# Patient Record
Sex: FEMALE | Race: WHITE
[De-identification: names, ages, dates, MRNs, and addresses within clinical notes are randomized per-mention and may not be internally consistent; named-entity substitution may affect disease eponyms.]

---

## 2018-10-27 ENCOUNTER — HOSPITAL ENCOUNTER (INPATIENT)
Dept: HOSPITAL 8 - ED | Age: 58
LOS: 4 days | Discharge: PSYCHIATRIC OR SUBSTANCE USE FACILITY | DRG: 917 | End: 2018-10-31
Attending: INTERNAL MEDICINE | Admitting: INTERNAL MEDICINE
Payer: MEDICAID

## 2018-10-27 VITALS — BODY MASS INDEX: 18.78 KG/M2 | WEIGHT: 123.9 LBS | HEIGHT: 68 IN

## 2018-10-27 VITALS — DIASTOLIC BLOOD PRESSURE: 94 MMHG | SYSTOLIC BLOOD PRESSURE: 141 MMHG

## 2018-10-27 DIAGNOSIS — T43.202A: Primary | ICD-10-CM

## 2018-10-27 DIAGNOSIS — G92: ICD-10-CM
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
## 2018-10-27 DIAGNOSIS — F32.9: ICD-10-CM

## 2018-10-27 DIAGNOSIS — R09.02: ICD-10-CM

## 2018-10-27 DIAGNOSIS — E87.0: ICD-10-CM

## 2018-10-27 DIAGNOSIS — F10.239: ICD-10-CM

## 2018-10-27 DIAGNOSIS — F10.229: ICD-10-CM

## 2018-10-27 DIAGNOSIS — E43: ICD-10-CM

## 2018-10-27 DIAGNOSIS — E87.6: ICD-10-CM

## 2018-10-27 LAB
ALBUMIN SERPL-MCNC: 3 G/DL (ref 3.4–5)
ALP SERPL-CCNC: 113 U/L (ref 45–117)
ALT SERPL-CCNC: 33 U/L (ref 12–78)
ANION GAP SERPL CALC-SCNC: 14 MMOL/L (ref 5–15)
APAP SERPL-MCNC: 3 MCG/ML (ref 10–30)
BASOPHILS # BLD AUTO: 0.01 X10^3/UL (ref 0–0.1)
BASOPHILS NFR BLD AUTO: 0 % (ref 0–1)
BILIRUB SERPL-MCNC: 0.2 MG/DL (ref 0.2–1)
CALCIUM SERPL-MCNC: 7.7 MG/DL (ref 8.5–10.1)
CHLORIDE SERPL-SCNC: 112 MMOL/L (ref 98–107)
CREAT SERPL-MCNC: 0.54 MG/DL (ref 0.55–1.02)
EOSINOPHIL # BLD AUTO: 0.16 X10^3/UL (ref 0–0.4)
EOSINOPHIL NFR BLD AUTO: 5 % (ref 1–7)
ERYTHROCYTE [DISTWIDTH] IN BLOOD BY AUTOMATED COUNT: 17.6 % (ref 9.6–15.2)
LYMPHOCYTES # BLD AUTO: 1.66 X10^3/UL (ref 1–3.4)
LYMPHOCYTES NFR BLD AUTO: 50 % (ref 22–44)
MCH RBC QN AUTO: 34 PG (ref 27–34.8)
MCHC RBC AUTO-ENTMCNC: 33.8 G/DL (ref 32.4–35.8)
MCV RBC AUTO: 100.3 FL (ref 80–100)
MD: NO
MONOCYTES # BLD AUTO: 0.26 X10^3/UL (ref 0.2–0.8)
MONOCYTES NFR BLD AUTO: 8 % (ref 2–9)
NEUTROPHILS # BLD AUTO: 1.26 X10^3/UL (ref 1.8–6.8)
NEUTROPHILS NFR BLD AUTO: 38 % (ref 42–75)
O2 FLOW: 2 L/MIN
PLATELET # BLD AUTO: 179 X10^3/UL (ref 130–400)
PMV BLD AUTO: 7.1 FL (ref 7.4–10.4)
PROT SERPL-MCNC: 5.9 G/DL (ref 6.4–8.2)
RBC # BLD AUTO: 4.2 X10^6/UL (ref 3.82–5.3)
VANCOMYCIN TROUGH SERPL-MCNC: 2.5 MG/DL (ref 2.8–20)

## 2018-10-27 PROCEDURE — 80307 DRUG TEST PRSMV CHEM ANLYZR: CPT

## 2018-10-27 PROCEDURE — 36415 COLL VENOUS BLD VENIPUNCTURE: CPT

## 2018-10-27 PROCEDURE — 83735 ASSAY OF MAGNESIUM: CPT

## 2018-10-27 PROCEDURE — 80329 ANALGESICS NON-OPIOID 1 OR 2: CPT

## 2018-10-27 PROCEDURE — 85025 COMPLETE CBC W/AUTO DIFF WBC: CPT

## 2018-10-27 PROCEDURE — 99291 CRITICAL CARE FIRST HOUR: CPT

## 2018-10-27 PROCEDURE — 96361 HYDRATE IV INFUSION ADD-ON: CPT

## 2018-10-27 PROCEDURE — C9113 INJ PANTOPRAZOLE SODIUM, VIA: HCPCS

## 2018-10-27 PROCEDURE — 36600 WITHDRAWAL OF ARTERIAL BLOOD: CPT

## 2018-10-27 PROCEDURE — 82803 BLOOD GASES ANY COMBINATION: CPT

## 2018-10-27 PROCEDURE — 90656 IIV3 VACC NO PRSV 0.5 ML IM: CPT

## 2018-10-27 PROCEDURE — 80053 COMPREHEN METABOLIC PANEL: CPT

## 2018-10-27 PROCEDURE — G0480 DRUG TEST DEF 1-7 CLASSES: HCPCS

## 2018-10-27 PROCEDURE — 87081 CULTURE SCREEN ONLY: CPT

## 2018-10-27 PROCEDURE — 84100 ASSAY OF PHOSPHORUS: CPT

## 2018-10-27 PROCEDURE — 93005 ELECTROCARDIOGRAM TRACING: CPT

## 2018-10-27 PROCEDURE — 96374 THER/PROPH/DIAG INJ IV PUSH: CPT

## 2018-10-27 RX ADMIN — DEXTROSE, SODIUM CHLORIDE, AND POTASSIUM CHLORIDE SCH MLS/HR: 5; .45; .15 INJECTION INTRAVENOUS at 23:28

## 2018-10-27 RX ADMIN — ENOXAPARIN SODIUM SCH MG: 40 INJECTION SUBCUTANEOUS at 21:54

## 2018-10-28 VITALS — SYSTOLIC BLOOD PRESSURE: 158 MMHG | DIASTOLIC BLOOD PRESSURE: 108 MMHG

## 2018-10-28 LAB
<PLATELET ESTIMATE>: ADEQUATE
<PLT MORPHOLOGY>: (no result)
ALBUMIN SERPL-MCNC: 3.2 G/DL (ref 3.4–5)
ALP SERPL-CCNC: 106 U/L (ref 45–117)
ALT SERPL-CCNC: 37 U/L (ref 12–78)
ANION GAP SERPL CALC-SCNC: 5 MMOL/L (ref 5–15)
ANISOCYTOSIS BLD QL SMEAR: (no result)
BAND#(MANUAL): 0.13 X10^3/UL
BILIRUB SERPL-MCNC: 0.6 MG/DL (ref 0.2–1)
CALCIUM SERPL-MCNC: 6.8 MG/DL (ref 8.5–10.1)
CHLORIDE SERPL-SCNC: 110 MMOL/L (ref 98–107)
CREAT SERPL-MCNC: 0.53 MG/DL (ref 0.55–1.02)
ERYTHROCYTE [DISTWIDTH] IN BLOOD BY AUTOMATED COUNT: 17 % (ref 9.6–15.2)
LYMPH#(MANUAL): 0.46 X10^3/UL (ref 1–3.4)
LYMPHS% (MANUAL): 7 % (ref 22–44)
MACROCYTES BLD QL SMEAR: (no result)
MCH RBC QN AUTO: 33.7 PG (ref 27–34.8)
MCHC RBC AUTO-ENTMCNC: 33.6 G/DL (ref 32.4–35.8)
MCV RBC AUTO: 100.3 FL (ref 80–100)
MD: YES
MONOS#(MANUAL): 0.2 X10^3/UL (ref 0.3–2.7)
MONOS% (MANUAL): 3 % (ref 2–9)
NEUTS BAND NFR BLD: 2 % (ref 0–7)
PLATELET # BLD AUTO: 178 X10^3/UL (ref 130–400)
PMV BLD AUTO: 7.6 FL (ref 7.4–10.4)
PROT SERPL-MCNC: 5.9 G/DL (ref 6.4–8.2)
RBC # BLD AUTO: 4.21 X10^6/UL (ref 3.82–5.3)
SEG#(MANUAL): 5.72 X10^3/UL (ref 1.8–6.8)
SEGS% (MANUAL): 88 % (ref 42–75)

## 2018-10-28 RX ADMIN — DEXTROSE, SODIUM CHLORIDE, AND POTASSIUM CHLORIDE SCH MLS/HR: 5; .45; .15 INJECTION INTRAVENOUS at 04:04

## 2018-10-28 RX ADMIN — ENOXAPARIN SODIUM SCH MG: 40 INJECTION SUBCUTANEOUS at 21:36

## 2018-10-28 RX ADMIN — PHENOBARBITAL SODIUM SCH MG: 65 INJECTION INTRAMUSCULAR; INTRAVENOUS at 19:33

## 2018-10-28 RX ADMIN — Medication SCH TAB: at 09:30

## 2018-10-29 VITALS — DIASTOLIC BLOOD PRESSURE: 77 MMHG | SYSTOLIC BLOOD PRESSURE: 115 MMHG

## 2018-10-29 VITALS — SYSTOLIC BLOOD PRESSURE: 129 MMHG | DIASTOLIC BLOOD PRESSURE: 85 MMHG

## 2018-10-29 VITALS — SYSTOLIC BLOOD PRESSURE: 106 MMHG | DIASTOLIC BLOOD PRESSURE: 70 MMHG

## 2018-10-29 LAB
ALBUMIN SERPL-MCNC: 3.4 G/DL (ref 3.4–5)
ALP SERPL-CCNC: 134 U/L (ref 45–117)
ALT SERPL-CCNC: 39 U/L (ref 12–78)
ANION GAP SERPL CALC-SCNC: 10 MMOL/L (ref 5–15)
BASOPHILS # BLD AUTO: 0.04 X10^3/UL (ref 0–0.1)
BASOPHILS NFR BLD AUTO: 1 % (ref 0–1)
BILIRUB SERPL-MCNC: 1 MG/DL (ref 0.2–1)
CALCIUM SERPL-MCNC: 7.8 MG/DL (ref 8.5–10.1)
CHLORIDE SERPL-SCNC: 105 MMOL/L (ref 98–107)
CREAT SERPL-MCNC: 0.45 MG/DL (ref 0.55–1.02)
EOSINOPHIL # BLD AUTO: 0.08 X10^3/UL (ref 0–0.4)
EOSINOPHIL NFR BLD AUTO: 1 % (ref 1–7)
ERYTHROCYTE [DISTWIDTH] IN BLOOD BY AUTOMATED COUNT: 16.9 % (ref 9.6–15.2)
LYMPHOCYTES # BLD AUTO: 0.99 X10^3/UL (ref 1–3.4)
LYMPHOCYTES NFR BLD AUTO: 17 % (ref 22–44)
MCH RBC QN AUTO: 33.6 PG (ref 27–34.8)
MCHC RBC AUTO-ENTMCNC: 33.6 G/DL (ref 32.4–35.8)
MCV RBC AUTO: 99.9 FL (ref 80–100)
MD: NO
MONOCYTES # BLD AUTO: 0.49 X10^3/UL (ref 0.2–0.8)
MONOCYTES NFR BLD AUTO: 8 % (ref 2–9)
NEUTROPHILS # BLD AUTO: 4.23 X10^3/UL (ref 1.8–6.8)
NEUTROPHILS NFR BLD AUTO: 73 % (ref 42–75)
PLATELET # BLD AUTO: 216 X10^3/UL (ref 130–400)
PMV BLD AUTO: 7.4 FL (ref 7.4–10.4)
PROT SERPL-MCNC: 6.8 G/DL (ref 6.4–8.2)
RBC # BLD AUTO: 5.12 X10^6/UL (ref 3.82–5.3)

## 2018-10-29 RX ADMIN — ACETAMINOPHEN PRN MG: 325 TABLET, FILM COATED ORAL at 18:24

## 2018-10-29 RX ADMIN — ENOXAPARIN SODIUM SCH MG: 40 INJECTION SUBCUTANEOUS at 20:21

## 2018-10-29 RX ADMIN — FOLIC ACID SCH MG: 1 TABLET ORAL at 08:29

## 2018-10-29 RX ADMIN — ACETAMINOPHEN PRN MG: 325 TABLET, FILM COATED ORAL at 01:31

## 2018-10-29 RX ADMIN — THIAMINE HYDROCHLORIDE SCH MLS/HR: 100 INJECTION, SOLUTION INTRAMUSCULAR; INTRAVENOUS at 09:03

## 2018-10-29 RX ADMIN — PHENOBARBITAL SODIUM SCH MG: 65 INJECTION INTRAMUSCULAR; INTRAVENOUS at 20:20

## 2018-10-29 RX ADMIN — PHENOBARBITAL SODIUM SCH MG: 65 INJECTION INTRAMUSCULAR; INTRAVENOUS at 08:26

## 2018-10-29 RX ADMIN — Medication SCH TAB: at 08:29

## 2018-10-30 VITALS — SYSTOLIC BLOOD PRESSURE: 157 MMHG | DIASTOLIC BLOOD PRESSURE: 102 MMHG

## 2018-10-30 VITALS — DIASTOLIC BLOOD PRESSURE: 80 MMHG | SYSTOLIC BLOOD PRESSURE: 122 MMHG

## 2018-10-30 VITALS — SYSTOLIC BLOOD PRESSURE: 122 MMHG | DIASTOLIC BLOOD PRESSURE: 76 MMHG

## 2018-10-30 VITALS — SYSTOLIC BLOOD PRESSURE: 120 MMHG | DIASTOLIC BLOOD PRESSURE: 84 MMHG

## 2018-10-30 VITALS — SYSTOLIC BLOOD PRESSURE: 128 MMHG | DIASTOLIC BLOOD PRESSURE: 88 MMHG

## 2018-10-30 VITALS — SYSTOLIC BLOOD PRESSURE: 124 MMHG | DIASTOLIC BLOOD PRESSURE: 83 MMHG

## 2018-10-30 VITALS — SYSTOLIC BLOOD PRESSURE: 150 MMHG | DIASTOLIC BLOOD PRESSURE: 84 MMHG

## 2018-10-30 VITALS — SYSTOLIC BLOOD PRESSURE: 120 MMHG | DIASTOLIC BLOOD PRESSURE: 87 MMHG

## 2018-10-30 RX ADMIN — ENOXAPARIN SODIUM SCH MG: 40 INJECTION SUBCUTANEOUS at 22:00

## 2018-10-30 RX ADMIN — ACETAMINOPHEN PRN MG: 325 TABLET, FILM COATED ORAL at 11:11

## 2018-10-30 RX ADMIN — PHENOBARBITAL SODIUM SCH MG: 65 INJECTION INTRAMUSCULAR; INTRAVENOUS at 08:43

## 2018-10-30 RX ADMIN — ACETAMINOPHEN PRN MG: 325 TABLET, FILM COATED ORAL at 15:24

## 2018-10-30 RX ADMIN — FOLIC ACID SCH MG: 1 TABLET ORAL at 08:43

## 2018-10-30 RX ADMIN — Medication SCH TAB: at 08:43

## 2018-10-30 RX ADMIN — ACETAMINOPHEN PRN MG: 325 TABLET, FILM COATED ORAL at 04:26

## 2018-10-30 RX ADMIN — ACETAMINOPHEN PRN MG: 325 TABLET, FILM COATED ORAL at 00:20

## 2018-10-30 RX ADMIN — THIAMINE HYDROCHLORIDE SCH MLS/HR: 100 INJECTION, SOLUTION INTRAMUSCULAR; INTRAVENOUS at 08:43

## 2018-10-31 VITALS — SYSTOLIC BLOOD PRESSURE: 129 MMHG | DIASTOLIC BLOOD PRESSURE: 86 MMHG

## 2018-12-15 ENCOUNTER — HOSPITAL ENCOUNTER (EMERGENCY)
Facility: MEDICAL CENTER | Age: 58
End: 2018-12-15

## 2018-12-15 VITALS
TEMPERATURE: 98 F | OXYGEN SATURATION: 94 % | RESPIRATION RATE: 20 BRPM | HEART RATE: 85 BPM | BODY MASS INDEX: 19.68 KG/M2 | SYSTOLIC BLOOD PRESSURE: 112 MMHG | DIASTOLIC BLOOD PRESSURE: 67 MMHG | WEIGHT: 125.66 LBS

## 2018-12-15 PROCEDURE — 302449 STATCHG TRIAGE ONLY (STATISTIC)

## 2018-12-15 ASSESSMENT — PAIN SCALES - GENERAL: PAINLEVEL_OUTOF10: 8

## 2018-12-15 NOTE — ED NOTES
Pt refusing to stay to see ERP. Pt signed form for leaving before physician contact. Pt ambulated to triage stable/steady gait A+O x4.

## 2019-01-25 ENCOUNTER — APPOINTMENT (OUTPATIENT)
Dept: RADIOLOGY | Facility: MEDICAL CENTER | Age: 59
End: 2019-01-25
Attending: EMERGENCY MEDICINE
Payer: MEDICAID

## 2019-01-25 ENCOUNTER — HOSPITAL ENCOUNTER (EMERGENCY)
Facility: MEDICAL CENTER | Age: 59
End: 2019-01-25
Attending: EMERGENCY MEDICINE
Payer: MEDICAID

## 2019-01-25 VITALS
DIASTOLIC BLOOD PRESSURE: 79 MMHG | BODY MASS INDEX: 22.77 KG/M2 | TEMPERATURE: 98.4 F | RESPIRATION RATE: 20 BRPM | HEART RATE: 82 BPM | HEIGHT: 67 IN | OXYGEN SATURATION: 93 % | SYSTOLIC BLOOD PRESSURE: 108 MMHG | WEIGHT: 145.06 LBS

## 2019-01-25 DIAGNOSIS — T23.321S: ICD-10-CM

## 2019-01-25 DIAGNOSIS — L08.9 FINGER INFECTION: ICD-10-CM

## 2019-01-25 LAB
ALBUMIN SERPL BCP-MCNC: 3.9 G/DL (ref 3.2–4.9)
ALBUMIN/GLOB SERPL: 1.2 G/DL
ALP SERPL-CCNC: 120 U/L (ref 30–99)
ALT SERPL-CCNC: 13 U/L (ref 2–50)
ANION GAP SERPL CALC-SCNC: 11 MMOL/L (ref 0–11.9)
APTT PPP: 33 SEC (ref 24.7–36)
AST SERPL-CCNC: 18 U/L (ref 12–45)
BASOPHILS # BLD AUTO: 0.8 % (ref 0–1.8)
BASOPHILS # BLD: 0.07 K/UL (ref 0–0.12)
BILIRUB SERPL-MCNC: 0.3 MG/DL (ref 0.1–1.5)
BLOOD CULTURE HOLD CXBCH: NORMAL
BUN SERPL-MCNC: 13 MG/DL (ref 8–22)
CALCIUM SERPL-MCNC: 9.6 MG/DL (ref 8.5–10.5)
CHLORIDE SERPL-SCNC: 103 MMOL/L (ref 96–112)
CO2 SERPL-SCNC: 24 MMOL/L (ref 20–33)
CREAT SERPL-MCNC: 0.52 MG/DL (ref 0.5–1.4)
EOSINOPHIL # BLD AUTO: 0.23 K/UL (ref 0–0.51)
EOSINOPHIL NFR BLD: 2.6 % (ref 0–6.9)
ERYTHROCYTE [DISTWIDTH] IN BLOOD BY AUTOMATED COUNT: 44 FL (ref 35.9–50)
GLOBULIN SER CALC-MCNC: 3.2 G/DL (ref 1.9–3.5)
GLUCOSE SERPL-MCNC: 102 MG/DL (ref 65–99)
GRAM STN SPEC: NORMAL
HCT VFR BLD AUTO: 43.4 % (ref 37–47)
HGB BLD-MCNC: 15 G/DL (ref 12–16)
IMM GRANULOCYTES # BLD AUTO: 0.06 K/UL (ref 0–0.11)
IMM GRANULOCYTES NFR BLD AUTO: 0.7 % (ref 0–0.9)
INR PPP: 0.92 (ref 0.87–1.13)
LYMPHOCYTES # BLD AUTO: 1.93 K/UL (ref 1–4.8)
LYMPHOCYTES NFR BLD: 22 % (ref 22–41)
MCH RBC QN AUTO: 32.8 PG (ref 27–33)
MCHC RBC AUTO-ENTMCNC: 34.6 G/DL (ref 33.6–35)
MCV RBC AUTO: 95 FL (ref 81.4–97.8)
MONOCYTES # BLD AUTO: 0.62 K/UL (ref 0–0.85)
MONOCYTES NFR BLD AUTO: 7.1 % (ref 0–13.4)
NEUTROPHILS # BLD AUTO: 5.87 K/UL (ref 2–7.15)
NEUTROPHILS NFR BLD: 66.8 % (ref 44–72)
NRBC # BLD AUTO: 0 K/UL
NRBC BLD-RTO: 0 /100 WBC
PLATELET # BLD AUTO: 375 K/UL (ref 164–446)
PMV BLD AUTO: 8.9 FL (ref 9–12.9)
POTASSIUM SERPL-SCNC: 3.7 MMOL/L (ref 3.6–5.5)
PROT SERPL-MCNC: 7.1 G/DL (ref 6–8.2)
PROTHROMBIN TIME: 12.5 SEC (ref 12–14.6)
RBC # BLD AUTO: 4.57 M/UL (ref 4.2–5.4)
SIGNIFICANT IND 70042: NORMAL
SITE SITE: NORMAL
SODIUM SERPL-SCNC: 138 MMOL/L (ref 135–145)
SOURCE SOURCE: NORMAL
WBC # BLD AUTO: 8.8 K/UL (ref 4.8–10.8)

## 2019-01-25 PROCEDURE — 85025 COMPLETE CBC W/AUTO DIFF WBC: CPT

## 2019-01-25 PROCEDURE — 36415 COLL VENOUS BLD VENIPUNCTURE: CPT

## 2019-01-25 PROCEDURE — 700111 HCHG RX REV CODE 636 W/ 250 OVERRIDE (IP)

## 2019-01-25 PROCEDURE — 160048 HCHG OR STATISTICAL LEVEL 1-5: Performed by: ORTHOPAEDIC SURGERY

## 2019-01-25 PROCEDURE — 99291 CRITICAL CARE FIRST HOUR: CPT

## 2019-01-25 PROCEDURE — 87205 SMEAR GRAM STAIN: CPT

## 2019-01-25 PROCEDURE — 700105 HCHG RX REV CODE 258: Performed by: EMERGENCY MEDICINE

## 2019-01-25 PROCEDURE — 71045 X-RAY EXAM CHEST 1 VIEW: CPT

## 2019-01-25 PROCEDURE — 501838 HCHG SUTURE GENERAL: Performed by: ORTHOPAEDIC SURGERY

## 2019-01-25 PROCEDURE — 90715 TDAP VACCINE 7 YRS/> IM: CPT | Performed by: EMERGENCY MEDICINE

## 2019-01-25 PROCEDURE — 87070 CULTURE OTHR SPECIMN AEROBIC: CPT

## 2019-01-25 PROCEDURE — 96374 THER/PROPH/DIAG INJ IV PUSH: CPT | Mod: XU

## 2019-01-25 PROCEDURE — 90471 IMMUNIZATION ADMIN: CPT

## 2019-01-25 PROCEDURE — 96375 TX/PRO/DX INJ NEW DRUG ADDON: CPT | Mod: XU

## 2019-01-25 PROCEDURE — 500891 HCHG PACK, ORTHO MAJOR: Performed by: ORTHOPAEDIC SURGERY

## 2019-01-25 PROCEDURE — 85610 PROTHROMBIN TIME: CPT

## 2019-01-25 PROCEDURE — 160009 HCHG ANES TIME/MIN: Performed by: ORTHOPAEDIC SURGERY

## 2019-01-25 PROCEDURE — A9270 NON-COVERED ITEM OR SERVICE: HCPCS | Performed by: ANESTHESIOLOGY

## 2019-01-25 PROCEDURE — 85730 THROMBOPLASTIN TIME PARTIAL: CPT

## 2019-01-25 PROCEDURE — 73130 X-RAY EXAM OF HAND: CPT | Mod: RT

## 2019-01-25 PROCEDURE — 160036 HCHG PACU - EA ADDL 30 MINS PHASE I: Performed by: ORTHOPAEDIC SURGERY

## 2019-01-25 PROCEDURE — 160002 HCHG RECOVERY MINUTES (STAT): Performed by: ORTHOPAEDIC SURGERY

## 2019-01-25 PROCEDURE — 160027 HCHG SURGERY MINUTES - 1ST 30 MINS LEVEL 2: Performed by: ORTHOPAEDIC SURGERY

## 2019-01-25 PROCEDURE — 700111 HCHG RX REV CODE 636 W/ 250 OVERRIDE (IP): Performed by: EMERGENCY MEDICINE

## 2019-01-25 PROCEDURE — 160035 HCHG PACU - 1ST 60 MINS PHASE I: Performed by: ORTHOPAEDIC SURGERY

## 2019-01-25 PROCEDURE — 700102 HCHG RX REV CODE 250 W/ 637 OVERRIDE(OP): Performed by: ANESTHESIOLOGY

## 2019-01-25 PROCEDURE — 700111 HCHG RX REV CODE 636 W/ 250 OVERRIDE (IP): Performed by: ANESTHESIOLOGY

## 2019-01-25 PROCEDURE — 80053 COMPREHEN METABOLIC PANEL: CPT

## 2019-01-25 RX ORDER — DIPHENHYDRAMINE HYDROCHLORIDE 50 MG/ML
12.5 INJECTION INTRAMUSCULAR; INTRAVENOUS
Status: DISCONTINUED | OUTPATIENT
Start: 2019-01-25 | End: 2019-01-26 | Stop reason: HOSPADM

## 2019-01-25 RX ORDER — IPRATROPIUM BROMIDE AND ALBUTEROL SULFATE 2.5; .5 MG/3ML; MG/3ML
3 SOLUTION RESPIRATORY (INHALATION)
Status: DISCONTINUED | OUTPATIENT
Start: 2019-01-25 | End: 2019-01-26 | Stop reason: HOSPADM

## 2019-01-25 RX ORDER — HYDROMORPHONE HYDROCHLORIDE 1 MG/ML
0.2 INJECTION, SOLUTION INTRAMUSCULAR; INTRAVENOUS; SUBCUTANEOUS
Status: DISCONTINUED | OUTPATIENT
Start: 2019-01-25 | End: 2019-01-26 | Stop reason: HOSPADM

## 2019-01-25 RX ORDER — MIDAZOLAM HYDROCHLORIDE 1 MG/ML
1 INJECTION INTRAMUSCULAR; INTRAVENOUS
Status: DISCONTINUED | OUTPATIENT
Start: 2019-01-25 | End: 2019-01-26 | Stop reason: HOSPADM

## 2019-01-25 RX ORDER — OXYCODONE HYDROCHLORIDE 5 MG/1
5 TABLET ORAL EVERY 4 HOURS PRN
Qty: 20 TAB | Refills: 0 | Status: SHIPPED | OUTPATIENT
Start: 2019-01-25 | End: 2019-01-30

## 2019-01-25 RX ORDER — SULFAMETHOXAZOLE AND TRIMETHOPRIM 800; 160 MG/1; MG/1
1 TABLET ORAL 2 TIMES DAILY
Qty: 20 TAB | Refills: 0 | Status: SHIPPED | OUTPATIENT
Start: 2019-01-25 | End: 2019-09-09

## 2019-01-25 RX ORDER — TRAZODONE HYDROCHLORIDE 100 MG/1
100 TABLET ORAL NIGHTLY PRN
COMMUNITY
End: 2021-04-13

## 2019-01-25 RX ORDER — HALOPERIDOL 5 MG/ML
1 INJECTION INTRAMUSCULAR
Status: DISCONTINUED | OUTPATIENT
Start: 2019-01-25 | End: 2019-01-26 | Stop reason: HOSPADM

## 2019-01-25 RX ORDER — HYDRALAZINE HYDROCHLORIDE 20 MG/ML
5 INJECTION INTRAMUSCULAR; INTRAVENOUS
Status: DISCONTINUED | OUTPATIENT
Start: 2019-01-25 | End: 2019-01-26 | Stop reason: HOSPADM

## 2019-01-25 RX ORDER — ONDANSETRON 2 MG/ML
4 INJECTION INTRAMUSCULAR; INTRAVENOUS
Status: DISCONTINUED | OUTPATIENT
Start: 2019-01-25 | End: 2019-01-26 | Stop reason: HOSPADM

## 2019-01-25 RX ORDER — QUETIAPINE FUMARATE 400 MG/1
400 TABLET, FILM COATED ORAL NIGHTLY PRN
COMMUNITY
End: 2021-04-13

## 2019-01-25 RX ORDER — MEPERIDINE HYDROCHLORIDE 25 MG/ML
12.5 INJECTION INTRAMUSCULAR; INTRAVENOUS; SUBCUTANEOUS
Status: DISCONTINUED | OUTPATIENT
Start: 2019-01-25 | End: 2019-01-26 | Stop reason: HOSPADM

## 2019-01-25 RX ORDER — OXYCODONE HCL 5 MG/5 ML
5 SOLUTION, ORAL ORAL
Status: COMPLETED | OUTPATIENT
Start: 2019-01-25 | End: 2019-01-25

## 2019-01-25 RX ORDER — OXYCODONE HCL 5 MG/5 ML
10 SOLUTION, ORAL ORAL
Status: COMPLETED | OUTPATIENT
Start: 2019-01-25 | End: 2019-01-25

## 2019-01-25 RX ORDER — HYDROMORPHONE HYDROCHLORIDE 1 MG/ML
0.5 INJECTION, SOLUTION INTRAMUSCULAR; INTRAVENOUS; SUBCUTANEOUS ONCE
Status: COMPLETED | OUTPATIENT
Start: 2019-01-25 | End: 2019-01-25

## 2019-01-25 RX ORDER — SODIUM CHLORIDE, SODIUM LACTATE, POTASSIUM CHLORIDE, CALCIUM CHLORIDE 600; 310; 30; 20 MG/100ML; MG/100ML; MG/100ML; MG/100ML
INJECTION, SOLUTION INTRAVENOUS CONTINUOUS
Status: DISCONTINUED | OUTPATIENT
Start: 2019-01-25 | End: 2019-01-26 | Stop reason: HOSPADM

## 2019-01-25 RX ORDER — ONDANSETRON 2 MG/ML
4 INJECTION INTRAMUSCULAR; INTRAVENOUS ONCE
Status: COMPLETED | OUTPATIENT
Start: 2019-01-25 | End: 2019-01-25

## 2019-01-25 RX ORDER — MIRTAZAPINE 45 MG/1
45 TABLET, FILM COATED ORAL NIGHTLY PRN
COMMUNITY
End: 2021-04-13

## 2019-01-25 RX ORDER — MIDAZOLAM HYDROCHLORIDE 1 MG/ML
INJECTION INTRAMUSCULAR; INTRAVENOUS
Status: DISCONTINUED
Start: 2019-01-25 | End: 2019-01-26 | Stop reason: HOSPADM

## 2019-01-25 RX ORDER — HYDROMORPHONE HYDROCHLORIDE 1 MG/ML
0.1 INJECTION, SOLUTION INTRAMUSCULAR; INTRAVENOUS; SUBCUTANEOUS
Status: DISCONTINUED | OUTPATIENT
Start: 2019-01-25 | End: 2019-01-26 | Stop reason: HOSPADM

## 2019-01-25 RX ORDER — HYDROMORPHONE HYDROCHLORIDE 1 MG/ML
0.4 INJECTION, SOLUTION INTRAMUSCULAR; INTRAVENOUS; SUBCUTANEOUS
Status: DISCONTINUED | OUTPATIENT
Start: 2019-01-25 | End: 2019-01-26 | Stop reason: HOSPADM

## 2019-01-25 RX ORDER — MAGNESIUM HYDROXIDE 1200 MG/15ML
LIQUID ORAL
Status: COMPLETED | OUTPATIENT
Start: 2019-01-25 | End: 2019-01-25

## 2019-01-25 RX ORDER — SODIUM CHLORIDE 9 MG/ML
INJECTION, SOLUTION INTRAVENOUS CONTINUOUS
Status: DISCONTINUED | OUTPATIENT
Start: 2019-01-25 | End: 2019-01-26 | Stop reason: HOSPADM

## 2019-01-25 RX ADMIN — VANCOMYCIN HYDROCHLORIDE 1600 MG: 100 INJECTION, POWDER, LYOPHILIZED, FOR SOLUTION INTRAVENOUS at 19:51

## 2019-01-25 RX ADMIN — FENTANYL CITRATE 50 MCG: 50 INJECTION, SOLUTION INTRAMUSCULAR; INTRAVENOUS at 19:51

## 2019-01-25 RX ADMIN — ONDANSETRON 4 MG: 2 INJECTION INTRAMUSCULAR; INTRAVENOUS at 17:50

## 2019-01-25 RX ADMIN — HYDROMORPHONE HYDROCHLORIDE 0.5 MG: 1 INJECTION, SOLUTION INTRAMUSCULAR; INTRAVENOUS; SUBCUTANEOUS at 17:50

## 2019-01-25 RX ADMIN — AMPICILLIN SODIUM AND SULBACTAM SODIUM 3 G: 2; 1 INJECTION, POWDER, FOR SOLUTION INTRAMUSCULAR; INTRAVENOUS at 18:10

## 2019-01-25 RX ADMIN — OXYCODONE HYDROCHLORIDE 10 MG: 5 SOLUTION ORAL at 19:15

## 2019-01-25 RX ADMIN — SODIUM CHLORIDE: 9 INJECTION, SOLUTION INTRAVENOUS at 17:49

## 2019-01-25 RX ADMIN — CLOSTRIDIUM TETANI TOXOID ANTIGEN (FORMALDEHYDE INACTIVATED), CORYNEBACTERIUM DIPHTHERIAE TOXOID ANTIGEN (FORMALDEHYDE INACTIVATED), BORDETELLA PERTUSSIS TOXOID ANTIGEN (GLUTARALDEHYDE INACTIVATED), BORDETELLA PERTUSSIS FILAMENTOUS HEMAGGLUTININ ANTIGEN (FORMALDEHYDE INACTIVATED), BORDETELLA PERTUSSIS PERTACTIN ANTIGEN, AND BORDETELLA PERTUSSIS FIMBRIAE 2/3 ANTIGEN 0.5 ML: 5; 2; 2.5; 5; 3; 5 INJECTION, SUSPENSION INTRAMUSCULAR at 18:09
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
## 2019-01-26 NOTE — ED NOTES
.Patient ambulated from waiting room to ED room with steady gait. Pus noted to be draining off patient's wound. Wound swab collected and sent to lab by RN. Patient gowned and warm blankets provided.

## 2019-01-26 NOTE — DISCHARGE INSTRUCTIONS
ACTIVITY: Rest and take it easy for the first 24 hours.  A responsible adult is recommended to remain with you during that time.  It is normal to feel sleepy.  We encourage you to not do anything that requires balance, judgment or coordination.    MILD FLU-LIKE SYMPTOMS ARE NORMAL. YOU MAY EXPERIENCE GENERALIZED MUSCLE ACHES, THROAT IRRITATION, HEADACHE AND/OR SOME NAUSEA.    FOR 24 HOURS DO NOT:  Drive, operate machinery or run household appliances.  Drink beer or alcoholic beverages.   Make important decisions or sign legal documents.    SPECIAL INSTRUCTIONS:    ACTIVITY:  The patient should remain full weightbearing     PAIN CONTROL:  The patient has been prescribed a narcotic pain medication.  Side effects of narcotics pain medications include sedation and constipation.  To prevent constipation, it is recommended that the patient take an over the counter stool softener (such as Colace or Senna) and use laxatives as needed to prevent constipation.  Take these over the counter medications as directed by the packaging.  The patient may not drive while taking narcotic pain medication.  The patient should wean off their prescription pain medication by taking over the counter analgesics (such as Tylenol, Advil, Ibuprofen, etc).  Use caution when taking acetaminophen (Tylenol), as some narcotic medications contain acetaminophen.  The total dose of acetaminophen should not exceed 3000 mg daily.    WOUND CARE:  The patient has a surgical wound which was closed with staples or sutures.  These need to be removed 10-14 days after surgery.  If the patient is not in a splint or cast, the operative dressing should be removed 2 days after surgery, and dry gauze dressing changes should be performed daily after that.       DIET: To avoid nausea, slowly advance diet as tolerated, avoiding spicy or greasy foods for the first day.  Add more substantial food to your diet according to your physician's instructions.  INCREASE FLUIDS  AND FIBER TO AVOID CONSTIPATION.    SURGICAL DRESSING/BATHING:  You may shower when the operative dressing is removed.      FOLLOW-UP APPOINTMENT:  A follow-up appointment should be arranged with your doctor in 1-2 WEEKS; call to schedule.    You should CALL YOUR PHYSICIAN if you develop:  Fever greater than 101 degrees F.  Pain not relieved by medication, or persistent nausea or vomiting.  Excessive bleeding (blood soaking through dressing) or unexpected drainage from the wound.  Extreme redness or swelling around the incision site, drainage of pus or foul smelling drainage.  Inability to urinate or empty your bladder within 8 hours.  Problems with breathing or chest pain.    You should call 911 if you develop problems with breathing or chest pain.  If you are unable to contact your doctor or surgical center, you should go to the nearest emergency room or urgent care center.  Physician's telephone #: [phone]    If any questions arise, call your doctor.  If your doctor is not available, please feel free to call the Surgical Center at [phone].  The Center is open Monday through Friday from 7AM to 7PM.  You can also call the [facility] HOTLINE open 24 hours/day, 7 days/week and speak to a nurse at [phone], or toll free at [phone].    A registered nurse may call you a few days after your surgery to see how you are doing after your procedure.    MEDICATIONS: Resume taking daily medication.  Take prescribed pain medication with food.  If no medication is prescribed, you may take non-aspirin pain medication if needed.  PAIN MEDICATION CAN BE VERY CONSTIPATING.  Take a stool softener or laxative such as senokot, pericolace, or milk of magnesia if needed.    Prescription given for Oxycodone (pain) Bactrim(antibiotic).  Last pain medication given at 7:15.    If your physician has prescribed pain medication that includes Acetaminophen (Tylenol), do not take additional Acetaminophen (Tylenol) while taking  the prescribed medication.    Depression / Suicide Risk    As you are discharged from this [facility] facility, it is important to learn how to keep safe from harming yourself.    Recognize the warning signs:  · Abrupt changes in personality, positive or negative- including increase in energy   · Giving away possessions  · Change in eating patterns- significant weight changes-  positive or negative  · Change in sleeping patterns- unable to sleep or sleeping all the time   · Unwillingness or inability to communicate  · Depression  · Unusual sadness, discouragement and loneliness  · Talk of wanting to die  · Neglect of personal appearance   · Rebelliousness- reckless behavior  · Withdrawal from people/activities they love  · Confusion- inability to concentrate     If you or a loved one observes any of these behaviors or has concerns about self-harm, here's what you can do:  · Talk about it- your feelings and reasons for harming yourself  · Remove any means that you might use to hurt yourself (examples: pills, rope, extension cords, firearm)  · Get professional help from the community (Mental Health, Substance Abuse, psychological counseling)  · Do not be alone:Call your Safe Contact- someone whom you trust who will be there for you.  · Call your local CRISIS HOTLINE 855-6138 or 282-044-0586  · Call your local Children's Mobile Crisis Response Team [place] (212) 869-7421 or www.[employer]  · Call the toll free National Suicide Prevention Hotlines   · National Suicide Prevention Lifeline 706-636-FVRX (0027)  · National Hope Line Network 800-SUICIDE (002-1083)        Sulfamethoxazole; Trimethoprim, SMX-TMP tablets  What is this medicine?  SULFAMETHOXAZOLE; TRIMETHOPRIM or SMX-TMP (suhl fuh meth OK charlotte zohl; trye METH oh prim) is a combination of a sulfonamide antibiotic and a second antibiotic, trimethoprim. It is used to treat or prevent certain kinds of bacterial infections. It will not work for colds,  flu, or other viral infections.  This medicine may be used for other purposes; ask your health care provider or pharmacist if you have questions.  COMMON BRAND NAME(S): Bacter-Aid DS, Bactrim, Bactrim DS, Septra, Septra DS  What should I tell my health care provider before I take this medicine?  They need to know if you have any of these conditions:  -anemia  -asthma  -being treated with anticonvulsants  -if you frequently drink alcohol containing drinks  -kidney disease  -liver disease  -low level of folic acid or glucose-6-phosphate dehydrogenase  -poor nutrition or malabsorption  -porphyria  -severe allergies  -thyroid disorder  -an unusual or allergic reaction to sulfamethoxazole, trimethoprim, sulfa drugs, other medicines, foods, dyes, or preservatives  -pregnant or trying to get pregnant  -breast-feeding  How should I use this medicine?  Take this medicine by mouth with a full glass of water. Follow the directions on the prescription label. Take your medicine at regular intervals. Do not take it more often than directed. Do not skip doses or stop your medicine early.  Talk to your pediatrician regarding the use of this medicine in children. Special care may be needed. This medicine has been used in children as young as 2 months of age.  Overdosage: If you think you have taken too much of this medicine contact a poison control center or emergency room at once.  NOTE: This medicine is only for you. Do not share this medicine with others.  What if I miss a dose?  If you miss a dose, take it as soon as you can. If it is almost time for your next dose, take only that dose. Do not take double or extra doses.  What may interact with this medicine?  Do not take this medicine with any of the following medications:  -aminobenzoate potassium  -dofetilide  -metronidazole  This medicine may also interact with the following medications:  -ACE inhibitors like benazepril, enalapril, lisinopril, and ramipril  -birth control  pills  -cyclosporine  -digoxin  -diuretics  -indomethacin  -medicines for diabetes  -methenamine  -methotrexate  -phenytoin  -potassium supplements  -pyrimethamine  -sulfinpyrazone  -tricyclic antidepressants  -warfarin  This list may not describe all possible interactions. Give your health care provider a list of all the medicines, herbs, non-prescription drugs, or dietary supplements you use. Also tell them if you smoke, drink alcohol, or use illegal drugs. Some items may interact with your medicine.  What should I watch for while using this medicine?  Tell your doctor or health care professional if your symptoms do not improve. Drink several glasses of water a day to reduce the risk of kidney problems.  Do not treat diarrhea with over the counter products. Contact your doctor if you have diarrhea that lasts more than 2 days or if it is severe and watery.  This medicine can make you more sensitive to the sun. Keep out of the sun. If you cannot avoid being in the sun, wear protective clothing and use a sunscreen. Do not use sun lamps or tanning beds/booths.  What side effects may I notice from receiving this medicine?  Side effects that you should report to your doctor or health care professional as soon as possible:  -allergic reactions like skin rash or hives, swelling of the face, lips, or tongue  -breathing problems  -fever or chills, sore throat  -irregular heartbeat, chest pain  -joint or muscle pain  -pain or difficulty passing urine  -red pinpoint spots on skin  -redness, blistering, peeling or loosening of the skin, including inside the mouth  -unusual bleeding or bruising  -unusually weak or tired  -yellowing of the eyes or skin  Side effects that usually do not require medical attention (report to your doctor or health care professional if they continue or are bothersome):  -diarrhea  -dizziness  -headache  -loss of appetite  -nausea, vomiting  -nervousness  This list may not describe all possible side  effects. Call your doctor for medical advice about side effects. You may report side effects to FDA at 5-456-XAK-0567.  Where should I keep my medicine?  Keep out of the reach of children.  Store at room temperature between 20 to 25 degrees C (68 to 77 degrees F). Protect from light. Throw away any unused medicine after the expiration date.  NOTE: This sheet is a summary. It may not cover all possible information. If you have questions about this medicine, talk to your doctor, pharmacist, or health care provider.  © 2018 [employer] (2014-07-25 14:38:26)

## 2019-01-26 NOTE — OR NURSING
Pt on room air.  No c/o nausea, tolerating PO fluids and medication. Pt tolerated yogurt and fruit.  Before discharging pt administered Vancomycin IV over 2 hours.  Right ring finger dressing CDI, elevated on pillow.  Pain tolerable per pt.  Pt up to bathroom, tolerated well.   Additional dressing supplies provided to pt.  Discharge instructions discussed with pt and her daughter.  VSS, afebrile, MUKHERJEE, A/O x4.  Prescriptions filled while waiting.  Pt discharged via wheelchair.
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
## 2019-01-26 NOTE — OR NURSING
Pt's daughter at bedside.  Prescriptions provided to fill while waiting for Vancomycin IV gtt to complete before discharge.

## 2019-01-27 LAB
BACTERIA WND AEROBE CULT: ABNORMAL
BACTERIA WND AEROBE CULT: ABNORMAL
GRAM STN SPEC: ABNORMAL
SIGNIFICANT IND 70042: ABNORMAL
SITE SITE: ABNORMAL
SOURCE SOURCE: ABNORMAL

## 2019-06-21 ENCOUNTER — OFFICE VISIT (OUTPATIENT)
Dept: URGENT CARE | Facility: CLINIC | Age: 59
End: 2019-06-21
Payer: MEDICAID

## 2019-06-21 ENCOUNTER — APPOINTMENT (OUTPATIENT)
Dept: RADIOLOGY | Facility: IMAGING CENTER | Age: 59
End: 2019-06-21
Attending: PHYSICIAN ASSISTANT
Payer: MEDICAID

## 2019-06-21 VITALS
OXYGEN SATURATION: 94 % | BODY MASS INDEX: 23.23 KG/M2 | RESPIRATION RATE: 18 BRPM | DIASTOLIC BLOOD PRESSURE: 88 MMHG | HEIGHT: 67 IN | WEIGHT: 148 LBS | HEART RATE: 82 BPM | TEMPERATURE: 97.2 F | SYSTOLIC BLOOD PRESSURE: 122 MMHG
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
## 2019-06-21 DIAGNOSIS — M79.644 PAIN OF RIGHT THUMB: ICD-10-CM

## 2019-06-21 DIAGNOSIS — S62.524A CLOSED NONDISPLACED FRACTURE OF DISTAL PHALANX OF RIGHT THUMB, INITIAL ENCOUNTER: ICD-10-CM

## 2019-06-21 PROCEDURE — 73140 X-RAY EXAM OF FINGER(S): CPT | Mod: TC,RT | Performed by: PHYSICIAN ASSISTANT

## 2019-06-21 PROCEDURE — 99204 OFFICE O/P NEW MOD 45 MIN: CPT | Performed by: PHYSICIAN ASSISTANT

## 2019-06-21 ASSESSMENT — ENCOUNTER SYMPTOMS
VISUAL CHANGE: 0
SORE THROAT: 0
FEVER: 0
EYE DISCHARGE: 0
WEAKNESS: 0
NAUSEA: 0
NUMBNESS: 0
VOMITING: 0
SHORTNESS OF BREATH: 0
HEADACHES: 1
EYE REDNESS: 0
COUGH: 0

## 2019-06-21 NOTE — PROGRESS NOTES
Subjective:      Nathalie Aguilar is a 58 y.o. female who presents with Finger Injury ( fractured thumb (R) -x1.5 weeks- has had x-ray,exp pain)          Hand Injury   This is a new problem. Episode onset: x 1 week ago. The problem occurs constantly. The problem has been unchanged. Associated symptoms include headaches (intermittent headaches). Pertinent negatives include no chest pain, congestion, coughing, fever, nausea, numbness, rash, sore throat, visual change, vomiting or weakness. Exacerbated by: movement of rihgt thumb. She has tried nothing for the symptoms.      The patient presents to clinic c/o a fracture to her right thumb. The patient states she fell approx. 1 week ago, injuring her right thumb. The patient states her fall was due to alcohol intoxication. The patient was seen by her PCP after the fall who ordered an XR of her right thumb. The patient states prior to receiving the results of her XR she checked herself into [facility] for alcohol detox. The patient reports continued pain to her right thumb. The patient states she has not been wearing a splint for her fracture. She reports associated swelling and bruising to her left thumb. She also reports increased pain with movement. The patient has been taking NSAIDs for her pain.     The patient also states she hit her head and bruised her ribs as a result of the fall. Unknown LOC. She reports continued bump to her head with associated tenderness. The patient also reports intermittent headaches. She denies vision changes, neck pain, numbness, tingling and weakness to her extremities, dizziness, and nausea/vomiting. She also denies shortness of breath, cough, and chest pain.    PMH:  has a past medical history of ASTHMA; Cancer (HCC); Heroin addiction (HCC); Pneumonia; and Psychiatric disorder.  MEDS:   Current Outpatient Prescriptions:   •  Venlafaxine HCl (EFFEXOR PO), Take  by mouth., Disp: , Rfl:   •  ClonazePAM (KLONOPIN PO), Take  by mouth.,  "Disp: , Rfl:   •  Mirtazapine (REMERON PO), Take  by mouth., Disp: , Rfl:   •  TRAZODONE HCL PO, Take  by mouth., Disp: , Rfl:   •  QUEtiapine Fumarate (SEROQUEL PO), Take  by mouth., Disp: , Rfl:   •  sulfamethoxazole-trimethoprim (BACTRIM DS) 800-160 MG tablet, Take 1 Tab by mouth 2 times a day., Disp: 20 Tab, Rfl: 0  •  ibuprofen (MOTRIN) 800 MG TABS, Take 1 Tab by mouth every 8 hours as needed for Mild Pain (pain)., Disp: 30 Each, Rfl: 0  ALLERGIES: No Known Allergies  SURGHX:   Past Surgical History:   Procedure Laterality Date   • INCISION AND DRAINAGE ORTHOPEDIC Right 1/25/2019    Procedure: INCISION AND DRAINAGE ORTHOPEDIC - HAND;  Surgeon: Kendrick Campbell M.D.;  Location: SURGERY [facility];  Service: Orthopedics   • GYN SURGERY      partial hysterectomy     SOCHX:  reports that she has been smoking.  She has never used smokeless tobacco. She reports that she drinks alcohol. She reports that she does not use drugs.  FH: Family history was reviewed, no pertinent findings to report      Review of Systems   Constitutional: Negative for fever.   HENT: Negative for congestion, ear pain and sore throat.    Eyes: Negative for discharge and redness.   Respiratory: Negative for cough and shortness of breath.    Cardiovascular: Negative for chest pain and leg swelling.   Gastrointestinal: Negative for nausea and vomiting.   Genitourinary: Negative for dysuria.   Musculoskeletal: Positive for joint pain.   Skin: Negative for rash.   Neurological: Positive for headaches (intermittent headaches). Negative for weakness and numbness.   All other systems reviewed and are negative.         Objective:     /88 (BP Location: Right arm)   Pulse 82   Temp 36.2 °C (97.2 °F) (Temporal)   Resp 18   Ht 1.702 m (5' 7\")   Wt 67.1 kg (148 lb)   SpO2 94%   BMI 23.18 kg/m²      Physical Exam   Constitutional: She is oriented to person, place, and time. She appears well-developed and well-nourished. No distress. "   HENT:   Head: Normocephalic. Head is with contusion. Head is without raccoon's eyes and without Chandler's sign.       Right Ear: External ear normal.   Left Ear: External ear normal.   Nose: Nose normal.   Mouth/Throat: Oropharynx is clear and moist.   Head:  Localized swelling/bump to the right parietal region with mild tenderness to palpation.    Eyes: Pupils are equal, round, and reactive to light. Conjunctivae and EOM are normal.   Neck: Normal range of motion. Neck supple.   Cardiovascular: Normal rate, regular rhythm and normal heart sounds.    Pulmonary/Chest: Effort normal and breath sounds normal.       Ribs:  Ecchymosis to left lateral ribs in various stages of healing.   No tenderness to palpation.   Musculoskeletal:        Hands:  Right Thumb:   Tenderness to the right thumb with associated swelling and ecchymosis  Decreased ROM secondary to pain and swelling.  Neurovascular intact   strength 5/5  Intrinsic muscles intact   Neurological: She is alert and oriented to person, place, and time. She displays tremor. No cranial nerve deficit or sensory deficit. Gait normal.   Skin: Skin is warm and dry.        Progress:  Right Thumb XR:  FINDINGS:  There is an acute nondisplaced comminuted fracture involving the base of the distal phalanx of the right thumb. The fracture involves the articular surface.  No dislocation is present.   Impression       Acute comminuted relatively nondisplaced fracture of the base of the distal phalanx of the right thumb. The fracture involves the articular surface.     Thumb Spica Splint applied by MA and checked by me.   Assessment/Plan:     1. Fall, initial encounter    2. Closed nondisplaced fracture of distal phalanx of right thumb, initial encounter    - DX-FINGER(S) 2+ RIGHT; Future  - REFERRAL TO ORTHOPEDICS    The patient's presenting symptoms and physical exam are consistent with an acute injury to her right thumb. The patient's right thumb XR today in clinic showed  an acute comminuted relatively non-displaced fracture of the base of the distal phalanx of the right thumb, which involves the articular surface. Will place the patient in a thumb spica splint. Will refer the patient to Ortho for further evaluation and treatment of her fracture.  On physical exam, the patient had swelling and tenderness to the right parietal region of her scalp. Given the patient's head injury with unknown LOC, I recommended the patient be transferred to the [facility] ED for further evaluation. The patient declined transfer to the [facility] ED for further evaluation, stating she would like to return to [facility]. I explained the associated risks of not seeking further care with the patient, including bleeding on the brain, and she verbalized understanding. The patient is voluntarily checked into [facility] for alcohol detox. The patient is alert and oriented, and demonstrates appropriate decision making capacity, and despite knowing the associated risks of not seeking further care, is choosing to go back to [facility] at this time. The [facility] representative accompanying the patient states the patient will be re-evaluated by the treatment team at [facility] and transported to the ED at that time if needed. Again, the patient states she would like to return to [facility].  Discussed strict ED return precautions with the patient.     OTC NSAIDS for pain/discomfort  RICE Therapy - rest, ice, compression, and elevation for symptomatic relief   Wear splint until seen by Ortho  Follow-up with Ortho  Follow-up with PCP as needed  Return to clinic or go to the ED if symptoms worsen or fail to improve, or if the patient should develop worsening/increasing right thumb pain, increased swelling, headache, vision changes, numbness, tingling, or weakness to her extremities, dizziness, nausea/vomiting, fever/chills, and/or any concerning symptoms.     Discussed plan with the patient, and she agrees to the above.

## 2019-09-09 ENCOUNTER — APPOINTMENT (OUTPATIENT)
Dept: RADIOLOGY | Facility: MEDICAL CENTER | Age: 59
End: 2019-09-09
Attending: EMERGENCY MEDICINE
Payer: MEDICAID

## 2019-09-09 ENCOUNTER — HOSPITAL ENCOUNTER (EMERGENCY)
Facility: MEDICAL CENTER | Age: 59
End: 2019-09-09
Attending: EMERGENCY MEDICINE
Payer: MEDICAID

## 2019-09-09 VITALS
HEART RATE: 90 BPM | TEMPERATURE: 97.7 F | RESPIRATION RATE: 14 BRPM | DIASTOLIC BLOOD PRESSURE: 68 MMHG | WEIGHT: 130 LBS | BODY MASS INDEX: 20.4 KG/M2 | HEIGHT: 67 IN | SYSTOLIC BLOOD PRESSURE: 100 MMHG | OXYGEN SATURATION: 94 %
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
## 2019-09-09 DIAGNOSIS — S09.90XA CLOSED HEAD INJURY, INITIAL ENCOUNTER: ICD-10-CM

## 2019-09-09 DIAGNOSIS — T07.XXXA ABRASIONS OF MULTIPLE SITES: ICD-10-CM

## 2019-09-09 LAB
ABO GROUP BLD: NORMAL
ALBUMIN SERPL BCP-MCNC: 3.9 G/DL (ref 3.2–4.9)
ALBUMIN/GLOB SERPL: 1.4 G/DL
ALP SERPL-CCNC: 119 U/L (ref 30–99)
ALT SERPL-CCNC: 13 U/L (ref 2–50)
ANION GAP SERPL CALC-SCNC: 17 MMOL/L (ref 0–11.9)
APTT PPP: 23.3 SEC (ref 24.7–36)
AST SERPL-CCNC: 25 U/L (ref 12–45)
BASOPHILS # BLD AUTO: 0.7 % (ref 0–1.8)
BASOPHILS # BLD: 0.05 K/UL (ref 0–0.12)
BILIRUB SERPL-MCNC: 1.3 MG/DL (ref 0.1–1.5)
BLD GP AB SCN SERPL QL: NORMAL
BUN SERPL-MCNC: 11 MG/DL (ref 8–22)
CALCIUM SERPL-MCNC: 8.9 MG/DL (ref 8.5–10.5)
CHLORIDE SERPL-SCNC: 102 MMOL/L (ref 96–112)
CO2 SERPL-SCNC: 24 MMOL/L (ref 20–33)
CREAT SERPL-MCNC: 0.71 MG/DL (ref 0.5–1.4)
EOSINOPHIL # BLD AUTO: 0.19 K/UL (ref 0–0.51)
EOSINOPHIL NFR BLD: 2.7 % (ref 0–6.9)
ERYTHROCYTE [DISTWIDTH] IN BLOOD BY AUTOMATED COUNT: 44.9 FL (ref 35.9–50)
ETHANOL BLD-MCNC: 0.01 G/DL
GLOBULIN SER CALC-MCNC: 2.8 G/DL (ref 1.9–3.5)
GLUCOSE SERPL-MCNC: 126 MG/DL (ref 65–99)
HCT VFR BLD AUTO: 50.4 % (ref 37–47)
HGB BLD-MCNC: 17.5 G/DL (ref 12–16)
IMM GRANULOCYTES # BLD AUTO: 0.02 K/UL (ref 0–0.11)
IMM GRANULOCYTES NFR BLD AUTO: 0.3 % (ref 0–0.9)
INR PPP: 0.93 (ref 0.87–1.13)
LIPASE SERPL-CCNC: 9 U/L (ref 11–82)
LYMPHOCYTES # BLD AUTO: 1.04 K/UL (ref 1–4.8)
LYMPHOCYTES NFR BLD: 14.6 % (ref 22–41)
MAGNESIUM SERPL-MCNC: 1.4 MG/DL (ref 1.5–2.5)
MCH RBC QN AUTO: 32.8 PG (ref 27–33)
MCHC RBC AUTO-ENTMCNC: 34.7 G/DL (ref 33.6–35)
MCV RBC AUTO: 94.6 FL (ref 81.4–97.8)
MONOCYTES # BLD AUTO: 0.57 K/UL (ref 0–0.85)
MONOCYTES NFR BLD AUTO: 8 % (ref 0–13.4)
NEUTROPHILS # BLD AUTO: 5.26 K/UL (ref 2–7.15)
NEUTROPHILS NFR BLD: 73.7 % (ref 44–72)
NRBC # BLD AUTO: 0 K/UL
NRBC BLD-RTO: 0 /100 WBC
PHOSPHATE SERPL-MCNC: 2.6 MG/DL (ref 2.5–4.5)
PLATELET # BLD AUTO: 216 K/UL (ref 164–446)
PMV BLD AUTO: 10.4 FL (ref 9–12.9)
POTASSIUM SERPL-SCNC: 3.4 MMOL/L (ref 3.6–5.5)
PROT SERPL-MCNC: 6.7 G/DL (ref 6–8.2)
PROTHROMBIN TIME: 12.7 SEC (ref 12–14.6)
RBC # BLD AUTO: 5.33 M/UL (ref 4.2–5.4)
RH BLD: NORMAL
SODIUM SERPL-SCNC: 143 MMOL/L (ref 135–145)
TRIGL SERPL-MCNC: 139 MG/DL (ref 0–149)
WBC # BLD AUTO: 7.1 K/UL (ref 4.8–10.8)

## 2019-09-09 PROCEDURE — 70486 CT MAXILLOFACIAL W/O DYE: CPT

## 2019-09-09 PROCEDURE — 85730 THROMBOPLASTIN TIME PARTIAL: CPT

## 2019-09-09 PROCEDURE — 86901 BLOOD TYPING SEROLOGIC RH(D): CPT

## 2019-09-09 PROCEDURE — 99285 EMERGENCY DEPT VISIT HI MDM: CPT

## 2019-09-09 PROCEDURE — 86850 RBC ANTIBODY SCREEN: CPT

## 2019-09-09 PROCEDURE — 80307 DRUG TEST PRSMV CHEM ANLYZR: CPT

## 2019-09-09 PROCEDURE — 83735 ASSAY OF MAGNESIUM: CPT

## 2019-09-09 PROCEDURE — 86900 BLOOD TYPING SEROLOGIC ABO: CPT

## 2019-09-09 PROCEDURE — 72125 CT NECK SPINE W/O DYE: CPT

## 2019-09-09 PROCEDURE — 96374 THER/PROPH/DIAG INJ IV PUSH: CPT

## 2019-09-09 PROCEDURE — 84100 ASSAY OF PHOSPHORUS: CPT

## 2019-09-09 PROCEDURE — 70450 CT HEAD/BRAIN W/O DYE: CPT

## 2019-09-09 PROCEDURE — 85610 PROTHROMBIN TIME: CPT

## 2019-09-09 PROCEDURE — 85025 COMPLETE CBC W/AUTO DIFF WBC: CPT

## 2019-09-09 PROCEDURE — 700111 HCHG RX REV CODE 636 W/ 250 OVERRIDE (IP): Performed by: SURGERY

## 2019-09-09 PROCEDURE — 304561 HCHG STAT O2

## 2019-09-09 PROCEDURE — 83690 ASSAY OF LIPASE: CPT

## 2019-09-09 PROCEDURE — 307742 HCHG YELLOW TRAUMA TEAM SERVICES

## 2019-09-09 PROCEDURE — 80053 COMPREHEN METABOLIC PANEL: CPT

## 2019-09-09 PROCEDURE — 84478 ASSAY OF TRIGLYCERIDES: CPT

## 2019-09-09 RX ORDER — ATORVASTATIN CALCIUM 20 MG/1
20 TABLET, FILM COATED ORAL NIGHTLY
COMMUNITY
End: 2021-04-13

## 2019-09-09 RX ORDER — CHLORPROMAZINE HYDROCHLORIDE 10 MG/1
10 TABLET, FILM COATED ORAL 2 TIMES DAILY PRN
COMMUNITY
End: 2021-04-13

## 2019-09-09 RX ORDER — ALPRAZOLAM 2 MG/1
2 TABLET ORAL 2 TIMES DAILY PRN
COMMUNITY
End: 2021-04-13

## 2019-09-09 RX ORDER — VENLAFAXINE HYDROCHLORIDE 150 MG/1
300 CAPSULE, EXTENDED RELEASE ORAL DAILY
COMMUNITY
End: 2021-04-13

## 2019-09-09 RX ORDER — NALTREXONE HYDROCHLORIDE 50 MG/1
50 TABLET, FILM COATED ORAL DAILY
COMMUNITY
End: 2021-04-13

## 2019-09-09 RX ORDER — PROPRANOLOL HYDROCHLORIDE 20 MG/1
20 TABLET ORAL 2 TIMES DAILY
COMMUNITY
End: 2021-04-13

## 2019-09-09 RX ADMIN — FENTANYL CITRATE 50 MCG: 50 INJECTION, SOLUTION INTRAMUSCULAR; INTRAVENOUS at 10:02

## 2019-09-09 RX ADMIN — FENTANYL CITRATE 25 MCG: 50 INJECTION, SOLUTION INTRAMUSCULAR; INTRAVENOUS at 10:02

## 2019-09-09 NOTE — ED NOTES
"Patient friend at bedside, patient called RN to room, states \"I just took a nap and i'm ready to go home now\".  RN assisted patient up to EOB, walked in hallway with SBA, steady gait.  ERP made aware.  "

## 2019-09-09 NOTE — ED PROVIDER NOTES
ED Provider Note    CHIEF COMPLAINT  Chief Complaint   Patient presents with   • Trauma Yellow       HPI  Nathalie Aguilar is a 58 y.o. female who presents after a fall.  She is not exactly sure when it was yesterday at some point she fell and struck her head on concrete.  She was told she had a 5-minute loss of consciousness.  Patient states that she lost her balance and fell because she was drinking too much.  She denies any blacking out or syncope.  She has pain across the front of her face now.  She also has pain across her neck.  She is also skinned up her knees.  She denies any weakness or numbness.  No nausea or vomiting.  No chest or belly symptoms at all.  She had a tetanus shot earlier this year.  She states that she falls quite a bit, and attributes this to drinking.  She does not think that she has broken anything, she reports that she is very hard headed.  There is no other complaint.    PAST MEDICAL HISTORY  Past Medical History:   Diagnosis Date   • ASTHMA    • Cancer (HCC)     breast   • Heroin addiction (HCC)    • Pneumonia    • Psychiatric disorder        FAMILY HISTORY  No family history on file.    SOCIAL HISTORY  Social History     Tobacco Use   • Smoking status: Current Every Day Smoker   • Smokeless tobacco: Never Used   Substance Use Topics   • Alcohol use: Yes     Comment: occ   • Drug use: No     Comment: history of         SURGICAL HISTORY  Past Surgical History:   Procedure Laterality Date   • INCISION AND DRAINAGE ORTHOPEDIC Right 1/25/2019    Procedure: INCISION AND DRAINAGE ORTHOPEDIC - HAND;  Surgeon: Kendrick Campbell M.D.;  Location: SURGERY [facility];  Service: Orthopedics   • GYN SURGERY      partial hysterectomy       CURRENT MEDICATIONS  Home Medications     Reviewed by Delio Cabrera (Pharmacy Tech) on 09/09/19 at 1103  Med List Status: Complete   Medication Last Dose Status   alprazolam (XANAX) 2 MG tablet 9/8/2019 Active   atorvastatin (LIPITOR) 20 MG Tab  "9/8/2019 Active   chlorproMAZINE (THORAZINE) 10 MG Tab 9/8/2019 Active   mirtazapine (REMERON) 45 MG tablet 9/8/2019 Active   naltrexone (DEPADE) 50 MG Tab 9/8/2019 Active   propranolol (INDERAL) 20 MG Tab 9/8/2019 Active   quetiapine (SEROQUEL) 400 MG tablet 9/8/2019 Active   traZODone (DESYREL) 100 MG Tab 9/8/2019 Active   venlafaxine (EFFEXOR XR) 150 MG extended-release capsule 9/8/2019 Active                I have reviewed the nurses notes and/or the list brought with the patient.    ALLERGIES  No Known Allergies    REVIEW OF SYSTEMS  See HPI for further details. Review of systems as above, otherwise all other systems are negative.     PHYSICAL EXAM  VITAL SIGNS: /79   Pulse 88   Temp 36.5 °C (97.7 °F)   Resp 14   Ht 1.702 m (5' 7\")   Wt 59 kg (130 lb)   SpO2 97%   BMI 20.36 kg/m²     Constitutional: Chronically ill-appearing.  Not toxic nor acutely ill.  HENT: Mucus membranes moist.  Oropharynx is clear.  No evidence of skull fracture, however she has abrasions over the front of her face and nose.  Mild surrounding hematoma over this one on the forehead.  Some these abrasions appear older in appearance.  There is nothing suturable.  Eyes: Pupils equally round.  No scleral icterus.   Neck: Initially kept immobilized; no midline step-off tenderness or deformity, later full nontender range of motion.  Cardiovascular: Regular heart rate and rhythm.  No murmurs, rubs, nor gallop appreciated.   Thorax & Lungs: Chest is nontender.  Lungs are clear to auscultation with good air movement bilaterally.  No wheeze, rhonchi, nor rales.   Abdomen: Soft, with no tenderness, rebound nor guarding.  No mass, pulsatile mass, nor hepatosplenomegaly appreciated.  Skin: As above.  She also has some older appearing abrasions of various ages on her right foot, both of her knees and shins.  Extremities/Musculoskeletal: No sign of trauma.  Calves are nontender with no cords nor edema.  No Rachel's sign.  Pulses are intact " all around.   Neurologic: Alert & oriented.  Strength and sensation is intact all around.  Gait is slow but steady.  Psychiatric: Normal affect appropriate for the clinical situation.    LABS  Labs Reviewed   DIAGNOSTIC ALCOHOL - Abnormal; Notable for the following components:       Result Value    Diagnostic Alcohol 0.01 (*)     All other components within normal limits   COMP METABOLIC PANEL - Abnormal; Notable for the following components:    Potassium 3.4 (*)     Anion Gap 17.0 (*)     Glucose 126 (*)     Alkaline Phosphatase 119 (*)     All other components within normal limits   MAGNESIUM - Abnormal; Notable for the following components:    Magnesium 1.4 (*)     All other components within normal limits   LIPASE - Abnormal; Notable for the following components:    Lipase 9 (*)     All other components within normal limits   CBC WITH DIFFERENTIAL - Abnormal; Notable for the following components:    Hemoglobin 17.5 (*)     Hematocrit 50.4 (*)     Neutrophils-Polys 73.70 (*)     Lymphocytes 14.60 (*)     All other components within normal limits   APTT - Abnormal; Notable for the following components:    APTT 23.3 (*)     All other components within normal limits   PHOSPHORUS   TRIGLYCERIDE   PROTHROMBIN TIME   COD (ADULT)   COMPONENT CELLULAR   ESTIMATED GFR   ABO RH CONFIRM         RADIOLOGY/PROCEDURES  I have reviewed the patient's film interpretations myself, and they are read out by the radiologist as:   CT-MAXILLOFACIAL W/O PLUS RECONS   Final Result      No facial bone fracture identified.   Frontal scalp trauma and soft tissue swelling about the nose.   Paranasal sinus mucosal thickening and mucous retention cyst.      CT-HEAD W/O   Final Result      1.  No acute intracranial abnormality.   2.  LEFT forehead scalp swelling without underlying skull fracture.   3.  Chronic paranasal sinus disease.      CT-CSPINE WITHOUT PLUS RECONS   Final Result      1.  Straightening and moderate multilevel degenerative  change of cervical spine.   2.  Minimal anterior wedge compression of C7, chronic.   3.  No acute cervical spine fracture or subluxation.   4.  Moderate emphysema.   5.  3 mm nodule at the RIGHT lung apex, nonspecific.      Low Risk: No routine follow-up      High Risk: Optional CT at 12 months      Comments: Nodules less than 6 mm do not require routine follow-up, but certain patients at high risk with suspicious nodule morphology, upper lobe location, or both may warrant 12-month follow-up.      Low Risk - Minimal or absent history of smoking and of other known risk factors.      High Risk - History of smoking or of other known risk factors.      Note: These recommendations do not apply to lung cancer screening, patients with immunosuppression, or patients with known primary cancer.      Fleischner Society 2017 Guidelines for Management of Incidentally Detected Pulmonary Nodules in Adults      US-ABORTED US PROCEDURE    (Results Pending)     .    MEDICAL RECORD  I have reviewed patient's medical record and pertinent results are listed above.    COURSE & MEDICAL DECISION MAKING  I have reviewed any medical record information, laboratory studies and radiographic results as noted above.  Patient presents after a fall last night.  She was triaged as a trauma yellow because of a loss of consciousness.  Here she has multiple abrasions but otherwise looks intact.  CT of her head neck and face are obtained, as noted above do not show any evidence of acute injury.  Trauma surgery has seen the patient and signed off.  Nursing is cleaned up her face and applied antibiotic ointment.  Tetanus is up-to-date.  Initially nursing tried to ambulate her, she was very unsteady to the point that there was felt that she was not safe to walk.  I spoke with the hospitalist about admitting her to the hospital for gait instability after a fall, potentially exacerbated by underlying alcoholism.  However an hour later I was called back to  the room the patient is up, ambulate with no difficulty and no instability, and is ready to leave.  I pointed out to her the hospitalist has already initially started her evaluation, however she does not want to stay.  She was discharged home with instructions on head injury, abrasions, and seeking help for alcohol problems.    FINAL IMPRESSION  1. Closed head injury, initial encounter    2. Fall, initial encounter    3. Abrasions of multiple sites           This dictation was created using voice recognition software.    Electronically signed by: Braden Lauern, 9/9/2019 11:15 AM

## 2019-09-09 NOTE — ED NOTES
Pt up assisted at the edge of the bed and tried ambulating pt, able to take few steps while inside the room then pt started feeling dizzy describes as she is going to fall forward, gait unsteady. Denies n/v. Bedside report to Shabana TAN.

## 2019-09-09 NOTE — ED NOTES
PIV removed and bandaged.  Nathalie Aguilar discharged via ambulation with friend driving home.  Discharge instructions given and reviewed, patient educated on wound care and dressing change, educated to follow up with PCP, verbalized understanding.  All personal belongings in possession.  No questions at this time.

## 2019-09-09 NOTE — DISCHARGE PLANNING
Trauma Response    Referral: Trauma yellow Response    Intervention: SW responded to trauma yellow.  Pt was arrived at the ER Lobby with a friend. Pt reports she fell after a night a drinking with a friend. Pt was alert upon arrival.  Pts name is Nathalie Aguilar (: 1960). MSW met with pt. Pt stated her friend brought her to the ER. Pt declined wanting any family contact at this time. She reports she will call her daughter when she is able to.     Plan: MSW will remain available for support

## 2019-09-09 NOTE — FLOWSHEET NOTE
Respiratory Trauma Yellow Note    Intubation NO                 Events/Summary/Plan: Trauma Yellow No interventions needed.

## 2019-09-09 NOTE — ED NOTES
Patient resting on gurney, respirations even and unlabored.  Patient reports she would not feel safe discharging home at this time.

## 2019-09-09 NOTE — ED NOTES
Late Entry: Patient to triage c/o facial pain and neck pain, s/p MGLF last night, (+) LOC, patient reports friend witnessed event and 5 minute loss of consciousness, abrasions noted to face, c-collar placed in triage, charge RN aware of patient, to [facility], report to TNTL and TN2.

## 2019-09-09 NOTE — ED NOTES
Bedside report from Tamera TAN.  Pt remains supine, c-collar in placed. Able to move all extremities. Perrla.  Denies numbness/tingling in all extremities. Aaox4. C/o nasal pain. Noted facial abrasions/bilateral knees abrasions.

## 2020-03-06 ENCOUNTER — APPOINTMENT (OUTPATIENT)
Dept: RADIOLOGY | Facility: MEDICAL CENTER | Age: 60
End: 2020-03-06
Attending: EMERGENCY MEDICINE
Payer: MEDICAID

## 2020-03-06 ENCOUNTER — HOSPITAL ENCOUNTER (EMERGENCY)
Facility: MEDICAL CENTER | Age: 60
End: 2020-03-06
Attending: EMERGENCY MEDICINE
Payer: MEDICAID

## 2020-03-06 VITALS
HEART RATE: 70 BPM | RESPIRATION RATE: 16 BRPM | WEIGHT: 130 LBS | SYSTOLIC BLOOD PRESSURE: 99 MMHG | OXYGEN SATURATION: 93 % | DIASTOLIC BLOOD PRESSURE: 71 MMHG | HEIGHT: 67 IN | BODY MASS INDEX: 20.4 KG/M2 | TEMPERATURE: 97.8 F

## 2020-03-06 DIAGNOSIS — R55 NEAR SYNCOPE: ICD-10-CM

## 2020-03-06 LAB
ALBUMIN SERPL BCP-MCNC: 3.8 G/DL (ref 3.2–4.9)
ALBUMIN/GLOB SERPL: 1.4 G/DL
ALP SERPL-CCNC: 86 U/L (ref 30–99)
ALT SERPL-CCNC: 10 U/L (ref 2–50)
ANION GAP SERPL CALC-SCNC: 8 MMOL/L (ref 0–11.9)
AST SERPL-CCNC: 16 U/L (ref 12–45)
BASOPHILS # BLD AUTO: 0.7 % (ref 0–1.8)
BASOPHILS # BLD: 0.03 K/UL (ref 0–0.12)
BILIRUB SERPL-MCNC: 0.6 MG/DL (ref 0.1–1.5)
BUN SERPL-MCNC: 11 MG/DL (ref 8–22)
CALCIUM SERPL-MCNC: 8.9 MG/DL (ref 8.5–10.5)
CHLORIDE SERPL-SCNC: 103 MMOL/L (ref 96–112)
CO2 SERPL-SCNC: 25 MMOL/L (ref 20–33)
CREAT SERPL-MCNC: 0.71 MG/DL (ref 0.5–1.4)
EKG IMPRESSION: NORMAL
EOSINOPHIL # BLD AUTO: 0.08 K/UL (ref 0–0.51)
EOSINOPHIL NFR BLD: 1.8 % (ref 0–6.9)
ERYTHROCYTE [DISTWIDTH] IN BLOOD BY AUTOMATED COUNT: 46.1 FL (ref 35.9–50)
GLOBULIN SER CALC-MCNC: 2.8 G/DL (ref 1.9–3.5)
GLUCOSE SERPL-MCNC: 70 MG/DL (ref 65–99)
HCT VFR BLD AUTO: 44.6 % (ref 37–47)
HGB BLD-MCNC: 15.1 G/DL (ref 12–16)
IMM GRANULOCYTES # BLD AUTO: 0.01 K/UL (ref 0–0.11)
IMM GRANULOCYTES NFR BLD AUTO: 0.2 % (ref 0–0.9)
LYMPHOCYTES # BLD AUTO: 1.9 K/UL (ref 1–4.8)
LYMPHOCYTES NFR BLD: 43.5 % (ref 22–41)
MCH RBC QN AUTO: 33 PG (ref 27–33)
MCHC RBC AUTO-ENTMCNC: 33.9 G/DL (ref 33.6–35)
MCV RBC AUTO: 97.6 FL (ref 81.4–97.8)
MONOCYTES # BLD AUTO: 0.36 K/UL (ref 0–0.85)
MONOCYTES NFR BLD AUTO: 8.2 % (ref 0–13.4)
NEUTROPHILS # BLD AUTO: 1.99 K/UL (ref 2–7.15)
NEUTROPHILS NFR BLD: 45.6 % (ref 44–72)
NRBC # BLD AUTO: 0 K/UL
NRBC BLD-RTO: 0 /100 WBC
PLATELET # BLD AUTO: 345 K/UL (ref 164–446)
PMV BLD AUTO: 8.6 FL (ref 9–12.9)
POTASSIUM SERPL-SCNC: 3.6 MMOL/L (ref 3.6–5.5)
PROT SERPL-MCNC: 6.6 G/DL (ref 6–8.2)
RBC # BLD AUTO: 4.57 M/UL (ref 4.2–5.4)
SODIUM SERPL-SCNC: 136 MMOL/L (ref 135–145)
WBC # BLD AUTO: 4.4 K/UL (ref 4.8–10.8)

## 2020-03-06 PROCEDURE — 80053 COMPREHEN METABOLIC PANEL: CPT

## 2020-03-06 PROCEDURE — 85025 COMPLETE CBC W/AUTO DIFF WBC: CPT

## 2020-03-06 PROCEDURE — 99285 EMERGENCY DEPT VISIT HI MDM: CPT

## 2020-03-06 PROCEDURE — 93005 ELECTROCARDIOGRAM TRACING: CPT | Performed by: EMERGENCY MEDICINE

## 2020-03-06 PROCEDURE — 700105 HCHG RX REV CODE 258: Performed by: EMERGENCY MEDICINE

## 2020-03-06 PROCEDURE — 71045 X-RAY EXAM CHEST 1 VIEW: CPT

## 2020-03-06 RX ORDER — SODIUM CHLORIDE 9 MG/ML
1000 INJECTION, SOLUTION INTRAVENOUS ONCE
Status: COMPLETED | OUTPATIENT
Start: 2020-03-06 | End: 2020-03-06

## 2020-03-06 RX ADMIN — SODIUM CHLORIDE 1000 ML: 9 INJECTION, SOLUTION INTRAVENOUS at 17:33

## 2020-03-06 ASSESSMENT — FIBROSIS 4 INDEX: FIB4 SCORE: 1.86

## 2020-03-07 NOTE — ED PROVIDER NOTES
ED Provider Note    Scribed for Tomer Jennings M.D. by Vivi Tam. 3/6/2020  5:00 PM    Primary care provider: Mohsen Tamasaby, M.D.  Means of arrival: Ambulance  History obtained from: Patient  History limited by: None    CHIEF COMPLAINT  Chief Complaint   Patient presents with   • Near Syncopal   • GLF   • Medical Clearance       HPI  Nathalie Aguilar is a 59 y.o. female who presents to the Emergency Department by ambulance complaining of near syncope and ground-level fall onset a couple hours prior to my exam. Patient states she was bending over multiple times to  trash and felt suddenly light-headed. She reports that she felt like she was going to pass out or have a seizure before experiencing a ground level fall. No loss of consciousness or head trauma. The patient notes she has not really been eating lately due to her living home not having vegan options and is dehydrated. No modifying factors noted. The patient endorses associated headache, dyspnea, and subjective warmth but denies any abdominal pain, nausea, vomiting, dysuria, or hematuria. Her last alcohol consumption was 2 months ago.     REVIEW OF SYSTEMS  Pertinent positives include near syncope, ground-level fall, headache, dyspnea, and subjective warmth. Pertinent negatives include no loss of consciousness, Head trauma, abdominal pain, nausea, vomiting, dysuria, or hematuria.  All other systems reviewed and negative.    PAST MEDICAL HISTORY   has a past medical history of ASTHMA, Cancer (HCC), Heroin addiction (HCC), Pneumonia, and Psychiatric disorder.    SURGICAL HISTORY   has a past surgical history that includes gyn surgery and incision and drainage orthopedic (Right, 1/25/2019).    SOCIAL HISTORY  Social History     Tobacco Use   • Smoking status: Current Every Day Smoker   • Smokeless tobacco: Never Used   Substance Use Topics   • Alcohol use: Yes     Comment: occ   • Drug use: No     Comment: history of      Social History  "    Substance and Sexual Activity   Drug Use No    Comment: history of       FAMILY HISTORY  No family history on file.    CURRENT MEDICATIONS  Home Medications     Reviewed by Alexa Ferguson R.N. (Registered Nurse) on 03/06/20 at 1631  Med List Status: <None>   Medication Last Dose Status   alprazolam (XANAX) 2 MG tablet  Active   atorvastatin (LIPITOR) 20 MG Tab  Active   chlorproMAZINE (THORAZINE) 10 MG Tab  Active   mirtazapine (REMERON) 45 MG tablet  Active   naltrexone (DEPADE) 50 MG Tab  Active   propranolol (INDERAL) 20 MG Tab  Active   quetiapine (SEROQUEL) 400 MG tablet  Active   traZODone (DESYREL) 100 MG Tab  Active   venlafaxine (EFFEXOR XR) 150 MG extended-release capsule  Active                ALLERGIES  No Known Allergies    PHYSICAL EXAM  VITAL SIGNS: /67   Pulse 84   Temp 36.6 °C (97.8 °F) (Temporal)   Resp 16   Ht 1.702 m (5' 7\")   Wt 59 kg (130 lb)   SpO2 98%   BMI 20.36 kg/m²     Constitutional: Well developed, Well nourished, mild distress, Non-toxic appearance.   HENT: Normocephalic, Atraumatic, Bilateral external ears normal, Dry mucous membranes, No oral exudates.   Eyes: PERRLA, EOMI, Conjunctiva normal, No discharge.   Neck: No tenderness, Supple, No stridor.   Lymphatic: No lymphadenopathy noted.   Cardiovascular: Normal heart rate, Normal rhythm.   Thorax & Lungs: Clear to auscultation bilaterally, No respiratory distress, No wheezing, No crackles.   Abdomen: Soft, No tenderness, No masses, No pulsatile masses.   Skin: Track marks to bilateral arms. Warm, Dry, No erythema, No rash.   Extremities:, No edema No cyanosis.   Musculoskeletal: No tenderness to palpation or major deformities noted.  Intact distal pulses  Neurologic: Awake, alert. Moves all extremities spontaneously.  Psychiatric: Affect normal, Judgment normal, Mood normal.     LABS  Results for orders placed or performed during the hospital encounter of 03/06/20   CBC WITH DIFFERENTIAL   Result Value Ref " Range    WBC 4.4 (L) 4.8 - 10.8 K/uL    RBC 4.57 4.20 - 5.40 M/uL    Hemoglobin 15.1 12.0 - 16.0 g/dL    Hematocrit 44.6 37.0 - 47.0 %    MCV 97.6 81.4 - 97.8 fL    MCH 33.0 27.0 - 33.0 pg    MCHC 33.9 33.6 - 35.0 g/dL    RDW 46.1 35.9 - 50.0 fL    Platelet Count 345 164 - 446 K/uL    MPV 8.6 (L) 9.0 - 12.9 fL    Neutrophils-Polys 45.60 44.00 - 72.00 %    Lymphocytes 43.50 (H) 22.00 - 41.00 %    Monocytes 8.20 0.00 - 13.40 %    Eosinophils 1.80 0.00 - 6.90 %    Basophils 0.70 0.00 - 1.80 %    Immature Granulocytes 0.20 0.00 - 0.90 %    Nucleated RBC 0.00 /100 WBC    Neutrophils (Absolute) 1.99 (L) 2.00 - 7.15 K/uL    Lymphs (Absolute) 1.90 1.00 - 4.80 K/uL    Monos (Absolute) 0.36 0.00 - 0.85 K/uL    Eos (Absolute) 0.08 0.00 - 0.51 K/uL    Baso (Absolute) 0.03 0.00 - 0.12 K/uL    Immature Granulocytes (abs) 0.01 0.00 - 0.11 K/uL    NRBC (Absolute) 0.00 K/uL   COMP METABOLIC PANEL   Result Value Ref Range    Sodium 136 135 - 145 mmol/L    Potassium 3.6 3.6 - 5.5 mmol/L    Chloride 103 96 - 112 mmol/L    Co2 25 20 - 33 mmol/L    Anion Gap 8.0 0.0 - 11.9    Glucose 70 65 - 99 mg/dL    Bun 11 8 - 22 mg/dL    Creatinine 0.71 0.50 - 1.40 mg/dL    Calcium 8.9 8.5 - 10.5 mg/dL    AST(SGOT) 16 12 - 45 U/L    ALT(SGPT) 10 2 - 50 U/L    Alkaline Phosphatase 86 30 - 99 U/L    Total Bilirubin 0.6 0.1 - 1.5 mg/dL    Albumin 3.8 3.2 - 4.9 g/dL    Total Protein 6.6 6.0 - 8.2 g/dL    Globulin 2.8 1.9 - 3.5 g/dL    A-G Ratio 1.4 g/dL   ESTIMATED GFR   Result Value Ref Range    GFR If African American >60 >60 mL/min/1.73 m 2    GFR If Non African American >60 >60 mL/min/1.73 m 2   EKG (NOW)   Result Value Ref Range    Report       [facility] Emergency Dept.    Test Date:  2020  Pt Name:    JOSS KOBY               Department: ER  MRN:        [id number]                      Room:       [facility]  Gender:     Female                       Technician: [id number]  :        1960                   Requested  By:SKY BRAR  Order #:    [id number]                    Reading MD: SKY BRAR MD    Measurements  Intervals                                Axis  Rate:       68                           P:          68  WI:         140                          QRS:        52  QRSD:       94                           T:          41  QT:         428  QTc:        456    Interpretive Statements  SINUS RHYTHM  No previous ECG available for comparison  Electronically Signed On 3-6-2020 18:19:19 PST by SKY BRAR MD       All labs reviewed by me.       RADIOLOGY  DX-CHEST-PORTABLE (1 VIEW)   Final Result      No radiographic evidence of acute cardiopulmonary process.        The radiologist's interpretation of all radiological studies have been reviewed by me.      COURSE & MEDICAL DECISION MAKING  Pertinent Labs & Imaging studies reviewed. (See chart for details)    I reviewed the patient's medical records which showed history of asthma and heroine addiction.     5:00 PM - Patient seen and examined at bedside. Discussed with patient I will order labs and imaging to further evaluate. Patient consents to plan of care. Patient will be treated with NS infusion 1,000 mL. Ordered DX-Chest, CBC with differentials, CMP, and EKG to evaluate her symptoms. The differential diagnoses include but are not limited to: Dehydration, Orthostatic vital signs.     Decision Making:  Patient with near syncopal episode, she felt like it was possibly due to a seizure but I believe it is likely secondary to dehydration.  She has not been eating and drinking well, secondary to her being vegan.  EKG was unremarkable, laboratory tests are unremarkable.  The patient will be discharged home.  Patient is medically cleared.  The patient has borderline hypotension but the patient has a baseline hypotension.    HYDRATION: Based on the patient's presentation of Dehydration and Other near syncope the patient was given IV fluids. IV Hydration was  used because oral hydration was not adequate alone. Upon recheck following hydration, the patient was Improved.      The patient will return for new or worsening symptoms and is stable at the time of discharge.    The patient is referred to a primary physician for blood pressure management, diabetic screening, and for all other preventative health concerns.        DISPOSITION:  Patient will be discharged home in stable condition.    FOLLOW UP:  [facility], Emergency Dept  [address] [facility] [id number] [phone]    If symptoms worsen    Mohsen Tamasaby, M.D.  [address] [address]  [phone]            OUTPATIENT MEDICATIONS:  Discharge Medication List as of 3/6/2020  6:54 PM          FINAL IMPRESSION  1. Near syncope          Vivi CASH (Scribe), am scribing for, and in the presence of, Tomer Jennings M.D..    Electronically signed by: Vivi Tam (Scribe), 3/6/2020    ITomer M.D. personally performed the services described in this documentation, as scribed by Vivi Tam in my presence, and it is both accurate and complete.    C    The note accurately reflects work and decisions made by me.  Tomer Jennings M.D.  3/6/2020  8:16 PM

## 2020-03-07 NOTE — ED TRIAGE NOTES
Chief Complaint   Patient presents with   • Near Syncopal   • GLF   • Medical Clearance   Pt bib [facility].  Pt was volunteering picking up garbage when she felt like she was going to pass out/have a seizure and that she fell to the ground.  Pt denies LOC or hitting her head.  Pt states she really hasn't been eating because the sober living home she is staying at doesn't have vegan options.  Pt needs medical clearance to return to facility.

## 2020-03-16 ENCOUNTER — HOSPITAL ENCOUNTER (EMERGENCY)
Dept: HOSPITAL 8 - ED | Age: 60
Discharge: HOME | End: 2020-03-16
Payer: MEDICAID

## 2020-03-16 VITALS — WEIGHT: 134.48 LBS | BODY MASS INDEX: 21.11 KG/M2 | HEIGHT: 67 IN

## 2020-03-16 VITALS — DIASTOLIC BLOOD PRESSURE: 74 MMHG | SYSTOLIC BLOOD PRESSURE: 101 MMHG

## 2020-03-16 DIAGNOSIS — R06.00: ICD-10-CM

## 2020-03-16 DIAGNOSIS — E78.5: ICD-10-CM

## 2020-03-16 DIAGNOSIS — R94.31: ICD-10-CM

## 2020-03-16 DIAGNOSIS — J41.1: Primary | ICD-10-CM

## 2020-03-16 LAB
ALBUMIN SERPL-MCNC: 3.5 G/DL (ref 3.4–5)
ANION GAP SERPL CALC-SCNC: 6 MMOL/L (ref 5–15)
BASOPHILS # BLD AUTO: 0.02 X10^3/UL (ref 0–0.1)
BASOPHILS NFR BLD AUTO: 0 % (ref 0–1)
CALCIUM SERPL-MCNC: 9.1 MG/DL (ref 8.5–10.1)
CHLORIDE SERPL-SCNC: 112 MMOL/L (ref 98–107)
CREAT SERPL-MCNC: 0.71 MG/DL (ref 0.55–1.02)
EOSINOPHIL # BLD AUTO: 0.04 X10^3/UL (ref 0–0.4)
EOSINOPHIL NFR BLD AUTO: 1 % (ref 1–7)
ERYTHROCYTE [DISTWIDTH] IN BLOOD BY AUTOMATED COUNT: 14 % (ref 9.6–15.2)
LYMPHOCYTES # BLD AUTO: 0.81 X10^3/UL (ref 1–3.4)
LYMPHOCYTES NFR BLD AUTO: 14 % (ref 22–44)
MCH RBC QN AUTO: 32.9 PG (ref 27–34.8)
MCHC RBC AUTO-ENTMCNC: 33.7 G/DL (ref 32.4–35.8)
MCV RBC AUTO: 97.6 FL (ref 80–100)
MD: NO
MONOCYTES # BLD AUTO: 0.45 X10^3/UL (ref 0.2–0.8)
MONOCYTES NFR BLD AUTO: 8 % (ref 2–9)
NEUTROPHILS # BLD AUTO: 4.51 X10^3/UL (ref 1.8–6.8)
NEUTROPHILS NFR BLD AUTO: 77 % (ref 42–75)
PLATELET # BLD AUTO: 292 X10^3/UL (ref 130–400)
PMV BLD AUTO: 7.5 FL (ref 7.4–10.4)
RBC # BLD AUTO: 4.77 X10^6/UL (ref 3.82–5.3)

## 2020-03-16 PROCEDURE — 99285 EMERGENCY DEPT VISIT HI MDM: CPT

## 2020-03-16 PROCEDURE — 36415 COLL VENOUS BLD VENIPUNCTURE: CPT

## 2020-03-16 PROCEDURE — 71045 X-RAY EXAM CHEST 1 VIEW: CPT

## 2020-03-16 PROCEDURE — 82040 ASSAY OF SERUM ALBUMIN: CPT

## 2020-03-16 PROCEDURE — 85025 COMPLETE CBC W/AUTO DIFF WBC: CPT

## 2020-03-16 PROCEDURE — 80048 BASIC METABOLIC PNL TOTAL CA: CPT

## 2020-03-16 PROCEDURE — 93005 ELECTROCARDIOGRAM TRACING: CPT

## 2020-03-16 NOTE — NUR
REVIEWED DISCHARGE INSTRUCTIONS AND PRESCRIPTIONS W/ PT, VERBALIZED 
UNDERSTANDING TO INFORMATION PROVIDED INCLUDING FOLLOW UP CARE, RETURN 
PRECAUTIONS, MEDICATIONS AND HAND HYGIENE, DENIED QUESTIONS/CONCERNS.  PT 
AMBULATED FROM ED.

## 2020-03-16 NOTE — NUR
PT C/O COUGH, SHORTNESS OF BREATH X 3 DAYS, REPORTS CARE FACILITY IS ON "LOCK 
DOWN RIGHT NOW," UNK IF ANY COVID EXPOSURE.  PT REPORTS DX W/ PNA 1.5 MONTHS 
AGO.  PT PLACED ON CARDIAC MONITOR.

## 2020-05-11 ENCOUNTER — HOSPITAL ENCOUNTER (EMERGENCY)
Dept: HOSPITAL 8 - ED | Age: 60
Discharge: LEFT BEFORE DISCHARGE | End: 2020-05-11
Payer: MEDICAID

## 2020-05-11 VITALS — DIASTOLIC BLOOD PRESSURE: 71 MMHG | SYSTOLIC BLOOD PRESSURE: 98 MMHG

## 2020-05-11 VITALS — HEIGHT: 67 IN | WEIGHT: 140.43 LBS | BODY MASS INDEX: 22.04 KG/M2

## 2020-05-11 DIAGNOSIS — R06.02: Primary | ICD-10-CM

## 2020-05-11 DIAGNOSIS — Z53.21: ICD-10-CM

## 2020-05-11 PROCEDURE — 93005 ELECTROCARDIOGRAM TRACING: CPT

## 2021-03-15 DIAGNOSIS — Z23 NEED FOR VACCINATION: ICD-10-CM

## 2021-04-13 ENCOUNTER — HOSPITAL ENCOUNTER (INPATIENT)
Facility: MEDICAL CENTER | Age: 61
LOS: 14 days | DRG: 082 | End: 2021-04-27
Attending: EMERGENCY MEDICINE | Admitting: INTERNAL MEDICINE
Payer: MEDICAID

## 2021-04-13 ENCOUNTER — APPOINTMENT (OUTPATIENT)
Dept: RADIOLOGY | Facility: MEDICAL CENTER | Age: 61
DRG: 082 | End: 2021-04-13
Attending: EMERGENCY MEDICINE
Payer: MEDICAID

## 2021-04-13 DIAGNOSIS — S06.5XAA SDH (SUBDURAL HEMATOMA) (HCC): ICD-10-CM

## 2021-04-13 DIAGNOSIS — I46.9 CARDIAC ARREST (HCC): ICD-10-CM

## 2021-04-13 DIAGNOSIS — R94.31 QT PROLONGATION: ICD-10-CM

## 2021-04-13 DIAGNOSIS — S06.5XAA SUBDURAL HEMATOMA (HCC): ICD-10-CM

## 2021-04-13 DIAGNOSIS — R41.82 ALTERED MENTAL STATUS, UNSPECIFIED ALTERED MENTAL STATUS TYPE: ICD-10-CM

## 2021-04-13 DIAGNOSIS — F10.939 ALCOHOL WITHDRAWAL SYNDROME WITH COMPLICATION (HCC): ICD-10-CM

## 2021-04-13 DIAGNOSIS — E87.20 LACTIC ACIDOSIS: ICD-10-CM

## 2021-04-13 DIAGNOSIS — E43 PROTEIN-CALORIE MALNUTRITION, SEVERE (HCC): ICD-10-CM

## 2021-04-13 DIAGNOSIS — E87.8 ELECTROLYTE DISTURBANCE: ICD-10-CM

## 2021-04-13 LAB
ALBUMIN SERPL BCP-MCNC: 3 G/DL (ref 3.2–4.9)
ALBUMIN/GLOB SERPL: 1.2 G/DL
ALP SERPL-CCNC: 167 U/L (ref 30–99)
ALT SERPL-CCNC: 87 U/L (ref 2–50)
AMMONIA PLAS-SCNC: 28 UMOL/L (ref 11–45)
AMPHET UR QL SCN: NEGATIVE
ANION GAP SERPL CALC-SCNC: 38 MMOL/L (ref 7–16)
APPEARANCE UR: ABNORMAL
APTT PPP: 35.3 SEC (ref 24.7–36)
AST SERPL-CCNC: 220 U/L (ref 12–45)
BACTERIA #/AREA URNS HPF: ABNORMAL /HPF
BARBITURATES UR QL SCN: NEGATIVE
BASOPHILS # BLD AUTO: 0.2 % (ref 0–1.8)
BASOPHILS # BLD: 0.01 K/UL (ref 0–0.12)
BENZODIAZ UR QL SCN: POSITIVE
BILIRUB SERPL-MCNC: 5 MG/DL (ref 0.1–1.5)
BILIRUB UR QL STRIP.AUTO: ABNORMAL
BUN SERPL-MCNC: 19 MG/DL (ref 8–22)
BZE UR QL SCN: NEGATIVE
CALCIUM SERPL-MCNC: 7.5 MG/DL (ref 8.5–10.5)
CANNABINOIDS UR QL SCN: POSITIVE
CFT BLD TEG: 5.1 MIN (ref 5–10)
CHLORIDE SERPL-SCNC: 85 MMOL/L (ref 96–112)
CK SERPL-CCNC: 96 U/L (ref 0–154)
CLOT ANGLE BLD TEG: 58.8 DEGREES (ref 53–72)
CLOT LYSIS 30M P MA LENFR BLD TEG: 0 % (ref 0–8)
CO2 SERPL-SCNC: 14 MMOL/L (ref 20–33)
COLOR UR: ABNORMAL
CREAT SERPL-MCNC: 0.85 MG/DL (ref 0.5–1.4)
CT.EXTRINSIC BLD ROTEM: 3.6 MIN (ref 1–3)
EKG IMPRESSION: NORMAL
EOSINOPHIL # BLD AUTO: 0 K/UL (ref 0–0.51)
EOSINOPHIL NFR BLD: 0 % (ref 0–6.9)
EPI CELLS #/AREA URNS HPF: NEGATIVE /HPF
ERYTHROCYTE [DISTWIDTH] IN BLOOD BY AUTOMATED COUNT: 67.2 FL (ref 35.9–50)
ETHANOL BLD-MCNC: 205.4 MG/DL (ref 0–10)
GLOBULIN SER CALC-MCNC: 2.5 G/DL (ref 1.9–3.5)
GLUCOSE SERPL-MCNC: 99 MG/DL (ref 65–99)
GLUCOSE UR STRIP.AUTO-MCNC: NEGATIVE MG/DL
HCT VFR BLD AUTO: 32.6 % (ref 37–47)
HGB BLD-MCNC: 11 G/DL (ref 12–16)
IMM GRANULOCYTES # BLD AUTO: 0.04 K/UL (ref 0–0.11)
IMM GRANULOCYTES NFR BLD AUTO: 0.8 % (ref 0–0.9)
INR PPP: 1.66 (ref 0.87–1.13)
KETONES UR STRIP.AUTO-MCNC: 80 MG/DL
LACTATE BLD-SCNC: 13 MMOL/L (ref 0.5–2)
LEUKOCYTE ESTERASE UR QL STRIP.AUTO: ABNORMAL
LYMPHOCYTES # BLD AUTO: 0.43 K/UL (ref 1–4.8)
LYMPHOCYTES NFR BLD: 8.8 % (ref 22–41)
MAGNESIUM SERPL-MCNC: 1 MG/DL (ref 1.5–2.5)
MCF BLD TEG: 53.6 MM (ref 50–70)
MCH RBC QN AUTO: 36.7 PG (ref 27–33)
MCHC RBC AUTO-ENTMCNC: 33.7 G/DL (ref 33.6–35)
MCV RBC AUTO: 108.7 FL (ref 81.4–97.8)
METHADONE UR QL SCN: NEGATIVE
MICRO URNS: ABNORMAL
MONOCYTES # BLD AUTO: 0.2 K/UL (ref 0–0.85)
MONOCYTES NFR BLD AUTO: 4.1 % (ref 0–13.4)
NEUTROPHILS # BLD AUTO: 4.21 K/UL (ref 2–7.15)
NEUTROPHILS NFR BLD: 86.1 % (ref 44–72)
NITRITE UR QL STRIP.AUTO: POSITIVE
NRBC # BLD AUTO: 0.04 K/UL
NRBC BLD-RTO: 0.8 /100 WBC
OPIATES UR QL SCN: NEGATIVE
OXYCODONE UR QL SCN: NEGATIVE
PCP UR QL SCN: NEGATIVE
PH UR STRIP.AUTO: 5.5 [PH] (ref 5–8)
PLATELET # BLD AUTO: 145 K/UL (ref 164–446)
PMV BLD AUTO: 10.8 FL (ref 9–12.9)
POTASSIUM SERPL-SCNC: 3 MMOL/L (ref 3.6–5.5)
PROCALCITONIN SERPL-MCNC: 0.8 NG/ML
PROPOXYPH UR QL SCN: NEGATIVE
PROT SERPL-MCNC: 5.5 G/DL (ref 6–8.2)
PROT UR QL STRIP: 30 MG/DL
PROTHROMBIN TIME: 20.1 SEC (ref 12–14.6)
RBC # BLD AUTO: 3 M/UL (ref 4.2–5.4)
RBC # URNS HPF: ABNORMAL /HPF
RBC UR QL AUTO: ABNORMAL
SODIUM SERPL-SCNC: 137 MMOL/L (ref 135–145)
SP GR UR STRIP.AUTO: 1.02
TEG ALGORITHM TGALG: ABNORMAL
TROPONIN T SERPL-MCNC: 65 NG/L (ref 6–19)
UROBILINOGEN UR STRIP.AUTO-MCNC: 1 MG/DL
WBC # BLD AUTO: 4.9 K/UL (ref 4.8–10.8)
WBC #/AREA URNS HPF: ABNORMAL /HPF

## 2021-04-13 PROCEDURE — 96375 TX/PRO/DX INJ NEW DRUG ADDON: CPT

## 2021-04-13 PROCEDURE — 85384 FIBRINOGEN ACTIVITY: CPT

## 2021-04-13 PROCEDURE — U0005 INFEC AGEN DETEC AMPLI PROBE: HCPCS

## 2021-04-13 PROCEDURE — 83735 ASSAY OF MAGNESIUM: CPT

## 2021-04-13 PROCEDURE — 82077 ASSAY SPEC XCP UR&BREATH IA: CPT

## 2021-04-13 PROCEDURE — 99223 1ST HOSP IP/OBS HIGH 75: CPT | Performed by: STUDENT IN AN ORGANIZED HEALTH CARE EDUCATION/TRAINING PROGRAM

## 2021-04-13 PROCEDURE — 700101 HCHG RX REV CODE 250: Performed by: EMERGENCY MEDICINE

## 2021-04-13 PROCEDURE — 94760 N-INVAS EAR/PLS OXIMETRY 1: CPT

## 2021-04-13 PROCEDURE — 87186 SC STD MICRODIL/AGAR DIL: CPT

## 2021-04-13 PROCEDURE — 770022 HCHG ROOM/CARE - ICU (200)

## 2021-04-13 PROCEDURE — 84484 ASSAY OF TROPONIN QUANT: CPT

## 2021-04-13 PROCEDURE — 87077 CULTURE AEROBIC IDENTIFY: CPT | Mod: 91

## 2021-04-13 PROCEDURE — 85730 THROMBOPLASTIN TIME PARTIAL: CPT

## 2021-04-13 PROCEDURE — U0003 INFECTIOUS AGENT DETECTION BY NUCLEIC ACID (DNA OR RNA); SEVERE ACUTE RESPIRATORY SYNDROME CORONAVIRUS 2 (SARS-COV-2) (CORONAVIRUS DISEASE [COVID-19]), AMPLIFIED PROBE TECHNIQUE, MAKING USE OF HIGH THROUGHPUT TECHNOLOGIES AS DESCRIBED BY CMS-2020-01-R: HCPCS

## 2021-04-13 PROCEDURE — 700111 HCHG RX REV CODE 636 W/ 250 OVERRIDE (IP): Performed by: INTERNAL MEDICINE

## 2021-04-13 PROCEDURE — 93005 ELECTROCARDIOGRAM TRACING: CPT | Performed by: EMERGENCY MEDICINE

## 2021-04-13 PROCEDURE — 83605 ASSAY OF LACTIC ACID: CPT

## 2021-04-13 PROCEDURE — 99291 CRITICAL CARE FIRST HOUR: CPT | Performed by: INTERNAL MEDICINE

## 2021-04-13 PROCEDURE — 99291 CRITICAL CARE FIRST HOUR: CPT

## 2021-04-13 PROCEDURE — 80053 COMPREHEN METABOLIC PANEL: CPT

## 2021-04-13 PROCEDURE — 81001 URINALYSIS AUTO W/SCOPE: CPT

## 2021-04-13 PROCEDURE — 87040 BLOOD CULTURE FOR BACTERIA: CPT

## 2021-04-13 PROCEDURE — 70450 CT HEAD/BRAIN W/O DYE: CPT

## 2021-04-13 PROCEDURE — 82140 ASSAY OF AMMONIA: CPT

## 2021-04-13 PROCEDURE — 84145 PROCALCITONIN (PCT): CPT

## 2021-04-13 PROCEDURE — 71045 X-RAY EXAM CHEST 1 VIEW: CPT

## 2021-04-13 PROCEDURE — 700111 HCHG RX REV CODE 636 W/ 250 OVERRIDE (IP): Performed by: EMERGENCY MEDICINE

## 2021-04-13 PROCEDURE — 96365 THER/PROPH/DIAG IV INF INIT: CPT

## 2021-04-13 PROCEDURE — 85025 COMPLETE CBC W/AUTO DIFF WBC: CPT

## 2021-04-13 PROCEDURE — 85610 PROTHROMBIN TIME: CPT

## 2021-04-13 PROCEDURE — 87086 URINE CULTURE/COLONY COUNT: CPT

## 2021-04-13 PROCEDURE — 51702 INSERT TEMP BLADDER CATH: CPT

## 2021-04-13 PROCEDURE — 80307 DRUG TEST PRSMV CHEM ANLYZR: CPT

## 2021-04-13 PROCEDURE — 700105 HCHG RX REV CODE 258: Performed by: EMERGENCY MEDICINE

## 2021-04-13 PROCEDURE — 85347 COAGULATION TIME ACTIVATED: CPT

## 2021-04-13 PROCEDURE — 303105 HCHG CATHETER EXTRA

## 2021-04-13 PROCEDURE — 85576 BLOOD PLATELET AGGREGATION: CPT

## 2021-04-13 PROCEDURE — 82550 ASSAY OF CK (CPK): CPT

## 2021-04-13 RX ORDER — SODIUM CHLORIDE 9 MG/ML
1000 INJECTION, SOLUTION INTRAVENOUS ONCE
Status: COMPLETED | OUTPATIENT
Start: 2021-04-13 | End: 2021-04-13

## 2021-04-13 RX ORDER — CLONIDINE HYDROCHLORIDE 0.1 MG/1
0.1 TABLET ORAL
Status: DISCONTINUED | OUTPATIENT
Start: 2021-04-13 | End: 2021-04-14

## 2021-04-13 RX ORDER — SODIUM CHLORIDE, SODIUM LACTATE, POTASSIUM CHLORIDE, AND CALCIUM CHLORIDE .6; .31; .03; .02 G/100ML; G/100ML; G/100ML; G/100ML
2000 INJECTION, SOLUTION INTRAVENOUS ONCE
Status: COMPLETED | OUTPATIENT
Start: 2021-04-13 | End: 2021-04-14

## 2021-04-13 RX ORDER — LORAZEPAM 2 MG/ML
1-2 INJECTION INTRAMUSCULAR
Status: DISCONTINUED | OUTPATIENT
Start: 2021-04-13 | End: 2021-04-15

## 2021-04-13 RX ORDER — POTASSIUM CHLORIDE 7.45 MG/ML
10 INJECTION INTRAVENOUS ONCE
Status: COMPLETED | OUTPATIENT
Start: 2021-04-13 | End: 2021-04-14

## 2021-04-13 RX ORDER — MAGNESIUM SULFATE HEPTAHYDRATE 40 MG/ML
2 INJECTION, SOLUTION INTRAVENOUS ONCE
Status: COMPLETED | OUTPATIENT
Start: 2021-04-13 | End: 2021-04-14

## 2021-04-13 RX ORDER — POLYETHYLENE GLYCOL 3350 17 G/17G
1 POWDER, FOR SOLUTION ORAL
Status: DISCONTINUED | OUTPATIENT
Start: 2021-04-13 | End: 2021-04-14

## 2021-04-13 RX ORDER — FOLIC ACID 1 MG/1
1 TABLET ORAL DAILY
Status: DISCONTINUED | OUTPATIENT
Start: 2021-04-14 | End: 2021-04-14

## 2021-04-13 RX ORDER — ACETAMINOPHEN 325 MG/1
650 TABLET ORAL EVERY 6 HOURS PRN
Status: DISCONTINUED | OUTPATIENT
Start: 2021-04-13 | End: 2021-04-14

## 2021-04-13 RX ORDER — NICOTINE 21 MG/24HR
14 PATCH, TRANSDERMAL 24 HOURS TRANSDERMAL
Status: DISCONTINUED | OUTPATIENT
Start: 2021-04-14 | End: 2021-04-27 | Stop reason: HOSPADM

## 2021-04-13 RX ORDER — POTASSIUM CHLORIDE 20 MEQ/1
20 TABLET, EXTENDED RELEASE ORAL 2 TIMES DAILY
Status: DISCONTINUED | OUTPATIENT
Start: 2021-04-13 | End: 2021-04-14

## 2021-04-13 RX ORDER — AMOXICILLIN 250 MG
2 CAPSULE ORAL 2 TIMES DAILY
Status: DISCONTINUED | OUTPATIENT
Start: 2021-04-14 | End: 2021-04-14

## 2021-04-13 RX ORDER — SODIUM CHLORIDE, SODIUM LACTATE, POTASSIUM CHLORIDE, CALCIUM CHLORIDE 600; 310; 30; 20 MG/100ML; MG/100ML; MG/100ML; MG/100ML
INJECTION, SOLUTION INTRAVENOUS CONTINUOUS
Status: DISCONTINUED | OUTPATIENT
Start: 2021-04-13 | End: 2021-04-14

## 2021-04-13 RX ORDER — LORAZEPAM 2 MG/ML
2 INJECTION INTRAMUSCULAR EVERY 4 HOURS
Status: DISCONTINUED | OUTPATIENT
Start: 2021-04-13 | End: 2021-04-14

## 2021-04-13 RX ORDER — DEXMEDETOMIDINE HYDROCHLORIDE 4 UG/ML
.1-1 INJECTION, SOLUTION INTRAVENOUS CONTINUOUS
Status: DISCONTINUED | OUTPATIENT
Start: 2021-04-13 | End: 2021-04-14

## 2021-04-13 RX ORDER — SODIUM CHLORIDE AND POTASSIUM CHLORIDE 150; 900 MG/100ML; MG/100ML
INJECTION, SOLUTION INTRAVENOUS ONCE
Status: COMPLETED | OUTPATIENT
Start: 2021-04-13 | End: 2021-04-14

## 2021-04-13 RX ORDER — BISACODYL 10 MG
10 SUPPOSITORY, RECTAL RECTAL
Status: DISCONTINUED | OUTPATIENT
Start: 2021-04-13 | End: 2021-04-14

## 2021-04-13 RX ORDER — GAUZE BANDAGE 2" X 2"
100 BANDAGE TOPICAL DAILY
Status: DISCONTINUED | OUTPATIENT
Start: 2021-04-14 | End: 2021-04-14

## 2021-04-13 RX ADMIN — SODIUM CHLORIDE 1000 ML: 9 INJECTION, SOLUTION INTRAVENOUS at 20:27

## 2021-04-13 RX ADMIN — POTASSIUM CHLORIDE AND SODIUM CHLORIDE: 900; 150 INJECTION, SOLUTION INTRAVENOUS at 22:23

## 2021-04-13 RX ADMIN — PIPERACILLIN AND TAZOBACTAM 4.5 G: 4; .5 INJECTION, POWDER, LYOPHILIZED, FOR SOLUTION INTRAVENOUS; PARENTERAL at 21:14

## 2021-04-13 RX ADMIN — LORAZEPAM 2 MG: 2 INJECTION INTRAMUSCULAR; INTRAVENOUS at 23:44

## 2021-04-13 RX ADMIN — LORAZEPAM 2 MG: 2 INJECTION INTRAMUSCULAR; INTRAVENOUS at 22:22

## 2021-04-13 RX ADMIN — SODIUM CHLORIDE 1000 ML: 9 INJECTION, SOLUTION INTRAVENOUS at 19:00

## 2021-04-13 ASSESSMENT — LIFESTYLE VARIABLES
TREMOR: MODERATE TREMOR WITH ARMS EXTENDED
AGITATION: MODERATELY FIDGETY AND RESTLESS
VISUAL DISTURBANCES: NOT PRESENT
DO YOU DRINK ALCOHOL: YES
NAUSEA AND VOMITING: *
TACTILE DISTURBANCES: MODERATE ITCHING, PINS AND NEEDLES SENSATION, BURNING OR NUMBNESS
HEADACHE, FULLNESS IN HEAD: NOT PRESENT
ORIENTATION AND CLOUDING OF SENSORIUM: DATE DISORIENTATION BY MORE THAN TWO CALENDAR DAYS
ANXIETY: *
PAROXYSMAL SWEATS: *
AUDITORY DISTURBANCES: NOT PRESENT
TOTAL SCORE: 20

## 2021-04-13 ASSESSMENT — FIBROSIS 4 INDEX
FIB4 SCORE: 9.6
FIB4 SCORE: 0.87

## 2021-04-14 ENCOUNTER — APPOINTMENT (OUTPATIENT)
Dept: RADIOLOGY | Facility: MEDICAL CENTER | Age: 61
DRG: 082 | End: 2021-04-14
Attending: STUDENT IN AN ORGANIZED HEALTH CARE EDUCATION/TRAINING PROGRAM
Payer: MEDICAID

## 2021-04-14 ENCOUNTER — APPOINTMENT (OUTPATIENT)
Dept: RADIOLOGY | Facility: MEDICAL CENTER | Age: 61
DRG: 082 | End: 2021-04-14
Attending: NURSE PRACTITIONER
Payer: MEDICAID

## 2021-04-14 ENCOUNTER — APPOINTMENT (OUTPATIENT)
Dept: CARDIOLOGY | Facility: MEDICAL CENTER | Age: 61
DRG: 082 | End: 2021-04-14
Attending: EMERGENCY MEDICINE
Payer: MEDICAID

## 2021-04-14 ENCOUNTER — APPOINTMENT (OUTPATIENT)
Dept: RADIOLOGY | Facility: MEDICAL CENTER | Age: 61
DRG: 082 | End: 2021-04-14
Attending: INTERNAL MEDICINE
Payer: MEDICAID

## 2021-04-14 PROBLEM — E87.20 LACTIC ACIDOSIS: Status: ACTIVE | Noted: 2021-04-14

## 2021-04-14 PROBLEM — E43 PROTEIN-CALORIE MALNUTRITION, SEVERE (HCC): Chronic | Status: ACTIVE | Noted: 2021-04-14

## 2021-04-14 PROBLEM — E83.51 HYPOCALCEMIA: Status: ACTIVE | Noted: 2021-04-14

## 2021-04-14 PROBLEM — R57.9 SHOCK (HCC): Status: ACTIVE | Noted: 2021-04-14

## 2021-04-14 PROBLEM — I51.89 RIGHT ATRIAL MASS: Status: ACTIVE | Noted: 2021-04-14

## 2021-04-14 PROBLEM — E87.8 ELECTROLYTE DISTURBANCE: Status: ACTIVE | Noted: 2021-04-14

## 2021-04-14 PROBLEM — F10.939 ALCOHOL WITHDRAWAL SYNDROME WITH COMPLICATION (HCC): Status: ACTIVE | Noted: 2021-04-14

## 2021-04-14 PROBLEM — E80.6 HYPERBILIRUBINEMIA: Status: ACTIVE | Noted: 2021-04-14

## 2021-04-14 PROBLEM — R74.01 TRANSAMINITIS: Status: ACTIVE | Noted: 2021-04-14

## 2021-04-14 PROBLEM — E83.42 HYPOMAGNESEMIA: Status: ACTIVE | Noted: 2021-04-14

## 2021-04-14 PROBLEM — E87.6 HYPOKALEMIA: Status: ACTIVE | Noted: 2021-04-14

## 2021-04-14 PROBLEM — F12.90 MARIJUANA USER: Status: ACTIVE | Noted: 2021-04-14

## 2021-04-14 PROBLEM — R79.1 ELEVATED INR: Status: ACTIVE | Noted: 2021-04-14

## 2021-04-14 PROBLEM — J96.00 ACUTE RESPIRATORY FAILURE (HCC): Status: ACTIVE | Noted: 2021-04-14

## 2021-04-14 PROBLEM — I46.9 CARDIAC ARREST (HCC): Status: ACTIVE | Noted: 2021-04-14

## 2021-04-14 PROBLEM — R65.10 SIRS (SYSTEMIC INFLAMMATORY RESPONSE SYNDROME) (HCC): Status: ACTIVE | Noted: 2021-04-14

## 2021-04-14 PROBLEM — R94.31 QT PROLONGATION: Status: ACTIVE | Noted: 2021-04-14

## 2021-04-14 PROBLEM — Z71.89 ADVANCED CARE PLANNING/COUNSELING DISCUSSION: Status: ACTIVE | Noted: 2021-04-14

## 2021-04-14 LAB
ALBUMIN SERPL BCP-MCNC: 2.1 G/DL (ref 3.2–4.9)
ALBUMIN SERPL BCP-MCNC: 2.3 G/DL (ref 3.2–4.9)
ALBUMIN/GLOB SERPL: 1.2 G/DL
ALBUMIN/GLOB SERPL: 1.2 G/DL
ALP SERPL-CCNC: 117 U/L (ref 30–99)
ALP SERPL-CCNC: 144 U/L (ref 30–99)
ALT SERPL-CCNC: 62 U/L (ref 2–50)
ALT SERPL-CCNC: 83 U/L (ref 2–50)
ANION GAP SERPL CALC-SCNC: 13 MMOL/L (ref 7–16)
ANION GAP SERPL CALC-SCNC: 14 MMOL/L (ref 7–16)
ANION GAP SERPL CALC-SCNC: 21 MMOL/L (ref 7–16)
ANION GAP SERPL CALC-SCNC: 28 MMOL/L (ref 7–16)
ANION GAP SERPL CALC-SCNC: 28 MMOL/L (ref 7–16)
AST SERPL-CCNC: 153 U/L (ref 12–45)
AST SERPL-CCNC: 238 U/L (ref 12–45)
BASE EXCESS BLDA CALC-SCNC: -1 MMOL/L (ref -4–3)
BASE EXCESS BLDA CALC-SCNC: -8 MMOL/L (ref -4–3)
BASOPHILS # BLD AUTO: 0 % (ref 0–1.8)
BASOPHILS # BLD AUTO: 0 % (ref 0–1.8)
BASOPHILS # BLD: 0 K/UL (ref 0–0.12)
BASOPHILS # BLD: 0 K/UL (ref 0–0.12)
BILIRUB SERPL-MCNC: 3.6 MG/DL (ref 0.1–1.5)
BILIRUB SERPL-MCNC: 3.7 MG/DL (ref 0.1–1.5)
BODY TEMPERATURE: ABNORMAL DEGREES
BODY TEMPERATURE: ABNORMAL DEGREES
BREATHS SETTING VENT: 20
BUN SERPL-MCNC: 13 MG/DL (ref 8–22)
BUN SERPL-MCNC: 13 MG/DL (ref 8–22)
BUN SERPL-MCNC: 15 MG/DL (ref 8–22)
BUN SERPL-MCNC: 16 MG/DL (ref 8–22)
BUN SERPL-MCNC: 16 MG/DL (ref 8–22)
CA-I SERPL-SCNC: 0.9 MMOL/L (ref 1.1–1.3)
CA-I SERPL-SCNC: 0.9 MMOL/L (ref 1.1–1.3)
CALCIUM SERPL-MCNC: 6.2 MG/DL (ref 8.5–10.5)
CALCIUM SERPL-MCNC: 6.5 MG/DL (ref 8.5–10.5)
CALCIUM SERPL-MCNC: 7 MG/DL (ref 8.5–10.5)
CALCIUM SERPL-MCNC: 7 MG/DL (ref 8.5–10.5)
CALCIUM SERPL-MCNC: 7.4 MG/DL (ref 8.5–10.5)
CHLORIDE SERPL-SCNC: 101 MMOL/L (ref 96–112)
CHLORIDE SERPL-SCNC: 106 MMOL/L (ref 96–112)
CHLORIDE SERPL-SCNC: 96 MMOL/L (ref 96–112)
CHLORIDE SERPL-SCNC: 98 MMOL/L (ref 96–112)
CHLORIDE SERPL-SCNC: 99 MMOL/L (ref 96–112)
CK SERPL-CCNC: 88 U/L (ref 0–154)
CO2 BLDA-SCNC: 17 MMOL/L (ref 20–33)
CO2 BLDA-SCNC: 24 MMOL/L (ref 20–33)
CO2 SERPL-SCNC: 11 MMOL/L (ref 20–33)
CO2 SERPL-SCNC: 14 MMOL/L (ref 20–33)
CO2 SERPL-SCNC: 22 MMOL/L (ref 20–33)
CO2 SERPL-SCNC: 25 MMOL/L (ref 20–33)
CO2 SERPL-SCNC: 26 MMOL/L (ref 20–33)
CREAT SERPL-MCNC: 0.2 MG/DL (ref 0.5–1.4)
CREAT SERPL-MCNC: 0.3 MG/DL (ref 0.5–1.4)
CREAT SERPL-MCNC: 0.4 MG/DL (ref 0.5–1.4)
CREAT SERPL-MCNC: 0.45 MG/DL (ref 0.5–1.4)
CREAT SERPL-MCNC: 0.47 MG/DL (ref 0.5–1.4)
DELSYS IDSYS: ABNORMAL
DELSYS IDSYS: ABNORMAL
EKG IMPRESSION: NORMAL
EKG IMPRESSION: NORMAL
END TIDAL CARBON DIOXIDE IECO2: 16 MMHG
END TIDAL CARBON DIOXIDE IECO2: 32 MMHG
EOSINOPHIL # BLD AUTO: 0 K/UL (ref 0–0.51)
EOSINOPHIL # BLD AUTO: 0 K/UL (ref 0–0.51)
EOSINOPHIL NFR BLD: 0 % (ref 0–6.9)
EOSINOPHIL NFR BLD: 0 % (ref 0–6.9)
ERYTHROCYTE [DISTWIDTH] IN BLOOD BY AUTOMATED COUNT: 66.4 FL (ref 35.9–50)
ERYTHROCYTE [DISTWIDTH] IN BLOOD BY AUTOMATED COUNT: 68.4 FL (ref 35.9–50)
GLOBULIN SER CALC-MCNC: 1.8 G/DL (ref 1.9–3.5)
GLOBULIN SER CALC-MCNC: 2 G/DL (ref 1.9–3.5)
GLUCOSE BLD-MCNC: 205 MG/DL (ref 65–99)
GLUCOSE SERPL-MCNC: 178 MG/DL (ref 65–99)
GLUCOSE SERPL-MCNC: 261 MG/DL (ref 65–99)
GLUCOSE SERPL-MCNC: 71 MG/DL (ref 65–99)
GLUCOSE SERPL-MCNC: 88 MG/DL (ref 65–99)
GLUCOSE SERPL-MCNC: 98 MG/DL (ref 65–99)
GRAM STN SPEC: NORMAL
HAV IGM SERPL QL IA: NORMAL
HBV CORE IGM SER QL: NORMAL
HBV SURFACE AG SER QL: NORMAL
HCO3 BLDA-SCNC: 15.8 MMOL/L (ref 17–25)
HCO3 BLDA-SCNC: 23 MMOL/L (ref 17–25)
HCT VFR BLD AUTO: 23.6 % (ref 37–47)
HCT VFR BLD AUTO: 23.9 % (ref 37–47)
HCT VFR BLD AUTO: 26.5 % (ref 37–47)
HCV AB SER QL: NORMAL
HGB BLD-MCNC: 7.9 G/DL (ref 12–16)
HGB BLD-MCNC: 8.1 G/DL (ref 12–16)
HGB BLD-MCNC: 8.9 G/DL (ref 12–16)
HOROWITZ INDEX BLDA+IHG-RTO: 283 MM[HG]
HOROWITZ INDEX BLDA+IHG-RTO: 406 MM[HG]
IMM GRANULOCYTES # BLD AUTO: 0.04 K/UL (ref 0–0.11)
IMM GRANULOCYTES # BLD AUTO: 0.07 K/UL (ref 0–0.11)
IMM GRANULOCYTES NFR BLD AUTO: 1.1 % (ref 0–0.9)
IMM GRANULOCYTES NFR BLD AUTO: 1.9 % (ref 0–0.9)
LACTATE BLD-SCNC: 10 MMOL/L (ref 0.5–2)
LACTATE BLD-SCNC: 3.6 MMOL/L (ref 0.5–2)
LACTATE BLD-SCNC: 5.8 MMOL/L (ref 0.5–2)
LACTATE BLD-SCNC: 8.5 MMOL/L (ref 0.5–2)
LV EJECT FRACT  99904: 65
LYMPHOCYTES # BLD AUTO: 0.38 K/UL (ref 1–4.8)
LYMPHOCYTES # BLD AUTO: 0.56 K/UL (ref 1–4.8)
LYMPHOCYTES NFR BLD: 10 % (ref 22–41)
LYMPHOCYTES NFR BLD: 15.2 % (ref 22–41)
MAGNESIUM SERPL-MCNC: 1.2 MG/DL (ref 1.5–2.5)
MAGNESIUM SERPL-MCNC: 1.9 MG/DL (ref 1.5–2.5)
MAGNESIUM SERPL-MCNC: 2 MG/DL (ref 1.5–2.5)
MAGNESIUM SERPL-MCNC: 2.3 MG/DL (ref 1.5–2.5)
MCH RBC QN AUTO: 36.8 PG (ref 27–33)
MCH RBC QN AUTO: 36.8 PG (ref 27–33)
MCHC RBC AUTO-ENTMCNC: 33.6 G/DL (ref 33.6–35)
MCHC RBC AUTO-ENTMCNC: 33.9 G/DL (ref 33.6–35)
MCV RBC AUTO: 108.6 FL (ref 81.4–97.8)
MCV RBC AUTO: 109.5 FL (ref 81.4–97.8)
MODE IMODE: ABNORMAL
MODE IMODE: ABNORMAL
MONOCYTES # BLD AUTO: 0.09 K/UL (ref 0–0.85)
MONOCYTES # BLD AUTO: 0.18 K/UL (ref 0–0.85)
MONOCYTES NFR BLD AUTO: 2.4 % (ref 0–13.4)
MONOCYTES NFR BLD AUTO: 4.7 % (ref 0–13.4)
NEUTROPHILS # BLD AUTO: 2.96 K/UL (ref 2–7.15)
NEUTROPHILS # BLD AUTO: 3.19 K/UL (ref 2–7.15)
NEUTROPHILS NFR BLD: 80.5 % (ref 44–72)
NEUTROPHILS NFR BLD: 84.2 % (ref 44–72)
NRBC # BLD AUTO: 0.02 K/UL
NRBC # BLD AUTO: 0.06 K/UL
NRBC BLD-RTO: 0.5 /100 WBC
NRBC BLD-RTO: 1.6 /100 WBC
O2/TOTAL GAS SETTING VFR VENT: 100 %
O2/TOTAL GAS SETTING VFR VENT: 35 %
PCO2 BLDA: 26.7 MMHG (ref 26–37)
PCO2 BLDA: 33.5 MMHG (ref 26–37)
PCO2 TEMP ADJ BLDA: 25.7 MMHG (ref 26–37)
PCO2 TEMP ADJ BLDA: 33.7 MMHG (ref 26–37)
PEEP END EXPIRATORY PRESSURE IPEEP: 8 CMH20
PEEP END EXPIRATORY PRESSURE IPEEP: 8 CMH20
PERCENT MINUTE VOLUME IPMV: 165
PH BLDA: 7.38 [PH] (ref 7.4–7.5)
PH BLDA: 7.44 [PH] (ref 7.4–7.5)
PH TEMP ADJ BLDA: 7.39 [PH] (ref 7.4–7.5)
PH TEMP ADJ BLDA: 7.44 [PH] (ref 7.4–7.5)
PHOSPHATE SERPL-MCNC: 1.9 MG/DL (ref 2.5–4.5)
PHOSPHATE SERPL-MCNC: 2.1 MG/DL (ref 2.5–4.5)
PLATELET # BLD AUTO: 109 K/UL (ref 164–446)
PLATELET # BLD AUTO: 94 K/UL (ref 164–446)
PMV BLD AUTO: 11.1 FL (ref 9–12.9)
PMV BLD AUTO: 11.2 FL (ref 9–12.9)
PO2 BLDA: 406 MMHG (ref 64–87)
PO2 BLDA: 99 MMHG (ref 64–87)
PO2 TEMP ADJ BLDA: 100 MMHG (ref 64–87)
PO2 TEMP ADJ BLDA: 400 MMHG (ref 64–87)
POTASSIUM SERPL-SCNC: 2.6 MMOL/L (ref 3.6–5.5)
POTASSIUM SERPL-SCNC: 2.8 MMOL/L (ref 3.6–5.5)
POTASSIUM SERPL-SCNC: 3.3 MMOL/L (ref 3.6–5.5)
POTASSIUM SERPL-SCNC: 3.6 MMOL/L (ref 3.6–5.5)
POTASSIUM SERPL-SCNC: 3.8 MMOL/L (ref 3.6–5.5)
PREALB SERPL-MCNC: 10 MG/DL (ref 18–38)
PREALB SERPL-MCNC: 10.6 MG/DL (ref 18–38)
PROT SERPL-MCNC: 3.9 G/DL (ref 6–8.2)
PROT SERPL-MCNC: 4.3 G/DL (ref 6–8.2)
PTH-INTACT SERPL-MCNC: 39.8 PG/ML (ref 14–72)
PTH-INTACT SERPL-MCNC: 66.2 PG/ML (ref 14–72)
RBC # BLD AUTO: 2.2 M/UL (ref 4.2–5.4)
RBC # BLD AUTO: 2.42 M/UL (ref 4.2–5.4)
SAO2 % BLDA: 100 % (ref 93–99)
SAO2 % BLDA: 98 % (ref 93–99)
SARS-COV-2 RNA RESP QL NAA+PROBE: NOTDETECTED
SIGNIFICANT IND 70042: NORMAL
SITE SITE: NORMAL
SODIUM SERPL-SCNC: 135 MMOL/L (ref 135–145)
SODIUM SERPL-SCNC: 140 MMOL/L (ref 135–145)
SODIUM SERPL-SCNC: 141 MMOL/L (ref 135–145)
SODIUM SERPL-SCNC: 141 MMOL/L (ref 135–145)
SODIUM SERPL-SCNC: 145 MMOL/L (ref 135–145)
SOURCE SOURCE: NORMAL
SPECIMEN DRAWN FROM PATIENT: ABNORMAL
SPECIMEN DRAWN FROM PATIENT: ABNORMAL
SPECIMEN SOURCE: NORMAL
TIDAL VOLUME IVT: 390 ML
TROPONIN T SERPL-MCNC: 69 NG/L (ref 6–19)
TROPONIN T SERPL-MCNC: 72 NG/L (ref 6–19)
VIT B12 SERPL-MCNC: 1659 PG/ML (ref 211–911)
WBC # BLD AUTO: 3.7 K/UL (ref 4.8–10.8)
WBC # BLD AUTO: 3.8 K/UL (ref 4.8–10.8)

## 2021-04-14 PROCEDURE — 93306 TTE W/DOPPLER COMPLETE: CPT | Mod: 26 | Performed by: INTERNAL MEDICINE

## 2021-04-14 PROCEDURE — 82306 VITAMIN D 25 HYDROXY: CPT

## 2021-04-14 PROCEDURE — 36620 INSERTION CATHETER ARTERY: CPT | Performed by: EMERGENCY MEDICINE

## 2021-04-14 PROCEDURE — 70450 CT HEAD/BRAIN W/O DYE: CPT

## 2021-04-14 PROCEDURE — 80053 COMPREHEN METABOLIC PANEL: CPT

## 2021-04-14 PROCEDURE — 700111 HCHG RX REV CODE 636 W/ 250 OVERRIDE (IP): Performed by: INTERNAL MEDICINE

## 2021-04-14 PROCEDURE — 700111 HCHG RX REV CODE 636 W/ 250 OVERRIDE (IP)

## 2021-04-14 PROCEDURE — 770022 HCHG ROOM/CARE - ICU (200)

## 2021-04-14 PROCEDURE — 700111 HCHG RX REV CODE 636 W/ 250 OVERRIDE (IP): Performed by: EMERGENCY MEDICINE

## 2021-04-14 PROCEDURE — 93010 ELECTROCARDIOGRAM REPORT: CPT | Performed by: INTERNAL MEDICINE

## 2021-04-14 PROCEDURE — 31500 INSERT EMERGENCY AIRWAY: CPT

## 2021-04-14 PROCEDURE — 37799 UNLISTED PX VASCULAR SURGERY: CPT

## 2021-04-14 PROCEDURE — 700105 HCHG RX REV CODE 258: Performed by: NURSE PRACTITIONER

## 2021-04-14 PROCEDURE — 04HY32Z INSERTION OF MONITORING DEVICE INTO LOWER ARTERY, PERCUTANEOUS APPROACH: ICD-10-PCS | Performed by: EMERGENCY MEDICINE

## 2021-04-14 PROCEDURE — 5A12012 PERFORMANCE OF CARDIAC OUTPUT, SINGLE, MANUAL: ICD-10-PCS | Performed by: EMERGENCY MEDICINE

## 2021-04-14 PROCEDURE — 302214 INTUBATION BOX: Performed by: EMERGENCY MEDICINE

## 2021-04-14 PROCEDURE — B548ZZA ULTRASONOGRAPHY OF SUPERIOR VENA CAVA, GUIDANCE: ICD-10-PCS | Performed by: EMERGENCY MEDICINE

## 2021-04-14 PROCEDURE — 83970 ASSAY OF PARATHORMONE: CPT

## 2021-04-14 PROCEDURE — 700101 HCHG RX REV CODE 250: Performed by: EMERGENCY MEDICINE

## 2021-04-14 PROCEDURE — 82330 ASSAY OF CALCIUM: CPT

## 2021-04-14 PROCEDURE — 85014 HEMATOCRIT: CPT

## 2021-04-14 PROCEDURE — 93970 EXTREMITY STUDY: CPT

## 2021-04-14 PROCEDURE — 93010 ELECTROCARDIOGRAM REPORT: CPT | Mod: 77 | Performed by: INTERNAL MEDICINE

## 2021-04-14 PROCEDURE — 02HV33Z INSERTION OF INFUSION DEVICE INTO SUPERIOR VENA CAVA, PERCUTANEOUS APPROACH: ICD-10-PCS | Performed by: EMERGENCY MEDICINE

## 2021-04-14 PROCEDURE — 700105 HCHG RX REV CODE 258: Performed by: INTERNAL MEDICINE

## 2021-04-14 PROCEDURE — 99292 CRITICAL CARE ADDL 30 MIN: CPT | Mod: 25 | Performed by: INTERNAL MEDICINE

## 2021-04-14 PROCEDURE — 84134 ASSAY OF PREALBUMIN: CPT

## 2021-04-14 PROCEDURE — 36556 INSERT NON-TUNNEL CV CATH: CPT

## 2021-04-14 PROCEDURE — 36556 INSERT NON-TUNNEL CV CATH: CPT | Mod: RT | Performed by: INTERNAL MEDICINE

## 2021-04-14 PROCEDURE — C1751 CATH, INF, PER/CENT/MIDLINE: HCPCS

## 2021-04-14 PROCEDURE — 71045 X-RAY EXAM CHEST 1 VIEW: CPT

## 2021-04-14 PROCEDURE — 85018 HEMOGLOBIN: CPT

## 2021-04-14 PROCEDURE — A9270 NON-COVERED ITEM OR SERVICE: HCPCS | Performed by: STUDENT IN AN ORGANIZED HEALTH CARE EDUCATION/TRAINING PROGRAM

## 2021-04-14 PROCEDURE — 94003 VENT MGMT INPAT SUBQ DAY: CPT

## 2021-04-14 PROCEDURE — 84100 ASSAY OF PHOSPHORUS: CPT | Mod: 91

## 2021-04-14 PROCEDURE — 87070 CULTURE OTHR SPECIMN AEROBIC: CPT

## 2021-04-14 PROCEDURE — 93005 ELECTROCARDIOGRAM TRACING: CPT | Performed by: EMERGENCY MEDICINE

## 2021-04-14 PROCEDURE — A9270 NON-COVERED ITEM OR SERVICE: HCPCS | Performed by: NURSE PRACTITIONER

## 2021-04-14 PROCEDURE — 99253 IP/OBS CNSLTJ NEW/EST LOW 45: CPT | Mod: 25 | Performed by: PSYCHIATRY & NEUROLOGY

## 2021-04-14 PROCEDURE — 5A1945Z RESPIRATORY VENTILATION, 24-96 CONSECUTIVE HOURS: ICD-10-PCS | Performed by: EMERGENCY MEDICINE

## 2021-04-14 PROCEDURE — 0BH17EZ INSERTION OF ENDOTRACHEAL AIRWAY INTO TRACHEA, VIA NATURAL OR ARTIFICIAL OPENING: ICD-10-PCS | Performed by: EMERGENCY MEDICINE

## 2021-04-14 PROCEDURE — 700102 HCHG RX REV CODE 250 W/ 637 OVERRIDE(OP): Performed by: INTERNAL MEDICINE

## 2021-04-14 PROCEDURE — 93005 ELECTROCARDIOGRAM TRACING: CPT | Performed by: NURSE PRACTITIONER

## 2021-04-14 PROCEDURE — 700101 HCHG RX REV CODE 250: Performed by: INTERNAL MEDICINE

## 2021-04-14 PROCEDURE — 82962 GLUCOSE BLOOD TEST: CPT

## 2021-04-14 PROCEDURE — 94799 UNLISTED PULMONARY SVC/PX: CPT

## 2021-04-14 PROCEDURE — 83735 ASSAY OF MAGNESIUM: CPT | Mod: 91

## 2021-04-14 PROCEDURE — 93306 TTE W/DOPPLER COMPLETE: CPT

## 2021-04-14 PROCEDURE — 87205 SMEAR GRAM STAIN: CPT

## 2021-04-14 PROCEDURE — 99292 CRITICAL CARE ADDL 30 MIN: CPT | Mod: 25 | Performed by: NURSE PRACTITIONER

## 2021-04-14 PROCEDURE — 4A00X4Z MEASUREMENT OF CENTRAL NERVOUS ELECTRICAL ACTIVITY, EXTERNAL APPROACH: ICD-10-PCS | Performed by: STUDENT IN AN ORGANIZED HEALTH CARE EDUCATION/TRAINING PROGRAM

## 2021-04-14 PROCEDURE — 700111 HCHG RX REV CODE 636 W/ 250 OVERRIDE (IP): Performed by: STUDENT IN AN ORGANIZED HEALTH CARE EDUCATION/TRAINING PROGRAM

## 2021-04-14 PROCEDURE — 82607 VITAMIN B-12: CPT

## 2021-04-14 PROCEDURE — 36556 INSERT NON-TUNNEL CV CATH: CPT | Mod: LT,59 | Performed by: EMERGENCY MEDICINE

## 2021-04-14 PROCEDURE — 99291 CRITICAL CARE FIRST HOUR: CPT | Mod: 25 | Performed by: EMERGENCY MEDICINE

## 2021-04-14 PROCEDURE — A9270 NON-COVERED ITEM OR SERVICE: HCPCS | Performed by: INTERNAL MEDICINE

## 2021-04-14 PROCEDURE — 700101 HCHG RX REV CODE 250: Performed by: NURSE PRACTITIONER

## 2021-04-14 PROCEDURE — 700102 HCHG RX REV CODE 250 W/ 637 OVERRIDE(OP): Performed by: NURSE PRACTITIONER

## 2021-04-14 PROCEDURE — 700102 HCHG RX REV CODE 250 W/ 637 OVERRIDE(OP): Performed by: STUDENT IN AN ORGANIZED HEALTH CARE EDUCATION/TRAINING PROGRAM

## 2021-04-14 PROCEDURE — 84484 ASSAY OF TROPONIN QUANT: CPT

## 2021-04-14 PROCEDURE — 80048 BASIC METABOLIC PNL TOTAL CA: CPT | Mod: 91

## 2021-04-14 PROCEDURE — 80074 ACUTE HEPATITIS PANEL: CPT

## 2021-04-14 PROCEDURE — 94002 VENT MGMT INPAT INIT DAY: CPT

## 2021-04-14 PROCEDURE — 82550 ASSAY OF CK (CPK): CPT

## 2021-04-14 PROCEDURE — 36620 INSERTION CATHETER ARTERY: CPT

## 2021-04-14 PROCEDURE — 95714 VEEG EA 12-26 HR UNMNTR: CPT | Performed by: STUDENT IN AN ORGANIZED HEALTH CARE EDUCATION/TRAINING PROGRAM

## 2021-04-14 PROCEDURE — 700105 HCHG RX REV CODE 258: Performed by: EMERGENCY MEDICINE

## 2021-04-14 PROCEDURE — 82803 BLOOD GASES ANY COMBINATION: CPT | Mod: 91

## 2021-04-14 PROCEDURE — 31500 INSERT EMERGENCY AIRWAY: CPT | Performed by: EMERGENCY MEDICINE

## 2021-04-14 PROCEDURE — 95700 EEG CONT REC W/VID EEG TECH: CPT | Performed by: STUDENT IN AN ORGANIZED HEALTH CARE EDUCATION/TRAINING PROGRAM

## 2021-04-14 PROCEDURE — 700111 HCHG RX REV CODE 636 W/ 250 OVERRIDE (IP): Performed by: NURSE PRACTITIONER

## 2021-04-14 PROCEDURE — 95720 EEG PHY/QHP EA INCR W/VEEG: CPT | Performed by: STUDENT IN AN ORGANIZED HEALTH CARE EDUCATION/TRAINING PROGRAM

## 2021-04-14 PROCEDURE — C9113 INJ PANTOPRAZOLE SODIUM, VIA: HCPCS | Performed by: INTERNAL MEDICINE

## 2021-04-14 PROCEDURE — 76705 ECHO EXAM OF ABDOMEN: CPT

## 2021-04-14 PROCEDURE — 85025 COMPLETE CBC W/AUTO DIFF WBC: CPT

## 2021-04-14 PROCEDURE — 83605 ASSAY OF LACTIC ACID: CPT | Mod: 91

## 2021-04-14 PROCEDURE — 82272 OCCULT BLD FECES 1-3 TESTS: CPT

## 2021-04-14 PROCEDURE — 700105 HCHG RX REV CODE 258: Performed by: STUDENT IN AN ORGANIZED HEALTH CARE EDUCATION/TRAINING PROGRAM

## 2021-04-14 RX ORDER — DEXTROSE MONOHYDRATE 25 G/50ML
50 INJECTION, SOLUTION INTRAVENOUS
Status: DISCONTINUED | OUTPATIENT
Start: 2021-04-14 | End: 2021-04-14

## 2021-04-14 RX ORDER — PHENOBARBITAL SODIUM 130 MG/ML
130 INJECTION INTRAMUSCULAR; INTRAVENOUS
Status: DISCONTINUED | OUTPATIENT
Start: 2021-04-14 | End: 2021-04-14

## 2021-04-14 RX ORDER — NOREPINEPHRINE BITARTRATE 0.03 MG/ML
0-30 INJECTION, SOLUTION INTRAVENOUS CONTINUOUS
Status: DISCONTINUED | OUTPATIENT
Start: 2021-04-14 | End: 2021-04-19

## 2021-04-14 RX ORDER — LORAZEPAM 2 MG/ML
2 INJECTION INTRAMUSCULAR ONCE
Status: COMPLETED | OUTPATIENT
Start: 2021-04-14 | End: 2021-04-14

## 2021-04-14 RX ORDER — POLYETHYLENE GLYCOL 3350 17 G/17G
1 POWDER, FOR SOLUTION ORAL
Status: DISCONTINUED | OUTPATIENT
Start: 2021-04-14 | End: 2021-04-14

## 2021-04-14 RX ORDER — CALCIUM CARBONATE 500 MG/1
500 TABLET, CHEWABLE ORAL 2 TIMES DAILY
Status: DISCONTINUED | OUTPATIENT
Start: 2021-04-14 | End: 2021-04-14

## 2021-04-14 RX ORDER — FAMOTIDINE 20 MG/1
20 TABLET, FILM COATED ORAL EVERY 12 HOURS
Status: DISCONTINUED | OUTPATIENT
Start: 2021-04-14 | End: 2021-04-15

## 2021-04-14 RX ORDER — BISACODYL 10 MG
10 SUPPOSITORY, RECTAL RECTAL
Status: DISCONTINUED | OUTPATIENT
Start: 2021-04-14 | End: 2021-04-20

## 2021-04-14 RX ORDER — MAGNESIUM SULFATE HEPTAHYDRATE 40 MG/ML
2 INJECTION, SOLUTION INTRAVENOUS ONCE
Status: COMPLETED | OUTPATIENT
Start: 2021-04-14 | End: 2021-04-14

## 2021-04-14 RX ORDER — POTASSIUM CHLORIDE 14.9 MG/ML
20 INJECTION INTRAVENOUS ONCE
Status: COMPLETED | OUTPATIENT
Start: 2021-04-14 | End: 2021-04-14

## 2021-04-14 RX ORDER — CLONIDINE HYDROCHLORIDE 0.1 MG/1
0.1 TABLET ORAL
Status: DISCONTINUED | OUTPATIENT
Start: 2021-04-14 | End: 2021-04-14

## 2021-04-14 RX ORDER — BISACODYL 10 MG
10 SUPPOSITORY, RECTAL RECTAL
Status: DISCONTINUED | OUTPATIENT
Start: 2021-04-14 | End: 2021-04-14

## 2021-04-14 RX ORDER — POLYETHYLENE GLYCOL 3350 17 G/17G
1 POWDER, FOR SOLUTION ORAL
Status: DISCONTINUED | OUTPATIENT
Start: 2021-04-14 | End: 2021-04-20

## 2021-04-14 RX ORDER — ACETAMINOPHEN 325 MG/1
650 TABLET ORAL EVERY 6 HOURS PRN
Status: DISCONTINUED | OUTPATIENT
Start: 2021-04-14 | End: 2021-04-20

## 2021-04-14 RX ORDER — CALCIUM CHLORIDE 100 MG/ML
INJECTION INTRAVENOUS; INTRAVENTRICULAR
Status: COMPLETED
Start: 2021-04-14 | End: 2021-04-14

## 2021-04-14 RX ORDER — LORAZEPAM 2 MG/ML
2 INJECTION INTRAMUSCULAR EVERY 4 HOURS
Status: DISCONTINUED | OUTPATIENT
Start: 2021-04-14 | End: 2021-04-15

## 2021-04-14 RX ORDER — DEXTROSE MONOHYDRATE 25 G/50ML
50 INJECTION, SOLUTION INTRAVENOUS
Status: DISCONTINUED | OUTPATIENT
Start: 2021-04-14 | End: 2021-04-20

## 2021-04-14 RX ORDER — EPINEPHRINE IN SOD CHLOR,ISO 1 MG/10 ML
SYRINGE (ML) INTRAVENOUS
Status: COMPLETED | OUTPATIENT
Start: 2021-04-14 | End: 2021-04-14

## 2021-04-14 RX ORDER — AMOXICILLIN 250 MG
2 CAPSULE ORAL 2 TIMES DAILY
Status: DISCONTINUED | OUTPATIENT
Start: 2021-04-14 | End: 2021-04-20

## 2021-04-14 RX ORDER — CALCIUM CHLORIDE 100 MG/ML
1 INJECTION INTRAVENOUS; INTRAVENTRICULAR ONCE
Status: COMPLETED | OUTPATIENT
Start: 2021-04-14 | End: 2021-04-14

## 2021-04-14 RX ORDER — POTASSIUM CHLORIDE 20 MEQ/1
20 TABLET, EXTENDED RELEASE ORAL 2 TIMES DAILY
Status: DISCONTINUED | OUTPATIENT
Start: 2021-04-14 | End: 2021-04-14

## 2021-04-14 RX ORDER — PROPOFOL 10 MG/ML
20 INJECTION, EMULSION INTRAVENOUS ONCE
Status: COMPLETED | OUTPATIENT
Start: 2021-04-14 | End: 2021-04-14

## 2021-04-14 RX ORDER — AMOXICILLIN 250 MG
2 CAPSULE ORAL 2 TIMES DAILY
Status: DISCONTINUED | OUTPATIENT
Start: 2021-04-14 | End: 2021-04-14

## 2021-04-14 RX ORDER — GAUZE BANDAGE 2" X 2"
100 BANDAGE TOPICAL DAILY
Status: DISCONTINUED | OUTPATIENT
Start: 2021-04-14 | End: 2021-04-14

## 2021-04-14 RX ORDER — PHENOBARBITAL SODIUM 130 MG/ML
260 INJECTION INTRAMUSCULAR; INTRAVENOUS
Status: DISCONTINUED | OUTPATIENT
Start: 2021-04-14 | End: 2021-04-14

## 2021-04-14 RX ORDER — POTASSIUM CHLORIDE 7.45 MG/ML
10 INJECTION INTRAVENOUS ONCE
Status: COMPLETED | OUTPATIENT
Start: 2021-04-14 | End: 2021-04-15

## 2021-04-14 RX ORDER — FOLIC ACID 1 MG/1
1 TABLET ORAL DAILY
Status: DISPENSED | OUTPATIENT
Start: 2021-04-14 | End: 2021-04-18

## 2021-04-14 RX ORDER — POTASSIUM CHLORIDE 7.45 MG/ML
10 INJECTION INTRAVENOUS
Status: COMPLETED | OUTPATIENT
Start: 2021-04-14 | End: 2021-04-14

## 2021-04-14 RX ORDER — POTASSIUM CHLORIDE 20 MEQ/1
40 TABLET, EXTENDED RELEASE ORAL ONCE
Status: DISCONTINUED | OUTPATIENT
Start: 2021-04-14 | End: 2021-04-14

## 2021-04-14 RX ORDER — PHYTONADIONE 5 MG/1
10 TABLET ORAL DAILY
Status: COMPLETED | OUTPATIENT
Start: 2021-04-14 | End: 2021-04-16

## 2021-04-14 RX ORDER — INSULIN GLARGINE 100 [IU]/ML
0.2 INJECTION, SOLUTION SUBCUTANEOUS EVERY EVENING
Status: DISCONTINUED | OUTPATIENT
Start: 2021-04-14 | End: 2021-04-14

## 2021-04-14 RX ADMIN — LORAZEPAM 2 MG: 2 INJECTION INTRAMUSCULAR; INTRAVENOUS at 22:15

## 2021-04-14 RX ADMIN — DEXMEDETOMIDINE 0.2 MCG/KG/HR: 200 INJECTION, SOLUTION INTRAVENOUS at 00:22

## 2021-04-14 RX ADMIN — POTASSIUM CHLORIDE 10 MEQ: 7.46 INJECTION, SOLUTION INTRAVENOUS at 03:32

## 2021-04-14 RX ADMIN — DOCUSATE SODIUM 50 MG AND SENNOSIDES 8.6 MG 2 TABLET: 8.6; 5 TABLET, FILM COATED ORAL at 11:55

## 2021-04-14 RX ADMIN — SODIUM CHLORIDE 8 MG/HR: 9 INJECTION, SOLUTION INTRAVENOUS at 22:17

## 2021-04-14 RX ADMIN — FAMOTIDINE 20 MG: 20 TABLET ORAL at 17:32

## 2021-04-14 RX ADMIN — FAMOTIDINE 20 MG: 20 TABLET ORAL at 11:55

## 2021-04-14 RX ADMIN — LORAZEPAM 2 MG: 2 INJECTION INTRAMUSCULAR; INTRAVENOUS at 05:45

## 2021-04-14 RX ADMIN — THIAMINE HYDROCHLORIDE: 100 INJECTION, SOLUTION INTRAMUSCULAR; INTRAVENOUS at 11:30

## 2021-04-14 RX ADMIN — LORAZEPAM 2 MG: 2 INJECTION INTRAMUSCULAR; INTRAVENOUS at 21:09

## 2021-04-14 RX ADMIN — MAGNESIUM SULFATE 2 G: 2 INJECTION INTRAVENOUS at 00:06

## 2021-04-14 RX ADMIN — MAGNESIUM GLUCONATE 500 MG ORAL TABLET 400 MG: 500 TABLET ORAL at 17:30

## 2021-04-14 RX ADMIN — CEFTRIAXONE SODIUM 2 G: 2 INJECTION, POWDER, FOR SOLUTION INTRAMUSCULAR; INTRAVENOUS at 11:30

## 2021-04-14 RX ADMIN — PHYTONADIONE 10 MG: 5 TABLET ORAL at 11:50

## 2021-04-14 RX ADMIN — POTASSIUM PHOSPHATE, MONOBASIC AND POTASSIUM PHOSPHATE, DIBASIC 30 MMOL: 224; 236 INJECTION, SOLUTION, CONCENTRATE INTRAVENOUS at 14:23

## 2021-04-14 RX ADMIN — INSULIN HUMAN 2 UNITS: 100 INJECTION, SOLUTION PARENTERAL at 17:31

## 2021-04-14 RX ADMIN — POTASSIUM BICARBONATE 50 MEQ: 978 TABLET, EFFERVESCENT ORAL at 07:55

## 2021-04-14 RX ADMIN — CALCIUM CHLORIDE 1 G: 100 INJECTION, SOLUTION INTRAVENOUS at 07:25

## 2021-04-14 RX ADMIN — POTASSIUM CHLORIDE 10 MEQ: 7.46 INJECTION, SOLUTION INTRAVENOUS at 02:28

## 2021-04-14 RX ADMIN — DOCUSATE SODIUM 50 MG AND SENNOSIDES 8.6 MG 2 TABLET: 8.6; 5 TABLET, FILM COATED ORAL at 17:30

## 2021-04-14 RX ADMIN — MAGNESIUM SULFATE 2 G: 2 INJECTION INTRAVENOUS at 03:32

## 2021-04-14 RX ADMIN — LORAZEPAM 2 MG: 2 INJECTION INTRAMUSCULAR; INTRAVENOUS at 07:13

## 2021-04-14 RX ADMIN — SODIUM CHLORIDE 3000 MG: 9 INJECTION, SOLUTION INTRAVENOUS at 06:26

## 2021-04-14 RX ADMIN — NOREPINEPHRINE BITARTRATE 5 MCG/MIN: 1 INJECTION, SOLUTION, CONCENTRATE INTRAVENOUS at 06:00

## 2021-04-14 RX ADMIN — POTASSIUM CHLORIDE 20 MEQ: 14.9 INJECTION, SOLUTION INTRAVENOUS at 13:34

## 2021-04-14 RX ADMIN — PROPOFOL 30 MCG/KG/MIN: 10 INJECTION, EMULSION INTRAVENOUS at 18:49

## 2021-04-14 RX ADMIN — SODIUM CHLORIDE, POTASSIUM CHLORIDE, SODIUM LACTATE AND CALCIUM CHLORIDE: 600; 310; 30; 20 INJECTION, SOLUTION INTRAVENOUS at 02:30

## 2021-04-14 RX ADMIN — POTASSIUM CHLORIDE 20 MEQ: 14.9 INJECTION, SOLUTION INTRAVENOUS at 18:51

## 2021-04-14 RX ADMIN — CALCIUM CHLORIDE 1 G: 100 INJECTION INTRAVENOUS; INTRAVENTRICULAR at 07:25

## 2021-04-14 RX ADMIN — POTASSIUM CHLORIDE 10 MEQ: 7.46 INJECTION, SOLUTION INTRAVENOUS at 00:12

## 2021-04-14 RX ADMIN — NOREPINEPHRINE BITARTRATE 9 MCG/MIN: 1 INJECTION, SOLUTION, CONCENTRATE INTRAVENOUS at 21:48

## 2021-04-14 RX ADMIN — DOXYCYCLINE 100 MG: 100 INJECTION, POWDER, LYOPHILIZED, FOR SOLUTION INTRAVENOUS at 17:31

## 2021-04-14 RX ADMIN — CALCIUM CARBONATE 500 MG: 500 TABLET, CHEWABLE ORAL at 07:57

## 2021-04-14 RX ADMIN — NICOTINE 14 MG: 14 PATCH TRANSDERMAL at 00:29

## 2021-04-14 RX ADMIN — LORAZEPAM 2 MG: 2 INJECTION INTRAMUSCULAR; INTRAVENOUS at 07:24

## 2021-04-14 RX ADMIN — DOXYCYCLINE 100 MG: 100 INJECTION, POWDER, LYOPHILIZED, FOR SOLUTION INTRAVENOUS at 12:05

## 2021-04-14 RX ADMIN — EPINEPHRINE 1 MG: 0.1 INJECTION, SOLUTION ENDOTRACHEAL; INTRACARDIAC; INTRAVENOUS at 06:21

## 2021-04-14 RX ADMIN — MAGNESIUM GLUCONATE 500 MG ORAL TABLET 400 MG: 500 TABLET ORAL at 11:55

## 2021-04-14 RX ADMIN — PROPOFOL 30 MCG/KG/MIN: 10 INJECTION, EMULSION INTRAVENOUS at 08:11

## 2021-04-14 RX ADMIN — PHENOBARBITAL SODIUM 280 MG: 65 INJECTION INTRAMUSCULAR; INTRAVENOUS at 23:02

## 2021-04-14 RX ADMIN — SODIUM CHLORIDE, POTASSIUM CHLORIDE, SODIUM LACTATE AND CALCIUM CHLORIDE 1250 ML: 600; 310; 30; 20 INJECTION, SOLUTION INTRAVENOUS at 02:16

## 2021-04-14 RX ADMIN — PROPOFOL 20 MG: 10 INJECTION, EMULSION INTRAVENOUS at 11:10

## 2021-04-14 RX ADMIN — POTASSIUM CHLORIDE 10 MEQ: 7.46 INJECTION, SOLUTION INTRAVENOUS at 04:58

## 2021-04-14 RX ADMIN — DIBASIC SODIUM PHOSPHATE, MONOBASIC POTASSIUM PHOSPHATE AND MONOBASIC SODIUM PHOSPHATE 250 MG: 852; 155; 130 TABLET ORAL at 14:23

## 2021-04-14 RX ADMIN — LORAZEPAM 2 MG: 2 INJECTION INTRAMUSCULAR; INTRAVENOUS at 07:55

## 2021-04-14 RX ADMIN — MAGNESIUM SULFATE 2 G: 2 INJECTION INTRAVENOUS at 05:45

## 2021-04-14 RX ADMIN — THIAMINE HYDROCHLORIDE 250 MG: 100 INJECTION, SOLUTION INTRAMUSCULAR; INTRAVENOUS at 11:30

## 2021-04-14 RX ADMIN — DIBASIC SODIUM PHOSPHATE, MONOBASIC POTASSIUM PHOSPHATE AND MONOBASIC SODIUM PHOSPHATE 250 MG: 852; 155; 130 TABLET ORAL at 17:31

## 2021-04-14 ASSESSMENT — PAIN DESCRIPTION - PAIN TYPE
TYPE: ACUTE PAIN

## 2021-04-14 ASSESSMENT — ENCOUNTER SYMPTOMS
VOMITING: 0
DIZZINESS: 0
PALPITATIONS: 0
ABDOMINAL PAIN: 0
DEPRESSION: 1
SORE THROAT: 0
MYALGIAS: 0
MEMORY LOSS: 1
FEVER: 0
CHILLS: 0
NAUSEA: 0
DIARRHEA: 0
NERVOUS/ANXIOUS: 1
COUGH: 0
DOUBLE VISION: 0
FALLS: 1
HEADACHES: 1
BLURRED VISION: 0
WEAKNESS: 1
SHORTNESS OF BREATH: 0
CONSTIPATION: 0

## 2021-04-14 ASSESSMENT — COGNITIVE AND FUNCTIONAL STATUS - GENERAL
MOVING FROM LYING ON BACK TO SITTING ON SIDE OF FLAT BED: A LOT
CLIMB 3 TO 5 STEPS WITH RAILING: A LOT
TOILETING: A LOT
DRESSING REGULAR UPPER BODY CLOTHING: A LOT
DAILY ACTIVITIY SCORE: 12
STANDING UP FROM CHAIR USING ARMS: A LOT
MOVING TO AND FROM BED TO CHAIR: A LOT
DRESSING REGULAR LOWER BODY CLOTHING: A LOT
SUGGESTED CMS G CODE MODIFIER MOBILITY: CL
MOBILITY SCORE: 12
TURNING FROM BACK TO SIDE WHILE IN FLAT BAD: A LOT
EATING MEALS: A LOT
PERSONAL GROOMING: A LOT
HELP NEEDED FOR BATHING: A LOT
WALKING IN HOSPITAL ROOM: A LOT
SUGGESTED CMS G CODE MODIFIER DAILY ACTIVITY: CL

## 2021-04-14 NOTE — PROGRESS NOTES
Two RN skin check performed with Aleah TAN. Patient was found down, presumed to be down for extended period of time, based upon daughter's report (the daughter found the patient down).     Pt had extensive blanchable redness on back, buttocks, and abdomin. Sacrum had a non-blanchable area of redness (photo taken). Bilateral ankles were swollen and had blanchable redness. Skin tears and bruising on arms and hands bilaterally. Rash on abdominal region. Right cheek swollen and bruised.

## 2021-04-14 NOTE — PROGRESS NOTES
0510: During sheet change, patient had seizure like activity. This RN at bedside, prompted angle Belinda to get Ativan from [employer]. Patient became apneic, bradycardic, and non-responsive. This progressed to asystole. Compressions started at 0511.     0512: Patient bagged via LMA and bag valve mask.     0516: 1mg epi     0518: Pulse check with ROSC obtained. Patient in SVT.    0520: Patient intubated by Dr. Sims.

## 2021-04-14 NOTE — ASSESSMENT & PLAN NOTE
"-family meeting today with patient's daughter, Kerline, mother, Pat, Palliative Care RN VIELKA Gage and MD Camarillo.  -22 minutes spent updating on recent imaging, labs, hemodynamics, and mentation. Patient has been off sedation for about 24 hours now. She will open her eyes to her name, but does not follow commands. MUKHERJEE's independently.   -both mother and daughter express that patient is a \"difficult person to put it nicely\". They suggest patient would not want prolonged life-support. They report patient has struggled with eating disorders her entire life and likely wouldn't want long-term tube feedings either. As far as ADL's, they suggest patient wouldn't want to be disabled and would want to be able to live independently.   -patient was able to SBT yesterday and today, but still does not follow commands to get parameters required for extubation. Patient received dose of Phenobarbital at 2300 on 4/13 for suspected ETOH withdrawal. Average 1/2 life about 80 hours. Therefore, we will continue to watch her over the next 24-48 hours, get MRI brain, and decide further plan/goals at that time.   -Palliative Care following  "

## 2021-04-14 NOTE — CARE PLAN
Pt coded, end tidal reading 12-17 during compressions, intubated and color change present, placed on ventilator.

## 2021-04-14 NOTE — CONSULTS
Neurology Consultation        Referring Physician:  Dr. Tania Camarillo    Referral Reason: Seizures    HPI: Nathalie is a 60-year-old woman who was found down yesterday evening.  She had likely been down for 24 hours at least.  CT of the brain revealed bilateral subdural hematomas without significant mass-effect.  This morning around 5 AM she developed a stiffening episode and then went into asystole.  She underwent CPR and return of circulation was obtained within 5 minutes.  She was intubated.  Since intubation she has had recurrent episodes of posturing and severe rigidity of the limbs with opening of the eyes and dilation of the pupils.  They are lasting up to 10 to 15 minutes at a time.  They seem to be stimulus provoked.  She was loaded with Keppra 3000 mg as well as given 4 mg of IV Ativan.  Unclear if the Ativan resulted in any decrease in the intensity and frequency of these events.  There is no known previous history of seizures.    Past Medical History:  Active Ambulatory Problems     Diagnosis Date Noted   • No Active Ambulatory Problems     Resolved Ambulatory Problems     Diagnosis Date Noted   • No Resolved Ambulatory Problems     Past Medical History:   Diagnosis Date   • ASTHMA    • Cancer (HCC)    • Heroin addiction (HCC)    • Pneumonia    • Psychiatric disorder        Medications:  See MAR    Allergies:  No Known Allergies    Social History:  Social History     Socioeconomic History   • Marital status:      Spouse name: Not on file   • Number of children: Not on file   • Years of education: Not on file   • Highest education level: Not on file   Occupational History   • Not on file   Tobacco Use   • Smoking status: Current Every Day Smoker     Packs/day: 0.50   • Smokeless tobacco: Never Used   Substance and Sexual Activity   • Alcohol use: Yes     Comment: 1 pint hard alcohol daily   • Drug use: No     Comment: history of heroin use   • Sexual activity: Not on file   Other Topics Concern   •  Not on file   Social History Narrative   • Not on file     Social Determinants of Health     Financial Resource Strain:    • Difficulty of Paying Living Expenses:    Food Insecurity:    • Worried About Running Out of Food in the Last Year:    • Ran Out of Food in the Last Year:    Transportation Needs:    • Lack of Transportation (Medical):    • Lack of Transportation (Non-Medical):    Physical Activity:    • Days of Exercise per Week:    • Minutes of Exercise per Session:    Stress:    • Feeling of Stress :    Social Connections:    • Frequency of Communication with Friends and Family:    • Frequency of Social Gatherings with Friends and Family:    • Attends Taoism Services:    • Active Member of Clubs or Organizations:    • Attends Club or Organization Meetings:    • Marital Status:    Intimate Partner Violence:    • Fear of Current or Ex-Partner:    • Emotionally Abused:    • Physically Abused:    • Sexually Abused:        Family History:  Family History   Problem Relation Age of Onset   • No Known Problems Mother    • Alcohol abuse Father        ROS:  Unobtainable due to intubation and sedation.    Physical Exam:  Vitals:   Vitals:    04/14/21 1600 04/14/21 1615 04/14/21 1630 04/14/21 1645   BP: (!) 93/65      Pulse: 95 (!) 114 94 99   Resp: 20 (!) 30 20 20   Temp:       TempSrc: Temporal      SpO2: 92% 92% 100% 100%   Weight:       Height:         CONSTITUTIONAL:  Lying in the hospital bed, appears older than stated age, intubated  EYES:  conjunctiva are clear without icterus, pupils are equal and reactive to light  NECK:  Supple and non tender, no masses  CV:  Carotid pulses are present bilaterally and normal amplitude, extremities are well perfused without edema  MUSCULOSKELETAL:  see neurological exam for details of gait and station, muscle strength and range of motion in the extremities      NEUROLOGICAL EXAM  MENTAL STATUS: Intubated and sedated, does grimace to noxious stimuli but does not open eyes to  voice or noxious stimuli  CRANIAL NERVES:  PERRL, EOMI with no nystagmus, eyes are in the midline without gaze deviation, face is symmetric, corneal reflexes intact bilaterally  MOTOR: Withdraws all 4 limbs to noxious stimuli minimally, when I initially evaluated her she appeared to be having decerebrate posturing with severe rigidity of the limbs, I was unable to flex them passively.  REFLEXES: Trace at the biceps and patellas, toes are downgoing bilaterally  SENSATION:  Intact to noxious stimuli in all 4 limbs  COORDINATION: Deferred due to intubation.  GAIT:  Deferred, due to weakness and fall risk        Impression: Nathalie is a 60-year-old woman with bilateral subdural hematomas without significant mass-effect.  Neurosurgery has been consulted and does not feel there is an indication for surgical intervention at this time.  She had possible seizure activity preceding a brief cardiac arrest.  Since that time she is having episodes of posturing and rigidity associated with pupillary dilatation.  Unclear if these represent epileptic seizures or some type of posturing or sympathetic storm type of event.      Recommendations:  -Patient has been started on propofol and the episodes seem to have ceased for now.  We will hold propofol and start continuous EEG monitoring to try to capture an event to see if they are epileptic in nature or not.  -Would continue Keppra 1000 mg twice daily for now  -MRI of the brain may be useful eventually to guide in prognosis and look for seizure focus but would defer to least tomorrow to allow for continuous EEG monitoring.  -Neurology will continue to follow.

## 2021-04-14 NOTE — ED TRIAGE NOTES
"61 y/o f BIB EMS after being found down in her home by her daughter. Patient was found \"on the floor in the fetal position\" naked and covered in feces and believed to be down at least 24 hours. Patient has history of heroine use and is a current every day etoh drinker. Daughter reports that pt is noncompliant with her meds. Patient placed on 2L O2 via NC.  Patient is currently AOx2-3.  "

## 2021-04-14 NOTE — PROGRESS NOTES
Spoke to patient's daughter Kerline. Updates given by Argenis VORA. Went over MRI screening form. All questions answered by daughter.

## 2021-04-14 NOTE — PROGRESS NOTES
Critical Care Progress Note    Date of admission  4/13/2021    Chief Complaint  Altered level of consciousness    Hospital Course  Ms. Aguilar is a 60-year-old female with PMH significant for tobacco dependence, EtOH dependence (1 pint/day) and IVDU (heroin) who presented via EMS 4/13/2021 with acute loss of consciousness and failure to thrive. A couple of days prior to presentation, she reportedly fell in the bathroom.  Day of presentation she was found by her daughter, naked, altered and covered in feces.  Suspected she was down for at least 24 hours.  CT head showed acute/subacute bilateral SDH left greater than right with significant bifrontal cerebral atrophy.    Pertinent admission labs: Lactic acid 13, UDS positive benzos and cannabinoids, .      Interval Problem Update  Reviewed last 24 hour events:              - acute events overnight: Code blue around 0511. ROSC after approx 10 min. Intubated. Suspected seizure activity. Loaded with Keppra.               - Tm: 36.8              - HR:  's              - SBP: 100-120's              - Neuro: Intermittent seizure activity with decorticate type posturing this morning.    Moves all extremities.  Does not follow commands.  Pupillary response changes -5 mm and sluggish during seizure-like activity.  2 to 3 mm and brisk when calm.  Propofol infusion (not titrating for RASS).     - GI: NPO.  Core track in place.  Holding off on tube feedings until hemodynamically stable              - I/O: 4700/385 (+4400 since admit)               - Castro: yes              - Lines: LIJ TLC, right femoral art line, CastroBETHEL single-lumen PICC              - PPx: Pepcid              - CXR (personally reviewed): no significant change from previous. No focal consolidations or pulmonary edema.              - Antibiotic Day:    - SAT/SBT: contraindicated. Intubated this morning. Hemodynamically unstable, active seizures.    - Mobility: 1   - Goals of Care: discussed on  phone with daughter, Kerline.    Review of Systems  Review of Systems   Unable to perform ROS: Intubated        Vital Signs for last 24 hours   Temp:  [35.8 °C (96.5 °F)-37.1 °C (98.8 °F)] 36.4 °C (97.5 °F)  Pulse:  [] 101  Resp:  [16-84] 21  BP: ()/(53-97) 129/84  SpO2:  [90 %-100 %] 100 %    Hemodynamic parameters for last 24 hours       Respiratory Information for the last 24 hours  Vent Mode: APVCMV  Rate (breaths/min): 20  Vt Target (mL): 390  PEEP/CPAP: 8  MAP: 11  Control VTE (exp VT): 396    Physical Exam   Physical Exam  Vitals and nursing note reviewed.   Constitutional:       General: She is in acute distress.      Appearance: She is underweight. She is ill-appearing. She is not toxic-appearing.      Interventions: She is intubated.   HENT:      Mouth/Throat:      Lips: Pink.      Mouth: Mucous membranes are moist.   Eyes:      Comments: 5 mm and sluggish at times  2 mm and brisk at times  Equal     Cardiovascular:      Pulses: Decreased pulses.           Dorsalis pedis pulses are 1+ on the right side and 1+ on the left side.      Heart sounds: Heart sounds are distant.   Pulmonary:      Effort: Pulmonary effort is normal. She is intubated.      Breath sounds: No wheezing or rhonchi.   Abdominal:      General: Bowel sounds are decreased.      Palpations: Abdomen is soft.   Genitourinary:     Comments: Castro with concentrated yellow urine  Musculoskeletal:      Right lower leg: No edema.      Left lower leg: No edema.      Comments: LONNY  Does not follow commands   Skin:     General: Skin is cool.      Capillary Refill: Capillary refill takes 2 to 3 seconds.      Comments: Bruises in various stages of healing throughout   Neurological:      GCS: GCS eye subscore is 2. GCS verbal subscore is 1. GCS motor subscore is 4.      Cranial Nerves: Cranial nerve deficit present.      Motor: Atrophy, abnormal muscle tone and seizure activity present.      Comments: 7T   Psychiatric:      Comments:  Intubated    Agitated at times         Medications  Current Facility-Administered Medications   Medication Dose Route Frequency Provider Last Rate Last Admin   • Pharmacy Consult: Enteral tube insertion - review meds/change route/product selection  1 Each Other PHARMACY TO DOSE Fatmata Funez M.D.       • multivitamin (THERAGRAN) tablet 1 tablet  1 tablet Enteral Tube DAILY Ventura Still, D.O.   Stopped at 04/14/21 0900    And   • folic acid (FOLVITE) tablet 1 mg  1 mg Enteral Tube DAILY Ventura Still, D.O.   Stopped at 04/14/21 0900   • magnesium oxide (MAG-OX) tablet 400 mg  400 mg Enteral Tube BID Ventura Still, D.O.   400 mg at 04/14/21 1730   • acetaminophen (Tylenol) tablet 650 mg  650 mg Enteral Tube Q6HRS PRN Ventura Still, D.O.       • norepinephrine (Levophed) infusion 8 mg/250 mL (premix)  0-30 mcg/min Intravenous Continuous Argenis Stapleton, A.P.R.N. 4.7 mL/hr at 04/14/21 1730 2.5 mcg/min at 04/14/21 1730   • MD Alert...ICU Electrolyte Replacement per Pharmacy   Other PHARMACY TO DOSE Argenis Stapleton, A.P.R.N.       • thiamine (B-1) 250 mg in dextrose 5% 100 mL IVPB  250 mg Intravenous DAILY Argenis Stapleton, A.P.R.N.   Stopped at 04/14/21 1200   • cefTRIAXone (ROCEPHIN) 2 g in  mL IVPB  2 g Intravenous Q24HRS Argenis Stapleton, A.P.R.N.   Stopped at 04/14/21 1200   • doxycycline (VIBRAMYCIN) 100 mg in  mL IVPB  100 mg Intravenous Q12HRS Argenis Stapleton, A.P.R.N.   Stopped at 04/14/21 1831   • propofol (DIPRIVAN) injection  30 mcg/kg/min Intravenous Continuous Argenis Staplteon, A.P.R.N. 10 mL/hr at 04/14/21 1101 30 mcg/kg/min at 04/14/21 1101   • K+ Scale: Goal of 4.5  1 Each Intravenous Q6HRS Tania Camarillo M.D.   1 Each at 04/14/21 1800   • Respiratory Therapy Consult   Nebulization Continuous RT ANA Harvey.P.R.N.       • famotidine (PEPCID) tablet 20 mg  20 mg Enteral Tube Q12HRS ESTEFANIA HarveyP.R.N.   20 mg at 04/14/21 1732    Or   • famotidine (PEPCID) injection 20 mg  20 mg  Intravenous Q12HRS Argenis Stapleton, A.P.R.N.       • senna-docusate (PERICOLACE or SENOKOT S) 8.6-50 MG per tablet 2 tablet  2 tablet Enteral Tube BID Argenis Stapleton, A.P.R.N.   2 tablet at 04/14/21 1730    And   • polyethylene glycol/lytes (MIRALAX) PACKET 1 Packet  1 Packet Enteral Tube QDAY PRN Argenis Stapleton, A.P.R.N.        And   • magnesium hydroxide (MILK OF MAGNESIA) suspension 30 mL  30 mL Enteral Tube QDAY PRN Argenis Stapleton, A.P.R.N.        And   • bisacodyl (DULCOLAX) suppository 10 mg  10 mg Rectal QDAY PRN Argenis Stapleton, A.P.R.N.       • lidocaine (XYLOCAINE) 1 % injection 1-2 mL  1-2 mL Tracheal Tube Q30 MIN PRN Argenis Stapleton, A.P.R.N.       • phytonadione (MEPHYTON) tablet 10 mg  10 mg Enteral Tube DAILY Argenis Stapleton, A.P.R.N.   10 mg at 04/14/21 1150   • insulin regular (HumuLIN R,NovoLIN R) injection  1-6 Units Subcutaneous 4X/DAY ACHS Argenis Stapleton, A.P.R.N.   2 Units at 04/14/21 1731    And   • dextrose 50% (D50W) injection 50 mL  50 mL Intravenous Q15 MIN PRN Argenis Stapleton, A.P.R.N.       • phosphorus (K-Phos-Neutral) per tablet 250 mg  250 mg Enteral Tube Q6HRS Argeins Stapleton, A.P.R.N.   250 mg at 04/14/21 1731   • potassium phosphate 30 mmol in  mL ivpb  30 mmol Intravenous Once Argenis Stapleton, A.P.R.N. 83.3 mL/hr at 04/14/21 1423 30 mmol at 04/14/21 1423   • potassium chloride in water (KCL) ivpb **Administer in ICU only** 20 mEq  20 mEq Intravenous Once Tania Camarillo M.D.       • fentaNYL (SUBLIMAZE) injection 25 mcg  25 mcg Intravenous Q HOUR PRN Tania Camarillo M.D.        Or   • fentaNYL (SUBLIMAZE) injection 50 mcg  50 mcg Intravenous Q HOUR PRN Tania Camarillo M.D.        Or   • fentaNYL (SUBLIMAZE) injection 100 mcg  100 mcg Intravenous Q HOUR PRN Tania Camarillo M.D.       • fentaNYL (SUBLIMAZE) 50 mcg/mL in 50mL   Intravenous Continuous Tania Camarillo M.D.       • LORazepam (ATIVAN) injection 1-2 mg  1-2 mg Intravenous Q2HRS PRN Ventura Still D.O.   2 mg at 04/14/21  0724   • nicotine (NICODERM) 14 MG/24HR 14 mg  14 mg Transdermal Daily-0600 Francesco Bernstein M.D.   14 mg at 04/14/21 0029       Fluids    Intake/Output Summary (Last 24 hours) at 4/14/2021 1801  Last data filed at 4/14/2021 1800  Gross per 24 hour   Intake 7749.18 ml   Output 705 ml   Net 7044.18 ml       Laboratory  Recent Labs     04/14/21  0546 04/14/21  1303   ISTATAPH 7.381* 7.444   ISTATAPCO2 26.7 33.5   ISTATAPO2 406* 99*   ISTATATCO2 17* 24   FMQNZRN2VGI 100* 98   ISTATARTHCO3 15.8* 23.0   ISTATARTBE -8* -1   ISTATTEMP 97.0 F 98.8 F   ISTATFIO2 100 35   ISTATSPEC Arterial Arterial   ISTATAPHTC 7.394* 7.442   UEJYHQHF9YF 400* 100*     Recent Labs     04/13/21  1845 04/14/21  0249   CPKTOTAL 96 88     Recent Labs     04/14/21  0545 04/14/21  1157 04/14/21  1555   SODIUM 135 141 140   POTASSIUM 2.6* 3.3* 3.6   CHLORIDE 96 98 101   CO2 11* 22 25   BUN 16 15 13   CREATININE 0.45* 0.40* 0.30*   MAGNESIUM 1.9 2.3 2.0   PHOSPHORUS 2.1* 1.9*  --    CALCIUM 6.5* 7.4* 7.0*     Recent Labs     04/13/21  1845 04/13/21  1845 04/14/21  0249 04/14/21  0249 04/14/21  0545 04/14/21  1157 04/14/21  1555   ALTSGPT 87*  --  62*  --  83*  --   --    ASTSGOT 220*  --  153*  --  238*  --   --    ALKPHOSPHAT 167*  --  117*  --  144*  --   --    TBILIRUBIN 5.0*  --  3.6*  --  3.7*  --   --    PREALBUMIN  --   --   --   --  10.6* 10.0*  --    GLUCOSE 99   < > 71   < > 98 178* 261*    < > = values in this interval not displayed.     Recent Labs     04/13/21  1845 04/14/21  0249 04/14/21  0545   WBC 4.9 3.8* 3.7*   NEUTSPOLYS 86.10* 84.20* 80.50*   LYMPHOCYTES 8.80* 10.00* 15.20*   MONOCYTES 4.10 4.70 2.40   EOSINOPHILS 0.00 0.00 0.00   BASOPHILS 0.20 0.00 0.00   ASTSGOT 220* 153* 238*   ALTSGPT 87* 62* 83*   ALKPHOSPHAT 167* 117* 144*   TBILIRUBIN 5.0* 3.6* 3.7*     Recent Labs     04/13/21  1845 04/14/21 0249 04/14/21  0545   RBC 3.00* 2.20* 2.42*   HEMOGLOBIN 11.0* 8.1* 8.9*   HEMATOCRIT 32.6* 23.9* 26.5*   PLATELETCT 145* 94* 109*    PROTHROMBTM 20.1*  --   --    APTT 35.3  --   --    INR 1.66*  --   --        Imaging  X-Ray:  I have personally reviewed the images and compared with prior images.  CT:    Reviewed  Echo:   Reviewed  Ultrasound:  Reviewed    Assessment/Plan  * SDH (subdural hematoma) (HCC)- (present on admission)  Assessment & Plan  -Patient with significant alcohol history - on presentation  -Sustained a fall, striking head at home 2 days prior to presentation  -Imaging showed bilateral subdural hematomas L>R  -INR elevated.  Not currently on anticoagulation.  Will give vitamin K 10 mg via enteral tube x3 days  -Also with new onset seizures.  Loaded with Keppra.  Ativan as needed.  Neurology following  -Every hour neurochecks  -VTE currently contraindicated  -PT/OT/SLP once hemodynamically stable    Alcohol withdrawal syndrome with complication (HCC)- (present on admission)  Assessment & Plan  -with acute intoxication on admission  -Rally bag  -high-dose Thiamine IV x 3 days, then PO daily  -MVI, folate daily  -Hepatitis panel negative  -cessation education and counseling when able  -seizure precautions - continuous EEG     Protein-calorie malnutrition, severe (HCC)- (present on admission)  Assessment & Plan  -Body mass index is 18.7 kg/m².  -History of significant EtOH abuse  -Keeping NPO for now.  Will start tube feedings per dietary recommendations once hemodynamically stable  -High risk for refeeding syndrome.  Monitor labs closely  -Daily MVI, folate and thiamine level    Transaminitis- (present on admission)  Assessment & Plan  -Likely from chronic alcohol use  -Hepatitis panel negative  -RUQ US pending  -Follow labs    Lactic acidosis- (present on admission)  Assessment & Plan  -Suspect from seizure-like activity and cardiac arrest  -Trending  -Empiric ABX      Electrolyte disturbance- (present on admission)  Assessment & Plan  -Hypokalemia, hypomagnesemia, hypophosphatemia, hypomagnesemia  -Replacing all per ICU  protocol  -Trending BMPs  -Rare PVCs noted on continuous telemetry monitoring    Shock (HCC)  Assessment & Plan  -Multifactorial: Cardiogenic shock post cardiac arrest. Hypovolemic shock  -Status post 4 L fluid resuscitation  -Vasopressor support to maintain MAP greater than 65  -Continuous hemodynamic monitoring    Acute respiratory failure (HCC)  Assessment & Plan  -bilateral SDH with seizure like activity  -intubated post-cardiac arrest  -Lung protective ventilation strategies  -Titrate ventilator prescription to optimize oxygenation, ventilation, and acid base balance.  -Goal PCO2 35-40  -Daily SAT/SBT trials currently contraindicated  -Chlorhexidine  -RT protocols    Cardiac arrest (HCC)  Assessment & Plan  -asystole this morning - suspected due to seizures  -ROSC after 2 rounds CPR  -intubated  -ECHO pending  -target-temperature management not indicated at this time. Data does not support in-hospital TTM. Would consider cooling to maintain normothermia if spikes fevers  -code status discussed with daughter - remain FULL CODE at this time.       QT prolongation- (present on admission)  Assessment & Plan  -537 on EKG, repeat EKG post cardiac arrest  -Repeat   -Continuous telemetry monitoring  -Repeat a.m. EKG  -Avoid QT prolonging agents    Advanced care planning/counseling discussion  Assessment & Plan  -Discussed with patient's daughter, Kerline, today.  Patient has no ED on file.  Kerline and her mother have not had extensive conversations in the past regarding advanced directives or end-of-life care.  Kerline says her mom has commented on not wanting to have aggressive care if her outcome would be long-term disability.  Kerline would like to discuss with patient's and make a CODE STATUS decision tomorrow.   -Palliative care consult    Elevated INR- (present on admission)  Assessment & Plan  -due to chronic liver disease  -vitamin K 10 mg daily enterally x 3 days  -follow INR  -monitor for bleeding      Right  atrial mass  Assessment & Plan  -identified on ECHO done today : measured to be 1.4 cm x 2.3 cm  -AC contraindicated with acute bilateral SDH  -dedicated cardiac MRI for further evaluation when able - currently has continuous EEG with <MRI-uncompatible leads  -BLE US to evaluate for DVT's. If positive, consider IVC filter placement         VTE:  Contraindicated  Ulcer: H2 Antagonist  Lines: Central Line  Ongoing indication addressed, Arterial Line  Ongoing indication addressed and Castro Catheter  Ongoing indication addressed    I have performed a physical exam and reviewed and updated ROS and Plan today (4/14/2021). In review of yesterday's note (4/13/2021), there are no changes except as documented above.     Discussed patient condition and risk of morbidity and/or mortality with Family, RN, RT, Pharmacy, Dietary, , Code status disscussed, Charge nurse / hot rounds and neurology  The patient remains critically ill.  Critical care time = 70 minutes in directly providing and coordinating critical care and extensive data review.  No time overlap and excludes procedures.    Please note that this dictation was created using voice recognition software. I have made every reasonable attempt to correct obvious errors, but there may be errors of grammar and possibly content that I did not discover before finalizing the note.    GELY Harvey.

## 2021-04-14 NOTE — PROGRESS NOTES
60F h/o EtOH, heroin misuse.  Having falls, Daughter found her down.  Bilateral SDHs and alcohol withdrawal in ER.  0500 seizure, then asystole. 8 minutes of CPR.  Intubated. Ongoing seizure-keppra, propofol, cEEG.  On vent, levo, propofol. Central line placed this morning, then accidentally pulled out in CT.     #Cardiac arrest-Probably due to seizure, electrolytes. ?contribution of cardiac mass.  Low suspicion for PE as her RV function is good.  Short arrest, PEA so TTM not indicated.  Would treat hyperthermia with targeted normothermia if she fevers. Non-responsive after arrest.  #seizure-EtOH withdrawal + SDH.  CT stable post-arrest.  MRI when stable/able. cEEG.  Propofol. Keppra Neuro following  #SDH-stable on CT today  #Acute ischemic, toxic and metabolic delirium  #Respiratory failure-intubated kathrin arrest. Lung protective ventilation. Low suspicion for PNA clinically, but infiltrate on CXR so will leave doxy for now and send trach aspirate  #shock-normotensive before propofol.  Levophed for MAP >65  #cardiac mass-noted on TTE.  Could be thrombus vs mass.  LE doppler negative. CMR vs CT.  Minimal TR and hemodynamics are largely OK so I don't think this is causing obstructive physiology  #electrolyte abnormalities-probably EtOh and nutrition related, aggressive repletion  #lactic acidsos-sz, cardiac arrest, trend  #abnormal LFTs-probably alk hep with underlying chronic liver injury from alcohol  #elevated INR-nutrition, liver  #UTI-ceftriaxone  #malnutrition-dietary consult, monitor refeeding, high dose thiamine    The patient remains critically ill on vent, pressors.  Additional critical care time = 60 minutes in directly providing and coordinating critical care and extensive data review.  No time overlap and excludes procedures.

## 2021-04-14 NOTE — PROGRESS NOTES
Lab called with critical result of Lactic at 5.8. Critical lab result read back to .   VIELKA More notified of critical lab result at 1235.

## 2021-04-14 NOTE — PROGRESS NOTES
Pt belongings in safekeeping drawer on [place]UST and entered into log. Pt also has home meds in pharmacy.

## 2021-04-14 NOTE — ASSESSMENT & PLAN NOTE
-Patient with significant alcohol history - on presentation  -Sustained a fall, striking head at home 2 days prior to presentation  -Imaging showed bilateral subdural hematomas L>R  -INR elevated.  Not currently on anticoagulation.  She has received 3 doses vitamin K  -Continuous EEG identified no seizure activity;   -Keppra Dcd 4/19  -Every 4 neurochecks  -VTE currently contraindicated  -PT/OT/SLP   -MRI 4/18 bilateral holohemispheric supratentorial subdural hematomas appear slightly larger than on the recent prior CT.

## 2021-04-14 NOTE — PROGRESS NOTES
Patient seen and examined.    Note dictated.    Patient admitted after a fall several days earlier.    H/o etoh/heroin abuse.    Patient found covered in feces.    Head CT was nonoperative with vince acute/subacute sdh.    Patient coded this am after seizures with asystole.  Patient intubated.    Stat Head CT just reviewed and completed.    Head CT is stable to improved.    Rec AED.    No NS treatment recommended.    Will sign off.    Pls call with questions.

## 2021-04-14 NOTE — ASSESSMENT & PLAN NOTE
"-identified on ECHO done today : measured to be 1.4 cm x 2.3 cm  -BLE US 4/14 Evidence of subacute to chronic superficial venous thrombosis in the lesser saphenous vein. Repeat US 4/17 showed no evidence of acute DVT  -Cardiology consulted: \"Right atrial mass is likely a prominent eustachian valve but cannot exclude malignancy the most appropriate test is a cardiac MRI.  Clearly she is not in clinical state to get a cardiac MRI as you have to hold still for 40 minutes with breath holds so if she gets to this clinical state try to arrange a cardiac MRI for evaluation of this otherwise could be done as an outpatient\".         "

## 2021-04-14 NOTE — ASSESSMENT & PLAN NOTE
-with acute intoxication on admission  -she has received Rally bag  -MVI, folate, thiamine daily  -Hepatitis panel negative  -cessation education and counseling when able  -seizure precautions

## 2021-04-14 NOTE — ASSESSMENT & PLAN NOTE
-Body mass index is 18.7 kg/m².  -History of significant EtOH abuse  -tube feedings per dietary  -High risk for refeeding syndrome.  Monitor closely  -Daily MVI, folate and thiamine

## 2021-04-14 NOTE — ED NOTES
Pt tremulous and tossing and turning in bed. She is intermittently pulling at wires and lines/drains. Attempted to redirect. Pt intermittently redirectable.

## 2021-04-14 NOTE — PROCEDURES
Intubation    Date/Time: 4/14/2021 5:20 AM  Performed by: Diego Sims M.D.  Authorized by: Diego Sims M.D.     Consent:     Consent obtained:  Emergent situation  Pre-procedure details:     Patient status:  Unresponsive    Mallampati score:  I    Pretreatment medications:  None    Paralytics:  None  Procedure details:     Preoxygenation:  ESTRADA/LMA    CPR in progress: no      Intubation method:  Oral    Oral intubation technique:  Video-assisted    Laryngoscope type:  GlideScope    Laryngoscope blade:  Mac 3    Cormack-Lehane Classification:  Grade 2a    Tube size (mm):  7.5    Tube type:  Cuffed    Number of attempts:  1    Tube visualized through cords: yes    Placement assessment:     ETT to teeth:  23    Tube secured with:  ETT duran    Breath sounds:  Equal    Placement verification: chest rise, direct visualization, equal breath sounds, ETCO2 detector and tube exhalation    Post-procedure details:     Patient tolerance of procedure:  Tolerated well, no immediate complications

## 2021-04-14 NOTE — ASSESSMENT & PLAN NOTE
-Hypokalemia, hypomagnesemia, hypophosphatemia, hypomagnesemia, hypocalcemia  -Replacing   -Following labs

## 2021-04-14 NOTE — PROGRESS NOTES
Cortrak Placement    Tube Team verified patient name and medical record number prior to tube placement.  Cortrak tube (55 inches, 10 Peruvian) placed at 75 cm in right nare.  Per Cortrak picture, tube appears to be in the stomach.  Nursing Instructions: Awaiting KUB to confirm placement before use for medications or feeding. Once placement confirmed, flush tube with 30 ml of water, and then remove and save stylet, in patient medication drawer.

## 2021-04-14 NOTE — ASSESSMENT & PLAN NOTE
Acute to subacute with history of recent falls    Patient evaluated by neurosurgery with nonsurgical treatment recommended  Continue Keppra per neurology recommendations

## 2021-04-14 NOTE — ED NOTES
Yady from Lab called with critical result of lactic 13.0 at 1919. Critical lab result read back to Yady.   Dr. Florence notified of critical lab result at 1920.  Critical lab result read back by Dr. Florence.

## 2021-04-14 NOTE — HOSPITAL COURSE
Ms. Aguilar is a 60-year-old female with PMH significant for cholecystectomy, tobacco dependence, EtOH dependence (1 pint/day) and IVDU (heroin) who presented via EMS 4/13/2021 with acute loss of consciousness and failure to thrive. A couple of days prior to presentation, she reportedly fell in the bathroom.  Day of presentation she was found by her daughter, naked, altered and covered in feces.  Suspected she was down for at least 24 hours.  CT head showed acute/subacute bilateral SDH left greater than right with significant bifrontal cerebral atrophy. Neurosurgery consulted and recommend non-operative management.     Pertinent admission labs: Lactic acid 13, UDS positive benzos and cannabinoids, . Elevated liver enzymes, Hepatitis panel (-). RUQ US unremarkable showing fatty liver and trace ascites.    4/14 - seizure --> asystole, Code Blue ROSC after 2nd pulse check. Intubated. Continuous EEG. ECHO found mass right atrium. Unable to do cardiac MRI at this time.   4/15 - bloody stool overnight. Hgb 7.7. INR 1.28, receiving vitamin K. Protonix drip. GI consult, medical management for now. Hgb 8.9 after 1 RBC.   4/16 - OK to start tube feedings per GI  4/18 - extubated. A/O x 4. No focal neuro deficits.  4/19- Weaned off or norepinephrine - Failed SLP; plan for FEES 4/20. Transfer to floor

## 2021-04-14 NOTE — ED NOTES
Med rec updated and complete.  Allergies reviewed. Pt is unable to participate in an interview at this time. Placed call to listed home pharmacy. Unable to locate  Active profile.      Listed home pharmacy [facility]  [phone]

## 2021-04-14 NOTE — PROGRESS NOTES
Tech from Lab called with critical result of lactic acid of 8.5 at 0320. Critical lab result read back to tech.   Dr. Sims notified of critical lab result at 0322.  Critical lab result read back by Dr. Sims.

## 2021-04-14 NOTE — PROGRESS NOTES
Critical Care Event Note    Called to the bedside for cardiac arrest.  Apparently nursing staff was bathing the patient and she was turned on her side when she had seizure like movement and became unresponsive at which point she was found to be pulseless.  ACLS was immediately initiated, she had asystole on the monitor, she was given 1 mg of epinephrine and an iGel was placed.  She obtained ROSC after approximately 10 minutes.  She was intubated and a right femoral arterial line was placed.    Cardiac arrest w/ ROSC:  Unclear etiology  Approximately 10 minutes of downtime  Titrate norepinephrine to maintain MAP > 65  Fluid resuscitation  Trend lactates  TTE    Acute hypoxic respiratory failure:  Ventilator dependent respiratory failure  Modify ventilator to optimize oxygenation and ventilation  HOB > 30  Chlorhexidine  Perform spontaneous breathing trial when able  Early mobility    Seizure:  Reported seizure like activity just prior to cardiac arrest  STAT head CT  STAT EEG  Lorazepam  Load with levetiracetam  Neurology consult    Updated patient's daughter, Kerline Hanson.    Patient is critically ill.  The critical that has been undertaken is medically complex.  There has been no overlap in critical care time.  Critical Care Time not including procedures: 50

## 2021-04-14 NOTE — PROCEDURES
Central Line Insertion    Date/Time: 4/14/2021 2:09 AM  Performed by: Diego Sims M.D.  Authorized by: Diego Sims M.D.     Consent:     Consent obtained:  Written    Consent given by:  Healthcare agent  Pre-procedure details:     Hand hygiene: Hand hygiene performed prior to insertion      Sterile barrier technique: All elements of maximal sterile technique followed      Skin preparation:  2% chlorhexidine    Skin preparation agent: Skin preparation agent completely dried prior to procedure    Anesthesia:     Anesthesia method:  Local infiltration    Local anesthetic:  Lidocaine 1% w/o epi  Procedure details:     Location: left cephallic.    Site selection rationale:  Unable to stay still, no IV access obtainable by nursing    Procedural supplies:  Single lumen    Catheter size: 16 G.    Landmarks identified: yes      Ultrasound guidance: yes      Sterile ultrasound techniques: Sterile gel and sterile probe covers were used      Number of attempts:  1    Successful placement: yes    Post-procedure details:     Post-procedure:  Dressing applied and line sutured    Assessment:  Blood return through all ports and free fluid flow    Patient tolerance of procedure:  Tolerated well, no immediate complications

## 2021-04-14 NOTE — THERAPY
Occupational Therapy Contact Note    OT consult received. Pt coded this AM, placed on ventilator. Will hold until able to participate.    Angela Dhaliwal, OTR/L

## 2021-04-14 NOTE — ASSESSMENT & PLAN NOTE
-due to chronic liver disease  -she has received vitamin K 10 mg daily enterally x 3 days  -follow INR as clinically indicated  -monitor for bleeding - no further evidence of melena

## 2021-04-14 NOTE — ED PROVIDER NOTES
ED Provider Note    Scribed for Navjot Florence M.D. by Prema Vo. 4/13/2021, 6:26 PM.    Primary care provider: Mohsen Tamasaby, M.D.  Means of arrival: EMS  History obtained from: Patient  History limited by: Ahistoric     CHIEF COMPLAINT  Chief Complaint   Patient presents with   • ALOC   • Failure to Thrive       HPI  Nathalie Aguilar is a 60 y.o. female who presents to the Emergency Department for evaluation of altered level of consciousness. Patient reports remembering falling down. Per nursing staff, patient was believed to be down for at least 24 hours and was found by her daughter naked and covered in feces. She has a history of heroine use.    Further history of present illness cannot be obtained due to the patient's ahistoric.     REVIEW OF SYSTEMS  Pertinent positives include altered level of consciousness. Pertinent negatives include no fever.     Further ROS cannot be obtained due to the patient's ahistoric.      PAST MEDICAL HISTORY   has a past medical history of ASTHMA, Cancer (HCC), Heroin addiction (HCC), Pneumonia, and Psychiatric disorder.    SURGICAL HISTORY   has a past surgical history that includes gyn surgery and incision and drainage orthopedic (Right, 1/25/2019).    SOCIAL HISTORY  Social History     Tobacco Use   • Smoking status: Current Every Day Smoker   • Smokeless tobacco: Never Used   Substance Use Topics   • Alcohol use: Yes     Comment: occ   • Drug use: No     Comment: history of      Social History     Substance and Sexual Activity   Drug Use No    Comment: history of       FAMILY HISTORY  No family history on noted.    CURRENT MEDICATIONS  Current Outpatient Medications   Medication Instructions   • alprazolam (XANAX) 2 mg, Oral, 2 TIMES DAILY PRN   • atorvastatin (LIPITOR) 20 mg, Oral, NIGHTLY   • chlorproMAZINE (THORAZINE) 10 mg, Oral, 2 TIMES DAILY PRN   • mirtazapine (REMERON) 45 mg, Oral, NIGHTLY PRN   • naltrexone (DEPADE) 50 mg, Oral, DAILY   • propranolol (INDERAL)  "20 mg, Oral, 2 TIMES DAILY   • quetiapine (SEROQUEL) 400 mg, Oral, NIGHTLY PRN   • traZODone (DESYREL) 100 mg, Oral, NIGHTLY PRN   • venlafaxine (EFFEXOR XR) 300 mg, Oral, DAILY          ALLERGIES  No Known Allergies    PHYSICAL EXAM  VITAL SIGNS: /55   Pulse (!) 102   Temp 36.8 °C (98.2 °F) (Temporal)   Resp 20   Ht 1.702 m (5' 7\")   Wt 59 kg (130 lb)   SpO2 100%   BMI 20.36 kg/m²     Constitutional: Well developed, Well nourished, No acute distress, Non-toxic appearance. Covered in feces  HENT: Normocephalic, Atraumatic, mucous membranes dry, no erythema, exudates, swelling, or masses, nares patent  Eyes: nonicteric  Neck: Supple, no meningismus  Lymphatic: No lymphadenopathy noted.   Cardiovascular: Borderline tachycardic with regular rhythm, no gallops rubs or murmurs  Lungs: Clear bilaterally   Abdomen: Bowel sounds normal, Soft, No tenderness  Skin: Warm, Dry, no rash  Back: No tenderness, No CVA tenderness.   Genitalia: Deferred  Rectal: Deferred  Extremities: No edema. Moves all extremities equally   Neurologic: follows some commands, moving all extremities, speech is somewhat slurred  Psychiatric: Oriented to person and place but not oriented to time. Slow to respond    DIAGNOSTIC STUDIES / PROCEDURES    LABS  Results for orders placed or performed during the hospital encounter of 04/13/21   CBC WITH DIFFERENTIAL   Result Value Ref Range    WBC 4.9 4.8 - 10.8 K/uL    RBC 3.00 (L) 4.20 - 5.40 M/uL    Hemoglobin 11.0 (L) 12.0 - 16.0 g/dL    Hematocrit 32.6 (L) 37.0 - 47.0 %    .7 (H) 81.4 - 97.8 fL    MCH 36.7 (H) 27.0 - 33.0 pg    MCHC 33.7 33.6 - 35.0 g/dL    RDW 67.2 (H) 35.9 - 50.0 fL    Platelet Count 145 (L) 164 - 446 K/uL    MPV 10.8 9.0 - 12.9 fL    Neutrophils-Polys 86.10 (H) 44.00 - 72.00 %    Lymphocytes 8.80 (L) 22.00 - 41.00 %    Monocytes 4.10 0.00 - 13.40 %    Eosinophils 0.00 0.00 - 6.90 %    Basophils 0.20 0.00 - 1.80 %    Immature Granulocytes 0.80 0.00 - 0.90 %    " Nucleated RBC 0.80 /100 WBC    Neutrophils (Absolute) 4.21 2.00 - 7.15 K/uL    Lymphs (Absolute) 0.43 (L) 1.00 - 4.80 K/uL    Monos (Absolute) 0.20 0.00 - 0.85 K/uL    Eos (Absolute) 0.00 0.00 - 0.51 K/uL    Baso (Absolute) 0.01 0.00 - 0.12 K/uL    Immature Granulocytes (abs) 0.04 0.00 - 0.11 K/uL    NRBC (Absolute) 0.04 K/uL   COMP METABOLIC PANEL   Result Value Ref Range    Sodium 137 135 - 145 mmol/L    Potassium 3.0 (L) 3.6 - 5.5 mmol/L    Chloride 85 (L) 96 - 112 mmol/L    Co2 14 (L) 20 - 33 mmol/L    Anion Gap 38.0 (H) 7.0 - 16.0    Glucose 99 65 - 99 mg/dL    Bun 19 8 - 22 mg/dL    Creatinine 0.85 0.50 - 1.40 mg/dL    Calcium 7.5 (L) 8.5 - 10.5 mg/dL    AST(SGOT) 220 (H) 12 - 45 U/L    ALT(SGPT) 87 (H) 2 - 50 U/L    Alkaline Phosphatase 167 (H) 30 - 99 U/L    Total Bilirubin 5.0 (H) 0.1 - 1.5 mg/dL    Albumin 3.0 (L) 3.2 - 4.9 g/dL    Total Protein 5.5 (L) 6.0 - 8.2 g/dL    Globulin 2.5 1.9 - 3.5 g/dL    A-G Ratio 1.2 g/dL   TROPONIN   Result Value Ref Range    Troponin T 65 (H) 6 - 19 ng/L   LACTIC ACID   Result Value Ref Range    Lactic Acid 13.0 (HH) 0.5 - 2.0 mmol/L   CREATINE KINASE   Result Value Ref Range    CPK Total 96 0 - 154 U/L   SARS-CoV-2 PCR (24 hour In-House): Collect [facility] swab in [facility]    Specimen: Respirate   Result Value Ref Range    SARS-CoV-2 Source NP Swab    AMMONIA   Result Value Ref Range    Ammonia 28 11 - 45 umol/L   ESTIMATED GFR   Result Value Ref Range    GFR If African American >60 >60 mL/min/1.73 m 2    GFR If Non African American >60 >60 mL/min/1.73 m 2   DIAGNOSTIC ALCOHOL   Result Value Ref Range    Diagnostic Alcohol 205.4 (H) 0.0 - 10.0 mg/dL   APTT   Result Value Ref Range    APTT 35.3 24.7 - 36.0 sec   PROTHROMBIN TIME (INR)   Result Value Ref Range    PT 20.1 (H) 12.0 - 14.6 sec    INR 1.66 (H) 0.87 - 1.13      All labs reviewed by me.     EKG:   Obtained at 6:11 PM  Tachycardic Sinus rhythm   Rate: 101  Axis normal   Nonspecific intraventricular delay  T waves inverted  inferiorly   QT interval prolonged   A lot of Artifact        RADIOLOGY  CT-HEAD W/O   Final Result      Bilateral subdural hematomas measure up to 8.3 mm on the left and 5 mm on the right.      Left frontal subarachnoid blood is also seen.      No midline shift.      Soft tissue swelling of the right cheek.      Findings discussed with Dr Naman MORGAN-CHEST-PORTABLE (1 VIEW)   Final Result      No acute cardiopulmonary process is seen.        The radiologist's interpretation of all radiological studies have been reviewed by me.    COURSE & MEDICAL DECISION MAKING  Nursing notes, VS, PMSFHx reviewed in chart.     6:26 PM Patient seen and examined at bedside. Ordered for DX-Chest, CT-Head, UA, Lactic Acid, Troponin, CMP, CBC w/ diff, Creatine Kinase, and COVID Testing to evaluate. Patient was treated with NS 1,000 mL for her symptoms.     6:45 PM - Patient was reevaluated at bedside. IV was placed successfully. Ordered Estimated GFR and Ammonia to evaluate her symptoms.    7:43 PM - Ordered Blood culture to evaluate her symptoms     8:00 PM - Ordered Prothrombin, APTT, Diagnostic Alcohol, Urine Drug Screen, and Basic TEG to evaluate her symptoms.    8:20 PM -  Paged Neuro.      8:20 PM - I discussed the patient's case and the above findings with Dr. Leos (Neurology).    8:59 PM - Paged Hospitalist.      9:08 PM - I discussed the patient's case and the above findings with Dr. Bernstein (Hospitalist) who agrees to evaluate the patient for hospitalization.      HYDRATION: Based on the patient's presentation of Bj BLANCHARD the patient was given IV fluids. IV Hydration was used because oral hydration was not adequate alone. Upon recheck following hydration, the patient was improved.    Decision Making:  This is a 60 y.o. year old female who presents with significant acidosis and alcohol intoxication.  She also appears to have bilateral subdural hematomas with some subarachnoid blood-these are small subdurals.  The patient is  altered what may be a combination of her metabolic state including alcohol and electrolyte derangement.  The patient's potassium was somewhat low and this will be corrected.  She was written for IV fluids for her acidosis-lactate was noted to be 13.  Case discussed with the hospitalist and the patient will be admitted.  Case also discussed with Dr. Leos.    9:24 PM-case discussed with the intensivist who will see the patient    CRITICAL CARE  The very real possibilty of a deterioration of this patient's condition required the highest level of my preparedness for sudden, emergent intervention.  I provided critical care services, which included medication orders, frequent reevaluations of the patient's condition and response to treatment, ordering and reviewing test results, and discussing the case with various consultants.  The critical care time associated with the care of the patient was 30 minutes. Review chart for interventions. This time is exclusive of any other billable procedures.      DISPOSITION:  Patient will be hospitalized by Dr. Bernstein in guarded condition.    FINAL IMPRESSION  1. Altered mental status, unspecified altered mental status type    2. Subdural hematoma (HCC)    3. Lactic acidosis          Prema CASH (Bertha), am scribing for, and in the presence of, Navjot Florence M.D..    Electronically signed by: Prema Vo (Bertha), 4/13/2021    Navjot CASH M.D. personally performed the services described in this documentation, as scribed by Prema Vo in my presence, and it is both accurate and complete. C.    The note accurately reflects work and decisions made by me.  Navjot Florence M.D.  4/13/2021  9:13 PM

## 2021-04-14 NOTE — DISCHARGE PLANNING
Medical Social Work    Referral: Code Blue    Intervention: MSW responded to R112 for a Code Blue for pt Nathalie Jeff (: 1960).  MSW contacted pt's daughter, Kerline ([phone]) via phone and updated her on pt's status.  Pt's daughter states that she will not be coming into the hospital at this time and requests to be updated via phone.  Nursing staff aware.    Plan: SW will remain available.

## 2021-04-14 NOTE — ASSESSMENT & PLAN NOTE
Secondary to hypoalbuminemia.  Corrected calcium within normal limits.  Continue to monitor closely.

## 2021-04-14 NOTE — DIETARY
Nutrition Services: Nutrition Support Assessment  Day 1 of admit.  Nathalie Aguilar is a 60 y.o. female with admitting DX of SDH (subdural hematoma)     Current problem list:  1. Subdural hematoma  2. Protein-calorie malnutrition, severe  3. Alcohol withdrawal syndrome with complication  4. Lactic acidosis  5. Marijuana user  6. Transaminitis     Assessment:  Estimated Nutritional Needs: based on: Ht: 172.7 cm, Wt : 55.8 kg via bed scale, IBW: 63.5 kg, BMI: 18.7 - Normal     Calculation/Equation: REE per MSJ x 1.2 = 1419 kcal/day; RMR per [facility] (vent L/min 7.4, T max 24 hours 36.8) = 1297 kcal/day  Total Calories / day: 1250 - 1450 (Calories / k - 26)  Total Grams Protein / day: 67 - 84 (Grams Protein / k.2 - 1.5)  Total Fluids ml / day: 1677 ml    Evaluation:   1. Consult received for TF assessment and failure to thrive. Pt was intubated this morning.    2. Enteral access: Gastric Cortrak   3. Labs: K 2.6, Creatinine 0.45, Phosphorus 2.1  4. Meds: tums, rocephin, vibramycin, pepcid, lantus, humalog, K+ scale, ativan, mag-ox, electrolyte replacement, thiamine, MVI, folvite, pericolace, levophed @ 15 mcg/min, propofol (paused)  5. Fluids: detox IV 1000 mL infusion, lactated ringers infusion @ 125 mL/hr  6. Pt at risk for refeeding syndrome due to alcohol withdrawal.   7. Fiber-free formula Impact Peptide 1.5 appropriate due to pressor support.       Malnutrition Risk: Unable to determine at this time.      Recommendations/Plan:  Start Impact Peptide 1.5 @ 25 ml/hr and advance per protocol to 35 ml/hr (goal rate) to provide 1260 kcal (23 kcal/kg), 79 grams protein (1.4 gm/kg), and 647 ml free water per day.   Fluids per MD.   Monitor for refeeding: Order BMP w/ Mg and Phos x 7 days. Replete K, Phos and Mg prn. Supplement 100 mg Thiamine x 7 days to reduce risk of refeeding       RD following

## 2021-04-14 NOTE — ASSESSMENT & PLAN NOTE
-SIRS criteria identified on my evaluation include:  Tachycardia, with heart rate greater than 90 BPM and Tachypnea, with respirations greater than 20 per minute  -SIRS is non-infectious, the patient does not have sepsis

## 2021-04-14 NOTE — EEG PROGRESS NOTE
Late entry:    PT hooked for CVEEG with CT compatible electrodes and collodion and Tape. Electrodes are not MRI conditional. NO MRI with these electrodes.

## 2021-04-14 NOTE — PROGRESS NOTES
Lost PIV access. IV medications paused. MD notified. Physician came to bedside to place PICC. Timeout called at 0145. IV medications restarted at 0210.

## 2021-04-14 NOTE — CONSULTS
DATE OF SERVICE:  04/14/2021     NEUROSURGICAL CONSULTATION     REASON FOR NEUROSURGICAL CONSULTATION:  Incidental finding of bilateral   subdural hematomas.     CLINICAL HISTORY:  The patient is a 60-year-old female with a history of   alcohol and heroin dependence with unspecified psychiatric disorder and asthma   who presented via EMS with acute loss of consciousness and failure to thrive.    The patient has a history of alcohol abuse and drinks a pint of alcohol   daily.  She fell in the bathroom a couple days ago.  She does not remember the   exact day and the exact events.  She was living with her boyfriend.  There   was a loss of consciousness when she hit her head during the fall.     Per EMS, the patient was found by the daughter to be naked and covered in   feces.  It is felt that she was down for at least 24 hours.  The head CT   showed a significant bifrontal cerebral atrophy with acute to subacute   bilateral subdural hematomas less than 1 cm with no mass effect.  Urine drug   screen was positive for benzodiazepines and cannabinoid metabolites.  Blood   alcohol was 205.     REVIEW OF SYSTEMS:  Positive for malaise and fatigue.  Neurologic:  Positive   for weakness and headaches.     PAST MEDICAL HISTORY:  Significant for asthma, cancer, heroin addiction,   pneumonia and psychiatric disorder.     PAST SURGICAL HISTORY:  Significant for GYN surgery.  Incision and drainage of   an orthopedic procedure.     FAMILY HISTORY:  Significant for alcohol abuse in the father.     SOCIAL HISTORY:  Positive for alcohol and smoking.     ALLERGIES:  None.     MEDICATIONS:  None.     PHYSICAL EXAMINATION:  VITAL SIGNS:  Upon examination include temperature 36.8, pulse of 100,   respirations of 20, blood pressure of 103/61, oxygen sats at 99%.  NEUROLOGIC:  For me, the patient was intubated, having disconjugate pupils,   having pupils 4 mm that are briskly reactive to 2.  The patient has some   nonpurposeful flexion in  the arms and has extensor tone in the lower   extremities.  Of note, this morning, the patient coded and was asystolic.  It   was thought that she had a seizure.  The patient's pupils were fixed and   dilated at one point.  Upon my examination this morning, she is being packed   up to go to CAT scan.     DIAGNOSTIC DATA:  Traumatic subdural hematomas without mass effect or edema.    The patient's both of the subdural hematomas are less than 1 cm.  Given the   atrophy in the brain, the patient can accommodate them.     IMPRESSION AND PLAN:  Traumatic subdural hematomas in a patient with drug and   alcohol use and failure to thrive and was found in feces after hitting her   head several days ago.  We await the repeat head CT.  I suspect that there   would not be anything surgical there.  I will write an updated note after the   repeat CAT scan is performed.  It is being performed currently.        ______________________________  MD DI GANN/ELIEL/RAHUL    DD:  04/14/2021 08:18  DT:  04/14/2021 11:54    Job#:  [id number]

## 2021-04-14 NOTE — PROGRESS NOTES
Arnold from Lab called with critical result of calcium of 6.2 at 0353. Critical lab result read back to Arnold.   Dr. Funez notified of critical lab result at 0354. Critical lab result read back by Dr. Funez.

## 2021-04-14 NOTE — PROCEDURES
CONTINUOUS VIDEO ELECTROENCEPHALOGRAM REPORT      Referring provider: Dr. Glez    DOS: 4/14/2021 (20 hours and 37 minutes of total recording time).     INDICATION:  Nathalie Aguilar 60 y.o. female presenting with altered mental status    CURRENT ANTIEPILEPTIC AND/OR SEDATING REGIMEN:   Ativan (lorazepam) (2mg )  Keppra (levetiracitam) (3000mg )  Diprivan (propofol) (30mcg/kg/min)    TECHNIQUE: CVEEG was set up by a Neurodiagnostic technologist who performed education to the patient and staff. A minimum of 23 electrodes and 23 channel recording was setup and performed by Neurodiagnostic technologist, in accordance with the international 10-20 system. Impedence, electrode integrity, and technical impressions were documented a minimum of every 2-24 hour period by a Neurodiagnostic Technologist and reviewed by Interpreting Physician. The study was reviewed in bipolar and referential montages. The recording examined the patient in the awake, drowsy, and sleep state(s).     DESCRIPTION OF THE RECORD:  During wakefulness, the background was continuous, low ampl, and no discernible posterior dominant rhythm.  Spontaneous variability and state changes were present. Reactivity was minimal but not altogrther absent.   During drowsiness, theta/delta frequencies were seen.    Sleep was captured and was characterized by diffuse background delta/theta activity with a loss of myogenic artifact.  N2 sleep transients in the form of sleep spindles and vertex waves were seen in the leads over the central regions.     ACTIVATION PROCEDURES:   NA      ICTAL AND INTERICTAL FINDINGS:   No focal or generalized epileptiform activity noted.     No regional slowing was seen during this routine study.      No seizures    EKG: sampling of the EKG recording showed tachycardia    EVENTS: Multiple clinical events were reported or recorded, two of these events at 2:58 PM and 9:02 PM were push button events. Clinically, patient was biting ET  tube and/or exhibiting fine body shaking. Although abundant artifact was present, no definitive abnormal EEG correlate could be seen.    NOTE: This is a technically-limited study due to abundant myogenic, movement, and lead artifact obscuring most of the recording.      INTERPRETATION:  Abnormal, technically-limited video EEG recording in the awake, drowsy, and sleep state(s):  - Moderate background slowing suggestive of diffuse/multifocal cerebral dysfunction.   - No persistent focal asymmetries seen.  - No definitive epileptiform discharges seen   - No definitive electro-clinical seizures. Clinical correlation is recommended.  -Multiple clinical events were reported or recorded, two of these events at 2:58 PM and 9:02 PM were push button events. Clinically, patient was biting ET tube and/or exhibiting fine body shaking. Although abundant artifact was present, no definitive abnormal EEG correlate could be seen.      Chris Kelly MD  Epilepsy and General Neurology  Department of Neurology  Instructor of Clinical Neurology [facility] of [facility].   Office: [phone]  Fax: [phone]

## 2021-04-14 NOTE — CONSULTS
Critical Care Consultation    Date of consult: 4/13/2021    Referring Physician  Navjot Florence M.D.;Ventura BARNETT    Reason for Consultation  SDH, EtOH withdraw, ALOC    History of Presenting Illness  60 y.o. female who presented 4/13/2021 with SDH, acute alcohol intoxication, and altered level of consciousness    Code Status  No Order    Review of Systems  Review of Systems   Unable to perform ROS: Mental status change       Past Medical History   has a past medical history of ASTHMA, Cancer (HCC), Heroin addiction (HCC), Pneumonia, and Psychiatric disorder.    Surgical History   has a past surgical history that includes gyn surgery and incision and drainage orthopedic (Right, 1/25/2019).    Family History  family history is not on file.    Social History   reports that she has been smoking. She has never used smokeless tobacco. She reports current alcohol use. She reports that she does not use drugs.    Medications  Home Medications     Reviewed by Delio Hernandez (Pharmacy Tech) on 04/13/21 at 1931  Med List Status: Complete   Medication Last Dose Status        Patient Davie Taking any Medications                     Current Facility-Administered Medications   Medication Dose Route Frequency Provider Last Rate Last Admin   • 0.9 % NaCl with KCl 20 mEq infusion   Intravenous Once Navjot Florence M.D.       • dexmedetomidine (PRECEDEX) 400 mcg/100mL NS premix infusion  0.1-1 mcg/kg/hr Intravenous Continuous Ventura Bellain, D.O.       • LORazepam (ATIVAN) injection 2 mg  2 mg Intravenous Q4HRS Ventura Bellain, D.O.       • LORazepam (ATIVAN) injection 1-2 mg  1-2 mg Intravenous Q2HRS PRN Ventura Bellain, D.O.       • detox IV 1000 mL (D5LR + magnesium 1 g + thiamine 100 mg + folic acid 1 mg) infusion   Intravenous Once Ventura Bellain, D.O.        And   • [START ON 4/14/2021] thiamine (Vitamin B-1) tablet 100 mg  100 mg Oral DAILY Ventura Still, D.O.        And   • [START ON 4/14/2021] multivitamin (THERAGRAN) tablet 1 tablet   1 tablet Oral DAILY Ventura Still D.OBao        And   • [START ON 4/14/2021] folic acid (FOLVITE) tablet 1 mg  1 mg Oral DAILY Ventura Still D.O.       • magnesium sulfate IVPB premix 2 g  2 g Intravenous Once Ventura Still D.O.        And   • [START ON 4/14/2021] magnesium oxide (MAG-OX) tablet 400 mg  400 mg Oral BID Ventura Still D.O.       • potassium chloride (KCL) ivpb 10 mEq  10 mEq Intravenous Once Ventura Still D.O.       • potassium chloride SA (Kdur) tablet 20 mEq  20 mEq Oral BID Ventura Still D.O.       • cloNIDine (CATAPRES) tablet 0.1 mg  0.1 mg Oral Q HOUR PRN Ventura Still D.O.         No current outpatient medications on file.       Allergies  No Known Allergies    Vital Signs last 24 hours  Temp:  [36.8 °C (98.2 °F)] 36.8 °C (98.2 °F)  Pulse:  [] 100  Resp:  [20-23] 20  BP: (101-120)/(55-65) 103/61  SpO2:  [99 %-100 %] 99 %    Physical Exam  Physical Exam  Constitutional:       Appearance: She is ill-appearing.      Comments: Pt appears to be unkept and has visible dirt on her legs and feet.   HENT:      Head: Normocephalic and atraumatic.      Nose: Nose normal.      Mouth/Throat:      Mouth: Mucous membranes are moist.      Pharynx: Oropharynx is clear.   Eyes:      Extraocular Movements: Extraocular movements intact.      Conjunctiva/sclera: Conjunctivae normal.   Cardiovascular:      Rate and Rhythm: Normal rate and regular rhythm.      Pulses: Normal pulses.      Heart sounds: Normal heart sounds.   Pulmonary:      Effort: Pulmonary effort is normal.      Breath sounds: Normal breath sounds.   Abdominal:      General: There is no distension.      Palpations: Abdomen is soft.      Tenderness: There is no abdominal tenderness. There is no guarding.   Musculoskeletal:      Cervical back: Normal range of motion and neck supple.      Right lower leg: No edema.      Left lower leg: Edema present.   Skin:     General: Skin is warm and dry.      Capillary Refill: Capillary refill  takes 2 to 3 seconds.   Neurological:      Mental Status: She is disoriented.         Fluids    Intake/Output Summary (Last 24 hours) at 2021  Last data filed at 2021  Gross per 24 hour   Intake 2100 ml   Output 100 ml   Net 2000 ml       Laboratory  Recent Results (from the past 48 hour(s))   EKG (NOW)    Collection Time: 21  6:11 PM   Result Value Ref Range    Report       [facility] Emergency Dept.    Test Date:  2021  Pt Name:    JOSS WHALEN               Department: ER  MRN:        [id number]                      Room:        03  Gender:     Female                       Technician: [id number]  :        1960                   Requested By:BALJIT AGUIRRE  Order #:    [id number]                    Reading MD:    Measurements  Intervals                                Axis  Rate:       101                          P:          115  OR:         109                          QRS:        85  QRSD:       114                          T:          105  QT:         392  QTc:        509    Interpretive Statements  SINUS TACHYCARDIA  BIATRIAL ABNORMALITIES  NONSPECIFIC INTRAVENTRICULAR CONDUCTION DELAY  PROBABLE INFERIOR INFARCT, OLD  ARTIFACT IN LEAD(S) I,II,III,aVR,aVL,aVF,V1,V2,V4,V5,V6  Compared to ECG 2020 17:32:33  Atrial abnormality now present  Intraventricular conduction delay now present  Myocardial infarct finding now  present  Sinus rhythm no longer present     SARS-CoV-2 PCR (24 hour In-House): Collect [facility] swab in [facility]    Collection Time: 21  6:43 PM    Specimen: Respirate   Result Value Ref Range    SARS-CoV-2 Source NP Swab    CBC WITH DIFFERENTIAL    Collection Time: 21  6:45 PM   Result Value Ref Range    WBC 4.9 4.8 - 10.8 K/uL    RBC 3.00 (L) 4.20 - 5.40 M/uL    Hemoglobin 11.0 (L) 12.0 - 16.0 g/dL    Hematocrit 32.6 (L) 37.0 - 47.0 %    .7 (H) 81.4 - 97.8 fL    MCH 36.7 (H) 27.0 - 33.0 pg    MCHC 33.7 33.6 - 35.0 g/dL    RDW  67.2 (H) 35.9 - 50.0 fL    Platelet Count 145 (L) 164 - 446 K/uL    MPV 10.8 9.0 - 12.9 fL    Neutrophils-Polys 86.10 (H) 44.00 - 72.00 %    Lymphocytes 8.80 (L) 22.00 - 41.00 %    Monocytes 4.10 0.00 - 13.40 %    Eosinophils 0.00 0.00 - 6.90 %    Basophils 0.20 0.00 - 1.80 %    Immature Granulocytes 0.80 0.00 - 0.90 %    Nucleated RBC 0.80 /100 WBC    Neutrophils (Absolute) 4.21 2.00 - 7.15 K/uL    Lymphs (Absolute) 0.43 (L) 1.00 - 4.80 K/uL    Monos (Absolute) 0.20 0.00 - 0.85 K/uL    Eos (Absolute) 0.00 0.00 - 0.51 K/uL    Baso (Absolute) 0.01 0.00 - 0.12 K/uL    Immature Granulocytes (abs) 0.04 0.00 - 0.11 K/uL    NRBC (Absolute) 0.04 K/uL   COMP METABOLIC PANEL    Collection Time: 04/13/21  6:45 PM   Result Value Ref Range    Sodium 137 135 - 145 mmol/L    Potassium 3.0 (L) 3.6 - 5.5 mmol/L    Chloride 85 (L) 96 - 112 mmol/L    Co2 14 (L) 20 - 33 mmol/L    Anion Gap 38.0 (H) 7.0 - 16.0    Glucose 99 65 - 99 mg/dL    Bun 19 8 - 22 mg/dL    Creatinine 0.85 0.50 - 1.40 mg/dL    Calcium 7.5 (L) 8.5 - 10.5 mg/dL    AST(SGOT) 220 (H) 12 - 45 U/L    ALT(SGPT) 87 (H) 2 - 50 U/L    Alkaline Phosphatase 167 (H) 30 - 99 U/L    Total Bilirubin 5.0 (H) 0.1 - 1.5 mg/dL    Albumin 3.0 (L) 3.2 - 4.9 g/dL    Total Protein 5.5 (L) 6.0 - 8.2 g/dL    Globulin 2.5 1.9 - 3.5 g/dL    A-G Ratio 1.2 g/dL   TROPONIN    Collection Time: 04/13/21  6:45 PM   Result Value Ref Range    Troponin T 65 (H) 6 - 19 ng/L   LACTIC ACID    Collection Time: 04/13/21  6:45 PM   Result Value Ref Range    Lactic Acid 13.0 (HH) 0.5 - 2.0 mmol/L   CREATINE KINASE    Collection Time: 04/13/21  6:45 PM   Result Value Ref Range    CPK Total 96 0 - 154 U/L   AMMONIA    Collection Time: 04/13/21  6:45 PM   Result Value Ref Range    Ammonia 28 11 - 45 umol/L   ESTIMATED GFR    Collection Time: 04/13/21  6:45 PM   Result Value Ref Range    GFR If African American >60 >60 mL/min/1.73 m 2    GFR If Non African American >60 >60 mL/min/1.73 m 2   DIAGNOSTIC ALCOHOL     Collection Time: 04/13/21  6:45 PM   Result Value Ref Range    Diagnostic Alcohol 205.4 (H) 0.0 - 10.0 mg/dL   APTT    Collection Time: 04/13/21  6:45 PM   Result Value Ref Range    APTT 35.3 24.7 - 36.0 sec   PROTHROMBIN TIME (INR)    Collection Time: 04/13/21  6:45 PM   Result Value Ref Range    PT 20.1 (H) 12.0 - 14.6 sec    INR 1.66 (H) 0.87 - 1.13   URINALYSIS (UA)    Collection Time: 04/13/21  8:25 PM    Specimen: Urine   Result Value Ref Range    Micro Urine Req Microscopic    URINE DRUG SCREEN    Collection Time: 04/13/21  8:25 PM   Result Value Ref Range    Amphetamines Urine Negative Negative    Barbiturates Negative Negative    Benzodiazepines Positive (A) Negative    Cocaine Metabolite Negative Negative    Methadone Negative Negative    Opiates Negative Negative    Oxycodone Negative Negative    Phencyclidine -Pcp Negative Negative    Propoxyphene Negative Negative    Cannabinoid Metab Positive (A) Negative   BASIC TEG    Collection Time: 04/13/21  8:30 PM   Result Value Ref Range    Reaction Time Initial-R 5.1 5.0 - 10.0 min    Clot Kinetics-K 3.6 (H) 1.0 - 3.0 min    Clot Angle-Angle 58.8 53.0 - 72.0 degrees    Maximum Clot Strength-MA 53.6 50.0 - 70.0 mm    Lysis 30 minutes-LY30 0.0 0.0 - 8.0 %    TEG Algorithm Link Algorithm        Imaging  CT-HEAD W/O   Final Result      Bilateral subdural hematomas measure up to 8.3 mm on the left and 5 mm on the right.      Left frontal subarachnoid blood is also seen.      No midline shift.      Soft tissue swelling of the right cheek.      Findings discussed with Dr Naman MORGAN-CHEST-PORTABLE (1 VIEW)   Final Result      No acute cardiopulmonary process is seen.          Assessment/Plan  SDH (subdural hematoma) (HCC)  Assessment & Plan  Pt is a 59 yo  female who was found in her home obtunded.  Pt was last seen on 12 Apr 2021.  Pt found to be intoxicated with altered level of consciousness.  CT Head shows b/l frontal subdural hematomas.  Pt has EtOH  of 207.  Pt unable to answer questions of what month is it and where are you right now? Do not know if pt's obtundation is from SDH or from EtOH intoxication.      - Admit pt to ICU  - Start ICU EtOH Withdraw protocol  - Frequent neurochecks  - Repeat CT Head in am.  - Hold anticoag  - Resuscitate with IVF to normalize lactate.  Pt appears to have good cardiac output, so it would be ok to be aggressive with IVF.  - Order procalcitonin       Discussed patient condition and risk of morbidity and/or mortality with Hospitalist.    The patient remains critically ill.  Critical care time = 43 minutes in directly providing and coordinating critical care and extensive data review.  No time overlap and excludes procedures.

## 2021-04-14 NOTE — PROCEDURES
Arterial Line Insertion    Date/Time: 4/14/2021 5:39 AM  Performed by: Diego Sims M.D.  Authorized by: Diego Sims M.D.   Consent: The procedure was performed in an emergent situation.  Preparation: Patient was prepped and draped in the usual sterile fashion.  Indications: hemodynamic monitoring  Location: right femoral  Needle gauge: 20  Seldinger technique: Seldinger technique used  Number of attempts: 1  Post-procedure: line sutured and dressing applied  Post-procedure CMS: unchanged  Patient tolerance: patient tolerated the procedure well with no immediate complications

## 2021-04-14 NOTE — THERAPY
Physical Therapy Contact Note    PT consult received, new admit with coding this AM; will follow up when appropriate for PT evaluation;    Kimberley FLORES, PT,  [phone]   NPO

## 2021-04-14 NOTE — H&P
Hospital Medicine History & Physical Note    Date of Service  4/13/2021    Primary Care Physician  Mohsen Tamasaby, M.D.    Consultants  Critical care-Dr. Still  Neurosurgery-Dr. Leos    Code Status  Full Code    Chief Complaint  Chief Complaint   Patient presents with   • ALOC   • Failure to Thrive       History of Presenting Illness  60 y.o. female with a history of alcohol and heroin dependence, unspecified psychiatric disorder, and asthma who presented by ambulance 4/13/2021 with acute loss of consciousness and failure to thrive.  Patient reports that she is an alcoholic and usually drinks about a pint of hard alcohol daily.  She reports she fell in the bathroom a couple days ago.  She does not remember the exact date and the exact events.  She reports living with her boyfriend, likely called EMS.  Patient reports she lost consciousness and hit her head during the fall.  She does not remember when her last trunk was.  She denies any history of alcohol significant withdrawal symptoms or history of seizures.  She reports continued to smoke about 1/2 pack/day.  She denies any other illicit drug use.  She denies any fevers or chills.    Per EMS report, patient was found by her daughter was noted to be naked and covered in feces.  It is believed that she was found for at least 24 hours.  Head CT revealed acute to subacute bilateral subdural hematomas left greater than the right with significant bifrontal cerebral atrophy.  Her lactic acid was noted to be 13.  Urine drug screen was positive for benzodiazepines and cannabinoid metabolites.  Blood alcohol level was 205.      Review of Systems  Review of Systems   Constitutional: Positive for malaise/fatigue. Negative for chills and fever.   HENT: Negative for ear pain and sore throat.    Eyes: Negative for blurred vision and double vision.   Respiratory: Negative for cough and shortness of breath.    Cardiovascular: Negative for chest pain and palpitations.      Gastrointestinal: Negative for abdominal pain, constipation, diarrhea, nausea and vomiting.   Genitourinary: Negative for dysuria and frequency.   Musculoskeletal: Positive for falls. Negative for joint pain and myalgias.   Skin: Negative for itching and rash.   Neurological: Positive for weakness and headaches. Negative for dizziness.   Psychiatric/Behavioral: Positive for depression and memory loss. The patient is nervous/anxious.        Past Medical History   has a past medical history of ASTHMA, Cancer (HCC), Heroin addiction (HCC), Pneumonia, and Psychiatric disorder.    Surgical History   has a past surgical history that includes gyn surgery and incision and drainage orthopedic (Right, 1/25/2019).     Family History  family history includes Alcohol abuse in her father; No Known Problems in her mother.     Social History   reports that she has been smoking. She has been smoking about 0.50 packs per day. She has never used smokeless tobacco. She reports current alcohol use. She reports that she does not use drugs.    Allergies  No Known Allergies    Medications  None       Physical Exam  Temp:  [36.8 °C (98.2 °F)] 36.8 °C (98.2 °F)  Pulse:  [] 100  Resp:  [20-23] 20  BP: (101-120)/(55-65) 103/61  SpO2:  [99 %-100 %] 99 %    Physical Exam  Vitals and nursing note reviewed.   Constitutional:       General: She is not in acute distress.     Appearance: She is well-developed. She is ill-appearing. She is not toxic-appearing or diaphoretic.      Comments: Disheveled, unkept, covered in feces, cachectic   HENT:      Head: Normocephalic and atraumatic.      Right Ear: External ear normal.      Left Ear: External ear normal.      Nose: Nose normal.      Mouth/Throat:      Pharynx: Oropharynx is clear. No oropharyngeal exudate or posterior oropharyngeal erythema.   Eyes:      General: Scleral icterus present.         Right eye: No discharge.         Left eye: No discharge.      Conjunctiva/sclera: Conjunctivae  normal.      Pupils: Pupils are equal, round, and reactive to light.   Neck:      Thyroid: No thyromegaly.      Vascular: No JVD.      Trachea: No tracheal deviation.   Cardiovascular:      Rate and Rhythm: Normal rate and regular rhythm.      Pulses: Normal pulses.      Heart sounds: Normal heart sounds. No murmur. No friction rub. No gallop.    Pulmonary:      Effort: Pulmonary effort is normal. No respiratory distress.      Breath sounds: Normal breath sounds. No stridor. No wheezing or rales.   Abdominal:      General: Abdomen is flat. Bowel sounds are normal. There is no distension.      Palpations: Abdomen is soft. There is no mass.      Tenderness: There is no abdominal tenderness. There is no guarding or rebound.   Musculoskeletal:         General: No tenderness or deformity.      Cervical back: Neck supple.      Right lower leg: No edema.      Left lower leg: No edema.   Lymphadenopathy:      Cervical: No cervical adenopathy.   Skin:     General: Skin is warm and dry.      Coloration: Skin is jaundiced. Skin is not pale.      Findings: No erythema or rash.   Neurological:      Mental Status: She is alert and oriented to person, place, and time.      Sensory: No sensory deficit.      Motor: No weakness or abnormal muscle tone.      Comments: Patient has full strength in both the upper and lower extremities.  Sensation is intact to light touch in both the upper lower extremities.  There is no drift.   Psychiatric:         Behavior: Behavior normal.         Thought Content: Thought content normal.         Judgment: Judgment normal.         Laboratory:  Recent Labs     04/13/21 1845   WBC 4.9   RBC 3.00*   HEMOGLOBIN 11.0*   HEMATOCRIT 32.6*   .7*   MCH 36.7*   MCHC 33.7   RDW 67.2*   PLATELETCT 145*   MPV 10.8     Recent Labs     04/13/21 1845   SODIUM 137   POTASSIUM 3.0*   CHLORIDE 85*   CO2 14*   GLUCOSE 99   BUN 19   CREATININE 0.85   CALCIUM 7.5*     Recent Labs     04/13/21 1845   ALTSGPT 87*      ASTSGOT 220*   ALKPHOSPHAT 167*   TBILIRUBIN 5.0*   GLUCOSE 99     Recent Labs     04/13/21  1845   APTT 35.3   INR 1.66*     No results for input(s): NTPROBNP in the last 72 hours.      Recent Labs     04/13/21  1845   TROPONINT 65*       Imaging:  CT-HEAD W/O   Final Result      Bilateral subdural hematomas measure up to 8.3 mm on the left and 5 mm on the right.      Left frontal subarachnoid blood is also seen.      No midline shift.      Soft tissue swelling of the right cheek.      Findings discussed with Dr Naman MORGAN-CHEST-PORTABLE (1 VIEW)   Final Result      No acute cardiopulmonary process is seen.            I have personally reviewed the patient's head CT.  Per my review, acute to subacute bilateral hemispheric subdural hematomas left greater than the right.  No significant midline shift.  Significant cerebral atrophy in the bilateral frontal lobe.  No significant hydrocephalus.  No intracranial masses.        I have personally reviewed the patient's chest x-ray.  Per my review, enlarged lung volumes bilaterally with no significant interstitial or focal infiltrates.  Sharp costophrenic angles bilaterally.      I have personally reviewed the patient's EKG.  Per my review, significant artifact from underlying tremor with tachycardia with heart rate of 101, prolonged QTC of 9, no significant ST elevation or depression.  There is T wave inversion in V2 and V3.      Assessment/Plan:  I anticipate this patient will require at least two midnights for appropriate medical management, necessitating inpatient admission.    * SDH (subdural hematoma) (HCC)- (present on admission)  Assessment & Plan  Acute to subacute.  Likely secondary to ground-level fall 1 to 2 days ago with loss of consciousness.  I have discussed the case with the ER physician as well as the critical care specialist, who has kindly accepted the patient for ICU care.    Admit to ICU.  Neurochecks every hour.  Appreciate neurosurgical and  critical care consultations.  Avoid chemo anticoagulation for DVT prophylaxis.  Seizure, fall, and aspiration precautions.    Alcohol withdrawal syndrome with complication (HCC)- (present on admission)  Assessment & Plan  Complicated by a bilateral subdural hematomas.    Appreciate critical care consultation.  Precedex and CIWA protocol as per critical care.  Vitamin replacement therapy.  Aspiration, fall, and seizure precautions.  Patient counseled on alcohol cessation to treatment center to help groups.    Protein-calorie malnutrition, severe (HCC)- (present on admission)  Assessment & Plan  In setting of alcoholism.  BMI of 18.7.    Nutrition consult.  Check prealbumin.  Consider NG tube placement.    Hyperbilirubinemia  Assessment & Plan  Etiology unclear.  Possibly secondary to alcohol.    Check right upper quadrant ultrasound.    Transaminitis- (present on admission)  Assessment & Plan  Etiology unclear.  Possibly secondary to alcohol.    Right upper quadrant ultrasound given significant hyperbilirubinemia.    Hypocalcemia- (present on admission)  Assessment & Plan  Secondary to hypoalbuminemia.  Corrected calcium within normal limits.  Continue to monitor closely.    Marijuana user- (present on admission)  Assessment & Plan  As per positive UDS.     polysubstance abstinence.    Lactic acidosis- (present on admission)  Assessment & Plan  Likely secondary to severe dehydration and tissue ischemia.    Continue fluid resuscitation.    Hypomagnesemia- (present on admission)  Assessment & Plan  Secondary to alcoholism.  Replace for goal greater than 2.0.    Hypokalemia- (present on admission)  Assessment & Plan  In setting of alcohol dependence.  Correct for goal greater than 4.0.  Correct magnesium for goal greater than 2.0.

## 2021-04-14 NOTE — ASSESSMENT & PLAN NOTE
Echogenic liver ultrasound like alcoholic hepatitis    Counseled on alcohol cessation  Monitor LFTs

## 2021-04-15 ENCOUNTER — APPOINTMENT (OUTPATIENT)
Dept: RADIOLOGY | Facility: MEDICAL CENTER | Age: 61
DRG: 082 | End: 2021-04-15
Attending: NURSE PRACTITIONER
Payer: MEDICAID

## 2021-04-15 PROBLEM — K92.2 GI BLEED: Status: ACTIVE | Noted: 2021-04-15

## 2021-04-15 PROBLEM — K92.1 MELENA: Status: ACTIVE | Noted: 2021-04-15

## 2021-04-15 LAB
25(OH)D3 SERPL-MCNC: 5 NG/ML (ref 30–80)
25(OH)D3 SERPL-MCNC: 5 NG/ML (ref 30–80)
ABO GROUP BLD: NORMAL
ANION GAP SERPL CALC-SCNC: 11 MMOL/L (ref 7–16)
ANION GAP SERPL CALC-SCNC: 12 MMOL/L (ref 7–16)
ANION GAP SERPL CALC-SCNC: 13 MMOL/L (ref 7–16)
ANION GAP SERPL CALC-SCNC: 13 MMOL/L (ref 7–16)
BARCODED ABORH UBTYP: 5100
BARCODED PRD CODE UBPRD: NORMAL
BARCODED UNIT NUM UBUNT: NORMAL
BASE EXCESS BLDA CALC-SCNC: 4 MMOL/L (ref -4–3)
BASOPHILS # BLD AUTO: 0 % (ref 0–1.8)
BASOPHILS # BLD: 0 K/UL (ref 0–0.12)
BLD GP AB SCN SERPL QL: NORMAL
BODY TEMPERATURE: ABNORMAL DEGREES
BREATHS SETTING VENT: 20
BUN SERPL-MCNC: 12 MG/DL (ref 8–22)
BUN SERPL-MCNC: 12 MG/DL (ref 8–22)
BUN SERPL-MCNC: 8 MG/DL (ref 8–22)
BUN SERPL-MCNC: 9 MG/DL (ref 8–22)
CA-I SERPL-SCNC: 0.9 MMOL/L (ref 1.1–1.3)
CA-I SERPL-SCNC: 0.9 MMOL/L (ref 1.1–1.3)
CALCIUM SERPL-MCNC: 6.6 MG/DL (ref 8.5–10.5)
CALCIUM SERPL-MCNC: 6.7 MG/DL (ref 8.5–10.5)
CALCIUM SERPL-MCNC: 6.8 MG/DL (ref 8.5–10.5)
CALCIUM SERPL-MCNC: 6.9 MG/DL (ref 8.5–10.5)
CHLORIDE SERPL-SCNC: 103 MMOL/L (ref 96–112)
CHLORIDE SERPL-SCNC: 104 MMOL/L (ref 96–112)
CHLORIDE SERPL-SCNC: 106 MMOL/L (ref 96–112)
CHLORIDE SERPL-SCNC: 107 MMOL/L (ref 96–112)
CO2 BLDA-SCNC: 29 MMOL/L (ref 20–33)
CO2 SERPL-SCNC: 22 MMOL/L (ref 20–33)
CO2 SERPL-SCNC: 23 MMOL/L (ref 20–33)
CO2 SERPL-SCNC: 25 MMOL/L (ref 20–33)
CO2 SERPL-SCNC: 25 MMOL/L (ref 20–33)
COMPONENT R 8504R: NORMAL
CREAT SERPL-MCNC: 0.18 MG/DL (ref 0.5–1.4)
CREAT SERPL-MCNC: 0.21 MG/DL (ref 0.5–1.4)
CREAT SERPL-MCNC: 0.22 MG/DL (ref 0.5–1.4)
CREAT SERPL-MCNC: 0.25 MG/DL (ref 0.5–1.4)
CRP SERPL HS-MCNC: 3.86 MG/DL (ref 0–0.75)
DELSYS IDSYS: ABNORMAL
EKG IMPRESSION: NORMAL
END TIDAL CARBON DIOXIDE IECO2: 29 MMHG
EOSINOPHIL # BLD AUTO: 0.02 K/UL (ref 0–0.51)
EOSINOPHIL NFR BLD: 0.6 % (ref 0–6.9)
ERYTHROCYTE [DISTWIDTH] IN BLOOD BY AUTOMATED COUNT: 65.1 FL (ref 35.9–50)
GLUCOSE BLD-MCNC: 102 MG/DL (ref 65–99)
GLUCOSE BLD-MCNC: 111 MG/DL (ref 65–99)
GLUCOSE SERPL-MCNC: 106 MG/DL (ref 65–99)
GLUCOSE SERPL-MCNC: 109 MG/DL (ref 65–99)
GLUCOSE SERPL-MCNC: 93 MG/DL (ref 65–99)
GLUCOSE SERPL-MCNC: 99 MG/DL (ref 65–99)
HCO3 BLDA-SCNC: 27.6 MMOL/L (ref 17–25)
HCT VFR BLD AUTO: 22.8 % (ref 37–47)
HEMOCCULT STL QL: POSITIVE
HGB BLD-MCNC: 7 G/DL (ref 12–16)
HGB BLD-MCNC: 7.7 G/DL (ref 12–16)
HGB BLD-MCNC: 8.1 G/DL (ref 12–16)
HGB BLD-MCNC: 8.9 G/DL (ref 12–16)
HOROWITZ INDEX BLDA+IHG-RTO: 228 MM[HG]
IMM GRANULOCYTES # BLD AUTO: 0.05 K/UL (ref 0–0.11)
IMM GRANULOCYTES NFR BLD AUTO: 1.5 % (ref 0–0.9)
INR PPP: 1.28 (ref 0.87–1.13)
LYMPHOCYTES # BLD AUTO: 0.78 K/UL (ref 1–4.8)
LYMPHOCYTES NFR BLD: 23 % (ref 22–41)
MAGNESIUM SERPL-MCNC: 1.6 MG/DL (ref 1.5–2.5)
MAGNESIUM SERPL-MCNC: 1.6 MG/DL (ref 1.5–2.5)
MAGNESIUM SERPL-MCNC: 1.7 MG/DL (ref 1.5–2.5)
MAGNESIUM SERPL-MCNC: 2 MG/DL (ref 1.5–2.5)
MCH RBC QN AUTO: 36.3 PG (ref 27–33)
MCHC RBC AUTO-ENTMCNC: 33.8 G/DL (ref 33.6–35)
MCV RBC AUTO: 107.5 FL (ref 81.4–97.8)
MODE IMODE: ABNORMAL
MONOCYTES # BLD AUTO: 0.12 K/UL (ref 0–0.85)
MONOCYTES NFR BLD AUTO: 3.5 % (ref 0–13.4)
NEUTROPHILS # BLD AUTO: 2.42 K/UL (ref 2–7.15)
NEUTROPHILS NFR BLD: 71.4 % (ref 44–72)
NRBC # BLD AUTO: 0.06 K/UL
NRBC BLD-RTO: 1.8 /100 WBC
O2/TOTAL GAS SETTING VFR VENT: 40 %
PCO2 BLDA: 36.2 MMHG (ref 26–37)
PCO2 TEMP ADJ BLDA: 34.9 MMHG (ref 26–37)
PEEP END EXPIRATORY PRESSURE IPEEP: 8 CMH20
PH BLDA: 7.49 [PH] (ref 7.4–7.5)
PH TEMP ADJ BLDA: 7.5 [PH] (ref 7.4–7.5)
PHOSPHATE SERPL-MCNC: 1.4 MG/DL (ref 2.5–4.5)
PHOSPHATE SERPL-MCNC: 2 MG/DL (ref 2.5–4.5)
PHOSPHATE SERPL-MCNC: 2.4 MG/DL (ref 2.5–4.5)
PHOSPHATE SERPL-MCNC: 2.6 MG/DL (ref 2.5–4.5)
PLATELET # BLD AUTO: 82 K/UL (ref 164–446)
PMV BLD AUTO: 11.5 FL (ref 9–12.9)
PO2 BLDA: 91 MMHG (ref 64–87)
PO2 TEMP ADJ BLDA: 86 MMHG (ref 64–87)
POTASSIUM SERPL-SCNC: 3.6 MMOL/L (ref 3.6–5.5)
POTASSIUM SERPL-SCNC: 3.6 MMOL/L (ref 3.6–5.5)
POTASSIUM SERPL-SCNC: 3.7 MMOL/L (ref 3.6–5.5)
POTASSIUM SERPL-SCNC: 3.7 MMOL/L (ref 3.6–5.5)
PREALB SERPL-MCNC: 9.7 MG/DL (ref 18–38)
PRODUCT TYPE UPROD: NORMAL
PROTHROMBIN TIME: 16.4 SEC (ref 12–14.6)
RBC # BLD AUTO: 2.12 M/UL (ref 4.2–5.4)
RH BLD: NORMAL
SAO2 % BLDA: 98 % (ref 93–99)
SODIUM SERPL-SCNC: 140 MMOL/L (ref 135–145)
SODIUM SERPL-SCNC: 141 MMOL/L (ref 135–145)
SODIUM SERPL-SCNC: 141 MMOL/L (ref 135–145)
SODIUM SERPL-SCNC: 142 MMOL/L (ref 135–145)
SPECIMEN DRAWN FROM PATIENT: ABNORMAL
TIDAL VOLUME IVT: 390 ML
TRIGL SERPL-MCNC: 213 MG/DL (ref 0–149)
TROPONIN T SERPL-MCNC: 57 NG/L (ref 6–19)
UNIT STATUS USTAT: NORMAL
WBC # BLD AUTO: 3.4 K/UL (ref 4.8–10.8)

## 2021-04-15 PROCEDURE — 700102 HCHG RX REV CODE 250 W/ 637 OVERRIDE(OP): Performed by: STUDENT IN AN ORGANIZED HEALTH CARE EDUCATION/TRAINING PROGRAM

## 2021-04-15 PROCEDURE — 700101 HCHG RX REV CODE 250: Performed by: NURSE PRACTITIONER

## 2021-04-15 PROCEDURE — 94003 VENT MGMT INPAT SUBQ DAY: CPT

## 2021-04-15 PROCEDURE — C9113 INJ PANTOPRAZOLE SODIUM, VIA: HCPCS | Performed by: INTERNAL MEDICINE

## 2021-04-15 PROCEDURE — 84484 ASSAY OF TROPONIN QUANT: CPT

## 2021-04-15 PROCEDURE — 700102 HCHG RX REV CODE 250 W/ 637 OVERRIDE(OP): Performed by: INTERNAL MEDICINE

## 2021-04-15 PROCEDURE — 700105 HCHG RX REV CODE 258: Performed by: NURSE PRACTITIONER

## 2021-04-15 PROCEDURE — 80048 BASIC METABOLIC PNL TOTAL CA: CPT | Mod: 91

## 2021-04-15 PROCEDURE — 85018 HEMOGLOBIN: CPT | Mod: 91

## 2021-04-15 PROCEDURE — 86900 BLOOD TYPING SEROLOGIC ABO: CPT

## 2021-04-15 PROCEDURE — 84100 ASSAY OF PHOSPHORUS: CPT

## 2021-04-15 PROCEDURE — 700111 HCHG RX REV CODE 636 W/ 250 OVERRIDE (IP): Performed by: NURSE PRACTITIONER

## 2021-04-15 PROCEDURE — 95714 VEEG EA 12-26 HR UNMNTR: CPT | Performed by: STUDENT IN AN ORGANIZED HEALTH CARE EDUCATION/TRAINING PROGRAM

## 2021-04-15 PROCEDURE — 82803 BLOOD GASES ANY COMBINATION: CPT

## 2021-04-15 PROCEDURE — 86140 C-REACTIVE PROTEIN: CPT

## 2021-04-15 PROCEDURE — A9270 NON-COVERED ITEM OR SERVICE: HCPCS | Performed by: STUDENT IN AN ORGANIZED HEALTH CARE EDUCATION/TRAINING PROGRAM

## 2021-04-15 PROCEDURE — 30233N1 TRANSFUSION OF NONAUTOLOGOUS RED BLOOD CELLS INTO PERIPHERAL VEIN, PERCUTANEOUS APPROACH: ICD-10-PCS | Performed by: STUDENT IN AN ORGANIZED HEALTH CARE EDUCATION/TRAINING PROGRAM

## 2021-04-15 PROCEDURE — 99233 SBSQ HOSP IP/OBS HIGH 50: CPT | Mod: 25 | Performed by: INTERNAL MEDICINE

## 2021-04-15 PROCEDURE — A9270 NON-COVERED ITEM OR SERVICE: HCPCS | Performed by: INTERNAL MEDICINE

## 2021-04-15 PROCEDURE — 82330 ASSAY OF CALCIUM: CPT

## 2021-04-15 PROCEDURE — 95720 EEG PHY/QHP EA INCR W/VEEG: CPT | Performed by: STUDENT IN AN ORGANIZED HEALTH CARE EDUCATION/TRAINING PROGRAM

## 2021-04-15 PROCEDURE — 700102 HCHG RX REV CODE 250 W/ 637 OVERRIDE(OP): Performed by: NURSE PRACTITIONER

## 2021-04-15 PROCEDURE — 86923 COMPATIBILITY TEST ELECTRIC: CPT

## 2021-04-15 PROCEDURE — 85025 COMPLETE CBC W/AUTO DIFF WBC: CPT

## 2021-04-15 PROCEDURE — 700105 HCHG RX REV CODE 258: Performed by: INTERNAL MEDICINE

## 2021-04-15 PROCEDURE — 86850 RBC ANTIBODY SCREEN: CPT

## 2021-04-15 PROCEDURE — 94799 UNLISTED PULMONARY SVC/PX: CPT

## 2021-04-15 PROCEDURE — 93005 ELECTROCARDIOGRAM TRACING: CPT | Performed by: NURSE PRACTITIONER

## 2021-04-15 PROCEDURE — 99254 IP/OBS CNSLTJ NEW/EST MOD 60: CPT | Performed by: INTERNAL MEDICINE

## 2021-04-15 PROCEDURE — 93010 ELECTROCARDIOGRAM REPORT: CPT | Performed by: INTERNAL MEDICINE

## 2021-04-15 PROCEDURE — 83735 ASSAY OF MAGNESIUM: CPT

## 2021-04-15 PROCEDURE — 700111 HCHG RX REV CODE 636 W/ 250 OVERRIDE (IP): Performed by: EMERGENCY MEDICINE

## 2021-04-15 PROCEDURE — P9016 RBC LEUKOCYTES REDUCED: HCPCS

## 2021-04-15 PROCEDURE — 99291 CRITICAL CARE FIRST HOUR: CPT | Performed by: NURSE PRACTITIONER

## 2021-04-15 PROCEDURE — 700111 HCHG RX REV CODE 636 W/ 250 OVERRIDE (IP): Performed by: INTERNAL MEDICINE

## 2021-04-15 PROCEDURE — 84134 ASSAY OF PREALBUMIN: CPT

## 2021-04-15 PROCEDURE — 700101 HCHG RX REV CODE 250: Performed by: INTERNAL MEDICINE

## 2021-04-15 PROCEDURE — 82962 GLUCOSE BLOOD TEST: CPT | Mod: 91

## 2021-04-15 PROCEDURE — 770022 HCHG ROOM/CARE - ICU (200)

## 2021-04-15 PROCEDURE — 36430 TRANSFUSION BLD/BLD COMPNT: CPT

## 2021-04-15 PROCEDURE — 85610 PROTHROMBIN TIME: CPT

## 2021-04-15 PROCEDURE — 99232 SBSQ HOSP IP/OBS MODERATE 35: CPT | Mod: 25 | Performed by: PSYCHIATRY & NEUROLOGY

## 2021-04-15 PROCEDURE — A9270 NON-COVERED ITEM OR SERVICE: HCPCS | Performed by: NURSE PRACTITIONER

## 2021-04-15 PROCEDURE — 71045 X-RAY EXAM CHEST 1 VIEW: CPT

## 2021-04-15 PROCEDURE — 86901 BLOOD TYPING SEROLOGIC RH(D): CPT

## 2021-04-15 PROCEDURE — 84478 ASSAY OF TRIGLYCERIDES: CPT

## 2021-04-15 PROCEDURE — 4A00X4Z MEASUREMENT OF CENTRAL NERVOUS ELECTRICAL ACTIVITY, EXTERNAL APPROACH: ICD-10-PCS | Performed by: STUDENT IN AN ORGANIZED HEALTH CARE EDUCATION/TRAINING PROGRAM

## 2021-04-15 RX ORDER — POTASSIUM CHLORIDE 14.9 MG/ML
20 INJECTION INTRAVENOUS ONCE
Status: COMPLETED | OUTPATIENT
Start: 2021-04-16 | End: 2021-04-16

## 2021-04-15 RX ORDER — POTASSIUM CHLORIDE 14.9 MG/ML
20 INJECTION INTRAVENOUS ONCE
Status: DISCONTINUED | OUTPATIENT
Start: 2021-04-15 | End: 2021-04-15

## 2021-04-15 RX ORDER — MAGNESIUM SULFATE HEPTAHYDRATE 40 MG/ML
4 INJECTION, SOLUTION INTRAVENOUS ONCE
Status: COMPLETED | OUTPATIENT
Start: 2021-04-15 | End: 2021-04-15

## 2021-04-15 RX ORDER — POTASSIUM CHLORIDE 14.9 MG/ML
20 INJECTION INTRAVENOUS ONCE
Status: COMPLETED | OUTPATIENT
Start: 2021-04-15 | End: 2021-04-15

## 2021-04-15 RX ORDER — LEVETIRACETAM 10 MG/ML
1000 INJECTION INTRAVASCULAR EVERY 12 HOURS
Status: DISCONTINUED | OUTPATIENT
Start: 2021-04-15 | End: 2021-04-19

## 2021-04-15 RX ORDER — PANTOPRAZOLE SODIUM 40 MG/10ML
40 INJECTION, POWDER, LYOPHILIZED, FOR SOLUTION INTRAVENOUS 2 TIMES DAILY
Status: DISCONTINUED | OUTPATIENT
Start: 2021-04-15 | End: 2021-04-16

## 2021-04-15 RX ORDER — LORAZEPAM 2 MG/ML
2 INJECTION INTRAMUSCULAR
Status: DISCONTINUED | OUTPATIENT
Start: 2021-04-15 | End: 2021-04-15

## 2021-04-15 RX ADMIN — LEVETIRACETAM INJECTION 1000 MG: 10 INJECTION INTRAVENOUS at 17:05

## 2021-04-15 RX ADMIN — CALCIUM GLUCONATE 1 G: 98 INJECTION, SOLUTION INTRAVENOUS at 17:09

## 2021-04-15 RX ADMIN — PROPOFOL 30 MCG/KG/MIN: 10 INJECTION, EMULSION INTRAVENOUS at 11:50

## 2021-04-15 RX ADMIN — LORAZEPAM 2 MG: 2 INJECTION INTRAMUSCULAR; INTRAVENOUS at 02:12

## 2021-04-15 RX ADMIN — DOXYCYCLINE 100 MG: 100 INJECTION, POWDER, LYOPHILIZED, FOR SOLUTION INTRAVENOUS at 05:46

## 2021-04-15 RX ADMIN — NOREPINEPHRINE BITARTRATE 2 MCG/MIN: 1 INJECTION, SOLUTION, CONCENTRATE INTRAVENOUS at 17:36

## 2021-04-15 RX ADMIN — LEVETIRACETAM INJECTION 1000 MG: 10 INJECTION INTRAVENOUS at 07:29

## 2021-04-15 RX ADMIN — THIAMINE HYDROCHLORIDE 250 MG: 100 INJECTION, SOLUTION INTRAMUSCULAR; INTRAVENOUS at 07:03

## 2021-04-15 RX ADMIN — LORAZEPAM 2 MG: 2 INJECTION INTRAMUSCULAR; INTRAVENOUS at 09:13

## 2021-04-15 RX ADMIN — PANTOPRAZOLE SODIUM 40 MG: 40 INJECTION, POWDER, LYOPHILIZED, FOR SOLUTION INTRAVENOUS at 17:07

## 2021-04-15 RX ADMIN — MAGNESIUM GLUCONATE 500 MG ORAL TABLET 400 MG: 500 TABLET ORAL at 05:10

## 2021-04-15 RX ADMIN — DIBASIC SODIUM PHOSPHATE, MONOBASIC POTASSIUM PHOSPHATE AND MONOBASIC SODIUM PHOSPHATE 250 MG: 852; 155; 130 TABLET ORAL at 00:42

## 2021-04-15 RX ADMIN — MAGNESIUM SULFATE IN WATER 4 G: 40 INJECTION, SOLUTION INTRAVENOUS at 07:55

## 2021-04-15 RX ADMIN — PROPOFOL 30 MCG/KG/MIN: 10 INJECTION, EMULSION INTRAVENOUS at 07:29

## 2021-04-15 RX ADMIN — NICOTINE 14 MG: 14 PATCH TRANSDERMAL at 05:05

## 2021-04-15 RX ADMIN — PANTOPRAZOLE SODIUM 40 MG: 40 INJECTION, POWDER, LYOPHILIZED, FOR SOLUTION INTRAVENOUS at 07:54

## 2021-04-15 RX ADMIN — PROPOFOL 30 MCG/KG/MIN: 10 INJECTION, EMULSION INTRAVENOUS at 04:11

## 2021-04-15 RX ADMIN — THERA TABS 1 TABLET: TAB at 05:10

## 2021-04-15 RX ADMIN — LORAZEPAM 2 MG: 2 INJECTION INTRAMUSCULAR; INTRAVENOUS at 08:23

## 2021-04-15 RX ADMIN — POTASSIUM CHLORIDE 10 MEQ: 7.46 INJECTION, SOLUTION INTRAVENOUS at 00:38

## 2021-04-15 RX ADMIN — DIBASIC SODIUM PHOSPHATE, MONOBASIC POTASSIUM PHOSPHATE AND MONOBASIC SODIUM PHOSPHATE 250 MG: 852; 155; 130 TABLET ORAL at 05:09

## 2021-04-15 RX ADMIN — FAMOTIDINE 20 MG: 20 TABLET ORAL at 05:10

## 2021-04-15 RX ADMIN — CEFTRIAXONE SODIUM 2 G: 2 INJECTION, POWDER, FOR SOLUTION INTRAMUSCULAR; INTRAVENOUS at 05:07

## 2021-04-15 RX ADMIN — PROPOFOL 25 MCG/KG/MIN: 10 INJECTION, EMULSION INTRAVENOUS at 13:26

## 2021-04-15 RX ADMIN — ACETAMINOPHEN 650 MG: 325 TABLET, FILM COATED ORAL at 20:37

## 2021-04-15 RX ADMIN — PHYTONADIONE 10 MG: 5 TABLET ORAL at 05:17

## 2021-04-15 RX ADMIN — MAGNESIUM GLUCONATE 500 MG ORAL TABLET 400 MG: 500 TABLET ORAL at 17:09

## 2021-04-15 RX ADMIN — POTASSIUM PHOSPHATE, MONOBASIC AND POTASSIUM PHOSPHATE, DIBASIC 30 MMOL: 224; 236 INJECTION, SOLUTION, CONCENTRATE INTRAVENOUS at 07:55

## 2021-04-15 RX ADMIN — LORAZEPAM 2 MG: 2 INJECTION INTRAMUSCULAR; INTRAVENOUS at 06:00

## 2021-04-15 RX ADMIN — FOLIC ACID 1 MG: 1 TABLET ORAL at 05:10

## 2021-04-15 RX ADMIN — POTASSIUM CHLORIDE 20 MEQ: 14.9 INJECTION, SOLUTION INTRAVENOUS at 17:32

## 2021-04-15 RX ADMIN — POTASSIUM CHLORIDE 20 MEQ: 14.9 INJECTION, SOLUTION INTRAVENOUS at 04:51

## 2021-04-15 RX ADMIN — CALCIUM GLUCONATE 1 G: 98 INJECTION, SOLUTION INTRAVENOUS at 07:55

## 2021-04-15 ASSESSMENT — FIBROSIS 4 INDEX: FIB4 SCORE: 14.14

## 2021-04-15 ASSESSMENT — PAIN DESCRIPTION - PAIN TYPE
TYPE: ACUTE PAIN

## 2021-04-15 NOTE — PROGRESS NOTES
Patient noted to have dark loose stool. Dr. Still notified. Orders received for occult blood stool and H&H.

## 2021-04-15 NOTE — CONSULTS
Gastroenterology Consultation    Date of Service  4/15/2021    Referring Physician  Tania Camarillo M.D.    Consulting Physician  Esvin Acuna M.D.    Reason for Consultation  Melena versus hematochezia    History of Presenting Illness  60 y.o. female with EtOH dependence who presented 4/13/2021 with altered mental status and has had multiple issues during hospital stay whom we were asked to see for melena versus hematochezia and anemia.    All history obtained from review of chart and discussion with nurse and ICU physician and daughter.    Her daughter notes that she is an alcoholic.  She was apparently having some abdominal discomfort and diarrhea and actually went 9 days without eating prior to admission.  Ultimately, her daughter found her down with altered mental status covered in feces and brought her to hospital.      Since being in hospital she was found to have small bilateral subdural hematoma, small SAH managed nonoperatively, possible seizure from EtOH withdraw, possible atrial mass.  She also had asystolic arrest yesterday requiring CPR.  She has acute alcoholic hepatitis.    Then, last night, she developed onset of melena per nursing.  Had 5 episodes last night but slowing today with only one small episode.  Hgb dropped to 7 on last check.  BUN normal.  Currently undergoing continuous 24 hour EEG.      Review of Systems  Review of Systems   Unable to perform ROS: Intubated       Past Medical History   has a past medical history of ASTHMA, Cancer (HCC), Heroin addiction (HCC), Pneumonia, and Psychiatric disorder.    Surgical History   has a past surgical history that includes gyn surgery; incision and drainage orthopedic (Right, 1/25/2019); and cholecystectomy.    Family History  family history includes Alcohol abuse in her father; No Known Problems in her mother.    Social History   reports that she has been smoking. She has been smoking about 0.50 packs per day. She has never used smokeless  tobacco. She reports current alcohol use. She reports that she does not use drugs.    Medications    Current Facility-Administered Medications:   •  levETIRAcetam (Keppra) IV  •  pantoprazole  •  propofol **AND** [START ON 4/17/2021] Triglycerides Starting now and then Every 3 Days  •  Pharmacy  •  [COMPLETED] detox 1000 mL infusion **AND** [DISCONTINUED] thiamine **AND** multivitamin **AND** folic acid  •  [COMPLETED] magnesium sulfate **AND** magnesium oxide  •  acetaminophen  •  norepinephrine (Levophed) infusion  •  MD Alert...Adult ICU Electrolyte Replacement per Pharmacy  •  thiamine  •  cefTRIAXone (ROCEPHIN) IV  •  K+ Scale: Goal of 4.5  •  Respiratory Therapy Consult  •  senna-docusate **AND** polyethylene glycol/lytes **AND** magnesium hydroxide **AND** bisacodyl  •  lidocaine  •  phytonadione  •  fentaNYL **OR** fentaNYL **OR** fentaNYL  •  fentaNYL  •  [DISCONTINUED] insulin regular **AND** POC blood glucose manual result **AND** NOTIFY MD and PharmD **AND** [DISCONTINUED] glucose **AND** dextrose 50%  •  nicotine **AND** Nicotine Replacement Patient Education Materials **AND** [DISCONTINUED] nicotine polacrilex    No medications prior to admission.         Allergies  No Known Allergies    Physical Exam  Temp:  [36.1 °C (97 °F)-37.2 °C (99 °F)] 36.4 °C (97.5 °F)  Pulse:  [] 96  Resp:  [9-84] 17  BP: ()/(52-84) 93/61  SpO2:  [90 %-100 %] 99 %    Physical Exam  Vitals and nursing note reviewed.   Constitutional:       Interventions: She is sedated and intubated.   Eyes:      General: Scleral icterus present.   Cardiovascular:      Rate and Rhythm: Normal rate and regular rhythm.      Pulses: Normal pulses.      Heart sounds: Murmur present.   Pulmonary:      Effort: Pulmonary effort is normal. No respiratory distress. She is intubated.      Breath sounds: Normal breath sounds. No wheezing or rales.   Abdominal:      General: Abdomen is flat. Bowel sounds are normal. There is no distension.       Palpations: Abdomen is soft. There is no mass.      Tenderness: There is no abdominal tenderness. There is no guarding or rebound.      Hernia: No hernia is present.   Musculoskeletal:      Right lower leg: Edema present.      Left lower leg: Edema present.   Neurological:      Motor: Weakness: .wetread.      Deep Tendon Reflexes: Abnormal reflex: .current.         Fluids  Date 04/15/21 0700 - 04/16/21 0659   Shift 2278-8547 5733-8791 3157-5399 24 Hour Total   INTAKE   I.V. 161.8   161.8   NG/GT 0   0   IV Piggyback 318.1   318.1   Shift Total 479.9   479.9   OUTPUT   Urine 77   77   Shift Total 77   77   Weight (kg) 57.8 57.8 57.8 57.8       Laboratory  Recent Labs     04/14/21 0249 04/14/21 0249 04/14/21  0545 04/14/21  0545 04/14/21  1952 04/15/21  0245 04/15/21  0927   WBC 3.8*  --  3.7*  --   --  3.4*  --    RBC 2.20*  --  2.42*  --   --  2.12*  --    HEMOGLOBIN 8.1*   < > 8.9*   < > 7.9* 7.7* 7.0*   HEMATOCRIT 23.9*   < > 26.5*  --  23.6* 22.8*  --    .6*  --  109.5*  --   --  107.5*  --    MCH 36.8*  --  36.8*  --   --  36.3*  --    MCHC 33.9  --  33.6  --   --  33.8  --    RDW 66.4*  --  68.4*  --   --  65.1*  --    PLATELETCT 94*  --  109*  --   --  82*  --    MPV 11.1  --  11.2  --   --  11.5  --     < > = values in this interval not displayed.     Recent Labs     04/14/21  2120 04/15/21  0245 04/15/21  0927   SODIUM 145 141 140   POTASSIUM 3.8 3.6 3.6   CHLORIDE 106 103 104   CO2 26 25 25   GLUCOSE 88 106* 109*   BUN 13 12 12   CREATININE 0.20* 0.22* 0.25*   CALCIUM 7.0* 6.9* 6.8*     Recent Labs     04/13/21  1845 04/15/21  0245   APTT 35.3  --    INR 1.66* 1.28*          Recent Labs     04/15/21  0927   TRIGLYCERIDE 213*        Imaging  DX-CHEST-PORTABLE (1 VIEW)   Final Result         1.  Hazy left pulmonary opacities suggests subtle infiltrate, similar to prior study.      US-EXTREMITY VENOUS LOWER BILAT   Final Result      EC-ECHOCARDIOGRAM COMPLETE W/O CONT   Final Result      US-RUQ    Final Result      1.  Enlarged echogenic liver suggesting fatty infiltration.   2.  Prior cholecystectomy.   3.  No biliary dilation.   4.  Trace ascites, nonspecific.         DX-CHEST-PORTABLE (1 VIEW)   Final Result      Left internal jugular catheter placement with the tip projecting over the superior vena cava. No pneumothorax is identified.      CT-HEAD W/O   Final Result      1.  Stable bilateral subdural hematomas. No significant midline shift.      2.  Stable left frontal subarachnoid hemorrhage.         DX-CHEST-LIMITED (1 VIEW)   Final Result      Right internal jugular catheter placement with the tip projecting over the superior vena cava. No pneumothorax is identified.      DX-CHEST-PORTABLE (1 VIEW)   Final Result         1.  Hazy left pulmonary opacities suggests subtle infiltrate      CT-HEAD W/O   Final Result      Bilateral subdural hematomas measure up to 8.3 mm on the left and 5 mm on the right.      Left frontal subarachnoid blood is also seen.      No midline shift.      Soft tissue swelling of the right cheek.      Findings discussed with Dr Florence      DX-CHEST-PORTABLE (1 VIEW)   Final Result      No acute cardiopulmonary process is seen.            Assessment/Plan  61 y/o with EtOH dependence, alcoholic hepatitis, SDH, SAH, possible seizure from EtOH withdraw, recent asystole arrest that has developed melena versus hematochezia and anemia.  Bleeding has likely slowed and occurred post-arrest.  BUN normal arguing against upper GI bleed but still possible.  Question potential ischemic colitis following cardiac arrest.  Given numerous issues, higher risk for endoscopy and feel like bleeding is slowing at this time.  Recommend conservative management and serial monitoring and blood transfusion if needed.  If she develops more brisk bleeding or unstable vitals, then plan for EGD.    PROBLEMS:  1. Melena versus hematochezia  2. Acute blood loss anemia  3. Recent asystole  4. Possible seizures  5.  EtOH dependence  6. Alcoholic hepatitis  7. Subdural hematoma  8. Subarachnoid hemorrhage  9. Atrial mass  10. Intubated  11. Hypotension    PLAN:  1. Conservative management with IV PPI BID and serial H/H q8 hours  2. Transfuse as needed  3. If has increased melena or unstable vitals, then plan for more urgent EGD.  Discussed with ICU physician, Dr. Camarillo

## 2021-04-15 NOTE — CONSULTS
Reason for Consult:  Asked by Dr Tania Camarillo M.D. to see this patient with abnormal echo   patient's PCP: Mohsen Tamasaby, M.D.    CC:   Chief Complaint   Patient presents with   • ALOC   • Failure to Thrive       HPI: This is a 60-year-old with alcohol resume who presents with altered mental status and failure to thrive she found to have subarachnoid hemorrhage and had a PEA arrest, echocardiogram shows normal function but there is a concern for right atrial mass    Medications / Drug list prior to admission:  No current facility-administered medications on file prior to encounter.     No current outpatient medications on file prior to encounter.       Current list of administered Medications:    Current Facility-Administered Medications:   •  levETIRAcetam (Keppra) 1000 mg in 100 mL NaCl IV premix, 1,000 mg, Intravenous, Q12HRS, Argenis Stapleton, A.P.R.N., Stopped at 04/15/21 0744  •  pantoprazole (PROTONIX) injection 40 mg, 40 mg, Intravenous, BID, Tania Camarillo M.D., 40 mg at 04/15/21 0754  •  propofol (DIPRIVAN) injection, 0-50 mcg/kg/min, Intravenous, Continuous, Last Rate: 5.2 mL/hr at 04/15/21 1428, 15 mcg/kg/min at 04/15/21 1428 **AND** [START ON 4/17/2021] Triglycerides Starting now and then Every 3 Days, , , Every 3 Days (0300), Argenis Stapleton, A.P.R.N.  •  Pharmacy Consult: Enteral tube insertion - review meds/change route/product selection, 1 Each, Other, PHARMACY TO DOSE, Fatmata Funez M.D.  •  [COMPLETED] detox IV 1000 mL (D5LR + magnesium 1 g + thiamine 100 mg + folic acid 1 mg) infusion, , Intravenous, Once, Stopped at 04/14/21 1530 **AND** [DISCONTINUED] thiamine (Vitamin B-1) tablet 100 mg, 100 mg, Enteral Tube, DAILY **AND** multivitamin (THERAGRAN) tablet 1 tablet, 1 tablet, Enteral Tube, DAILY, 1 tablet at 04/15/21 0510 **AND** folic acid (FOLVITE) tablet 1 mg, 1 mg, Enteral Tube, DAILY, Ventura Still D.MICHELLE., 1 mg at 04/15/21 0510  •  [COMPLETED] magnesium sulfate IVPB premix 2 g, 2 g,  Intravenous, Once, Stopped at 04/14/21 0700 **AND** magnesium oxide (MAG-OX) tablet 400 mg, 400 mg, Enteral Tube, BID, Ventura Still D.O., 400 mg at 04/15/21 0510  •  acetaminophen (Tylenol) tablet 650 mg, 650 mg, Enteral Tube, Q6HRS PRN, MARISOL Ye.O.  •  norepinephrine (Levophed) infusion 8 mg/250 mL (premix), 0-30 mcg/min, Intravenous, Continuous, Taina Camarillo M.D., Last Rate: 5.6 mL/hr at 04/15/21 1500, 3 mcg/min at 04/15/21 1500  •  MD Alert...ICU Electrolyte Replacement per Pharmacy, , Other, PHARMACY TO DOSE, Argenis Stapleton, A.P.R.N.  •  thiamine (B-1) 250 mg in dextrose 5% 100 mL IVPB, 250 mg, Intravenous, DAILY, Argenis Stapleton A.P.R.N., Last Rate: 200 mL/hr at 04/15/21 0703, 250 mg at 04/15/21 0703  •  cefTRIAXone (ROCEPHIN) 2 g in  mL IVPB, 2 g, Intravenous, Q24HRS, Argenis Stapleton A.P.R.N., Stopped at 04/15/21 0537  •  K+ Scale: Goal of 4.5, 1 Each, Intravenous, Q6HRS, Tania Camarillo M.D., Stopped at 04/15/21 1200  •  Respiratory Therapy Consult, , Nebulization, Continuous RT, Argenis Stapleton, A.P.R.N.  •  senna-docusate (PERICOLACE or SENOKOT S) 8.6-50 MG per tablet 2 tablet, 2 tablet, Enteral Tube, BID, Stopped at 04/15/21 0600 **AND** polyethylene glycol/lytes (MIRALAX) PACKET 1 Packet, 1 Packet, Enteral Tube, QDAY PRN **AND** magnesium hydroxide (MILK OF MAGNESIA) suspension 30 mL, 30 mL, Enteral Tube, QDAY PRN **AND** bisacodyl (DULCOLAX) suppository 10 mg, 10 mg, Rectal, QDAY PRN, Argenis Stapleton A.P.R.N.  •  lidocaine (XYLOCAINE) 1 % injection 1-2 mL, 1-2 mL, Tracheal Tube, Q30 MIN PRN, Argenis Stapleton A.P.R.N.  •  phytonadione (MEPHYTON) tablet 10 mg, 10 mg, Enteral Tube, DAILY, Argenis Stapleton A.P.R.N., 10 mg at 04/15/21 0517  •  fentaNYL (SUBLIMAZE) injection 25 mcg, 25 mcg, Intravenous, Q HOUR PRN **OR** fentaNYL (SUBLIMAZE) injection 50 mcg, 50 mcg, Intravenous, Q HOUR PRN **OR** fentaNYL (SUBLIMAZE) injection 100 mcg, 100 mcg, Intravenous, Q HOUR PRN, Tania Camarillo,  M.D.  •  fentaNYL (SUBLIMAZE) 50 mcg/mL in 50mL, , Intravenous, Continuous, Tania Camarillo M.D., Stopped at 04/14/21 1815  •  [DISCONTINUED] insulin regular (HumuLIN R,NovoLIN R) injection, 1-6 Units, Subcutaneous, Q6HRS, Stopped at 04/15/21 0000 **AND** POC blood glucose manual result, , , Q6H **AND** NOTIFY MD and PharmD, , , Once **AND** [DISCONTINUED] glucose 4 g chewable tablet 16 g, 16 g, Oral, Q15 MIN PRN **AND** dextrose 50% (D50W) injection 50 mL, 50 mL, Intravenous, Q15 MIN PRN, Ventura Still D.O.  •  nicotine (NICODERM) 14 MG/24HR 14 mg, 14 mg, Transdermal, Daily-0600, 14 mg at 04/15/21 0505 **AND** Nicotine Replacement Patient Education Materials, , , Once **AND** [DISCONTINUED] nicotine polacrilex (NICORETTE) 2 MG piece 2 mg, 2 mg, Oral, Q HOUR PRN, Francesco Bernstein M.D.    Past Medical History:   Diagnosis Date   • ASTHMA    • Cancer (HCC)     breast   • Heroin addiction (HCC)    • Pneumonia    • Psychiatric disorder        Past Surgical History:   Procedure Laterality Date   • INCISION AND DRAINAGE ORTHOPEDIC Right 1/25/2019    Procedure: INCISION AND DRAINAGE ORTHOPEDIC - HAND;  Surgeon: Kendrick Campbell M.D.;  Location: SURGERY [facility];  Service: Orthopedics   • CHOLECYSTECTOMY     • GYN SURGERY      partial hysterectomy       Family History   Problem Relation Age of Onset   • No Known Problems Mother    • Alcohol abuse Father      Patient family history was personally reviewed, no pertinent family history to current presentation    Social History     Tobacco Use   • Smoking status: Current Every Day Smoker     Packs/day: 0.50   • Smokeless tobacco: Never Used   Substance Use Topics   • Alcohol use: Yes     Comment: 1 pint hard alcohol daily   • Drug use: No     Comment: history of heroin use       ALLERGIES:  No Known Allergies    Review of systems:  A complete review of symptoms was unable to obtain due to mental status    Physical exam:  Patient Vitals for the past 24 hrs:   BP Temp Temp  src Pulse Resp SpO2 Weight   04/15/21 1530 -- -- -- 98 18 98 % --   04/15/21 1515 -- 36.3 °C (97.3 °F) Temporal 94 (!) 25 98 % --   04/15/21 1500 -- 36.6 °C (97.8 °F) Temporal 90 (!) 28 98 % --   04/15/21 1451 -- -- -- 91 17 98 % --   04/15/21 1445 112/62 36.5 °C (97.7 °F) Temporal 92 (!) 23 99 % --   04/15/21 1430 103/57 36.2 °C (97.2 °F) Temporal 86 (!) 27 99 % --   04/15/21 1425 125/71 36.6 °C (97.8 °F) Temporal 89 (!) 26 98 % --   04/15/21 1420 108/59 36.2 °C (97.1 °F) Temporal 89 (!) 21 99 % --   04/15/21 1417 107/60 36.6 °C (97.9 °F) -- 86 18 99 % --   04/15/21 1415 106/60 36.6 °C (97.9 °F) Temporal 88 18 98 % --   04/15/21 1400 -- -- -- 91 18 97 % --   04/15/21 1345 -- -- -- 94 (!) 21 97 % --   04/15/21 1330 -- -- -- (!) 101 (!) 21 95 % --   04/15/21 1315 -- -- -- 94 19 96 % --   04/15/21 1300 -- -- -- 92 20 96 % --   04/15/21 1245 -- -- -- 91 18 94 % --   04/15/21 1230 -- -- -- 94 (!) 26 100 % --   04/15/21 1215 -- -- -- 86 (!) 10 100 % --   04/15/21 1200 (!) 99/66 -- -- 92 (!) 10 99 % --   04/15/21 1145 -- -- -- 92 (!) 11 100 % --   04/15/21 1130 -- -- -- 91 (!) 10 100 % --   04/15/21 1115 -- -- -- 90 13 100 % --   04/15/21 1100 -- -- -- 94 (!) 21 99 % --   04/15/21 1045 -- -- -- 89 18 98 % --   04/15/21 1030 -- -- -- 98 14 99 % --   04/15/21 1029 -- -- -- 96 17 99 % --   04/15/21 1015 -- -- -- 99 (!) 9 99 % --   04/15/21 1000 -- -- -- (!) 101 15 98 % --   04/15/21 0945 -- -- -- (!) 103 13 98 % --   04/15/21 0930 -- -- -- (!) 102 (!) 23 98 % --   04/15/21 0915 -- -- -- (!) 106 (!) 21 99 % --   04/15/21 0900 -- -- -- (!) 108 (!) 23 99 % --   04/15/21 0845 -- -- -- (!) 107 19 99 % --   04/15/21 0830 -- -- -- (!) 104 (!) 25 100 % --   04/15/21 0815 -- -- -- (!) 121 (!) 39 99 % --   04/15/21 0800 (!) 93/61 36.4 °C (97.5 °F) Temporal (!) 103 (!) 11 97 % --   04/15/21 0745 -- -- -- (!) 105 13 98 % --   04/15/21 0730 -- -- -- (!) 105 (!) 21 97 % --   04/15/21 0715 -- -- -- (!) 108 (!) 23 97 % --   04/15/21 0700  (!) 87/58 -- -- (!) 109 17 97 % --   04/15/21 0645 -- -- -- (!) 111 (!) 25 98 % --   04/15/21 0630 -- -- -- (!) 112 (!) 26 98 % --   04/15/21 0620 -- -- -- (!) 117 (!) 29 100 % --   04/15/21 0615 118/74 -- -- (!) 117 (!) 22 100 % --   04/15/21 0600 -- 37.2 °C (98.9 °F) Temporal (!) 122 (!) 38 99 % --   04/15/21 0445 -- -- -- (!) 113 (!) 24 96 % --   04/15/21 0430 -- -- -- (!) 109 (!) 23 99 % --   04/15/21 0415 -- -- -- (!) 113 (!) 30 100 % --   04/15/21 0400 101/63 37.2 °C (99 °F) Temporal (!) 107 (!) 22 99 % --   04/15/21 0345 -- -- -- (!) 109 (!) 24 99 % --   04/15/21 0300 (!) 98/62 -- -- (!) 114 (!) 24 97 % --   04/15/21 0245 -- -- -- (!) 113 (!) 24 98 % --   04/15/21 0230 -- -- -- (!) 113 (!) 24 97 % --   04/15/21 0215 -- -- -- (!) 110 (!) 22 97 % --   04/15/21 0202 -- -- -- (!) 111 (!) 21 97 % --   04/15/21 0200 (!) 99/65 36.1 °C (97 °F) Temporal (!) 114 (!) 23 97 % --   04/15/21 0145 -- -- -- (!) 113 (!) 25 97 % --   04/15/21 0130 -- -- -- (!) 111 (!) 22 97 % --   04/15/21 0115 -- -- -- (!) 112 (!) 21 97 % --   04/15/21 0100 (!) 98/66 -- -- (!) 109 (!) 22 94 % --   04/15/21 0045 -- -- -- (!) 112 (!) 22 98 % --   04/15/21 0030 -- -- -- (!) 111 (!) 22 98 % --   04/15/21 0015 -- -- -- (!) 111 (!) 23 98 % --   04/15/21 0000 (!) 98/67 36.1 °C (97 °F) Temporal (!) 114 (!) 21 98 % 57.8 kg (127 lb 6.8 oz)   04/14/21 2345 -- -- -- (!) 113 (!) 34 99 % --   04/14/21 2330 -- -- -- (!) 116 (!) 24 98 % --   04/14/21 2315 -- -- -- (!) 115 (!) 23 98 % --   04/14/21 2300 (!) 94/62 -- -- (!) 113 (!) 22 98 % --   04/14/21 2245 -- -- -- (!) 115 (!) 24 98 % --   04/14/21 2230 -- -- -- (!) 116 (!) 23 98 % --   04/14/21 2215 -- -- -- (!) 105 19 97 % --   04/14/21 2211 -- -- -- (!) 112 (!) 22 97 % --   04/14/21 2200 (!) 94/60 36.2 °C (97.2 °F) Temporal (!) 115 (!) 21 97 % --   04/14/21 2145 -- -- -- (!) 114 (!) 23 97 % --   04/14/21 2130 -- -- -- (!) 114 (!) 22 98 % --   04/14/21 2115 -- -- -- (!) 112 (!) 24 99 % --   04/14/21 2045  -- -- -- (!) 111 (!) 22 98 % --   04/14/21 2030 -- -- -- (!) 112 19 99 % --   04/14/21 2015 -- -- -- (!) 110 (!) 21 99 % --   04/14/21 2000 100/68 36.1 °C (97 °F) Temporal (!) 107 20 99 % --   04/14/21 1945 -- -- -- (!) 117 (!) 31 98 % --   04/14/21 1930 -- -- -- (!) 104 (!) 21 99 % --   04/14/21 1915 -- -- -- (!) 105 (!) 22 99 % --   04/14/21 1900 (!) 78/52 -- -- (!) 109 (!) 23 100 % --   04/14/21 1845 -- -- -- (!) 104 19 98 % --   04/14/21 1830 -- -- -- (!) 113 (!) 25 100 % --   04/14/21 1826 -- -- -- 91 (!) 23 100 % --   04/14/21 1815 -- -- -- (!) 107 (!) 22 100 % --   04/14/21 1800 (!) 86/56 -- -- (!) 105 (!) 21 100 % --   04/14/21 1745 -- -- -- (!) 101 (!) 21 100 % --   04/14/21 1730 -- -- -- (!) 104 (!) 22 100 % --   04/14/21 1715 -- -- -- 98 18 100 % --   04/14/21 1700 129/84 -- -- 98 (!) 63 100 % --   04/14/21 1645 -- -- -- 99 20 100 % --   04/14/21 1630 -- -- -- 94 20 100 % --   04/14/21 1615 -- -- -- (!) 114 (!) 30 92 % --     General: Sedated on vent -4  EYES: no jaundice  HEENT: ET tube  Neck:  No JVD.   CVS: Regular tachycardic  Resp: Vent sounds  Abdomen: Soft, ND,  Skin: Grossly nothing acute no obvious rashes  Neurological: She has abnormal posturing of the feet  Extremities: Minimal edema. No cyanosis.       Data:  Laboratory studies personally reviewed by me:  Recent Results (from the past 24 hour(s))   POCT glucose device results    Collection Time: 04/14/21  5:22 PM   Result Value Ref Range    Glucose - Accu-Ck 205 (H) 65 - 99 mg/dL   OCCULT BLOOD STOOL    Collection Time: 04/14/21  7:48 PM   Result Value Ref Range    Occult Blood Feces Positive (A) Negative   HEMOGLOBIN AND HEMATOCRIT    Collection Time: 04/14/21  7:52 PM   Result Value Ref Range    Hemoglobin 7.9 (L) 12.0 - 16.0 g/dL    Hematocrit 23.6 (L) 37.0 - 47.0 %   MAGNESIUM    Collection Time: 04/14/21  9:20 PM   Result Value Ref Range    Magnesium 1.7 1.5 - 2.5 mg/dL   PHOSPHORUS    Collection Time: 04/14/21  9:20 PM   Result Value Ref  Range    Phosphorus 2.0 (L) 2.5 - 4.5 mg/dL   Basic Metabolic Panel    Collection Time: 04/14/21  9:20 PM   Result Value Ref Range    Sodium 145 135 - 145 mmol/L    Potassium 3.8 3.6 - 5.5 mmol/L    Chloride 106 96 - 112 mmol/L    Co2 26 20 - 33 mmol/L    Glucose 88 65 - 99 mg/dL    Bun 13 8 - 22 mg/dL    Creatinine 0.20 (L) 0.50 - 1.40 mg/dL    Calcium 7.0 (L) 8.5 - 10.5 mg/dL    Anion Gap 13.0 7.0 - 16.0   IONIZED CALCIUM    Collection Time: 04/14/21  9:20 PM   Result Value Ref Range    Ionized Calcium 0.9 (L) 1.1 - 1.3 mmol/L   ESTIMATED GFR    Collection Time: 04/14/21  9:20 PM   Result Value Ref Range    GFR If African American >60 >60 mL/min/1.73 m 2    GFR If Non African American >60 >60 mL/min/1.73 m 2   POCT glucose device results    Collection Time: 04/15/21 12:41 AM   Result Value Ref Range    Glucose - Accu-Ck 102 (H) 65 - 99 mg/dL   PHOSPHORUS    Collection Time: 04/15/21  2:45 AM   Result Value Ref Range    Phosphorus 1.4 (L) 2.5 - 4.5 mg/dL   CRP Quantitive (Non-Cardiac)    Collection Time: 04/15/21  2:45 AM   Result Value Ref Range    Stat C-Reactive Protein 3.86 (H) 0.00 - 0.75 mg/dL   Prealbumin    Collection Time: 04/15/21  2:45 AM   Result Value Ref Range    Pre-Albumin 9.7 (L) 18.0 - 38.0 mg/dL   CBC with Differential    Collection Time: 04/15/21  2:45 AM   Result Value Ref Range    WBC 3.4 (L) 4.8 - 10.8 K/uL    RBC 2.12 (L) 4.20 - 5.40 M/uL    Hemoglobin 7.7 (L) 12.0 - 16.0 g/dL    Hematocrit 22.8 (L) 37.0 - 47.0 %    .5 (H) 81.4 - 97.8 fL    MCH 36.3 (H) 27.0 - 33.0 pg    MCHC 33.8 33.6 - 35.0 g/dL    RDW 65.1 (H) 35.9 - 50.0 fL    Platelet Count 82 (L) 164 - 446 K/uL    MPV 11.5 9.0 - 12.9 fL    Neutrophils-Polys 71.40 44.00 - 72.00 %    Lymphocytes 23.00 22.00 - 41.00 %    Monocytes 3.50 0.00 - 13.40 %    Eosinophils 0.60 0.00 - 6.90 %    Basophils 0.00 0.00 - 1.80 %    Immature Granulocytes 1.50 (H) 0.00 - 0.90 %    Nucleated RBC 1.80 /100 WBC    Neutrophils (Absolute) 2.42 2.00 -  7.15 K/uL    Lymphs (Absolute) 0.78 (L) 1.00 - 4.80 K/uL    Monos (Absolute) 0.12 0.00 - 0.85 K/uL    Eos (Absolute) 0.02 0.00 - 0.51 K/uL    Baso (Absolute) 0.00 0.00 - 0.12 K/uL    Immature Granulocytes (abs) 0.05 0.00 - 0.11 K/uL    NRBC (Absolute) 0.06 K/uL   Prothrombin Time    Collection Time: 04/15/21  2:45 AM   Result Value Ref Range    PT 16.4 (H) 12.0 - 14.6 sec    INR 1.28 (H) 0.87 - 1.13   Basic Metabolic Panel    Collection Time: 04/15/21  2:45 AM   Result Value Ref Range    Sodium 141 135 - 145 mmol/L    Potassium 3.6 3.6 - 5.5 mmol/L    Chloride 103 96 - 112 mmol/L    Co2 25 20 - 33 mmol/L    Glucose 106 (H) 65 - 99 mg/dL    Bun 12 8 - 22 mg/dL    Creatinine 0.22 (L) 0.50 - 1.40 mg/dL    Calcium 6.9 (LL) 8.5 - 10.5 mg/dL    Anion Gap 13.0 7.0 - 16.0   MAGNESIUM    Collection Time: 04/15/21  2:45 AM   Result Value Ref Range    Magnesium 1.6 1.5 - 2.5 mg/dL   ESTIMATED GFR    Collection Time: 04/15/21  2:45 AM   Result Value Ref Range    GFR If African American >60 >60 mL/min/1.73 m 2    GFR If Non African American >60 >60 mL/min/1.73 m 2   TROPONIN    Collection Time: 04/15/21  2:45 AM   Result Value Ref Range    Troponin T 57 (H) 6 - 19 ng/L   COD - Adult (Type and Screen)    Collection Time: 04/15/21  2:45 AM   Result Value Ref Range    ABO Grouping Only O     Rh Grouping Only POS     Antibody Screen-Cod NEG    COMPONENT CELLULAR    Collection Time: 04/15/21  2:45 AM   Result Value Ref Range    Component R       R99                 Red Cells, LR       [id number]   issued       04/15/21   14:05      Product Type R99     Dispense Status issued     Unit Number (Barcoded) [id number]     Product Code (Barcoded) D3292J70     Blood Type (Barcoded) 5100    POCT arterial blood gas device results    Collection Time: 04/15/21  2:47 AM   Result Value Ref Range    Ph 7.490 7.400 - 7.500    Pco2 36.2 26.0 - 37.0 mmHg    Po2 91 (H) 64 - 87 mmHg    Tco2 29 20 - 33 mmol/L    S02 98 93 - 99 %    Hco3 27.6 (H)  17.0 - 25.0 mmol/L    BE 4 (H) -4 - 3 mmol/L    Body Temp 97.0 F degrees    O2 Therapy 40 %    iPF Ratio 228     Ph Temp Ana 7.504 (H) 7.400 - 7.500    Pco2 Temp Co 34.9 26.0 - 37.0 mmHg    Po2 Temp Cor 86 64 - 87 mmHg    Specimen Arterial     DelSys Vent     End Tidal Carbon Dioxide 29 mmhg    Tidal Volume 390 mL    Peep End Expiratory Pressure 8 cmh20    Set Rate 20     Mode APV-CMV    EKG    Collection Time: 04/15/21  3:48 AM   Result Value Ref Range    Report       [facility]    Test Date:  2021-04-15  Pt Name:    JOSS WHALEN               Department: [facility]  MRN:        [id number]                      Room:       12  Gender:     Female                       Technician: PATSY  :        [id number]                   Requested By:PRIYANK PIRES  Order #:    [id number]                    Reading MD: Leti Malave MD    Measurements  Intervals                                Axis  Rate:       110                          P:          71  WI:         115                          QRS:        67  QRSD:       92                           T:          65  QT:         378  QTc:        512    Interpretive Statements  SINUS TACHYCARDIA  ABNORMAL R-WAVE PROGRESSION, EARLY TRANSITION  ABNRM T, CONSIDER ISCHEMIA, ANTEROLATERAL LDS  PROLONGED QT INTERVAL  BASELINE WANDER IN LEAD(S) V6  Compared to ECG 2021 10:31:51  Possible ischemia now present  T-wave abnormality no longer present  Electronically Signed On 4- 6:16:05 PDT by Leti lucas MD     IONIZED CALCIUM    Collection Time: 04/15/21  4:30 AM   Result Value Ref Range    Ionized Calcium 0.9 (L) 1.1 - 1.3 mmol/L   POCT glucose device results    Collection Time: 04/15/21  5:19 AM   Result Value Ref Range    Glucose - Accu-Ck 111 (H) 65 - 99 mg/dL   Basic Metabolic Panel    Collection Time: 04/15/21  9:27 AM   Result Value Ref Range    Sodium 140 135 - 145 mmol/L    Potassium 3.6 3.6 - 5.5 mmol/L    Chloride 104 96 - 112 mmol/L    Co2 25 20 - 33  mmol/L    Glucose 109 (H) 65 - 99 mg/dL    Bun 12 8 - 22 mg/dL    Creatinine 0.25 (L) 0.50 - 1.40 mg/dL    Calcium 6.8 (LL) 8.5 - 10.5 mg/dL    Anion Gap 11.0 7.0 - 16.0   HGB    Collection Time: 04/15/21  9:27 AM   Result Value Ref Range    Hemoglobin 7.0 (L) 12.0 - 16.0 g/dL   MAGNESIUM    Collection Time: 04/15/21  9:27 AM   Result Value Ref Range    Magnesium 1.6 1.5 - 2.5 mg/dL   PHOSPHORUS    Collection Time: 04/15/21  9:27 AM   Result Value Ref Range    Phosphorus 2.4 (L) 2.5 - 4.5 mg/dL   Triglyceride    Collection Time: 04/15/21  9:27 AM   Result Value Ref Range    Triglycerides 213 (H) 0 - 149 mg/dL   ESTIMATED GFR    Collection Time: 04/15/21  9:27 AM   Result Value Ref Range    GFR If African American >60 >60 mL/min/1.73 m 2    GFR If Non African American >60 >60 mL/min/1.73 m 2       Imaging:  DX-CHEST-PORTABLE (1 VIEW)   Final Result         1.  Hazy left pulmonary opacities suggests subtle infiltrate, similar to prior study.      US-EXTREMITY VENOUS LOWER BILAT   Final Result      EC-ECHOCARDIOGRAM COMPLETE W/O CONT   Final Result      US-RUQ   Final Result      1.  Enlarged echogenic liver suggesting fatty infiltration.   2.  Prior cholecystectomy.   3.  No biliary dilation.   4.  Trace ascites, nonspecific.         DX-CHEST-PORTABLE (1 VIEW)   Final Result      Left internal jugular catheter placement with the tip projecting over the superior vena cava. No pneumothorax is identified.      CT-HEAD W/O   Final Result      1.  Stable bilateral subdural hematomas. No significant midline shift.      2.  Stable left frontal subarachnoid hemorrhage.         DX-CHEST-LIMITED (1 VIEW)   Final Result      Right internal jugular catheter placement with the tip projecting over the superior vena cava. No pneumothorax is identified.      DX-CHEST-PORTABLE (1 VIEW)   Final Result         1.  Hazy left pulmonary opacities suggests subtle infiltrate      CT-HEAD W/O   Final Result      Bilateral subdural hematomas  measure up to 8.3 mm on the left and 5 mm on the right.      Left frontal subarachnoid blood is also seen.      No midline shift.      Soft tissue swelling of the right cheek.      Findings discussed with Dr Naman MORGAN-CHEST-PORTABLE (1 VIEW)   Final Result      No acute cardiopulmonary process is seen.              EKG tracings personally reviewed by me sinus tachycardia with prolonged QT interval abnormal T waves these are new compared to the EKG from March 2020    Echocardiogram images personally reviewed by me show preserved ejection fraction in the right atrium there is a mass it seems to move with the surrounding heart structures and not independently most likely is a prominent eustachian valve but cannot exclude a malignancy it does not have characteristics of a thrombus    All pertinent features of laboratory and imaging reviewed including primary images where applicable      Principal Problem:    SDH (subdural hematoma) (HCC) POA: Yes  Active Problems:    Protein-calorie malnutrition, severe (HCC) (Chronic) POA: Yes    Alcohol withdrawal syndrome with complication (HCC) POA: Yes    Electrolyte disturbance POA: Yes    Lactic acidosis POA: Yes    Marijuana user POA: Yes    Transaminitis POA: Yes    Hyperbilirubinemia POA: Yes    Right atrial mass POA: Yes    Elevated INR POA: Yes    Advanced care planning/counseling discussion POA: No    QT prolongation POA: Yes    Cardiac arrest (HCC) POA: No    Acute respiratory failure (HCC) POA: No    Shock (HCC) POA: Unknown    GI bleed POA: No    Melena POA: No  Resolved Problems:    * No resolved hospital problems. *      Assessment / Plan:  Right atrial mass is likely a prominent eustachian valve but cannot exclude malignancy the most appropriate test is a cardiac MRI.  Clearly she is not in clinical state to get a cardiac MRI as you have to hold still for 40 minutes with breath holds so if she gets to this clinical state try to arrange a cardiac MRI for evaluation  of this otherwise could be done as an outpatient.    Avoid QT prolonging medications    Cardiology will sign off but certainly reconsult when she is able to do a cardiac MRI    I personally discussed her case with  Dr Tania Camarillo M.D.    It is my pleasure to participate in the care of Ms. Aguilar.  Please do not hesitate to contact me with questions or concerns.    Asa Sanchez MD PhD [facility]  Cardiologist [facility]    4/15/2021    Please note that this dictation was created using voice recognition software. There may be errors I did not discover before finalizing the note.

## 2021-04-15 NOTE — CARE PLAN
Problem: Safety  Goal: Will remain free from injury  4/15/2021 0233 by Yanni Molina R.N.  Outcome: PROGRESSING AS EXPECTED  4/15/2021 0137 by Yanni Molina R.N.  Outcome: PROGRESSING AS EXPECTED  Goal: Will remain free from falls  Outcome: PROGRESSING AS EXPECTED     Problem: Knowledge Deficit  Goal: Knowledge of disease process/condition, treatment plan, diagnostic tests, and medications will improve  Outcome: PROGRESSING AS EXPECTED     Problem: Skin Integrity  Goal: Risk for impaired skin integrity will decrease  Outcome: PROGRESSING AS EXPECTED     Problem: Safety - Medical Restraint  Goal: Remains free of injury from restraints (Restraint for Interference with Medical Device)  Description: INTERVENTIONS:  1. Determine that other, less restrictive measures have been tried or would not be effective before applying the restraint  2. Evaluate the patient's condition at the time of restraint application  3. Inform patient/family regarding the reason for restraint  4. Q2H: Monitor safety, psychosocial status, comfort, nutrition and hydration  Outcome: PROGRESSING AS EXPECTED     Problem: Communication  Goal: The ability to communicate needs accurately and effectively will improve  4/15/2021 0233 by Yanni Molina R.N.  Outcome: PROGRESSING SLOWER THAN EXPECTED  4/15/2021 0137 by Yanni Molina R.N.  Outcome: PROGRESSING SLOWER THAN EXPECTED     Problem: Bowel/Gastric:  Goal: Normal bowel function is maintained or improved  Outcome: PROGRESSING SLOWER THAN EXPECTED     Problem: Discharge Barriers/Planning  Goal: Patient's continuum of care needs will be met  Outcome: PROGRESSING SLOWER THAN EXPECTED     Problem: Respiratory:  Goal: Respiratory status will improve  Outcome: PROGRESSING SLOWER THAN EXPECTED

## 2021-04-15 NOTE — PROCEDURES
Central Line Insertion    Date/Time: 4/14/2021 5:41 PM  Performed by: Tania Camarillo M.D.  Authorized by: Tania Camarillo M.D.     Consent:     Consent obtained:  Verbal    Consent given by:  Healthcare agent    Risks discussed:  Arterial puncture, infection, incorrect placement, pneumothorax, nerve damage and bleeding  Pre-procedure details:     Hand hygiene: Hand hygiene performed prior to insertion      Sterile barrier technique: All elements of maximal sterile technique followed      Skin preparation:  ChloraPrep    Skin preparation agent: Skin preparation agent completely dried prior to procedure    Sedation:     Sedation type:  None  Anesthesia:     Anesthesia method:  Local infiltration    Local anesthetic:  Lidocaine 1% w/o epi  Procedure details:     Location:  R internal jugular    Patient position:  Trendelenburg    Procedural supplies:  Triple lumen    Catheter size:  7 Fr    Landmarks identified: yes      Ultrasound guidance: yes      Sterile ultrasound techniques: Sterile gel and sterile probe covers were used      Number of attempts:  2 (initial attempt got flash but wire wouldn't pass.  Second attempt successful with more trendelenburg)    Successful placement: yes    Post-procedure details:     Post-procedure:  Dressing applied and line sutured    Assessment:  Blood return through all ports, no pneumothorax on x-ray, placement verified by x-ray and free fluid flow    Patient tolerance of procedure:  Tolerated well, no immediate complications  Comments:      Small hematoma  Central Line Insertion    Date/Time: 4/14/2021 5:42 PM  Performed by: Tania Camarillo M.D.  Authorized by: Tania Camarillo M.D.     Consent:     Consent obtained:  Verbal    Consent given by:  Healthcare agent    Risks discussed:  Arterial puncture, infection, incorrect placement, pneumothorax, nerve damage and bleeding  Pre-procedure details:     Hand hygiene: Hand hygiene performed prior to insertion      Sterile barrier  technique: All elements of maximal sterile technique followed      Skin preparation:  ChloraPrep    Skin preparation agent: Skin preparation agent completely dried prior to procedure    Sedation:     Sedation type:  None  Anesthesia:     Anesthesia method:  Local infiltration    Local anesthetic:  Lidocaine 1% w/o epi  Procedure details:     Location:  R internal jugular and L internal jugular    Patient position:  Trendelenburg    Procedural supplies:  Triple lumen    Catheter size:  7 Fr    Landmarks identified: yes      Ultrasound guidance: yes      Sterile ultrasound techniques: Sterile gel and sterile probe covers were used      Number of attempts:  1    Successful placement: yes    Post-procedure details:     Post-procedure:  Dressing applied and line sutured    Assessment:  Blood return through all ports, no pneumothorax on x-ray, placement verified by x-ray and free fluid flow    Patient tolerance of procedure:  Tolerated well, no immediate complications  Comments:      First line got pulled out during CT scan so I put another line on the left

## 2021-04-15 NOTE — PROGRESS NOTES
Critical Care Progress Note    Date of admission  4/13/2021    Chief Complaint  Altered level of consciousness    Hospital Course  Ms. Aguilar is a 60-year-old female with PMH significant for cholecystectomy, tobacco dependence, EtOH dependence (1 pint/day) and IVDU (heroin) who presented via EMS 4/13/2021 with acute loss of consciousness and failure to thrive. A couple of days prior to presentation, she reportedly fell in the bathroom.  Day of presentation she was found by her daughter, naked, altered and covered in feces.  Suspected she was down for at least 24 hours.  CT head showed acute/subacute bilateral SDH left greater than right with significant bifrontal cerebral atrophy. Neurosurgery consulted and recommend non-operative management.     Pertinent admission labs: Lactic acid 13, UDS positive benzos and cannabinoids, . Elevated liver enzymes, Hepatitis panel (-). RUQ US unremarkable showing fatty liver and trace ascites.    4/14 - seizure --> asystole, Code Blue ROSC after 2nd pulse check. Intubated. Continuous EEG. ECHO found mass right atrium. Unable to do cardiac MRI at this time.     4/15 - bloody stool overnight. Hgb 7.7. INR 1.28, receiving vitamin K. Protonix drip.    Interval Problem Update  Reviewed last 24 hour events:              - acute events overnight: dark bloody stools x 5, Hgb 7.7. Protonix drip --> BID IV. Patient had seizure-like activity this morning around 0823/0913. EEG reviewed by Neurology and reportedly not seizure activity. DC'd scheduled Ativan, using Propofol for RAAS goal -1 to +1 and component of ETOH withdrawal.               - Tm: 99              - HR: 's              - SBP: 's              - Neuro: rhythmic twitching right foot. Posturing at times. Continuous EEG. Does not follow commands. PEERL              - GI: NPO - continue to hold tube feedings today              - I/O: 3800/750 (+7800 since admit)              - Castro: yes              - Lines:  LIJ TLC, right fem art line, BETHEL single lumen PICC              - PPx: Protonix              - CXR (personally reviewed): no significant changes from yesterday. No infiltrates.               - Antibiotic Day: 2/5 - Ceftriaxone. DC Doxy today, do not suspect PNA.   - SAT/SBT: contraindicated due to continuous EEG monitoring and hemodynamic instability.   - Mobility: 1   - Goals of Care: discussed with daughter, Kerline, today. Continue full treatment, but change to DNR status. Discussed with Cardiology - do not think GERARD would be beneficial to evaluate right atrial mass. Consider Cardiac CT when more stable. Also will need brain MRI when EEG completed. Continue trending labs and replacing electrolytes.     Review of Systems  Review of Systems   Unable to perform ROS: Intubated        Vital Signs for last 24 hours   Temp:  [36.1 °C (97 °F)-37.2 °C (99 °F)] 36.6 °C (97.8 °F)  Pulse:  [] 94  Resp:  [9-63] 25  BP: ()/(52-84) 112/62  SpO2:  [90 %-100 %] 98 %    Hemodynamic parameters for last 24 hours       Respiratory Information for the last 24 hours  Vent Mode: APVCMV  Rate (breaths/min): 20  Vt Target (mL): 390  PEEP/CPAP: 8  MAP: 11  Control VTE (exp VT): 471    Physical Exam   Physical Exam  Vitals and nursing note reviewed.   Constitutional:       General: She is in acute distress.      Appearance: She is underweight. She is ill-appearing. She is not toxic-appearing.      Interventions: She is intubated.   HENT:      Mouth/Throat:      Lips: Pink.      Mouth: Mucous membranes are moist.   Eyes:      Comments: PEERL wax and wane between 5mm and 2mm     Cardiovascular:      Pulses: Decreased pulses.           Dorsalis pedis pulses are 1+ on the right side and 1+ on the left side.      Heart sounds: Heart sounds are distant.   Pulmonary:      Effort: Pulmonary effort is normal. She is intubated.      Breath sounds: No wheezing or rhonchi.   Abdominal:      General: Bowel sounds are decreased.      Palpations:  Abdomen is soft.      Comments: Incontinent maroon stools   Genitourinary:     Comments: Castro with concentrated yellow urine  Musculoskeletal:      Right lower leg: No edema.      Left lower leg: No edema.      Comments: LONNY  Does not follow commands   Skin:     General: Skin is cool.      Capillary Refill: Capillary refill takes 2 to 3 seconds.      Comments: Bruises in various stages of healing throughout   Neurological:      GCS: GCS eye subscore is 2. GCS verbal subscore is 1. GCS motor subscore is 4.      Cranial Nerves: Cranial nerve deficit present.      Motor: Atrophy, abnormal muscle tone and seizure activity present.      Comments: 7T   Psychiatric:      Comments: Intubated    Agitated at times         Medications  Current Facility-Administered Medications   Medication Dose Route Frequency Provider Last Rate Last Admin   • levETIRAcetam (Keppra) 1000 mg in 100 mL NaCl IV premix  1,000 mg Intravenous Q12HRS Argenis Stapleton A.P.R.N.   Stopped at 04/15/21 0744   • pantoprazole (PROTONIX) injection 40 mg  40 mg Intravenous BID Tania Camarillo M.D.   40 mg at 04/15/21 0754   • propofol (DIPRIVAN) injection  0-50 mcg/kg/min Intravenous Continuous Argenis Stapleton A.P.R.N. 5.2 mL/hr at 04/15/21 1428 15 mcg/kg/min at 04/15/21 1428   • Pharmacy Consult: Enteral tube insertion - review meds/change route/product selection  1 Each Other PHARMACY TO DOSE Fatmata Funez M.D.       • multivitamin (THERAGRAN) tablet 1 tablet  1 tablet Enteral Tube DAILY Ventura Still, D.O.   1 tablet at 04/15/21 0510    And   • folic acid (FOLVITE) tablet 1 mg  1 mg Enteral Tube DAILY Ventura Still, D.O.   1 mg at 04/15/21 0510   • magnesium oxide (MAG-OX) tablet 400 mg  400 mg Enteral Tube BID Ventura Still, D.O.   400 mg at 04/15/21 0510   • acetaminophen (Tylenol) tablet 650 mg  650 mg Enteral Tube Q6HRS PRN Ventura Still, D.O.       • norepinephrine (Levophed) infusion 8 mg/250 mL (premix)  0-30 mcg/min Intravenous Continuous  Tania Camarillo M.D. 9.4 mL/hr at 04/15/21 1427 5 mcg/min at 04/15/21 1427   • MD Alert...ICU Electrolyte Replacement per Pharmacy   Other PHARMACY TO DOSE Argenis Stapleton A.P.R.N.       • thiamine (B-1) 250 mg in dextrose 5% 100 mL IVPB  250 mg Intravenous DAILY Argenis Stapleton A.P.R.N. 200 mL/hr at 04/15/21 0703 250 mg at 04/15/21 0703   • cefTRIAXone (ROCEPHIN) 2 g in  mL IVPB  2 g Intravenous Q24HRS Argenis Stapleton, A.P.R.N.   Stopped at 04/15/21 0537   • K+ Scale: Goal of 4.5  1 Each Intravenous Q6HRS Tania Camarillo M.D.   Stopped at 04/15/21 1200   • Respiratory Therapy Consult   Nebulization Continuous RT Argenis Stapleton, A.P.R.N.       • senna-docusate (PERICOLACE or SENOKOT S) 8.6-50 MG per tablet 2 tablet  2 tablet Enteral Tube BID Argenis Stapleton, A.P.R.N.   Stopped at 04/15/21 0600    And   • polyethylene glycol/lytes (MIRALAX) PACKET 1 Packet  1 Packet Enteral Tube QDAY PRN Argenis Stapleton, A.P.R.N.        And   • magnesium hydroxide (MILK OF MAGNESIA) suspension 30 mL  30 mL Enteral Tube QDAY PRN Argenis Stapleton, A.P.R.N.        And   • bisacodyl (DULCOLAX) suppository 10 mg  10 mg Rectal QDAY PRN Argenis Stapleton, A.P.R.N.       • lidocaine (XYLOCAINE) 1 % injection 1-2 mL  1-2 mL Tracheal Tube Q30 MIN PRN Argenis Stapleton, A.P.R.N.       • phytonadione (MEPHYTON) tablet 10 mg  10 mg Enteral Tube DAILY Argenis Stapleton, A.P.R.N.   10 mg at 04/15/21 0517   • fentaNYL (SUBLIMAZE) injection 25 mcg  25 mcg Intravenous Q HOUR PRN Tania Camarillo M.D.        Or   • fentaNYL (SUBLIMAZE) injection 50 mcg  50 mcg Intravenous Q HOUR PRN Tania Camarillo M.D.        Or   • fentaNYL (SUBLIMAZE) injection 100 mcg  100 mcg Intravenous Q HOUR PRN Tania Camarillo M.D.       • fentaNYL (SUBLIMAZE) 50 mcg/mL in 50mL   Intravenous Continuous Tania Camarillo M.D.   Stopped at 04/14/21 1815   • dextrose 50% (D50W) injection 50 mL  50 mL Intravenous Q15 MIN PRN Ventura Still D.O.       • nicotine (NICODERM) 14 MG/24HR 14 mg   14 mg Transdermal Daily-0600 Francesco Bernstein M.D.   14 mg at 04/15/21 0505       Fluids    Intake/Output Summary (Last 24 hours) at 4/15/2021 1521  Last data filed at 4/15/2021 1400  Gross per 24 hour   Intake 2609.97 ml   Output 732 ml   Net 1877.97 ml       Laboratory  Recent Labs     04/14/21  0546 04/14/21  1303 04/15/21  0247   ISTATAPH 7.381* 7.444 7.490   ISTATAPCO2 26.7 33.5 36.2   ISTATAPO2 406* 99* 91*   ISTATATCO2 17* 24 29   [id number] 100* 98 98   ISTATARTHCO3 15.8* 23.0 27.6*   ISTATARTBE -8* -1 4*   ISTATTEMP 97.0 F 98.8 F 97.0 F   ISTATFIO2 100 35 40   ISTATSPEC Arterial Arterial Arterial   ISTATAPHTC 7.394* 7.442 7.504*   [id number] 400* 100* 86     Recent Labs     04/13/21  1845 04/14/21  0249   CPKTOTAL 96 88     Recent Labs     04/14/21  2120 04/15/21  0245 04/15/21  0927   SODIUM 145 141 140   POTASSIUM 3.8 3.6 3.6   CHLORIDE 106 103 104   CO2 26 25 25   BUN 13 12 12   CREATININE 0.20* 0.22* 0.25*   MAGNESIUM 1.7 1.6 1.6   PHOSPHORUS 2.0* 1.4* 2.4*   CALCIUM 7.0* 6.9* 6.8*     Recent Labs     04/13/21  1845 04/13/21  1845 04/14/21  0249 04/14/21  0249 04/14/21  0545 04/14/21  0545 04/14/21  1157 04/14/21  1555 04/14/21  2120 04/15/21  0245 04/15/21  0927   ALTSGPT 87*  --  62*  --  83*  --   --   --   --   --   --    ASTSGOT 220*  --  153*  --  238*  --   --   --   --   --   --    ALKPHOSPHAT 167*  --  117*  --  144*  --   --   --   --   --   --    TBILIRUBIN 5.0*  --  3.6*  --  3.7*  --   --   --   --   --   --    PREALBUMIN  --   --   --   --  10.6*  --  10.0*  --   --  9.7*  --    GLUCOSE 99   < > 71   < > 98   < > 178*   < > 88 106* 109*    < > = values in this interval not displayed.     Recent Labs     04/13/21  1845 04/13/21  1845 04/14/21  0249 04/14/21  0545 04/15/21  0245   WBC 4.9   < > 3.8* 3.7* 3.4*   NEUTSPOLYS 86.10*   < > 84.20* 80.50* 71.40   LYMPHOCYTES 8.80*   < > 10.00* 15.20* 23.00   MONOCYTES 4.10   < > 4.70 2.40 3.50   EOSINOPHILS 0.00   < > 0.00 0.00 0.60   BASOPHILS 0.20    < > 0.00 0.00 0.00   ASTSGOT 220*  --  153* 238*  --    ALTSGPT 87*  --  62* 83*  --    ALKPHOSPHAT 167*  --  117* 144*  --    TBILIRUBIN 5.0*  --  3.6* 3.7*  --     < > = values in this interval not displayed.     Recent Labs     04/13/21  1845 04/13/21  1845 04/14/21  0249 04/14/21  0249 04/14/21  0545 04/14/21  0545 04/14/21  1952 04/15/21  0245 04/15/21  0927   RBC 3.00*   < > 2.20*  --  2.42*  --   --  2.12*  --    HEMOGLOBIN 11.0*   < > 8.1*   < > 8.9*   < > 7.9* 7.7* 7.0*   HEMATOCRIT 32.6*   < > 23.9*   < > 26.5*  --  23.6* 22.8*  --    PLATELETCT 145*   < > 94*  --  109*  --   --  82*  --    PROTHROMBTM 20.1*  --   --   --   --   --   --  16.4*  --    APTT 35.3  --   --   --   --   --   --   --   --    INR 1.66*  --   --   --   --   --   --  1.28*  --     < > = values in this interval not displayed.       Imaging  X-Ray:  I have personally reviewed the images and compared with prior images.  EKG:  I have personally reviewed the images and compared with prior images.  Ultrasound:  Reviewed    Assessment/Plan  * SDH (subdural hematoma) (HCC)- (present on admission)  Assessment & Plan  -Patient with significant alcohol history - on presentation  -Sustained a fall, striking head at home 2 days prior to presentation  -Imaging showed bilateral subdural hematomas L>R  -INR elevated.  Not currently on anticoagulation.  Will give vitamin K 10 mg via enteral tube x3 days  -Also with new onset seizures.  Loaded with Keppra.  Ativan as needed.  Neurology following - continue BID Keppra  -Every 2 neurochecks  -VTE currently contraindicated  -PT/OT/SLP once hemodynamically stable    Alcohol withdrawal syndrome with complication (HCC)- (present on admission)  Assessment & Plan  -with acute intoxication on admission  -she has received Rally bag  -high-dose Thiamine IV x 3 days, then PO daily  -MVI, folate daily  -Hepatitis panel negative  -cessation education and counseling when able  -seizure precautions -  "continuous EEG     Protein-calorie malnutrition, severe (HCC)- (present on admission)  Assessment & Plan  -Body mass index is 18.7 kg/m².  -History of significant EtOH abuse  -Keeping NPO for now.  Will start tube feedings per dietary recommendations once hemodynamically stable  -High risk for refeeding syndrome.  Monitor labs closely  -Daily MVI, folate and thiamine     Transaminitis- (present on admission)  Assessment & Plan  -Likely from chronic alcohol use  -Hepatitis panel negative  -RUQ US pending  -Follow labs    Lactic acidosis- (present on admission)  Assessment & Plan  -Suspect from seizure-like activity and cardiac arrest  -Trending down, stop trending        Electrolyte disturbance- (present on admission)  Assessment & Plan  -Hypokalemia, hypomagnesemia, hypophosphatemia, hypomagnesemia  -Replacing all per ICU protocol  -Trending BMPs    Melena  Assessment & Plan  -4 stools overnight, one this morning  -see \"GI Bleed\"    GI bleed  Assessment & Plan  -suspected  -GI consult - no intervention at this time. Should Hgb continue to drop or patient begin having more bloody stools, would consider EGD/colonoscopy  -continue PPI  -continue to trend Hgb    Shock (HCC)  Assessment & Plan  -Multifactorial: Cardiogenic shock post cardiac arrest. Hypovolemic shock  -Status post 4 L fluid resuscitation  -Vasopressor support to maintain MAP greater than 65  -Continuous hemodynamic monitoring    Acute respiratory failure (HCC)  Assessment & Plan  -bilateral SDH with seizure like activity  -intubated post-cardiac arrest  -Lung protective ventilation strategies  -Low suspicion for PNA clinically, but infiltrate on CXR so will leave doxy for now and send trach aspirate  -Titrate ventilator prescription to optimize oxygenation, ventilation, and acid base balance.  -Goal PCO2 35-40  -Daily SAT/SBT trials currently contraindicated  -Chlorhexidine  -RT protocols    Cardiac arrest (HCC)  Assessment & Plan  -4/14 asystole - " suspected due to seizures  -ROSC after 2 rounds CPR  -intubated  -ECHO nml EF, no valvular abnormalities reported  -target-temperature management not indicated at this time. Data does not support in-hospital TTM. Would consider cooling to maintain normothermia if spikes fevers  -code status discussed with daughter - remain FULL CODE at this time.           QT prolongation- (present on admission)  Assessment & Plan  -537 on EKG, repeat EKG post cardiac arrest  -Repeat , 512  -Continuous telemetry monitoring  -Repeat a.m. EKG  -Avoid QT prolonging agents  -trending and replacing Mag    Advanced care planning/counseling discussion  Assessment & Plan  -per daughter, Kerline, patient has been complaining of abdominal pain for a few weeks now.  Kerline has been trying to encourage mother to seek medical advice, however had been unable to do so.  Kerline found her mother down at home covered in feces and altered mentation.  -Discussed patient's status and plan of care with daughter, Mabel, both on the phone and in person at bedside today.  Kerline has been in contact with the patient's mother who is on her way here from [place].  -Kerline does wish to transition to DNR status at this time.  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 Repeat US ordered for 4/17.           VTE:  Contraindicated  Ulcer: H2  Antagonist  Lines: Central Line  Ongoing indication addressed, Arterial Line  Ongoing indication addressed and Castro Catheter  Ongoing indication addressed    I have performed a physical exam and reviewed and updated ROS and Plan today (4/15/2021). In review of yesterday's note (4/14/2021), there are no changes except as documented above.     Discussed patient condition and risk of morbidity and/or mortality with Family, RN, RT, Pharmacy, , Charge nurse / hot rounds, Patient and GI and neurology  The patient remains critically ill.  Critical care time = 55 minutes in directly providing and coordinating critical care and extensive data review.  No time overlap and excludes procedures.    Please note that this dictation was created using voice recognition software. I have made every reasonable attempt to correct obvious errors, but there may be errors of grammar and possibly content that I did not discover before finalizing the note.    GELY Harvey.

## 2021-04-15 NOTE — PROCEDURES
CONTINUOUS VIDEO ELECTROENCEPHALOGRAM REPORT        Referring provider: Dr. Glez     DOS: 4/15/2021 (23 hours and 24 minutes of total recording time).      INDICATION:  Nathalie Aguilar 60 y.o. female presenting with altered mental status     CURRENT ANTIEPILEPTIC AND/OR SEDATING REGIMEN:   keppra  Propofol gtt (paused at 6:20 AM on 4/16/21     TECHNIQUE: CVEEG was set up by a Neurodiagnostic technologist who performed education to the patient and staff. A minimum of 23 electrodes and 23 channel recording was setup and performed by Neurodiagnostic technologist, in accordance with the international 10-20 system. Impedence, electrode integrity, and technical impressions were documented a minimum of every 2-24 hour period by a Neurodiagnostic Technologist and reviewed by Interpreting Physician. The study was reviewed in bipolar and referential montages. The recording examined the patient in the awake, drowsy, and sleep state(s).      DESCRIPTION OF THE RECORD:  During wakefulness, the background was continuous, low amplitude, and without a discernible posterior dominant rhythm.  Spontaneous variability and state changes were present. Reactivity was minimal but not altogrther absent.   During drowsiness, theta/delta frequencies were seen.     Sleep was captured and was characterized by diffuse background delta/theta activity with a loss of myogenic artifact.  N2 sleep transients in the form of sleep spindles and vertex waves were seen in the leads over the central regions.      ACTIVATION PROCEDURES:   NA        ICTAL AND INTERICTAL FINDINGS:   No focal or generalized epileptiform activity noted.      No regional slowing was seen during this routine study.       No seizures     EKG: sampling of the EKG recording showed tachycardia     EVENTS: No push button events recorded or reported     NOTE: This is an occasionally technically-limited study due to abundant myogenic, movement, and lead artifact obscuring most of  the recording.        INTERPRETATION:  Abnormal and occasionally technically-limited video EEG recording in the awake, drowsy, and sleep state(s):  - Moderate background slowing suggestive of diffuse/multifocal cerebral dysfunction.   - No persistent focal asymmetries seen.  - No definitive epileptiform discharges seen   - No definitive electro-clinical seizures. Clinical correlation is recommended.  -No push button events recorded or reported        Chris Kelly MD  Epilepsy and General Neurology  Department of Neurology  Instructor of Clinical Neurology [facility].   Office: [phone]  Fax: [phone]

## 2021-04-15 NOTE — PROGRESS NOTES
Tech from lab called with critical result of calcium of 6.9 at 0400. Dr. Sims notified at 0403. MD order for ionized calcium.

## 2021-04-15 NOTE — PROGRESS NOTES
Interval Critical Care Progress Note:    Pt had stool with blood in it.  Occult blood positive.  Pt started on Protonix gtt.  Pt's hgb slowly trending down.  Last Hgb 7.9.  Check morning Hgb, if continuing to decline pt will need transfusion.  Pt should undergo upper and lower GI scopes when pt deemed stable.    Pt had seizure activity during the evening, around 2200 hrs.  Pt's saturations declined during seizure.  Seizure was abated with 2 mg Ativan.  A review of pt's chart showed that pt's ativan was d/c'ed earlier in the day.  The pt is a severe alcoholic who was going through alcohol withdrawal with EtOH level of greater than 200.  Pt needs scheduled ativan for EtOH withdraw, which is in addition to pt's propofol sedation.    Pt was loaded with 1/2 loading dose of phenobarb and placed back on scheduled ativan for EtOH withdrawal.  Cont scheduled ativan.

## 2021-04-15 NOTE — CARE PLAN
Ventilator Daily Summary    Vent Day # 1 ETT 7.5 @ 25     Ventilator settings changed this shift:  Yes APVCMV 20/390/8/50%    Weaning trials:No     Respiratory Procedures: None     Plan: Continue current ventilator settings and wean mechanical ventilation as tolerated per physician orders.

## 2021-04-15 NOTE — PROGRESS NOTES
2110: Patient noted to have seizure-like activity: whole body rigidity and tremors. Oxygen saturation dropped with good waveform to 38%. 2mg Ativan administered. RT called to bedside to bag patient, but seizure abated and saturations recovered to >90%.     2111: Dr. Still notified of episode.     2127: Dr. Still at bedside. Discussed episode by phone with on-call neurologist Dr. Mathis. Orders placed by MD for scheduled ativan and phenobarbitol loading dose (see MAR).

## 2021-04-15 NOTE — ASSESSMENT & PLAN NOTE
-4/14 asystole - suspected due to seizures  -ROSC after 2 rounds CPR  -intubated/ extubated 4/18  -ECHO nml EF, no valvular abnormalities reported  -target-temperature management not indicated.  -code status discussed with daughter - patient is DNR

## 2021-04-15 NOTE — ASSESSMENT & PLAN NOTE
-bilateral SDH with seizure like activity  -intubated post-cardiac arrest. Extubated 4/18  -Wean O2 to keep sats >92%  -RT protocols

## 2021-04-15 NOTE — CARE PLAN
Problem: Safety  Goal: Will remain free from injury  Outcome: PROGRESSING AS EXPECTED  Note: Seizure precautions in place  Goal: Will remain free from falls  Outcome: PROGRESSING AS EXPECTED  Note: Bed alarm on     Problem: Safety - Medical Restraint  Goal: Remains free of injury from restraints (Restraint for Interference with Medical Device)  Description: INTERVENTIONS:  1. Determine that other, less restrictive measures have been tried or would not be effective before applying the restraint  2. Evaluate the patient's condition at the time of restraint application  3. Inform patient/family regarding the reason for restraint  4. Q2H: Monitor safety, psychosocial status, comfort, nutrition and hydration  Outcome: PROGRESSING AS EXPECTED  Flowsheets (Taken 4/14/2021 1814)  Addressed this shift: Remains free of injury from restraints (restraint for interference with medical device):   Determine that other, less restrictive measures have been tried or would not be effective before applying the restraint   Evaluate the patient's condition at the time of restraint application   Inform patient/family regarding the reason for restraint   Every 2 hours: Monitor safety, psychosocial status, comfort, nutrition and hydration  Note: Need for continued restraints assessed. In place for safety.  Goal: Free from restraint(s) (Restraint for Interference with Medical Device)  Description: INTERVENTIONS:  1. ONCE/SHIFT or MINIMUM Q12H: Assess and document the continuing need for restraints  2. Q24H: Continued use of restraint requires LIP to perform face to face examination and written order  3. Identify and implement measures to help patient regain control  Outcome: PROGRESSING AS EXPECTED  Flowsheets (Taken 4/14/2021 1814)  Addressed this shift: Free from restraint(s) (restraint for interference with medical device):   ONCE/SHIFT or MINIMUM Every 12 hours: Assess and document the continuing need for restraints   Every 24 hours:  Continued use of restraint requires Licensed Independent Practitioner to perform face to face examination and written order   Identify and implement measures to help patient regain control  Note: Need for continued restraints assessed. In place for safety.

## 2021-04-15 NOTE — DIETARY
Nutrition Services: Update   Day 2 of admit.  Nathalie Aguilar is a 60 y.o. female with admitting DX of SDH (subdural hematoma)     Pt is currently NPO x1. TF assessment was completed yesterday, however TF order was d/c yesterday as well. Spoke with RN. RN reports plan to hold off on restarting TF because pt now has GI bleed. RD will continue to monitor for nutrition plan of care.     RD following

## 2021-04-15 NOTE — ASSESSMENT & PLAN NOTE
-resolved - BM today showed no evidence of melena  -GI consult - no intervention at this time. Should Hgb continue to drop or patient begin having more bloody stools, would consider EGD/colonoscopy  -continue PPI

## 2021-04-15 NOTE — ASSESSMENT & PLAN NOTE
-Multifactorial: Cardiogenic shock post cardiac arrest. Hypovolemic shock  -Status post fluid resuscitation  -resolved

## 2021-04-15 NOTE — CARE PLAN
Ventilator Daily Summary     Vent Day # 2 ETT 7.5 @ 25      Ventilator settings changed this shift:  Decreased FiO2 to 40%     Weaning trials: No      Respiratory Procedures: None      Plan: Continue current ventilator settings and wean mechanical ventilation as tolerated per physician orders.

## 2021-04-15 NOTE — PROGRESS NOTES
Neurology Progress Note        Subjective:    Patient had a clinical episode concerning for seizure overnight and was given phenobarbital and lorazepam for concerns about alcohol withdrawal.  Otherwise no significant change overnight.    Objective:  Vitals:  Vitals:    04/15/21 0945 04/15/21 1000 04/15/21 1015 04/15/21 1029   BP:       Pulse: (!) 103 (!) 101 99 96   Resp: 13 15 (!) 9 17   Temp:       TempSrc:       SpO2: 98% 98% 99% 99%   Weight:       Height:         MENTAL STATUS: Intubated and sedated, does grimace to noxious stimuli but does not open eyes to voice or noxious stimuli  CRANIAL NERVES:  pupils are pinpoint today, EOMI with no nystagmus, eyes are in the midline without gaze deviation, face is symmetric, corneal reflexes intact bilaterally  MOTOR: Withdraws all 4 limbs to noxious stimuli minimally  REFLEXES: Trace at the biceps and patellas, toes are upgoing bilaterally  SENSATION:  Intact to noxious stimuli in all 4 limbs  COORDINATION: Deferred due to intubation.  GAIT:  Deferred, due to weakness and fall risk    Recent Labs     04/14/21  0249 04/14/21  0249 04/14/21  0545 04/14/21  0545 04/14/21  1952 04/15/21  0245 04/15/21  0927   WBC 3.8*  --  3.7*  --   --  3.4*  --    RBC 2.20*  --  2.42*  --   --  2.12*  --    HEMOGLOBIN 8.1*   < > 8.9*   < > 7.9* 7.7* 7.0*   HEMATOCRIT 23.9*   < > 26.5*  --  23.6* 22.8*  --    .6*  --  109.5*  --   --  107.5*  --    MCH 36.8*  --  36.8*  --   --  36.3*  --    MCHC 33.9  --  33.6  --   --  33.8  --    RDW 66.4*  --  68.4*  --   --  65.1*  --    PLATELETCT 94*  --  109*  --   --  82*  --    MPV 11.1  --  11.2  --   --  11.5  --     < > = values in this interval not displayed.     Recent Labs     04/14/21 2120 04/15/21  0245 04/15/21  0927   SODIUM 145 141 140   POTASSIUM 3.8 3.6 3.6   CHLORIDE 106 103 104   CO2 26 25 25   GLUCOSE 88 106* 109*   BUN 13 12 12   CREATININE 0.20* 0.22* 0.25*   CALCIUM 7.0* 6.9* 6.8*     Recent Labs     04/13/21  1845  04/15/21  0245   APTT 35.3  --    INR 1.66* 1.28*         Recent Labs     04/13/21 1845 04/14/21  0249   CPKTOTAL 96 88     Recent Labs     04/15/21  0927   TRIGLYCERIDE 213*     Recent Labs     04/13/21 1845 04/13/21 1845 04/14/21 0249 04/14/21  0545 04/14/21  2120 04/15/21  0245 04/15/21  0927   SODIUM 137   < > 141   < > 145 141 140   POTASSIUM 3.0*   < > 2.8*   < > 3.8 3.6 3.6   CHLORIDE 85*   < > 99   < > 106 103 104   CO2 14*   < > 14*   < > 26 25 25   GLUCOSE 99   < > 71   < > 88 106* 109*   BUN 19   < > 16   < > 13 12 12   CPKTOTAL 96  --  88  --   --   --   --     < > = values in this interval not displayed.     Recent Labs     04/14/21  2120 04/15/21  0245 04/15/21  0927   SODIUM 145 141 140   POTASSIUM 3.8 3.6 3.6   CHLORIDE 106 103 104   CO2 26 25 25   BUN 13 12 12   CREATININE 0.20* 0.22* 0.25*   MAGNESIUM 1.7 1.6 1.6   PHOSPHORUS 2.0* 1.4* 2.4*   CALCIUM 7.0* 6.9* 6.8*     Recent Labs     04/13/21  1845 04/15/21  0245   APTT 35.3  --    INR 1.66* 1.28*     No results found for this or any previous visit.           Imaging: neuroimaging reviewed and directly visualized by me  DX-CHEST-PORTABLE (1 VIEW)   Final Result         1.  Hazy left pulmonary opacities suggests subtle infiltrate, similar to prior study.      US-EXTREMITY VENOUS LOWER BILAT   Final Result      EC-ECHOCARDIOGRAM COMPLETE W/O CONT   Final Result      US-RUQ   Final Result      1.  Enlarged echogenic liver suggesting fatty infiltration.   2.  Prior cholecystectomy.   3.  No biliary dilation.   4.  Trace ascites, nonspecific.         DX-CHEST-PORTABLE (1 VIEW)   Final Result      Left internal jugular catheter placement with the tip projecting over the superior vena cava. No pneumothorax is identified.      CT-HEAD W/O   Final Result      1.  Stable bilateral subdural hematomas. No significant midline shift.      2.  Stable left frontal subarachnoid hemorrhage.         DX-CHEST-LIMITED (1 VIEW)   Final Result      Right internal  jugular catheter placement with the tip projecting over the superior vena cava. No pneumothorax is identified.      DX-CHEST-PORTABLE (1 VIEW)   Final Result         1.  Hazy left pulmonary opacities suggests subtle infiltrate      CT-HEAD W/O   Final Result      Bilateral subdural hematomas measure up to 8.3 mm on the left and 5 mm on the right.      Left frontal subarachnoid blood is also seen.      No midline shift.      Soft tissue swelling of the right cheek.      Findings discussed with Dr Florence      DX-CHEST-PORTABLE (1 VIEW)   Final Result      No acute cardiopulmonary process is seen.        EEG without epileptiform activity, subclinical seizure or electrographic correlate to clinical events.    Impression: Nathalie is a 60-year-old woman with bilateral subdural hematomas without significant mass-effect.  Neurosurgery has been consulted and does not feel there is an indication for surgical intervention at this time.  She had possible seizure activity preceding a brief cardiac arrest.  Since that time she is having episodes of posturing and rigidity associated with pupillary dilatation.  EEG has not shown any evidence of seizure so far despite clinical events that have been concerning.  I do not think she had had a full blown episode of posturing with pupillary dilation since being started on propofol.        Recommendations:  -Would continue Keppra 1000 mg twice daily for now  -Discussed with ICU team, they will attempt to wean sedation today.  -Will continue EEG monitoring till tomorrow morning to monitor for seizures as sedation is withdrawn.  -MRI of the brain may be useful eventually to guide in prognosis and look for seizure focus but would defer to least tomorrow to allow for continuous EEG monitoring.  -Neurology will continue to follow.     40 minutes of floor time were spent in reviewing the case and coordinating care with the ICU team and Epileptologist.

## 2021-04-15 NOTE — PROGRESS NOTES
0820- Rhythmic motion of R foot noted after cleaning up patient. VIELKA More notified and came to bedsided.    0823- 2mg Ativan given per MAR

## 2021-04-15 NOTE — CARE PLAN
Ventilator Daily Summary    Vent Day # 2 ETT 7.5@25    Ventilator settings changed this shift: Yes APVCMV 20/390/8/30%    Weaning trials:No     Respiratory Procedures: None    Plan: Continue current ventilator settings and wean mechanical ventilation as tolerated per physician orders.

## 2021-04-16 ENCOUNTER — APPOINTMENT (OUTPATIENT)
Dept: RADIOLOGY | Facility: MEDICAL CENTER | Age: 61
DRG: 082 | End: 2021-04-16
Attending: NURSE PRACTITIONER
Payer: MEDICAID

## 2021-04-16 PROBLEM — B96.20 E-COLI UTI: Status: ACTIVE | Noted: 2021-04-16

## 2021-04-16 PROBLEM — B96.29 UTI DUE TO EXTENDED-SPECTRUM BETA LACTAMASE (ESBL) PRODUCING ESCHERICHIA COLI: Status: ACTIVE | Noted: 2021-04-16

## 2021-04-16 PROBLEM — Z16.12 UTI DUE TO EXTENDED-SPECTRUM BETA LACTAMASE (ESBL) PRODUCING ESCHERICHIA COLI: Status: ACTIVE | Noted: 2021-04-16

## 2021-04-16 PROBLEM — N39.0 E-COLI UTI: Status: ACTIVE | Noted: 2021-04-16

## 2021-04-16 PROBLEM — D69.6 THROMBOCYTOPENIA (HCC): Status: ACTIVE | Noted: 2021-04-16

## 2021-04-16 LAB
ANION GAP SERPL CALC-SCNC: 11 MMOL/L (ref 7–16)
ANION GAP SERPL CALC-SCNC: 13 MMOL/L (ref 7–16)
ANION GAP SERPL CALC-SCNC: 9 MMOL/L (ref 7–16)
ANISOCYTOSIS BLD QL SMEAR: ABNORMAL
BACTERIA SPEC RESP CULT: NORMAL
BACTERIA UR CULT: ABNORMAL
BACTERIA UR CULT: ABNORMAL
BASE EXCESS BLDA CALC-SCNC: 1 MMOL/L (ref -4–3)
BASOPHILS # BLD AUTO: 0.2 % (ref 0–1.8)
BASOPHILS # BLD: 0.01 K/UL (ref 0–0.12)
BODY TEMPERATURE: ABNORMAL DEGREES
BREATHS SETTING VENT: 18
BUN SERPL-MCNC: 5 MG/DL (ref 8–22)
BUN SERPL-MCNC: 7 MG/DL (ref 8–22)
BUN SERPL-MCNC: 7 MG/DL (ref 8–22)
CA-I SERPL-SCNC: 1 MMOL/L (ref 1.1–1.3)
CA-I SERPL-SCNC: 1 MMOL/L (ref 1.1–1.3)
CALCIUM SERPL-MCNC: 7.4 MG/DL (ref 8.5–10.5)
CALCIUM SERPL-MCNC: 7.8 MG/DL (ref 8.5–10.5)
CALCIUM SERPL-MCNC: 8.6 MG/DL (ref 8.5–10.5)
CHLORIDE SERPL-SCNC: 101 MMOL/L (ref 96–112)
CHLORIDE SERPL-SCNC: 104 MMOL/L (ref 96–112)
CHLORIDE SERPL-SCNC: 106 MMOL/L (ref 96–112)
CO2 BLDA-SCNC: 25 MMOL/L (ref 20–33)
CO2 SERPL-SCNC: 22 MMOL/L (ref 20–33)
CO2 SERPL-SCNC: 24 MMOL/L (ref 20–33)
CO2 SERPL-SCNC: 25 MMOL/L (ref 20–33)
COMMENT 1642: NORMAL
CREAT SERPL-MCNC: 0.17 MG/DL (ref 0.5–1.4)
CREAT SERPL-MCNC: 0.19 MG/DL (ref 0.5–1.4)
CREAT SERPL-MCNC: 0.21 MG/DL (ref 0.5–1.4)
DELSYS IDSYS: ABNORMAL
END TIDAL CARBON DIOXIDE IECO2: 26 MMHG
EOSINOPHIL # BLD AUTO: 0.05 K/UL (ref 0–0.51)
EOSINOPHIL NFR BLD: 1.2 % (ref 0–6.9)
GLUCOSE BLD-MCNC: 101 MG/DL (ref 65–99)
GLUCOSE BLD-MCNC: 94 MG/DL (ref 65–99)
GLUCOSE SERPL-MCNC: 106 MG/DL (ref 65–99)
GLUCOSE SERPL-MCNC: 90 MG/DL (ref 65–99)
GLUCOSE SERPL-MCNC: 96 MG/DL (ref 65–99)
GRAM STN SPEC: NORMAL
HCO3 BLDA-SCNC: 24.2 MMOL/L (ref 17–25)
HCT VFR BLD AUTO: 24.6 % (ref 37–47)
HGB BLD-MCNC: 8.1 G/DL (ref 12–16)
HGB BLD-MCNC: 8.2 G/DL (ref 12–16)
HGB BLD-MCNC: 8.6 G/DL (ref 12–16)
HOROWITZ INDEX BLDA+IHG-RTO: 183 MM[HG]
IMM GRANULOCYTES # BLD AUTO: 0.2 K/UL (ref 0–0.11)
IMM GRANULOCYTES NFR BLD AUTO: 5 % (ref 0–0.9)
INR PPP: 1.1 (ref 0.87–1.13)
LYMPHOCYTES # BLD AUTO: 1.11 K/UL (ref 1–4.8)
LYMPHOCYTES NFR BLD: 27.5 % (ref 22–41)
MACROCYTES BLD QL SMEAR: ABNORMAL
MAGNESIUM SERPL-MCNC: 1.6 MG/DL (ref 1.5–2.5)
MAGNESIUM SERPL-MCNC: 1.7 MG/DL (ref 1.5–2.5)
MAGNESIUM SERPL-MCNC: 2.3 MG/DL (ref 1.5–2.5)
MCH RBC QN AUTO: 34.7 PG (ref 27–33)
MCHC RBC AUTO-ENTMCNC: 33.3 G/DL (ref 33.6–35)
MCV RBC AUTO: 104.2 FL (ref 81.4–97.8)
MODE IMODE: ABNORMAL
MONOCYTES # BLD AUTO: 0.26 K/UL (ref 0–0.85)
MONOCYTES NFR BLD AUTO: 6.4 % (ref 0–13.4)
MORPHOLOGY BLD-IMP: NORMAL
NEUTROPHILS # BLD AUTO: 2.41 K/UL (ref 2–7.15)
NEUTROPHILS NFR BLD: 59.7 % (ref 44–72)
NRBC # BLD AUTO: 0.25 K/UL
NRBC BLD-RTO: 6.2 /100 WBC
O2/TOTAL GAS SETTING VFR VENT: 40 %
PCO2 BLDA: 31.4 MMHG (ref 26–37)
PCO2 TEMP ADJ BLDA: 30.7 MMHG (ref 26–37)
PEEP END EXPIRATORY PRESSURE IPEEP: 8 CMH20
PH BLDA: 7.5 [PH] (ref 7.4–7.5)
PH TEMP ADJ BLDA: 7.5 [PH] (ref 7.4–7.5)
PHOSPHATE SERPL-MCNC: 1.6 MG/DL (ref 2.5–4.5)
PHOSPHATE SERPL-MCNC: 2.4 MG/DL (ref 2.5–4.5)
PHOSPHATE SERPL-MCNC: 3.5 MG/DL (ref 2.5–4.5)
PLATELET # BLD AUTO: 60 K/UL (ref 164–446)
PLATELET BLD QL SMEAR: NORMAL
PMV BLD AUTO: 11.1 FL (ref 9–12.9)
PO2 BLDA: 73 MMHG (ref 64–87)
PO2 TEMP ADJ BLDA: 71 MMHG (ref 64–87)
POTASSIUM SERPL-SCNC: 4.1 MMOL/L (ref 3.6–5.5)
POTASSIUM SERPL-SCNC: 4.2 MMOL/L (ref 3.6–5.5)
POTASSIUM SERPL-SCNC: 4.3 MMOL/L (ref 3.6–5.5)
PROTHROMBIN TIME: 14.5 SEC (ref 12–14.6)
RBC # BLD AUTO: 2.36 M/UL (ref 4.2–5.4)
RBC BLD AUTO: PRESENT
SAO2 % BLDA: 96 % (ref 93–99)
SIGNIFICANT IND 70042: ABNORMAL
SIGNIFICANT IND 70042: NORMAL
SITE SITE: ABNORMAL
SITE SITE: NORMAL
SODIUM SERPL-SCNC: 135 MMOL/L (ref 135–145)
SODIUM SERPL-SCNC: 139 MMOL/L (ref 135–145)
SODIUM SERPL-SCNC: 141 MMOL/L (ref 135–145)
SOURCE SOURCE: ABNORMAL
SOURCE SOURCE: NORMAL
SPECIMEN DRAWN FROM PATIENT: ABNORMAL
TIDAL VOLUME IVT: 390 ML
WBC # BLD AUTO: 4 K/UL (ref 4.8–10.8)

## 2021-04-16 PROCEDURE — 302136 NUTRITION PUMP: Performed by: INTERNAL MEDICINE

## 2021-04-16 PROCEDURE — A9270 NON-COVERED ITEM OR SERVICE: HCPCS | Performed by: NURSE PRACTITIONER

## 2021-04-16 PROCEDURE — 99292 CRITICAL CARE ADDL 30 MIN: CPT | Performed by: INTERNAL MEDICINE

## 2021-04-16 PROCEDURE — 700111 HCHG RX REV CODE 636 W/ 250 OVERRIDE (IP): Performed by: NURSE PRACTITIONER

## 2021-04-16 PROCEDURE — 71045 X-RAY EXAM CHEST 1 VIEW: CPT

## 2021-04-16 PROCEDURE — 85018 HEMOGLOBIN: CPT | Mod: 91

## 2021-04-16 PROCEDURE — 85610 PROTHROMBIN TIME: CPT

## 2021-04-16 PROCEDURE — 84100 ASSAY OF PHOSPHORUS: CPT

## 2021-04-16 PROCEDURE — 700102 HCHG RX REV CODE 250 W/ 637 OVERRIDE(OP): Performed by: STUDENT IN AN ORGANIZED HEALTH CARE EDUCATION/TRAINING PROGRAM

## 2021-04-16 PROCEDURE — 700111 HCHG RX REV CODE 636 W/ 250 OVERRIDE (IP): Performed by: INTERNAL MEDICINE

## 2021-04-16 PROCEDURE — 82803 BLOOD GASES ANY COMBINATION: CPT

## 2021-04-16 PROCEDURE — C9113 INJ PANTOPRAZOLE SODIUM, VIA: HCPCS | Performed by: INTERNAL MEDICINE

## 2021-04-16 PROCEDURE — 700105 HCHG RX REV CODE 258: Performed by: INTERNAL MEDICINE

## 2021-04-16 PROCEDURE — 84478 ASSAY OF TRIGLYCERIDES: CPT

## 2021-04-16 PROCEDURE — 85027 COMPLETE CBC AUTOMATED: CPT

## 2021-04-16 PROCEDURE — A9270 NON-COVERED ITEM OR SERVICE: HCPCS | Performed by: INTERNAL MEDICINE

## 2021-04-16 PROCEDURE — 700111 HCHG RX REV CODE 636 W/ 250 OVERRIDE (IP): Performed by: EMERGENCY MEDICINE

## 2021-04-16 PROCEDURE — 94003 VENT MGMT INPAT SUBQ DAY: CPT

## 2021-04-16 PROCEDURE — 95718 EEG PHYS/QHP 2-12 HR W/VEEG: CPT | Performed by: STUDENT IN AN ORGANIZED HEALTH CARE EDUCATION/TRAINING PROGRAM

## 2021-04-16 PROCEDURE — 94799 UNLISTED PULMONARY SVC/PX: CPT

## 2021-04-16 PROCEDURE — 700102 HCHG RX REV CODE 250 W/ 637 OVERRIDE(OP): Performed by: INTERNAL MEDICINE

## 2021-04-16 PROCEDURE — 770022 HCHG ROOM/CARE - ICU (200)

## 2021-04-16 PROCEDURE — 80048 BASIC METABOLIC PNL TOTAL CA: CPT | Mod: 91

## 2021-04-16 PROCEDURE — 4A00X4Z MEASUREMENT OF CENTRAL NERVOUS ELECTRICAL ACTIVITY, EXTERNAL APPROACH: ICD-10-PCS | Performed by: STUDENT IN AN ORGANIZED HEALTH CARE EDUCATION/TRAINING PROGRAM

## 2021-04-16 PROCEDURE — 85025 COMPLETE CBC W/AUTO DIFF WBC: CPT

## 2021-04-16 PROCEDURE — 95711 VEEG 2-12 HR UNMONITORED: CPT | Performed by: STUDENT IN AN ORGANIZED HEALTH CARE EDUCATION/TRAINING PROGRAM

## 2021-04-16 PROCEDURE — 37799 UNLISTED PX VASCULAR SURGERY: CPT

## 2021-04-16 PROCEDURE — 82330 ASSAY OF CALCIUM: CPT | Mod: 91

## 2021-04-16 PROCEDURE — 99232 SBSQ HOSP IP/OBS MODERATE 35: CPT | Mod: 25 | Performed by: PSYCHIATRY & NEUROLOGY

## 2021-04-16 PROCEDURE — 99291 CRITICAL CARE FIRST HOUR: CPT | Performed by: NURSE PRACTITIONER

## 2021-04-16 PROCEDURE — 87040 BLOOD CULTURE FOR BACTERIA: CPT | Mod: 91

## 2021-04-16 PROCEDURE — 700102 HCHG RX REV CODE 250 W/ 637 OVERRIDE(OP): Performed by: NURSE PRACTITIONER

## 2021-04-16 PROCEDURE — 85007 BL SMEAR W/DIFF WBC COUNT: CPT

## 2021-04-16 PROCEDURE — 700101 HCHG RX REV CODE 250: Performed by: EMERGENCY MEDICINE

## 2021-04-16 PROCEDURE — 86140 C-REACTIVE PROTEIN: CPT

## 2021-04-16 PROCEDURE — 83735 ASSAY OF MAGNESIUM: CPT | Mod: 91

## 2021-04-16 PROCEDURE — 700105 HCHG RX REV CODE 258: Performed by: NURSE PRACTITIONER

## 2021-04-16 PROCEDURE — 82962 GLUCOSE BLOOD TEST: CPT

## 2021-04-16 PROCEDURE — A9270 NON-COVERED ITEM OR SERVICE: HCPCS | Performed by: STUDENT IN AN ORGANIZED HEALTH CARE EDUCATION/TRAINING PROGRAM

## 2021-04-16 RX ORDER — OMEPRAZOLE 20 MG/1
20 CAPSULE, DELAYED RELEASE ORAL 2 TIMES DAILY
Status: DISCONTINUED | OUTPATIENT
Start: 2021-04-16 | End: 2021-04-16

## 2021-04-16 RX ORDER — POTASSIUM CHLORIDE 7.45 MG/ML
10 INJECTION INTRAVENOUS ONCE
Status: COMPLETED | OUTPATIENT
Start: 2021-04-16 | End: 2021-04-16

## 2021-04-16 RX ORDER — MAGNESIUM SULFATE HEPTAHYDRATE 40 MG/ML
2 INJECTION, SOLUTION INTRAVENOUS ONCE
Status: COMPLETED | OUTPATIENT
Start: 2021-04-16 | End: 2021-04-16

## 2021-04-16 RX ORDER — SULFAMETHOXAZOLE AND TRIMETHOPRIM 400; 80 MG/1; MG/1
1 TABLET ORAL EVERY 12 HOURS
Status: COMPLETED | OUTPATIENT
Start: 2021-04-16 | End: 2021-04-20

## 2021-04-16 RX ORDER — METOPROLOL TARTRATE 1 MG/ML
2.5 INJECTION, SOLUTION INTRAVENOUS ONCE
Status: DISCONTINUED | OUTPATIENT
Start: 2021-04-16 | End: 2021-04-16

## 2021-04-16 RX ORDER — SODIUM CHLORIDE, SODIUM LACTATE, POTASSIUM CHLORIDE, AND CALCIUM CHLORIDE .6; .31; .03; .02 G/100ML; G/100ML; G/100ML; G/100ML
1000 INJECTION, SOLUTION INTRAVENOUS ONCE
Status: COMPLETED | OUTPATIENT
Start: 2021-04-16 | End: 2021-04-16

## 2021-04-16 RX ORDER — MAGNESIUM SULFATE HEPTAHYDRATE 40 MG/ML
4 INJECTION, SOLUTION INTRAVENOUS ONCE
Status: COMPLETED | OUTPATIENT
Start: 2021-04-16 | End: 2021-04-16

## 2021-04-16 RX ORDER — CALCIUM CHLORIDE 100 MG/ML
2 INJECTION INTRAVENOUS; INTRAVENTRICULAR ONCE
Status: COMPLETED | OUTPATIENT
Start: 2021-04-16 | End: 2021-04-16

## 2021-04-16 RX ORDER — LIDOCAINE HYDROCHLORIDE 10 MG/ML
INJECTION, SOLUTION INFILTRATION; PERINEURAL
Status: ACTIVE
Start: 2021-04-16 | End: 2021-04-17

## 2021-04-16 RX ADMIN — CEFTRIAXONE SODIUM 2 G: 2 INJECTION, POWDER, FOR SOLUTION INTRAMUSCULAR; INTRAVENOUS at 05:35

## 2021-04-16 RX ADMIN — MAGNESIUM SULFATE IN WATER 4 G: 40 INJECTION, SOLUTION INTRAVENOUS at 11:19

## 2021-04-16 RX ADMIN — LEVETIRACETAM INJECTION 1000 MG: 10 INJECTION INTRAVENOUS at 05:05

## 2021-04-16 RX ADMIN — CALCIUM CHLORIDE 2 G: 100 INJECTION, SOLUTION INTRAVENOUS at 01:45

## 2021-04-16 RX ADMIN — POTASSIUM CHLORIDE 10 MEQ: 7.46 INJECTION, SOLUTION INTRAVENOUS at 12:47

## 2021-04-16 RX ADMIN — SODIUM CHLORIDE, POTASSIUM CHLORIDE, SODIUM LACTATE AND CALCIUM CHLORIDE 1000 ML: 600; 310; 30; 20 INJECTION, SOLUTION INTRAVENOUS at 16:52

## 2021-04-16 RX ADMIN — MAGNESIUM GLUCONATE 500 MG ORAL TABLET 400 MG: 500 TABLET ORAL at 05:08

## 2021-04-16 RX ADMIN — MAGNESIUM GLUCONATE 500 MG ORAL TABLET 400 MG: 500 TABLET ORAL at 17:43

## 2021-04-16 RX ADMIN — MAGNESIUM SULFATE 2 G: 2 INJECTION INTRAVENOUS at 02:48

## 2021-04-16 RX ADMIN — PANTOPRAZOLE SODIUM 40 MG: 40 INJECTION, POWDER, LYOPHILIZED, FOR SOLUTION INTRAVENOUS at 05:18

## 2021-04-16 RX ADMIN — PHYTONADIONE 10 MG: 5 TABLET ORAL at 05:09

## 2021-04-16 RX ADMIN — LEVETIRACETAM INJECTION 1000 MG: 10 INJECTION INTRAVENOUS at 17:33

## 2021-04-16 RX ADMIN — POTASSIUM CHLORIDE 20 MEQ: 14.9 INJECTION, SOLUTION INTRAVENOUS at 00:47

## 2021-04-16 RX ADMIN — FENTANYL CITRATE 25 MCG: 50 INJECTION, SOLUTION INTRAMUSCULAR; INTRAVENOUS at 20:42

## 2021-04-16 RX ADMIN — OMEPRAZOLE 40 MG: KIT at 17:43

## 2021-04-16 RX ADMIN — THERA TABS 1 TABLET: TAB at 05:08

## 2021-04-16 RX ADMIN — SULFAMETHOXAZOLE AND TRIMETHOPRIM 1 TABLET: 400; 80 TABLET ORAL at 17:42

## 2021-04-16 RX ADMIN — POTASSIUM CHLORIDE 10 MEQ: 7.46 INJECTION, SOLUTION INTRAVENOUS at 17:41

## 2021-04-16 RX ADMIN — POTASSIUM PHOSPHATE, MONOBASIC AND POTASSIUM PHOSPHATE, DIBASIC 30 MMOL: 224; 236 INJECTION, SOLUTION, CONCENTRATE INTRAVENOUS at 03:15

## 2021-04-16 RX ADMIN — FOLIC ACID 1 MG: 1 TABLET ORAL at 05:08

## 2021-04-16 RX ADMIN — DOCUSATE SODIUM 50 MG AND SENNOSIDES 8.6 MG 2 TABLET: 8.6; 5 TABLET, FILM COATED ORAL at 17:43

## 2021-04-16 RX ADMIN — THIAMINE HYDROCHLORIDE 250 MG: 100 INJECTION, SOLUTION INTRAMUSCULAR; INTRAVENOUS at 06:19

## 2021-04-16 RX ADMIN — MEROPENEM 500 MG: 500 INJECTION, POWDER, FOR SOLUTION INTRAVENOUS at 11:21

## 2021-04-16 RX ADMIN — NICOTINE 14 MG: 14 PATCH TRANSDERMAL at 05:09

## 2021-04-16 RX ADMIN — PROPOFOL 25 MCG/KG/MIN: 10 INJECTION, EMULSION INTRAVENOUS at 02:11

## 2021-04-16 ASSESSMENT — PAIN DESCRIPTION - PAIN TYPE
TYPE: ACUTE PAIN

## 2021-04-16 ASSESSMENT — FIBROSIS 4 INDEX: FIB4 SCORE: 18.8

## 2021-04-16 NOTE — PROGRESS NOTES
Dr. Sims notified of critical calcium of 6.7, and ionized calcium of 1.0. MD to place order for calcium chloride.

## 2021-04-16 NOTE — CARE PLAN
Ventilator Daily Summary    Vent Day # 3    ETT 7.5 @ 25      Ventilator settings changed this shift:yes based on ABGs    16 390 10 40%    Weaning trials:no    Respiratory Procedures:no    Plan: Continue current ventilator settings and wean mechanical ventilation as tolerated per physician orders.

## 2021-04-16 NOTE — ASSESSMENT & PLAN NOTE
-present on admission-found down, altered at home - had been incontinent feces  -ID following. Recommend 5 day course of  PO Bactrim. End date 4/20

## 2021-04-16 NOTE — CARE PLAN
Problem: Safety  Goal: Will remain free from injury  Outcome: PROGRESSING AS EXPECTED  Goal: Will remain free from falls  Outcome: PROGRESSING AS EXPECTED     Problem: Infection  Goal: Will remain free from infection  Outcome: PROGRESSING AS EXPECTED     Problem: Skin Integrity  Goal: Risk for impaired skin integrity will decrease  Outcome: PROGRESSING AS EXPECTED     Problem: Pain Management  Goal: Pain level will decrease to patient's comfort goal  Outcome: PROGRESSING AS EXPECTED     Problem: Safety - Medical Restraint  Goal: Remains free of injury from restraints (Restraint for Interference with Medical Device)  Description: INTERVENTIONS:  1. Determine that other, less restrictive measures have been tried or would not be effective before applying the restraint  2. Evaluate the patient's condition at the time of restraint application  3. Inform patient/family regarding the reason for restraint  4. Q2H: Monitor safety, psychosocial status, comfort, nutrition and hydration  Outcome: PROGRESSING AS EXPECTED     Problem: Communication  Goal: The ability to communicate needs accurately and effectively will improve  Outcome: PROGRESSING SLOWER THAN EXPECTED     Problem: Bowel/Gastric:  Goal: Normal bowel function is maintained or improved  Outcome: PROGRESSING SLOWER THAN EXPECTED     Problem: Respiratory:  Goal: Respiratory status will improve  Outcome: PROGRESSING SLOWER THAN EXPECTED     Problem: Psychosocial Needs:  Goal: Level of anxiety will decrease  Outcome: PROGRESSING SLOWER THAN EXPECTED

## 2021-04-16 NOTE — PROGRESS NOTES
Neurology Progress Note        Subjective:    Nathalie has had no further concern for gneralized seizure activity.  She did have an episode where her pupils dilated and her blood pressure went up bu there was no motor manifestations.  Review of the EEG showed no evidence of seizure activity in the last 24 hours.  Propofol was turned off around 6am this morning.  There was concern for GI bleeding overnight and she is requiring some low doses of pressors.    Objective:  Vitals:  Vitals:    04/16/21 0628 04/16/21 0800 04/16/21 1000 04/16/21 1050   BP:       Pulse: 98 90  (!) 112   Resp: (!) 24 16  17   Temp:  36.9 °C (98.4 °F) 36.5 °C (97.7 °F)    TempSrc:  Temporal Temporal    SpO2: 97%   95%   Weight:       Height:            MENTAL STATUS: Intubated  does grimace to noxious stimuli and opens eyes to voice and mild stimulation but does not follow commands.  CRANIAL NERVES:  pupils are equal and reactive, eyes are in the midline without gaze deviation, face is symmetric, corneal reflexes intact bilaterally  MOTOR: Withdraws all 4 limbs to noxious stimuli minimally  REFLEXES: Trace at the biceps and patellas, toes are upgoing bilaterally  SENSATION:  Intact to noxious stimuli in all 4 limbs  COORDINATION: Deferred due to intubation.  GAIT:  Deferred, due to weakness and fall risk       Recent Labs     04/14/21  0249 04/14/21  0249 04/14/21  0545 04/14/21  0545 04/14/21  1952 04/14/21  1952 04/15/21  0245 04/15/21  0927 04/15/21  2200 04/16/21  0408 04/16/21  0925   WBC 3.8*   < > 3.7*  --   --   --  3.4*  --   --   --  4.0*   RBC 2.20*   < > 2.42*  --   --   --  2.12*  --   --   --  2.36*   HEMOGLOBIN 8.1*   < > 8.9*   < > 7.9*   < > 7.7*   < > 8.1* 8.6* 8.2*  8.1*   HEMATOCRIT 23.9*   < > 26.5*   < > 23.6*  --  22.8*  --   --   --  24.6*   .6*   < > 109.5*  --   --   --  107.5*  --   --   --  104.2*   MCH 36.8*   < > 36.8*  --   --   --  36.3*  --   --   --  34.7*   MCHC 33.9   < > 33.6  --   --   --  33.8  --    --   --  33.3*   RDW 66.4*  --  68.4*  --   --   --  65.1*  --   --   --   --    PLATELETCT 94*   < > 109*  --   --   --  82*  --   --   --  60*   MPV 11.1   < > 11.2  --   --   --  11.5  --   --   --  11.1    < > = values in this interval not displayed.     Recent Labs     04/15/21  2200 04/16/21  0408 04/16/21  0925   SODIUM 142 141 135   POTASSIUM 3.7 4.3 4.1   CHLORIDE 107 106 101   CO2 23 22 25   GLUCOSE 93 96 106*   BUN 8 7* 7*   CREATININE 0.18* 0.17* 0.19*   CALCIUM 6.7* 8.6 7.8*     Recent Labs     04/13/21  1845 04/15/21  0245 04/16/21  0615   APTT 35.3  --   --    INR 1.66* 1.28* 1.10         Recent Labs     04/13/21 1845 04/14/21  0249   CPKTOTAL 96 88     Recent Labs     04/15/21  0927   TRIGLYCERIDE 213*     Recent Labs     04/13/21 1845 04/13/21  1845 04/14/21  0249 04/14/21  0545 04/15/21  2200 04/16/21  0408 04/16/21  0925   SODIUM 137   < > 141   < > 142 141 135   POTASSIUM 3.0*   < > 2.8*   < > 3.7 4.3 4.1   CHLORIDE 85*   < > 99   < > 107 106 101   CO2 14*   < > 14*   < > 23 22 25   GLUCOSE 99   < > 71   < > 93 96 106*   BUN 19   < > 16   < > 8 7* 7*   CPKTOTAL 96  --  88  --   --   --   --     < > = values in this interval not displayed.     Recent Labs     04/15/21  1607 04/15/21  1607 04/15/21  2200 04/16/21  0000 04/16/21 0408 04/16/21  0925   SODIUM 141   < > 142  --  141 135   POTASSIUM 3.7   < > 3.7  --  4.3 4.1   CHLORIDE 106   < > 107  --  106 101   CO2 22   < > 23  --  22 25   BUN 9   < > 8  --  7* 7*   CREATININE 0.21*   < > 0.18*  --  0.17* 0.19*   MAGNESIUM 2.0  --   --  1.7  --  1.6   PHOSPHORUS 2.6  --   --  1.6*  --  3.5   CALCIUM 6.6*   < > 6.7*  --  8.6 7.8*    < > = values in this interval not displayed.     Recent Labs     04/13/21  1845 04/15/21  0245 04/16/21  0615   APTT 35.3  --   --    INR 1.66* 1.28* 1.10     No results found for this or any previous visit.           Imaging: neuroimaging reviewed and directly visualized by me  DX-CHEST-PORTABLE (1 VIEW)   Final  Result         1.  Hazy left pulmonary opacities suggests subtle infiltrate, similar to prior study.      DX-CHEST-PORTABLE (1 VIEW)   Final Result         1.  Hazy left pulmonary opacities suggests subtle infiltrate, similar to prior study.      US-EXTREMITY VENOUS LOWER BILAT   Final Result      EC-ECHOCARDIOGRAM COMPLETE W/O CONT   Final Result      US-RUQ   Final Result      1.  Enlarged echogenic liver suggesting fatty infiltration.   2.  Prior cholecystectomy.   3.  No biliary dilation.   4.  Trace ascites, nonspecific.         DX-CHEST-PORTABLE (1 VIEW)   Final Result      Left internal jugular catheter placement with the tip projecting over the superior vena cava. No pneumothorax is identified.      CT-HEAD W/O   Final Result      1.  Stable bilateral subdural hematomas. No significant midline shift.      2.  Stable left frontal subarachnoid hemorrhage.         DX-CHEST-LIMITED (1 VIEW)   Final Result      Right internal jugular catheter placement with the tip projecting over the superior vena cava. No pneumothorax is identified.      DX-CHEST-PORTABLE (1 VIEW)   Final Result         1.  Hazy left pulmonary opacities suggests subtle infiltrate      CT-HEAD W/O   Final Result      Bilateral subdural hematomas measure up to 8.3 mm on the left and 5 mm on the right.      Left frontal subarachnoid blood is also seen.      No midline shift.      Soft tissue swelling of the right cheek.      Findings discussed with Dr Florence      DX-CHEST-PORTABLE (1 VIEW)   Final Result      No acute cardiopulmonary process is seen.          Impression: Nathalie is a 60-year-old woman with bilateral subdural hematomas without significant mass-effect.  Neurosurgery has been consulted and does not feel there is an indication for surgical intervention at this time.  She had possible seizure activity preceding a brief cardiac arrest.  Since that time she is having episodes of posturing and rigidity associated with pupillary dilatation.   EEG has not shown any evidence of seizure so far despite clinical events that have been concerning.         Recommendations:  -Would continue Keppra 1000 mg twice daily for now  -Watch on EEG till around noon time off sedation.  If no development of epileptiform activity will be ok to stop EEG monitoring.  -MRI of the brain may be useful eventually to guide in prognosis and look for seizure focus but not critical to management if patient is clinically unstable.    -In regards to prognosis from a neurological perspective the patient had a relatively short period of cardiac arrest and is showing motor withdrawal in all 4 limbs as well as opening eyes to noxious stimuli.  So far I have no evidence of any irreversible brain injury.  Obviously the patient is still critically ill from multiple medical issues.  I cannot rule out at this time that she has not suffered some permanent neurological disability from her traumatic brain injury, however I doubt she suffered a significant anoxic brain injury.  Overall neurological prognosis remains guarded.    -Neurology will continue to follow.

## 2021-04-16 NOTE — CARE PLAN
Ventilator Daily Summary    Vent Day # 3 ETT 7.5@ 25    Ventilator settings changed this shift:  No     Weaning trials:Yes     Respiratory Procedures: No    Plan: Continue current ventilator settings and wean mechanical ventilation as tolerated per physician orders.

## 2021-04-16 NOTE — PROGRESS NOTES
60F h/o EtOH, heroin misuse.  Having falls, Daughter found her down.  Bilateral SDHs and alcohol withdrawal in ER.  PEA/asystole arrest on 4/14, 8 min CPR.  Remains on vent, encephalopathic, no seizures on cEEG despite clinical rhythmic movements.       #Cardiac arrest-Probably due to seizure, electrolytes. Short arrest, PEA so TTM not indicated.  Remains encephalopathic, but opens eyes to voice so hopefully will have neuro recovering.  MRI brain tomorrow.    #seizure-Clinical sz before cardiac arrest.  No seizures on cEEG. Cont AEDs in conjunction with neuro.     #SDH    #GIB-black stool, Hgb drifted down. 1U prbc yesterday, hgb now stable.  GI consulted, no scope for now.  PO omeprazole BID, trend hgb q12.     #thrombocytopenia-likely d/t liver disease, bleed    #Acute ischemic, toxic and metabolic delirium. Improving, opens eyes to voice.  Received phenobarb night of 14/15 so may have prolonged sedation.  Continue off propofol.     #Respiratory failure-intubated kathrin arrest. Lung protective ventilation. Low suspicion for PNA clinically, but infiltrate on CXR so will leave doxy for now and send trach aspirate    #shock-normotensive before propofol.  Levophed for MAP >65.  IVC collapsing today.  Fluid bolus    #cardiac mass-noted on TTE.  Could be thrombus vs mass, more likely mass.  LE doppler superficial clot. CMR vs CT when able/stable.  Minimal TR and hemodynamics are largely OK so I don't think this is causing obstructive physiology    #electrolyte abnormalities-probably EtOh and nutrition related, aggressive repletion. Watching for refeeding with tube feeds      #abnormal LFTs-probably alk hep with underlying chronic liver injury from alcohol    #elevated INR-resolved with vitamin K, probably just nutritional.     #UTI-ESBL.  ID consult.  Recommend bactrim.  Fever last night, get blood cultures.     #malnutrition-dietary consult, monitor refeeding, high dose thiamine    The patient remains critically ill on  vent, pressors.  Additional critical care time = 30 minutes in directly providing and coordinating critical care and extensive data review.  No time overlap and excludes procedures.

## 2021-04-16 NOTE — PROCEDURES
CONTINUOUS VIDEO ELECTROENCEPHALOGRAM REPORT        Referring provider: Dr. Glez     DOS: 4/16/2021 (5 hours and 55 minutes of total recording time).      INDICATION:  Nathalie Aguilar 60 y.o. female presenting with altered mental status     CURRENT ANTIEPILEPTIC AND/OR SEDATING REGIMEN:   keppra  Propofol gtt (paused at 6:20 AM on 4/16/21)     TECHNIQUE: CVEEG was set up by a Neurodiagnostic technologist who performed education to the patient and staff. A minimum of 23 electrodes and 23 channel recording was setup and performed by Neurodiagnostic technologist, in accordance with the international 10-20 system. Impedence, electrode integrity, and technical impressions were documented a minimum of every 2-24 hour period by a Neurodiagnostic Technologist and reviewed by Interpreting Physician. The study was reviewed in bipolar and referential montages. The recording examined the patient in the awake, drowsy, and sleep state(s).      DESCRIPTION OF THE RECORD:  During wakefulness, the background was continuous, low amplitude, and without a discernible posterior dominant rhythm.  Spontaneous variability and state changes were present. Reactivity was minimal but not altogrther absent.   During drowsiness, theta/delta frequencies were seen.     Sleep was captured and was characterized by diffuse background delta/theta activity with a loss of myogenic artifact.  N2 sleep transients in the form of sleep spindles and vertex waves were seen in the leads over the central regions.      ACTIVATION PROCEDURES:   NA        ICTAL AND INTERICTAL FINDINGS:   There was the emergence of quasi-periodic sharply contoured delta activity at 0.5-1 hz.     No regional slowing was seen during this routine study.       No seizures     EKG: sampling of the EKG recording showed tachycardia     EVENTS: No push button events recorded or reported     NOTE: This is an occasionally technically-limited study due to abundant myogenic, movement,  and lead artifact obscuring most of the recording.        INTERPRETATION:  Abnormal video EEG recording in the awake, drowsy, and sleep state(s):  - Moderate background slowing with occasional triphasic waves suggestive of diffuse/multifocal cerebral dysfunction.   - No persistent focal asymmetries seen.  - No definitive electro-clinical seizures. Clinical correlation is recommended.  -No push button events recorded or reported  -Compared to the prior study, the emergence of triphasic waves is now seen.        Chris Kelly MD  Epilepsy and General Neurology  Department of Neurology  Instructor of Clinical Neurology [facility].   Office: [phone]  Fax: [phone]

## 2021-04-16 NOTE — PROGRESS NOTES
Critical Care Event Note    Called by bedside for electrolyte repletion.    Hypomagnesemia:  - 2 grams magnesium sulfate    Hypophosphatemia:  - 30 mmol KPhos

## 2021-04-16 NOTE — ASSESSMENT & PLAN NOTE
-multifactorial due to history of ETOH abuse, vitamin deficiencies, liver disease  -stable - improving  -monitor for bleeding  -follow CBC

## 2021-04-16 NOTE — PROGRESS NOTES
Gastroenterology Progress Note     Author: Esvin Acuna M.D.   Date & Time Created: 4/16/2021 7:33 AM    Chief Complaint:  Melena versus hematochezia    History of Presenting Illness  60 y.o. female with EtOH dependence who presented 4/13/2021 with altered mental status and has had multiple issues during hospital stay whom we were asked to see for melena versus hematochezia and anemia.     All history obtained from review of chart and discussion with nurse and ICU physician and daughter.     Her daughter notes that she is an alcoholic.  She was apparently having some abdominal discomfort and diarrhea and actually went 9 days without eating prior to admission.  Ultimately, her daughter found her down with altered mental status covered in feces and brought her to hospital.       Since being in hospital she was found to have small bilateral subdural hematoma, small SAH managed nonoperatively, possible seizure from EtOH withdraw, possible atrial mass.  She also had asystolic arrest yesterday requiring CPR.  She has acute alcoholic hepatitis.     Then, last night, she developed onset of melena per nursing.  Had 5 episodes last night but slowing today with only one small episode.  Hgb dropped to 7 on last check.  BUN normal.  Currently undergoing continuous 24 hour EEG.     Interval History:  4/16/2021: One small melena overnight but much improved per RN.  Transfused 1U PRBC with appropriate increase.  Hgb now stable.  Remains altered, on low dose pressors.    Review of Systems:  Review of Systems   Unable to perform ROS: Intubated       Physical Exam:  Physical Exam  Constitutional:       Appearance: She is ill-appearing.   Cardiovascular:      Rate and Rhythm: Normal rate and regular rhythm.   Pulmonary:      Breath sounds: Decreased breath sounds present.   Abdominal:      General: Abdomen is flat. Bowel sounds are normal. There is no distension.      Palpations: Abdomen is soft. There is no mass.       Tenderness: There is no abdominal tenderness. There is no guarding or rebound.      Hernia: No hernia is present.   Musculoskeletal:      Right lower leg: Edema present.      Left lower leg: Edema present.         Labs:  Recent Labs     04/14/21  0546 04/14/21  1303 04/15/21  0247   ISTATAPH 7.381* 7.444 7.490   ISTATAPCO2 26.7 33.5 36.2   ISTATAPO2 406* 99* 91*   ISTATATCO2 17* 24 29   LHBUGFC9XDP 100* 98 98   ISTATARTHCO3 15.8* 23.0 27.6*   ISTATARTBE -8* -1 4*   ISTATTEMP 97.0 F 98.8 F 97.0 F   ISTATFIO2 100 35 40   ISTATSPEC Arterial Arterial Arterial   ISTATAPHTC 7.394* 7.442 7.504*   LHYPMNQM0XC 400* 100* 86     Recent Labs     04/13/21  1845 04/14/21  0249   CPKTOTAL 96 88     Recent Labs     04/15/21  0927 04/15/21  0927 04/15/21  1607 04/15/21  2200 04/16/21  0000 04/16/21  0408   SODIUM 140   < > 141 142  --  141   POTASSIUM 3.6   < > 3.7 3.7  --  4.3   CHLORIDE 104   < > 106 107  --  106   CO2 25   < > 22 23  --  22   BUN 12   < > 9 8  --  7*   CREATININE 0.25*   < > 0.21* 0.18*  --  0.17*   MAGNESIUM 1.6  --  2.0  --  1.7  --    PHOSPHORUS 2.4*  --  2.6  --  1.6*  --    CALCIUM 6.8*   < > 6.6* 6.7*  --  8.6    < > = values in this interval not displayed.     Recent Labs     04/13/21  1845 04/13/21  1845 04/14/21  0249 04/14/21  0249 04/14/21  0545 04/14/21  0545 04/14/21  1157 04/14/21  1555 04/15/21  0245 04/15/21  0927 04/15/21  1607 04/15/21  2200 04/16/21  0408   ALTSGPT 87*  --  62*  --  83*  --   --   --   --   --   --   --   --    ASTSGOT 220*  --  153*  --  238*  --   --   --   --   --   --   --   --    ALKPHOSPHAT 167*  --  117*  --  144*  --   --   --   --   --   --   --   --    TBILIRUBIN 5.0*  --  3.6*  --  3.7*  --   --   --   --   --   --   --   --    PREALBUMIN  --   --   --   --  10.6*  --  10.0*  --  9.7*  --   --   --   --    GLUCOSE 99   < > 71   < > 98   < > 178*   < > 106*   < > 99 93 96    < > = values in this interval not displayed.     Recent Labs     04/13/21 1845 04/13/21  1845  21  0545 21  0545 04/14/21  1952 04/14/21  1952 04/15/21  0245 04/15/21  0927 04/15/21  1607 04/15/21  2200 04/16/21  0408 04/16/21  0615   RBC 3.00*   < > 2.20*  --  2.42*  --   --   --  2.12*  --   --   --   --   --    HEMOGLOBIN 11.0*   < > 8.1*   < > 8.9*   < > 7.9*   < > 7.7*   < > 8.9* 8.1* 8.6*  --    HEMATOCRIT 32.6*   < > 23.9*   < > 26.5*  --  23.6*  --  22.8*  --   --   --   --   --    PLATELETCT 145*   < > 94*  --  109*  --   --   --  82*  --   --   --   --   --    PROTHROMBTM 20.1*  --   --   --   --   --   --   --  16.4*  --   --   --   --  14.5   APTT 35.3  --   --   --   --   --   --   --   --   --   --   --   --   --    INR 1.66*  --   --   --   --   --   --   --  1.28*  --   --   --   --  1.10    < > = values in this interval not displayed.     Recent Labs     04/13/21  1845 04/13/21  1845 04/14/21  0249 04/14/21  0545 04/15/21  0245   WBC 4.9   < > 3.8* 3.7* 3.4*   NEUTSPOLYS 86.10*   < > 84.20* 80.50* 71.40   LYMPHOCYTES 8.80*   < > 10.00* 15.20* 23.00   MONOCYTES 4.10   < > 4.70 2.40 3.50   EOSINOPHILS 0.00   < > 0.00 0.00 0.60   BASOPHILS 0.20   < > 0.00 0.00 0.00   ASTSGOT 220*  --  153* 238*  --    ALTSGPT 87*  --  62* 83*  --    ALKPHOSPHAT 167*  --  117* 144*  --    TBILIRUBIN 5.0*  --  3.6* 3.7*  --     < > = values in this interval not displayed.     Hemodynamics:  Temp (24hrs), Av.9 °C (98.4 °F), Min:36.2 °C (97.1 °F), Max:38.1 °C (100.5 °F)  Temperature: 37.2 °C (99 °F)  Pulse  Av.6  Min: 84  Max: 122   Blood Pressure: 105/67, Arterial BP: 114/64     Respiratory:  Vent Mode: APVCMV, Rate (breaths/min): 16, PEEP/CPAP: 10, PIP: 21, MAP: 14 Respiration: (Abnormal) 24, Pulse Oximetry: 97 %     Work Of Breathing / Effort: Vented  RUL Breath Sounds: Diminished;Clear, RML Breath Sounds: Diminished, RLL Breath Sounds: Diminished, STONE Breath Sounds: Diminished;Clear, LLL Breath Sounds: Diminished  Fluids:    Intake/Output Summary (Last 24  hours) at 4/16/2021 0733  Last data filed at 4/16/2021 0600  Gross per 24 hour   Intake 2044.69 ml   Output 657 ml   Net 1387.69 ml     Weight: 63.8 kg (140 lb 10.5 oz)  GI/Nutrition:  Orders Placed This Encounter   Procedures   • Diet NPO     Standing Status:   Standing     Number of Occurrences:   1     Order Specific Question:   Type:     Answer:   Now [1]     Order Specific Question:   Restrict to:     Answer:   Strict [1]     Medical Decision Making, by Problem:  Active Hospital Problems    Diagnosis    • *SDH (subdural hematoma) (HCC) [S06.5X9A]    • Protein-calorie malnutrition, severe (HCC) [E43]    • Alcohol withdrawal syndrome with complication (HCC) [F10.239]    • Electrolyte disturbance [E87.8]    • Lactic acidosis [E87.2]    • Marijuana user [F12.90]    • Transaminitis [R74.01]    • Hyperbilirubinemia [E80.6]    • E-coli UTI [N39.0, B96.20]    • GI bleed [K92.2]    • Melena [K92.1]    • Right atrial mass [I51.89]    • Elevated INR [R79.1]    • Advanced care planning/counseling discussion [Z71.89]    • QT prolongation [R94.31]    • Cardiac arrest (HCC) [I46.9]    • Acute respiratory failure (HCC) [J96.00]    • Shock (HCC) [R57.9]      Assessment/Plan  61 y/o with EtOH dependence, alcoholic hepatitis, SDH, SAH, possible seizure from EtOH withdraw, recent asystole arrest that has developed melena versus hematochezia and anemia.  Bleeding has likely slowed and occurred post-arrest.  BUN normal arguing against upper GI bleed but still possible.  Question potential ischemic colitis following cardiac arrest.  Given numerous issues, higher risk for endoscopy and feel like bleeding is slowing at this time.  Recommend conservative management and serial monitoring and blood transfusion if needed.  If she develops more brisk bleeding or unstable vitals, then plan for EGD.     PROBLEMS:  1. Melena versus hematochezia.  Appears to be resolving  2. Acute blood loss anemia.  Hgb now stable.  3. Recent asystole  4.  Possible seizures  5. EtOH dependence  6. Alcoholic hepatitis  7. Subdural hematoma  8. Subarachnoid hemorrhage  9. Atrial mass  10. Intubated  11. Hypotension    Plan:  1. Continue IV PPI BID  2. OK to start tube feeds from GI standpoint  3. GI to sign off.  Please call with questions or if she develops recurrent bleeding.    Quality-Core Measures   Reviewed items::  Medications reviewed, Labs reviewed and Radiology images reviewed

## 2021-04-16 NOTE — DIETARY
Nutrition Services: Update   Day 3 of admit.  Nathalie Aguilar is a 60 y.o. female with admitting DX of SDH (subdural hematoma)     Pt is currently NPO x2 days. TF assessment was initially completed on 4/14. Pt remains intubated and has gastric cortrak in place. Labs and meds reviewed. Levophed (stopped) and propofol (paused) per MAR. Per rounds okay to start TF. Plan for family meeting tomorrow per rounds. Previous recommendations remain appropriate at this time.     Recommendations/Plan:  1. Start Impact Peptide 1.5 @ 25 ml/hr and advance per protocol to 35 ml/hr (goal rate) to provide 1260 kcal (23 kcal/kg), 79 grams protein (1.4 gm/kg), and 647 ml free water per day.   2. Fluids per MD.   3. Monitor for refeeding: Order BMP w/ Mg and Phos x 7 days. Replete K, Phos and Mg prn. Supplement 100 mg Thiamine x 7 days to reduce risk of refeeding      RD following

## 2021-04-16 NOTE — CARE PLAN
Problem: Communication  Goal: The ability to communicate needs accurately and effectively will improve  Outcome: PROGRESSING AS EXPECTED       Problem: Safety  Goal: Will remain free from injury  Outcome: PROGRESSING AS EXPECTED  Bed lowest position. SZ precautions in place.      Problem: Venous Thromboembolism (VTW)/Deep Vein Thrombosis (DVT) Prevention:  Goal: Patient will participate in Venous Thrombosis (VTE)/Deep Vein Thrombosis (DVT)Prevention Measures  Outcome: PROGRESSING AS EXPECTED  SCD on R.

## 2021-04-16 NOTE — THERAPY
Physical Therapy Contact Note    PT consult received, per RN pt will not be medically stable for some time for PT evaluation; will discharge order, please reconsult should condition change.    Kimberley FLORES, PT,  [phone]

## 2021-04-17 ENCOUNTER — APPOINTMENT (OUTPATIENT)
Dept: RADIOLOGY | Facility: MEDICAL CENTER | Age: 61
DRG: 082 | End: 2021-04-17
Attending: NURSE PRACTITIONER
Payer: MEDICAID

## 2021-04-17 LAB
ANION GAP SERPL CALC-SCNC: 6 MMOL/L (ref 7–16)
ANION GAP SERPL CALC-SCNC: 7 MMOL/L (ref 7–16)
ANION GAP SERPL CALC-SCNC: 8 MMOL/L (ref 7–16)
ANION GAP SERPL CALC-SCNC: 9 MMOL/L (ref 7–16)
ANISOCYTOSIS BLD QL SMEAR: ABNORMAL
BASOPHILS # BLD AUTO: 0 % (ref 0–1.8)
BASOPHILS # BLD: 0 K/UL (ref 0–0.12)
BUN SERPL-MCNC: 6 MG/DL (ref 8–22)
BUN SERPL-MCNC: 7 MG/DL (ref 8–22)
BUN SERPL-MCNC: 8 MG/DL (ref 8–22)
BUN SERPL-MCNC: 8 MG/DL (ref 8–22)
CA-I SERPL-SCNC: 1 MMOL/L (ref 1.1–1.3)
CALCIUM SERPL-MCNC: 7 MG/DL (ref 8.5–10.5)
CALCIUM SERPL-MCNC: 7.1 MG/DL (ref 8.5–10.5)
CALCIUM SERPL-MCNC: 7.1 MG/DL (ref 8.5–10.5)
CALCIUM SERPL-MCNC: 7.6 MG/DL (ref 8.5–10.5)
CHLORIDE SERPL-SCNC: 101 MMOL/L (ref 96–112)
CHLORIDE SERPL-SCNC: 101 MMOL/L (ref 96–112)
CHLORIDE SERPL-SCNC: 102 MMOL/L (ref 96–112)
CHLORIDE SERPL-SCNC: 104 MMOL/L (ref 96–112)
CO2 SERPL-SCNC: 24 MMOL/L (ref 20–33)
CO2 SERPL-SCNC: 26 MMOL/L (ref 20–33)
CO2 SERPL-SCNC: 26 MMOL/L (ref 20–33)
CO2 SERPL-SCNC: 27 MMOL/L (ref 20–33)
CREAT SERPL-MCNC: 0.17 MG/DL (ref 0.5–1.4)
CREAT SERPL-MCNC: <0.17 MG/DL (ref 0.5–1.4)
CRP SERPL HS-MCNC: 2.25 MG/DL (ref 0–0.75)
EOSINOPHIL # BLD AUTO: 0.14 K/UL (ref 0–0.51)
EOSINOPHIL NFR BLD: 2.6 % (ref 0–6.9)
ERYTHROCYTE [DISTWIDTH] IN BLOOD BY AUTOMATED COUNT: 76.8 FL (ref 35.9–50)
GLUCOSE SERPL-MCNC: 115 MG/DL (ref 65–99)
GLUCOSE SERPL-MCNC: 115 MG/DL (ref 65–99)
GLUCOSE SERPL-MCNC: 119 MG/DL (ref 65–99)
GLUCOSE SERPL-MCNC: 127 MG/DL (ref 65–99)
HCT VFR BLD AUTO: 23.4 % (ref 37–47)
HGB BLD-MCNC: 7.9 G/DL (ref 12–16)
INR PPP: 1.02 (ref 0.87–1.13)
LYMPHOCYTES # BLD AUTO: 1.08 K/UL (ref 1–4.8)
LYMPHOCYTES NFR BLD: 20.7 % (ref 22–41)
MACROCYTES BLD QL SMEAR: ABNORMAL
MAGNESIUM SERPL-MCNC: 1.7 MG/DL (ref 1.5–2.5)
MAGNESIUM SERPL-MCNC: 1.7 MG/DL (ref 1.5–2.5)
MAGNESIUM SERPL-MCNC: 1.9 MG/DL (ref 1.5–2.5)
MAGNESIUM SERPL-MCNC: 2.2 MG/DL (ref 1.5–2.5)
MANUAL DIFF BLD: NORMAL
MCH RBC QN AUTO: 35.9 PG (ref 27–33)
MCHC RBC AUTO-ENTMCNC: 33.8 G/DL (ref 33.6–35)
MCV RBC AUTO: 106.4 FL (ref 81.4–97.8)
MICROCYTES BLD QL SMEAR: ABNORMAL
MONOCYTES # BLD AUTO: 0.14 K/UL (ref 0–0.85)
MONOCYTES NFR BLD AUTO: 2.6 % (ref 0–13.4)
MORPHOLOGY BLD-IMP: NORMAL
MYELOCYTES NFR BLD MANUAL: 0.8 %
NEUTROPHILS # BLD AUTO: 3.81 K/UL (ref 2–7.15)
NEUTROPHILS NFR BLD: 73.3 % (ref 44–72)
NRBC # BLD AUTO: 0.8 K/UL
NRBC BLD-RTO: 15.4 /100 WBC
PHOSPHATE SERPL-MCNC: 1.6 MG/DL (ref 2.5–4.5)
PHOSPHATE SERPL-MCNC: 2.3 MG/DL (ref 2.5–4.5)
PHOSPHATE SERPL-MCNC: 3 MG/DL (ref 2.5–4.5)
PHOSPHATE SERPL-MCNC: 3.2 MG/DL (ref 2.5–4.5)
PLATELET # BLD AUTO: 69 K/UL (ref 164–446)
PLATELET BLD QL SMEAR: NORMAL
PMV BLD AUTO: 10.7 FL (ref 9–12.9)
POIKILOCYTOSIS BLD QL SMEAR: NORMAL
POLYCHROMASIA BLD QL SMEAR: NORMAL
POTASSIUM SERPL-SCNC: 3.8 MMOL/L (ref 3.6–5.5)
POTASSIUM SERPL-SCNC: 4 MMOL/L (ref 3.6–5.5)
POTASSIUM SERPL-SCNC: 4.1 MMOL/L (ref 3.6–5.5)
POTASSIUM SERPL-SCNC: 4.1 MMOL/L (ref 3.6–5.5)
PREALB SERPL-MCNC: 9 MG/DL (ref 18–38)
PROTHROMBIN TIME: 13.7 SEC (ref 12–14.6)
RBC # BLD AUTO: 2.2 M/UL (ref 4.2–5.4)
RBC BLD AUTO: PRESENT
SODIUM SERPL-SCNC: 134 MMOL/L (ref 135–145)
SODIUM SERPL-SCNC: 134 MMOL/L (ref 135–145)
SODIUM SERPL-SCNC: 135 MMOL/L (ref 135–145)
SODIUM SERPL-SCNC: 138 MMOL/L (ref 135–145)
TARGETS BLD QL SMEAR: NORMAL
TRIGL SERPL-MCNC: 221 MG/DL (ref 0–149)
WBC # BLD AUTO: 5.2 K/UL (ref 4.8–10.8)

## 2021-04-17 PROCEDURE — 85027 COMPLETE CBC AUTOMATED: CPT

## 2021-04-17 PROCEDURE — 700111 HCHG RX REV CODE 636 W/ 250 OVERRIDE (IP): Performed by: INTERNAL MEDICINE

## 2021-04-17 PROCEDURE — 700105 HCHG RX REV CODE 258: Performed by: EMERGENCY MEDICINE

## 2021-04-17 PROCEDURE — 80048 BASIC METABOLIC PNL TOTAL CA: CPT | Mod: 91

## 2021-04-17 PROCEDURE — 700111 HCHG RX REV CODE 636 W/ 250 OVERRIDE (IP): Performed by: NURSE PRACTITIONER

## 2021-04-17 PROCEDURE — 84134 ASSAY OF PREALBUMIN: CPT

## 2021-04-17 PROCEDURE — 83735 ASSAY OF MAGNESIUM: CPT | Mod: 91

## 2021-04-17 PROCEDURE — A9270 NON-COVERED ITEM OR SERVICE: HCPCS | Performed by: NURSE PRACTITIONER

## 2021-04-17 PROCEDURE — 82330 ASSAY OF CALCIUM: CPT

## 2021-04-17 PROCEDURE — 85007 BL SMEAR W/DIFF WBC COUNT: CPT

## 2021-04-17 PROCEDURE — 93970 EXTREMITY STUDY: CPT

## 2021-04-17 PROCEDURE — 700102 HCHG RX REV CODE 250 W/ 637 OVERRIDE(OP): Performed by: NURSE PRACTITIONER

## 2021-04-17 PROCEDURE — 700101 HCHG RX REV CODE 250: Performed by: NURSE PRACTITIONER

## 2021-04-17 PROCEDURE — 84100 ASSAY OF PHOSPHORUS: CPT | Mod: 91

## 2021-04-17 PROCEDURE — 700111 HCHG RX REV CODE 636 W/ 250 OVERRIDE (IP): Performed by: EMERGENCY MEDICINE

## 2021-04-17 PROCEDURE — 770022 HCHG ROOM/CARE - ICU (200)

## 2021-04-17 PROCEDURE — 94150 VITAL CAPACITY TEST: CPT

## 2021-04-17 PROCEDURE — 94640 AIRWAY INHALATION TREATMENT: CPT

## 2021-04-17 PROCEDURE — 700101 HCHG RX REV CODE 250: Performed by: EMERGENCY MEDICINE

## 2021-04-17 PROCEDURE — 99291 CRITICAL CARE FIRST HOUR: CPT | Performed by: NURSE PRACTITIONER

## 2021-04-17 PROCEDURE — 85610 PROTHROMBIN TIME: CPT

## 2021-04-17 PROCEDURE — 94003 VENT MGMT INPAT SUBQ DAY: CPT

## 2021-04-17 PROCEDURE — 700102 HCHG RX REV CODE 250 W/ 637 OVERRIDE(OP): Performed by: INTERNAL MEDICINE

## 2021-04-17 PROCEDURE — A9270 NON-COVERED ITEM OR SERVICE: HCPCS | Performed by: STUDENT IN AN ORGANIZED HEALTH CARE EDUCATION/TRAINING PROGRAM

## 2021-04-17 PROCEDURE — 94799 UNLISTED PULMONARY SVC/PX: CPT

## 2021-04-17 PROCEDURE — 99292 CRITICAL CARE ADDL 30 MIN: CPT | Performed by: INTERNAL MEDICINE

## 2021-04-17 PROCEDURE — 700102 HCHG RX REV CODE 250 W/ 637 OVERRIDE(OP): Performed by: STUDENT IN AN ORGANIZED HEALTH CARE EDUCATION/TRAINING PROGRAM

## 2021-04-17 PROCEDURE — A9270 NON-COVERED ITEM OR SERVICE: HCPCS | Performed by: INTERNAL MEDICINE

## 2021-04-17 PROCEDURE — 99231 SBSQ HOSP IP/OBS SF/LOW 25: CPT | Performed by: PSYCHIATRY & NEUROLOGY

## 2021-04-17 RX ORDER — MAGNESIUM SULFATE HEPTAHYDRATE 40 MG/ML
4 INJECTION, SOLUTION INTRAVENOUS ONCE
Status: COMPLETED | OUTPATIENT
Start: 2021-04-17 | End: 2021-04-17

## 2021-04-17 RX ORDER — POTASSIUM CHLORIDE 7.45 MG/ML
10 INJECTION INTRAVENOUS ONCE
Status: COMPLETED | OUTPATIENT
Start: 2021-04-17 | End: 2021-04-17

## 2021-04-17 RX ORDER — LIDOCAINE HYDROCHLORIDE 10 MG/ML
INJECTION, SOLUTION INFILTRATION; PERINEURAL
Status: DISCONTINUED
Start: 2021-04-17 | End: 2021-04-18

## 2021-04-17 RX ORDER — MAGNESIUM SULFATE HEPTAHYDRATE 40 MG/ML
2 INJECTION, SOLUTION INTRAVENOUS ONCE
Status: COMPLETED | OUTPATIENT
Start: 2021-04-17 | End: 2021-04-17

## 2021-04-17 RX ADMIN — MAGNESIUM SULFATE IN WATER 4 G: 40 INJECTION, SOLUTION INTRAVENOUS at 09:07

## 2021-04-17 RX ADMIN — LEVETIRACETAM INJECTION 1000 MG: 10 INJECTION INTRAVENOUS at 17:20

## 2021-04-17 RX ADMIN — CALCIUM GLUCONATE 2 G: 98 INJECTION, SOLUTION INTRAVENOUS at 03:16

## 2021-04-17 RX ADMIN — OMEPRAZOLE 40 MG: KIT at 17:20

## 2021-04-17 RX ADMIN — MAGNESIUM GLUCONATE 500 MG ORAL TABLET 400 MG: 500 TABLET ORAL at 17:20

## 2021-04-17 RX ADMIN — POTASSIUM PHOSPHATE, MONOBASIC AND POTASSIUM PHOSPHATE, DIBASIC 30 MMOL: 224; 236 INJECTION, SOLUTION, CONCENTRATE INTRAVENOUS at 03:29

## 2021-04-17 RX ADMIN — POTASSIUM CHLORIDE 10 MEQ: 7.46 INJECTION, SOLUTION INTRAVENOUS at 13:24

## 2021-04-17 RX ADMIN — LIDOCAINE HYDROCHLORIDE 2 ML: 10 INJECTION, SOLUTION INFILTRATION; PERINEURAL at 21:52

## 2021-04-17 RX ADMIN — THERA TABS 1 TABLET: TAB at 05:29

## 2021-04-17 RX ADMIN — FENTANYL CITRATE 25 MCG: 50 INJECTION, SOLUTION INTRAMUSCULAR; INTRAVENOUS at 22:07

## 2021-04-17 RX ADMIN — MAGNESIUM SULFATE 2 G: 2 INJECTION INTRAVENOUS at 03:13

## 2021-04-17 RX ADMIN — SULFAMETHOXAZOLE AND TRIMETHOPRIM 1 TABLET: 400; 80 TABLET ORAL at 05:33

## 2021-04-17 RX ADMIN — POTASSIUM CHLORIDE 10 MEQ: 7.46 INJECTION, SOLUTION INTRAVENOUS at 21:24

## 2021-04-17 RX ADMIN — FOLIC ACID 1 MG: 1 TABLET ORAL at 05:29

## 2021-04-17 RX ADMIN — LEVETIRACETAM INJECTION 1000 MG: 10 INJECTION INTRAVENOUS at 05:29

## 2021-04-17 RX ADMIN — MAGNESIUM GLUCONATE 500 MG ORAL TABLET 400 MG: 500 TABLET ORAL at 05:32

## 2021-04-17 RX ADMIN — OMEPRAZOLE 40 MG: KIT at 06:41

## 2021-04-17 RX ADMIN — SULFAMETHOXAZOLE AND TRIMETHOPRIM 1 TABLET: 400; 80 TABLET ORAL at 17:20

## 2021-04-17 RX ADMIN — NICOTINE 14 MG: 14 PATCH TRANSDERMAL at 05:28

## 2021-04-17 RX ADMIN — POTASSIUM CHLORIDE 10 MEQ: 7.46 INJECTION, SOLUTION INTRAVENOUS at 02:00

## 2021-04-17 RX ADMIN — POTASSIUM CHLORIDE 10 MEQ: 7.46 INJECTION, SOLUTION INTRAVENOUS at 08:11

## 2021-04-17 ASSESSMENT — FIBROSIS 4 INDEX: FIB4 SCORE: 22.34

## 2021-04-17 ASSESSMENT — PAIN DESCRIPTION - PAIN TYPE
TYPE: ACUTE PAIN

## 2021-04-17 ASSESSMENT — PULMONARY FUNCTION TESTS
FVC: 1.2
FVC: .9

## 2021-04-17 ASSESSMENT — LIFESTYLE VARIABLES
DOES PATIENT WANT TO STOP DRINKING: CANNOT ASSESS
REASON UNABLE TO ASSESS: PATIENT INTUBATED

## 2021-04-17 NOTE — PROGRESS NOTES
Today Nathalie is *possibly* following commands for some people, not others.  Does open eyes to voice for me today, but not following for me. Remains dependent on mechanical ventilation due to mental state.  Off levophed. No ongoing GIB.     I had a meeting with family today, including mother, step-dad and daughter Kerline. Palliative care graciously assisted.  I primarily updated them about her clinical status.  The main question is her neurologic function.  She has numerous other active issues, but all more treatable than a CNS injury.  MRI today will help understand if there is parenchymal injury.  She may have some residual phenobarbital hanging around, which could be causing ongoing sedation, so the next few days will give us a better picture of her mental status.  Family seems quite certain she would not want long-term life support of any form.  They are unsure if a nursing home would be an acceptable quality of life for the Nathalie.     The patient remains critically ill on ventilator.  Critical care time = 20 minutes in directly providing and coordinating critical care and extensive data review.  No time overlap and excludes procedures.

## 2021-04-17 NOTE — PROGRESS NOTES
Labs resulted as Ca of 1, Mg of 1.7, and Phos of 1.6. Pharmacy notified and chose not to replace. Dr. Sims notified. New orders received.

## 2021-04-17 NOTE — CARE PLAN
Problem: Communication  Goal: The ability to communicate needs accurately and effectively will improve  Outcome: PROGRESSING AS EXPECTED  Able to follow commands     Problem: Bowel/Gastric:  Goal: Normal bowel function is maintained or improved  Outcome: PROGRESSING AS EXPECTED  Normal BM

## 2021-04-17 NOTE — PALLIATIVE CARE
"Palliative Care follow-up  Family meeting with Dr Camarillo, Argenis VORA, pts daughter Kerline, pts mother Shalini, and pts step-father Alfonso, and this PC RN. Moved from the bedside to the conference room. Dr Camarillo provided clinical overview and discuss possible prognosis and pending MRI to assist with more definitive prognosis.   Family spoke about Екатерина' difficult hx of addiction and mental health problems over her lifetime. They feel that she would not wish to require excessive rehabilitation due to her life long hx of non-compliance. \"She doesn't like to take any advice from people she knows and likes, let alone strangers\".   Explored their thoughts on what Nathalie would want if she was able to speak at this time. Discussed continuation of this conversation in order to guide decision making process if pt is unable to awaken and regain her own ability to make decisions.   Family are all in agreement that she would not wish for long term life support, or debility. Pts daughter stated that perhaps, if she was able to be \"fairly independent in a facility\" then she may be willing to accept this level of care.     Everyone is in agreement to await MRI results to better assist decision making.     Shalini spoke about pts brother and how they have been keeping him updated.   Shalini asked what the policy would be for him to visit. Explained that if Nathalie was to decline, and the family was looking to transition to CC the visiting policy is more relaxed, but while the pt is being actively being treated, the two person restriction is in place.     Shalini and Alfonso stated that are able to stay for \"several days\" in a local hotel, but then would need to return home as they live outside of [facility].   Kerline is very appreciative of their support and hopes that all major decisions can be made while they are together.     Spoke further about the difficult nature of family dynamics when addiction and mental health is involved. Pt mother " expressed conflicting anger, guilt, and sadness over her daughters life and current situation. Validated her thoughts and feelings. Spoke about the normalcy of this. Validated the families love and support for each other and let them know that the entire Medical team is here for Nathalie and them throughout this time.   Ensured that Kerline has the Palliative Team contact information and will call with any question or needs.    Updated: Bedside BRITNEY Eduardo    Plan: TBD pending MRI results and further discussion.     Recommendations: I do not recommend an ethics or hospice consult at this time because MRI is currently pending to assist with POC/GOC decision making.    Thank you for allowing Palliative Care to participate in this patient's care. Please feel free to call [phone] with any questions or concerns.

## 2021-04-17 NOTE — PROGRESS NOTES
"Critical Care Progress Note    Date of admission  4/13/2021    Chief Complaint  ALOC    Hospital Course  Ms. Aguilar is a 60-year-old female with PMH significant for cholecystectomy, tobacco dependence, EtOH dependence (1 pint/day) and IVDU (heroin) who presented via EMS 4/13/2021 with acute loss of consciousness and failure to thrive. A couple of days prior to presentation, she reportedly fell in the bathroom.  Day of presentation she was found by her daughter, naked, altered and covered in feces.  Suspected she was down for at least 24 hours.  CT head showed acute/subacute bilateral SDH left greater than right with significant bifrontal cerebral atrophy. Neurosurgery consulted and recommend non-operative management.     Pertinent admission labs: Lactic acid 13, UDS positive benzos and cannabinoids, . Elevated liver enzymes, Hepatitis panel (-). RUQ US unremarkable showing fatty liver and trace ascites.    4/14 - seizure --> asystole, Code Blue ROSC after 2nd pulse check. Intubated. Continuous EEG. ECHO found mass right atrium. Unable to do cardiac MRI at this time.   4/15 - bloody stool overnight. Hgb 7.7. INR 1.28, receiving vitamin K. Protonix drip. GI consult, medical management for now. Hgb 8.9 after 1 RBC.   4/16 - OK to start tube feedings per GI      Interval Problem Update  Reviewed last 24 hour events:              - acute events overnight              - Tm:              - HR:               - SBP:               - Neuro:              - GI:               - UOP:               - Castro:               - Lines:              - PPx:              - CXR (personally reviewed):               - Antibiotic Day   - SAT/SBT   - Mobility:   - Goals of Care: family meeting today. (see problem list \"advanced care planning discussion\".     Review of Systems  Review of Systems   Unable to perform ROS: Intubated        Vital Signs for last 24 hours   Temp:  [36.6 °C (97.8 °F)-36.9 °C (98.5 °F)] 36.6 °C (97.8 °F)  Pulse: "  [] 99  Resp:  [12-27] 19  BP: ()/(55-73) 90/63  SpO2:  [92 %-100 %] 100 %    Hemodynamic parameters for last 24 hours       Respiratory Information for the last 24 hours  Vent Mode: APVCMV  Rate (breaths/min): 16  Vt Target (mL): 390  PEEP/CPAP: 10  P Support: 5  MAP: 10  Length of Weaning Trial (Hours): 1   Control VTE (exp VT): 610    Physical Exam   Physical Exam  Vitals and nursing note reviewed.   Constitutional:       General: She is not in acute distress.     Appearance: She is underweight. She is ill-appearing. She is not toxic-appearing.      Interventions: She is intubated.      Comments: Thin/frail   HENT:      Mouth/Throat:      Lips: Pink.      Mouth: Mucous membranes are moist.   Eyes:      Comments: PEERL 5mm      Cardiovascular:      Rate and Rhythm: Regular rhythm.      Pulses: No decreased pulses.           Dorsalis pedis pulses are 2+ on the right side and 2+ on the left side.      Heart sounds: Heart sounds are distant.      Comments: Bilateral hand edema 1-2+ with anasarca  Pulmonary:      Effort: Pulmonary effort is normal. She is intubated.      Breath sounds: No wheezing or rhonchi.   Abdominal:      General: Bowel sounds are decreased.      Palpations: Abdomen is soft.   Genitourinary:     Comments: Matthew  Musculoskeletal:      Right lower le+ Edema present.      Left lower le+ Edema present.      Comments: MUKHERJEE  Does not follow commands   Skin:     General: Skin is cool.      Capillary Refill: Capillary refill takes 2 to 3 seconds.      Comments: Bruises in various stages of healing throughout   Neurological:      GCS: GCS eye subscore is 4. GCS verbal subscore is 1. GCS motor subscore is 4.      Cranial Nerves: Cranial nerve deficit present.      Motor: Atrophy, abnormal muscle tone and seizure activity present.      Comments: 9T  Opens eyes, does not follow commands  MUKHERJEE - withdrawals to pain    Continuous EEG   Psychiatric:      Comments: Intubated    Agitated at  times         Medications  Current Facility-Administered Medications   Medication Dose Route Frequency Provider Last Rate Last Admin   • potassium chloride (KCL) ivpb 10 mEq  10 mEq Intravenous Once Argenis Stapleton, A.P.R.N.       • omeprazole (FIRST-OMEPRAZOLE) 2 mg/mL oral susp 40 mg  40 mg Enteral Tube Q12HRS Argenis Stapleton, A.P.R.N.   40 mg at 04/17/21 0641   • sulfamethoxazole-trimethoprim (BACTRIM) 400-80 MG per tablet 1 tablet  1 tablet Enteral Tube Q12HRS Argenis Stapleton, A.P.R.N.   1 tablet at 04/17/21 0533   • levETIRAcetam (Keppra) 1000 mg in 100 mL NaCl IV premix  1,000 mg Intravenous Q12HRS Argenis Stapleton, A.P.R.N.   Stopped at 04/17/21 0544   • Pharmacy Consult: Enteral tube insertion - review meds/change route/product selection  1 Each Other PHARMACY TO DOSE Fatmata Funez M.D.       • multivitamin (THERAGRAN) tablet 1 tablet  1 tablet Enteral Tube DAILY Ventura Still, D.O.   1 tablet at 04/17/21 0529    And   • folic acid (FOLVITE) tablet 1 mg  1 mg Enteral Tube DAILY Ventura Still, D.O.   1 mg at 04/17/21 0529   • magnesium oxide (MAG-OX) tablet 400 mg  400 mg Enteral Tube BID Ventura Still D.O.   400 mg at 04/17/21 0532   • acetaminophen (Tylenol) tablet 650 mg  650 mg Enteral Tube Q6HRS PRN Ventura Still, D.O.   650 mg at 04/15/21 2037   • norepinephrine (Levophed) infusion 8 mg/250 mL (premix)  0-30 mcg/min Intravenous Continuous Tania Camarillo M.D.   Stopped at 04/16/21 1600   • MD Alert...ICU Electrolyte Replacement per Pharmacy   Other PHARMACY TO DOSE Argenis Stapleton, A.P.R.N.       • K+ Scale: Goal of 4.5  1 Each Intravenous Q6HRS Tania Camarillo M.D.   1 Each at 04/17/21 1200   • Respiratory Therapy Consult   Nebulization Continuous RT Argenis Stapleton, A.P.R.N.       • senna-docusate (PERICOLACE or SENOKOT S) 8.6-50 MG per tablet 2 tablet  2 tablet Enteral Tube BID ESTEFANIA HarveyP.RBETTY   Stopped at 04/17/21 0600    And   • polyethylene glycol/lytes (MIRALAX) PACKET 1 Packet  1 Packet  Enteral Tube QDAY PRN Argenis Stapleton, A.P.R.N.        And   • magnesium hydroxide (MILK OF MAGNESIA) suspension 30 mL  30 mL Enteral Tube QDAY PRN Argenis Stapleton, A.P.R.N.        And   • bisacodyl (DULCOLAX) suppository 10 mg  10 mg Rectal QDAY PRN Argenis Stapleton, A.P.R.N.       • lidocaine (XYLOCAINE) 1 % injection 1-2 mL  1-2 mL Tracheal Tube Q30 MIN PRN Argenis Stapleton, A.P.R.N.       • fentaNYL (SUBLIMAZE) injection 25 mcg  25 mcg Intravenous Q HOUR PRN Tania Camarillo M.D.   25 mcg at 04/16/21 2042    Or   • fentaNYL (SUBLIMAZE) injection 50 mcg  50 mcg Intravenous Q HOUR PRN Tania Camarillo M.D.        Or   • fentaNYL (SUBLIMAZE) injection 100 mcg  100 mcg Intravenous Q HOUR PRN Tania Camarillo M.D.       • dextrose 50% (D50W) injection 50 mL  50 mL Intravenous Q15 MIN PRN Ventura Still D.O.       • nicotine (NICODERM) 14 MG/24HR 14 mg  14 mg Transdermal Daily-0600 Francesco Bernstein M.D.   14 mg at 04/17/21 0528       Fluids    Intake/Output Summary (Last 24 hours) at 4/17/2021 1312  Last data filed at 4/17/2021 1307  Gross per 24 hour   Intake 3254.61 ml   Output 1110 ml   Net 2144.61 ml       Laboratory  Recent Labs     04/15/21  0247 04/16/21  0114   ISTATAPH 7.490 7.495   ISTATAPCO2 36.2 31.4   ISTATAPO2 91* 73   ISTATATCO2 29 25   [id number] 98 96   ISTATARTHCO3 27.6* 24.2   ISTATARTBE 4* 1   ISTATTEMP 97.0 F 97.6 F   ISTATFIO2 40 40   ISTATSPEC Arterial Arterial   ISTATAPHTC 7.504* 7.503*   [id number] 86 71         Recent Labs     04/16/21  2355 04/17/21  0523 04/17/21  0800 04/17/21  1200   SODIUM 138 135  --  134*   POTASSIUM 3.8 4.0  --  4.1   CHLORIDE 104 102  --  101   CO2 26 27  --  26   BUN 6* 8  --  8   CREATININE 0.17* <0.17*  --  <0.17*   MAGNESIUM 1.7 1.9 1.7  --    PHOSPHORUS 1.6* 2.3* 3.2  --    CALCIUM 7.1* 7.6*  --  7.1*     Recent Labs     04/15/21  0245 04/15/21  0927 04/16/21  2355 04/17/21  0040 04/17/21  0523 04/17/21  1200   PREALBUMIN 9.7*  --   --  9.0*  --   --    GLUCOSE 106*   < >  115*  --  127* 119*    < > = values in this interval not displayed.     Recent Labs     04/15/21  0245 04/16/21  0925 04/16/21  2355   WBC 3.4* 4.0* 5.2   NEUTSPOLYS 71.40 59.70 73.30*   LYMPHOCYTES 23.00 27.50 20.70*   MONOCYTES 3.50 6.40 2.60   EOSINOPHILS 0.60 1.20 2.60   BASOPHILS 0.00 0.20 0.00     Recent Labs     04/15/21  0245 04/15/21  0927 04/16/21  0408 04/16/21  0615 04/16/21  0925 04/16/21  2355 04/17/21  0523   RBC 2.12*  --   --   --  2.36* 2.20*  --    HEMOGLOBIN 7.7*   < > 8.6*  --  8.2*  8.1* 7.9*  --    HEMATOCRIT 22.8*  --   --   --  24.6* 23.4*  --    PLATELETCT 82*  --   --   --  60* 69*  --    PROTHROMBTM 16.4*  --   --  14.5  --   --  13.7   INR 1.28*  --   --  1.10  --   --  1.02    < > = values in this interval not displayed.       Imaging  no new imaging today    Assessment/Plan  * SDH (subdural hematoma) (HCC)- (present on admission)  Assessment & Plan  -Patient with significant alcohol history - on presentation  -Sustained a fall, striking head at home 2 days prior to presentation  -Imaging showed bilateral subdural hematomas L>R  -INR elevated.  Not currently on anticoagulation.  She has received 3 doses vitamin K  -Also with new onset seizures. Continue BID Keppra  -Every 2 neurochecks  -VTE currently contraindicated  -PT/OT/SLP once hemodynamically stable    Alcohol withdrawal syndrome with complication (HCC)- (present on admission)  Assessment & Plan  -with acute intoxication on admission  -she has received Rally bag  -high-dose Thiamine IV x 3 days, then PO daily  -MVI, folate daily  -Hepatitis panel negative  -cessation education and counseling when able  -seizure precautions - continuous EEG     Protein-calorie malnutrition, severe (HCC)- (present on admission)  Assessment & Plan  -Body mass index is 18.7 kg/m².  -History of significant EtOH abuse  -tube feedings per dietary  -High risk for refeeding syndrome.  Monitor closely  -Daily MVI, folate and thiamine      Transaminitis- (present on admission)  Assessment & Plan  -Likely from chronic alcohol use  -Hepatitis panel negative  -RUQ US pending  -Follow labs    Lactic acidosis- (present on admission)  Assessment & Plan  -Suspect from seizure-like activity and cardiac arrest  -Trending down, stop trending        Electrolyte disturbance- (present on admission)  Assessment & Plan  -Hypokalemia, hypomagnesemia, hypophosphatemia, hypomagnesemia  -Replacing all per ICU protocol  -Trending BMPs    Thrombocytopenia (HCC)- (present on admission)  Assessment & Plan  -multifactorial due to history of ETOH abuse, vitamin deficiencies, liver disease  -stable  -monitor for bleeding  -follow CBC    UTI due to extended-spectrum beta lactamase (ESBL) producing Escherichia coli- (present on admission)  Assessment & Plan  -present on admission-found down, altered at home - had been incontinent feces  -ID following. Recommend PO Bactrim for now. Check blood cultures, if bacteremia, would consider Meropenem  -low-grade fever, no leukocytosis    Melena  Assessment & Plan  --resolved    GI bleed  Assessment & Plan  -resolved - BM today showed no evidence of melena  -GI consult - no intervention at this time. Should Hgb continue to drop or patient begin having more bloody stools, would consider EGD/colonoscopy  -continue PPI      Shock (HCC)  Assessment & Plan  -Multifactorial: Cardiogenic shock post cardiac arrest. Hypovolemic shock  -Status post 4 L fluid resuscitation  -resolved - levophed drip off > 24 hours    Acute respiratory failure (HCC)  Assessment & Plan  -bilateral SDH with seizure like activity  -intubated post-cardiac arrest  -Lung protective ventilation strategies  -Low suspicion for PNA clinically, doxycycline has been DC'd  -Titrate ventilator prescription to optimize oxygenation, ventilation, and acid base balance.  -Goal PCO2 35-40  -Daily SAT/SBT trials currently contraindicated  -Chlorhexidine  -RT protocols    Cardiac  "arrest (HCC)  Assessment & Plan  -4/14 asystole - suspected due to seizures  -ROSC after 2 rounds CPR  -intubated  -ECHO nml EF, no valvular abnormalities reported  -target-temperature management not indicated.  -code status discussed with daughter - patient is DNR          QT prolongation- (present on admission)  Assessment & Plan  -537 on EKG, repeat EKG post cardiac arrest  -Repeat , 512  -Continuous telemetry monitoring  -Repeat a.m. EKG  -Avoid QT prolonging agents  -trending and replacing Mag    Advanced care planning/counseling discussion- (present on admission)  Assessment & Plan  -family meeting today with patient's daughter, Kerline, mother, Pat, Palliative Care RN VIELKA Gage and MD Camarillo.  -22 minutes spent updating on recent imaging, labs, hemodynamics, and mentation. Patient has been off sedation for about 24 hours now. She will open her eyes to her name, but does not follow commands. MUKHERJEE's independently.   -both mother and daughter express that patient is a \"difficult person to put it nicely\". They suggest patient would not want prolonged life-support. They report patient has struggled with eating disorders her entire life and likely wouldn't want long-term tube feedings either. As far as ADL's, they suggest patient wouldn't want to be disabled and would want to be able to live independently.   -patient was able to SBT yesterday and today, but still does not follow commands to get parameters required for extubation. Patient received dose of Phenobarbital at 2300 on 4/13 for suspected ETOH withdrawal. Average 1/2 life about 80 hours. Therefore, we will continue to watch her over the next 24-48 hours, get MRI brain, and decide further plan/goals at that time.   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 Repeat US 4/17 showed no evidence of acute DVT  -Cardiology consulted: \"Right atrial mass is likely a prominent eustachian valve but cannot exclude malignancy the most appropriate test is a cardiac MRI.  Clearly she is not in clinical state to get a cardiac MRI as you have to hold still for 40 minutes with breath holds so if she gets to this clinical state try to arrange a cardiac MRI for evaluation of this otherwise could be done as an outpatient\".              VTE:  Contraindicated  Ulcer: PPI  Lines: Central Line  Ongoing indication addressed, Arterial Line  Ongoing indication addressed and Castro Catheter  Ongoing indication addressed    I have performed a physical exam and reviewed and updated ROS and Plan today (4/17/2021). In review of yesterday's note (4/16/2021), there are no changes except as documented above.     Discussed patient condition and risk of morbidity and/or mortality with Family, RN, RT, Therapies, Pharmacy, Dietary, , Charge nurse / hot rounds and Patient  The patient remains critically ill.  Critical care time = 62 minutes in directly providing and coordinating critical care and extensive data review.  No time overlap and excludes procedures.    Please note that this dictation was created using voice recognition software. I have made every reasonable attempt to correct obvious errors, but there may be errors of grammar and possibly content that I did not discover before finalizing the note.    GELY Harvey.    "

## 2021-04-17 NOTE — FLOWSHEET NOTE
04/17/21 0733   Spontaneous Breathing Trial (SBT)   Spontaneous breathing trial (SBT) outcome Pt weaned for 1 hour and returned to rest settings per protocol - SBT Pass   Length of Weaning Trial (Hours) 1    Pt passed SBT but not ready to extubate Not ready - continue daily assessments for extubation   Weaning Parameters   RR (bpm) 16   $ FVC / Vital Capacity (liters)  0.9   NIF (cm H2O)  -18   Rapid Shallow Breathing Index (RR/VT) 23   Spontaneous VE 11.4   Spontaneous

## 2021-04-17 NOTE — FLOWSHEET NOTE
04/17/21 1520   Spontaneous Breathing Trial (SBT)   Spontaneous breathing trial (SBT) outcome Pt weaned for 1 hour and returned to rest settings per protocol - SBT Pass   Length of Weaning Trial (Hours) 1   Pt passed SBT but not ready to extubate Not ready - continue daily assessments for extubation   Weaning Parameters   RR (bpm) 16   $ FVC / Vital Capacity (liters)  1.2   NIF (cm H2O)  -30   Rapid Shallow Breathing Index (RR/VT) 21   Spontaneous VE 11.8   Spontaneous

## 2021-04-17 NOTE — CARE PLAN
Ventilator Daily Summary    Vent Day #  4    7.5 @ 25    APVcmv 16 390 10 40 %    Ventilator settings changed this shift:no    Weaning trials:no    Respiratory Procedures:no    Plan: Continue current ventilator settings and wean mechanical ventilation as tolerated per physician orders.

## 2021-04-17 NOTE — PROGRESS NOTES
Neurology Progress Note        Subjective:    No further concerns for seizure activity.  The patient shakes her head and now she is in any pain.    Objective:  Vitals:  Vitals:    04/17/21 0945 04/17/21 1000 04/17/21 1015 04/17/21 1030   BP:       Pulse: (!) 118 (!) 107 (!) 113 (!) 116   Resp: (!) 21 (!) 23 (!) 24 20   Temp:  36.7 °C (98 °F)     TempSrc:  Temporal     SpO2: 96% 94% 97% 97%   Weight:       Height:         General: When I walk in the room she is awake and looks at me, she is still intubated.  Does shake her head no wants to questions otherwise does not try to shake her head yes or no  Mental Status:  Alert, follows commands in all 4 limbs (this squeezed my fingers, wiggle her toes)  Cranial Nerves:  PERRL, EOMI with no nystagmus  Motor: Moves all 4 limbs  Coordination:  Normal finger to nose bilaterally  Gait:  Deferred    Recent Labs     04/15/21  0245 04/15/21  0927 04/16/21  0408 04/16/21  0925 04/16/21  2355   WBC 3.4*  --   --  4.0* 5.2   RBC 2.12*  --   --  2.36* 2.20*   HEMOGLOBIN 7.7*   < > 8.6* 8.2*  8.1* 7.9*   HEMATOCRIT 22.8*  --   --  24.6* 23.4*   .5*  --   --  104.2* 106.4*   MCH 36.3*  --   --  34.7* 35.9*   MCHC 33.8  --   --  33.3* 33.8   RDW 65.1*  --   --   --  76.8*   PLATELETCT 82*  --   --  60* 69*   MPV 11.5  --   --  11.1 10.7    < > = values in this interval not displayed.     Recent Labs     04/16/21  1450 04/16/21  2355 04/17/21  0523   SODIUM 139 138 135   POTASSIUM 4.2 3.8 4.0   CHLORIDE 104 104 102   CO2 24 26 27   GLUCOSE 90 115* 127*   BUN 5* 6* 8   CREATININE 0.21* 0.17* <0.17*   CALCIUM 7.4* 7.1* 7.6*     Recent Labs     04/15/21  0245 04/16/21  0615 04/17/21  0523   INR 1.28* 1.10 1.02             Recent Labs     04/15/21  0927 04/16/21  2355   TRIGLYCERIDE 213* 221*     Recent Labs     04/16/21  1450 04/16/21  2355 04/17/21  0523   SODIUM 139 138 135   POTASSIUM 4.2 3.8 4.0   CHLORIDE 104 104 102   CO2 24 26 27   GLUCOSE 90 115* 127*   BUN 5* 6* 8      Recent Labs     04/16/21  1450 04/16/21  1450 04/16/21  2355 04/17/21  0523 04/17/21  0800   SODIUM 139  --  138 135  --    POTASSIUM 4.2  --  3.8 4.0  --    CHLORIDE 104  --  104 102  --    CO2 24  --  26 27  --    BUN 5*  --  6* 8  --    CREATININE 0.21*  --  0.17* <0.17*  --    MAGNESIUM 2.3   < > 1.7 1.9 1.7   PHOSPHORUS 2.4*   < > 1.6* 2.3* 3.2   CALCIUM 7.4*  --  7.1* 7.6*  --     < > = values in this interval not displayed.     Recent Labs     04/15/21  0245 04/16/21  0615 04/17/21  0523   INR 1.28* 1.10 1.02     No results found for this or any previous visit.           Imaging: neuroimaging reviewed and directly visualized by me  US-EXTREMITY VENOUS LOWER BILAT   Final Result      DX-CHEST-PORTABLE (1 VIEW)   Final Result         1.  Hazy left pulmonary opacities suggests subtle infiltrate, similar to prior study.      DX-CHEST-PORTABLE (1 VIEW)   Final Result         1.  Hazy left pulmonary opacities suggests subtle infiltrate, similar to prior study.      US-EXTREMITY VENOUS LOWER BILAT   Final Result      EC-ECHOCARDIOGRAM COMPLETE W/O CONT   Final Result      US-RUQ   Final Result      1.  Enlarged echogenic liver suggesting fatty infiltration.   2.  Prior cholecystectomy.   3.  No biliary dilation.   4.  Trace ascites, nonspecific.         DX-CHEST-PORTABLE (1 VIEW)   Final Result      Left internal jugular catheter placement with the tip projecting over the superior vena cava. No pneumothorax is identified.      CT-HEAD W/O   Final Result      1.  Stable bilateral subdural hematomas. No significant midline shift.      2.  Stable left frontal subarachnoid hemorrhage.         DX-CHEST-LIMITED (1 VIEW)   Final Result      Right internal jugular catheter placement with the tip projecting over the superior vena cava. No pneumothorax is identified.      DX-CHEST-PORTABLE (1 VIEW)   Final Result         1.  Hazy left pulmonary opacities suggests subtle infiltrate      CT-HEAD W/O   Final Result       Bilateral subdural hematomas measure up to 8.3 mm on the left and 5 mm on the right.      Left frontal subarachnoid blood is also seen.      No midline shift.      Soft tissue swelling of the right cheek.      Findings discussed with Dr Naman MORGAN-CHEST-PORTABLE (1 VIEW)   Final Result      No acute cardiopulmonary process is seen.      MR-BRAIN-W/O    (Results Pending)       Impression: Nathalie is a 60-year-old woman with bilateral subdural hematomas without significant mass-effect.  Neurosurgery has been consulted and does not feel there is an indication for surgical intervention at this time.  She had possible seizure activity preceding a brief cardiac arrest.  Since that time she is having episodes of posturing and rigidity associated with pupillary dilatation.  EEG has not shown any evidence of seizure so far despite clinical events that have been concerning.         Recommendations:  -Would continue Keppra 1000 mg twice daily for now  -MRI of the brain may be useful eventually to guide in prognosis and look for seizure focus but not critical to management if patient is clinically unstable.     -In regards to prognosis from a neurological perspective the patient had a relatively short period of cardiac arrest and is now alert and following commands. So far I have no evidence of any irreversible brain injury.  I cannot rule out at this time that she has not suffered some permanent neurological disability from her traumatic brain injury, however I doubt she suffered a significant anoxic brain injury.  Overall neurological prognosis remains guarded.  Cognitive evaluation and evaluation with PT and OT will be important what she is clinically stable and extubated.     -No further recommendations from a neurological standpoint at this time.  Neurology will sign off.  Please call if any further questions or concerns arise.

## 2021-04-18 ENCOUNTER — APPOINTMENT (OUTPATIENT)
Dept: RADIOLOGY | Facility: MEDICAL CENTER | Age: 61
DRG: 082 | End: 2021-04-18
Attending: NURSE PRACTITIONER
Payer: MEDICAID

## 2021-04-18 ENCOUNTER — APPOINTMENT (OUTPATIENT)
Dept: RADIOLOGY | Facility: MEDICAL CENTER | Age: 61
DRG: 082 | End: 2021-04-18
Attending: INTERNAL MEDICINE
Payer: MEDICAID

## 2021-04-18 LAB
ALBUMIN SERPL BCP-MCNC: 1.8 G/DL (ref 3.2–4.9)
ANION GAP SERPL CALC-SCNC: 5 MMOL/L (ref 7–16)
ANION GAP SERPL CALC-SCNC: 5 MMOL/L (ref 7–16)
ANION GAP SERPL CALC-SCNC: 6 MMOL/L (ref 7–16)
ANION GAP SERPL CALC-SCNC: 9 MMOL/L (ref 7–16)
ANISOCYTOSIS BLD QL SMEAR: ABNORMAL
BACTERIA BLD CULT: NORMAL
BACTERIA BLD CULT: NORMAL
BASOPHILS # BLD AUTO: 0 % (ref 0–1.8)
BASOPHILS # BLD: 0 K/UL (ref 0–0.12)
BUN SERPL-MCNC: 10 MG/DL (ref 8–22)
BUN SERPL-MCNC: 10 MG/DL (ref 8–22)
BUN SERPL-MCNC: 11 MG/DL (ref 8–22)
BUN SERPL-MCNC: 9 MG/DL (ref 8–22)
CA-I SERPL-SCNC: 1 MMOL/L (ref 1.1–1.3)
CALCIUM SERPL-MCNC: 6.9 MG/DL (ref 8.5–10.5)
CALCIUM SERPL-MCNC: 7 MG/DL (ref 8.5–10.5)
CALCIUM SERPL-MCNC: 7.1 MG/DL (ref 8.5–10.5)
CALCIUM SERPL-MCNC: 7.1 MG/DL (ref 8.5–10.5)
CHLORIDE SERPL-SCNC: 100 MMOL/L (ref 96–112)
CHLORIDE SERPL-SCNC: 101 MMOL/L (ref 96–112)
CHLORIDE SERPL-SCNC: 103 MMOL/L (ref 96–112)
CHLORIDE SERPL-SCNC: 99 MMOL/L (ref 96–112)
CO2 SERPL-SCNC: 25 MMOL/L (ref 20–33)
CO2 SERPL-SCNC: 26 MMOL/L (ref 20–33)
CO2 SERPL-SCNC: 26 MMOL/L (ref 20–33)
CO2 SERPL-SCNC: 27 MMOL/L (ref 20–33)
CREAT SERPL-MCNC: 0.18 MG/DL (ref 0.5–1.4)
CREAT SERPL-MCNC: <0.17 MG/DL (ref 0.5–1.4)
EOSINOPHIL # BLD AUTO: 0 K/UL (ref 0–0.51)
EOSINOPHIL NFR BLD: 0 % (ref 0–6.9)
ERYTHROCYTE [DISTWIDTH] IN BLOOD BY AUTOMATED COUNT: 77 FL (ref 35.9–50)
GLUCOSE SERPL-MCNC: 109 MG/DL (ref 65–99)
GLUCOSE SERPL-MCNC: 112 MG/DL (ref 65–99)
GLUCOSE SERPL-MCNC: 113 MG/DL (ref 65–99)
GLUCOSE SERPL-MCNC: 116 MG/DL (ref 65–99)
HCT VFR BLD AUTO: 23.1 % (ref 37–47)
HGB BLD-MCNC: 7.4 G/DL (ref 12–16)
HYPOCHROMIA BLD QL SMEAR: ABNORMAL
LYMPHOCYTES # BLD AUTO: 1.13 K/UL (ref 1–4.8)
LYMPHOCYTES NFR BLD: 17.1 % (ref 22–41)
MACROCYTES BLD QL SMEAR: ABNORMAL
MAGNESIUM SERPL-MCNC: 1.5 MG/DL (ref 1.5–2.5)
MAGNESIUM SERPL-MCNC: 1.7 MG/DL (ref 1.5–2.5)
MAGNESIUM SERPL-MCNC: 1.9 MG/DL (ref 1.5–2.5)
MAGNESIUM SERPL-MCNC: 2.3 MG/DL (ref 1.5–2.5)
MANUAL DIFF BLD: NORMAL
MCH RBC QN AUTO: 35.1 PG (ref 27–33)
MCHC RBC AUTO-ENTMCNC: 32 G/DL (ref 33.6–35)
MCV RBC AUTO: 109.5 FL (ref 81.4–97.8)
MICROCYTES BLD QL SMEAR: ABNORMAL
MONOCYTES # BLD AUTO: 0.06 K/UL (ref 0–0.85)
MONOCYTES NFR BLD AUTO: 0.9 % (ref 0–13.4)
MORPHOLOGY BLD-IMP: NORMAL
NEUTROPHILS # BLD AUTO: 5.41 K/UL (ref 2–7.15)
NEUTROPHILS NFR BLD: 82 % (ref 44–72)
NRBC # BLD AUTO: 0.71 K/UL
NRBC BLD-RTO: 10.7 /100 WBC
PHOSPHATE SERPL-MCNC: 2.6 MG/DL (ref 2.5–4.5)
PHOSPHATE SERPL-MCNC: 2.7 MG/DL (ref 2.5–4.5)
PHOSPHATE SERPL-MCNC: 2.8 MG/DL (ref 2.5–4.5)
PHOSPHATE SERPL-MCNC: 2.8 MG/DL (ref 2.5–4.5)
PLATELET # BLD AUTO: 73 K/UL (ref 164–446)
PLATELET BLD QL SMEAR: NORMAL
PMV BLD AUTO: 11.4 FL (ref 9–12.9)
POLYCHROMASIA BLD QL SMEAR: NORMAL
POTASSIUM SERPL-SCNC: 3.8 MMOL/L (ref 3.6–5.5)
POTASSIUM SERPL-SCNC: 3.9 MMOL/L (ref 3.6–5.5)
POTASSIUM SERPL-SCNC: 4.1 MMOL/L (ref 3.6–5.5)
POTASSIUM SERPL-SCNC: 4.5 MMOL/L (ref 3.6–5.5)
RBC # BLD AUTO: 2.11 M/UL (ref 4.2–5.4)
RBC BLD AUTO: PRESENT
SIGNIFICANT IND 70042: NORMAL
SIGNIFICANT IND 70042: NORMAL
SITE SITE: NORMAL
SITE SITE: NORMAL
SODIUM SERPL-SCNC: 131 MMOL/L (ref 135–145)
SODIUM SERPL-SCNC: 131 MMOL/L (ref 135–145)
SODIUM SERPL-SCNC: 135 MMOL/L (ref 135–145)
SODIUM SERPL-SCNC: 135 MMOL/L (ref 135–145)
SOURCE SOURCE: NORMAL
SOURCE SOURCE: NORMAL
STOMATOCYTES BLD QL SMEAR: NORMAL
WBC # BLD AUTO: 6.6 K/UL (ref 4.8–10.8)

## 2021-04-18 PROCEDURE — 700102 HCHG RX REV CODE 250 W/ 637 OVERRIDE(OP): Performed by: INTERNAL MEDICINE

## 2021-04-18 PROCEDURE — 83735 ASSAY OF MAGNESIUM: CPT

## 2021-04-18 PROCEDURE — 99291 CRITICAL CARE FIRST HOUR: CPT | Performed by: EMERGENCY MEDICINE

## 2021-04-18 PROCEDURE — 700102 HCHG RX REV CODE 250 W/ 637 OVERRIDE(OP): Performed by: STUDENT IN AN ORGANIZED HEALTH CARE EDUCATION/TRAINING PROGRAM

## 2021-04-18 PROCEDURE — A9270 NON-COVERED ITEM OR SERVICE: HCPCS | Performed by: NURSE PRACTITIONER

## 2021-04-18 PROCEDURE — 700105 HCHG RX REV CODE 258: Performed by: EMERGENCY MEDICINE

## 2021-04-18 PROCEDURE — 700111 HCHG RX REV CODE 636 W/ 250 OVERRIDE (IP): Performed by: INTERNAL MEDICINE

## 2021-04-18 PROCEDURE — 70551 MRI BRAIN STEM W/O DYE: CPT

## 2021-04-18 PROCEDURE — 94799 UNLISTED PULMONARY SVC/PX: CPT

## 2021-04-18 PROCEDURE — 99292 CRITICAL CARE ADDL 30 MIN: CPT | Performed by: NURSE PRACTITIONER

## 2021-04-18 PROCEDURE — 700111 HCHG RX REV CODE 636 W/ 250 OVERRIDE (IP): Performed by: EMERGENCY MEDICINE

## 2021-04-18 PROCEDURE — A9270 NON-COVERED ITEM OR SERVICE: HCPCS | Performed by: STUDENT IN AN ORGANIZED HEALTH CARE EDUCATION/TRAINING PROGRAM

## 2021-04-18 PROCEDURE — 80048 BASIC METABOLIC PNL TOTAL CA: CPT | Mod: 91

## 2021-04-18 PROCEDURE — A9270 NON-COVERED ITEM OR SERVICE: HCPCS | Performed by: INTERNAL MEDICINE

## 2021-04-18 PROCEDURE — 84100 ASSAY OF PHOSPHORUS: CPT

## 2021-04-18 PROCEDURE — 94003 VENT MGMT INPAT SUBQ DAY: CPT

## 2021-04-18 PROCEDURE — 700111 HCHG RX REV CODE 636 W/ 250 OVERRIDE (IP): Performed by: NURSE PRACTITIONER

## 2021-04-18 PROCEDURE — 700102 HCHG RX REV CODE 250 W/ 637 OVERRIDE(OP): Performed by: NURSE PRACTITIONER

## 2021-04-18 PROCEDURE — 82330 ASSAY OF CALCIUM: CPT

## 2021-04-18 PROCEDURE — 700101 HCHG RX REV CODE 250: Performed by: INTERNAL MEDICINE

## 2021-04-18 PROCEDURE — 770022 HCHG ROOM/CARE - ICU (200)

## 2021-04-18 PROCEDURE — 82040 ASSAY OF SERUM ALBUMIN: CPT

## 2021-04-18 RX ORDER — TRAMADOL HYDROCHLORIDE 50 MG/1
50 TABLET ORAL EVERY 6 HOURS PRN
Status: DISCONTINUED | OUTPATIENT
Start: 2021-04-18 | End: 2021-04-20

## 2021-04-18 RX ORDER — MAGNESIUM SULFATE HEPTAHYDRATE 40 MG/ML
4 INJECTION, SOLUTION INTRAVENOUS ONCE
Status: COMPLETED | OUTPATIENT
Start: 2021-04-18 | End: 2021-04-18

## 2021-04-18 RX ORDER — POTASSIUM CHLORIDE 7.45 MG/ML
10 INJECTION INTRAVENOUS ONCE
Status: COMPLETED | OUTPATIENT
Start: 2021-04-18 | End: 2021-04-18

## 2021-04-18 RX ORDER — SODIUM CHLORIDE, SODIUM LACTATE, POTASSIUM CHLORIDE, AND CALCIUM CHLORIDE .6; .31; .03; .02 G/100ML; G/100ML; G/100ML; G/100ML
500 INJECTION, SOLUTION INTRAVENOUS ONCE
Status: COMPLETED | OUTPATIENT
Start: 2021-04-18 | End: 2021-04-18

## 2021-04-18 RX ORDER — TRAMADOL HYDROCHLORIDE 50 MG/1
50 TABLET ORAL EVERY 6 HOURS PRN
Status: DISCONTINUED | OUTPATIENT
Start: 2021-04-18 | End: 2021-04-18

## 2021-04-18 RX ADMIN — NOREPINEPHRINE BITARTRATE 5 MCG/MIN: 1 INJECTION, SOLUTION, CONCENTRATE INTRAVENOUS at 03:25

## 2021-04-18 RX ADMIN — TRAMADOL HYDROCHLORIDE 50 MG: 50 TABLET, FILM COATED ORAL at 22:16

## 2021-04-18 RX ADMIN — SULFAMETHOXAZOLE AND TRIMETHOPRIM 1 TABLET: 400; 80 TABLET ORAL at 05:23

## 2021-04-18 RX ADMIN — MAGNESIUM GLUCONATE 500 MG ORAL TABLET 400 MG: 500 TABLET ORAL at 17:23

## 2021-04-18 RX ADMIN — POTASSIUM BICARBONATE 25 MEQ: 978 TABLET, EFFERVESCENT ORAL at 12:48

## 2021-04-18 RX ADMIN — TRAMADOL HYDROCHLORIDE 50 MG: 50 TABLET, FILM COATED ORAL at 14:39

## 2021-04-18 RX ADMIN — POTASSIUM CHLORIDE 10 MEQ: 7.46 INJECTION, SOLUTION INTRAVENOUS at 14:39

## 2021-04-18 RX ADMIN — OMEPRAZOLE 40 MG: KIT at 05:21

## 2021-04-18 RX ADMIN — MAGNESIUM GLUCONATE 500 MG ORAL TABLET 400 MG: 500 TABLET ORAL at 05:21

## 2021-04-18 RX ADMIN — SODIUM CHLORIDE, POTASSIUM CHLORIDE, SODIUM LACTATE AND CALCIUM CHLORIDE 500 ML: 600; 310; 30; 20 INJECTION, SOLUTION INTRAVENOUS at 03:42

## 2021-04-18 RX ADMIN — LEVETIRACETAM INJECTION 1000 MG: 10 INJECTION INTRAVENOUS at 05:21

## 2021-04-18 RX ADMIN — OMEPRAZOLE 40 MG: KIT at 17:26

## 2021-04-18 RX ADMIN — POTASSIUM CHLORIDE 10 MEQ: 7.46 INJECTION, SOLUTION INTRAVENOUS at 00:24

## 2021-04-18 RX ADMIN — SULFAMETHOXAZOLE AND TRIMETHOPRIM 1 TABLET: 400; 80 TABLET ORAL at 17:23

## 2021-04-18 RX ADMIN — POTASSIUM CHLORIDE 10 MEQ: 7.46 INJECTION, SOLUTION INTRAVENOUS at 06:43

## 2021-04-18 RX ADMIN — MAGNESIUM SULFATE IN WATER 4 G: 40 INJECTION, SOLUTION INTRAVENOUS at 06:43

## 2021-04-18 RX ADMIN — NICOTINE 14 MG: 14 PATCH TRANSDERMAL at 05:21

## 2021-04-18 RX ADMIN — LEVETIRACETAM INJECTION 1000 MG: 10 INJECTION INTRAVENOUS at 17:23

## 2021-04-18 RX ADMIN — FENTANYL CITRATE 25 MCG: 50 INJECTION, SOLUTION INTRAMUSCULAR; INTRAVENOUS at 01:53

## 2021-04-18 ASSESSMENT — LIFESTYLE VARIABLES
HAVE PEOPLE ANNOYED YOU BY CRITICIZING YOUR DRINKING: YES
HOW MANY TIMES IN THE PAST YEAR HAVE YOU HAD 5 OR MORE DRINKS IN A DAY: 365
TOTAL SCORE: 4
EVER FELT BAD OR GUILTY ABOUT YOUR DRINKING: YES
EVER HAD A DRINK FIRST THING IN THE MORNING TO STEADY YOUR NERVES TO GET RID OF A HANGOVER: YES
DOES PATIENT WANT TO TALK TO SOMEONE ABOUT QUITTING: YES
TOTAL SCORE: 4
ON A TYPICAL DAY WHEN YOU DRINK ALCOHOL HOW MANY DRINKS DO YOU HAVE: 8
TOTAL SCORE: 4
DOES PATIENT WANT TO STOP DRINKING: YES
ALCOHOL_USE: YES
AVERAGE NUMBER OF DAYS PER WEEK YOU HAVE A DRINK CONTAINING ALCOHOL: 7
HAVE YOU EVER FELT YOU SHOULD CUT DOWN ON YOUR DRINKING: YES
CONSUMPTION TOTAL: POSITIVE

## 2021-04-18 ASSESSMENT — PAIN DESCRIPTION - PAIN TYPE
TYPE: ACUTE PAIN

## 2021-04-18 ASSESSMENT — ENCOUNTER SYMPTOMS
WEAKNESS: 1
COUGH: 0
NAUSEA: 0
BLURRED VISION: 0
PALPITATIONS: 0
VOMITING: 0
ABDOMINAL PAIN: 0
DOUBLE VISION: 0
SHORTNESS OF BREATH: 0

## 2021-04-18 ASSESSMENT — PATIENT HEALTH QUESTIONNAIRE - PHQ9
SUM OF ALL RESPONSES TO PHQ9 QUESTIONS 1 AND 2: 0
2. FEELING DOWN, DEPRESSED, IRRITABLE, OR HOPELESS: NOT AT ALL
2. FEELING DOWN, DEPRESSED, IRRITABLE, OR HOPELESS: NOT AT ALL
1. LITTLE INTEREST OR PLEASURE IN DOING THINGS: NOT AT ALL
SUM OF ALL RESPONSES TO PHQ9 QUESTIONS 1 AND 2: 0
1. LITTLE INTEREST OR PLEASURE IN DOING THINGS: NOT AT ALL

## 2021-04-18 NOTE — PROGRESS NOTES
Critical Care Event Note    Called to the bedside for hypotension requiring increased norepinephrine.  Urine output has been about 400 mL for the shift.  No signs of bleeding.  No other changes to vital signs.  HGB is 7.4.    Hypotension:  - Bolus 500 mL LR  - Titrate norepinephrine to keep MAP > 65  - BP improved    Patient is critically ill.  The critical that has been undertaken is medically complex.  There has been no overlap in critical care time.  Critical Care Time not including procedures: 31

## 2021-04-18 NOTE — PROGRESS NOTES
Critical lab result of calcium 6.9. Requested that Lab add albumin to obtain corrected calcium. Awaiting albumin results.

## 2021-04-18 NOTE — CARE PLAN
Problem: Communication  Goal: The ability to communicate needs accurately and effectively will improve  Outcome: PROGRESSING AS EXPECTED     Problem: Safety  Goal: Will remain free from injury  Outcome: PROGRESSING AS EXPECTED  Goal: Will remain free from falls  Outcome: PROGRESSING AS EXPECTED     Problem: Pain Management  Goal: Pain level will decrease to patient's comfort goal  Outcome: PROGRESSING AS EXPECTED     Problem: Safety - Medical Restraint  Goal: Remains free of injury from restraints (Restraint for Interference with Medical Device)  Description: INTERVENTIONS:  1. Determine that other, less restrictive measures have been tried or would not be effective before applying the restraint  2. Evaluate the patient's condition at the time of restraint application  3. Inform patient/family regarding the reason for restraint  4. Q2H: Monitor safety, psychosocial status, comfort, nutrition and hydration  Outcome: PROGRESSING AS EXPECTED  Note: Discontinued

## 2021-04-18 NOTE — PROGRESS NOTES
Dr. Sims made aware of change in BP and HR and pt requiring Levophed. Updated on urine output as requested. Also, made aware of drop in hgb. New orders received for LR bolus.

## 2021-04-18 NOTE — CARE PLAN
Ventilator Daily Summary    Vent Day # 5    7.5 @ 25    APVcmv 16 390 8 40%    Ventilator settings changed this shift:no    Weaning trials:no    Respiratory Procedures:no     Plan: Continue current ventilator settings and wean mechanical ventilation as tolerated per physician orders.

## 2021-04-18 NOTE — PROGRESS NOTES
Critical Care Progress Note    Date of admission  4/13/2021    Chief Complaint  ALOC    Hospital Course  Ms. Aguilar is a 60-year-old female with PMH significant for cholecystectomy, tobacco dependence, EtOH dependence (1 pint/day) and IVDU (heroin) who presented via EMS 4/13/2021 with acute loss of consciousness and failure to thrive. A couple of days prior to presentation, she reportedly fell in the bathroom.  Day of presentation she was found by her daughter, naked, altered and covered in feces.  Suspected she was down for at least 24 hours.  CT head showed acute/subacute bilateral SDH left greater than right with significant bifrontal cerebral atrophy. Neurosurgery consulted and recommend non-operative management.     Pertinent admission labs: Lactic acid 13, UDS positive benzos and cannabinoids, . Elevated liver enzymes, Hepatitis panel (-). RUQ US unremarkable showing fatty liver and trace ascites.    4/14 - seizure --> asystole, Code Blue ROSC after 2nd pulse check. Intubated. Continuous EEG. ECHO found mass right atrium. Unable to do cardiac MRI at this time.   4/15 - bloody stool overnight. Hgb 7.7. INR 1.28, receiving vitamin K. Protonix drip. GI consult, medical management for now. Hgb 8.9 after 1 RBC.   4/16 - OK to start tube feedings per GI  4/18 - extubated. A/O x 4. No focal neuro deficits.      Interval Problem Update  Reviewed last 24 hour events:              - acute events overnight: 500 mL LR bolus for hypotension requiring increasing Levophed to maintain MAP >65              - Tm: 37.1              - HR:               - SBP: 's              - Neuro: A/O x 4. MUKHERJEE 5/5. Does not remember events leading up to hospitalization. No focal neuro deficits.               - GI: Cortrack with tube feedings at goal               - I/O: 2300/1400 (+[id number])                - Ha: yes              - Lines: LIJ TLC, right radial art-like, ha              - PPx: PPI              - CXR  "(personally reviewed): none today              - Antibiotic Day: none   - SAT/SBT: yes/yes   - Mobility: 2   - Goals of Care: extubated. Asking to go home. Updated daughter, Kerline, over phone. Likely to transfer out of ICU if remains off Levophed.    Review of Systems  Review of Systems   Reason unable to perform ROS: Limited due to lethargy post-extubation.   Constitutional: Positive for malaise/fatigue.   Eyes: Negative for blurred vision and double vision.   Respiratory: Negative for cough and shortness of breath.    Cardiovascular: Negative for chest pain and palpitations.   Gastrointestinal: Negative for abdominal pain, nausea and vomiting.        \"I'm hungry\"     Neurological: Positive for weakness.        Vital Signs for last 24 hours   Temp:  [36.4 °C (97.5 °F)-37.1 °C (98.7 °F)] 37.1 °C (98.7 °F)  Pulse:  [] 91  Resp:  [11-32] 22  BP: ()/(52-74) 98/62  SpO2:  [88 %-100 %] 99 %    Hemodynamic parameters for last 24 hours       Respiratory Information for the last 24 hours  Vent Mode: Spont  Rate (breaths/min): 16  Vt Target (mL): 390  PEEP/CPAP: 8  P Support: 5  MAP: 11  Length of Weaning Trial (Hours): 1    Physical Exam   Physical Exam  Vitals and nursing note reviewed.   Constitutional:       General: She is not in acute distress.     Appearance: She is underweight. She is ill-appearing. She is not toxic-appearing.      Comments: Thin/frail   HENT:      Head: Normocephalic.      Mouth/Throat:      Lips: Pink.      Mouth: Mucous membranes are moist.   Eyes:      Pupils: Pupils are equal, round, and reactive to light.      Comments: PEERL 5mm      Cardiovascular:      Rate and Rhythm: Normal rate and regular rhythm.      Pulses: No decreased pulses.           Dorsalis pedis pulses are 2+ on the right side and 2+ on the left side.      Heart sounds: Heart sounds are distant.      Comments: Bilateral hand edema 1-2+ with anasarca  Pulmonary:      Effort: Pulmonary effort is normal.      Breath " sounds: No wheezing or rales.   Abdominal:      General: Bowel sounds are decreased.      Palpations: Abdomen is soft.      Tenderness: There is no abdominal tenderness.   Genitourinary:     Comments: Castro  Musculoskeletal:      Right lower leg: No edema.      Left lower leg: No edema.      Comments: LONNY   Skin:     General: Skin is cool.      Capillary Refill: Capillary refill takes 2 to 3 seconds.      Comments: Bruises in various stages of healing throughout   Neurological:      Mental Status: She is lethargic.      GCS: GCS eye subscore is 4. GCS verbal subscore is 5. GCS motor subscore is 6.      Motor: Atrophy and abnormal muscle tone present. No seizure activity.   Psychiatric:         Cognition and Memory: She exhibits impaired recent memory.         Medications  Current Facility-Administered Medications   Medication Dose Route Frequency Provider Last Rate Last Admin   • potassium bicarbonate (KLYTE) effervescent tablet 25 mEq  25 mEq Enteral Tube DAILY Tania Camarillo M.D.   25 mEq at 04/18/21 1248   • omeprazole (FIRST-OMEPRAZOLE) 2 mg/mL oral susp 40 mg  40 mg Enteral Tube Q12HRS Argenis Stapleton, A.P.R.N.   40 mg at 04/18/21 0521   • sulfamethoxazole-trimethoprim (BACTRIM) 400-80 MG per tablet 1 tablet  1 tablet Enteral Tube Q12HRS Argenis Stapleton, A.P.R.N.   1 tablet at 04/18/21 0523   • levETIRAcetam (Keppra) 1000 mg in 100 mL NaCl IV premix  1,000 mg Intravenous Q12HRS Argenis Stapleton, A.P.R.N.   Stopped at 04/18/21 0536   • Pharmacy Consult: Enteral tube insertion - review meds/change route/product selection  1 Each Other PHARMACY TO DOSE Fatmata Funez M.D.       • magnesium oxide (MAG-OX) tablet 400 mg  400 mg Enteral Tube BID Ventura Still D.O.   400 mg at 04/18/21 0521   • acetaminophen (Tylenol) tablet 650 mg  650 mg Enteral Tube Q6HRS PRN Ventura Still D.O.   650 mg at 04/15/21 2037   • norepinephrine (Levophed) infusion 8 mg/250 mL (premix)  0-30 mcg/min Intravenous Continuous Tania Camarillo  M.D. 7.5 mL/hr at 04/18/21 1114 4 mcg/min at 04/18/21 1114   • MD Alert...ICU Electrolyte Replacement per Pharmacy   Other PHARMACY TO DOSE Argenis Stapleton A.P.R.N.       • K+ Scale: Goal of 4.5  1 Each Intravenous Q6HRS Tania Camarillo M.D.   1 Each at 04/18/21 0600   • Respiratory Therapy Consult   Nebulization Continuous RT Argenis Stapleton A.P.R.N.       • senna-docusate (PERICOLACE or SENOKOT S) 8.6-50 MG per tablet 2 tablet  2 tablet Enteral Tube BID Argenis Stapleton A.P.R.N.   Stopped at 04/17/21 0600    And   • polyethylene glycol/lytes (MIRALAX) PACKET 1 Packet  1 Packet Enteral Tube QDAY PRN Argenis Stapleton, A.P.R.N.        And   • magnesium hydroxide (MILK OF MAGNESIA) suspension 30 mL  30 mL Enteral Tube QDAY PRN Argenis Stapleton, A.P.R.N.        And   • bisacodyl (DULCOLAX) suppository 10 mg  10 mg Rectal QDAY PRN Argenis Stapleton, A.P.R.N.       • dextrose 50% (D50W) injection 50 mL  50 mL Intravenous Q15 MIN PRN Ventura Still D.O.       • nicotine (NICODERM) 14 MG/24HR 14 mg  14 mg Transdermal Daily-0600 Francesco Bernstein M.D.   14 mg at 04/18/21 0521       Fluids    Intake/Output Summary (Last 24 hours) at 4/18/2021 1319  Last data filed at 4/18/2021 1200  Gross per 24 hour   Intake 2010.97 ml   Output 1035 ml   Net 975.97 ml       Laboratory  Recent Labs     04/16/21  0114   ISTATAPH 7.495   ISTATAPCO2 31.4   ISTATAPO2 73   ISTATATCO2 25   YROYIVG7DOF 96   ISTATARTHCO3 24.2   ISTATARTBE 1   ISTATTEMP 97.6 F   ISTATFIO2 40   ISTATSPEC Arterial   ISTATAPHTC 7.503*   IEUJISUS8PD 71         Recent Labs     04/17/21  1609 04/17/21  1800 04/17/21  2330 04/18/21  0515 04/18/21  1208   SODIUM  --  134* 131* 131*  --    POTASSIUM  --  4.1 4.1 3.8  --    CHLORIDE  --  101 99 100  --    CO2  --  24 27 26  --    BUN  --  7* 10 10  --    CREATININE  --  <0.17* <0.17* <0.17*  --    MAGNESIUM   < >  --  1.7 1.5 2.3   PHOSPHORUS   < >  --  2.8 2.7 2.8   CALCIUM  --  7.0* 7.0* 6.9*  --     < > = values in this interval not  displayed.     Recent Labs     04/17/21  0040 04/17/21  0523 04/17/21  1800 04/17/21  2330 04/18/21  0515   PREALBUMIN 9.0*  --   --   --   --    GLUCOSE  --    < > 115* 109* 112*    < > = values in this interval not displayed.     Recent Labs     04/16/21  0925 04/16/21 2355 04/17/21  2330   WBC 4.0* 5.2 6.6   NEUTSPOLYS 59.70 73.30* 82.00*   LYMPHOCYTES 27.50 20.70* 17.10*   MONOCYTES 6.40 2.60 0.90   EOSINOPHILS 1.20 2.60 0.00   BASOPHILS 0.20 0.00 0.00     Recent Labs     04/16/21  0408 04/16/21  0615 04/16/21  0925 04/16/21 2355 04/17/21  0523 04/17/21 2330   RBC  --   --  2.36* 2.20*  --  2.11*   HEMOGLOBIN   < >  --  8.2*  8.1* 7.9*  --  7.4*   HEMATOCRIT  --   --  24.6* 23.4*  --  23.1*   PLATELETCT  --   --  60* 69*  --  73*   PROTHROMBTM  --  14.5  --   --  13.7  --    INR  --  1.10  --   --  1.02  --     < > = values in this interval not displayed.       Imaging  MRI:   Reviewed    Assessment/Plan  * SDH (subdural hematoma) (HCC)- (present on admission)  Assessment & Plan  -Patient with significant alcohol history - on presentation  -Sustained a fall, striking head at home 2 days prior to presentation  -Imaging showed bilateral subdural hematomas L>R  -INR elevated.  Not currently on anticoagulation.  She has received 3 doses vitamin K  -Also with new onset seizures. Continue BID Keppra  -Every 2 neurochecks  -VTE currently contraindicated  -PT/OT/SLP once hemodynamically stable  -MRI 4/18 bilateral holohemispheric supratentorial subdural hematomas appear slightly larger than on the recent prior CT.    Alcohol withdrawal syndrome with complication (HCC)- (present on admission)  Assessment & Plan  -with acute intoxication on admission  -she has received Rally bag  -high-dose Thiamine IV x 3 days, then PO daily  -MVI, folate daily  -Hepatitis panel negative  -cessation education and counseling when able  -seizure precautions - continuous EEG     Protein-calorie malnutrition, severe (HCC)-  (present on admission)  Assessment & Plan  -Body mass index is 18.7 kg/m².  -History of significant EtOH abuse  -tube feedings per dietary  -High risk for refeeding syndrome.  Monitor closely  -Daily MVI, folate and thiamine     Transaminitis- (present on admission)  Assessment & Plan  -Likely from chronic alcohol use  -Hepatitis panel negative  -RUQ US pending  -Follow labs    Lactic acidosis- (present on admission)  Assessment & Plan  -Suspect from seizure-like activity and cardiac arrest  -Trending down, stop trending        Electrolyte disturbance- (present on admission)  Assessment & Plan  -Hypokalemia, hypomagnesemia, hypophosphatemia, hypomagnesemia, hypocalcemia  -Replacing all per ICU protocol  -Trending BMPs    Thrombocytopenia (HCC)- (present on admission)  Assessment & Plan  -multifactorial due to history of ETOH abuse, vitamin deficiencies, liver disease  -stable - improving  -monitor for bleeding  -follow CBC    UTI due to extended-spectrum beta lactamase (ESBL) producing Escherichia coli- (present on admission)  Assessment & Plan  -present on admission-found down, altered at home - had been incontinent feces  -ID following. Recommend PO Bactrim for now. BC negative to date  -low-grade fever, no leukocytosis    Melena  Assessment & Plan  --resolved    GI bleed  Assessment & Plan  -resolved - BM today showed no evidence of melena  -GI consult - no intervention at this time. Should Hgb continue to drop or patient begin having more bloody stools, would consider EGD/colonoscopy  -continue PPI      Shock (HCC)  Assessment & Plan  -Multifactorial: Cardiogenic shock post cardiac arrest. Hypovolemic shock  -Status post fluid resuscitation  -resolved    Acute respiratory failure (HCC)  Assessment & Plan  -bilateral SDH with seizure like activity  -intubated post-cardiac arrest  -Lung protective ventilation strategies  -Low suspicion for PNA clinically, doxycycline has been DC'd  -Titrate ventilator prescription  "to optimize oxygenation, ventilation, and acid base balance.  -Goal PCO2 35-40  -Daily SAT/SBT trials currently contraindicated  -Chlorhexidine  -RT protocols    Cardiac arrest (HCC)  Assessment & Plan  -4/14 asystole - suspected due to seizures  -ROSC after 2 rounds CPR  -intubated  -ECHO nml EF, no valvular abnormalities reported  -target-temperature management not indicated.  -code status discussed with daughter - patient is DNR          QT prolongation- (present on admission)  Assessment & Plan  -537 on EKG, repeat EKG post cardiac arrest  -Repeat , 512  -Continuous telemetry monitoring  -Repeat a.m. EKG  -Avoid QT prolonging agents  -trending and replacing Mag    Advanced care planning/counseling discussion  Assessment & Plan  -family meeting today with patient's daughter, Kerline, mother, Pat, Palliative Care RN VIELKA Gage and MD Camarillo.  -22 minutes spent updating on recent imaging, labs, hemodynamics, and mentation. Patient has been off sedation for about 24 hours now. She will open her eyes to her name, but does not follow commands. MUKHERJEE's independently.   -both mother and daughter express that patient is a \"difficult person to put it nicely\". They suggest patient would not want prolonged life-support. They report patient has struggled with eating disorders her entire life and likely wouldn't want long-term tube feedings either. As far as ADL's, they suggest patient wouldn't want to be disabled and would want to be able to live independently.   -patient was able to SBT yesterday and today, but still does not follow commands to get parameters required for extubation. Patient received dose of Phenobarbital at 2300 on 4/13 for suspected ETOH withdrawal. Average 1/2 life about 80 hours. Therefore, we will continue to watch her over the next 24-48 hours, get MRI brain, and decide further plan/goals at that time.   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 Repeat US 4/17 showed no evidence of acute DVT  -Cardiology consulted: \"Right atrial mass is likely a prominent eustachian valve but cannot exclude malignancy the most appropriate test is a cardiac MRI.  Clearly she is not in clinical state to get a cardiac MRI as you have to hold still for 40 minutes with breath holds so if she gets to this clinical state try to arrange a cardiac MRI for evaluation of this otherwise could be done as an outpatient\".              VTE:  Contraindicated  Ulcer: PPI  Lines: Central Line  Ongoing indication addressed, Arterial Line  Ongoing indication addressed and Castro Catheter  Ongoing indication addressed    I have performed a physical exam and reviewed and updated ROS and Plan today (4/18/2021). In review of yesterday's note (4/17/2021), there are no changes except as documented above.     Discussed patient condition and risk of morbidity and/or mortality with Family, RN, , Code status disscussed, Charge nurse / hot rounds and Patient  The patient remains critically ill.  Critical care time = 45 minutes in directly providing and coordinating critical care and extensive data review.  No time overlap and excludes procedures.    Please note that this dictation was created using voice recognition software. I have made every reasonable attempt to correct obvious errors, but there may be errors of grammar and possibly content that I did not discover before finalizing the note.    GELY Harvey.    " Breath sounds clear and equal bilaterally.

## 2021-04-18 NOTE — RESPIRATORY CARE
Ventilator Daily Summary    Vent Day # 4     Ventilator settings changed this shift: 16 390 +8 30%    Weaning trials: yes x2    Respiratory Procedures: none     Plan: Continue current ventilator settings and wean mechanical ventilation as tolerated per physician orders.

## 2021-04-18 NOTE — PROGRESS NOTES
Called the patient daughter, Kerline. Updated her on patient status. Notified that extubation went well. Will continue to monitor patient in the ICU for today. Answered all questions.

## 2021-04-19 ENCOUNTER — APPOINTMENT (OUTPATIENT)
Dept: RADIOLOGY | Facility: MEDICAL CENTER | Age: 61
DRG: 082 | End: 2021-04-19
Attending: INTERNAL MEDICINE
Payer: MEDICAID

## 2021-04-19 PROBLEM — N30.00 ACUTE CYSTITIS WITHOUT HEMATURIA: Status: ACTIVE | Noted: 2021-04-16

## 2021-04-19 PROBLEM — E87.20 LACTIC ACIDOSIS: Status: RESOLVED | Noted: 2021-04-14 | Resolved: 2021-04-19

## 2021-04-19 LAB
ANION GAP SERPL CALC-SCNC: 4 MMOL/L (ref 7–16)
ANION GAP SERPL CALC-SCNC: 6 MMOL/L (ref 7–16)
BASOPHILS # BLD AUTO: 0.5 % (ref 0–1.8)
BASOPHILS # BLD: 0.03 K/UL (ref 0–0.12)
BUN SERPL-MCNC: 11 MG/DL (ref 8–22)
BUN SERPL-MCNC: 11 MG/DL (ref 8–22)
CALCIUM SERPL-MCNC: 7 MG/DL (ref 8.5–10.5)
CALCIUM SERPL-MCNC: 7.1 MG/DL (ref 8.5–10.5)
CHLORIDE SERPL-SCNC: 102 MMOL/L (ref 96–112)
CHLORIDE SERPL-SCNC: 102 MMOL/L (ref 96–112)
CO2 SERPL-SCNC: 26 MMOL/L (ref 20–33)
CO2 SERPL-SCNC: 26 MMOL/L (ref 20–33)
CREAT SERPL-MCNC: <0.17 MG/DL (ref 0.5–1.4)
CREAT SERPL-MCNC: <0.17 MG/DL (ref 0.5–1.4)
CRP SERPL HS-MCNC: 5.94 MG/DL (ref 0–0.75)
EOSINOPHIL # BLD AUTO: 0.06 K/UL (ref 0–0.51)
EOSINOPHIL NFR BLD: 0.9 % (ref 0–6.9)
ERYTHROCYTE [DISTWIDTH] IN BLOOD BY AUTOMATED COUNT: 79.7 FL (ref 35.9–50)
GLUCOSE BLD-MCNC: 115 MG/DL (ref 65–99)
GLUCOSE SERPL-MCNC: 104 MG/DL (ref 65–99)
GLUCOSE SERPL-MCNC: 113 MG/DL (ref 65–99)
HCT VFR BLD AUTO: 22 % (ref 37–47)
HGB BLD-MCNC: 7.2 G/DL (ref 12–16)
IMM GRANULOCYTES # BLD AUTO: 0.31 K/UL (ref 0–0.11)
IMM GRANULOCYTES NFR BLD AUTO: 4.9 % (ref 0–0.9)
LYMPHOCYTES # BLD AUTO: 0.76 K/UL (ref 1–4.8)
LYMPHOCYTES NFR BLD: 11.9 % (ref 22–41)
MAGNESIUM SERPL-MCNC: 1.4 MG/DL (ref 1.5–2.5)
MAGNESIUM SERPL-MCNC: 1.6 MG/DL (ref 1.5–2.5)
MCH RBC QN AUTO: 36.5 PG (ref 27–33)
MCHC RBC AUTO-ENTMCNC: 32.7 G/DL (ref 33.6–35)
MCV RBC AUTO: 111.7 FL (ref 81.4–97.8)
MONOCYTES # BLD AUTO: 0.5 K/UL (ref 0–0.85)
MONOCYTES NFR BLD AUTO: 7.8 % (ref 0–13.4)
NEUTROPHILS # BLD AUTO: 4.72 K/UL (ref 2–7.15)
NEUTROPHILS NFR BLD: 74 % (ref 44–72)
NRBC # BLD AUTO: 0.24 K/UL
NRBC BLD-RTO: 3.8 /100 WBC
PHOSPHATE SERPL-MCNC: 2.4 MG/DL (ref 2.5–4.5)
PHOSPHATE SERPL-MCNC: 2.5 MG/DL (ref 2.5–4.5)
PLATELET # BLD AUTO: 78 K/UL (ref 164–446)
PMV BLD AUTO: 11.4 FL (ref 9–12.9)
POTASSIUM SERPL-SCNC: 4.1 MMOL/L (ref 3.6–5.5)
POTASSIUM SERPL-SCNC: 4.7 MMOL/L (ref 3.6–5.5)
PREALB SERPL-MCNC: 7.4 MG/DL (ref 18–38)
RBC # BLD AUTO: 1.97 M/UL (ref 4.2–5.4)
SODIUM SERPL-SCNC: 132 MMOL/L (ref 135–145)
SODIUM SERPL-SCNC: 134 MMOL/L (ref 135–145)
WBC # BLD AUTO: 6.4 K/UL (ref 4.8–10.8)

## 2021-04-19 PROCEDURE — A9270 NON-COVERED ITEM OR SERVICE: HCPCS | Performed by: HOSPITALIST

## 2021-04-19 PROCEDURE — 700102 HCHG RX REV CODE 250 W/ 637 OVERRIDE(OP): Performed by: NURSE PRACTITIONER

## 2021-04-19 PROCEDURE — 700111 HCHG RX REV CODE 636 W/ 250 OVERRIDE (IP): Performed by: INTERNAL MEDICINE

## 2021-04-19 PROCEDURE — 700102 HCHG RX REV CODE 250 W/ 637 OVERRIDE(OP): Performed by: INTERNAL MEDICINE

## 2021-04-19 PROCEDURE — 99292 CRITICAL CARE ADDL 30 MIN: CPT | Performed by: INTERNAL MEDICINE

## 2021-04-19 PROCEDURE — 700102 HCHG RX REV CODE 250 W/ 637 OVERRIDE(OP): Performed by: STUDENT IN AN ORGANIZED HEALTH CARE EDUCATION/TRAINING PROGRAM

## 2021-04-19 PROCEDURE — 770006 HCHG ROOM/CARE - MED/SURG/GYN SEMI*

## 2021-04-19 PROCEDURE — 86140 C-REACTIVE PROTEIN: CPT

## 2021-04-19 PROCEDURE — 84100 ASSAY OF PHOSPHORUS: CPT

## 2021-04-19 PROCEDURE — A9270 NON-COVERED ITEM OR SERVICE: HCPCS | Performed by: NURSE PRACTITIONER

## 2021-04-19 PROCEDURE — 82962 GLUCOSE BLOOD TEST: CPT

## 2021-04-19 PROCEDURE — A9270 NON-COVERED ITEM OR SERVICE: HCPCS | Performed by: STUDENT IN AN ORGANIZED HEALTH CARE EDUCATION/TRAINING PROGRAM

## 2021-04-19 PROCEDURE — 92610 EVALUATE SWALLOWING FUNCTION: CPT

## 2021-04-19 PROCEDURE — 85025 COMPLETE CBC W/AUTO DIFF WBC: CPT

## 2021-04-19 PROCEDURE — 99291 CRITICAL CARE FIRST HOUR: CPT | Performed by: NURSE PRACTITIONER

## 2021-04-19 PROCEDURE — 700111 HCHG RX REV CODE 636 W/ 250 OVERRIDE (IP): Performed by: NURSE PRACTITIONER

## 2021-04-19 PROCEDURE — 71045 X-RAY EXAM CHEST 1 VIEW: CPT

## 2021-04-19 PROCEDURE — 700105 HCHG RX REV CODE 258: Performed by: INTERNAL MEDICINE

## 2021-04-19 PROCEDURE — 99233 SBSQ HOSP IP/OBS HIGH 50: CPT | Performed by: HOSPITALIST

## 2021-04-19 PROCEDURE — 84134 ASSAY OF PREALBUMIN: CPT

## 2021-04-19 PROCEDURE — 700102 HCHG RX REV CODE 250 W/ 637 OVERRIDE(OP): Performed by: HOSPITALIST

## 2021-04-19 PROCEDURE — A9270 NON-COVERED ITEM OR SERVICE: HCPCS | Performed by: INTERNAL MEDICINE

## 2021-04-19 PROCEDURE — 83735 ASSAY OF MAGNESIUM: CPT

## 2021-04-19 PROCEDURE — 80048 BASIC METABOLIC PNL TOTAL CA: CPT

## 2021-04-19 RX ORDER — LEVETIRACETAM 500 MG/1
1000 TABLET ORAL EVERY 12 HOURS
Status: DISCONTINUED | OUTPATIENT
Start: 2021-04-19 | End: 2021-04-20

## 2021-04-19 RX ORDER — MIDODRINE HYDROCHLORIDE 5 MG/1
5 TABLET ORAL 3 TIMES DAILY
Status: DISCONTINUED | OUTPATIENT
Start: 2021-04-19 | End: 2021-04-20

## 2021-04-19 RX ORDER — POTASSIUM CHLORIDE 7.45 MG/ML
10 INJECTION INTRAVENOUS ONCE
Status: COMPLETED | OUTPATIENT
Start: 2021-04-19 | End: 2021-04-19

## 2021-04-19 RX ORDER — MAGNESIUM SULFATE HEPTAHYDRATE 40 MG/ML
4 INJECTION, SOLUTION INTRAVENOUS ONCE
Status: COMPLETED | OUTPATIENT
Start: 2021-04-19 | End: 2021-04-19

## 2021-04-19 RX ADMIN — DIBASIC SODIUM PHOSPHATE, MONOBASIC POTASSIUM PHOSPHATE AND MONOBASIC SODIUM PHOSPHATE 250 MG: 852; 155; 130 TABLET ORAL at 18:00

## 2021-04-19 RX ADMIN — CALCIUM GLUCONATE 1 G: 98 INJECTION, SOLUTION INTRAVENOUS at 08:49

## 2021-04-19 RX ADMIN — TRAMADOL HYDROCHLORIDE 50 MG: 50 TABLET, FILM COATED ORAL at 04:08

## 2021-04-19 RX ADMIN — SULFAMETHOXAZOLE AND TRIMETHOPRIM 1 TABLET: 400; 80 TABLET ORAL at 19:28

## 2021-04-19 RX ADMIN — POTASSIUM CHLORIDE 10 MEQ: 7.46 INJECTION, SOLUTION INTRAVENOUS at 01:11

## 2021-04-19 RX ADMIN — NICOTINE 14 MG: 14 PATCH TRANSDERMAL at 05:44

## 2021-04-19 RX ADMIN — MIDODRINE HYDROCHLORIDE 5 MG: 5 TABLET ORAL at 11:28

## 2021-04-19 RX ADMIN — MAGNESIUM GLUCONATE 500 MG ORAL TABLET 400 MG: 500 TABLET ORAL at 17:40

## 2021-04-19 RX ADMIN — DIBASIC SODIUM PHOSPHATE, MONOBASIC POTASSIUM PHOSPHATE AND MONOBASIC SODIUM PHOSPHATE 250 MG: 852; 155; 130 TABLET ORAL at 11:28

## 2021-04-19 RX ADMIN — LEVETIRACETAM 1000 MG: 500 TABLET ORAL at 17:52

## 2021-04-19 RX ADMIN — MIDODRINE HYDROCHLORIDE 5 MG: 5 TABLET ORAL at 07:33

## 2021-04-19 RX ADMIN — MAGNESIUM SULFATE IN WATER 4 G: 40 INJECTION, SOLUTION INTRAVENOUS at 07:33

## 2021-04-19 RX ADMIN — POTASSIUM BICARBONATE 25 MEQ: 978 TABLET, EFFERVESCENT ORAL at 05:44

## 2021-04-19 RX ADMIN — OMEPRAZOLE 40 MG: KIT at 06:38

## 2021-04-19 RX ADMIN — MIDODRINE HYDROCHLORIDE 5 MG: 5 TABLET ORAL at 17:40

## 2021-04-19 RX ADMIN — MAGNESIUM GLUCONATE 500 MG ORAL TABLET 400 MG: 500 TABLET ORAL at 05:44

## 2021-04-19 RX ADMIN — SULFAMETHOXAZOLE AND TRIMETHOPRIM 1 TABLET: 400; 80 TABLET ORAL at 07:33

## 2021-04-19 RX ADMIN — OMEPRAZOLE 40 MG: KIT at 17:39

## 2021-04-19 RX ADMIN — DOCUSATE SODIUM 50 MG AND SENNOSIDES 8.6 MG 2 TABLET: 8.6; 5 TABLET, FILM COATED ORAL at 17:39

## 2021-04-19 RX ADMIN — LEVETIRACETAM INJECTION 1000 MG: 10 INJECTION INTRAVENOUS at 05:44

## 2021-04-19 RX ADMIN — ACETAMINOPHEN 650 MG: 325 TABLET, FILM COATED ORAL at 17:40

## 2021-04-19 ASSESSMENT — ENCOUNTER SYMPTOMS
ORTHOPNEA: 0
DIZZINESS: 0
FEVER: 0
ABDOMINAL PAIN: 1
COUGH: 0
VOMITING: 0
HEADACHES: 0
WHEEZING: 0
BACK PAIN: 0
CHILLS: 0
PALPITATIONS: 0
NECK PAIN: 0
NAUSEA: 1
LOSS OF CONSCIOUSNESS: 0
SHORTNESS OF BREATH: 0

## 2021-04-19 ASSESSMENT — PAIN DESCRIPTION - PAIN TYPE
TYPE: ACUTE PAIN

## 2021-04-19 ASSESSMENT — LIFESTYLE VARIABLES: SUBSTANCE_ABUSE: 1

## 2021-04-19 NOTE — PROGRESS NOTES
2 RN skin Check with BRITNEY Jacinto:    Skin intact under oxymask, bilateral blanchable redness to ears, generalized bruising and abrasions to all extremities, Gauze and tape noted to left neck clean dry intact from central line removal, right groin old art line site clean dry intact soft with some bruising with gauze and tegaderm pressure dressing, blanchable redness and excoriation to kathrin area and bottocks, blanchable redness and peeling over sacrum.

## 2021-04-19 NOTE — CARE PLAN
Problem: Communication  Goal: The ability to communicate needs accurately and effectively will improve  Outcome: PROGRESSING AS EXPECTED     Problem: Safety  Goal: Will remain free from injury  Outcome: PROGRESSING AS EXPECTED  Goal: Will remain free from falls  Outcome: PROGRESSING AS EXPECTED     Problem: Infection  Goal: Will remain free from infection  Outcome: PROGRESSING AS EXPECTED     Problem: Venous Thromboembolism (VTW)/Deep Vein Thrombosis (DVT) Prevention:  Goal: Patient will participate in Venous Thrombosis (VTE)/Deep Vein Thrombosis (DVT)Prevention Measures  Outcome: PROGRESSING AS EXPECTED  Flowsheets (Taken 4/18/2021 2000)  Risk Assessment Score: 3  VTE RISK: High  Mechanical Prophylaxis: SCDs, Sequential Compression Device  SCDs, Sequential Compression Device: On  Pharmacologic Prophylaxis Used: Contraindicated per MD     Problem: Knowledge Deficit  Goal: Knowledge of disease process/condition, treatment plan, diagnostic tests, and medications will improve  Outcome: PROGRESSING AS EXPECTED     Problem: Respiratory:  Goal: Respiratory status will improve  Outcome: PROGRESSING AS EXPECTED  Note: Pt. Was extubated this AM, currently satting 90s on 5L oxymask.      Problem: Skin Integrity  Goal: Risk for impaired skin integrity will decrease  Outcome: PROGRESSING AS EXPECTED     Problem: Pain Management  Goal: Pain level will decrease to patient's comfort goal  Outcome: PROGRESSING AS EXPECTED  Flowsheets  Taken 4/18/2021 2242 by Aimnah Jaimes RBETTY  Comfort Goal: Sleep Comfortably  Pain Rating Scale (NPRS): 3  Taken 4/18/2021 1600 by Ophelia Zapata, R.NBao  Non Verbal Scale: Sleeping  Taken 4/18/2021 0600 by Belinda Johnson R.NBao  Critical-Care Pain Observation Score: 1

## 2021-04-19 NOTE — ASSESSMENT & PLAN NOTE
Evaluated by cardiology, Case Discussed with Cardiology On call Dr. Malave about prior recommendation of cardiac MRI once clinically stable - advised to follow up as outpatient.

## 2021-04-19 NOTE — ASSESSMENT & PLAN NOTE
Weaned off norepinephrine  Currently on midodrine monitor blood pressure off pressors  BP has been stable; given over net +10L since admission, IV furosemide 1 dose provided 4/21  Start furosemide oral twice daily 4/23

## 2021-04-19 NOTE — PROGRESS NOTES
Critical Care Progress Note    Date of admission  4/13/2021    Chief Complaint  60 y.o. female admitted 4/13/2021 with ALOC     Hospital Course  Ms. Aguilar is a 60-year-old female with PMH significant for cholecystectomy, tobacco dependence, EtOH dependence (1 pint/day) and IVDU (heroin) who presented via EMS 4/13/2021 with acute loss of consciousness and failure to thrive. A couple of days prior to presentation, she reportedly fell in the bathroom.  Day of presentation she was found by her daughter, naked, altered and covered in feces.  Suspected she was down for at least 24 hours.  CT head showed acute/subacute bilateral SDH left greater than right with significant bifrontal cerebral atrophy. Neurosurgery consulted and recommend non-operative management.     Pertinent admission labs: Lactic acid 13, UDS positive benzos and cannabinoids, . Elevated liver enzymes, Hepatitis panel (-). RUQ US unremarkable showing fatty liver and trace ascites.    4/14 - seizure --> asystole, Code Blue ROSC after 2nd pulse check. Intubated. Continuous EEG. ECHO found mass right atrium. Unable to do cardiac MRI at this time.   4/15 - bloody stool overnight. Hgb 7.7. INR 1.28, receiving vitamin K. Protonix drip. GI consult, medical management for now. Hgb 8.9 after 1 RBC.   4/16 - OK to start tube feedings per GI  4/18 - extubated. A/O x 4. No focal neuro deficits.  4/19- Weaned off or norepinephrine - Failed SLP; plan for FEES 4/20. Transfer to floor      Interval Problem Update  Reviewed last 24 hour events:              - acute events overnight -none               - Tm: 97.8 (36.6)              - HR: 90's              - SBP: 's-               - Neuro: AO X 4              - GI: Cor track with TF.               - UOP: 1400/2400 (+ 11,000 since admit)              - Ha: Yes               - Lines:Art line, Left IJ, ha- orders to remove all today              - PPx:PPI              - CXR (personally reviewed): shauna  opacities throughout the left lung               - Antibiotic Day- Day 4 of 5 Bactrim   - SAT/SBT- not applicable    - Mobility-  2    - Goals of care - Transfer to floor, discussed with hospitalist Dr. Jose Luis Sims      Review of Systems  Review of Systems   Constitutional: Negative for chills and fever.   Respiratory: Negative for cough, shortness of breath and wheezing.    Cardiovascular: Positive for leg swelling. Negative for chest pain, palpitations and orthopnea.   Gastrointestinal: Positive for abdominal pain. Negative for vomiting.   Genitourinary:        Castro - tolerating   Musculoskeletal: Negative for back pain and neck pain.   Skin: Negative for rash.   Neurological: Negative for dizziness, loss of consciousness and headaches.   Psychiatric/Behavioral: Positive for substance abuse.        Vital Signs for last 24 hours   Temp:  [36.3 °C (97.3 °F)-36.6 °C (97.8 °F)] 36.3 °C (97.3 °F)  Pulse:  [] 93  Resp:  [9-30] 15  BP: (76-98)/(50-64) 86/57  SpO2:  [89 %-99 %] 97 %    Hemodynamic parameters for last 24 hours       Respiratory Information for the last 24 hours       Physical Exam   Physical Exam  Constitutional:       General: She is awake. She is not in acute distress.     Appearance: Normal appearance. She is ill-appearing. She is not toxic-appearing.      Interventions: Nasal cannula in place.   Eyes:      General: Lids are normal.   Neck:      Comments: Bruising right side of neck at previous IJ site  Cardiovascular:      Rate and Rhythm: Normal rate and regular rhythm.      Pulses: Normal pulses.      Heart sounds: Normal heart sounds.   Pulmonary:      Effort: Pulmonary effort is normal.      Comments: Decreased bilateral lower lobes  Chest:      Breasts: Breasts are symmetrical.     Abdominal:      General: Bowel sounds are decreased.      Palpations: Abdomen is soft.      Tenderness: There is generalized abdominal tenderness.   Musculoskeletal:      Cervical back: Full passive range of motion  without pain, normal range of motion and neck supple.      Right lower le+ Edema present.      Left lower le+ Edema present.   Skin:     General: Skin is warm and dry.      Capillary Refill: Capillary refill takes less than 2 seconds.      Coloration: Skin is pale.   Neurological:      General: No focal deficit present.      Mental Status: She is alert.      Sensory: Sensation is intact.      Motor: Motor function is intact.      Coordination: Coordination is intact.   Psychiatric:         Attention and Perception: Attention normal.         Speech: Speech normal.         Behavior: Behavior normal. Behavior is cooperative.         Medications  Current Facility-Administered Medications   Medication Dose Route Frequency Provider Last Rate Last Admin   • magnesium sulfate IVPB premix 4 g  4 g Intravenous Once Argenis Stapleton, A.P.R.N. 25 mL/hr at 21 0733 4 g at 21 0733   • midodrine (PROAMATINE) tablet 5 mg  5 mg Oral TID Sharonda Hand, A.P.R.N.   5 mg at 21 0733   • phosphorus (K-Phos-Neutral) per tablet 250 mg  250 mg Enteral Tube Q6HRS Nick Baer M.D.       • traMADol (ULTRAM) 50 MG tablet 50 mg  50 mg Enteral Tube Q6HRS PRN Argenis Stapleton, A.P.R.N.   50 mg at 21 0408   • omeprazole (FIRST-OMEPRAZOLE) 2 mg/mL oral susp 40 mg  40 mg Enteral Tube Q12HRS Argenis Stapleton, A.P.R.N.   40 mg at 21 0638   • sulfamethoxazole-trimethoprim (BACTRIM) 400-80 MG per tablet 1 tablet  1 tablet Enteral Tube Q12HRS Argenis Stapleton, A.P.R.N.   1 tablet at 21 0733   • Pharmacy Consult: Enteral tube insertion - review meds/change route/product selection  1 Each Other PHARMACY TO DOSE Fatmata Funez M.D.       • magnesium oxide (MAG-OX) tablet 400 mg  400 mg Enteral Tube BID Ventura Still D.O.   400 mg at 21 0544   • acetaminophen (Tylenol) tablet 650 mg  650 mg Enteral Tube Q6HRS PRN Ventura Still, D.O.   650 mg at 04/15/21 2037   • Respiratory Therapy Consult   Nebulization  Continuous RT Argenis Stapleton, A.P.R.N.       • senna-docusate (PERICOLACE or SENOKOT S) 8.6-50 MG per tablet 2 tablet  2 tablet Enteral Tube BID Argenis Stapleton, A.P.R.N.   Stopped at 04/17/21 0600    And   • polyethylene glycol/lytes (MIRALAX) PACKET 1 Packet  1 Packet Enteral Tube QDAY PRN Argenis Stapleton, A.P.R.N.        And   • magnesium hydroxide (MILK OF MAGNESIA) suspension 30 mL  30 mL Enteral Tube QDAY PRN Argenis Stapleton, A.P.R.N.        And   • bisacodyl (DULCOLAX) suppository 10 mg  10 mg Rectal QDAY PRN Argenis Stapleton, A.P.R.N.       • dextrose 50% (D50W) injection 50 mL  50 mL Intravenous Q15 MIN PRN Ventura Still D.O.       • nicotine (NICODERM) 14 MG/24HR 14 mg  14 mg Transdermal Daily-0600 Francesco Bernstein M.D.   14 mg at 04/19/21 0544       Fluids    Intake/Output Summary (Last 24 hours) at 4/19/2021 1119  Last data filed at 4/19/2021 1000  Gross per 24 hour   Intake 1269.54 ml   Output 2055 ml   Net -785.46 ml       Laboratory          Recent Labs     04/18/21 1740 04/18/21 2339 04/19/21  0603   SODIUM 135 134* 132*   POTASSIUM 4.5 4.1 4.7   CHLORIDE 103 102 102   CO2 26 26 26   BUN 11 11 11   CREATININE <0.17* <0.17* <0.17*   MAGNESIUM 1.9 1.6 1.4*   PHOSPHORUS 2.6 2.5 2.4*   CALCIUM 7.1* 7.0* 7.1*     Recent Labs     04/17/21  0040 04/17/21  0523 04/18/21  1740 04/18/21 2339 04/19/21  0603   PREALBUMIN 9.0*  --   --   --  7.4*   GLUCOSE  --    < > 116* 104* 113*    < > = values in this interval not displayed.     Recent Labs     04/16/21  2355 04/17/21 2330 04/19/21  0603   WBC 5.2 6.6 6.4   NEUTSPOLYS 73.30* 82.00* 74.00*   LYMPHOCYTES 20.70* 17.10* 11.90*   MONOCYTES 2.60 0.90 7.80   EOSINOPHILS 2.60 0.00 0.90   BASOPHILS 0.00 0.00 0.50     Recent Labs     04/16/21  2355 04/17/21  0523 04/17/21  2330 04/19/21  0603   RBC 2.20*  --  2.11* 1.97*   HEMOGLOBIN 7.9*  --  7.4* 7.2*   HEMATOCRIT 23.4*  --  23.1* 22.0*   PLATELETCT 69*  --  73* 78*   PROTHROMBTM  --  13.7  --   --    INR  --  1.02  --    --        Imaging  X-Ray:  I have personally reviewed the images and compared with prior images.    Assessment/Plan  * SDH (subdural hematoma) (HCC)- (present on admission)  Assessment & Plan  -Patient with significant alcohol history - on presentation  -Sustained a fall, striking head at home 2 days prior to presentation  -Imaging showed bilateral subdural hematomas L>R  -INR elevated.  Not currently on anticoagulation.  She has received 3 doses vitamin K  -Continuous EEG identified no seizure activity;   -Keppra Dcd 4/19  -Every 4 neurochecks  -VTE currently contraindicated  -PT/OT/SLP   -MRI 4/18 bilateral holohemispheric supratentorial subdural hematomas appear slightly larger than on the recent prior CT.    Alcohol withdrawal syndrome with complication (HCC)- (present on admission)  Assessment & Plan  -with acute intoxication on admission  -she has received Rally bag  -MVI, folate, thiamine daily  -Hepatitis panel negative  -cessation education and counseling when able  -seizure precautions    Protein-calorie malnutrition, severe (HCC)- (present on admission)  Assessment & Plan  -Body mass index is 18.7 kg/m².  -History of significant EtOH abuse  -tube feedings per dietary  -High risk for refeeding syndrome.  Monitor closely  -Daily MVI, folate and thiamine     Transaminitis- (present on admission)  Assessment & Plan  -Likely from chronic alcohol use  -Hepatitis panel negative  -RUQ US pending  -Follow labs    Electrolyte disturbance- (present on admission)  Assessment & Plan  -Hypokalemia, hypomagnesemia, hypophosphatemia, hypomagnesemia, hypocalcemia  -Replacing   -Following labs    Thrombocytopenia (HCC)- (present on admission)  Assessment & Plan  -multifactorial due to history of ETOH abuse, vitamin deficiencies, liver disease  -stable - improving  -monitor for bleeding  -follow CBC    Acute cystitis without hematuria- (present on admission)  Assessment & Plan  -present on admission-found down,  "altered at home - had been incontinent feces  -ID following. Recommend 5 day course of  PO Bactrim. End date 4/20    Melena  Assessment & Plan  --resolved    GI bleed  Assessment & Plan  -resolved - BM today showed no evidence of melena  -GI consult - no intervention at this time. Should Hgb continue to drop or patient begin having more bloody stools, would consider EGD/colonoscopy  -continue PPI      Shock (HCC)  Assessment & Plan  -Multifactorial: Cardiogenic shock post cardiac arrest. Hypovolemic shock  -Status post fluid resuscitation  -resolved    Acute respiratory failure (HCC)  Assessment & Plan  -bilateral SDH with seizure like activity  -intubated post-cardiac arrest. Extubated 4/18  -Wean O2 to keep sats >92%  -RT protocols    Cardiac arrest (HCC)  Assessment & Plan  -4/14 asystole - suspected due to seizures  -ROSC after 2 rounds CPR  -intubated/ extubated 4/18  -ECHO nml EF, no valvular abnormalities reported  -target-temperature management not indicated.  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 Repeat US 4/17 showed no evidence of acute DVT  -Cardiology consulted: \"Right atrial mass is likely a prominent eustachian valve but cannot exclude malignancy the most appropriate test is a cardiac MRI.  Clearly she is not in clinical state to get a cardiac MRI as " "you have to hold still for 40 minutes with breath holds so if she gets to this clinical state try to arrange a cardiac MRI for evaluation of this otherwise could be done as an outpatient\".              VTE:  Contraindicated  Ulcer: PPI  Lines: Central Line  orders to dc today, Arterial Line  order to dc today and Castro Catheter  orders to dc today    I have performed a physical exam and reviewed and updated ROS and Plan today (4/19/2021). In review of yesterday's note (4/18/2021), there are no changes except as documented above.     Discussed patient condition and risk of morbidity and/or mortality with Hospitalist, RN, Therapies, Pharmacy, Dietary, , Charge nurse / hot rounds, Patient and hospitalist  The patient remains critically ill.  Critical care time = 43 minutes in directly providing and coordinating critical care and extensive data review.  No time overlap and excludes procedures.  "

## 2021-04-19 NOTE — ASSESSMENT & PLAN NOTE
Possibly secondary to seizure  Patient evaluated by cardiology and noted to have right atrial mass    Case Discussed with Cardiology On call Dr. Malave about prior recommendation of cardiac MRI once clinically stable - advised to follow up as outpatient.

## 2021-04-19 NOTE — PROGRESS NOTES
[facility] Medicine Daily Progress Note    Date of Service  4/19/2021    Chief Complaint  60 y.o. female admitted 4/13/2021 with altered level of consciousness and syncope    Hospital Course  60-year-old female with history of alcohol and heroin dependence who had a syncopal episode and hit her head during her fall was transferred to the emergency department where head CT revealed acute to subacute bilateral subdural hematomas metabolic acidosis.  Patient was admitted to the intensive care unit and evaluated by neurosurgery.  She had a possible seizure episode shortly after admission and went into PEA cardiac arrest for which she received CPR with ROSC within 5 minutes.  Patient was intubated she was on pressors.  Echocardiogram revealed right atrial mass she was evaluated by cardiology who recommended cardiac MRI when patient is more clinically stable.  The patient had GI bleeding and was evaluated by Dr. Acuna from [facility] recommendation for conservative management and deferring EGD and endoscopy to when she is more clinically stable unless she has signs of brisk bleeding.  The patient tolerated extubation on 4/18/2021.  She was weaned off norepinephrine the morning of 4/19/2021.      Interval Problem Update    Patient is alert oriented x3  Intake 1.4 L output 2.3 L +11.3 L since admit  She complains of mild lower abdominal pain and nausea  Denies headache or dyspnea denies chest pain  Sodium 132 magnesium 1.4 phosphorus 2.4 hemoglobin 7.2  Reviewed MRI brain and echocardiogram  Reviewed chest x-ray      Consultants/Specialty  Neurosurgery Dr. Leos  Neurology  Cardiology  Critical care  GI Dr. Acuna    Code Status  DNAR, I OK    Disposition  Transfer to medical floor    Review of Systems  Review of Systems   Constitutional: Positive for malaise/fatigue. Negative for chills and fever.   Respiratory: Negative for shortness of breath.    Cardiovascular: Positive for leg swelling. Negative for chest pain and  palpitations.   Gastrointestinal: Positive for abdominal pain and nausea.   All other systems reviewed and are negative.       Physical Exam  Temp:  [36.3 °C (97.3 °F)-36.6 °C (97.8 °F)] 36.3 °C (97.3 °F)  Pulse:  [] 93  Resp:  [9-30] 15  BP: (76-98)/(50-64) 86/57  SpO2:  [89 %-99 %] 97 %    Physical Exam  Vitals and nursing note reviewed.   Constitutional:       General: She is not in acute distress.  HENT:      Head: Normocephalic and atraumatic.      Nose: Nose normal. No rhinorrhea.      Comments: cortrak in place       Mouth/Throat:      Pharynx: No oropharyngeal exudate or posterior oropharyngeal erythema.   Eyes:      General: No scleral icterus.        Right eye: No discharge.         Left eye: No discharge.   Cardiovascular:      Rate and Rhythm: Normal rate and regular rhythm.      Heart sounds: Normal heart sounds. No murmur. No friction rub. No gallop.    Pulmonary:      Effort: Pulmonary effort is normal. No respiratory distress.      Breath sounds: No stridor. Rales present. No wheezing or rhonchi.   Chest:      Chest wall: No tenderness.   Abdominal:      General: Bowel sounds are normal. There is distension.      Palpations: Abdomen is soft. There is no mass.      Tenderness: There is no abdominal tenderness. There is no rebound.   Musculoskeletal:         General: Swelling present. No tenderness.      Cervical back: Neck supple. No rigidity.   Skin:     General: Skin is warm and dry.      Coloration: Skin is not cyanotic or jaundiced.      Findings: Bruising present.      Nails: There is no clubbing.   Neurological:      General: No focal deficit present.      Mental Status: She is alert and oriented to person, place, and time.      Cranial Nerves: No cranial nerve deficit.      Motor: Weakness (Generalized) present.   Psychiatric:         Mood and Affect: Mood normal.         Behavior: Behavior normal.         Fluids    Intake/Output Summary (Last 24 hours) at 4/19/2021 1039  Last data filed  at 4/19/2021 1000  Gross per 24 hour   Intake 1269.54 ml   Output 2055 ml   Net -785.46 ml       Laboratory  Recent Labs     04/16/21  2355 04/17/21  2330 04/19/21  0603   WBC 5.2 6.6 6.4   RBC 2.20* 2.11* 1.97*   HEMOGLOBIN 7.9* 7.4* 7.2*   HEMATOCRIT 23.4* 23.1* 22.0*   .4* 109.5* 111.7*   MCH 35.9* 35.1* 36.5*   MCHC 33.8 32.0* 32.7*   RDW 76.8* 77.0* 79.7*   PLATELETCT 69* 73* 78*   MPV 10.7 11.4 11.4     Recent Labs     04/18/21  1740 04/18/21  2339 04/19/21  0603   SODIUM 135 134* 132*   POTASSIUM 4.5 4.1 4.7   CHLORIDE 103 102 102   CO2 26 26 26   GLUCOSE 116* 104* 113*   BUN 11 11 11   CREATININE <0.17* <0.17* <0.17*   CALCIUM 7.1* 7.0* 7.1*     Recent Labs     04/17/21  0523   INR 1.02         Recent Labs     04/16/21  2355   TRIGLYCERIDE 221*       Imaging  DX-CHEST-PORTABLE (1 VIEW)   Final Result      Interval increase in interstitial and hazy opacities throughout the left lung which could be related to worsening infection rather than asymmetric edema.      Removal of endotracheal tube.      DX-ABDOMEN FOR TUBE PLACEMENT   Final Result      Feeding tube tip projects over the distal stomach.      MR-BRAIN-W/O   Final Result      1.  BILATERAL holohemispheric supratentorial subdural hematomas appear slightly larger than on the recent prior CT.   2.  Trace LEFT frontal vertex subarachnoid blood, unchanged   3.  Trace intraventricular blood without hydrocephalus   4.  Atrophy   5.  Mild white matter changes      US-EXTREMITY VENOUS LOWER BILAT   Final Result      DX-CHEST-PORTABLE (1 VIEW)   Final Result         1.  Hazy left pulmonary opacities suggests subtle infiltrate, similar to prior study.      DX-CHEST-PORTABLE (1 VIEW)   Final Result         1.  Hazy left pulmonary opacities suggests subtle infiltrate, similar to prior study.      US-EXTREMITY VENOUS LOWER BILAT   Final Result      EC-ECHOCARDIOGRAM COMPLETE W/O CONT   Final Result      US-RUQ   Final Result      1.  Enlarged echogenic liver  suggesting fatty infiltration.   2.  Prior cholecystectomy.   3.  No biliary dilation.   4.  Trace ascites, nonspecific.         DX-CHEST-PORTABLE (1 VIEW)   Final Result      Left internal jugular catheter placement with the tip projecting over the superior vena cava. No pneumothorax is identified.      CT-HEAD W/O   Final Result      1.  Stable bilateral subdural hematomas. No significant midline shift.      2.  Stable left frontal subarachnoid hemorrhage.         DX-CHEST-LIMITED (1 VIEW)   Final Result      Right internal jugular catheter placement with the tip projecting over the superior vena cava. No pneumothorax is identified.      DX-CHEST-PORTABLE (1 VIEW)   Final Result         1.  Hazy left pulmonary opacities suggests subtle infiltrate      CT-HEAD W/O   Final Result      Bilateral subdural hematomas measure up to 8.3 mm on the left and 5 mm on the right.      Left frontal subarachnoid blood is also seen.      No midline shift.      Soft tissue swelling of the right cheek.      Findings discussed with Dr Florence      DX-CHEST-PORTABLE (1 VIEW)   Final Result      No acute cardiopulmonary process is seen.           Assessment/Plan  * SDH (subdural hematoma) (HCC)- (present on admission)  Assessment & Plan  Acute to subacute with history of recent falls    Patient evaluated by neurosurgery with nonsurgical treatment recommended  Continue Keppra per neurology recommendations    Alcohol withdrawal syndrome with complication (HCC)- (present on admission)  Assessment & Plan  Alcohol withdrawal resolved    Patient counseled on cessation    Protein-calorie malnutrition, severe (HCC)- (present on admission)  Assessment & Plan  Continue tube feeding and supportive care    Transaminitis- (present on admission)  Assessment & Plan  Echogenic liver ultrasound like alcoholic hepatitis    Counseled on alcohol cessation  Monitor LFTs    Electrolyte disturbance- (present on admission)  Assessment &  Plan  Hypomagnesemia  Hypophosphatemia    Replete and monitor    Thrombocytopenia (HCC)- (present on admission)  Assessment & Plan  Possibly related to her alcoholism  Monitor CBC    Acute cystitis without hematuria- (present on admission)  Assessment & Plan  On Bactrim to complete course on 4/20/2021    GI bleed  Assessment & Plan  Continue Prilosec  Clinically resolved  Monitor H&H  Evaluated by Dr. Acuna earlier during this admission reviewed consultation notes    Shock (HCC)  Assessment & Plan  Weaned off norepinephrine  Currently on midodrine monitor blood pressure off pressors if remains stable consider diuresis    Acute respiratory failure (HCC)  Assessment & Plan  Extubated on 4/18/2021  Aspiration precautions  RT protocol  Wean off oxygen as tolerated    Cardiac arrest (HCC)  Assessment & Plan  Possibly secondary to seizure  Patient evaluated by cardiology and noted to have right atrial mass with recommendation for cardiac MRI with more clinically stable    QT prolongation- (present on admission)  Assessment & Plan  Avoid QT prolonging agents    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

## 2021-04-19 NOTE — PROGRESS NOTES
60 y F SDH L/R, substance abuse, malnutrition, electrolyte disturbance, PEA, gi bleed, QT prolong, EtOH abuse, transaminitis, ESBL urine, thrombocytopnea, right atrial mass, respiratory failure s/p extubation 4/18    No complaints follow commands brisk  Will remove art line  Mag replace  Phos replace  Midodrine 5mg Q8  MAP goal 60  Edema +11L    Transfer to floor discussed with Dr Jose Luis krishnan critical care will sign off call with question or concerns or change in status of patient    Patient remains in critical condition from titration of norepinephrine gtt. Critical care time provided was 35 minutes. This excludes all separate billable procedures.     Rachid Man MD  Critical Care Medicine

## 2021-04-19 NOTE — PROGRESS NOTES
1222: Report received from BRITNEY Giraldo    1310: Pt arrived from ICU via bed with Kristal TAN. Pt alert and oriented x4, denies numbness and tingling, 2 RN skin check performed. Oriented to room and use of call light. Mother Pat at bedside,updated on pt condition and plan of care, all questions answered. Bed low and locked, bed alarm on, kalyani light and belongings within reach, all needs met at this time.     1730: Pt reporting 8/10 abdominal and back pain. Abdomen soft and non distended, bowel sounds present in all four quadrants. BP 86/51, pt reporting she is slightly lightheaded but is otherwise alert and oriented x4. notified Dr. Joes Luis Baer of pt's pain and blood pressure and concern for giving tramadol due to pt's low BP, stated to administer tylenol per MAR first and then reassess, if pain persists, then may administer tramadol. Pt declining heat and ice packs at this time, medicated with tylenol per MAR.

## 2021-04-19 NOTE — THERAPY
"Speech Language Pathology   Clinical Swallow Evaluation     Patient Name: Nathalie Aguilar  AGE:  60 y.o., SEX:  female  Medical Record #: [id number]  Today's Date: 4/19/2021       Precautions: Swallow Precautions ( See Comments), Nasogastric Tube    Assessment    60 y.o. female with Hx of alcohol and heroin dependence, unspecified psychiatric disorder, and asthma.  She presented  4/13/2021 with acute LOC and FTT.  Pt reports she drinks a pint of hard alcohol daily, and reports she fell in the bathroom a couple days ago. Head CT revealed acute to subacute bilateral subdural hematomas left greater than the right with significant bifrontal cerebral atrophy. Drug screen was positive for benzodiazepines and cannabinoid metabolites.  Blood alcohol level was 205. Pt had cardiac arrest 4/14, was intubated 4/14-4/18.    Pt was AAOx3, with confusion to day, and reports feeling \"dazed and confused\".  Pt noted to have generalized weakness in oral musculature, with reduced lingual and labial ROM.  Pt's voice was hoarse and minimally reduced in volume.  Laryngeal elevation palpated as complete with all textures tested.  PO trials included: ice chips, MTL via tsp and cup sip, applesauce.  Pt had coughing with cup sips of MTL, as well as coughing with multiple ice chips taken at once, which is concerning for possible aspiration.  Pt had no overt s/sx of aspiration with tsp of applesauce, however she triggered multiple effortful swallows to clear a small bolus and reported, \"this seems off\".  Pt had NO s/sx of aspiration with single ice chips x8, and triggered a timely swallow.  No further PO trials given as pt appeared to fatigue at this time and SpO2 inconsistently decreased to high 80's with fatigue.  Recommend to continue NPO with cortrak.  Pt is OK for up to 5 SINGLE ice chips per hour ONLY with 1:1 supervision.  SLP continues to follow.    Plan  1) Continue NPO with cortrak    2) Pt is OK for up to 5 SINGLE ice chips per " hour ONLY with 1:1 supervision    Recommend Speech Therapy 5 times per week until therapy goals are met for the following treatments:  Dysphagia Training and Patient / Family / Caregiver Education.    Discharge Recommendations: Recommend post-acute placement for additional speech therapy services prior to discharge home       Objective     04/19/21 0750   Prior Level Of Function   Communication Within Functional Limits   Swallow Within Functional Limits   Dentition Edentulous   Dentures None   Hearing Within Functional Limits for Evaluation   Hearing Aid None   Vision Within Functional Limits for Evaluation   Patient's Primary Language English   Oral Motor Eval    Is Patient Able to Complete Oral Motor Eval Yes but Impaired   Labial Function   Labial Structure At Rest Within Functional Limits   Labial Vowel Production / I /, / U / Minimal   Labial Sequence / I /, / U / Minimal   Lingual Function   Lingual Structure At Rest Within Functional Limits   Lingual Protrude Minimal   Lingual Retract Minimal   Elevate In Mouth Minimal   Elevate Outside Mouth Minimal   Lateralization Minimal Right;Minimal Left   Jaw   Jaw Structure At Rest Within Functional Limits   Bite (Masseter) Not Tested   Chew (Rotary) Not Tested   Jaw Open / Resist Within Functional Limits   Jaw Close / Resist Within Functional Limits   Velar Function   Velar Structure At Rest Within Functional Limits   / A / Prolonged Within Functional Limits   / A /  5X's Within Functional Limits   Laryngeal Function   Voice Quality Minimal   Volutional Cough Minimal   Excursion Upon Swallow Complete   Oral Food Presentation   Ice Chips Minimal  (ok with single ice chips)   Single Swallow Mildly Thick (2) - (Nectar Thick)  Within Functional Limits   Serial Swallow Mildly Thick (2) - (Nectar Thick) Moderate   Liquidised (3) Within Functional Limits   Self Feeding Independent   Tracheostomy   Tracheostomy  No   Dysphagia Strategies / Recommendations   Strategies /  Interventions Recommended (Yes / No) Yes   Compensatory Strategies To Be Assessed   Diet / Liquid Recommendation NPO;Pre-Feeding Trials with SLP Only   Medication Administration  Via Gastric Tube   Therapy Interventions Dysphagia Therapy By Speech Language Pathologist   Dysphagia Rating   Nutritional Liquid Intake Rating Scale Nothing by mouth   Nutritional Food Intake Rating Scale Nothing by mouth

## 2021-04-19 NOTE — PROGRESS NOTES
Pt transferred via gurney from R112 to S190/01. Mother accompanied in wheelchair. Report to Lillian TAN. All questions answered. Pt transferred with all belongings and medications including safe keeping/medication slip.

## 2021-04-19 NOTE — ASSESSMENT & PLAN NOTE
Continue Prilosec  Clinically resolved  Monitor H&H  Evaluated by Dr. Acuna earlier during this admission reviewed consultation notes    Needed one pRBC 4/21 AM - responded appropriately   At this point, will monitor  If continued to have a further drop, will re-consult GI.

## 2021-04-20 LAB
ALBUMIN SERPL BCP-MCNC: 1.7 G/DL (ref 3.2–4.9)
ALP SERPL-CCNC: 156 U/L (ref 30–99)
ALT SERPL-CCNC: 87 U/L (ref 2–50)
ANION GAP SERPL CALC-SCNC: 9 MMOL/L (ref 7–16)
AST SERPL-CCNC: 185 U/L (ref 12–45)
BASOPHILS # BLD AUTO: 0 % (ref 0–1.8)
BASOPHILS # BLD: 0 K/UL (ref 0–0.12)
BILIRUB CONJ SERPL-MCNC: 3.3 MG/DL (ref 0.1–0.5)
BILIRUB INDIRECT SERPL-MCNC: 0.8 MG/DL (ref 0–1)
BILIRUB SERPL-MCNC: 4.1 MG/DL (ref 0.1–1.5)
BUN SERPL-MCNC: 13 MG/DL (ref 8–22)
CALCIUM SERPL-MCNC: 7.7 MG/DL (ref 8.5–10.5)
CHLORIDE SERPL-SCNC: 100 MMOL/L (ref 96–112)
CO2 SERPL-SCNC: 25 MMOL/L (ref 20–33)
CREAT SERPL-MCNC: <0.17 MG/DL (ref 0.5–1.4)
EOSINOPHIL # BLD AUTO: 0.05 K/UL (ref 0–0.51)
EOSINOPHIL NFR BLD: 0.9 % (ref 0–6.9)
ERYTHROCYTE [DISTWIDTH] IN BLOOD BY AUTOMATED COUNT: 79.5 FL (ref 35.9–50)
GLUCOSE BLD-MCNC: 101 MG/DL (ref 65–99)
GLUCOSE BLD-MCNC: 96 MG/DL (ref 65–99)
GLUCOSE SERPL-MCNC: 95 MG/DL (ref 65–99)
HCT VFR BLD AUTO: 23.9 % (ref 37–47)
HGB BLD-MCNC: 7.3 G/DL (ref 12–16)
IMM GRANULOCYTES # BLD AUTO: 0.1 K/UL (ref 0–0.11)
IMM GRANULOCYTES NFR BLD AUTO: 1.8 % (ref 0–0.9)
LYMPHOCYTES # BLD AUTO: 0.5 K/UL (ref 1–4.8)
LYMPHOCYTES NFR BLD: 9.2 % (ref 22–41)
MAGNESIUM SERPL-MCNC: 1.4 MG/DL (ref 1.5–2.5)
MCH RBC QN AUTO: 35.3 PG (ref 27–33)
MCHC RBC AUTO-ENTMCNC: 30.5 G/DL (ref 33.6–35)
MCV RBC AUTO: 115.5 FL (ref 81.4–97.8)
MONOCYTES # BLD AUTO: 0.33 K/UL (ref 0–0.85)
MONOCYTES NFR BLD AUTO: 6.1 % (ref 0–13.4)
NEUTROPHILS # BLD AUTO: 4.43 K/UL (ref 2–7.15)
NEUTROPHILS NFR BLD: 82 % (ref 44–72)
NRBC # BLD AUTO: 0.21 K/UL
NRBC BLD-RTO: 3.9 /100 WBC
PHOSPHATE SERPL-MCNC: 2.6 MG/DL (ref 2.5–4.5)
PLATELET # BLD AUTO: 120 K/UL (ref 164–446)
PMV BLD AUTO: 11.7 FL (ref 9–12.9)
POTASSIUM SERPL-SCNC: 3.6 MMOL/L (ref 3.6–5.5)
PROT SERPL-MCNC: 4.4 G/DL (ref 6–8.2)
RBC # BLD AUTO: 2.07 M/UL (ref 4.2–5.4)
SODIUM SERPL-SCNC: 134 MMOL/L (ref 135–145)
WBC # BLD AUTO: 5.4 K/UL (ref 4.8–10.8)

## 2021-04-20 PROCEDURE — 770006 HCHG ROOM/CARE - MED/SURG/GYN SEMI*

## 2021-04-20 PROCEDURE — A9270 NON-COVERED ITEM OR SERVICE: HCPCS | Performed by: HOSPITALIST

## 2021-04-20 PROCEDURE — 700102 HCHG RX REV CODE 250 W/ 637 OVERRIDE(OP): Performed by: INTERNAL MEDICINE

## 2021-04-20 PROCEDURE — A9270 NON-COVERED ITEM OR SERVICE: HCPCS | Performed by: NURSE PRACTITIONER

## 2021-04-20 PROCEDURE — 80076 HEPATIC FUNCTION PANEL: CPT

## 2021-04-20 PROCEDURE — 85025 COMPLETE CBC W/AUTO DIFF WBC: CPT

## 2021-04-20 PROCEDURE — 99232 SBSQ HOSP IP/OBS MODERATE 35: CPT | Performed by: STUDENT IN AN ORGANIZED HEALTH CARE EDUCATION/TRAINING PROGRAM

## 2021-04-20 PROCEDURE — A9270 NON-COVERED ITEM OR SERVICE: HCPCS | Performed by: STUDENT IN AN ORGANIZED HEALTH CARE EDUCATION/TRAINING PROGRAM

## 2021-04-20 PROCEDURE — 82962 GLUCOSE BLOOD TEST: CPT

## 2021-04-20 PROCEDURE — 36415 COLL VENOUS BLD VENIPUNCTURE: CPT

## 2021-04-20 PROCEDURE — 700102 HCHG RX REV CODE 250 W/ 637 OVERRIDE(OP): Performed by: NURSE PRACTITIONER

## 2021-04-20 PROCEDURE — 700102 HCHG RX REV CODE 250 W/ 637 OVERRIDE(OP): Performed by: HOSPITALIST

## 2021-04-20 PROCEDURE — A9270 NON-COVERED ITEM OR SERVICE: HCPCS | Performed by: INTERNAL MEDICINE

## 2021-04-20 PROCEDURE — 92526 ORAL FUNCTION THERAPY: CPT

## 2021-04-20 PROCEDURE — 700102 HCHG RX REV CODE 250 W/ 637 OVERRIDE(OP): Performed by: STUDENT IN AN ORGANIZED HEALTH CARE EDUCATION/TRAINING PROGRAM

## 2021-04-20 PROCEDURE — 97166 OT EVAL MOD COMPLEX 45 MIN: CPT

## 2021-04-20 PROCEDURE — 83735 ASSAY OF MAGNESIUM: CPT

## 2021-04-20 PROCEDURE — 80048 BASIC METABOLIC PNL TOTAL CA: CPT

## 2021-04-20 PROCEDURE — 84100 ASSAY OF PHOSPHORUS: CPT

## 2021-04-20 PROCEDURE — 99406 BEHAV CHNG SMOKING 3-10 MIN: CPT

## 2021-04-20 PROCEDURE — 97162 PT EVAL MOD COMPLEX 30 MIN: CPT

## 2021-04-20 RX ORDER — ACETAMINOPHEN 325 MG/1
650 TABLET ORAL EVERY 6 HOURS PRN
Status: DISCONTINUED | OUTPATIENT
Start: 2021-04-20 | End: 2021-04-27 | Stop reason: HOSPADM

## 2021-04-20 RX ORDER — LEVETIRACETAM 500 MG/1
1000 TABLET ORAL EVERY 12 HOURS
Status: DISCONTINUED | OUTPATIENT
Start: 2021-04-21 | End: 2021-04-27 | Stop reason: HOSPADM

## 2021-04-20 RX ORDER — POLYETHYLENE GLYCOL 3350 17 G/17G
1 POWDER, FOR SOLUTION ORAL
Status: DISCONTINUED | OUTPATIENT
Start: 2021-04-20 | End: 2021-04-20

## 2021-04-20 RX ORDER — MIDODRINE HYDROCHLORIDE 5 MG/1
5 TABLET ORAL 3 TIMES DAILY
Status: DISCONTINUED | OUTPATIENT
Start: 2021-04-21 | End: 2021-04-27 | Stop reason: HOSPADM

## 2021-04-20 RX ORDER — BISACODYL 10 MG
10 SUPPOSITORY, RECTAL RECTAL
Status: DISCONTINUED | OUTPATIENT
Start: 2021-04-20 | End: 2021-04-27 | Stop reason: HOSPADM

## 2021-04-20 RX ORDER — BISACODYL 10 MG
10 SUPPOSITORY, RECTAL RECTAL
Status: DISCONTINUED | OUTPATIENT
Start: 2021-04-20 | End: 2021-04-20

## 2021-04-20 RX ORDER — AMOXICILLIN 250 MG
2 CAPSULE ORAL 2 TIMES DAILY PRN
Status: DISCONTINUED | OUTPATIENT
Start: 2021-04-20 | End: 2021-04-20

## 2021-04-20 RX ORDER — TRAMADOL HYDROCHLORIDE 50 MG/1
50 TABLET ORAL EVERY 6 HOURS PRN
Status: DISCONTINUED | OUTPATIENT
Start: 2021-04-20 | End: 2021-04-21

## 2021-04-20 RX ORDER — POLYETHYLENE GLYCOL 3350 17 G/17G
1 POWDER, FOR SOLUTION ORAL
Status: DISCONTINUED | OUTPATIENT
Start: 2021-04-20 | End: 2021-04-27 | Stop reason: HOSPADM

## 2021-04-20 RX ORDER — AMOXICILLIN 250 MG
2 CAPSULE ORAL 2 TIMES DAILY PRN
Status: DISCONTINUED | OUTPATIENT
Start: 2021-04-20 | End: 2021-04-27 | Stop reason: HOSPADM

## 2021-04-20 RX ORDER — MIDODRINE HYDROCHLORIDE 5 MG/1
5 TABLET ORAL 3 TIMES DAILY
Status: DISCONTINUED | OUTPATIENT
Start: 2021-04-20 | End: 2021-04-20

## 2021-04-20 RX ORDER — OMEPRAZOLE 20 MG/1
20 CAPSULE, DELAYED RELEASE ORAL EVERY 12 HOURS
Status: COMPLETED | OUTPATIENT
Start: 2021-04-20 | End: 2021-04-21

## 2021-04-20 RX ADMIN — OMEPRAZOLE 20 MG: 20 CAPSULE, DELAYED RELEASE ORAL at 21:10

## 2021-04-20 RX ADMIN — MAGNESIUM GLUCONATE 500 MG ORAL TABLET 400 MG: 500 TABLET ORAL at 17:27

## 2021-04-20 RX ADMIN — MAGNESIUM GLUCONATE 500 MG ORAL TABLET 400 MG: 500 TABLET ORAL at 05:26

## 2021-04-20 RX ADMIN — SULFAMETHOXAZOLE AND TRIMETHOPRIM 1 TABLET: 400; 80 TABLET ORAL at 05:27

## 2021-04-20 RX ADMIN — DIBASIC SODIUM PHOSPHATE, MONOBASIC POTASSIUM PHOSPHATE AND MONOBASIC SODIUM PHOSPHATE 250 MG: 852; 155; 130 TABLET ORAL at 00:39

## 2021-04-20 RX ADMIN — TRAMADOL HYDROCHLORIDE 50 MG: 50 TABLET, FILM COATED ORAL at 21:09

## 2021-04-20 RX ADMIN — LEVETIRACETAM 1000 MG: 500 TABLET ORAL at 05:27

## 2021-04-20 RX ADMIN — DIBASIC SODIUM PHOSPHATE, MONOBASIC POTASSIUM PHOSPHATE AND MONOBASIC SODIUM PHOSPHATE 250 MG: 852; 155; 130 TABLET ORAL at 05:27

## 2021-04-20 RX ADMIN — MIDODRINE HYDROCHLORIDE 5 MG: 5 TABLET ORAL at 17:27

## 2021-04-20 RX ADMIN — LEVETIRACETAM 1000 MG: 500 TABLET ORAL at 17:27

## 2021-04-20 RX ADMIN — SULFAMETHOXAZOLE AND TRIMETHOPRIM 1 TABLET: 400; 80 TABLET ORAL at 17:27

## 2021-04-20 RX ADMIN — OMEPRAZOLE 40 MG: KIT at 05:26

## 2021-04-20 RX ADMIN — MIDODRINE HYDROCHLORIDE 5 MG: 5 TABLET ORAL at 05:27

## 2021-04-20 RX ADMIN — MIDODRINE HYDROCHLORIDE 5 MG: 5 TABLET ORAL at 14:40

## 2021-04-20 ASSESSMENT — GAIT ASSESSMENTS
DISTANCE (FEET): 3
DEVIATION: BRADYKINETIC;SHUFFLED GAIT;ATAXIC
GAIT LEVEL OF ASSIST: MINIMAL ASSIST
ASSISTIVE DEVICE: FRONT WHEEL WALKER
DISTANCE (FEET): 5

## 2021-04-20 ASSESSMENT — PAIN DESCRIPTION - PAIN TYPE
TYPE: ACUTE PAIN

## 2021-04-20 ASSESSMENT — COGNITIVE AND FUNCTIONAL STATUS - GENERAL
DAILY ACTIVITIY SCORE: 15
SUGGESTED CMS G CODE MODIFIER DAILY ACTIVITY: CL
DRESSING REGULAR LOWER BODY CLOTHING: A LOT
PERSONAL GROOMING: A LOT
SUGGESTED CMS G CODE MODIFIER MOBILITY: CL
SUGGESTED CMS G CODE MODIFIER DAILY ACTIVITY: CK
CLIMB 3 TO 5 STEPS WITH RAILING: A LOT
MOBILITY SCORE: 13
TOILETING: A LOT
STANDING UP FROM CHAIR USING ARMS: A LITTLE
HELP NEEDED FOR BATHING: A LOT
EATING MEALS: A LITTLE
WALKING IN HOSPITAL ROOM: TOTAL
DRESSING REGULAR UPPER BODY CLOTHING: A LOT
TURNING FROM BACK TO SIDE WHILE IN FLAT BAD: A LITTLE
MOVING TO AND FROM BED TO CHAIR: A LOT
HELP NEEDED FOR BATHING: A LOT
DRESSING REGULAR UPPER BODY CLOTHING: A LITTLE
CLIMB 3 TO 5 STEPS WITH RAILING: TOTAL
DRESSING REGULAR LOWER BODY CLOTHING: A LOT
PERSONAL GROOMING: A LITTLE
MOVING FROM LYING ON BACK TO SITTING ON SIDE OF FLAT BED: UNABLE
EATING MEALS: TOTAL
TOILETING: A LOT
DAILY ACTIVITIY SCORE: 11
STANDING UP FROM CHAIR USING ARMS: TOTAL
SUGGESTED CMS G CODE MODIFIER MOBILITY: CM
MOVING TO AND FROM BED TO CHAIR: UNABLE
MOBILITY SCORE: 9
WALKING IN HOSPITAL ROOM: A LITTLE
TURNING FROM BACK TO SIDE WHILE IN FLAT BAD: A LITTLE
MOVING FROM LYING ON BACK TO SITTING ON SIDE OF FLAT BED: UNABLE

## 2021-04-20 ASSESSMENT — ACTIVITIES OF DAILY LIVING (ADL): TOILETING: INDEPENDENT

## 2021-04-20 ASSESSMENT — ENCOUNTER SYMPTOMS
WEAKNESS: 1
SHORTNESS OF BREATH: 0
ABDOMINAL PAIN: 1
NAUSEA: 1
FEVER: 0
DIARRHEA: 1
CHILLS: 0
PALPITATIONS: 0

## 2021-04-20 NOTE — RESPIRATORY CARE
"   COPD EDUCATION by COPD CLINICAL EDUCATOR  4/20/2021 at 4:26 PM by Bebe Robles, RRT     Smoking Cessation Intervention and education completed, 8 minutes spent on smoking cessation education with patient.    Provided smoking cessation packet with \"Tips to Quit\" and brochure for \"Free Smoking Cessation Classes\".         COPD Assessment  COPD Clinical Specialists ONLY  COPD Education Initiated: Yes--Short Intervention:  Smoking cessation education done with patient and her daughter.  Referrals Initiated: Yes  Smoking Cessation: Yes  $ Smoking Cessation 3-10 Minutes: Symptomatic  Is this a COPD exacerbation patient?: No      "

## 2021-04-20 NOTE — THERAPY
"Physical Therapy   Initial Evaluation     Patient Name: Nathalie Aguilar  Age:  60 y.o., Sex:  female  Medical Record #: [id number]  Today's Date: 4/20/2021     Precautions: Fall Risk, Swallow Precautions ( See Comments)    Assessment  Ms. Aguilar is a 61 y/o female who presents to acute secondary to fall that resulted in SDH. Her hospital course was complicated by alcohol withdrawal, transaminitis with alcoholic hepatitis, shock, cardiac arrest, respiratory failure that required intubation now extubated, and R atrial mass. PMH of heroin use. Pt fearful of mobility but very motivated. Mild LE strength but able to move limbs against gravity. Significant decreased activity tolerance, dynamic balance deficits, and gross motor coordination impairments that negatively impact her ability to perform gait, transfers, and bed mobility at her prior level of function and prevent her safe discharge home. Currently recommend post acute placement. Pt does have potential for rapid improvement if she begins regular out of bed mobility with both nursing staff and therapies. Acute PT to follow.    Plan    Recommend Physical Therapy 4 times per week until therapy goals are met for the following treatments:  Bed Mobility, Gait Training, Neuro Re-Education / Balance, Stair Training, Therapeutic Activities and Therapeutic Exercises    DC Equipment Recommendations: Unable to determine at this time  Discharge Recommendations: Recommend post-acute placement for additional physical therapy services prior to discharge home       Subjective    \"I want to get better\"     Objective       04/20/21 1107   Prior Living Situation   Prior Services None   Housing / Facility 1 Story Apartment / Condo   Steps Into Home 3   Equipment Owned None   Lives with - Patient's Self Care Capacity Alone and Able to Care For Self   Prior Level of Functional Mobility   Bed Mobility Independent   Transfer Status Independent   Ambulation Independent   Distance " Ambulation (Feet)   (community ambulator)   Assistive Devices Used None   Stairs Independent   Cognition    Level of Consciousness Alert   Passive ROM Lower Body   Passive ROM Lower Body WDL   Active ROM Lower Body    Active ROM Lower Body  WDL   Strength Lower Body   Gross Strength Generalized Weakness, Equal Bilaterally   Comments 3/5 B LE   Sensation Lower Body   Lower Extremity Sensation   WDL   Balance Assessment   Sitting Balance (Static) Fair -   Sitting Balance (Dynamic) Fair -   Standing Balance (Static) Poor +   Standing Balance (Dynamic) Poor   Weight Shift Sitting Fair   Weight Shift Standing Poor   Comments FWW/HHA when standing   Gait Analysis   Gait Level Of Assist Minimal Assist   Assistive Device Front Wheel Walker   Distance (Feet) 5   # of Times Distance was Traveled 1   Deviation Bradykinetic;Shuffled Gait;Ataxic   Weight Bearing Status no restrictions   Comments very fearful when taking steps   Bed Mobility    Supine to Sit Minimal Assist   Sit to Supine   (left up with OT)   Scooting Minimal Assist   Functional Mobility   Sit to Stand Minimal Assist   Bed, Chair, Wheelchair Transfer Minimal Assist   Short Term Goals    Short Term Goal # 1 Pt will perform supine <> sit with SPV in 6 visits to get in/out of bed   Short Term Goal # 2 Pt will perform functional transfers with SPV in 6 visits to increase independence   Short Term Goal # 3 Pt will ambulate 150ft with FWW and SPV in 6 visits to increase independence

## 2021-04-20 NOTE — DIETARY
NUTRITION SERVICES: UPDATE    Pt transitioned from TF to PO diet on 4/20. Current diet: pureed, thin liquids. No PO intake per chart review at this time.     RD following.

## 2021-04-20 NOTE — CARE PLAN
Problem: Skin Integrity  Goal: Risk for impaired skin integrity will decrease  Outcome: PROGRESSING AS EXPECTED  Skin assessed to ensure it remains clean and dry. Barrier cream applied. Q2 turns. Purwick in place.    Problem: Pain Management  Goal: Pain level will decrease to patient's comfort goal  Outcome: PROGRESSING AS EXPECTED   Patient encouraged to express pain levels on 0-10 scale.

## 2021-04-20 NOTE — PROGRESS NOTES
Patient removed Cortrak, does not want it any longer because it's making her have diarrhea. MD notified. SLP contacted to re-evaluate swallow. Dietician contacted and changed tube feeding formula.

## 2021-04-20 NOTE — CARE PLAN
Problem: Communication  Goal: The ability to communicate needs accurately and effectively will improve  Outcome: PROGRESSING AS EXPECTED  Note: Pt using call light appropriately      Problem: Knowledge Deficit  Goal: Knowledge of disease process/condition, treatment plan, diagnostic tests, and medications will improve  Outcome: PROGRESSING AS EXPECTED  Note: Educated pt and mother at bedside about pt condition, treatment plan, medications, and safety measures. Encouraging questions.   Goal: Knowledge of the prescribed therapeutic regimen will improve  Outcome: PROGRESSING AS EXPECTED

## 2021-04-20 NOTE — THERAPY
"Occupational Therapy   Initial Evaluation     Patient Name: Nathalie Aguilar  Age:  60 y.o., Sex:  female  Medical Record #: [id number]  Today's Date: 4/20/2021     Precautions  Precautions: Fall Risk, Swallow Precautions ( See Comments)    Assessment  Patient is 60 y.o. female admitted with altered consciousness and syncope, GLF, bilateral SDH & metabolic acidosis, possible seizure with cardiac arrest shortly after admission with subsequent intubation, extubated 4/18, and GI bleed. PMHx of alcohol and heroin dependence. Pt presents to OT eval with generalized weakness, impaired balance, decreased activity tolerance, and emotional lability affecting her independence with ADLs, ADL transfers, and functional mobility. Pt required maxA for LB dressing, modA for toileting, Jerson for toilet transfer, and Jerson for seated grooming/hygiene. Pt also lives alone, however has daughter that lives nearby. Currently recommend post-acute placement for additional occupational therapy services prior to discharge home.     Plan    Recommend Occupational Therapy 4 times per week until therapy goals are met for the following treatments:  Adaptive Equipment, Manual Therapy Techniques, Neuro Re-Education / Balance, Self Care/Activities of Daily Living, Therapeutic Activities and Therapeutic Exercises.    DC Equipment Recommendations: Unable to determine at this time  Discharge Recommendations: Recommend post-acute placement for additional occupational therapy services prior to discharge home     Subjective    Pt alert and cooperative. Pt intermittently emotional regarding having multiple BMs recently. Pt states, \"I've never felt this bad in my life!\"     Objective       04/20/21 1122   Prior Living Situation   Prior Services Home-Independent   Housing / Facility 1 Story Apartment / Condo   Steps Into Home 3   Steps In Home 0   Bathroom Set up Bathtub / Shower Combination   Equipment Owned None   Lives with - Patient's Self Care Capacity " "Alone and Able to Care For Self   Comments Pt lives alone and was independent with ADLs at baseline. Her daughter lives nearby and drives her to store for grocery shopping. She does simple microwave meals at baseline.   Prior Level of ADL Function   Self Feeding Independent   Grooming / Hygiene Independent   Bathing Independent   Dressing Independent   Toileting Independent   Prior Level of IADL Function   Medication Management Independent   Laundry Independent   Kitchen Mobility Independent   Finances Independent   Home Management Independent   Shopping Requires Assist   Prior Level Of Mobility Independent Without Device in Community;Independent Without Device in Home   Driving / Transportation Relatives / Others Provide Transportation   Occupation (Pre-Hospital Vocational) Not Employed   Leisure Interests Other (Comments)  (\"drinking\")   History of Falls   History of Falls Yes   Date of Last Fall   (reason for admit)   Vitals   O2 (LPM) 3   O2 Delivery Device Oxymask   Cognition    Cognition / Consciousness WDL   Level of Consciousness Alert   Comments cooperative, intermittently emotional regarding having multiple BMs due to tube feed   Strength Upper Body   Upper Body Strength  X   Gross Strength Generalized Weakness, Equal Bilaterally.    Comments shoulder strength WFL, slightly impaired strength in forearms/biceps/triceps and  4/5, still appears to be functional   Balance Assessment   Comments seated with spv, standing with Jerson using FWW, no overt LOB   Bed Mobility    Supine to Sit Minimal Assist   Scooting Moderate Assist  (seated)   Comments HHA for leverage   ADL Assessment   Grooming Seated;Minimal Assist  (mouthwash)   Lower Body Dressing Maximal Assist  (socks)   Toileting Moderate Assist  (for thoroughness with rear pericare)   Modified Mariana (mRS)   Modified Cloud Score 4   Functional Mobility   Sit to Stand Minimal Assist   Toilet Transfers Minimal Assist   Transfer Method Stand Step "   Mobility EOB to BSC and back to EOB using FWW   Distance (Feet) 3   # of Times Distance was Traveled 2   Activity Tolerance   Comments limited by weakness, fatigue   Patient / Family Goals   Patient / Family Goal #1 To get better and go home   Short Term Goals   Short Term Goal # 1 Pt will complete toilet transfer with spv by discharge.   Short Term Goal # 2 Pt will complete toileting with spv by discharge.   Short Term Goal # 3 Pt will complete LB dressing with spv using AE as needed by discharge.

## 2021-04-20 NOTE — DIETARY
"Nutrition support weekly update:  Day 7 of admit.  Nathalie Aguilar is a 60 y.o. female with admitting DX of SDH.    Tube feeding initiated on 4/14. Current TF via gastric cortrak is Impact Peptide 1.5 @35 ml/hr, providing 1260 kcal, 79 g protein, and 647 ml free water per day. Order in place for 30 ml free water q4 hr = 180 ml free water per day. Total free water TF + flushes = 827 ml/day.     Assessment:  Weight 67.2 kg via bed scale on 4/17. This is increased by 11.4 kg since admit wt of 55.8 kg on 4/13. I/O currently +11.1 L since admit.   Re-estimate of nutritional needs not indicated at this time.      Evaluation:   1. RD received message today from RN asking to re-evaluate TF d/t pt experiencing diarrhea. Shortly thereafter RN notified RD that pt pulled Cortrak. Per RN note \"Patient removed Cortrak, does not want it any longer because it's making her have diarrhea. MD notified. SLP contacted to re-evaluate swallow.\"   2. Per SLP on 4/19 continues to recommend NPO with non-oral source of nutrition.   3. Pt transferred out of ICU on 4/19.   4. 4/19: Pre-albumin 7.4 (low, trending down), CRP 5.94 (high, this is increased from 2.25 on 4/16). This is indicative of ongoing/increasing acute inflammatory response.   5. Other labs: sodium 134, creatinine <0.17, magnesium 1.4, pre-albumin 7.4  6. Meds: mag-ox, proamatine, omeprazole, bactrim, pericolace (held this morning)   7. Per flowsheets pt with 2+ BLE edema.   8. Per flowsheets pt with pressure injury to coccyx, however, no wound team note or consult pending at this time.   9. RD visualized nutrition focused physical exam while pt sleeping. Pt mostly covered in blankets. Pt appeared well-nourished in visible areas of body. Noted with mild muscle wasting of temple region - unclear if this is pt's baseline appearance.   10. Per chart review pt wt was 130 lb via unknown source on 3/6 - noted potential 5.4% wt loss x1.5 months, which is notable though not " significant for malnutrition.   11. Will transition pt to standard omega 3-containing anti-inflammatory formula.   12. Recommend slow advancement of new formula as pt transitioning from fiber-free to fiber-containing formula.     Malnutrition risk: Criteria not met though at risk with recent wt loss and alcohol use.     Recommendations/Plan:  1. PO diet per SLP v replace Cortrak and continue non-oral source of nutrition.   2. Change formula to Diabetisource AC with final goal rate of 50 ml/hr, to provide 1440 kcal, 72 g protein, and 984 ml free water per day.   3. Recommend starting Diabetisource @25 ml/hr and advance per protocol to final goal of 50 ml/hr to promote GI tolerance to new formula.   4. Fluids per MD.     RD following.

## 2021-04-20 NOTE — THERAPY
"Speech Language Pathology  Daily Treatment     Patient Name: Nathalie Aguilar  Age:  60 y.o., Sex:  female  Medical Record #: [id number]  Today's Date: 4/20/2021     Precautions  Precautions: Fall Risk, Swallow Precautions ( See Comments)    Assessment    Pt was seen for a clinical swallow reassessment today after pt removed Cortrak, stating it was giving her diarrhea.  Pt was A&Ox3, did not recall reason for admit but did state that she had been drinking and doesn't remember what happened. Oral mech exam was repeated with no significant change noted; orofacial musculature is symmetrical with good ROM and strength. Pt is edentulous but her dentures are at home. Pt is currently on 4L O2 via oxymask with O2 desatting to 86-90% with removal of mask to eat; RN switched to NC. She was presented w/ ice chips, thins via tsp/cup, liquidized and minced/moist solids. Pt demo'd adequate mashing of minced/moist solids, but stated she didn't like the taste and preferred the applesauce. Pharyngeal swallow trigger was timely. Hyolaryngeal excursion was palpated as reduced. No s/sx of aspiration occurred w/ PO intake. Vocal quality remained clear. Pt was able to self-feed small bites and sips with no impulsivity noted. Recommend initiating a diet of Pureed Solids (PU4) and Thin Liquids (TN0) with monitoring during meals. Ok to give pills whole w/ thins.     Plan    Pureed Solids (PU4) and Thin Liquids (TN0) with monitoring during meals.   Ok to give pills whole w/ thins.     Continue current treatment plan.    Discharge Recommendations: Recommend home health for continued speech therapy services    Subjective    \"I can eat pureed foods until I go home.\"     Objective       04/20/21 1405   Dysphagia    Positioning / Behavior Modification Modulate Rate or Bite Size;Self Monitoring   Other Treatments PO trials of ice chips, thins via tsp/cup, liquidized, minced/moist solids   Diet / Liquid Recommendation Puree (4);Thin (0) "   Recommended Route of Medication Administration   Medication Administration  Whole with Liquid Wash   Short Term Goals   Short Term Goal # 1 Pt will consume pre-feeding trials with SLP, without s/sx of aspiration.   Goal Outcome # 1 Goal met, new goal added   Short Term Goal # 1 B  Patient will consume meals of pureed solids and thin liquids with no s/sx of aspiration given monitoring during meals.

## 2021-04-21 ENCOUNTER — APPOINTMENT (OUTPATIENT)
Dept: RADIOLOGY | Facility: MEDICAL CENTER | Age: 61
DRG: 082 | End: 2021-04-21
Attending: STUDENT IN AN ORGANIZED HEALTH CARE EDUCATION/TRAINING PROGRAM
Payer: MEDICAID

## 2021-04-21 LAB
ABO GROUP BLD: NORMAL
ANION GAP SERPL CALC-SCNC: 7 MMOL/L (ref 7–16)
BACTERIA BLD CULT: NORMAL
BACTERIA BLD CULT: NORMAL
BARCODED ABORH UBTYP: 9500
BARCODED PRD CODE UBPRD: NORMAL
BARCODED UNIT NUM UBUNT: NORMAL
BASOPHILS # BLD AUTO: 0.4 % (ref 0–1.8)
BASOPHILS # BLD: 0.02 K/UL (ref 0–0.12)
BLD GP AB SCN SERPL QL: NORMAL
BUN SERPL-MCNC: 10 MG/DL (ref 8–22)
CALCIUM SERPL-MCNC: 7.4 MG/DL (ref 8.5–10.5)
CHLORIDE SERPL-SCNC: 98 MMOL/L (ref 96–112)
CO2 SERPL-SCNC: 24 MMOL/L (ref 20–33)
COMPONENT R 8504R: NORMAL
CREAT SERPL-MCNC: <0.17 MG/DL (ref 0.5–1.4)
EKG IMPRESSION: NORMAL
EOSINOPHIL # BLD AUTO: 0.04 K/UL (ref 0–0.51)
EOSINOPHIL NFR BLD: 0.7 % (ref 0–6.9)
ERYTHROCYTE [DISTWIDTH] IN BLOOD BY AUTOMATED COUNT: 78.6 FL (ref 35.9–50)
GLUCOSE SERPL-MCNC: 96 MG/DL (ref 65–99)
HCT VFR BLD AUTO: 20.9 % (ref 37–47)
HCT VFR BLD AUTO: 26.7 % (ref 37–47)
HGB BLD-MCNC: 6.7 G/DL (ref 12–16)
HGB BLD-MCNC: 8.6 G/DL (ref 12–16)
IMM GRANULOCYTES # BLD AUTO: 0.07 K/UL (ref 0–0.11)
IMM GRANULOCYTES NFR BLD AUTO: 1.3 % (ref 0–0.9)
LYMPHOCYTES # BLD AUTO: 0.51 K/UL (ref 1–4.8)
LYMPHOCYTES NFR BLD: 9.3 % (ref 22–41)
MAGNESIUM SERPL-MCNC: 1.2 MG/DL (ref 1.5–2.5)
MAGNESIUM SERPL-MCNC: 1.9 MG/DL (ref 1.5–2.5)
MCH RBC QN AUTO: 36 PG (ref 27–33)
MCHC RBC AUTO-ENTMCNC: 32.1 G/DL (ref 33.6–35)
MCV RBC AUTO: 112.4 FL (ref 81.4–97.8)
MONOCYTES # BLD AUTO: 0.41 K/UL (ref 0–0.85)
MONOCYTES NFR BLD AUTO: 7.5 % (ref 0–13.4)
NEUTROPHILS # BLD AUTO: 4.44 K/UL (ref 2–7.15)
NEUTROPHILS NFR BLD: 80.8 % (ref 44–72)
NRBC # BLD AUTO: 0.16 K/UL
NRBC BLD-RTO: 2.9 /100 WBC
PHOSPHATE SERPL-MCNC: 2.9 MG/DL (ref 2.5–4.5)
PLATELET # BLD AUTO: 161 K/UL (ref 164–446)
PMV BLD AUTO: 10.7 FL (ref 9–12.9)
POTASSIUM SERPL-SCNC: 3.6 MMOL/L (ref 3.6–5.5)
PRODUCT TYPE UPROD: NORMAL
RBC # BLD AUTO: 1.86 M/UL (ref 4.2–5.4)
RH BLD: NORMAL
SIGNIFICANT IND 70042: NORMAL
SIGNIFICANT IND 70042: NORMAL
SITE SITE: NORMAL
SITE SITE: NORMAL
SODIUM SERPL-SCNC: 129 MMOL/L (ref 135–145)
SOURCE SOURCE: NORMAL
SOURCE SOURCE: NORMAL
UNIT STATUS USTAT: NORMAL
WBC # BLD AUTO: 5.5 K/UL (ref 4.8–10.8)

## 2021-04-21 PROCEDURE — A9270 NON-COVERED ITEM OR SERVICE: HCPCS | Performed by: INTERNAL MEDICINE

## 2021-04-21 PROCEDURE — 85014 HEMATOCRIT: CPT

## 2021-04-21 PROCEDURE — 93005 ELECTROCARDIOGRAM TRACING: CPT | Performed by: STUDENT IN AN ORGANIZED HEALTH CARE EDUCATION/TRAINING PROGRAM

## 2021-04-21 PROCEDURE — P9016 RBC LEUKOCYTES REDUCED: HCPCS

## 2021-04-21 PROCEDURE — A9270 NON-COVERED ITEM OR SERVICE: HCPCS | Performed by: NURSE PRACTITIONER

## 2021-04-21 PROCEDURE — 85018 HEMOGLOBIN: CPT

## 2021-04-21 PROCEDURE — A9270 NON-COVERED ITEM OR SERVICE: HCPCS | Performed by: STUDENT IN AN ORGANIZED HEALTH CARE EDUCATION/TRAINING PROGRAM

## 2021-04-21 PROCEDURE — A9270 NON-COVERED ITEM OR SERVICE: HCPCS | Performed by: HOSPITALIST

## 2021-04-21 PROCEDURE — 99254 IP/OBS CNSLTJ NEW/EST MOD 60: CPT | Mod: 25 | Performed by: PHYSICAL MEDICINE & REHABILITATION

## 2021-04-21 PROCEDURE — 80048 BASIC METABOLIC PNL TOTAL CA: CPT

## 2021-04-21 PROCEDURE — 36430 TRANSFUSION BLD/BLD COMPNT: CPT

## 2021-04-21 PROCEDURE — 36415 COLL VENOUS BLD VENIPUNCTURE: CPT

## 2021-04-21 PROCEDURE — 700102 HCHG RX REV CODE 250 W/ 637 OVERRIDE(OP): Performed by: PHYSICAL MEDICINE & REHABILITATION

## 2021-04-21 PROCEDURE — 770006 HCHG ROOM/CARE - MED/SURG/GYN SEMI*

## 2021-04-21 PROCEDURE — 700105 HCHG RX REV CODE 258: Performed by: STUDENT IN AN ORGANIZED HEALTH CARE EDUCATION/TRAINING PROGRAM

## 2021-04-21 PROCEDURE — 86901 BLOOD TYPING SEROLOGIC RH(D): CPT

## 2021-04-21 PROCEDURE — 86900 BLOOD TYPING SEROLOGIC ABO: CPT

## 2021-04-21 PROCEDURE — 700102 HCHG RX REV CODE 250 W/ 637 OVERRIDE(OP): Performed by: HOSPITALIST

## 2021-04-21 PROCEDURE — 700111 HCHG RX REV CODE 636 W/ 250 OVERRIDE (IP): Performed by: STUDENT IN AN ORGANIZED HEALTH CARE EDUCATION/TRAINING PROGRAM

## 2021-04-21 PROCEDURE — 83735 ASSAY OF MAGNESIUM: CPT

## 2021-04-21 PROCEDURE — 700102 HCHG RX REV CODE 250 W/ 637 OVERRIDE(OP): Performed by: STUDENT IN AN ORGANIZED HEALTH CARE EDUCATION/TRAINING PROGRAM

## 2021-04-21 PROCEDURE — 86850 RBC ANTIBODY SCREEN: CPT

## 2021-04-21 PROCEDURE — 86923 COMPATIBILITY TEST ELECTRIC: CPT

## 2021-04-21 PROCEDURE — A9270 NON-COVERED ITEM OR SERVICE: HCPCS | Performed by: PHYSICAL MEDICINE & REHABILITATION

## 2021-04-21 PROCEDURE — 85025 COMPLETE CBC W/AUTO DIFF WBC: CPT

## 2021-04-21 PROCEDURE — 700102 HCHG RX REV CODE 250 W/ 637 OVERRIDE(OP): Performed by: NURSE PRACTITIONER

## 2021-04-21 PROCEDURE — 71045 X-RAY EXAM CHEST 1 VIEW: CPT

## 2021-04-21 PROCEDURE — 93010 ELECTROCARDIOGRAM REPORT: CPT | Performed by: INTERNAL MEDICINE

## 2021-04-21 PROCEDURE — 99233 SBSQ HOSP IP/OBS HIGH 50: CPT | Performed by: STUDENT IN AN ORGANIZED HEALTH CARE EDUCATION/TRAINING PROGRAM

## 2021-04-21 PROCEDURE — 99406 BEHAV CHNG SMOKING 3-10 MIN: CPT | Performed by: PHYSICAL MEDICINE & REHABILITATION

## 2021-04-21 PROCEDURE — 84100 ASSAY OF PHOSPHORUS: CPT

## 2021-04-21 PROCEDURE — 700102 HCHG RX REV CODE 250 W/ 637 OVERRIDE(OP): Performed by: INTERNAL MEDICINE

## 2021-04-21 RX ORDER — FUROSEMIDE 20 MG/1
20 TABLET ORAL
Status: DISCONTINUED | OUTPATIENT
Start: 2021-04-22 | End: 2021-04-25

## 2021-04-21 RX ORDER — FUROSEMIDE 10 MG/ML
20 INJECTION INTRAMUSCULAR; INTRAVENOUS ONCE
Status: COMPLETED | OUTPATIENT
Start: 2021-04-21 | End: 2021-04-21

## 2021-04-21 RX ORDER — OXYCODONE HYDROCHLORIDE 10 MG/1
10 TABLET ORAL EVERY 4 HOURS PRN
Status: DISCONTINUED | OUTPATIENT
Start: 2021-04-21 | End: 2021-04-27 | Stop reason: HOSPADM

## 2021-04-21 RX ORDER — CHOLECALCIFEROL (VITAMIN D3) 125 MCG
5 CAPSULE ORAL NIGHTLY
Status: DISCONTINUED | OUTPATIENT
Start: 2021-04-21 | End: 2021-04-25

## 2021-04-21 RX ORDER — SODIUM CHLORIDE 9 MG/ML
INJECTION, SOLUTION INTRAVENOUS CONTINUOUS
Status: ACTIVE | OUTPATIENT
Start: 2021-04-21 | End: 2021-04-21

## 2021-04-21 RX ORDER — TRAZODONE HYDROCHLORIDE 100 MG/1
100 TABLET ORAL
Status: DISCONTINUED | OUTPATIENT
Start: 2021-04-21 | End: 2021-04-21

## 2021-04-21 RX ORDER — OXYCODONE HYDROCHLORIDE 5 MG/1
5 TABLET ORAL EVERY 4 HOURS PRN
Status: DISCONTINUED | OUTPATIENT
Start: 2021-04-21 | End: 2021-04-27 | Stop reason: HOSPADM

## 2021-04-21 RX ADMIN — MAGNESIUM GLUCONATE 500 MG ORAL TABLET 400 MG: 500 TABLET ORAL at 05:27

## 2021-04-21 RX ADMIN — MAGNESIUM GLUCONATE 500 MG ORAL TABLET 400 MG: 500 TABLET ORAL at 17:24

## 2021-04-21 RX ADMIN — LEVETIRACETAM 1000 MG: 500 TABLET ORAL at 17:24

## 2021-04-21 RX ADMIN — LEVETIRACETAM 1000 MG: 500 TABLET ORAL at 05:27

## 2021-04-21 RX ADMIN — MAGNESIUM SULFATE HEPTAHYDRATE 3 G: 500 INJECTION, SOLUTION INTRAMUSCULAR; INTRAVENOUS at 05:09

## 2021-04-21 RX ADMIN — Medication 5 MG: at 20:34

## 2021-04-21 RX ADMIN — TRAMADOL HYDROCHLORIDE 50 MG: 50 TABLET, FILM COATED ORAL at 05:52

## 2021-04-21 RX ADMIN — MIDODRINE HYDROCHLORIDE 5 MG: 5 TABLET ORAL at 17:24

## 2021-04-21 RX ADMIN — MIDODRINE HYDROCHLORIDE 5 MG: 5 TABLET ORAL at 13:46

## 2021-04-21 RX ADMIN — SODIUM CHLORIDE: 9 INJECTION, SOLUTION INTRAVENOUS at 05:58

## 2021-04-21 RX ADMIN — FUROSEMIDE 20 MG: 10 INJECTION, SOLUTION INTRAMUSCULAR; INTRAVENOUS at 09:26

## 2021-04-21 RX ADMIN — OMEPRAZOLE 20 MG: 20 CAPSULE, DELAYED RELEASE ORAL at 05:28

## 2021-04-21 RX ADMIN — MIDODRINE HYDROCHLORIDE 5 MG: 5 TABLET ORAL at 05:27

## 2021-04-21 ASSESSMENT — ENCOUNTER SYMPTOMS
PALPITATIONS: 0
SHORTNESS OF BREATH: 0
CHILLS: 0
FEVER: 0
ABDOMINAL PAIN: 1
NAUSEA: 1
WEAKNESS: 1
DIARRHEA: 1

## 2021-04-21 ASSESSMENT — PAIN DESCRIPTION - PAIN TYPE
TYPE: ACUTE PAIN

## 2021-04-21 NOTE — PROGRESS NOTES
Patient was not connect to pulse oximeter, RN attached pulse oximeter and patient was noted to be sating at 68% on 3L of oxygen via nasal cannula. Patient was alert and oriented X4. Patient stated she was having a hard time taking deep breathes, oxy mask applied and turned up to 10L of oxygen, rapid response called. Patient recovered, back to 96% oxygenation on 3L. Daughter at beside and updated. MD updated. Stat EKG and chest xray obtained.

## 2021-04-21 NOTE — CARE PLAN
Problem: Communication  Goal: The ability to communicate needs accurately and effectively will improve  Outcome: PROGRESSING AS EXPECTED     Problem: Psychosocial Needs:  Goal: Level of anxiety will decrease  Outcome: PROGRESSING AS EXPECTED  Intervention: Identify and develop with patient strategies to cope with anxiety triggers  Note: Patient educated on support system use, coping strategies and relaxation techniques.     Problem: Pain Management  Goal: Pain level will decrease to patient's comfort goal  Outcome: PROGRESSING AS EXPECTED

## 2021-04-21 NOTE — CARE PLAN
Problem: Communication  Goal: The ability to communicate needs accurately and effectively will improve  Outcome: PROGRESSING AS EXPECTED     Problem: Safety  Goal: Will remain free from injury  Outcome: PROGRESSING AS EXPECTED   Patient is free of falls, alarms in place for safety, slipper socks on.     Problem: Bowel/Gastric:  Goal: Normal bowel function is maintained or improved  Outcome: PROGRESSING AS EXPECTED   Patient has had 2 loose BM's this shift, holding stool softeners.     Problem: Knowledge Deficit  Goal: Knowledge of disease process/condition, treatment plan, diagnostic tests, and medications will improve  Outcome: PROGRESSING AS EXPECTED   Patient educated on POC, possible testing that needs to be done prior to discharge.     Problem: Discharge Barriers/Planning  Goal: Patient's continuum of care needs will be met  Outcome: PROGRESSING AS EXPECTED   Patient is a potential candidate for rehab at discharge. Daughter at bedside and updated.     Problem: Skin Integrity  Goal: Risk for impaired skin integrity will decrease  Outcome: PROGRESSING AS EXPECTED   Patient continues to have redness in her groins and sacrum, barrier cream applied. Patient is able to turn herself in bed with assistance.     Problem: Pain Management  Goal: Pain level will decrease to patient's comfort goal  Outcome: PROGRESSING AS EXPECTED   Patient has denied pain this shift, pain medications available if needed.

## 2021-04-21 NOTE — PROGRESS NOTES
Hospital Medicine Daily Progress Note    Date of Service  4/20/2021    Chief Complaint  60 y.o. female admitted 4/13/2021 with altered level of consciousness and syncope    Hospital Course  60-year-old female with history of alcohol and heroin dependence who had a syncopal episode and hit her head during her fall was transferred to the emergency department where head CT revealed acute to subacute bilateral subdural hematomas metabolic acidosis.  Patient was admitted to the intensive care unit and evaluated by neurosurgery.  She had a possible seizure episode shortly after admission and went into PEA cardiac arrest for which she received CPR with ROSC within 5 minutes.  Patient was intubated she was on pressors.  Echocardiogram revealed right atrial mass she was evaluated by cardiology who recommended cardiac MRI when patient is more clinically stable.  The patient had GI bleeding and was evaluated by Dr. Acuna from [facility] recommendation for conservative management and deferring EGD and endoscopy to when she is more clinically stable unless she has signs of brisk bleeding.  The patient tolerated extubation on 4/18/2021.  She was weaned off norepinephrine the morning of 4/19/2021.    Care was transferred to Hospitalist Services and to  Neuro on 4/19.    Interval Problem Update  4/20:  Vitals reviewed; afebrile.  The rest of the vitals within normal parameters except BP soft.   Pain scale reported - about 2 in abd  Blood glucose in last 24hrs - around 100s    Per nursing report, Pt pulled out Cortrek - no longer wants it.    At bedside, appeared comfortable. Expressed her concern with tube feed and diarrhea (bowel incontinence). She does not remember most of her ICU course and all the things that she went through. I summarized a brief hospital course for her and current treatment plan.     Labs reviewed   Na 132 > 134    Consultants/Specialty  Neurosurgery Dr. Leos  Neurology  Cardiology  Critical care  GI   Armand    Code Status  DNAR, I OK    Disposition  Transfer to medical floor    Review of Systems  Review of Systems   Constitutional: Positive for malaise/fatigue. Negative for chills and fever.   Respiratory: Negative for shortness of breath.    Cardiovascular: Positive for leg swelling. Negative for chest pain and palpitations.   Gastrointestinal: Positive for abdominal pain, diarrhea and nausea.   Neurological: Positive for weakness.   All other systems reviewed and are negative.       Physical Exam  Temp:  [36.3 °C (97.4 °F)-36.7 °C (98 °F)] 36.4 °C (97.6 °F)  Pulse:  [] 94  Resp:  [16] 16  BP: ()/(53-66) 112/66  SpO2:  [93 %-95 %] 93 %    Physical Exam  Vitals and nursing note reviewed.   Constitutional:       General: She is not in acute distress.  HENT:      Head: Normocephalic and atraumatic.      Nose: No rhinorrhea.   Eyes:      General: No scleral icterus.     Comments: Pale conjunctiva    Cardiovascular:      Rate and Rhythm: Normal rate and regular rhythm.      Heart sounds: Normal heart sounds. No murmur. No friction rub. No gallop.    Pulmonary:      Effort: Pulmonary effort is normal. No respiratory distress.      Breath sounds: Rales present. No wheezing.   Abdominal:      General: Bowel sounds are normal. There is distension.      Palpations: Abdomen is soft. There is no mass.      Tenderness: There is no abdominal tenderness. There is no rebound.   Musculoskeletal:         General: Swelling present. No tenderness.      Cervical back: Neck supple. No rigidity.   Skin:     General: Skin is warm and dry.      Capillary Refill: Capillary refill takes less than 2 seconds.      Coloration: Skin is not cyanotic or jaundiced.      Findings: Bruising present.      Nails: There is no clubbing.   Neurological:      General: No focal deficit present.      Mental Status: She is alert.      Cranial Nerves: No cranial nerve deficit.      Motor: Weakness (Generalized) present.   Psychiatric:          Mood and Affect: Mood normal.         Behavior: Behavior normal.         Fluids    Intake/Output Summary (Last 24 hours) at 4/20/2021 1723  Last data filed at 4/20/2021 0903  Gross per 24 hour   Intake 180 ml   Output --   Net 180 ml       Laboratory  Recent Labs     04/17/21  2330 04/19/21  0603 04/20/21  0600   WBC 6.6 6.4 5.4   RBC 2.11* 1.97* 2.07*   HEMOGLOBIN 7.4* 7.2* 7.3*   HEMATOCRIT 23.1* 22.0* 23.9*   .5* 111.7* 115.5*   MCH 35.1* 36.5* 35.3*   MCHC 32.0* 32.7* 30.5*   RDW 77.0* 79.7* 79.5*   PLATELETCT 73* 78* 120*   MPV 11.4 11.4 11.7     Recent Labs     04/18/21  2339 04/19/21  0603 04/20/21  0600   SODIUM 134* 132* 134*   POTASSIUM 4.1 4.7 3.6   CHLORIDE 102 102 100   CO2 26 26 25   GLUCOSE 104* 113* 95   BUN 11 11 13   CREATININE <0.17* <0.17* <0.17*   CALCIUM 7.0* 7.1* 7.7*                   Imaging  DX-CHEST-PORTABLE (1 VIEW)   Final Result      Interval increase in interstitial and hazy opacities throughout the left lung which could be related to worsening infection rather than asymmetric edema.      Removal of endotracheal tube.      DX-ABDOMEN FOR TUBE PLACEMENT   Final Result      Feeding tube tip projects over the distal stomach.      MR-BRAIN-W/O   Final Result      1.  BILATERAL holohemispheric supratentorial subdural hematomas appear slightly larger than on the recent prior CT.   2.  Trace LEFT frontal vertex subarachnoid blood, unchanged   3.  Trace intraventricular blood without hydrocephalus   4.  Atrophy   5.  Mild white matter changes      US-EXTREMITY VENOUS LOWER BILAT   Final Result      DX-CHEST-PORTABLE (1 VIEW)   Final Result         1.  Hazy left pulmonary opacities suggests subtle infiltrate, similar to prior study.      DX-CHEST-PORTABLE (1 VIEW)   Final Result         1.  Hazy left pulmonary opacities suggests subtle infiltrate, similar to prior study.      US-EXTREMITY VENOUS LOWER BILAT   Final Result      EC-ECHOCARDIOGRAM COMPLETE W/O CONT   Final Result       US-RUQ   Final Result      1.  Enlarged echogenic liver suggesting fatty infiltration.   2.  Prior cholecystectomy.   3.  No biliary dilation.   4.  Trace ascites, nonspecific.         DX-CHEST-PORTABLE (1 VIEW)   Final Result      Left internal jugular catheter placement with the tip projecting over the superior vena cava. No pneumothorax is identified.      CT-HEAD W/O   Final Result      1.  Stable bilateral subdural hematomas. No significant midline shift.      2.  Stable left frontal subarachnoid hemorrhage.         DX-CHEST-LIMITED (1 VIEW)   Final Result      Right internal jugular catheter placement with the tip projecting over the superior vena cava. No pneumothorax is identified.      DX-CHEST-PORTABLE (1 VIEW)   Final Result         1.  Hazy left pulmonary opacities suggests subtle infiltrate      CT-HEAD W/O   Final Result      Bilateral subdural hematomas measure up to 8.3 mm on the left and 5 mm on the right.      Left frontal subarachnoid blood is also seen.      No midline shift.      Soft tissue swelling of the right cheek.      Findings discussed with Dr Florence      DX-CHEST-PORTABLE (1 VIEW)   Final Result      No acute cardiopulmonary process is seen.           Assessment/Plan  * SDH (subdural hematoma) (HCC)- (present on admission)  Assessment & Plan  Acute to subacute with history of recent falls    Patient evaluated by neurosurgery with nonsurgical treatment recommended  Continue Keppra per neurology recommendations    Alcohol withdrawal syndrome with complication (HCC)- (present on admission)  Assessment & Plan  Alcohol withdrawal resolved    Patient counseled on cessation    Protein-calorie malnutrition, severe (HCC)- (present on admission)  Assessment & Plan  Continue supportive care  SLP, RD and nutrition following  Passed FEEs 4/20    Transaminitis- (present on admission)  Assessment & Plan  Echogenic liver ultrasound like alcoholic hepatitis    Counseled on alcohol cessation  Monitor  LFTs    Electrolyte disturbance- (present on admission)  Assessment & Plan  Hypomagnesemia  Hypophosphatemia    Replete and monitor    Thrombocytopenia (HCC)- (present on admission)  Assessment & Plan  Possibly related to her alcoholism  Monitor CBC    Acute cystitis without hematuria- (present on admission)  Assessment & Plan  On Bactrim to complete course on 4/20/2021    GI bleed  Assessment & Plan  Continue Prilosec  Clinically resolved  Monitor H&H  Evaluated by Dr. Acuna earlier during this admission reviewed consultation notes    Shock (HCC)  Assessment & Plan  Weaned off norepinephrine  Currently on midodrine monitor blood pressure off pressors  If remains stable, consider diuresis    Acute respiratory failure (HCC)  Assessment & Plan  Extubated on 4/18/2021  Aspiration precautions  RT protocol  Wean off oxygen as tolerated    Cardiac arrest (HCC)  Assessment & Plan  Possibly secondary to seizure  Patient evaluated by cardiology and noted to have right atrial mass with recommendation for cardiac MRI with more clinically stable    QT prolongation- (present on admission)  Assessment & Plan  Avoid QT prolonging agents    Right atrial mass- (present on admission)  Assessment & Plan  Evaluated by cardiology, patient will need further work-up with cardiac MRI when more clinically stable       VTE prophylaxis: SCD

## 2021-04-21 NOTE — CARE PLAN
Problem: Communication  Goal: The ability to communicate needs accurately and effectively will improve  Outcome: PROGRESSING AS EXPECTED   Patient is able to communicate effectively, call light is within reach.     Problem: Safety  Goal: Will remain free from injury  Outcome: PROGRESSING AS EXPECTED   Patient is free of falls, slipper socks in place, alarms on for safety.     Problem: Bowel/Gastric:  Goal: Normal bowel function is maintained or improved  Outcome: PROGRESSING AS EXPECTED   Patient was having frequent incontinent diarrhea today. She removed her Cortrak because she was upset she was having diarrhea. SLP evaluated patient this afternoon and ordered a diet.     Problem: Pain Management  Goal: Pain level will decrease to patient's comfort goal  Outcome: PROGRESSING AS EXPECTED   Patient denies pain, aware of pain management options.     Problem: Respiratory:  Goal: Respiratory status will improve  Outcome: PROGRESSING SLOWER THAN EXPECTED   Patient remains on 3L of oxygen to maintain saturations. Encouraged use of IS while awake.     Problem: Safety - Medical Restraint  Goal: Remains free of injury from restraints (Restraint for Interference with Medical Device)  Description: INTERVENTIONS:  1. Determine that other, less restrictive measures have been tried or would not be effective before applying the restraint  2. Evaluate the patient's condition at the time of restraint application  3. Inform patient/family regarding the reason for restraint  4. Q2H: Monitor safety, psychosocial status, comfort, nutrition and hydration  Outcome: MET  Goal: Free from restraint(s) (Restraint for Interference with Medical Device)  Description: INTERVENTIONS:  1. ONCE/SHIFT or MINIMUM Q12H: Assess and document the continuing need for restraints  2. Q24H: Continued use of restraint requires LIP to perform face to face examination and written order  3. Identify and implement measures to help patient regain  control  Outcome: MET

## 2021-04-21 NOTE — CONSULTS
"    Physical Medicine and Rehabilitation Consultation              Date of initial consultation: 4/21/2021  Consulting provider: Francesco Pelaez MD  Reason for consultation: assess for acute inpatient rehab appropriateness  LOS: 8 Day(s)    Chief complaint: AMS    HPI: The patient is a 60 y.o. left hand dominant female with a past medical history of alcohol and heroin dependence;  who presented on 4/13/2021  5:52 PM with altered level of consciousness after syncope. From Dr. Fuentes's note on 4/20, patient \"had a syncopal episode and hit her head during her fall was transferred to the emergency department where head CT revealed acute to subacute bilateral subdural hematomas metabolic acidosis.  Patient was admitted to the intensive care unit and evaluated by neurosurgery.  She had a possible seizure episode shortly after admission and went into PEA cardiac arrest for which she received CPR with ROSC within 5 minutes.  Patient was intubated she was on pressors.  Echocardiogram revealed right atrial mass she was evaluated by cardiology who recommended cardiac MRI when patient is more clinically stable.  The patient had GI bleeding and was evaluated by Dr. Acuna from [facility] recommendation for conservative management and deferring EGD and endoscopy to when she is more clinically stable unless she has signs of brisk bleeding.  The patient tolerated extubation on 4/18/2021.  She was weaned off norepinephrine the morning of 4/19/2021.\"    The patient currently reports headache this morning without nausea or vomiting. She has low energy and also endorses insomnia and poor appetitie. She received blood this AM.     Social Hx:  1 SH  3 GANGA  With: Alone    Has a daughter in [place] who she says she can live with who has a [facility] with 0 GANGA and lives with her boyfriend. She states she has a good relationship with them.     Employment: not working   Tobacco: 1/2 ppd   Alcohol: 1 pint whisky daily   Drugs: denies     THERAPY:  PT: " Functional mobility   4/20: gait 5' at min A     OT: ADLs  4/20: Max Assist     SLP:   4/20: Pureed Solids (PU4) and Thin Liquids (TN0)    IMAGING:    MR brain 4-18-21  1.  BILATERAL holohemispheric supratentorial subdural hematomas appear slightly larger than on the recent prior CT.  2.  Trace LEFT frontal vertex subarachnoid blood, unchanged  3.  Trace intraventricular blood without hydrocephalus  4.  Atrophy  5.  Mild white matter changes    PROCEDURES:  EEG 4-16-21  Abnormal video EEG recording in the awake, drowsy, and sleep state(s):  - Moderate background slowing with occasional triphasic waves suggestive of diffuse/multifocal cerebral dysfunction.   - No persistent focal asymmetries seen.  - No definitive electro-clinical seizures. Clinical correlation is recommended.  -No push button events recorded or reported  -Compared to the prior study, the emergence of triphasic waves is now seen.    PMH:  Past Medical History:   Diagnosis Date   • ASTHMA    • Cancer (HCC)     breast   • Heroin addiction (HCC)    • Pneumonia    • Psychiatric disorder        PSH:  Past Surgical History:   Procedure Laterality Date   • INCISION AND DRAINAGE ORTHOPEDIC Right 1/25/2019    Procedure: INCISION AND DRAINAGE ORTHOPEDIC - HAND;  Surgeon: Kendrick Campbell M.D.;  Location: SURGERY [facility];  Service: Orthopedics   • CHOLECYSTECTOMY     • GYN SURGERY      partial hysterectomy       FHX:  Family History   Problem Relation Age of Onset   • No Known Problems Mother    • Alcohol abuse Father        Medications:  Current Facility-Administered Medications   Medication Dose   • NS infusion     • oxyCODONE immediate-release (ROXICODONE) tablet 5 mg  5 mg    Or   • oxyCODONE immediate release (ROXICODONE) tablet 10 mg  10 mg   • midodrine (PROAMATINE) tablet 5 mg  5 mg   • levETIRAcetam (KEPPRA) tablet 1,000 mg  1,000 mg   • magnesium oxide (MAG-OX) tablet 400 mg  400 mg   • acetaminophen (Tylenol) tablet 650 mg  650 mg   •  "senna-docusate (PERICOLACE or SENOKOT S) 8.6-50 MG per tablet 2 tablet  2 tablet    And   • polyethylene glycol/lytes (MIRALAX) PACKET 1 Packet  1 Packet    And   • magnesium hydroxide (MILK OF MAGNESIA) suspension 30 mL  30 mL    And   • bisacodyl (DULCOLAX) suppository 10 mg  10 mg   • Respiratory Therapy Consult     • nicotine (NICODERM) 14 MG/24HR 14 mg  14 mg       Allergies:  No Known Allergies    Physical Exam:  Vitals: /57   Pulse 87   Temp 37.1 °C (98.8 °F) (Temporal)   Resp 16   Ht 1.727 m (5' 8\")   Wt 67.2 kg (148 lb 2.4 oz)   SpO2 94%   Gen: NAD  Head: light bruising pattern on face, right worse than left.   Eyes/ Nose/ Mouth: PERRLA, moist mucous membranes  Cardio: RRR, good distal perfusion, warm extremities  Pulm: normal respiratory effort, no cyanosis   Abd: Soft NTND, negative borborygmi   Ext: No peripheral edema. No calf tenderness. No clubbing.    Mental status: answers questions appropriately follows commands  Speech: fluent, no aphasia or dysarthria    Motor:      Upper Extremity  Myotome R L   Shoulder flexion C5 4/5 4/5   Elbow flexion C5 4/5 4/5   Wrist extension C6 4/5 4/5   Elbow extension C7 4/5 4/5   Finger flexion C8 4/5 4/5   Finger abduction T1 4/5 4/5     Lower Extremity Myotome R L   Hip flexion L2 4/5 4/5   Knee extension L3 4/5 4/5   Ankle dorsiflexion L4 4/5 4/5   Toe extension L5 4/5 4/5   Ankle plantarflexion S1 4/5 4/5       Sensory:   intact to light touch through out    DTRs:  Right  Left    Brachioradialis  2+  2+   Patella tendon  2+ 2+       Labs: Reviewed and significant for   Recent Labs     04/19/21  0603 04/19/21  0603 04/20/21  0600 04/21/21  0305 04/21/21  0929   RBC 1.97*  --  2.07* 1.86*  --    HEMOGLOBIN 7.2*   < > 7.3* 6.7* 8.6*   HEMATOCRIT 22.0*   < > 23.9* 20.9* 26.7*   PLATELETCT 78*  --  120* 161*  --     < > = values in this interval not displayed.     Recent Labs     04/19/21  0603 04/20/21  0600 04/21/21  0305   SODIUM 132* 134* 129*   "   POTASSIUM 4.7 3.6 3.6   CHLORIDE 102 100 98   CO2 26 25 24   GLUCOSE 113* 95 96   BUN 11 13 10   CREATININE <0.17* <0.17* <0.17*   CALCIUM 7.1* 7.7* 7.4*     Recent Results (from the past 24 hour(s))   CBC WITH DIFFERENTIAL    Collection Time: 04/21/21  3:05 AM   Result Value Ref Range    WBC 5.5 4.8 - 10.8 K/uL    RBC 1.86 (L) 4.20 - 5.40 M/uL    Hemoglobin 6.7 (L) 12.0 - 16.0 g/dL    Hematocrit 20.9 (L) 37.0 - 47.0 %    .4 (H) 81.4 - 97.8 fL    MCH 36.0 (H) 27.0 - 33.0 pg    MCHC 32.1 (L) 33.6 - 35.0 g/dL    RDW 78.6 (H) 35.9 - 50.0 fL    Platelet Count 161 (L) 164 - 446 K/uL    MPV 10.7 9.0 - 12.9 fL    Neutrophils-Polys 80.80 (H) 44.00 - 72.00 %    Lymphocytes 9.30 (L) 22.00 - 41.00 %    Monocytes 7.50 0.00 - 13.40 %    Eosinophils 0.70 0.00 - 6.90 %    Basophils 0.40 0.00 - 1.80 %    Immature Granulocytes 1.30 (H) 0.00 - 0.90 %    Nucleated RBC 2.90 /100 WBC    Neutrophils (Absolute) 4.44 2.00 - 7.15 K/uL    Lymphs (Absolute) 0.51 (L) 1.00 - 4.80 K/uL    Monos (Absolute) 0.41 0.00 - 0.85 K/uL    Eos (Absolute) 0.04 0.00 - 0.51 K/uL    Baso (Absolute) 0.02 0.00 - 0.12 K/uL    Immature Granulocytes (abs) 0.07 0.00 - 0.11 K/uL    NRBC (Absolute) 0.16 K/uL   PHOSPHORUS    Collection Time: 04/21/21  3:05 AM   Result Value Ref Range    Phosphorus 2.9 2.5 - 4.5 mg/dL   MAGNESIUM    Collection Time: 04/21/21  3:05 AM   Result Value Ref Range    Magnesium 1.2 (L) 1.5 - 2.5 mg/dL   COD - Adult (Type and Screen)    Collection Time: 04/21/21  3:05 AM   Result Value Ref Range    ABO Grouping Only O     Rh Grouping Only POS     Antibody Screen-Cod NEG     Component R       R99                 Red Cells, LR       [id number]   issued       04/21/21   05:34      Product Type R99     Dispense Status issued     Unit Number (Barcoded) [id number]     Product Code (Barcoded) U2844W52     Blood Type (Barcoded) 9500    Basic Metabolic Panel    Collection Time: 04/21/21  3:05 AM   Result Value Ref Range    Sodium 129 (L)  135 - 145 mmol/L    Potassium 3.6 3.6 - 5.5 mmol/L    Chloride 98 96 - 112 mmol/L    Co2 24 20 - 33 mmol/L    Glucose 96 65 - 99 mg/dL    Bun 10 8 - 22 mg/dL    Creatinine <0.17 (L) 0.50 - 1.40 mg/dL    Calcium 7.4 (L) 8.5 - 10.5 mg/dL    Anion Gap 7.0 7.0 - 16.0   ESTIMATED GFR    Collection Time: 04/21/21  3:05 AM   Result Value Ref Range    GFR If African American >60 >60 mL/min/1.73 m 2    GFR If Non African American >60 >60 mL/min/1.73 m 2   HEMOGLOBIN AND HEMATOCRIT    Collection Time: 04/21/21  9:29 AM   Result Value Ref Range    Hemoglobin 8.6 (L) 12.0 - 16.0 g/dL    Hematocrit 26.7 (L) 37.0 - 47.0 %   MAGNESIUM    Collection Time: 04/21/21  9:29 AM   Result Value Ref Range    Magnesium 1.9 1.5 - 2.5 mg/dL         ASSESSMENT:  Patient is a 60 y.o. female admitted with bilateral holohemispheric frontal SDH, complicated by PEA s/p CPR with ROSC and GI bleed.     [facility] Code / Diagnosis to Support: 0002.22 - Brain Dysfunction: Traumatic, Closed Injury and 0017.8 - Medically Complex: Medical/Surgical Complications    Rehabilitation: Impaired ADLs and mobility  Patient is a good candidate for inpatient rehab based on needs for PT, OT, and speech therapy.  Patient will also benefit from family training.  Patient has a good discharge situation which will be home with daughter.     Barriers to transfer include: Insurance authorization, TCCs to verify disposition, medical clearance and bed availability     Additional Recommendations:  - Good candidate for IPR, she can stay with her daughter on discharge she says. She is medically complex but appears stable enough for IPR level of therapy. She has diffuse weakness and will benefit from PT, OT and SLP for cog.   - HGB 6.7 this am, recheck after blood products 8.6. Would appreciate recheck in AM to verify stability. May need EGD if bleeding continues. Per GI, she is high risk for EGD and only recommended if she has unstable vitals   - Tobacco abuse cessation counseling  given today for ~5 min as it pertains to vascular disease, heart attack, stroke, wound healing, and pulmonary disease.   - Starting melatonin 5mg for insomnia   - SDH non-surgical per NSG  - Avoid QT prolonging medications (trazodone, zofran)  - Cardiology recommending cardiac MRI which should be done prior to transfer, or cleared by cardiology. Initially seen by Dr. Daniel MD  - On Keppra 1000mg BID     Medical Complexity:  SDH   Right atrial mass on echo   Anemia   Acute seizures    DVT PPX: SCDs      Thank you for allowing us to participate in the care of this patient.     Patient was seen for 88 minutes on unit/floor of which > 50% of time was spent on counseling and coordination of care regarding the above, including prognosis, risk reduction, benefits of treatment, and options for next stage of care.    Rudolph Granados, DO   Physical Medicine and Rehabilitation

## 2021-04-21 NOTE — PROGRESS NOTES
AM Hemoglobin = 6.7. MD notified, orders received for blood transfusion. Patient currently tolerating well.

## 2021-04-22 LAB
ANION GAP SERPL CALC-SCNC: 11 MMOL/L (ref 7–16)
BASOPHILS # BLD AUTO: 0.4 % (ref 0–1.8)
BASOPHILS # BLD: 0.02 K/UL (ref 0–0.12)
BUN SERPL-MCNC: 6 MG/DL (ref 8–22)
CALCIUM SERPL-MCNC: 7.5 MG/DL (ref 8.5–10.5)
CHLORIDE SERPL-SCNC: 102 MMOL/L (ref 96–112)
CO2 SERPL-SCNC: 23 MMOL/L (ref 20–33)
CREAT SERPL-MCNC: <0.17 MG/DL (ref 0.5–1.4)
EOSINOPHIL # BLD AUTO: 0.06 K/UL (ref 0–0.51)
EOSINOPHIL NFR BLD: 1.1 % (ref 0–6.9)
ERYTHROCYTE [DISTWIDTH] IN BLOOD BY AUTOMATED COUNT: 96 FL (ref 35.9–50)
GLUCOSE SERPL-MCNC: 73 MG/DL (ref 65–99)
HCT VFR BLD AUTO: 24.6 % (ref 37–47)
HCT VFR BLD AUTO: 29.1 % (ref 37–47)
HGB BLD-MCNC: 7.9 G/DL (ref 12–16)
HGB BLD-MCNC: 9.3 G/DL (ref 12–16)
IMM GRANULOCYTES # BLD AUTO: 0.05 K/UL (ref 0–0.11)
IMM GRANULOCYTES NFR BLD AUTO: 0.9 % (ref 0–0.9)
LYMPHOCYTES # BLD AUTO: 0.61 K/UL (ref 1–4.8)
LYMPHOCYTES NFR BLD: 11.1 % (ref 22–41)
MAGNESIUM SERPL-MCNC: 1.5 MG/DL (ref 1.5–2.5)
MCH RBC QN AUTO: 33.2 PG (ref 27–33)
MCHC RBC AUTO-ENTMCNC: 32.1 G/DL (ref 33.6–35)
MCV RBC AUTO: 103.4 FL (ref 81.4–97.8)
MONOCYTES # BLD AUTO: 0.34 K/UL (ref 0–0.85)
MONOCYTES NFR BLD AUTO: 6.2 % (ref 0–13.4)
NEUTROPHILS # BLD AUTO: 4.41 K/UL (ref 2–7.15)
NEUTROPHILS NFR BLD: 80.3 % (ref 44–72)
NRBC # BLD AUTO: 0.05 K/UL
NRBC BLD-RTO: 0.9 /100 WBC
PHOSPHATE SERPL-MCNC: 2.9 MG/DL (ref 2.5–4.5)
PLATELET # BLD AUTO: 232 K/UL (ref 164–446)
PMV BLD AUTO: 10.3 FL (ref 9–12.9)
POTASSIUM SERPL-SCNC: 3.4 MMOL/L (ref 3.6–5.5)
RBC # BLD AUTO: 2.38 M/UL (ref 4.2–5.4)
SODIUM SERPL-SCNC: 136 MMOL/L (ref 135–145)
WBC # BLD AUTO: 5.5 K/UL (ref 4.8–10.8)

## 2021-04-22 PROCEDURE — A9270 NON-COVERED ITEM OR SERVICE: HCPCS | Performed by: NURSE PRACTITIONER

## 2021-04-22 PROCEDURE — 97530 THERAPEUTIC ACTIVITIES: CPT

## 2021-04-22 PROCEDURE — A9270 NON-COVERED ITEM OR SERVICE: HCPCS | Performed by: PHYSICAL MEDICINE & REHABILITATION

## 2021-04-22 PROCEDURE — 700102 HCHG RX REV CODE 250 W/ 637 OVERRIDE(OP): Performed by: INTERNAL MEDICINE

## 2021-04-22 PROCEDURE — 83735 ASSAY OF MAGNESIUM: CPT

## 2021-04-22 PROCEDURE — 700102 HCHG RX REV CODE 250 W/ 637 OVERRIDE(OP): Performed by: PHYSICAL MEDICINE & REHABILITATION

## 2021-04-22 PROCEDURE — 99232 SBSQ HOSP IP/OBS MODERATE 35: CPT | Performed by: PHYSICAL MEDICINE & REHABILITATION

## 2021-04-22 PROCEDURE — 770006 HCHG ROOM/CARE - MED/SURG/GYN SEMI*

## 2021-04-22 PROCEDURE — 99233 SBSQ HOSP IP/OBS HIGH 50: CPT | Performed by: STUDENT IN AN ORGANIZED HEALTH CARE EDUCATION/TRAINING PROGRAM

## 2021-04-22 PROCEDURE — 36415 COLL VENOUS BLD VENIPUNCTURE: CPT

## 2021-04-22 PROCEDURE — 85025 COMPLETE CBC W/AUTO DIFF WBC: CPT

## 2021-04-22 PROCEDURE — 97116 GAIT TRAINING THERAPY: CPT

## 2021-04-22 PROCEDURE — 80048 BASIC METABOLIC PNL TOTAL CA: CPT

## 2021-04-22 PROCEDURE — A9270 NON-COVERED ITEM OR SERVICE: HCPCS | Performed by: HOSPITALIST

## 2021-04-22 PROCEDURE — A9270 NON-COVERED ITEM OR SERVICE: HCPCS | Performed by: INTERNAL MEDICINE

## 2021-04-22 PROCEDURE — 700102 HCHG RX REV CODE 250 W/ 637 OVERRIDE(OP): Performed by: HOSPITALIST

## 2021-04-22 PROCEDURE — 85018 HEMOGLOBIN: CPT

## 2021-04-22 PROCEDURE — 85014 HEMATOCRIT: CPT

## 2021-04-22 PROCEDURE — 92526 ORAL FUNCTION THERAPY: CPT

## 2021-04-22 PROCEDURE — A9270 NON-COVERED ITEM OR SERVICE: HCPCS | Performed by: STUDENT IN AN ORGANIZED HEALTH CARE EDUCATION/TRAINING PROGRAM

## 2021-04-22 PROCEDURE — 700102 HCHG RX REV CODE 250 W/ 637 OVERRIDE(OP): Performed by: STUDENT IN AN ORGANIZED HEALTH CARE EDUCATION/TRAINING PROGRAM

## 2021-04-22 PROCEDURE — 700102 HCHG RX REV CODE 250 W/ 637 OVERRIDE(OP): Performed by: NURSE PRACTITIONER

## 2021-04-22 PROCEDURE — 84100 ASSAY OF PHOSPHORUS: CPT

## 2021-04-22 RX ORDER — MIRTAZAPINE 15 MG/1
15 TABLET, FILM COATED ORAL
Status: DISCONTINUED | OUTPATIENT
Start: 2021-04-22 | End: 2021-04-27 | Stop reason: HOSPADM

## 2021-04-22 RX ADMIN — FUROSEMIDE 20 MG: 20 TABLET ORAL at 05:41

## 2021-04-22 RX ADMIN — FUROSEMIDE 20 MG: 20 TABLET ORAL at 17:17

## 2021-04-22 RX ADMIN — LEVETIRACETAM 1000 MG: 500 TABLET ORAL at 17:17

## 2021-04-22 RX ADMIN — MAGNESIUM GLUCONATE 500 MG ORAL TABLET 400 MG: 500 TABLET ORAL at 17:17

## 2021-04-22 RX ADMIN — MAGNESIUM GLUCONATE 500 MG ORAL TABLET 400 MG: 500 TABLET ORAL at 05:41

## 2021-04-22 RX ADMIN — OXYCODONE 5 MG: 5 TABLET ORAL at 17:26

## 2021-04-22 RX ADMIN — LEVETIRACETAM 1000 MG: 500 TABLET ORAL at 05:41

## 2021-04-22 RX ADMIN — Medication 5 MG: at 21:37

## 2021-04-22 RX ADMIN — MIRTAZAPINE 15 MG: 15 TABLET, FILM COATED ORAL at 21:37

## 2021-04-22 RX ADMIN — MIDODRINE HYDROCHLORIDE 5 MG: 5 TABLET ORAL at 12:51

## 2021-04-22 RX ADMIN — OXYCODONE 5 MG: 5 TABLET ORAL at 05:23

## 2021-04-22 RX ADMIN — MIDODRINE HYDROCHLORIDE 5 MG: 5 TABLET ORAL at 05:41

## 2021-04-22 RX ADMIN — MIDODRINE HYDROCHLORIDE 5 MG: 5 TABLET ORAL at 17:17

## 2021-04-22 ASSESSMENT — COGNITIVE AND FUNCTIONAL STATUS - GENERAL
CLIMB 3 TO 5 STEPS WITH RAILING: A LOT
SUGGESTED CMS G CODE MODIFIER MOBILITY: CL
TURNING FROM BACK TO SIDE WHILE IN FLAT BAD: A LITTLE
WALKING IN HOSPITAL ROOM: A LITTLE
STANDING UP FROM CHAIR USING ARMS: A LITTLE
MOBILITY SCORE: 13
MOVING FROM LYING ON BACK TO SITTING ON SIDE OF FLAT BED: UNABLE
MOVING TO AND FROM BED TO CHAIR: UNABLE

## 2021-04-22 ASSESSMENT — GAIT ASSESSMENTS
GAIT LEVEL OF ASSIST: MINIMAL ASSIST
DISTANCE (FEET): 25
DEVIATION: ATAXIC;BRADYKINETIC;SHUFFLED GAIT
ASSISTIVE DEVICE: FRONT WHEEL WALKER

## 2021-04-22 ASSESSMENT — ENCOUNTER SYMPTOMS
DIARRHEA: 1
WEAKNESS: 1
SHORTNESS OF BREATH: 0
FEVER: 0
NAUSEA: 1
CHILLS: 0
PALPITATIONS: 0
ABDOMINAL PAIN: 1

## 2021-04-22 ASSESSMENT — PAIN DESCRIPTION - PAIN TYPE
TYPE: ACUTE PAIN
TYPE: ACUTE PAIN

## 2021-04-22 NOTE — PALLIATIVE CARE
"Palliative Care follow-up  Met with pt at the bedside to discuss code status now that she is extubated and oriented. We spoke about the conversations I participated in with her daughter and mother while she in the ICU. She feels very thankful to be awake, alert, and able to have her cognition intact.   We spoke about the decisions for DNR with I ok during her intubation in the ICU.  Nathalie spoke about previous conversations that she has had with her daughter stating that she would not want to have CPR. We discussed intubation as an option, but Nathalie feels that she would not wish to have this as her decision is that \"if I was just a vegetable, I wouldn't want to burden them\".   Explained POLST for home after DC. Nathalie would like to complete one.    Discussed POLST in detail. Completed for DNR/DNI with Selective treatment option. POLST reviewed by MD, singed, scanned into EMR, and original placed in protective sleeve and taped behind the pts bed along with diet precautions for DC.     Updated: Dr Fuentes, Bedside BRITNEY Laws, Unit  BRITNEY Zaman     Plan: DC to [facility] once arranged.     Recommendations: I do not recommend an ethics or hospice consult at this time because pt is motivated to rehabilitate herself. .    Thank you for allowing Palliative Care to participate in this patient's care. Please feel free to call [phone] with any questions or concerns.  "

## 2021-04-22 NOTE — PROGRESS NOTES
Hospital Medicine Daily Progress Note    Date of Service  4/22/2021    Chief Complaint  60 y.o. female admitted 4/13/2021 with altered level of consciousness and syncope    Hospital Course  60-year-old female with history of alcohol and heroin dependence who had a syncopal episode and hit her head during her fall was transferred to the emergency department where head CT revealed acute to subacute bilateral subdural hematomas metabolic acidosis.  Patient was admitted to the intensive care unit and evaluated by neurosurgery.  She had a possible seizure episode shortly after admission and went into PEA cardiac arrest for which she received CPR with ROSC within 5 minutes.  Patient was intubated she was on pressors.  Echocardiogram revealed right atrial mass she was evaluated by cardiology who recommended cardiac MRI when patient is more clinically stable.  The patient had GI bleeding and was evaluated by Dr. Acuna from [facility] recommendation for conservative management and deferring EGD and endoscopy to when she is more clinically stable unless she has signs of brisk bleeding.  The patient tolerated extubation on 4/18/2021.  She was weaned off norepinephrine the morning of 4/19/2021.    Care was transferred to Hospitalist Services and to  Neuro on 4/19.    4/20: Vitals afebrile. BP soft. Pt pulled out Cortrek - no longer wanted it. SLP eval - passed FEEs; hence, started diet. Na improved to 134. She does not remember most of her ICU course and all the things that she went through. I summarized a brief hospital course for her and current treatment plan.     4/21: Vitals wnl - BP improving; afebrile. IV Lasix 1x provided. On 3L O2. Hb 6.7 (1 pRBC provided) - repeat 8.6 (no signs of active bleeding, stable vitals). Mg supplemented. PM&R Dr. Darwin stanford appreciated - appropriate for IPR. CDW Cardiology oncall Dr. Malave about prior recommendation of cardiac MRI once clinically stable - advised to follow up as outpatient.  She did have an episode of hypoxia in PM (unclear cause) - EKG done and CXR done - grossly unremarkable.        Interval Problem Update  4/22  Vitals reviewed; afebrile.  The rest of the vitals within normal parameters. BP a bit soft.   Pain scale reported - none  Blood glucose in last 24hrs - in high 90s    At bedside, no acute complain from patient. She thinks she is getting better slowly. Appetite is still poor without her dentures (daughter cannot find it). Plan to continue to monitor her Hb today and if continued to be stable then, we may be able to defer GI procedures and she can head to rehab.     Labs reviewed  Na 129 > 136  K 3.4 (will supplement)  Hb 6.7 > 8.6 > 7.9    PM&R and SLP follow up appreciated   Palliative care follow up appreciated - Pt can now participate in goals of care discussion - agreed for DNR/DNI  POLST to be signed.     Consultants/Specialty  Neurosurgery Dr. Leos  Neurology  Cardiology  Critical care  GI Dr. Acuna  PM&R Dr. Granados     Code Status  DNAR, I OK    Disposition  Pending DC plan to IPR 4/23 (if H&H continues to be stable)    Discussed with patient, patient's nurse and with multidisciplinary team during rounds including , pharmacist and charge nurse.      I have performed the physical examination, and reviewed updated ROS and plan today 4/22/2021.  In review of yesterday's note, there are no new changes except as documented above or bolded below.       Review of Systems  Review of Systems   Constitutional: Positive for malaise/fatigue. Negative for chills and fever.   Respiratory: Negative for shortness of breath.    Cardiovascular: Positive for leg swelling. Negative for chest pain and palpitations.   Gastrointestinal: Positive for abdominal pain, diarrhea and nausea.   Neurological: Positive for weakness.   All other systems reviewed and are negative.       Physical Exam  Temp:  [35.9 °C (96.7 °F)-37.2 °C (99 °F)] 36.3 °C (97.4 °F)  Pulse:  []  92  Resp:  [12-18] 18  BP: ()/(61-71) 94/64  SpO2:  [94 %-99 %] 96 %    Physical Exam  Vitals and nursing note reviewed.   Constitutional:       General: She is not in acute distress.  HENT:      Head: Normocephalic and atraumatic.      Nose: No rhinorrhea.   Eyes:      General: No scleral icterus.     Comments: Pale conjunctiva    Cardiovascular:      Rate and Rhythm: Normal rate and regular rhythm.      Heart sounds: Normal heart sounds. No murmur. No friction rub. No gallop.    Pulmonary:      Effort: Pulmonary effort is normal. No respiratory distress.      Breath sounds: No wheezing or rales.   Abdominal:      General: Bowel sounds are normal. There is distension.      Palpations: Abdomen is soft. There is no mass.      Tenderness: There is no abdominal tenderness. There is no rebound.   Musculoskeletal:         General: Swelling present. No tenderness.      Cervical back: Neck supple. No rigidity.   Skin:     General: Skin is warm and dry.      Capillary Refill: Capillary refill takes less than 2 seconds.      Coloration: Skin is not cyanotic or jaundiced.      Findings: Bruising present.      Nails: There is no clubbing.   Neurological:      General: No focal deficit present.      Mental Status: She is alert.      Cranial Nerves: No cranial nerve deficit.      Motor: Weakness (Generalized) present.   Psychiatric:         Mood and Affect: Mood normal.         Behavior: Behavior normal.         Fluids    Intake/Output Summary (Last 24 hours) at 4/22/2021 1407  Last data filed at 4/22/2021 0752  Gross per 24 hour   Intake 120 ml   Output --   Net 120 ml       Laboratory  Recent Labs     04/20/21  0600 04/20/21  0600 04/21/21  0305 04/21/21  0929 04/22/21  0413   WBC 5.4  --  5.5  --  5.5   RBC 2.07*  --  1.86*  --  2.38*   HEMOGLOBIN 7.3*   < > 6.7* 8.6* 7.9*   HEMATOCRIT 23.9*   < > 20.9* 26.7* 24.6*   .5*  --  112.4*  --  103.4*   MCH 35.3*  --  36.0*  --  33.2*   MCHC 30.5*  --  32.1*  --  32.1*    RDW 79.5*  --  78.6*  --  96.0*   PLATELETCT 120*  --  161*  --  232   MPV 11.7  --  10.7  --  10.3    < > = values in this interval not displayed.     Recent Labs     04/20/21  0600 04/21/21  0305 04/22/21  0413   SODIUM 134* 129* 136   POTASSIUM 3.6 3.6 3.4*   CHLORIDE 100 98 102   CO2 25 24 23   GLUCOSE 95 96 73   BUN 13 10 6*   CREATININE <0.17* <0.17* <0.17*   CALCIUM 7.7* 7.4* 7.5*                   Imaging  DX-CHEST-PORTABLE (1 VIEW)   Final Result      1.  Resolution of left lower lobe atelectasis or pneumonia.      2.  Persistent diffuse interstitial opacities which represent edema, pneumonia, or underlying interstitial fibrotic change.      DX-CHEST-PORTABLE (1 VIEW)   Final Result      Interval increase in interstitial and hazy opacities throughout the left lung which could be related to worsening infection rather than asymmetric edema.      Removal of endotracheal tube.      DX-ABDOMEN FOR TUBE PLACEMENT   Final Result      Feeding tube tip projects over the distal stomach.      MR-BRAIN-W/O   Final Result      1.  BILATERAL holohemispheric supratentorial subdural hematomas appear slightly larger than on the recent prior CT.   2.  Trace LEFT frontal vertex subarachnoid blood, unchanged   3.  Trace intraventricular blood without hydrocephalus   4.  Atrophy   5.  Mild white matter changes      US-EXTREMITY VENOUS LOWER BILAT   Final Result      DX-CHEST-PORTABLE (1 VIEW)   Final Result         1.  Hazy left pulmonary opacities suggests subtle infiltrate, similar to prior study.      DX-CHEST-PORTABLE (1 VIEW)   Final Result         1.  Hazy left pulmonary opacities suggests subtle infiltrate, similar to prior study.      US-EXTREMITY VENOUS LOWER BILAT   Final Result      EC-ECHOCARDIOGRAM COMPLETE W/O CONT   Final Result      US-RUQ   Final Result      1.  Enlarged echogenic liver suggesting fatty infiltration.   2.  Prior cholecystectomy.   3.  No biliary dilation.   4.  Trace ascites, nonspecific.          DX-CHEST-PORTABLE (1 VIEW)   Final Result      Left internal jugular catheter placement with the tip projecting over the superior vena cava. No pneumothorax is identified.      CT-HEAD W/O   Final Result      1.  Stable bilateral subdural hematomas. No significant midline shift.      2.  Stable left frontal subarachnoid hemorrhage.         DX-CHEST-LIMITED (1 VIEW)   Final Result      Right internal jugular catheter placement with the tip projecting over the superior vena cava. No pneumothorax is identified.      DX-CHEST-PORTABLE (1 VIEW)   Final Result         1.  Hazy left pulmonary opacities suggests subtle infiltrate      CT-HEAD W/O   Final Result      Bilateral subdural hematomas measure up to 8.3 mm on the left and 5 mm on the right.      Left frontal subarachnoid blood is also seen.      No midline shift.      Soft tissue swelling of the right cheek.      Findings discussed with Dr Florence      DX-CHEST-PORTABLE (1 VIEW)   Final Result      No acute cardiopulmonary process is seen.           Assessment/Plan  * SDH (subdural hematoma) (HCC)- (present on admission)  Assessment & Plan  Acute to subacute with history of recent falls    Patient evaluated by neurosurgery with nonsurgical treatment recommended  Continue Keppra per neurology recommendations    Alcohol withdrawal syndrome with complication (HCC)- (present on admission)  Assessment & Plan  Alcohol withdrawal resolved    Patient counseled on cessation    Protein-calorie malnutrition, severe (HCC)- (present on admission)  Assessment & Plan  Continue supportive care  SLP, RD and nutrition following  Passed FEEs 4/20    Transaminitis- (present on admission)  Assessment & Plan  Echogenic liver ultrasound like alcoholic hepatitis    Counseled on alcohol cessation  Monitor LFTs    Electrolyte disturbance- (present on admission)  Assessment & Plan  Hypomagnesemia  Hypophosphatemia    Replete and monitor    Thrombocytopenia (HCC)- (present on  admission)  Assessment & Plan  Possibly related to her alcoholism  Monitor CBC    Acute cystitis without hematuria- (present on admission)  Assessment & Plan  Completed Bactrim course on 4/20/2021    GI bleed  Assessment & Plan  Continue Prilosec  Clinically resolved  Monitor H&H  Evaluated by Dr. Acuna earlier during this admission reviewed consultation notes    Needed one pRBC 4/21 AM - responded appropriately   At this point, will monitor  If continued to have a further drop, will re-consult GI.     Shock (HCC)  Assessment & Plan  Weaned off norepinephrine  Currently on midodrine monitor blood pressure off pressors  BP has been stable; given over net +10L since admission, IV furosemide 1 dose provided 4/21  To start furosemide oral 4/23    Acute respiratory failure (HCC)  Assessment & Plan  Extubated on 4/18/2021  Aspiration precautions  RT protocol  Wean off oxygen as tolerated    Cardiac arrest (HCC)  Assessment & Plan  Possibly secondary to seizure  Patient evaluated by cardiology and noted to have right atrial mass    Case Discussed with Cardiology On call Dr. Malave about prior recommendation of cardiac MRI once clinically stable - advised to follow up as outpatient.     QT prolongation- (present on admission)  Assessment & Plan  Avoid QT prolonging agents    Advanced care planning/counseling discussion- (present on admission)  Assessment & Plan  Palliative care team involvement appreciated  Pt was made DNR/ I OK by daughter in ICU  Pt can now participate in goals of care discussion - agreed for DNR/DNI  POLST to be signed.     Right atrial mass- (present on admission)  Assessment & Plan  Evaluated by cardiology, Case Discussed with Cardiology On call Dr. Malave about prior recommendation of cardiac MRI once clinically stable - advised to follow up as outpatient.        VTE prophylaxis: SCD

## 2021-04-22 NOTE — PROGRESS NOTES
"    Physical Medicine and Rehabilitation Consultation              Date of initial consultation: 4/21/2021  Consulting provider: Francesco Pelaez MD  Reason for consultation: assess for acute inpatient rehab appropriateness  LOS: 9 Day(s)    Chief complaint: AMS    HPI: The patient is a 60 y.o. left hand dominant female with a past medical history of alcohol and heroin dependence;  who presented on 4/13/2021  5:52 PM with altered level of consciousness after syncope. From Dr. Fuentes's note on 4/20, patient \"had a syncopal episode and hit her head during her fall was transferred to the emergency department where head CT revealed acute to subacute bilateral subdural hematomas metabolic acidosis.  Patient was admitted to the intensive care unit and evaluated by neurosurgery.  She had a possible seizure episode shortly after admission and went into PEA cardiac arrest for which she received CPR with ROSC within 5 minutes.  Patient was intubated she was on pressors.  Echocardiogram revealed right atrial mass she was evaluated by cardiology who recommended cardiac MRI when patient is more clinically stable.  The patient had GI bleeding and was evaluated by Dr. Acuna from [facility] recommendation for conservative management and deferring EGD and endoscopy to when she is more clinically stable unless she has signs of brisk bleeding.  The patient tolerated extubation on 4/18/2021.  She was weaned off norepinephrine the morning of 4/19/2021.\"    The patient currently reports headache this morning without nausea or vomiting. She has low energy and also endorses insomnia and poor appetitie. She received blood this AM.     4/22/2021  Patient is doing well today. No new complaints. She is excited about coming to [facility]. Per Dr. Fuentes's note, cardiac MRI can be done as an outpatient. TCCs have confirmed support and insurance has authorized. Likely to come to [facility] tomorrow.     Social Hx:  1 SH  3 GANGA  With: Alone    Has a daughter in [place] " who she says she can live with who has a H with 0 GANGA and lives with her boyfriend. She states she has a good relationship with them.     Employment: not working   Tobacco: 1/2 ppd   Alcohol: 1 pint whisky daily   Drugs: denies     THERAPY:  PT: Functional mobility   4/20: gait 5' at min A     OT: ADLs  4/20: Max Assist     SLP:   4/20: Pureed Solids (PU4) and Thin Liquids (TN0)    IMAGING:    MR brain 4-18-21  1.  BILATERAL holohemispheric supratentorial subdural hematomas appear slightly larger than on the recent prior CT.  2.  Trace LEFT frontal vertex subarachnoid blood, unchanged  3.  Trace intraventricular blood without hydrocephalus  4.  Atrophy  5.  Mild white matter changes    PROCEDURES:  EEG 4-16-21  Abnormal video EEG recording in the awake, drowsy, and sleep state(s):  - Moderate background slowing with occasional triphasic waves suggestive of diffuse/multifocal cerebral dysfunction.   - No persistent focal asymmetries seen.  - No definitive electro-clinical seizures. Clinical correlation is recommended.  -No push button events recorded or reported  -Compared to the prior study, the emergence of triphasic waves is now seen.    PMH:  Past Medical History:   Diagnosis Date   • ASTHMA    • Cancer (HCC)     breast   • Heroin addiction (HCC)    • Pneumonia    • Psychiatric disorder        PSH:  Past Surgical History:   Procedure Laterality Date   • INCISION AND DRAINAGE ORTHOPEDIC Right 1/25/2019    Procedure: INCISION AND DRAINAGE ORTHOPEDIC - HAND;  Surgeon: Kendrick Campbell M.D.;  Location: SURGERY [facility];  Service: Orthopedics   • CHOLECYSTECTOMY     • GYN SURGERY      partial hysterectomy       FHX:  Family History   Problem Relation Age of Onset   • No Known Problems Mother    • Alcohol abuse Father        Medications:  Current Facility-Administered Medications   Medication Dose   • mirtazapine (Remeron) tablet 15 mg  15 mg   • oxyCODONE immediate-release (ROXICODONE) tablet 5 mg  5 mg     "Or   • oxyCODONE immediate release (ROXICODONE) tablet 10 mg  10 mg   • melatonin tablet 5 mg  5 mg   • furosemide (LASIX) tablet 20 mg  20 mg   • midodrine (PROAMATINE) tablet 5 mg  5 mg   • levETIRAcetam (KEPPRA) tablet 1,000 mg  1,000 mg   • magnesium oxide (MAG-OX) tablet 400 mg  400 mg   • acetaminophen (Tylenol) tablet 650 mg  650 mg   • senna-docusate (PERICOLACE or SENOKOT S) 8.6-50 MG per tablet 2 tablet  2 tablet    And   • polyethylene glycol/lytes (MIRALAX) PACKET 1 Packet  1 Packet    And   • magnesium hydroxide (MILK OF MAGNESIA) suspension 30 mL  30 mL    And   • bisacodyl (DULCOLAX) suppository 10 mg  10 mg   • Respiratory Therapy Consult     • nicotine (NICODERM) 14 MG/24HR 14 mg  14 mg       Allergies:  No Known Allergies    Physical Exam:  Vitals: BP (!) 94/64   Pulse 92   Temp 36.3 °C (97.4 °F) (Temporal)   Resp 18   Ht 1.727 m (5' 8\")   Wt 67.2 kg (148 lb 2.4 oz)   SpO2 96%   Gen: NAD  Head: light bruising pattern on face, right worse than left.   Eyes/ Nose/ Mouth: PERRLA, moist mucous membranes  Cardio: RRR, good distal perfusion, warm extremities  Pulm: normal respiratory effort, no cyanosis   Abd: Soft NTND, negative borborygmi   Ext: No peripheral edema. No calf tenderness. No clubbing.    Mental status: answers questions appropriately follows commands  Speech: fluent, no aphasia or dysarthria    Motor:      Upper Extremity  Myotome R L   Shoulder flexion C5 4/5 4/5   Elbow flexion C5 4/5 4/5   Wrist extension C6 4/5 4/5   Elbow extension C7 4/5 4/5   Finger flexion C8 4/5 4/5   Finger abduction T1 4/5 4/5     Lower Extremity Myotome R L   Hip flexion L2 4/5 4/5   Knee extension L3 4/5 4/5   Ankle dorsiflexion L4 4/5 4/5   Toe extension L5 4/5 4/5   Ankle plantarflexion S1 4/5 4/5       Sensory:   intact to light touch through out    DTRs:  Right  Left    Brachioradialis  2+  2+   Patella tendon  2+ 2+       Labs: Reviewed and significant for   Recent Labs     04/20/21  0600 " 21  0600 21  0305 21  0929 21  0413   RBC 2.07*  --  1.86*  --  2.38*   HEMOGLOBIN 7.3*   < > 6.7* 8.6* 7.9*   HEMATOCRIT 23.9*   < > 20.9* 26.7* 24.6*   PLATELETCT 120*  --  161*  --  232    < > = values in this interval not displayed.     Recent Labs     21  0621  0305 213   SODIUM 134* 129* 136   POTASSIUM 3.6 3.6 3.4*   CHLORIDE 100 98 102   CO2 25 24 23   GLUCOSE 95 96 73   BUN 13 10 6*   CREATININE <0.17* <0.17* <0.17*   CALCIUM 7.7* 7.4* 7.5*     Recent Results (from the past 24 hour(s))   EKG    Collection Time: 21  2:12 PM   Result Value Ref Range    Report       [facility]    Test Date:  2021  Pt Name:    JOSS WHALEN               Department: [facility]  MRN:        [id number]                      Room:       [facility]  Gender:     Female                       Technician: [facility]  :        1960                   Requested By:BRE NOBLE  Order #:    [id number]                    Reading MD: Gerard Irene MD    Measurements  Intervals                                Axis  Rate:       94                           P:          51  CO:         136                          QRS:        70  QRSD:       82                           T:          28  QT:         372  QTc:        466    Interpretive Statements  SINUS RHYTHM  Anterior T wave abnormalities???consider ischemia  Prolonged QTC  Electronically Signed On 2021 15:07:07 PDT by Gerard Irene MD     PHOSPHORUS    Collection Time: 21  4:13 AM   Result Value Ref Range    Phosphorus 2.9 2.5 - 4.5 mg/dL   MAGNESIUM    Collection Time: 21  4:13 AM   Result Value Ref Range    Magnesium 1.5 1.5 - 2.5 mg/dL   CBC WITH DIFFERENTIAL    Collection Time: 21  4:13 AM   Result Value Ref Range    WBC 5.5 4.8 - 10.8 K/uL    RBC 2.38 (L) 4.20 - 5.40 M/uL    Hemoglobin 7.9 (L) 12.0 - 16.0 g/dL    Hematocrit 24.6 (L) 37.0 - 47.0 %    .4 (H) 81.4 - 97.8 fL    MCH 33.2 (H) 27.0 - 33.0 pg     MCHC 32.1 (L) 33.6 - 35.0 g/dL    RDW 96.0 (H) 35.9 - 50.0 fL    Platelet Count 232 164 - 446 K/uL    MPV 10.3 9.0 - 12.9 fL    Neutrophils-Polys 80.30 (H) 44.00 - 72.00 %    Lymphocytes 11.10 (L) 22.00 - 41.00 %    Monocytes 6.20 0.00 - 13.40 %    Eosinophils 1.10 0.00 - 6.90 %    Basophils 0.40 0.00 - 1.80 %    Immature Granulocytes 0.90 0.00 - 0.90 %    Nucleated RBC 0.90 /100 WBC    Neutrophils (Absolute) 4.41 2.00 - 7.15 K/uL    Lymphs (Absolute) 0.61 (L) 1.00 - 4.80 K/uL    Monos (Absolute) 0.34 0.00 - 0.85 K/uL    Eos (Absolute) 0.06 0.00 - 0.51 K/uL    Baso (Absolute) 0.02 0.00 - 0.12 K/uL    Immature Granulocytes (abs) 0.05 0.00 - 0.11 K/uL    NRBC (Absolute) 0.05 K/uL   Basic Metabolic Panel    Collection Time: 04/22/21  4:13 AM   Result Value Ref Range    Sodium 136 135 - 145 mmol/L    Potassium 3.4 (L) 3.6 - 5.5 mmol/L    Chloride 102 96 - 112 mmol/L    Co2 23 20 - 33 mmol/L    Glucose 73 65 - 99 mg/dL    Bun 6 (L) 8 - 22 mg/dL    Creatinine <0.17 (L) 0.50 - 1.40 mg/dL    Calcium 7.5 (L) 8.5 - 10.5 mg/dL    Anion Gap 11.0 7.0 - 16.0   ESTIMATED GFR    Collection Time: 04/22/21  4:13 AM   Result Value Ref Range    GFR If African American >60 >60 mL/min/1.73 m 2    GFR If Non African American >60 >60 mL/min/1.73 m 2         ASSESSMENT:  Patient is a 60 y.o. female admitted with bilateral holohemispheric frontal SDH, complicated by PEA s/p CPR with ROSC and GI bleed.     [facility] Code / Diagnosis to Support: 0002.22 - Brain Dysfunction: Traumatic, Closed Injury and 0017.8 - Medically Complex: Medical/Surgical Complications    Rehabilitation: Impaired ADLs and mobility  Patient is a good candidate for inpatient rehab based on needs for PT, OT, and speech therapy.  Patient will also benefit from family training.  Patient has a good discharge situation which will be home with daughter.     Barriers to transfer include: Insurance authorization, TCCs to verify disposition, medical clearance and bed availability      Additional Recommendations:  - Good candidate for IPR, she can stay with her daughter on discharge she says. She is medically complex but appears stable enough for IPR level of therapy. She has diffuse weakness and will benefit from PT, OT and SLP for cog.   - HGB is holding after transfusion. HGB 7.9 today. Per GI, she is high risk for EGD and only recommended if she has unstable vitals   - Tobacco abuse cessation counseling given for ~5 min as it pertains to vascular disease, heart attack, stroke, wound healing, and pulmonary disease.   - Continue melatonin 5mg for insomnia   - SDH non-surgical per NSG  - On Keppra 1000mg BID   - Cardiology recommending cardiac MRI for right atrial mass seen on echo which can be done as an outpatient   - Avoid QT prolonging medications (trazodone, zofran)    Medical Complexity:  SDH   Right atrial mass on echo   Anemia   Acute seizures    DVT PPX: SCDs      Thank you for allowing us to participate in the care of this patient.     Patient was seen for 28 minutes on unit/floor of which > 50% of time was spent on counseling and coordination of care regarding the above, including prognosis, risk reduction, benefits of treatment, and options for next stage of care.    Rudolph Granados, DO   Physical Medicine and Rehabilitation

## 2021-04-22 NOTE — DISCHARGE PLANNING
Spoke with Kerline, daughter regarding [facility] and D/C resources/support.  She is agreeable with an admission.  Kerline lives with her S.O. and 2 kids in a 1LV home with 1ST to enter.  Kerline and her S.O. work schedule very.  She is open to Nathalie staying with her for a short period.  Kerline is agreeable to assist with dressing/toileting as needed if available.      0843-Submitted to insurance.

## 2021-04-22 NOTE — PREADMISSION SCREENING NOTE
Updated Pre-Screen Assessment     Name: Nathalie Aguilar  MRN: [id number]  : 1960    Medical Status/ Changes:     Francesco Fuentes M.D.   Physician   Hospital Medicine   Progress Notes       Signed   Date of Service:  2021  1:55 PM                    []Hide copied text    []Leon for details  Hospital Medicine Daily Progress Note     Date of Service  2021     Chief Complaint  60 y.o. female admitted 2021 with altered level of consciousness and syncope     Hospital Course  60-year-old female with history of alcohol and heroin dependence who had a syncopal episode and hit her head during her fall was transferred to the emergency department where head CT revealed acute to subacute bilateral subdural hematomas metabolic acidosis.  Patient was admitted to the intensive care unit and evaluated by neurosurgery.  She had a possible seizure episode shortly after admission and went into PEA cardiac arrest for which she received CPR with ROSC within 5 minutes.  Patient was intubated she was on pressors.  Echocardiogram revealed right atrial mass she was evaluated by cardiology who recommended cardiac MRI when patient is more clinically stable.  The patient had GI bleeding and [facility] recommendation for conservative management and deferring EGD and endoscopy to when she is more clinically stable unless she has signs of brisk bleeding.  The patient tolerated extubation on 2021.  She was weaned off norepinephrine the morning of 2021.     Care was transferred to Hospitalist Services and to  Neuro on .     : Vitals afebrile. BP soft. Pt pulled out Cortrek - no longer wanted it. SLP eval - passed FEEs; hence, started diet. Na improved to 134. She does not remember most of her ICU course and all the things that she went through. I summarized a brief hospital course for her and current treatment plan.      : Vitals wnl - BP improving; afebrile. IV Lasix 1x  provided. On 3L O2. Hb 6.7 (1 pRBC provided) - repeat 8.6 (no signs of active bleeding, stable vitals). Mg supplemented. PM&R Dr. Darwin stanford appreciated - appropriate for IPR. CDW Cardiology oncall Dr. Malave about prior recommendation of cardiac MRI once clinically stable - advised to follow up as outpatient. She did have an episode of hypoxia in PM (unclear cause) - EKG done and CXR done - grossly unremarkable.       4/22:  Vitals wnl though a bit soft BP afebrile. Appetite is still poor without her dentures (daughter cannot find it). Plan to continue to monitor her Hb and if continued to be stable then, we may be able to defer GI procedures and she can head to rehab. K supplemented. Na improved to 136. PM&R and SLP follow up appreciated   Palliative care follow up appreciated - Pt can now participate in goals of care discussion - agreed for DNR/DNI. POLST  signed.      4/23: Vitals wnl; afebrile. At bedside when seen alongside Daughter Kerline, reports doing well but still being bothered by incontinence. Partly due to unable to get to bathroom and frequent urination. Pending acceptance for IPR. Mg supplemented.      4/24: Vitals wnl; afebrile.  The rest of the vitals within normal parameters except soft BP. She then mentioned about discomfort in left chest - she cannot describe, no exacerbating or alleviating factor, no radiation. She wondered if it is from her cardiac arrest. EKG done with no acute ST/T wave abnormalities; hence, continued to monitor.      4/25: vitals wnl; O2 needs down to about 1L. Furosemide now switched to daily dosing. Around noon, reports of feeling quite anxious and having nightmares about not recovering forever. Teary at times. Agreed to be seen by spiritual care team. Mg and K supplemented.      Interval Problem Update  4/26:   Vitals reviewed; afebrile.  The rest of the vitals within normal parameters. On 1L O2.   Pain scale reported - none  Blood glucose in last 24hrs - just under  100s     At bedside, a bit disappointed and teary when discussed about delayed in transfer to rehab.   Her anxiety has been quite bothering her with the whole clinical situation.      1x lorazepam 1mg ordered.      Labs reviewed  Mg 1.6  K 3.9  Hb  8.6 > 7.9 > 9.3 > 8.7 > 8.1        Consultants/Specialty  Neurosurgery Dr. Leos  Neurology  Cardiology  Critical care  GI Dr. Acuna  PM&R Dr. Granados      Code Status  DNAR/DNI     Disposition  Pending DC plan to IPR 4/27 (if bed available)     Discussed with patient, patient's nurse and with multidisciplinary team during rounds including , pharmacist and charge nurse.       I have performed the physical examination, and reviewed updated ROS and plan today 4/26/2021.  In review of yesterday's note, there are no new changes except as documented above or bolded below.        Review of Systems  Review of Systems   Constitutional: Positive for malaise/fatigue. Negative for chills and fever.   Respiratory: Negative for shortness of breath.    Cardiovascular: Positive for leg swelling. Negative for chest pain and palpitations.   Gastrointestinal: Positive for abdominal pain, diarrhea and nausea.   Neurological: Positive for weakness.   All other systems reviewed and are negative.        Physical Exam  Temp:  [36 °C (96.8 °F)-36.8 °C (98.3 °F)] 36.7 °C (98 °F)  Pulse:  [72-98] 72  Resp:  [14-18] 16  BP: ()/(66-79) 114/77  SpO2:  [92 %-97 %] 92 %     Physical Exam  Vitals and nursing note reviewed.   Constitutional:       General: She is not in acute distress.  HENT:      Head: Normocephalic and atraumatic.      Nose: No rhinorrhea.   Eyes:      General: No scleral icterus.     Comments: Pale conjunctiva    Cardiovascular:      Rate and Rhythm: Normal rate and regular rhythm.      Heart sounds: Normal heart sounds. No murmur. No friction rub. No gallop.    Pulmonary:      Effort: Pulmonary effort is normal. No respiratory distress.      Breath sounds: No  wheezing or rales.   Abdominal:      General: Bowel sounds are normal. There is distension.      Palpations: Abdomen is soft. There is no mass.      Tenderness: There is no abdominal tenderness. There is no rebound.   Musculoskeletal:         General: Swelling present. No tenderness.      Cervical back: Neck supple. No rigidity.   Skin:     General: Skin is warm and dry.      Capillary Refill: Capillary refill takes less than 2 seconds.      Coloration: Skin is not cyanotic or jaundiced.      Findings: Bruising present.      Nails: There is no clubbing.   Neurological:      General: No focal deficit present.      Mental Status: She is alert.      Cranial Nerves: No cranial nerve deficit.      Motor: Weakness (Generalized) present.   Psychiatric:         Mood and Affect: Mood normal.         Behavior: Behavior normal.            Fluids     Intake/Output Summary (Last 24 hours) at 4/26/2021 1429  Last data filed at 4/26/2021 0600      Gross per 24 hour   Intake 120 ml   Output --   Net 120 ml         Laboratory      Recent Labs     04/25/21  0450   WBC 3.6*   RBC 2.43*   HEMOGLOBIN 8.1*   HEMATOCRIT 25.7*   .8*   MCH 33.3*   MCHC 31.5*   RDW 91.3*   PLATELETCT 315   MPV 9.8           Recent Labs     04/25/21  0450 04/26/21  0701   SODIUM 141 138   POTASSIUM 3.0* 3.9   CHLORIDE 106 105   CO2 28 27   GLUCOSE 98 83   BUN 2* 2*   CREATININE 0.21* 0.21*   CALCIUM 7.6* 7.7*                     Imaging  DX-CHEST-PORTABLE (1 VIEW)   Final Result       1.  Resolution of left lower lobe atelectasis or pneumonia.       2.  Persistent diffuse interstitial opacities which represent edema, pneumonia, or underlying interstitial fibrotic change.       DX-CHEST-PORTABLE (1 VIEW)   Final Result       Interval increase in interstitial and hazy opacities throughout the left lung which could be related to worsening infection rather than asymmetric edema.       Removal of endotracheal tube.       DX-ABDOMEN FOR TUBE PLACEMENT    Final Result       Feeding tube tip projects over the distal stomach.       MR-BRAIN-W/O   Final Result       1.  BILATERAL holohemispheric supratentorial subdural hematomas appear slightly larger than on the recent prior CT.   2.  Trace LEFT frontal vertex subarachnoid blood, unchanged   3.  Trace intraventricular blood without hydrocephalus   4.  Atrophy   5.  Mild white matter changes       US-EXTREMITY VENOUS LOWER BILAT   Final Result       DX-CHEST-PORTABLE (1 VIEW)   Final Result           1.  Hazy left pulmonary opacities suggests subtle infiltrate, similar to prior study.       DX-CHEST-PORTABLE (1 VIEW)   Final Result           1.  Hazy left pulmonary opacities suggests subtle infiltrate, similar to prior study.       US-EXTREMITY VENOUS LOWER BILAT   Final Result       EC-ECHOCARDIOGRAM COMPLETE W/O CONT   Final Result       US-RUQ   Final Result       1.  Enlarged echogenic liver suggesting fatty infiltration.   2.  Prior cholecystectomy.   3.  No biliary dilation.   4.  Trace ascites, nonspecific.           DX-CHEST-PORTABLE (1 VIEW)   Final Result       Left internal jugular catheter placement with the tip projecting over the superior vena cava. No pneumothorax is identified.       CT-HEAD W/O   Final Result       1.  Stable bilateral subdural hematomas. No significant midline shift.       2.  Stable left frontal subarachnoid hemorrhage.           DX-CHEST-LIMITED (1 VIEW)   Final Result       Right internal jugular catheter placement with the tip projecting over the superior vena cava. No pneumothorax is identified.       DX-CHEST-PORTABLE (1 VIEW)   Final Result           1.  Hazy left pulmonary opacities suggests subtle infiltrate       CT-HEAD W/O   Final Result       Bilateral subdural hematomas measure up to 8.3 mm on the left and 5 mm on the right.       Left frontal subarachnoid blood is also seen.       No midline shift.       Soft tissue swelling of the right cheek.       Findings discussed  with Dr Naman MORGAN-CHEST-PORTABLE (1 VIEW)   Final Result       No acute cardiopulmonary process is seen.             Assessment/Plan  * SDH (subdural hematoma) (HCC)- (present on admission)  Assessment & Plan  Acute to subacute with history of recent falls     Patient evaluated by neurosurgery with nonsurgical treatment recommended  Continue Keppra per neurology recommendations     Alcohol withdrawal syndrome with complication (HCC)- (present on admission)  Assessment & Plan  Alcohol withdrawal resolved     Patient counseled on cessation     Protein-calorie malnutrition, severe (HCC)- (present on admission)  Assessment & Plan  Continue supportive care  SLP, RD and nutrition following  Passed FEEs 4/20     Transaminitis- (present on admission)  Assessment & Plan  Echogenic liver ultrasound like alcoholic hepatitis     Counseled on alcohol cessation  Monitor LFTs     Electrolyte disturbance- (present on admission)  Assessment & Plan  Hypomagnesemia  Hypophosphatemia     Replete and monitor     Anxiety  Assessment & Plan  And acute stress with current medical illness   Underlying insomnia on ?seroquel, trazodone and mirtazapine.     Has already restarted on mirtazapine; added trazodone 4/25  1x Ativan 1mg IV ordered 4/26     Chest wall discomfort  Assessment & Plan  Reported 4/24 - will get EKG and pain management  Vague description of symptoms  No acute changes on EKG - managed as a muscular skeletal pain   Will monitor     Thrombocytopenia (HCC)- (present on admission)  Assessment & Plan  Possibly related to her alcoholism  Monitor CBC     Acute cystitis without hematuria- (present on admission)  Assessment & Plan  Completed Bactrim course on 4/20/2021     GI bleed  Assessment & Plan  Continue Prilosec  Clinically resolved  Monitor H&H  Evaluated by Dr. Acuna earlier during this admission reviewed consultation notes     Needed one pRBC 4/21 AM - responded appropriately   At this point, will monitor  If  continued to have a further drop, will re-consult GI.      Shock (HCC)  Assessment & Plan  Weaned off norepinephrine  Currently on midodrine monitor blood pressure off pressors  BP has been stable; given over net +10L since admission, IV furosemide 1 dose provided 4/21  Start furosemide oral twice daily 4/23     Acute respiratory failure (HCC)  Assessment & Plan  Extubated on 4/18/2021  Aspiration precautions  RT protocol  Wean off oxygen as tolerated     Cardiac arrest (HCC)  Assessment & Plan  Possibly secondary to seizure  Patient evaluated by cardiology and noted to have right atrial mass     Case Discussed with Cardiology On call Dr. Malave about prior recommendation of cardiac MRI once clinically stable - advised to follow up as outpatient.      QT prolongation- (present on admission)  Assessment & Plan  Avoid QT prolonging agents     Advanced care planning/counseling discussion- (present on admission)  Assessment & Plan  Palliative care team involvement appreciated  Pt was made DNR/ I OK by daughter in ICU  Pt can now participate in goals of care discussion - agreed for DNR/DNI  POLST signed 4/22      Right atrial mass- (present on admission)  Assessment & Plan  Evaluated by cardiology, Case Discussed with Cardiology On call Dr. Malave about prior recommendation of cardiac MRI once clinically stable - advised to follow up as outpatient.         VTE prophylaxis: SCD        Functional Status/ Changes:    Occupational Therapy  Daily Treatment     Patient Name: Nathalie Aguilar  Age:  60 y.o., Sex:  female  Medical Record #: [id number]  Today's Date: 4/26/2021     Precautions  Precautions: Fall Risk, Swallow Precautions ( See Comments)     Assessment     Pt progressing with OT goals. She was able to don socks with spv, toileting with spv, toilet transfer with Jerson, and standing grooming/hygiene with Jerson for safety/balance. Pt continues to be limited by poor activity tolerance and impaired standing balance. Pt had 2  "overt LOB and requires hands on assist to maintain balance during ADLs and functional mobility.      Plan     Continue current treatment plan.     DC Equipment Recommendations: Unable to determine at this time  Discharge Recommendations: Recommend post-acute placement for additional occupational therapy services prior to discharge home     Subjective     Pt alert, pleasant, and cooperative. Pt stated, \"I'm still weak but not as weak.\"     Objective          04/26/21 1712   Cognition    Cognition / Consciousness WDL   Level of Consciousness Alert   Comments pleasant, cooperative, upset towards end of session about being delivered the wrong food   Other Treatments   Other Treatments Provided Empathetic listening and communication with nursing staff about pts concerns.   Balance   Sitting Balance (Static) Fair +   Sitting Balance (Dynamic) Fair   Standing Balance (Static) Fair -   Standing Balance (Dynamic) Fair -   Weight Shift Sitting Fair   Weight Shift Standing Fair   Comments standing with hands on assist, no AD, two overt LOB requiring physical assist   Bed Mobility    Supine to Sit Supervised   Comments HOB slightly elevated, use of bed rails   Activities of Daily Living   Grooming Standing;Minimal Assist  (Jerson for balance, able to complete task without assist)   Lower Body Dressing Supervision  (socks)   Toileting Supervision   Functional Mobility   Sit to Stand Supervised   Toilet Transfers Minimal Assist   Transfer Method Stand Step   Mobility in room/bathroom/hallway with hands on assist   Distance (Feet) 70   # of Times Distance was Traveled 1   Activity Tolerance   Comments reported fatigue and sore feet limiting her participation   Patient / Family Goals   Patient / Family Goal #1 To get better and go home   Short Term Goals   Short Term Goal # 1 Pt will complete toilet transfer with spv by discharge.   Goal Outcome # 1 Progressing as expected   Short Term Goal # 2 Pt will complete toileting with spv by " "discharge.   Goal Outcome # 2 Goal met   Short Term Goal # 3 Pt will complete LB dressing with spv using AE as needed by discharge.   Goal Outcome # 3 Goal met   Short Term Goal # 3 B Pt will complete 10 mins standing ADLs with spv and no rest break by discharge.   Goal Outcome # 3 B Progressing as expected      Speech Language Pathology  Daily Treatment     Patient Name: Nathalie Aguilar  Age:  60 y.o., Sex:  female  Medical Record #: [id number]  Today's Date: 4/26/2021     Precautions  Precautions: Fall Risk, Swallow Precautions ( See Comments)     Assessment     Pt was seen for dysphagia tx with PO trials of thins via tsp/cup, purees and soft/bite sized solids. Pt was alert, sitting upright at EOB, able to self-feed small bites and sips. Pt still does not have dentures; states her daughter can't find them. Pt was able to mash soft/bite sized solids well enough without her dentures; however, she coughed x2 while masticating juicy fruit with concern for possible penetration/aspiration (likely on prespillage). No s/sx of aspiration occurred w/ thins via tsp/cup. Recommend diet upgrade to Minced and Moist Solids (MM5) and Thin Liquids (TN0) with monitoring during meals. Float pills in puree. Hold PO with any overt coughing. Pt may need a diagnostic swallow evaluation (MBS) if she does not tolerate advanced textures. SLP is following.      Plan     Minced and Moist Solids (MM5) and Thin Liquids (TN0) with monitoring during meals. Float pills in puree.   Hold PO with any overt coughing.     Continue current treatment plan.     Discharge Recommendations: Recommend post-acute placement for additional speech therapy services prior to discharge home     Subjective     \"I ate that too fast.\"     Objective          04/26/21 1517   Dysphagia    Positioning / Behavior Modification Modulate Rate or Bite Size;Self Monitoring   Other Treatments PO trials thins via tsp/cup, PU4, SB6   Diet / Liquid Recommendation Minced & Moist " (5) - (Dysphagia II);Thin (0)   Recommended Route of Medication Administration   Medication Administration  Float Whole with Puree   Short Term Goals   Short Term Goal # 1 B  Revised 4/22: Patient will consume meals of pureed solids and mildly thickened liquids with no s/sx of aspiration given monitoring during meals.   Goal Outcome  # 1 B Goal met   Short Term Goal # 2 New 4/26: Patient will consume meals of minced/moist solids and thin liquids with no s/sx of aspiration given monitoring during meals.      Physical Therapy   Daily Treatment     Patient Name: Nathalie Aguilar  Age:  60 y.o., Sex:  female  Medical Record #: [id number]  Today's Date: 4/25/2021     Precautions: Fall Risk, Swallow Precautions     Assessment  Pt seen for PT treatment session. Pt very motivated to participate. Pt demonstrates improved gait while using FWW and wished to trial ambulation without AD to return to PLOF. Pt required min A and HHA for safety d/t unsteadiness. Ataxia improving, gait distance limited by fatigue. Pt reports following rehab she will be staying with her dtr for a bit. Continue to recommend postacute placement at this time to maximize safety and functional independence. Will follow.      Plan  Continue current treatment plan.  DC Equipment Recommendations: Unable to determine at this time  Discharge Recommendations: Recommend post-acute placement for additional physical therapy services prior to discharge home          04/25/21 1624   Balance   Sitting Balance (Static) Fair   Sitting Balance (Dynamic) Fair   Standing Balance (Static) Fair   Standing Balance (Dynamic) Fair -   Weight Shift Sitting Fair   Weight Shift Standing Fair   Comments hands on assist for no AD   Gait Analysis   Gait Level Of Assist Minimal Assist   Assistive Device Hand Held Assist   Distance (Feet) 100   Deviation ataxia improving, shaky, slow dale   Weight Bearing Status no restrictions   Comments close SPV with walker, min A without AD.  PLOF without AD. no overt LOB but unsteady and shaky. easily fatigues with mild incr SOB   Bed Mobility    Supine to Sit Supervised   Sit to Supine Supervised   Scooting Minimal Assist (supine)   Comments cues for bridging to assist with bed mob   Functional Mobility   Sit to Stand Supervised   Comments politely declined up to chair.   Short Term Goals    Short Term Goal # 1 Pt will perform supine <> sit with SPV in 6 visits to get in/out of bed   Goal Outcome # 1 Goal met   Short Term Goal # 2 Pt will perform functional transfers with SPV in 6 visits to increase independence   Goal Outcome # 2 Goal met   Short Term Goal # 3 Pt will ambulate 150ft with FWW and SPV in 6 visits to increase independence   Goal Outcome # 3 Progressing as expected (min A 100ft no AD)      Reviewer: Sammy Vu L.P.N.  Date: 2021  Time: 9:30 AM  Pre-Admission Screening Form    Patient Information:   Name: Nathalie Aguilar     MRN: [id number]       : 1960      Age: 60 y.o.   Gender: female      Race: White [7]       Marital Status:  [4]  Family Contact: Kerline Hanson        Relationship: Daughter [2]  Home Phone:            Cell Phone: [phone]  Advanced Directives: None  Code Status:  DNAR, I OK  Current Attending Provider: Francesco Fuentes M.D.  Referring Physician: Dr. Fuentes   Physiatrist Consult: Dr. Granados    Referral Date: 21  Primary Payor Source:  MEDICAID HMO  Secondary Payor Source:      Medical Information:   Date of Admission to Acute Care [place]2021  Room Number: S190/01  Rehabilitation Diagnosis: 0002.22 - Brain Dysfunction: Traumatic, Closed Injury  Immunization History   Administered Date(s) Administered   • Tdap Vaccine 2019     No Known Allergies  Past Medical History:   Diagnosis Date   • ASTHMA    • Cancer (HCC)     breast   • Heroin addiction (HCC)    • Pneumonia    • Psychiatric disorder      Past Surgical History:   Procedure Laterality Date   • INCISION AND DRAINAGE  ORTHOPEDIC Right 1/25/2019    Procedure: INCISION AND DRAINAGE ORTHOPEDIC - HAND;  Surgeon: Kendrick Campbell M.D.;  Location: SURGERY [facility];  Service: Orthopedics   • CHOLECYSTECTOMY     • GYN SURGERY      partial hysterectomy       History Leading to Admission, Conditions that Caused the Need for Rehab (CMS):     Dr. Fuentes H&P:  Chief Complaint   Patient presents with   • ALOC   • Failure to Thrive         History of Presenting Illness  60 y.o. female with a history of alcohol and heroin dependence, unspecified psychiatric disorder, and asthma who presented by ambulance 4/13/2021 with acute loss of consciousness and failure to thrive.  Patient reports that she is an alcoholic and usually drinks about a pint of hard alcohol daily.  She reports she fell in the bathroom a couple days ago.  She does not remember the exact date and the exact events.  She reports living with her boyfriend, likely called EMS.  Patient reports she lost consciousness and hit her head during the fall.  She does not remember when her last trunk was.  She denies any history of alcohol significant withdrawal symptoms or history of seizures.  She reports continued to smoke about 1/2 pack/day.  She denies any other illicit drug use.  She denies any fevers or chills.     Per EMS report, patient was found by her daughter was noted to be naked and covered in feces.  It is believed that she was found for at least 24 hours.  Head CT revealed acute to subacute bilateral subdural hematomas left greater than the right with significant bifrontal cerebral atrophy.  Her lactic acid was noted to be 13.  Urine drug screen was positive for benzodiazepines and cannabinoid metabolites.  Blood alcohol level was 205.     Assessment/Plan:  I anticipate this patient will require at least two midnights for appropriate medical management, necessitating inpatient admission.     * SDH (subdural hematoma) (HCC)- (present on admission)  Assessment &  Plan  Acute to subacute.  Likely secondary to ground-level fall 1 to 2 days ago with loss of consciousness.  I have discussed the case with the ER physician as well as the critical care specialist, who has kindly accepted the patient for ICU care.     Admit to ICU.  Neurochecks every hour.  Appreciate neurosurgical and critical care consultations.  Avoid chemo anticoagulation for DVT prophylaxis.  Seizure, fall, and aspiration precautions.     Alcohol withdrawal syndrome with complication (HCC)- (present on admission)  Assessment & Plan  Complicated by a bilateral subdural hematomas.     Appreciate critical care consultation.  Precedex and CIWA protocol as per critical care.  Vitamin replacement therapy.  Aspiration, fall, and seizure precautions.  Patient counseled on alcohol cessation to treatment center to help groups.     Protein-calorie malnutrition, severe (HCC)- (present on admission)  Assessment & Plan  In setting of alcoholism.  BMI of 18.7.     Nutrition consult.  Check prealbumin.  Consider NG tube placement.     Hyperbilirubinemia  Assessment & Plan  Etiology unclear.  Possibly secondary to alcohol.     Check right upper quadrant ultrasound.     Transaminitis- (present on admission)  Assessment & Plan  Etiology unclear.  Possibly secondary to alcohol.     Right upper quadrant ultrasound given significant hyperbilirubinemia.     Hypocalcemia- (present on admission)  Assessment & Plan  Secondary to hypoalbuminemia.  Corrected calcium within normal limits.  Continue to monitor closely.     Marijuana user- (present on admission)  Assessment & Plan  As per positive UDS.      polysubstance abstinence.     Lactic acidosis- (present on admission)  Assessment & Plan  Likely secondary to severe dehydration and tissue ischemia.     Continue fluid resuscitation.     Hypomagnesemia- (present on admission)  Assessment & Plan  Secondary to alcoholism.  Replace for goal greater than 2.0.     Hypokalemia- (present  on admission)  Assessment & Plan  In setting of alcohol dependence.  Correct for goal greater than 4.0.  Correct magnesium for goal greater than 2.0.      Dr. Granados (Physiatry) recommendations:  ASSESSMENT:  Patient is a 60 y.o. female admitted with bilateral holohemispheric frontal SDH, complicated by PEA s/p CPR with ROSC and GI bleed.      [facility] Code / Diagnosis to Support: 0002.22 - Brain Dysfunction: Traumatic, Closed Injury and 0017.8 - Medically Complex: Medical/Surgical Complications     Rehabilitation: Impaired ADLs and mobility  Patient is a good candidate for inpatient rehab based on needs for PT, OT, and speech therapy.  Patient will also benefit from family training.  Patient has a good discharge situation which will be home with daughter.      Barriers to transfer include: Insurance authorization, TCCs to verify disposition, medical clearance and bed availability      Additional Recommendations:  - Good candidate for IPR, she can stay with her daughter on discharge she says. She is medically complex but appears stable enough for IPR level of therapy. She has diffuse weakness and will benefit from PT, OT and SLP for cog.   - HGB 6.7 this am, recheck after blood products 8.6. Would appreciate recheck in AM to verify stability. May need EGD if bleeding continues. Per GI, she is high risk for EGD and only recommended if she has unstable vitals   - Tobacco abuse cessation counseling given today for ~5 min as it pertains to vascular disease, heart attack, stroke, wound healing, and pulmonary disease.   - Starting melatonin 5mg for insomnia   - SDH non-surgical per NSG  - Avoid QT prolonging medications (trazodone, zofran)  - Cardiology recommending cardiac MRI which should be done prior to transfer, or cleared by cardiology. Initially seen by Dr. Daniel MD  - On Keppra 1000mg BID      Medical Complexity:  SDH   Right atrial mass on echo   Anemia   Acute seizures     DVT PPX: SCDs    Dr. Sanchez (Cardiology)  recommendations:  Assessment / Plan:  Right atrial mass is likely a prominent eustachian valve but cannot exclude malignancy the most appropriate test is a cardiac MRI.  Clearly she is not in clinical state to get a cardiac MRI as you have to hold still for 40 minutes with breath holds so if she gets to this clinical state try to arrange a cardiac MRI for evaluation of this otherwise could be done as an outpatient.     Avoid QT prolonging medications    Dr. Acuna (Gastroenterology) recommendations:  Assessment/Plan  61 y/o with EtOH dependence, alcoholic hepatitis, SDH, SAH, possible seizure from EtOH withdraw, recent asystole arrest that has developed melena versus hematochezia and anemia.  Bleeding has likely slowed and occurred post-arrest.  BUN normal arguing against upper GI bleed but still possible.  Question potential ischemic colitis following cardiac arrest.  Given numerous issues, higher risk for endoscopy and feel like bleeding is slowing at this time.  Recommend conservative management and serial monitoring and blood transfusion if needed.  If she develops more brisk bleeding or unstable vitals, then plan for EGD.     PROBLEMS:  1. Melena versus hematochezia  2. Acute blood loss anemia  3. Recent asystole  4. Possible seizures  5. EtOH dependence  6. Alcoholic hepatitis  7. Subdural hematoma  8. Subarachnoid hemorrhage  9. Atrial mass  10. Intubated  11. Hypotension     PLAN:  1. Conservative management with IV PPI BID and serial H/H q8 hours  2. Transfuse as needed  3. If has increased melena or unstable vitals, then plan for more urgent EGD.  Discussed with ICU physician, Dr. Marquise Glez (Neurology) recommendations:  Impression: Nathalie is a 60-year-old woman with bilateral subdural hematomas without significant mass-effect.  Neurosurgery has been consulted and does not feel there is an indication for surgical intervention at this time.  She had possible seizure activity preceding a brief  cardiac arrest.  Since that time she is having episodes of posturing and rigidity associated with pupillary dilatation.  Unclear if these represent epileptic seizures or some type of posturing or sympathetic storm type of event.        Recommendations:  -Patient has been started on propofol and the episodes seem to have ceased for now.  We will hold propofol and start continuous EEG monitoring to try to capture an event to see if they are epileptic in nature or not.  -Would continue Keppra 1000 mg twice daily for now  -MRI of the brain may be useful eventually to guide in prognosis and look for seizure focus but would defer to least tomorrow to allow for continuous EEG monitoring.  -Neurology will continue to follow.    Dr. Leos (Surgery Neurosurgery) recommendations:  IMPRESSION AND PLAN:  Traumatic subdural hematomas in a patient with drug and   alcohol use and failure to thrive and was found in feces after hitting her   head several days ago.  We await the repeat head CT.  I suspect that there   would not be anything surgical there.  I will write an updated note after the   repeat CAT scan is performed.  It is being performed currently.    Intubation     Date/Time: 4/14/2021 5:20 AM  Performed by: Diego Sims M.D.  Authorized by: Diego Sims M.D.      Consent:     Consent obtained:  Emergent situation  Pre-procedure details:     Patient status:  Unresponsive    Mallampati score:  I    Pretreatment medications:  None    Paralytics:  None  Procedure details:     Preoxygenation:  ESTRADA/LMA    CPR in progress: no      Intubation method:  Oral    Oral intubation technique:  Video-assisted    Laryngoscope type:  GlideScope    Laryngoscope blade:  Mac 3    Cormack-Lehane Classification:  Grade 2a    Tube size (mm):  7.5    Tube type:  Cuffed    Number of attempts:  1    Tube visualized through cords: yes    Placement assessment:     ETT to teeth:  23    Tube secured with:  ETT duran    Breath sounds:   "Equal    Placement verification: chest rise, direct visualization, equal breath sounds, ETCO2 detector and tube exhalation    Post-procedure details:     Patient tolerance of procedure:  Tolerated well, no immediate complications    Brain MRI 04-18-21:  1.  BILATERAL holohemispheric supratentorial subdural hematomas appear slightly larger than on the recent prior CT.  2.  Trace LEFT frontal vertex subarachnoid blood, unchanged  3.  Trace intraventricular blood without hydrocephalus  4.  Atrophy  5.  Mild white matter changes    Co-morbidities: See PMH  Potential Risk - Complications: Aphasia, Cognitive Impairment, Contractures, Deep Vein Thrombosis, Dysphagia, Incontinence, Malnutrition, Pain, Perceptual Impairment, Pneumonia, Pressure Ulcer, Seizures and Urinary Tract Infection  Level of Risk: High    Ongoing Medical Management Needed (Medical/Nursing Needs):   Patient Active Problem List    Diagnosis Date Noted   • Protein-calorie malnutrition, severe ([facility]) 04/14/2021   • Alcohol withdrawal syndrome with complication ([facility]) 04/14/2021   • SDH (subdural hematoma) ([facility]) 04/13/2021   • Electrolyte disturbance 04/14/2021   • Marijuana user 04/14/2021   • Transaminitis 04/14/2021   • Hyperbilirubinemia 04/14/2021   • Acute cystitis without hematuria 04/16/2021   • Thrombocytopenia ([facility]) 04/16/2021   • GI bleed 04/15/2021   • Melena 04/15/2021   • Right atrial mass 04/14/2021   • Elevated INR 04/14/2021   • Advanced care planning/counseling discussion 04/14/2021   • QT prolongation 04/14/2021   • Cardiac arrest ([facility]) 04/14/2021   • Acute respiratory failure ([facility]) 04/14/2021   • Shock ([facility]) 04/14/2021     Alert     Current Vital Signs:   Temperature: 36.2 °C (97.2 °F) Pulse: 89 Respiration: 16 Blood Pressure: 102/66  Weight: 67.2 kg (148 lb 2.4 oz) Height: 172.7 cm (5' 8\")  Pulse Oximetry: 97 % O2 (LPM): 4      Completed Laboratory Reports:  Recent Labs     04/19/21  1757 04/20/21  0038 04/20/21  0542 04/20/21  0600 " 21  0305 21  0929 21  0413   WBC  --   --   --  5.4 5.5  --  5.5   HEMOGLOBIN  --   --   --  7.3* 6.7* 8.6* 7.9*   HEMATOCRIT  --   --   --  23.9* 20.9* 26.7* 24.6*   PLATELETCT  --   --   --  120* 161*  --  232   SODIUM  --   --   --  134* 129*  --   --    POTASSIUM  --   --   --  3.6 3.6  --   --    BUN  --   --   --  13 10  --   --    CREATININE  --   --   --  <0.17* <0.17*  --   --    ALBUMIN  --   --   --  1.7*  --   --   --    GLUCOSE  --   --   --  95 96  --   --    POCGLUCOSE 115* 96 101*  --   --   --   --      Additional Labs: Not Applicable    Prior Living Situation:   Housing / Facility: 1 Story Apartment / Condo  Steps Into Home: 3  Steps In Home: 0  Lives with - Patient's Self Care Capacity: Alone and Able to Care For Self  Equipment Owned: None    Prior Level of Function / Living Situation:   Physical Therapy: Prior Services: Home-Independent  Housing / Facility: 1 Story Apartment / Condo  Steps Into Home: 3  Steps In Home: 0  Bathroom Set up: Bathtub / Shower Combination  Equipment Owned: None  Lives with - Patient's Self Care Capacity: Alone and Able to Care For Self  Bed Mobility: Independent  Transfer Status: Independent  Ambulation: Independent  Distance Ambulation (Feet): (community ambulator)  Assistive Devices Used: None  Stairs: Independent  Current Level of Function:   Gait Level Of Assist: Minimal Assist  Assistive Device: Front Wheel Walker  Distance (Feet): 3  Deviation: Bradykinetic, Shuffled Gait, Ataxic  Weight Bearing Status: no restrictions  Supine to Sit: Minimal Assist  Sit to Supine: Supervised  Scooting: Moderate Assist(seated)  Comments: HHA for leverage  Sit to Stand: Minimal Assist  Bed, Chair, Wheelchair Transfer: Minimal Assist  Toilet Transfers: Minimal Assist  Transfer Method: Stand Step  Sitting in Chair: 5 mins (BSC)  Sitting Edge of Bed: 10-12 mins  Standin-2 mins x 2  Occupational Therapy:   Self Feeding: Independent  Grooming / Hygiene:  "Independent  Bathing: Independent  Dressing: Independent  Toileting: Independent  Medication Management: Independent  Laundry: Independent  Kitchen Mobility: Independent  Finances: Independent  Home Management: Independent  Shopping: Requires Assist  Prior Level Of Mobility: Independent Without Device in Community, Independent Without Device in Home  Driving / Transportation: Relatives / Others Provide Transportation  Prior Services: Home-Independent  Housing / Facility: 1 Story Apartment / Condo  Occupation (Pre-Hospital Vocational): Not Employed  Leisure Interests: Other (Comments)(\"drinking\")  Current Level of Function:   Lower Body Dressing: Maximal Assist(socks)  Toileting: Moderate Assist(for thoroughness with rear pericare)  Speech Language Pathology:   Problem List: Dysphagia  Diet / Liquid Recommendation: Puree (4), Thin (0)  Rehabilitation Prognosis/Potential: Fair  Estimated Length of Stay: 12-13 days    Nursing:   Orientation : Unable to Assess  Continent    Scope/Intensity of Services Recommended:  Physical Therapy: 1 hr / day  5 days / week. Therapeutic Interventions Required: Maximize Endurance, Mobility, Strength and Safety  Occupational Therapy: 1 hr / day 5 days / week. Therapeutic Interventions Required: Maximize Self Care, ADLs, IADLs and Energy Conservation  Speech & Language Pathology: 1 hr / day 5 days / week. Therapeutic Interventions Required: Maximize Cognition, Swallowing and Safety  Rehabilitation Nursin/7. Therapeutic Interventions Required: Monitor Pain, Skin, Vital Signs, Intake and Output, Labs, Safety, Aspiration Risk and Family Training  Rehabilitation Physician: 3 - 5 days / week. Therapeutic Interventions Required: Medical Management  Respiratory Care: Pulmonary Toileting. Therapeutic Interventions Required: Pulmonary Toileting, O2 Weaning and Aspiration Risk  Dietician: Nutritional Assessment/Education. Therapeutic Interventions Required: .    She requires 24-hour " rehabilitation nursing to manage skin care, nutrition and fluid intake, pulmonary hygiene, pain control, safety, medication management and patient/family goals. In addition, rehabilitation nursing will reiterate and reinforce therapy skills and equipment use, including ADLs, as well as provide education to the patient and family. Nathalie Aguilar is willing to participate in and is able to tolerate the proposed plan of care.    Rehabilitation Goals and Plan (Expected frequency & duration of treatment in the IRF):   Return to the Community, Supervised Level of Care and Outpatient Support  Anticipated Date of Rehabilitation Admission: 04-23-21  Patient/Family oriented IRF level of care/facility/plan: Yes  Patient/Family willing to participate in IRF care/facility/plan: Yes  Patient able to tolerate IRF level of care proposed: Yes  Patient has potential to benefit IRF level of care proposed: Yes  Comments: Not Applicable    Special Needs or Precautions - Medical Necessity:  Safety Concerns/Precautions:  Fall Risk / High Risk for Falls, Balance, Cognition and Bed / Chair Alarm  Pain Management  IV Site: Peripheral  Requires Oxygen  Alcohol Use  Current Medications:    Current Facility-Administered Medications Ordered in Epic   Medication Dose Route Frequency Provider Last Rate Last Admin   • oxyCODONE immediate-release (ROXICODONE) tablet 5 mg  5 mg Oral Q4HRS PRN Francesco Fuentes M.D.   5 mg at 04/22/21 0523    Or   • oxyCODONE immediate release (ROXICODONE) tablet 10 mg  10 mg Oral Q4HRS PRN Francesco Fuentes M.D.       • melatonin tablet 5 mg  5 mg Oral Nightly Rudolph Worchel, D.O.   5 mg at 04/21/21 2034   • furosemide (LASIX) tablet 20 mg  20 mg Oral BID DIURETIC Francesco Fuentes M.D.   20 mg at 04/22/21 0541   • midodrine (PROAMATINE) tablet 5 mg  5 mg Oral TID ESTEFANIA GuerraP.RBETTY   5 mg at 04/22/21 0541   • levETIRAcetam (KEPPRA) tablet 1,000 mg  1,000 mg Oral Q12HRS Nick Baer M.D.   1,000 mg at 04/22/21 0541   •  magnesium oxide (MAG-OX) tablet 400 mg  400 mg Oral BID Ventura Still D.O.   400 mg at 04/22/21 0541   • acetaminophen (Tylenol) tablet 650 mg  650 mg Oral Q6HRS PRN MRAISOL Ye.O.       • senna-docusate (PERICOLACE or SENOKOT S) 8.6-50 MG per tablet 2 tablet  2 tablet Oral BID PRN Francesco Fuentes M.D.        And   • polyethylene glycol/lytes (MIRALAX) PACKET 1 Packet  1 Packet Oral QDAY PRN Francesco Fuentes M.D.        And   • magnesium hydroxide (MILK OF MAGNESIA) suspension 30 mL  30 mL Oral QDAY PRN Francesco Fuentes M.D.        And   • bisacodyl (DULCOLAX) suppository 10 mg  10 mg Rectal QDAY PRN Francesco Fuentes M.D.       • Respiratory Therapy Consult   Nebulization Continuous RT SONIA Harvey       • nicotine (NICODERM) 14 MG/24HR 14 mg  14 mg Transdermal Daily-0600 Francesco Bernstein M.D.   14 mg at 04/19/21 0544     No current [facility]-ordered outpatient medications on file.     Diet:   DIET ORDERS (From admission to next 24h)     Start     Ordered    04/22/21 0832  Supplements  ALL MEALS     Question:  Which Supplement  Answer:  BOOST PLUS    04/22/21 0831    04/20/21 1405  Diet Order Diet: Level 4 - Pureed (monitor during meals); Liquid level: Level 0 - Thin  ALL MEALS     Question Answer Comment   Diet: Level 4 - Pureed monitor during meals   Liquid level Level 0 - Thin        04/20/21 1405                Anticipated Discharge Destination / Patient/Family Goal:  Destination: Home with Supervision Support System: Family   Anticipated home health services: OT and PT  Previously used HH service/ provider: Not Applicable  Anticipated DME Needs: Oxygen, Walker and Life Line  Outpatient Services: OT and PT  Alternative resources to address additional identified needs:     Pre-Screen Completed: 4/22/2021 8:35 AM PEDRO [email or web address]

## 2021-04-22 NOTE — ASSESSMENT & PLAN NOTE
Palliative care team involvement appreciated  Pt was made DNR/ I OK by daughter in ICU  Pt can now participate in goals of care discussion - agreed for DNR/DNI  POLST signed 4/22

## 2021-04-22 NOTE — THERAPY
"Speech Language Pathology  Daily Treatment     Patient Name: Nathalie Aguilar  Age:  60 y.o., Sex:  female  Medical Record #: [id number]  Today's Date: 4/22/2021     Precautions  Precautions: Fall Risk, Swallow Precautions ( See Comments)    Assessment    Pt seen on this date for dysphagia treatment. Pt found seated at edge of bed and AAOx4. Pt agreeable to participate in therapy. Pt seen with lunch tray of  pureed peas, mashed potatoes, Boost, and coffee. Pt reportedly a vegetarian and \"a picky eater\". With PO trials of Boost via cup sip Pt presented with a wet vocal quality in 50% of trials (+3/6), throat clearing was noted to successfully clear vocal quality. With PO trials of coffee Pt demonstrated a strong cough with 40% of trials (+2/5) and reported it \"went down the wrong pipe\". With all PO trials of thickened apple juice, Pt demonstrated no overt clinical s/sx of aspiration. With PO trials of puree, Pt demonstrated no overt clinical s/sx of aspiration. Pt denied globus sensation and no oral residue was appreciated. Pt educated on swallow safety and downgrade to thickened liquids as well as possible swallow diagnostic to determine safest diet. Pt agreed with POC and denied further questions. Pt left at edge of bed finishing lunch and all thin liquids were discarded.     Recommend diet downgrade of Pureed solids and Mildly thick liquids, Medications floated in puree; Monitor with meals. A swallow diagnostic to determine swallow function/safest diet may be appropriate (will defer to primary SLP; Pt may discharge to rehab tomorrow and can receive MBS there as well). Please hold PO if difficulties/concerns or change in medical status. SLP following. Thank you.      Plan    Continue current treatment plan.    Discharge Recommendations: Recommend post-acute placement for additional speech therapy services prior to discharge home     Objective     04/22/21 1301   Non Verbal Descriptors   Non Verbal Scale  Calm " "  Cognitive-Linguistic   Level of Consciousness Alert   Dysphagia    Positioning / Behavior Modification Modulate Rate or Bite Size;Self Monitoring   Other Treatments Lunch tray PU4/TN0 and trials of MT2   Diet / Liquid Recommendation Puree (4);Mildly Thick (2) - (Nectar Thick)   Nutritional Liquid Intake Rating Scale Thickened beverages (mildly thick unless otherwise specified)   Nutritional Food Intake Rating Scale Total oral diet of a single consistency   Nursing Communication Swallow Precaution Sign Posted at Head of Bed   Skilled Intervention Verbal Cueing;Compensatory Strategies   Recommended Route of Medication Administration   Medication Administration  Float Whole with Puree   Patient / Family Goals   Patient / Family Goal #1 \"I am so thirsty\"   Goal #1 Outcome Goal met   Short Term Goals   Short Term Goal # 1 Pt will consume pre-feeding trials with SLP, without s/sx of aspiration.   Goal Outcome # 1 Goal met, new goal added   Short Term Goal # 1 B  Revised 4/22: Patient will consume meals of pureed solids and mildly thickened liquids with no s/sx of aspiration given monitoring during meals.         "

## 2021-04-22 NOTE — CARE PLAN
Problem: Communication  Goal: The ability to communicate needs accurately and effectively will improve  Outcome: PROGRESSING AS EXPECTED     Problem: Safety  Goal: Will remain free from injury  Outcome: PROGRESSING AS EXPECTED     Problem: Bowel/Gastric:  Goal: Normal bowel function is maintained or improved  Outcome: PROGRESSING AS EXPECTED     Problem: Discharge Barriers/Planning  Goal: Patient's continuum of care needs will be met  Outcome: PROGRESSING AS EXPECTED     Problem: Respiratory:  Goal: Respiratory status will improve  Outcome: PROGRESSING AS EXPECTED     Problem: Psychosocial Needs:  Goal: Level of anxiety will decrease  Outcome: PROGRESSING AS EXPECTED     Problem: Skin Integrity  Goal: Risk for impaired skin integrity will decrease  Outcome: PROGRESSING AS EXPECTED     Problem: Pain Management  Goal: Pain level will decrease to patient's comfort goal  Outcome: PROGRESSING AS EXPECTED

## 2021-04-22 NOTE — PROGRESS NOTES
[facility] Medicine Daily Progress Note    Date of Service  4/21/2021    Chief Complaint  60 y.o. female admitted 4/13/2021 with altered level of consciousness and syncope    Hospital Course  60-year-old female with history of alcohol and heroin dependence who had a syncopal episode and hit her head during her fall was transferred to the emergency department where head CT revealed acute to subacute bilateral subdural hematomas metabolic acidosis.  Patient was admitted to the intensive care unit and evaluated by neurosurgery.  She had a possible seizure episode shortly after admission and went into PEA cardiac arrest for which she received CPR with ROSC within 5 minutes.  Patient was intubated she was on pressors.  Echocardiogram revealed right atrial mass she was evaluated by cardiology who recommended cardiac MRI when patient is more clinically stable.  The patient had GI bleeding and was evaluated by Dr. Acuna from [facility] recommendation for conservative management and deferring EGD and endoscopy to when she is more clinically stable unless she has signs of brisk bleeding.  The patient tolerated extubation on 4/18/2021.  She was weaned off norepinephrine the morning of 4/19/2021.    Care was transferred to Hospitalist Services and to  Neuro on 4/19.    4/20: Vitals afebrile. BP soft. Pt pulled out Cortrek - no longer wanted it. SLP eval - passed FEEs; hence, started diet. Na improved to 134. She does not remember most of her ICU course and all the things that she went through. I summarized a brief hospital course for her and current treatment plan.     Interval Problem Update  4/21:  Vitals reviewed; afebrile.  The rest of the vitals within normal parameters. BP has been improving slowly. On 3L O2.   Pain scale reported - 7 in lower back  Blood glucose in last 24hrs - around 100s    During morning rounds, reported doing OK. No acute complain except still feeling quite weak. No acute ritesh bleeding noted. Plan  to have PT/OT eval and monitor her H&H discussed.     Labs reviewed   Na 132 > 134  Hb 6.7 (1 pRBC provided) - 8.6  Mg 1.2 (supplemented) > 1.9    At around 1405: Pt was noted to be sating in 68% (on 3L NC) once Pulse Ox monitor was connected. Pt reported SOB. Hence, RRT was called. Pt was provided 10L Oxymask. CXR done reviewed - grossly unremarkable (no significant change); EKG done reviewed -  SNR, anterior T wave abnormalities but no acute change from prior. O2 requirement back down to 3L shortly after. The rest of the vitals unremarkable.     PM&R consult appreciated.     Case Discussed with Cardiology On call Dr. Malave about prior recommendation of cardiac MRI once clinically stable - advised to follow up as outpatient.     Consultants/Specialty  Neurosurgery Dr. Leos  Neurology  Cardiology  Critical care  GI Dr. Acuna  PM&R    Code Status  DNAR, I OK    Disposition  TBD (IPR vs SNF)    Discussed with patient, patient's nurse and with multidisciplinary team during rounds including , pharmacist and charge nurse.      I have performed the physical examination, and reviewed updated ROS and plan today 4/21/2021.  In review of yesterday's note, there are no new changes except as documented above or bolded below.       Review of Systems  Review of Systems   Constitutional: Positive for malaise/fatigue. Negative for chills and fever.   Respiratory: Negative for shortness of breath.    Cardiovascular: Positive for leg swelling. Negative for chest pain and palpitations.   Gastrointestinal: Positive for abdominal pain, diarrhea and nausea.   Neurological: Positive for weakness.   All other systems reviewed and are negative.       Physical Exam  Temp:  [35.9 °C (96.7 °F)-37.3 °C (99.1 °F)] 35.9 °C (96.7 °F)  Pulse:  [] 100  Resp:  [12-18] 18  BP: ()/(55-71) 106/67  SpO2:  [92 %-99 %] 94 %    Physical Exam  Vitals and nursing note reviewed.   Constitutional:       General: She is not in acute  distress.  HENT:      Head: Normocephalic and atraumatic.      Nose: No rhinorrhea.   Eyes:      General: No scleral icterus.     Comments: Pale conjunctiva    Cardiovascular:      Rate and Rhythm: Normal rate and regular rhythm.      Heart sounds: Normal heart sounds. No murmur. No friction rub. No gallop.    Pulmonary:      Effort: Pulmonary effort is normal. No respiratory distress.      Breath sounds: Rales present. No wheezing.   Abdominal:      General: Bowel sounds are normal. There is distension.      Palpations: Abdomen is soft. There is no mass.      Tenderness: There is no abdominal tenderness. There is no rebound.   Musculoskeletal:         General: Swelling present. No tenderness.      Cervical back: Neck supple. No rigidity.   Skin:     General: Skin is warm and dry.      Capillary Refill: Capillary refill takes less than 2 seconds.      Coloration: Skin is not cyanotic or jaundiced.      Findings: Bruising present.      Nails: There is no clubbing.   Neurological:      General: No focal deficit present.      Mental Status: She is alert.      Cranial Nerves: No cranial nerve deficit.      Motor: Weakness (Generalized) present.   Psychiatric:         Mood and Affect: Mood normal.         Behavior: Behavior normal.         Fluids    Intake/Output Summary (Last 24 hours) at 4/21/2021 2006  Last data filed at 4/21/2021 0830  Gross per 24 hour   Intake 529.33 ml   Output --   Net 529.33 ml       Laboratory  Recent Labs     04/19/21  0603 04/19/21  0603 04/20/21  0600 04/21/21  0305 04/21/21  0929   WBC 6.4  --  5.4 5.5  --    RBC 1.97*  --  2.07* 1.86*  --    HEMOGLOBIN 7.2*   < > 7.3* 6.7* 8.6*   HEMATOCRIT 22.0*   < > 23.9* 20.9* 26.7*   .7*  --  115.5* 112.4*  --    MCH 36.5*  --  35.3* 36.0*  --    MCHC 32.7*  --  30.5* 32.1*  --    RDW 79.7*  --  79.5* 78.6*  --    PLATELETCT 78*  --  120* 161*  --    MPV 11.4  --  11.7 10.7  --     < > = values in this interval not displayed.     Recent Labs      04/19/21  0603 04/20/21  0600 04/21/21  0305   SODIUM 132* 134* 129*   POTASSIUM 4.7 3.6 3.6   CHLORIDE 102 100 98   CO2 26 25 24   GLUCOSE 113* 95 96   BUN 11 13 10   CREATININE <0.17* <0.17* <0.17*   CALCIUM 7.1* 7.7* 7.4*                   Imaging  DX-CHEST-PORTABLE (1 VIEW)   Final Result      1.  Resolution of left lower lobe atelectasis or pneumonia.      2.  Persistent diffuse interstitial opacities which represent edema, pneumonia, or underlying interstitial fibrotic change.      DX-CHEST-PORTABLE (1 VIEW)   Final Result      Interval increase in interstitial and hazy opacities throughout the left lung which could be related to worsening infection rather than asymmetric edema.      Removal of endotracheal tube.      DX-ABDOMEN FOR TUBE PLACEMENT   Final Result      Feeding tube tip projects over the distal stomach.      MR-BRAIN-W/O   Final Result      1.  BILATERAL holohemispheric supratentorial subdural hematomas appear slightly larger than on the recent prior CT.   2.  Trace LEFT frontal vertex subarachnoid blood, unchanged   3.  Trace intraventricular blood without hydrocephalus   4.  Atrophy   5.  Mild white matter changes      US-EXTREMITY VENOUS LOWER BILAT   Final Result      DX-CHEST-PORTABLE (1 VIEW)   Final Result         1.  Hazy left pulmonary opacities suggests subtle infiltrate, similar to prior study.      DX-CHEST-PORTABLE (1 VIEW)   Final Result         1.  Hazy left pulmonary opacities suggests subtle infiltrate, similar to prior study.      US-EXTREMITY VENOUS LOWER BILAT   Final Result      EC-ECHOCARDIOGRAM COMPLETE W/O CONT   Final Result      US-RUQ   Final Result      1.  Enlarged echogenic liver suggesting fatty infiltration.   2.  Prior cholecystectomy.   3.  No biliary dilation.   4.  Trace ascites, nonspecific.         DX-CHEST-PORTABLE (1 VIEW)   Final Result      Left internal jugular catheter placement with the tip projecting over the superior vena cava. No pneumothorax is  identified.      CT-HEAD W/O   Final Result      1.  Stable bilateral subdural hematomas. No significant midline shift.      2.  Stable left frontal subarachnoid hemorrhage.         DX-CHEST-LIMITED (1 VIEW)   Final Result      Right internal jugular catheter placement with the tip projecting over the superior vena cava. No pneumothorax is identified.      DX-CHEST-PORTABLE (1 VIEW)   Final Result         1.  Hazy left pulmonary opacities suggests subtle infiltrate      CT-HEAD W/O   Final Result      Bilateral subdural hematomas measure up to 8.3 mm on the left and 5 mm on the right.      Left frontal subarachnoid blood is also seen.      No midline shift.      Soft tissue swelling of the right cheek.      Findings discussed with Dr Florence      DX-CHEST-PORTABLE (1 VIEW)   Final Result      No acute cardiopulmonary process is seen.           Assessment/Plan  * SDH (subdural hematoma) (HCC)- (present on admission)  Assessment & Plan  Acute to subacute with history of recent falls    Patient evaluated by neurosurgery with nonsurgical treatment recommended  Continue Keppra per neurology recommendations    Alcohol withdrawal syndrome with complication (HCC)- (present on admission)  Assessment & Plan  Alcohol withdrawal resolved    Patient counseled on cessation    Protein-calorie malnutrition, severe (HCC)- (present on admission)  Assessment & Plan  Continue supportive care  SLP, RD and nutrition following  Passed FEEs 4/20    Transaminitis- (present on admission)  Assessment & Plan  Echogenic liver ultrasound like alcoholic hepatitis    Counseled on alcohol cessation  Monitor LFTs    Electrolyte disturbance- (present on admission)  Assessment & Plan  Hypomagnesemia  Hypophosphatemia    Replete and monitor    Thrombocytopenia (HCC)- (present on admission)  Assessment & Plan  Possibly related to her alcoholism  Monitor CBC    Acute cystitis without hematuria- (present on admission)  Assessment & Plan  Completed Bactrim  course on 4/20/2021    GI bleed  Assessment & Plan  Continue Prilosec  Clinically resolved  Monitor H&H  Evaluated by Dr. Acuna earlier during this admission reviewed consultation notes    Needed one pRBC 4/21 AM - responded appropriately   At this point, will monitor  If continued to have a further drop, will re-consult GI.     Shock (HCC)  Assessment & Plan  Weaned off norepinephrine  Currently on midodrine monitor blood pressure off pressors  BP has been stable; given over net +10L since admission, IV furosemide 1 dose provided 4/21  To start furosemide oral     Acute respiratory failure (HCC)  Assessment & Plan  Extubated on 4/18/2021  Aspiration precautions  RT protocol  Wean off oxygen as tolerated    Cardiac arrest (HCC)  Assessment & Plan  Possibly secondary to seizure  Patient evaluated by cardiology and noted to have right atrial mass    Case Discussed with Cardiology On call Dr. Malave about prior recommendation of cardiac MRI once clinically stable - advised to follow up as outpatient.     QT prolongation- (present on admission)  Assessment & Plan  Avoid QT prolonging agents    Right atrial mass- (present on admission)  Assessment & Plan  Evaluated by cardiology, Case Discussed with Cardiology On call Dr. Malave about prior recommendation of cardiac MRI once clinically stable - advised to follow up as outpatient.        VTE prophylaxis: SCD

## 2021-04-22 NOTE — THERAPY
Physical Therapy   Daily Treatment     Patient Name: Nathalie Aguilar  Age:  60 y.o., Sex:  female  Medical Record #: [id number]  Today's Date: 4/22/2021     Precautions: Fall Risk, Swallow Precautions ( See Comments)    Assessment    Pt with significant improvements since last treatment session. Now able to demonstrate ambulation in room. Very ataxic with poor FWW management, tends to ambulate quickly with kyphotic posture and lateral sway. Max cues for proper positioning. Pt did require extended time on toilet. Continue to recommend placement per current function, however is progressing quickly. Acute PT to follow.    Plan    Continue current treatment plan.    DC Equipment Recommendations: Unable to determine at this time  Discharge Recommendations: Recommend post-acute placement for additional physical therapy services prior to discharge home           Objective       04/22/21 0935   Cognition    Level of Consciousness Alert   Comments agreeable and participatory   Balance   Sitting Balance (Static) Fair   Sitting Balance (Dynamic) Fair   Standing Balance (Static) Fair -   Standing Balance (Dynamic) Poor +   Weight Shift Sitting Fair   Weight Shift Standing Fair   Comments FWW   Gait Analysis   Gait Level Of Assist Minimal Assist   Assistive Device Front Wheel Walker   Distance (Feet) 25   # of Times Distance was Traveled 2   Deviation Ataxic;Bradykinetic;Shuffled Gait   Weight Bearing Status no restrictions   Bed Mobility    Supine to Sit Supervised   Sit to Supine Minimal Assist   Scooting Supervised   Functional Mobility   Sit to Stand Minimal Assist   Bed, Chair, Wheelchair Transfer Minimal Assist   Short Term Goals    Short Term Goal # 1 Pt will perform supine <> sit with SPV in 6 visits to get in/out of bed   Goal Outcome # 1 goal not met   Short Term Goal # 2 Pt will perform functional transfers with SPV in 6 visits to increase independence   Goal Outcome # 2 Goal not met   Short Term Goal # 3 Pt will  ambulate 150ft with FWW and SPV in 6 visits to increase independence   Goal Outcome # 3 Goal not met

## 2021-04-22 NOTE — CARE PLAN
Problem: Safety  Goal: Will remain free from falls  Outcome: PROGRESSING AS EXPECTED  Note: Patient A+Ox4, verbalizes understanding of calling for assistance before getting out of bed. Bed locked and in low position, bed alarm on, call light within reach. Non slid socks on patient. Patient demonstrates appropriate use of call light.       Problem: Skin Integrity  Goal: Risk for impaired skin integrity will decrease  Outcome: PROGRESSING AS EXPECTED  Note: Patient with redness to sacrum and groin. Barrier cream and mepilex used, waffle cushion in place. Patient educated on risk for skin breakdown, verbalizes understanding. Patient turns self with ease.

## 2021-04-23 LAB
ANISOCYTOSIS BLD QL SMEAR: ABNORMAL
BASOPHILS # BLD AUTO: 0.2 % (ref 0–1.8)
BASOPHILS # BLD: 0.01 K/UL (ref 0–0.12)
COMMENT 1642: NORMAL
EOSINOPHIL # BLD AUTO: 0.02 K/UL (ref 0–0.51)
EOSINOPHIL NFR BLD: 0.4 % (ref 0–6.9)
ERYTHROCYTE [DISTWIDTH] IN BLOOD BY AUTOMATED COUNT: 92.3 FL (ref 35.9–50)
HCT VFR BLD AUTO: 27 % (ref 37–47)
HGB BLD-MCNC: 8.7 G/DL (ref 12–16)
IMM GRANULOCYTES # BLD AUTO: 0.04 K/UL (ref 0–0.11)
IMM GRANULOCYTES NFR BLD AUTO: 0.7 % (ref 0–0.9)
LYMPHOCYTES # BLD AUTO: 0.62 K/UL (ref 1–4.8)
LYMPHOCYTES NFR BLD: 11.6 % (ref 22–41)
MACROCYTES BLD QL SMEAR: ABNORMAL
MAGNESIUM SERPL-MCNC: 1.2 MG/DL (ref 1.5–2.5)
MCH RBC QN AUTO: 33.2 PG (ref 27–33)
MCHC RBC AUTO-ENTMCNC: 32.2 G/DL (ref 33.6–35)
MCV RBC AUTO: 103.1 FL (ref 81.4–97.8)
MICROCYTES BLD QL SMEAR: ABNORMAL
MONOCYTES # BLD AUTO: 0.37 K/UL (ref 0–0.85)
MONOCYTES NFR BLD AUTO: 6.9 % (ref 0–13.4)
MORPHOLOGY BLD-IMP: NORMAL
NEUTROPHILS # BLD AUTO: 4.29 K/UL (ref 2–7.15)
NEUTROPHILS NFR BLD: 80.2 % (ref 44–72)
NRBC # BLD AUTO: 0.03 K/UL
NRBC BLD-RTO: 0.6 /100 WBC
OVALOCYTES BLD QL SMEAR: NORMAL
PHOSPHATE SERPL-MCNC: 3.5 MG/DL (ref 2.5–4.5)
PLATELET # BLD AUTO: 268 K/UL (ref 164–446)
PLATELET BLD QL SMEAR: NORMAL
PMV BLD AUTO: 10.7 FL (ref 9–12.9)
POIKILOCYTOSIS BLD QL SMEAR: NORMAL
POLYCHROMASIA BLD QL SMEAR: NORMAL
RBC # BLD AUTO: 2.62 M/UL (ref 4.2–5.4)
RBC BLD AUTO: PRESENT
WBC # BLD AUTO: 5.4 K/UL (ref 4.8–10.8)

## 2021-04-23 PROCEDURE — 99232 SBSQ HOSP IP/OBS MODERATE 35: CPT | Performed by: STUDENT IN AN ORGANIZED HEALTH CARE EDUCATION/TRAINING PROGRAM

## 2021-04-23 PROCEDURE — A9270 NON-COVERED ITEM OR SERVICE: HCPCS | Performed by: STUDENT IN AN ORGANIZED HEALTH CARE EDUCATION/TRAINING PROGRAM

## 2021-04-23 PROCEDURE — 83735 ASSAY OF MAGNESIUM: CPT

## 2021-04-23 PROCEDURE — 770006 HCHG ROOM/CARE - MED/SURG/GYN SEMI*

## 2021-04-23 PROCEDURE — 92526 ORAL FUNCTION THERAPY: CPT

## 2021-04-23 PROCEDURE — 700102 HCHG RX REV CODE 250 W/ 637 OVERRIDE(OP): Performed by: INTERNAL MEDICINE

## 2021-04-23 PROCEDURE — 84100 ASSAY OF PHOSPHORUS: CPT

## 2021-04-23 PROCEDURE — 700102 HCHG RX REV CODE 250 W/ 637 OVERRIDE(OP): Performed by: HOSPITALIST

## 2021-04-23 PROCEDURE — 700102 HCHG RX REV CODE 250 W/ 637 OVERRIDE(OP): Performed by: PHYSICAL MEDICINE & REHABILITATION

## 2021-04-23 PROCEDURE — 700111 HCHG RX REV CODE 636 W/ 250 OVERRIDE (IP): Performed by: STUDENT IN AN ORGANIZED HEALTH CARE EDUCATION/TRAINING PROGRAM

## 2021-04-23 PROCEDURE — A9270 NON-COVERED ITEM OR SERVICE: HCPCS | Performed by: NURSE PRACTITIONER

## 2021-04-23 PROCEDURE — 85025 COMPLETE CBC W/AUTO DIFF WBC: CPT

## 2021-04-23 PROCEDURE — A9270 NON-COVERED ITEM OR SERVICE: HCPCS | Performed by: PHYSICAL MEDICINE & REHABILITATION

## 2021-04-23 PROCEDURE — A9270 NON-COVERED ITEM OR SERVICE: HCPCS | Performed by: INTERNAL MEDICINE

## 2021-04-23 PROCEDURE — A9270 NON-COVERED ITEM OR SERVICE: HCPCS | Performed by: HOSPITALIST

## 2021-04-23 PROCEDURE — 700102 HCHG RX REV CODE 250 W/ 637 OVERRIDE(OP): Performed by: NURSE PRACTITIONER

## 2021-04-23 PROCEDURE — 700102 HCHG RX REV CODE 250 W/ 637 OVERRIDE(OP): Performed by: STUDENT IN AN ORGANIZED HEALTH CARE EDUCATION/TRAINING PROGRAM

## 2021-04-23 PROCEDURE — 36415 COLL VENOUS BLD VENIPUNCTURE: CPT

## 2021-04-23 RX ORDER — MAGNESIUM SULFATE HEPTAHYDRATE 40 MG/ML
2 INJECTION, SOLUTION INTRAVENOUS ONCE
Status: COMPLETED | OUTPATIENT
Start: 2021-04-23 | End: 2021-04-23

## 2021-04-23 RX ADMIN — MIDODRINE HYDROCHLORIDE 5 MG: 5 TABLET ORAL at 11:16

## 2021-04-23 RX ADMIN — MAGNESIUM GLUCONATE 500 MG ORAL TABLET 400 MG: 500 TABLET ORAL at 05:00

## 2021-04-23 RX ADMIN — LEVETIRACETAM 1000 MG: 500 TABLET ORAL at 17:24

## 2021-04-23 RX ADMIN — FUROSEMIDE 20 MG: 20 TABLET ORAL at 05:00

## 2021-04-23 RX ADMIN — MAGNESIUM SULFATE 2 G: 2 INJECTION INTRAVENOUS at 17:25

## 2021-04-23 RX ADMIN — Medication 5 MG: at 20:17

## 2021-04-23 RX ADMIN — MIDODRINE HYDROCHLORIDE 5 MG: 5 TABLET ORAL at 17:24

## 2021-04-23 RX ADMIN — MIDODRINE HYDROCHLORIDE 5 MG: 5 TABLET ORAL at 05:00

## 2021-04-23 RX ADMIN — MAGNESIUM GLUCONATE 500 MG ORAL TABLET 400 MG: 500 TABLET ORAL at 17:24

## 2021-04-23 RX ADMIN — MIRTAZAPINE 15 MG: 15 TABLET, FILM COATED ORAL at 20:17

## 2021-04-23 RX ADMIN — LEVETIRACETAM 1000 MG: 500 TABLET ORAL at 05:00

## 2021-04-23 ASSESSMENT — ENCOUNTER SYMPTOMS
SHORTNESS OF BREATH: 0
ABDOMINAL PAIN: 1
CHILLS: 0
NAUSEA: 1
FEVER: 0
PALPITATIONS: 0
WEAKNESS: 1
DIARRHEA: 1

## 2021-04-23 NOTE — DISCHARGE PLANNING
Anticipated Discharge Disposition:   [facility]    Action:    Pt admitted with subdural hematoma, alcohol withdrawal, protein-calorie malnutrition, severe, GI bleed, shock, acute respiratory failure, receiving O2 3 LNC, cardiac arrest, right atrial mass.    Physatrist recommending acute rehab.    RN PATRICIA met with patient at bedside.  Pt stated she rents a room in [place] that has a bathroom.  She shares a kitchen with others.  She doesn't work and has had a relapse with drinking hard liquor.  She stated [facility]  She smokes cigarettes.  Her mother supports her financially.  Her daughter Kerline lives in their old home and is the owner of the home now.  Kerline has two small boys and lives in [place].  Pt aware of [facility] and agreeable.    Barriers to Discharge:    Insurance authorization    Plan:    F/U with [facility].    Care Transition Team Assessment    Information Source  Orientation : Unable to Assess  Information Given By: Patient  Who is responsible for making decisions for patient? : Patient    Readmission Evaluation  Is this a readmission?: No    Elopement Risk  Legal Hold: No  Ambulatory or Self Mobile in Wheelchair: Yes  Disoriented: No  Psychiatric Symptoms: None  History of Wandering: No  Elopement this Admit: No  Vocalizing Wanting to Leave: No  Displays Behaviors, Body Language Wanting to Leave: No-Not at Risk for Elopement  Elopement Risk: Not at Risk for Elopement    Interdisciplinary Discharge Planning  Lives with - Patient's Self Care Capacity: Alone and Able to Care For Self  Patient or legal guardian wants to designate a caregiver: No  Housing / Facility: 1 Story Apartment / Condo  Prior Services: Home-Independent    Discharge Preparedness  What is your plan after discharge?: Other (comment)  What are your discharge supports?: Child  Prior Functional Level: Ambulatory, Independent  with Activities of Daily Living, Independent with Medication Management  Difficulity with ADLs: Bathing, Dressing, Eating, Toileting, Walking  Difficulity with IADLs: Cooking, Driving, Laundry, Managing medication, Shopping    Functional Assesment  Prior Functional Level: Ambulatory, Independent with Activities of Daily Living, Independent with Medication Management    Finances  Financial Barriers to Discharge: No  Prescription Coverage: Yes              Advance Directive  Advance Directive?: None    Domestic Abuse  Have you ever been the victim of abuse or violence?: Not Sure  Physical Abuse or Sexual Abuse: Unable to Assess due to Patient Condition  Verbal Abuse or Emotional Abuse: Unable to Assess due to Patient Condition  Possible Abuse/Neglect Reported to:: Not Applicable    Psychological Assessment  History of Substance Abuse: Alcohol    Discharge Risks or Barriers  Discharge risks or barriers?: Uninsured / underinsured  Patient risk factors: Uninsured or underinsured    Anticipated Discharge Information  Discharge Disposition: D/T to IP rehab facility w/planned hosp IP readmit (90)  Discharge Contact Phone Number: [phone]

## 2021-04-23 NOTE — PROGRESS NOTES
Report received from BRITNEY Liu. Dx, medications, O2 needs, and poc reviewed. Safety precautions in place and pt has no sign of distress or acute needs at this time. Care fully assumed. Will continue to monitor.

## 2021-04-23 NOTE — PROGRESS NOTES
Hospital Medicine Daily Progress Note    Date of Service  4/23/2021    Chief Complaint  60 y.o. female admitted 4/13/2021 with altered level of consciousness and syncope    Hospital Course  60-year-old female with history of alcohol and heroin dependence who had a syncopal episode and hit her head during her fall was transferred to the emergency department where head CT revealed acute to subacute bilateral subdural hematomas metabolic acidosis.  Patient was admitted to the intensive care unit and evaluated by neurosurgery.  She had a possible seizure episode shortly after admission and went into PEA cardiac arrest for which she received CPR with ROSC within 5 minutes.  Patient was intubated she was on pressors.  Echocardiogram revealed right atrial mass she was evaluated by cardiology who recommended cardiac MRI when patient is more clinically stable.  The patient had GI bleeding and was [facility] recommendation for conservative management and deferring EGD and endoscopy to when she is more clinically stable unless she has signs of brisk bleeding.  The patient tolerated extubation on 4/18/2021.  She was weaned off norepinephrine the morning of 4/19/2021.    Care was transferred to Hospitalist Services and to  Neuro on 4/19.    4/20: Vitals afebrile. BP soft. Pt pulled out Cortrek - no longer wanted it. SLP eval - passed FEEs; hence, started diet. Na improved to 134. She does not remember most of her ICU course and all the things that she went through. I summarized a brief hospital course for her and current treatment plan.     4/21: Vitals wnl - BP improving; afebrile. IV Lasix 1x provided. On 3L O2. Hb 6.7 (1 pRBC provided) - repeat 8.6 (no signs of active bleeding, stable vitals). Mg supplemented. PM&R Dr. Darwin stanford appreciated - appropriate for IPR. CDW Cardiology oncall Dr. Malave about prior recommendation of cardiac MRI once clinically stable - advised to follow up as outpatient.  She did have an episode of hypoxia in PM (unclear cause) - EKG done and CXR done - grossly unremarkable.      4/22  Vitals wnl though a bit soft BP afebrile. Appetite is still poor without her dentures (daughter cannot find it). Plan to continue to monitor her Hb and if continued to be stable then, we may be able to defer GI procedures and she can head to rehab. K supplemented. Na improved to 136. PM&R and SLP follow up appreciated   Palliative care follow up appreciated - Pt can now participate in goals of care discussion - agreed for DNR/DNI. POLST  signed.     Interval Problem Update  4/23:  Vitals reviewed; afebrile.  The rest of the vitals within normal parameters.  Pain scale reported - none    At bedside when seen alongside Daughter Kerline, reports doing well but still being bothered by incontinence. Partly due to unable to get to bathroom and frequent urination. I explained current plan with diuretics to remove extra fluid.    Labs reviewed  Mg 1.2 (will supplement IV)  Hb  8.6 > 7.9 > 9.3 > 8.7      Consultants/Specialty  Neurosurgery Dr. Leos  Neurology  Cardiology  Critical care  GI Dr. Acuna  PM&R Dr. Granados     Code Status  DNAR/DNI    Disposition  Pending DC plan to IPR 4/23 (if H&H continues to be stable)    Discussed with patient, patient's nurse and with multidisciplinary team during rounds including , pharmacist and charge nurse.      I have performed the physical examination, and reviewed updated ROS and plan today 4/22/2021.  In review of yesterday's note, there are no new changes except as documented above or bolded below.       Review of Systems  Review of Systems   Constitutional: Positive for malaise/fatigue. Negative for chills and fever.   Respiratory: Negative for shortness of breath.    Cardiovascular: Positive for leg swelling. Negative for chest pain and palpitations.   Gastrointestinal: Positive for abdominal pain, diarrhea and nausea.   Neurological: Positive for  weakness.   All other systems reviewed and are negative.       Physical Exam  Temp:  [36.1 °C (97 °F)-36.7 °C (98.1 °F)] 36.2 °C (97.2 °F)  Pulse:  [] 89  Resp:  [16-17] 17  BP: ()/(61-68) 107/68  SpO2:  [95 %-99 %] 95 %    Physical Exam  Vitals and nursing note reviewed.   Constitutional:       General: She is not in acute distress.  HENT:      Head: Normocephalic and atraumatic.      Nose: No rhinorrhea.   Eyes:      General: No scleral icterus.     Comments: Pale conjunctiva    Cardiovascular:      Rate and Rhythm: Normal rate and regular rhythm.      Heart sounds: Normal heart sounds. No murmur. No friction rub. No gallop.    Pulmonary:      Effort: Pulmonary effort is normal. No respiratory distress.      Breath sounds: No wheezing or rales.   Abdominal:      General: Bowel sounds are normal. There is distension.      Palpations: Abdomen is soft. There is no mass.      Tenderness: There is no abdominal tenderness. There is no rebound.   Musculoskeletal:         General: Swelling present. No tenderness.      Cervical back: Neck supple. No rigidity.   Skin:     General: Skin is warm and dry.      Capillary Refill: Capillary refill takes less than 2 seconds.      Coloration: Skin is not cyanotic or jaundiced.      Findings: Bruising present.      Nails: There is no clubbing.   Neurological:      General: No focal deficit present.      Mental Status: She is alert.      Cranial Nerves: No cranial nerve deficit.      Motor: Weakness (Generalized) present.   Psychiatric:         Mood and Affect: Mood normal.         Behavior: Behavior normal.         Fluids  No intake or output data in the 24 hours ending 04/23/21 1353    Laboratory  Recent Labs     04/21/21  0305 04/21/21  0929 04/22/21  0413 04/22/21  1414 04/23/21  0730   WBC 5.5  --  5.5  --  5.4   RBC 1.86*  --  2.38*  --  2.62*   HEMOGLOBIN 6.7*   < > 7.9* 9.3* 8.7*   HEMATOCRIT 20.9*   < > 24.6* 29.1* 27.0*   .4*  --  103.4*  --  103.1*   MCH  36.0*  --  33.2*  --  33.2*   MCHC 32.1*  --  32.1*  --  32.2*   RDW 78.6*  --  96.0*  --  92.3*   PLATELETCT 161*  --  232  --  268   MPV 10.7  --  10.3  --  10.7    < > = values in this interval not displayed.     Recent Labs     04/21/21  0305 04/22/21  0413   SODIUM 129* 136   POTASSIUM 3.6 3.4*   CHLORIDE 98 102   CO2 24 23   GLUCOSE 96 73   BUN 10 6*   CREATININE <0.17* <0.17*   CALCIUM 7.4* 7.5*                   Imaging  DX-CHEST-PORTABLE (1 VIEW)   Final Result      1.  Resolution of left lower lobe atelectasis or pneumonia.      2.  Persistent diffuse interstitial opacities which represent edema, pneumonia, or underlying interstitial fibrotic change.      DX-CHEST-PORTABLE (1 VIEW)   Final Result      Interval increase in interstitial and hazy opacities throughout the left lung which could be related to worsening infection rather than asymmetric edema.      Removal of endotracheal tube.      DX-ABDOMEN FOR TUBE PLACEMENT   Final Result      Feeding tube tip projects over the distal stomach.      MR-BRAIN-W/O   Final Result      1.  BILATERAL holohemispheric supratentorial subdural hematomas appear slightly larger than on the recent prior CT.   2.  Trace LEFT frontal vertex subarachnoid blood, unchanged   3.  Trace intraventricular blood without hydrocephalus   4.  Atrophy   5.  Mild white matter changes      US-EXTREMITY VENOUS LOWER BILAT   Final Result      DX-CHEST-PORTABLE (1 VIEW)   Final Result         1.  Hazy left pulmonary opacities suggests subtle infiltrate, similar to prior study.      DX-CHEST-PORTABLE (1 VIEW)   Final Result         1.  Hazy left pulmonary opacities suggests subtle infiltrate, similar to prior study.      US-EXTREMITY VENOUS LOWER BILAT   Final Result      EC-ECHOCARDIOGRAM COMPLETE W/O CONT   Final Result      US-RUQ   Final Result      1.  Enlarged echogenic liver suggesting fatty infiltration.   2.  Prior cholecystectomy.   3.  No biliary dilation.   4.  Trace ascites,  nonspecific.         DX-CHEST-PORTABLE (1 VIEW)   Final Result      Left internal jugular catheter placement with the tip projecting over the superior vena cava. No pneumothorax is identified.      CT-HEAD W/O   Final Result      1.  Stable bilateral subdural hematomas. No significant midline shift.      2.  Stable left frontal subarachnoid hemorrhage.         DX-CHEST-LIMITED (1 VIEW)   Final Result      Right internal jugular catheter placement with the tip projecting over the superior vena cava. No pneumothorax is identified.      DX-CHEST-PORTABLE (1 VIEW)   Final Result         1.  Hazy left pulmonary opacities suggests subtle infiltrate      CT-HEAD W/O   Final Result      Bilateral subdural hematomas measure up to 8.3 mm on the left and 5 mm on the right.      Left frontal subarachnoid blood is also seen.      No midline shift.      Soft tissue swelling of the right cheek.      Findings discussed with Dr Florence      DX-CHEST-PORTABLE (1 VIEW)   Final Result      No acute cardiopulmonary process is seen.           Assessment/Plan  * SDH (subdural hematoma) (HCC)- (present on admission)  Assessment & Plan  Acute to subacute with history of recent falls    Patient evaluated by neurosurgery with nonsurgical treatment recommended  Continue Keppra per neurology recommendations    Alcohol withdrawal syndrome with complication (HCC)- (present on admission)  Assessment & Plan  Alcohol withdrawal resolved    Patient counseled on cessation    Protein-calorie malnutrition, severe (HCC)- (present on admission)  Assessment & Plan  Continue supportive care  SLP, RD and nutrition following  Passed FEEs 4/20    Transaminitis- (present on admission)  Assessment & Plan  Echogenic liver ultrasound like alcoholic hepatitis    Counseled on alcohol cessation  Monitor LFTs    Electrolyte disturbance- (present on admission)  Assessment & Plan  Hypomagnesemia  Hypophosphatemia    Replete and monitor    Thrombocytopenia (HCC)- (present  on admission)  Assessment & Plan  Possibly related to her alcoholism  Monitor CBC    Acute cystitis without hematuria- (present on admission)  Assessment & Plan  Completed Bactrim course on 4/20/2021    GI bleed  Assessment & Plan  Continue Prilosec  Clinically resolved  Monitor H&H  Evaluated by Dr. Acuna earlier during this admission reviewed consultation notes    Needed one pRBC 4/21 AM - responded appropriately   At this point, will monitor  If continued to have a further drop, will re-consult GI.     Shock (HCC)  Assessment & Plan  Weaned off norepinephrine  Currently on midodrine monitor blood pressure off pressors  BP has been stable; given over net +10L since admission, IV furosemide 1 dose provided 4/21  Start furosemide oral twice daily 4/23    Acute respiratory failure (HCC)  Assessment & Plan  Extubated on 4/18/2021  Aspiration precautions  RT protocol  Wean off oxygen as tolerated    Cardiac arrest (HCC)  Assessment & Plan  Possibly secondary to seizure  Patient evaluated by cardiology and noted to have right atrial mass    Case Discussed with Cardiology On call Dr. Malave about prior recommendation of cardiac MRI once clinically stable - advised to follow up as outpatient.     QT prolongation- (present on admission)  Assessment & Plan  Avoid QT prolonging agents    Advanced care planning/counseling discussion- (present on admission)  Assessment & Plan  Palliative care team involvement appreciated  Pt was made DNR/ I OK by daughter in ICU  Pt can now participate in goals of care discussion - agreed for DNR/DNI  POLST signed 4/22     Right atrial mass- (present on admission)  Assessment & Plan  Evaluated by cardiology, Case Discussed with Cardiology On call Dr. Malave about prior recommendation of cardiac MRI once clinically stable - advised to follow up as outpatient.        VTE prophylaxis: SCD

## 2021-04-23 NOTE — DISCHARGE PLANNING
Insurance has authorized.  Dr. Fuentes has medically cleared.  Will discuss this case with the Admissions Team this am.  Anticipate an admission pending Physiatry acceptance/bed availability.    1010-Anticipate an admission on Monday.  Would appreciate updated PT/OT evals on Sunday.  Msg placed to Dr. Fuentes & Junie GOMEZ.  I do apologize for the delay.

## 2021-04-23 NOTE — CARE PLAN
Problem: Safety  Goal: Will remain free from falls  Outcome: PROGRESSING AS EXPECTED  Note: Fall precaution in place & uses call light for assistance.     Problem: Venous Thromboembolism (VTW)/Deep Vein Thrombosis (DVT) Prevention:  Goal: Patient will participate in Venous Thrombosis (VTE)/Deep Vein Thrombosis (DVT)Prevention Measures  Outcome: PROGRESSING SLOWER THAN EXPECTED  Flowsheets (Taken 4/22/2021 2210)  SCDs, Sequential Compression Device: Refused  Note: Refused SCD,educated about the importance SCD in preventing DVT but still refuse it.

## 2021-04-23 NOTE — THERAPY
"Speech Language Pathology  Daily Treatment     Patient Name: Nathalie Aguilar  Age:  60 y.o., Sex:  female  Medical Record #: [id number]  Today's Date: 4/23/2021     Precautions  Precautions: (P) Fall Risk, Swallow Precautions ( See Comments)    Assessment    Pt with decreased intake of her lunch meal related to dislike of thickened liquids. Pt moved up in bed with CNA. Pt orientated to place and date without cues. Pt educ regarding 3 sec oral hold with dble swallow as a strategy during tx for thins. Pt given 6-1/3 tsp amounts, using a spoon, of thin water. Pt with immediate and sustained coughing on 4/6 of the trials. Pt educ regarding high risks for aspirating thins with thickened liquids cont to be recommended. Pt verbalized understanding but declined any thickened Boost when offered. Probable d/c to rehab early next week per pt and RN. Pt may benefit from diagnostic early next wk. Consider MBS as approp.     Plan  PU4/MT2, consider MBS early next week as approp.   Continue current treatment plan.    Discharge Recommendations: (P) Recommend post-acute placement for additional speech therapy services prior to discharge home       04/23/21 1331   Voice   Comments Mild hoarse quality   Dysphagia    Dysphagia X   Other Treatments trialed 3 second oral hold with thin water from the spoon.   Diet / Liquid Recommendation Tiara (4)   Nutritional Liquid Intake Rating Scale Thickened beverages (mildly thick unless otherwise specified)   Nutritional Food Intake Rating Scale Total oral diet of a single consistency   Nursing Communication Swallow Precaution Sign Posted at Head of Bed   Skilled Intervention Compensatory Strategies;Verbal Cueing   Recommended Route of Medication Administration   Medication Administration  Float Whole with Puree   Patient / Family Goals   Patient / Family Goal #1 \"I am so thirsty\"   Goal #1 Outcome Progressing slower than expected   Short Term Goals   Short Term Goal # 1 B  Revised 4/22: " Patient will consume meals of pureed solids and mildly thickened liquids with no s/sx of aspiration given monitoring during meals.   Goal Outcome  # 1 B Progressing slower than expected   Session Information   Priority 3  (5x,ETOH/SDH's/cardiac arrest/CPR,intub,PU4/MT2, seenotes)

## 2021-04-23 NOTE — DIETARY
Nutrition Services: Update   Day 10 of admit. Nathalie Aguilar is a 60 y.o. female with admitting DX of SDH (subdural hematoma).     Pt is currently on pureed diet with mildly thick liquids and THICK Boost Plus TID. PO 25-75% x 3 meals (50-75% x 2) per flow sheet since 4/20. Pt reports she is not drinking Boost Plus since she does not like the thickener. Pt is agreeable to Magic Cup BID. Appetite is currently low due to diet texture and liquid consistency. SLP saw pt today and suggested MBS possibly next week.     Pt reports gaining weight recently due to fluid accumulation. Pt unable to specify usual body weight as she stated she tries not to look at the scale. Pt stated she feels as if she has lost muscle. Prior to admission, pt reports eating the same as usual.     Nutrition focused physical exam performed. Pt noted with hollowing at the temples, sunken eyes, slight protruding clavicle, squaring of shoulder, thinning of bicep, depression in hand, squared knee, and posterior calf had excess skin and was swollen.     Wt 4/17: 67.2 kg (148 lb 2.4 oz) via bed scale.     Malnutrition Risk: Pt with severe malnutrition in the context of chronic illness related to ETOH and heroin dependence as evidenced by severe muscle loss and severe fat loss.     Recommendations/Plan:  1. Magic Cup BID.    2. Encourage intake of meals/supplements.  3. Document intake of all meals/supplements as % taken in ADL's to provide interdisciplinary communication across all shifts.   4. Monitor weight.  5. Nutrition rep will continue to see patient for ongoing meal and snack preferences.    RD following.

## 2021-04-24 PROBLEM — R07.89 CHEST WALL DISCOMFORT: Status: ACTIVE | Noted: 2021-04-24

## 2021-04-24 PROCEDURE — A9270 NON-COVERED ITEM OR SERVICE: HCPCS | Performed by: PHYSICAL MEDICINE & REHABILITATION

## 2021-04-24 PROCEDURE — 770006 HCHG ROOM/CARE - MED/SURG/GYN SEMI*

## 2021-04-24 PROCEDURE — 700102 HCHG RX REV CODE 250 W/ 637 OVERRIDE(OP): Performed by: INTERNAL MEDICINE

## 2021-04-24 PROCEDURE — A9270 NON-COVERED ITEM OR SERVICE: HCPCS | Performed by: NURSE PRACTITIONER

## 2021-04-24 PROCEDURE — 700102 HCHG RX REV CODE 250 W/ 637 OVERRIDE(OP): Performed by: PHYSICAL MEDICINE & REHABILITATION

## 2021-04-24 PROCEDURE — A9270 NON-COVERED ITEM OR SERVICE: HCPCS | Performed by: HOSPITALIST

## 2021-04-24 PROCEDURE — 700102 HCHG RX REV CODE 250 W/ 637 OVERRIDE(OP): Performed by: NURSE PRACTITIONER

## 2021-04-24 PROCEDURE — 93005 ELECTROCARDIOGRAM TRACING: CPT | Performed by: STUDENT IN AN ORGANIZED HEALTH CARE EDUCATION/TRAINING PROGRAM

## 2021-04-24 PROCEDURE — A9270 NON-COVERED ITEM OR SERVICE: HCPCS | Performed by: INTERNAL MEDICINE

## 2021-04-24 PROCEDURE — A9270 NON-COVERED ITEM OR SERVICE: HCPCS | Performed by: STUDENT IN AN ORGANIZED HEALTH CARE EDUCATION/TRAINING PROGRAM

## 2021-04-24 PROCEDURE — 700102 HCHG RX REV CODE 250 W/ 637 OVERRIDE(OP): Performed by: HOSPITALIST

## 2021-04-24 PROCEDURE — 700102 HCHG RX REV CODE 250 W/ 637 OVERRIDE(OP): Performed by: STUDENT IN AN ORGANIZED HEALTH CARE EDUCATION/TRAINING PROGRAM

## 2021-04-24 PROCEDURE — 99232 SBSQ HOSP IP/OBS MODERATE 35: CPT | Performed by: STUDENT IN AN ORGANIZED HEALTH CARE EDUCATION/TRAINING PROGRAM

## 2021-04-24 RX ADMIN — MIDODRINE HYDROCHLORIDE 5 MG: 5 TABLET ORAL at 05:15

## 2021-04-24 RX ADMIN — MIDODRINE HYDROCHLORIDE 5 MG: 5 TABLET ORAL at 12:39

## 2021-04-24 RX ADMIN — MIRTAZAPINE 15 MG: 15 TABLET, FILM COATED ORAL at 21:34

## 2021-04-24 RX ADMIN — Medication 5 MG: at 21:34

## 2021-04-24 RX ADMIN — LEVETIRACETAM 1000 MG: 500 TABLET ORAL at 17:16

## 2021-04-24 RX ADMIN — MIDODRINE HYDROCHLORIDE 5 MG: 5 TABLET ORAL at 17:16

## 2021-04-24 RX ADMIN — LEVETIRACETAM 1000 MG: 500 TABLET ORAL at 05:15

## 2021-04-24 RX ADMIN — FUROSEMIDE 20 MG: 20 TABLET ORAL at 17:16

## 2021-04-24 RX ADMIN — OXYCODONE 5 MG: 5 TABLET ORAL at 14:07

## 2021-04-24 RX ADMIN — MAGNESIUM GLUCONATE 500 MG ORAL TABLET 400 MG: 500 TABLET ORAL at 17:17

## 2021-04-24 RX ADMIN — MAGNESIUM GLUCONATE 500 MG ORAL TABLET 400 MG: 500 TABLET ORAL at 05:15

## 2021-04-24 ASSESSMENT — ENCOUNTER SYMPTOMS
NAUSEA: 1
CHILLS: 0
DIARRHEA: 1
ABDOMINAL PAIN: 1
SHORTNESS OF BREATH: 0
PALPITATIONS: 0
FEVER: 0
WEAKNESS: 1

## 2021-04-24 ASSESSMENT — FIBROSIS 4 INDEX: FIB4 SCORE: 4.37

## 2021-04-24 ASSESSMENT — PAIN DESCRIPTION - PAIN TYPE
TYPE: ACUTE PAIN
TYPE: ACUTE PAIN

## 2021-04-24 NOTE — CARE PLAN
Problem: Safety  Goal: Will remain free from falls  Outcome: PROGRESSING AS EXPECTED  Note: Fall precautions in place.     Problem: Discharge Barriers/Planning  Goal: Patient's continuum of care needs will be met  Outcome: PROGRESSING AS EXPECTED  Note: Plan d/c to rehab on Monday.     Problem: Skin Integrity  Goal: Risk for impaired skin integrity will decrease  Outcome: PROGRESSING SLOWER THAN EXPECTED  Note: Barrier paste to groin,minimize incontinence by going bathroom.

## 2021-04-24 NOTE — PROGRESS NOTES
Report given to BRITNEY Liu. Dx, medications, O2 needs, and poc reviewed. Pt has no signs of distress or complaints at this time. Pt has no acute needs at this time. Care fully relinquished.

## 2021-04-24 NOTE — PROGRESS NOTES
Hospital Medicine Daily Progress Note    Date of Service  4/24/2021    Chief Complaint  60 y.o. female admitted 4/13/2021 with altered level of consciousness and syncope    Hospital Course  60-year-old female with history of alcohol and heroin dependence who had a syncopal episode and hit her head during her fall was transferred to the emergency department where head CT revealed acute to subacute bilateral subdural hematomas metabolic acidosis.  Patient was admitted to the intensive care unit and evaluated by neurosurgery.  She had a possible seizure episode shortly after admission and went into PEA cardiac arrest for which she received CPR with ROSC within 5 minutes.  Patient was intubated she was on pressors.  Echocardiogram revealed right atrial mass she was evaluated by cardiology who recommended cardiac MRI when patient is more clinically stable.  The patient had GI bleeding and was evaluated by Dr. Acuna from [facility] recommendation for conservative management and deferring EGD and endoscopy to when she is more clinically stable unless she has signs of brisk bleeding.  The patient tolerated extubation on 4/18/2021.  She was weaned off norepinephrine the morning of 4/19/2021.    Care was transferred to Hospitalist Services and to  Neuro on 4/19.    4/20: Vitals afebrile. BP soft. Pt pulled out Cortrek - no longer wanted it. SLP eval - passed FEEs; hence, started diet. Na improved to 134. She does not remember most of her ICU course and all the things that she went through. I summarized a brief hospital course for her and current treatment plan.     4/21: Vitals wnl - BP improving; afebrile. IV Lasix 1x provided. On 3L O2. Hb 6.7 (1 pRBC provided) - repeat 8.6 (no signs of active bleeding, stable vitals). Mg supplemented. PM&R Dr. Darwin stanford appreciated - appropriate for IPR. CDW Cardiology oncall Dr. Malave about prior recommendation of cardiac MRI once clinically stable - advised to follow up as outpatient.  She did have an episode of hypoxia in PM (unclear cause) - EKG done and CXR done - grossly unremarkable.      4/22:  Vitals wnl though a bit soft BP afebrile. Appetite is still poor without her dentures (daughter cannot find it). Plan to continue to monitor her Hb and if continued to be stable then, we may be able to defer GI procedures and she can head to rehab. K supplemented. Na improved to 136. PM&R and SLP follow up appreciated   Palliative care follow up appreciated - Pt can now participate in goals of care discussion - agreed for DNR/DNI. POLST  signed.     4/23: Vitals wnl; afebrile. At bedside when seen alongside Daughter Kerline, reports doing well but still being bothered by incontinence. Partly due to unable to get to bathroom and frequent urination. Pending acceptance for IPR. Mg supplemented.     Interval Problem Update  4/24:  Vitals reviewed; afebrile.  The rest of the vitals within normal parameters except soft BP.   Pain scale reported - none overnight      At bedside, reports of doing ok. In the same time, she wondered how much better she could get. We talked about her hospital course again. She is willing to try rehab to see. She was previously independent before. She then mentioned about discomfort in left chest - she cannot describe, no exacerbating or alleviating factor, no radiation. She wondered if it is from her cardiac arrest. We discussed about EKG and pain management.     Labs reviewed  Mg 1.2 (supplemented IV) - will check with next lab   Hb  8.6 > 7.9 > 9.3 > 8.7      Consultants/Specialty  Neurosurgery Dr. Leos  Neurology  Cardiology  Critical care  GI Dr. Acuna  PM&R Dr. Granados     Code Status  DNAR/DNI    Disposition  Pending DC plan to IPR 4/26 (if bed available)    Discussed with patient, patient's nurse and with multidisciplinary team during rounds including , pharmacist and charge nurse.      I have performed the physical examination, and reviewed updated ROS and  plan today 4/24/2021.  In review of yesterday's note, there are no new changes except as documented above or bolded below.    Review of Systems  Review of Systems   Constitutional: Positive for malaise/fatigue. Negative for chills and fever.   Respiratory: Negative for shortness of breath.    Cardiovascular: Positive for leg swelling. Negative for chest pain and palpitations.   Gastrointestinal: Positive for abdominal pain, diarrhea and nausea.   Neurological: Positive for weakness.   All other systems reviewed and are negative.       Physical Exam  Temp:  [36.2 °C (97.1 °F)-36.6 °C (97.9 °F)] 36.6 °C (97.9 °F)  Pulse:  [] 96  Resp:  [17-18] 17  BP: ()/(57-78) 97/57  SpO2:  [98 %-99 %] 98 %    Physical Exam  Vitals and nursing note reviewed.   Constitutional:       General: She is not in acute distress.  HENT:      Head: Normocephalic and atraumatic.      Nose: No rhinorrhea.   Eyes:      General: No scleral icterus.     Comments: Pale conjunctiva    Cardiovascular:      Rate and Rhythm: Normal rate and regular rhythm.      Heart sounds: Normal heart sounds. No murmur. No friction rub. No gallop.    Pulmonary:      Effort: Pulmonary effort is normal. No respiratory distress.      Breath sounds: No wheezing or rales.   Abdominal:      General: Bowel sounds are normal. There is distension.      Palpations: Abdomen is soft. There is no mass.      Tenderness: There is no abdominal tenderness. There is no rebound.   Musculoskeletal:         General: Swelling present. No tenderness.      Cervical back: Neck supple. No rigidity.   Skin:     General: Skin is warm and dry.      Capillary Refill: Capillary refill takes less than 2 seconds.      Coloration: Skin is not cyanotic or jaundiced.      Findings: Bruising present.      Nails: There is no clubbing.   Neurological:      General: No focal deficit present.      Mental Status: She is alert.      Cranial Nerves: No cranial nerve deficit.      Motor: Weakness  (Generalized) present.   Psychiatric:         Mood and Affect: Mood normal.         Behavior: Behavior normal.         Fluids  No intake or output data in the 24 hours ending 04/24/21 1336    Laboratory  Recent Labs     04/22/21  0413 04/22/21  1414 04/23/21  0730   WBC 5.5  --  5.4   RBC 2.38*  --  2.62*   HEMOGLOBIN 7.9* 9.3* 8.7*   HEMATOCRIT 24.6* 29.1* 27.0*   .4*  --  103.1*   MCH 33.2*  --  33.2*   MCHC 32.1*  --  32.2*   RDW 96.0*  --  92.3*   PLATELETCT 232  --  268   MPV 10.3  --  10.7     Recent Labs     04/22/21  0413   SODIUM 136   POTASSIUM 3.4*   CHLORIDE 102   CO2 23   GLUCOSE 73   BUN 6*   CREATININE <0.17*   CALCIUM 7.5*                   Imaging  DX-CHEST-PORTABLE (1 VIEW)   Final Result      1.  Resolution of left lower lobe atelectasis or pneumonia.      2.  Persistent diffuse interstitial opacities which represent edema, pneumonia, or underlying interstitial fibrotic change.      DX-CHEST-PORTABLE (1 VIEW)   Final Result      Interval increase in interstitial and hazy opacities throughout the left lung which could be related to worsening infection rather than asymmetric edema.      Removal of endotracheal tube.      DX-ABDOMEN FOR TUBE PLACEMENT   Final Result      Feeding tube tip projects over the distal stomach.      MR-BRAIN-W/O   Final Result      1.  BILATERAL holohemispheric supratentorial subdural hematomas appear slightly larger than on the recent prior CT.   2.  Trace LEFT frontal vertex subarachnoid blood, unchanged   3.  Trace intraventricular blood without hydrocephalus   4.  Atrophy   5.  Mild white matter changes      US-EXTREMITY VENOUS LOWER BILAT   Final Result      DX-CHEST-PORTABLE (1 VIEW)   Final Result         1.  Hazy left pulmonary opacities suggests subtle infiltrate, similar to prior study.      DX-CHEST-PORTABLE (1 VIEW)   Final Result         1.  Hazy left pulmonary opacities suggests subtle infiltrate, similar to prior study.      US-EXTREMITY VENOUS LOWER  BILAT   Final Result      EC-ECHOCARDIOGRAM COMPLETE W/O CONT   Final Result      US-RUQ   Final Result      1.  Enlarged echogenic liver suggesting fatty infiltration.   2.  Prior cholecystectomy.   3.  No biliary dilation.   4.  Trace ascites, nonspecific.         DX-CHEST-PORTABLE (1 VIEW)   Final Result      Left internal jugular catheter placement with the tip projecting over the superior vena cava. No pneumothorax is identified.      CT-HEAD W/O   Final Result      1.  Stable bilateral subdural hematomas. No significant midline shift.      2.  Stable left frontal subarachnoid hemorrhage.         DX-CHEST-LIMITED (1 VIEW)   Final Result      Right internal jugular catheter placement with the tip projecting over the superior vena cava. No pneumothorax is identified.      DX-CHEST-PORTABLE (1 VIEW)   Final Result         1.  Hazy left pulmonary opacities suggests subtle infiltrate      CT-HEAD W/O   Final Result      Bilateral subdural hematomas measure up to 8.3 mm on the left and 5 mm on the right.      Left frontal subarachnoid blood is also seen.      No midline shift.      Soft tissue swelling of the right cheek.      Findings discussed with Dr Florence      DX-CHEST-PORTABLE (1 VIEW)   Final Result      No acute cardiopulmonary process is seen.           Assessment/Plan  * SDH (subdural hematoma) (HCC)- (present on admission)  Assessment & Plan  Acute to subacute with history of recent falls    Patient evaluated by neurosurgery with nonsurgical treatment recommended  Continue Keppra per neurology recommendations    Alcohol withdrawal syndrome with complication (HCC)- (present on admission)  Assessment & Plan  Alcohol withdrawal resolved    Patient counseled on cessation    Protein-calorie malnutrition, severe (HCC)- (present on admission)  Assessment & Plan  Continue supportive care  SLP, RD and nutrition following  Passed FEEs 4/20    Transaminitis- (present on admission)  Assessment & Plan  Echogenic liver  ultrasound like alcoholic hepatitis    Counseled on alcohol cessation  Monitor LFTs    Electrolyte disturbance- (present on admission)  Assessment & Plan  Hypomagnesemia  Hypophosphatemia    Replete and monitor    Chest wall discomfort  Assessment & Plan  Reported 4/24 - will get EKG and pain management  Vague description of symptoms  If acute EKG changes, will do acute cardiac workups  If no changes, will manage as a muscular skeletal pain    Thrombocytopenia (HCC)- (present on admission)  Assessment & Plan  Possibly related to her alcoholism  Monitor CBC    Acute cystitis without hematuria- (present on admission)  Assessment & Plan  Completed Bactrim course on 4/20/2021    GI bleed  Assessment & Plan  Continue Prilosec  Clinically resolved  Monitor H&H  Evaluated by Dr. Acuna earlier during this admission reviewed consultation notes    Needed one pRBC 4/21 AM - responded appropriately   At this point, will monitor  If continued to have a further drop, will re-consult GI.     Shock (HCC)  Assessment & Plan  Weaned off norepinephrine  Currently on midodrine monitor blood pressure off pressors  BP has been stable; given over net +10L since admission, IV furosemide 1 dose provided 4/21  Start furosemide oral twice daily 4/23    Acute respiratory failure (HCC)  Assessment & Plan  Extubated on 4/18/2021  Aspiration precautions  RT protocol  Wean off oxygen as tolerated    Cardiac arrest (HCC)  Assessment & Plan  Possibly secondary to seizure  Patient evaluated by cardiology and noted to have right atrial mass    Case Discussed with Cardiology On call Dr. Malave about prior recommendation of cardiac MRI once clinically stable - advised to follow up as outpatient.     QT prolongation- (present on admission)  Assessment & Plan  Avoid QT prolonging agents    Advanced care planning/counseling discussion- (present on admission)  Assessment & Plan  Palliative care team involvement appreciated  Pt was made DNR/ I OK by daughter  in ICU  Pt can now participate in goals of care discussion - agreed for DNR/DNI  POLST signed 4/22     Right atrial mass- (present on admission)  Assessment & Plan  Evaluated by cardiology, Case Discussed with Cardiology On call Dr. Malave about prior recommendation of cardiac MRI once clinically stable - advised to follow up as outpatient.        VTE prophylaxis: SCD

## 2021-04-24 NOTE — ASSESSMENT & PLAN NOTE
Reported 4/24 - will get EKG and pain management  Vague description of symptoms  No acute changes on EKG - managed as a muscular skeletal pain   Will monitor

## 2021-04-25 PROBLEM — F41.9 ANXIETY: Status: ACTIVE | Noted: 2021-04-25

## 2021-04-25 LAB
ANION GAP SERPL CALC-SCNC: 7 MMOL/L (ref 7–16)
BASOPHILS # BLD AUTO: 0.5 % (ref 0–1.8)
BASOPHILS # BLD: 0.02 K/UL (ref 0–0.12)
BUN SERPL-MCNC: 2 MG/DL (ref 8–22)
CALCIUM SERPL-MCNC: 7.6 MG/DL (ref 8.5–10.5)
CHLORIDE SERPL-SCNC: 106 MMOL/L (ref 96–112)
CO2 SERPL-SCNC: 28 MMOL/L (ref 20–33)
CREAT SERPL-MCNC: 0.21 MG/DL (ref 0.5–1.4)
EKG IMPRESSION: NORMAL
EOSINOPHIL # BLD AUTO: 0.06 K/UL (ref 0–0.51)
EOSINOPHIL NFR BLD: 1.6 % (ref 0–6.9)
ERYTHROCYTE [DISTWIDTH] IN BLOOD BY AUTOMATED COUNT: 91.3 FL (ref 35.9–50)
GLUCOSE SERPL-MCNC: 98 MG/DL (ref 65–99)
HCT VFR BLD AUTO: 25.7 % (ref 37–47)
HGB BLD-MCNC: 8.1 G/DL (ref 12–16)
IMM GRANULOCYTES # BLD AUTO: 0.02 K/UL (ref 0–0.11)
IMM GRANULOCYTES NFR BLD AUTO: 0.5 % (ref 0–0.9)
LYMPHOCYTES # BLD AUTO: 0.72 K/UL (ref 1–4.8)
LYMPHOCYTES NFR BLD: 19.8 % (ref 22–41)
MAGNESIUM SERPL-MCNC: 1.4 MG/DL (ref 1.5–2.5)
MCH RBC QN AUTO: 33.3 PG (ref 27–33)
MCHC RBC AUTO-ENTMCNC: 31.5 G/DL (ref 33.6–35)
MCV RBC AUTO: 105.8 FL (ref 81.4–97.8)
MONOCYTES # BLD AUTO: 0.25 K/UL (ref 0–0.85)
MONOCYTES NFR BLD AUTO: 6.9 % (ref 0–13.4)
NEUTROPHILS # BLD AUTO: 2.57 K/UL (ref 2–7.15)
NEUTROPHILS NFR BLD: 70.7 % (ref 44–72)
NRBC # BLD AUTO: 0 K/UL
NRBC BLD-RTO: 0 /100 WBC
PLATELET # BLD AUTO: 315 K/UL (ref 164–446)
PMV BLD AUTO: 9.8 FL (ref 9–12.9)
POTASSIUM SERPL-SCNC: 3 MMOL/L (ref 3.6–5.5)
RBC # BLD AUTO: 2.43 M/UL (ref 4.2–5.4)
SODIUM SERPL-SCNC: 141 MMOL/L (ref 135–145)
WBC # BLD AUTO: 3.6 K/UL (ref 4.8–10.8)

## 2021-04-25 PROCEDURE — 36415 COLL VENOUS BLD VENIPUNCTURE: CPT

## 2021-04-25 PROCEDURE — A9270 NON-COVERED ITEM OR SERVICE: HCPCS | Performed by: HOSPITALIST

## 2021-04-25 PROCEDURE — A9270 NON-COVERED ITEM OR SERVICE: HCPCS | Performed by: STUDENT IN AN ORGANIZED HEALTH CARE EDUCATION/TRAINING PROGRAM

## 2021-04-25 PROCEDURE — 700111 HCHG RX REV CODE 636 W/ 250 OVERRIDE (IP): Performed by: STUDENT IN AN ORGANIZED HEALTH CARE EDUCATION/TRAINING PROGRAM

## 2021-04-25 PROCEDURE — A9270 NON-COVERED ITEM OR SERVICE: HCPCS | Performed by: INTERNAL MEDICINE

## 2021-04-25 PROCEDURE — 93010 ELECTROCARDIOGRAM REPORT: CPT | Performed by: INTERNAL MEDICINE

## 2021-04-25 PROCEDURE — A9270 NON-COVERED ITEM OR SERVICE: HCPCS | Performed by: NURSE PRACTITIONER

## 2021-04-25 PROCEDURE — 80048 BASIC METABOLIC PNL TOTAL CA: CPT

## 2021-04-25 PROCEDURE — 85025 COMPLETE CBC W/AUTO DIFF WBC: CPT

## 2021-04-25 PROCEDURE — 700102 HCHG RX REV CODE 250 W/ 637 OVERRIDE(OP): Performed by: HOSPITALIST

## 2021-04-25 PROCEDURE — 700102 HCHG RX REV CODE 250 W/ 637 OVERRIDE(OP): Performed by: STUDENT IN AN ORGANIZED HEALTH CARE EDUCATION/TRAINING PROGRAM

## 2021-04-25 PROCEDURE — 97116 GAIT TRAINING THERAPY: CPT

## 2021-04-25 PROCEDURE — 700102 HCHG RX REV CODE 250 W/ 637 OVERRIDE(OP): Performed by: NURSE PRACTITIONER

## 2021-04-25 PROCEDURE — 700102 HCHG RX REV CODE 250 W/ 637 OVERRIDE(OP): Performed by: INTERNAL MEDICINE

## 2021-04-25 PROCEDURE — 770006 HCHG ROOM/CARE - MED/SURG/GYN SEMI*

## 2021-04-25 PROCEDURE — 97535 SELF CARE MNGMENT TRAINING: CPT

## 2021-04-25 PROCEDURE — 83735 ASSAY OF MAGNESIUM: CPT

## 2021-04-25 PROCEDURE — 99232 SBSQ HOSP IP/OBS MODERATE 35: CPT | Performed by: STUDENT IN AN ORGANIZED HEALTH CARE EDUCATION/TRAINING PROGRAM

## 2021-04-25 RX ORDER — MIDODRINE HYDROCHLORIDE 5 MG/1
5 TABLET ORAL 3 TIMES DAILY
Qty: 60 TABLET | DISCHARGE
Start: 2021-04-26

## 2021-04-25 RX ORDER — AMOXICILLIN 250 MG
2 CAPSULE ORAL 2 TIMES DAILY PRN
Qty: 30 TABLET | Refills: 0 | DISCHARGE
Start: 2021-04-25

## 2021-04-25 RX ORDER — LORAZEPAM 2 MG/ML
0.5 INJECTION INTRAMUSCULAR ONCE
Status: COMPLETED | OUTPATIENT
Start: 2021-04-25 | End: 2021-04-25

## 2021-04-25 RX ORDER — ACETAMINOPHEN 325 MG/1
650 TABLET ORAL EVERY 6 HOURS PRN
Qty: 30 TABLET | Refills: 0 | DISCHARGE
Start: 2021-04-25

## 2021-04-25 RX ORDER — TRAZODONE HYDROCHLORIDE 50 MG/1
50 TABLET ORAL
Qty: 30 TABLET | Refills: 3 | DISCHARGE
Start: 2021-04-26

## 2021-04-25 RX ORDER — POTASSIUM CHLORIDE 20 MEQ/1
40 TABLET, EXTENDED RELEASE ORAL 3 TIMES DAILY
Status: COMPLETED | OUTPATIENT
Start: 2021-04-25 | End: 2021-04-25

## 2021-04-25 RX ORDER — FUROSEMIDE 20 MG/1
20 TABLET ORAL DAILY
Qty: 60 TABLET | DISCHARGE
Start: 2021-04-26

## 2021-04-25 RX ORDER — MAGNESIUM SULFATE HEPTAHYDRATE 40 MG/ML
2 INJECTION, SOLUTION INTRAVENOUS ONCE
Status: COMPLETED | OUTPATIENT
Start: 2021-04-25 | End: 2021-04-25

## 2021-04-25 RX ORDER — POLYETHYLENE GLYCOL 3350 17 G/17G
17 POWDER, FOR SOLUTION ORAL
Refills: 3 | DISCHARGE
Start: 2021-04-25

## 2021-04-25 RX ORDER — FUROSEMIDE 20 MG/1
20 TABLET ORAL
Status: DISCONTINUED | OUTPATIENT
Start: 2021-04-26 | End: 2021-04-27 | Stop reason: HOSPADM

## 2021-04-25 RX ORDER — OXYCODONE HYDROCHLORIDE 10 MG/1
10 TABLET ORAL EVERY 4 HOURS PRN
Qty: 28 TABLET | DISCHARGE
Start: 2021-04-25 | End: 2021-04-30

## 2021-04-25 RX ORDER — TRAZODONE HYDROCHLORIDE 100 MG/1
50 TABLET ORAL
Status: DISCONTINUED | OUTPATIENT
Start: 2021-04-25 | End: 2021-04-27 | Stop reason: HOSPADM

## 2021-04-25 RX ORDER — NICOTINE 21 MG/24HR
1 PATCH, TRANSDERMAL 24 HOURS TRANSDERMAL EVERY 24 HOURS
Qty: 30 PATCH | DISCHARGE
Start: 2021-04-25

## 2021-04-25 RX ORDER — LEVETIRACETAM 1000 MG/1
1000 TABLET ORAL EVERY 12 HOURS
Qty: 60 TABLET | DISCHARGE
Start: 2021-04-26

## 2021-04-25 RX ORDER — BISACODYL 10 MG
10 SUPPOSITORY, RECTAL RECTAL
Refills: 0 | DISCHARGE
Start: 2021-04-25

## 2021-04-25 RX ORDER — OXYCODONE HYDROCHLORIDE 5 MG/1
5 TABLET ORAL EVERY 4 HOURS PRN
Qty: 30 TABLET | Refills: 0 | DISCHARGE
Start: 2021-04-25 | End: 2021-04-30

## 2021-04-25 RX ORDER — MIRTAZAPINE 15 MG/1
15 TABLET, FILM COATED ORAL
Qty: 30 TABLET | DISCHARGE
Start: 2021-04-26

## 2021-04-25 RX ADMIN — MIDODRINE HYDROCHLORIDE 5 MG: 5 TABLET ORAL at 05:18

## 2021-04-25 RX ADMIN — TRAZODONE HYDROCHLORIDE 50 MG: 100 TABLET ORAL at 20:47

## 2021-04-25 RX ADMIN — MIDODRINE HYDROCHLORIDE 5 MG: 5 TABLET ORAL at 18:11

## 2021-04-25 RX ADMIN — MAGNESIUM GLUCONATE 500 MG ORAL TABLET 400 MG: 500 TABLET ORAL at 18:11

## 2021-04-25 RX ADMIN — OXYCODONE 5 MG: 5 TABLET ORAL at 20:47

## 2021-04-25 RX ADMIN — POTASSIUM CHLORIDE 40 MEQ: 1500 TABLET, EXTENDED RELEASE ORAL at 08:37

## 2021-04-25 RX ADMIN — MIRTAZAPINE 15 MG: 15 TABLET, FILM COATED ORAL at 20:47

## 2021-04-25 RX ADMIN — MIDODRINE HYDROCHLORIDE 5 MG: 5 TABLET ORAL at 11:48

## 2021-04-25 RX ADMIN — MAGNESIUM SULFATE IN WATER 2 G: 40 INJECTION, SOLUTION INTRAVENOUS at 08:37

## 2021-04-25 RX ADMIN — MAGNESIUM GLUCONATE 500 MG ORAL TABLET 400 MG: 500 TABLET ORAL at 05:18

## 2021-04-25 RX ADMIN — POTASSIUM CHLORIDE 40 MEQ: 1500 TABLET, EXTENDED RELEASE ORAL at 11:48

## 2021-04-25 RX ADMIN — LEVETIRACETAM 1000 MG: 500 TABLET ORAL at 18:11

## 2021-04-25 RX ADMIN — FUROSEMIDE 20 MG: 20 TABLET ORAL at 05:18

## 2021-04-25 RX ADMIN — LORAZEPAM 0.5 MG: 2 INJECTION INTRAMUSCULAR; INTRAVENOUS at 13:34

## 2021-04-25 RX ADMIN — LEVETIRACETAM 1000 MG: 500 TABLET ORAL at 05:17

## 2021-04-25 ASSESSMENT — COGNITIVE AND FUNCTIONAL STATUS - GENERAL
MOVING FROM LYING ON BACK TO SITTING ON SIDE OF FLAT BED: A LITTLE
CLIMB 3 TO 5 STEPS WITH RAILING: A LITTLE
DRESSING REGULAR LOWER BODY CLOTHING: A LITTLE
STANDING UP FROM CHAIR USING ARMS: A LITTLE
SUGGESTED CMS G CODE MODIFIER MOBILITY: CK
DAILY ACTIVITIY SCORE: 18
SUGGESTED CMS G CODE MODIFIER DAILY ACTIVITY: CK
EATING MEALS: A LITTLE
MOVING TO AND FROM BED TO CHAIR: A LITTLE
PERSONAL GROOMING: A LITTLE
TURNING FROM BACK TO SIDE WHILE IN FLAT BAD: A LITTLE
HELP NEEDED FOR BATHING: A LITTLE
MOBILITY SCORE: 18
TOILETING: A LITTLE
WALKING IN HOSPITAL ROOM: A LITTLE
DRESSING REGULAR UPPER BODY CLOTHING: A LITTLE

## 2021-04-25 ASSESSMENT — ENCOUNTER SYMPTOMS
PALPITATIONS: 0
WEAKNESS: 1
DIARRHEA: 1
ABDOMINAL PAIN: 1
FEVER: 0
CHILLS: 0
SHORTNESS OF BREATH: 0
NAUSEA: 1

## 2021-04-25 ASSESSMENT — GAIT ASSESSMENTS
ASSISTIVE DEVICE: HAND HELD ASSIST
GAIT LEVEL OF ASSIST: MINIMAL ASSIST
DISTANCE (FEET): 100

## 2021-04-25 ASSESSMENT — PAIN DESCRIPTION - PAIN TYPE
TYPE: ACUTE PAIN
TYPE: ACUTE PAIN

## 2021-04-25 NOTE — PROGRESS NOTES
Hospital Medicine Daily Progress Note    Date of Service  4/25/2021    Chief Complaint  60 y.o. female admitted 4/13/2021 with altered level of consciousness and syncope    Hospital Course  60-year-old female with history of alcohol and heroin dependence who had a syncopal episode and hit her head during her fall was transferred to the emergency department where head CT revealed acute to subacute bilateral subdural hematomas metabolic acidosis.  Patient was admitted to the intensive care unit and evaluated by neurosurgery.  She had a possible seizure episode shortly after admission and went into PEA cardiac arrest for which she received CPR with ROSC within 5 minutes.  Patient was intubated she was on pressors.  Echocardiogram revealed right atrial mass she was evaluated by cardiology who recommended cardiac MRI when patient is more clinically stable.  The patient had GI bleeding and [facility] for conservative management and deferring EGD and endoscopy to when she is more clinically stable unless she has signs of brisk bleeding.  The patient tolerated extubation on 4/18/2021.  She was weaned off norepinephrine the morning of 4/19/2021.    Care was transferred to Hospitalist Services and to  Neuro on 4/19.    4/20: Vitals afebrile. BP soft. Pt pulled out Cortrek - no longer wanted it. SLP eval - passed FEEs; hence, started diet. Na improved to 134. She does not remember most of her ICU course and all the things that she went through. I summarized a brief hospital course for her and current treatment plan.     4/21: Vitals wnl - BP improving; afebrile. IV Lasix 1x provided. On 3L O2. Hb 6.7 (1 pRBC provided) - repeat 8.6 (no signs of active bleeding, stable vitals). Mg supplemented. PM&R Dr. Darwin stanford appreciated - appropriate for IPR. CDW Cardiology oncall Dr. Malave about prior recommendation of cardiac MRI once clinically stable - advised to follow up as outpatient.  She did have an episode of hypoxia in PM (unclear cause) - EKG done and CXR done - grossly unremarkable.      4/22:  Vitals wnl though a bit soft BP afebrile. Appetite is still poor without her dentures (daughter cannot find it). Plan to continue to monitor her Hb and if continued to be stable then, we may be able to defer GI procedures and she can head to rehab. K supplemented. Na improved to 136. PM&R and SLP follow up appreciated   Palliative care follow up appreciated - Pt can now participate in goals of care discussion - agreed for DNR/DNI. POLST  signed.     4/23: Vitals wnl; afebrile. At bedside when seen alongside Daughter Kerline, reports doing well but still being bothered by incontinence. Partly due to unable to get to bathroom and frequent urination. Pending acceptance for IPR. Mg supplemented.     4/24: Vitals wnl; afebrile.  The rest of the vitals within normal parameters except soft BP. She then mentioned about discomfort in left chest - she cannot describe, no exacerbating or alleviating factor, no radiation. She wondered if it is from her cardiac arrest. EKG done with no acute ST/T wave abnormalities; hence, continued to monitor.     Interval Problem Update  4/25:   Vitals reviewed; afebrile.  The rest of the vitals within normal parameters.  On 3L NC - down to about 1L this AM  Pain scale reported - none  Blood glucose in last 24hrs - around 100s     At bedside, no acute issues earlier in the rounds. Around noon, reports of feeling quite anxious and having nightmares about not recovering forever. Teary at times. Agreed to be seen by spiritual care team.       Labs reviewed  Mg 1.4 (supplemented IV)  K 3 (supplemented)  Hb  8.6 > 7.9 > 9.3 > 8.7 > 8.1      Consultants/Specialty  Neurosurgery Dr. Leos  Neurology  Cardiology  Critical care  GI Dr. Acuna  PM&R Dr. Granados     Code Status  DNAR/DNI    Disposition  Pending DC plan to IPR 4/26 (if bed available)    Discussed with patient, patient's nurse  and with multidisciplinary team during rounds including , pharmacist and charge nurse.      I have performed the physical examination, and reviewed updated ROS and plan today 4/25/2021.  In review of yesterday's note, there are no new changes except as documented above or bolded below.      Review of Systems  Review of Systems   Constitutional: Positive for malaise/fatigue. Negative for chills and fever.   Respiratory: Negative for shortness of breath.    Cardiovascular: Positive for leg swelling. Negative for chest pain and palpitations.   Gastrointestinal: Positive for abdominal pain, diarrhea and nausea.   Neurological: Positive for weakness.   All other systems reviewed and are negative.       Physical Exam  Temp:  [36.2 °C (97.2 °F)-36.7 °C (98.1 °F)] 36.5 °C (97.7 °F)  Pulse:  [72-89] 82  Resp:  [16-18] 17  BP: (101-114)/(47-72) 109/64  SpO2:  [96 %-98 %] 96 %    Physical Exam  Vitals and nursing note reviewed.   Constitutional:       General: She is not in acute distress.  HENT:      Head: Normocephalic and atraumatic.      Nose: No rhinorrhea.   Eyes:      General: No scleral icterus.     Comments: Pale conjunctiva    Cardiovascular:      Rate and Rhythm: Normal rate and regular rhythm.      Heart sounds: Normal heart sounds. No murmur. No friction rub. No gallop.    Pulmonary:      Effort: Pulmonary effort is normal. No respiratory distress.      Breath sounds: No wheezing or rales.   Abdominal:      General: Bowel sounds are normal. There is distension.      Palpations: Abdomen is soft. There is no mass.      Tenderness: There is no abdominal tenderness. There is no rebound.   Musculoskeletal:         General: Swelling present. No tenderness.      Cervical back: Neck supple. No rigidity.   Skin:     General: Skin is warm and dry.      Capillary Refill: Capillary refill takes less than 2 seconds.      Coloration: Skin is not cyanotic or jaundiced.      Findings: Bruising present.      Nails:  There is no clubbing.   Neurological:      General: No focal deficit present.      Mental Status: She is alert.      Cranial Nerves: No cranial nerve deficit.      Motor: Weakness (Generalized) present.   Psychiatric:         Mood and Affect: Mood normal.         Behavior: Behavior normal.         Fluids  No intake or output data in the 24 hours ending 04/25/21 1358    Laboratory  Recent Labs     04/22/21  1414 04/23/21  0730 04/25/21  0450   WBC  --  5.4 3.6*   RBC  --  2.62* 2.43*   HEMOGLOBIN 9.3* 8.7* 8.1*   HEMATOCRIT 29.1* 27.0* 25.7*   MCV  --  103.1* 105.8*   MCH  --  33.2* 33.3*   MCHC  --  32.2* 31.5*   RDW  --  92.3* 91.3*   PLATELETCT  --  268 315   MPV  --  10.7 9.8     Recent Labs     04/25/21  0450   SODIUM 141   POTASSIUM 3.0*   CHLORIDE 106   CO2 28   GLUCOSE 98   BUN 2*   CREATININE 0.21*   CALCIUM 7.6*                   Imaging  DX-CHEST-PORTABLE (1 VIEW)   Final Result      1.  Resolution of left lower lobe atelectasis or pneumonia.      2.  Persistent diffuse interstitial opacities which represent edema, pneumonia, or underlying interstitial fibrotic change.      DX-CHEST-PORTABLE (1 VIEW)   Final Result      Interval increase in interstitial and hazy opacities throughout the left lung which could be related to worsening infection rather than asymmetric edema.      Removal of endotracheal tube.      DX-ABDOMEN FOR TUBE PLACEMENT   Final Result      Feeding tube tip projects over the distal stomach.      MR-BRAIN-W/O   Final Result      1.  BILATERAL holohemispheric supratentorial subdural hematomas appear slightly larger than on the recent prior CT.   2.  Trace LEFT frontal vertex subarachnoid blood, unchanged   3.  Trace intraventricular blood without hydrocephalus   4.  Atrophy   5.  Mild white matter changes      US-EXTREMITY VENOUS LOWER BILAT   Final Result      DX-CHEST-PORTABLE (1 VIEW)   Final Result         1.  Hazy left pulmonary opacities suggests subtle infiltrate, similar to prior  study.      DX-CHEST-PORTABLE (1 VIEW)   Final Result         1.  Hazy left pulmonary opacities suggests subtle infiltrate, similar to prior study.      US-EXTREMITY VENOUS LOWER BILAT   Final Result      EC-ECHOCARDIOGRAM COMPLETE W/O CONT   Final Result      US-RUQ   Final Result      1.  Enlarged echogenic liver suggesting fatty infiltration.   2.  Prior cholecystectomy.   3.  No biliary dilation.   4.  Trace ascites, nonspecific.         DX-CHEST-PORTABLE (1 VIEW)   Final Result      Left internal jugular catheter placement with the tip projecting over the superior vena cava. No pneumothorax is identified.      CT-HEAD W/O   Final Result      1.  Stable bilateral subdural hematomas. No significant midline shift.      2.  Stable left frontal subarachnoid hemorrhage.         DX-CHEST-LIMITED (1 VIEW)   Final Result      Right internal jugular catheter placement with the tip projecting over the superior vena cava. No pneumothorax is identified.      DX-CHEST-PORTABLE (1 VIEW)   Final Result         1.  Hazy left pulmonary opacities suggests subtle infiltrate      CT-HEAD W/O   Final Result      Bilateral subdural hematomas measure up to 8.3 mm on the left and 5 mm on the right.      Left frontal subarachnoid blood is also seen.      No midline shift.      Soft tissue swelling of the right cheek.      Findings discussed with Dr Florence      DX-CHEST-PORTABLE (1 VIEW)   Final Result      No acute cardiopulmonary process is seen.           Assessment/Plan  * SDH (subdural hematoma) (HCC)- (present on admission)  Assessment & Plan  Acute to subacute with history of recent falls    Patient evaluated by neurosurgery with nonsurgical treatment recommended  Continue Keppra per neurology recommendations    Alcohol withdrawal syndrome with complication (HCC)- (present on admission)  Assessment & Plan  Alcohol withdrawal resolved    Patient counseled on cessation    Protein-calorie malnutrition, severe (HCC)- (present on  admission)  Assessment & Plan  Continue supportive care  SLP, RD and nutrition following  Passed FEEs 4/20    Transaminitis- (present on admission)  Assessment & Plan  Echogenic liver ultrasound like alcoholic hepatitis    Counseled on alcohol cessation  Monitor LFTs    Electrolyte disturbance- (present on admission)  Assessment & Plan  Hypomagnesemia  Hypophosphatemia    Replete and monitor    Anxiety  Assessment & Plan  And acute stress with current medical illness   Underlying insomnia on ?seroquel, trazodone and mirtazapine.    Has already restarted on mirtazapine; will add trazodone this PM  1x Ativan 0.5mg IV once today.     Chest wall discomfort  Assessment & Plan  Reported 4/24 - will get EKG and pain management  Vague description of symptoms  No acute changes on EKG - managed as a muscular skeletal pain   Will monitor    Thrombocytopenia (HCC)- (present on admission)  Assessment & Plan  Possibly related to her alcoholism  Monitor CBC    Acute cystitis without hematuria- (present on admission)  Assessment & Plan  Completed Bactrim course on 4/20/2021    GI bleed  Assessment & Plan  Continue Prilosec  Clinically resolved  Monitor H&H  Evaluated by Dr. Acuna earlier during this admission reviewed consultation notes    Needed one pRBC 4/21 AM - responded appropriately   At this point, will monitor  If continued to have a further drop, will re-consult GI.     Shock (HCC)  Assessment & Plan  Weaned off norepinephrine  Currently on midodrine monitor blood pressure off pressors  BP has been stable; given over net +10L since admission, IV furosemide 1 dose provided 4/21  Start furosemide oral twice daily 4/23    Acute respiratory failure (HCC)  Assessment & Plan  Extubated on 4/18/2021  Aspiration precautions  RT protocol  Wean off oxygen as tolerated    Cardiac arrest (HCC)  Assessment & Plan  Possibly secondary to seizure  Patient evaluated by cardiology and noted to have right atrial mass    Case Discussed  with Cardiology On call Dr. Malave about prior recommendation of cardiac MRI once clinically stable - advised to follow up as outpatient.     QT prolongation- (present on admission)  Assessment & Plan  Avoid QT prolonging agents    Advanced care planning/counseling discussion- (present on admission)  Assessment & Plan  Palliative care team involvement appreciated  Pt was made DNR/ I OK by daughter in ICU  Pt can now participate in goals of care discussion - agreed for DNR/DNI  POLST signed 4/22     Right atrial mass- (present on admission)  Assessment & Plan  Evaluated by cardiology, Case Discussed with Cardiology On call Dr. Malave about prior recommendation of cardiac MRI once clinically stable - advised to follow up as outpatient.        VTE prophylaxis: SCD

## 2021-04-25 NOTE — CARE PLAN
Problem: Safety  Goal: Will remain free from falls  Outcome: PROGRESSING AS EXPECTED  Note: Free of falls.  Fall precautions in place.  Patient uses call light appropriately.

## 2021-04-25 NOTE — ASSESSMENT & PLAN NOTE
And acute stress with current medical illness   Underlying insomnia on ?seroquel, trazodone and mirtazapine.    Has already restarted on mirtazapine; added trazodone 4/25  1x Ativan 1mg IV ordered 4/26

## 2021-04-26 LAB
ANION GAP SERPL CALC-SCNC: 6 MMOL/L (ref 7–16)
BUN SERPL-MCNC: 2 MG/DL (ref 8–22)
CALCIUM SERPL-MCNC: 7.7 MG/DL (ref 8.5–10.5)
CHLORIDE SERPL-SCNC: 105 MMOL/L (ref 96–112)
CO2 SERPL-SCNC: 27 MMOL/L (ref 20–33)
CREAT SERPL-MCNC: 0.21 MG/DL (ref 0.5–1.4)
GLUCOSE SERPL-MCNC: 83 MG/DL (ref 65–99)
MAGNESIUM SERPL-MCNC: 1.6 MG/DL (ref 1.5–2.5)
POTASSIUM SERPL-SCNC: 3.9 MMOL/L (ref 3.6–5.5)
SODIUM SERPL-SCNC: 138 MMOL/L (ref 135–145)

## 2021-04-26 PROCEDURE — 99232 SBSQ HOSP IP/OBS MODERATE 35: CPT | Performed by: STUDENT IN AN ORGANIZED HEALTH CARE EDUCATION/TRAINING PROGRAM

## 2021-04-26 PROCEDURE — 700102 HCHG RX REV CODE 250 W/ 637 OVERRIDE(OP): Performed by: STUDENT IN AN ORGANIZED HEALTH CARE EDUCATION/TRAINING PROGRAM

## 2021-04-26 PROCEDURE — 80048 BASIC METABOLIC PNL TOTAL CA: CPT

## 2021-04-26 PROCEDURE — 83735 ASSAY OF MAGNESIUM: CPT

## 2021-04-26 PROCEDURE — 36415 COLL VENOUS BLD VENIPUNCTURE: CPT

## 2021-04-26 PROCEDURE — A9270 NON-COVERED ITEM OR SERVICE: HCPCS | Performed by: INTERNAL MEDICINE

## 2021-04-26 PROCEDURE — 700102 HCHG RX REV CODE 250 W/ 637 OVERRIDE(OP): Performed by: INTERNAL MEDICINE

## 2021-04-26 PROCEDURE — A9270 NON-COVERED ITEM OR SERVICE: HCPCS | Performed by: HOSPITALIST

## 2021-04-26 PROCEDURE — 700102 HCHG RX REV CODE 250 W/ 637 OVERRIDE(OP): Performed by: NURSE PRACTITIONER

## 2021-04-26 PROCEDURE — A9270 NON-COVERED ITEM OR SERVICE: HCPCS | Performed by: NURSE PRACTITIONER

## 2021-04-26 PROCEDURE — 700102 HCHG RX REV CODE 250 W/ 637 OVERRIDE(OP): Performed by: HOSPITALIST

## 2021-04-26 PROCEDURE — A9270 NON-COVERED ITEM OR SERVICE: HCPCS | Performed by: STUDENT IN AN ORGANIZED HEALTH CARE EDUCATION/TRAINING PROGRAM

## 2021-04-26 PROCEDURE — 97535 SELF CARE MNGMENT TRAINING: CPT

## 2021-04-26 PROCEDURE — 770006 HCHG ROOM/CARE - MED/SURG/GYN SEMI*

## 2021-04-26 PROCEDURE — 700111 HCHG RX REV CODE 636 W/ 250 OVERRIDE (IP): Performed by: STUDENT IN AN ORGANIZED HEALTH CARE EDUCATION/TRAINING PROGRAM

## 2021-04-26 PROCEDURE — 92526 ORAL FUNCTION THERAPY: CPT

## 2021-04-26 RX ORDER — LORAZEPAM 2 MG/ML
1 INJECTION INTRAMUSCULAR ONCE
Status: COMPLETED | OUTPATIENT
Start: 2021-04-26 | End: 2021-04-26

## 2021-04-26 RX ADMIN — MIDODRINE HYDROCHLORIDE 5 MG: 5 TABLET ORAL at 17:26

## 2021-04-26 RX ADMIN — MIDODRINE HYDROCHLORIDE 5 MG: 5 TABLET ORAL at 11:32

## 2021-04-26 RX ADMIN — OXYCODONE HYDROCHLORIDE 10 MG: 10 TABLET ORAL at 22:14

## 2021-04-26 RX ADMIN — TRAZODONE HYDROCHLORIDE 50 MG: 100 TABLET ORAL at 22:13

## 2021-04-26 RX ADMIN — LEVETIRACETAM 1000 MG: 500 TABLET ORAL at 17:26

## 2021-04-26 RX ADMIN — MIRTAZAPINE 15 MG: 15 TABLET, FILM COATED ORAL at 22:12

## 2021-04-26 RX ADMIN — MAGNESIUM GLUCONATE 500 MG ORAL TABLET 400 MG: 500 TABLET ORAL at 17:26

## 2021-04-26 RX ADMIN — MAGNESIUM GLUCONATE 500 MG ORAL TABLET 400 MG: 500 TABLET ORAL at 04:16

## 2021-04-26 RX ADMIN — LORAZEPAM 1 MG: 2 INJECTION INTRAMUSCULAR; INTRAVENOUS at 12:10

## 2021-04-26 RX ADMIN — MIDODRINE HYDROCHLORIDE 5 MG: 5 TABLET ORAL at 04:16

## 2021-04-26 RX ADMIN — LEVETIRACETAM 1000 MG: 500 TABLET ORAL at 04:16

## 2021-04-26 ASSESSMENT — PATIENT HEALTH QUESTIONNAIRE - PHQ9
2. FEELING DOWN, DEPRESSED, IRRITABLE, OR HOPELESS: NOT AT ALL
1. LITTLE INTEREST OR PLEASURE IN DOING THINGS: NOT AT ALL
SUM OF ALL RESPONSES TO PHQ9 QUESTIONS 1 AND 2: 0

## 2021-04-26 ASSESSMENT — ENCOUNTER SYMPTOMS
NAUSEA: 1
PALPITATIONS: 0
CHILLS: 0
DIARRHEA: 1
ABDOMINAL PAIN: 1
WEAKNESS: 1
FEVER: 0
SHORTNESS OF BREATH: 0

## 2021-04-26 ASSESSMENT — COGNITIVE AND FUNCTIONAL STATUS - GENERAL
DAILY ACTIVITIY SCORE: 21
HELP NEEDED FOR BATHING: A LITTLE
TOILETING: A LITTLE
SUGGESTED CMS G CODE MODIFIER DAILY ACTIVITY: CJ
DRESSING REGULAR LOWER BODY CLOTHING: A LITTLE

## 2021-04-26 ASSESSMENT — PAIN DESCRIPTION - PAIN TYPE
TYPE: ACUTE PAIN

## 2021-04-26 ASSESSMENT — GAIT ASSESSMENTS: DISTANCE (FEET): 70

## 2021-04-26 NOTE — PROGRESS NOTES
Hospital Medicine Daily Progress Note    Date of Service  4/26/2021    Chief Complaint  60 y.o. female admitted 4/13/2021 with altered level of consciousness and syncope    Hospital Course  60-year-old female with history of alcohol and heroin dependence who had a syncopal episode and hit her head during her fall was transferred to the emergency department where head CT revealed acute to subacute bilateral subdural hematomas metabolic acidosis.  Patient was admitted to the intensive care unit and evaluated by neurosurgery.  She had a possible seizure episode shortly after admission and went into PEA cardiac arrest for which she received CPR with ROSC within 5 minutes.  Patient was intubated she was on pressors.  Echocardiogram revealed right atrial mass she was evaluated by cardiology who recommended cardiac MRI when patient is more clinically stable.  The patient had GI bleeding and was evaluated [facility] recommendation for conservative management and deferring EGD and endoscopy to when she is more clinically stable unless she has signs of brisk bleeding.  The patient tolerated extubation on 4/18/2021.  She was weaned off norepinephrine the morning of 4/19/2021.    Care was transferred to Hospitalist Services and to  Neuro on 4/19.    4/20: Vitals afebrile. BP soft. Pt pulled out Cortrek - no longer wanted it. SLP eval - passed FEEs; hence, started diet. Na improved to 134. She does not remember most of her ICU course and all the things that she went through. I summarized a brief hospital course for her and current treatment plan.     4/21: Vitals wnl - BP improving; afebrile. IV Lasix 1x provided. On 3L O2. Hb 6.7 (1 pRBC provided) - repeat 8.6 (no signs of active bleeding, stable vitals). Mg supplemented. PM&R Dr. Darwin stanford appreciated - appropriate for IPR. CDW Cardiology oncall Dr. Malave about prior recommendation of cardiac MRI once clinically stable - advised to follow up as outpatient.  She did have an episode of hypoxia in PM (unclear cause) - EKG done and CXR done - grossly unremarkable.      4/22:  Vitals wnl though a bit soft BP afebrile. Appetite is still poor without her dentures (daughter cannot find it). Plan to continue to monitor her Hb and if continued to be stable then, we may be able to defer GI procedures and she can head to rehab. K supplemented. Na improved to 136. PM&R and SLP follow up appreciated   Palliative care follow up appreciated - Pt can now participate in goals of care discussion - agreed for DNR/DNI. POLST  signed.     4/23: Vitals wnl; afebrile. At bedside when seen alongside Daughter Kerline, reports doing well but still being bothered by incontinence. Partly due to unable to get to bathroom and frequent urination. Pending acceptance for IPR. Mg supplemented.     4/24: Vitals wnl; afebrile.  The rest of the vitals within normal parameters except soft BP. She then mentioned about discomfort in left chest - she cannot describe, no exacerbating or alleviating factor, no radiation. She wondered if it is from her cardiac arrest. EKG done with no acute ST/T wave abnormalities; hence, continued to monitor.     4/25: vitals wnl; O2 needs down to about 1L. Furosemide now switched to daily dosing. Around noon, reports of feeling quite anxious and having nightmares about not recovering forever. Teary at times. Agreed to be seen by spiritual care team. Mg and K supplemented.     Interval Problem Update  4/26:   Vitals reviewed; afebrile.  The rest of the vitals within normal parameters. On 1L O2.   Pain scale reported - none  Blood glucose in last 24hrs - just under 100s    At bedside, a bit disappointed and teary when discussed about delayed in transfer to rehab.   Her anxiety has been quite bothering her with the whole clinical situation.     1x lorazepam 1mg ordered.     Labs reviewed  Mg 1.6  K 3.9  Hb  8.6 > 7.9 > 9.3 > 8.7 > 8.1      Consultants/Specialty  Neurosurgery   Deric  Neurology  Cardiology  Critical care  GI Dr. Acuna  PM&R Dr. Granados     Code Status  DNAR/DNI    Disposition  Pending DC plan to IPR 4/27 (if bed available)    Discussed with patient, patient's nurse and with multidisciplinary team during rounds including , pharmacist and charge nurse.      I have performed the physical examination, and reviewed updated ROS and plan today 4/26/2021.  In review of yesterday's note, there are no new changes except as documented above or bolded below.      Review of Systems  Review of Systems   Constitutional: Positive for malaise/fatigue. Negative for chills and fever.   Respiratory: Negative for shortness of breath.    Cardiovascular: Positive for leg swelling. Negative for chest pain and palpitations.   Gastrointestinal: Positive for abdominal pain, diarrhea and nausea.   Neurological: Positive for weakness.   All other systems reviewed and are negative.       Physical Exam  Temp:  [36 °C (96.8 °F)-36.8 °C (98.3 °F)] 36.7 °C (98 °F)  Pulse:  [72-98] 72  Resp:  [14-18] 16  BP: ()/(66-79) 114/77  SpO2:  [92 %-97 %] 92 %    Physical Exam  Vitals and nursing note reviewed.   Constitutional:       General: She is not in acute distress.  HENT:      Head: Normocephalic and atraumatic.      Nose: No rhinorrhea.   Eyes:      General: No scleral icterus.     Comments: Pale conjunctiva    Cardiovascular:      Rate and Rhythm: Normal rate and regular rhythm.      Heart sounds: Normal heart sounds. No murmur. No friction rub. No gallop.    Pulmonary:      Effort: Pulmonary effort is normal. No respiratory distress.      Breath sounds: No wheezing or rales.   Abdominal:      General: Bowel sounds are normal. There is distension.      Palpations: Abdomen is soft. There is no mass.      Tenderness: There is no abdominal tenderness. There is no rebound.   Musculoskeletal:         General: Swelling present. No tenderness.      Cervical back: Neck supple. No rigidity.    Skin:     General: Skin is warm and dry.      Capillary Refill: Capillary refill takes less than 2 seconds.      Coloration: Skin is not cyanotic or jaundiced.      Findings: Bruising present.      Nails: There is no clubbing.   Neurological:      General: No focal deficit present.      Mental Status: She is alert.      Cranial Nerves: No cranial nerve deficit.      Motor: Weakness (Generalized) present.   Psychiatric:         Mood and Affect: Mood normal.         Behavior: Behavior normal.         Fluids    Intake/Output Summary (Last 24 hours) at 4/26/2021 1429  Last data filed at 4/26/2021 0600  Gross per 24 hour   Intake 120 ml   Output --   Net 120 ml       Laboratory  Recent Labs     04/25/21  0450   WBC 3.6*   RBC 2.43*   HEMOGLOBIN 8.1*   HEMATOCRIT 25.7*   .8*   MCH 33.3*   MCHC 31.5*   RDW 91.3*   PLATELETCT 315   MPV 9.8     Recent Labs     04/25/21  0450 04/26/21  0701   SODIUM 141 138   POTASSIUM 3.0* 3.9   CHLORIDE 106 105   CO2 28 27   GLUCOSE 98 83   BUN 2* 2*   CREATININE 0.21* 0.21*   CALCIUM 7.6* 7.7*                   Imaging  DX-CHEST-PORTABLE (1 VIEW)   Final Result      1.  Resolution of left lower lobe atelectasis or pneumonia.      2.  Persistent diffuse interstitial opacities which represent edema, pneumonia, or underlying interstitial fibrotic change.      DX-CHEST-PORTABLE (1 VIEW)   Final Result      Interval increase in interstitial and hazy opacities throughout the left lung which could be related to worsening infection rather than asymmetric edema.      Removal of endotracheal tube.      DX-ABDOMEN FOR TUBE PLACEMENT   Final Result      Feeding tube tip projects over the distal stomach.      MR-BRAIN-W/O   Final Result      1.  BILATERAL holohemispheric supratentorial subdural hematomas appear slightly larger than on the recent prior CT.   2.  Trace LEFT frontal vertex subarachnoid blood, unchanged   3.  Trace intraventricular blood without hydrocephalus   4.  Atrophy   5.   Mild white matter changes      US-EXTREMITY VENOUS LOWER BILAT   Final Result      DX-CHEST-PORTABLE (1 VIEW)   Final Result         1.  Hazy left pulmonary opacities suggests subtle infiltrate, similar to prior study.      DX-CHEST-PORTABLE (1 VIEW)   Final Result         1.  Hazy left pulmonary opacities suggests subtle infiltrate, similar to prior study.      US-EXTREMITY VENOUS LOWER BILAT   Final Result      EC-ECHOCARDIOGRAM COMPLETE W/O CONT   Final Result      US-RUQ   Final Result      1.  Enlarged echogenic liver suggesting fatty infiltration.   2.  Prior cholecystectomy.   3.  No biliary dilation.   4.  Trace ascites, nonspecific.         DX-CHEST-PORTABLE (1 VIEW)   Final Result      Left internal jugular catheter placement with the tip projecting over the superior vena cava. No pneumothorax is identified.      CT-HEAD W/O   Final Result      1.  Stable bilateral subdural hematomas. No significant midline shift.      2.  Stable left frontal subarachnoid hemorrhage.         DX-CHEST-LIMITED (1 VIEW)   Final Result      Right internal jugular catheter placement with the tip projecting over the superior vena cava. No pneumothorax is identified.      DX-CHEST-PORTABLE (1 VIEW)   Final Result         1.  Hazy left pulmonary opacities suggests subtle infiltrate      CT-HEAD W/O   Final Result      Bilateral subdural hematomas measure up to 8.3 mm on the left and 5 mm on the right.      Left frontal subarachnoid blood is also seen.      No midline shift.      Soft tissue swelling of the right cheek.      Findings discussed with Dr Florence      DX-CHEST-PORTABLE (1 VIEW)   Final Result      No acute cardiopulmonary process is seen.           Assessment/Plan  * SDH (subdural hematoma) (HCC)- (present on admission)  Assessment & Plan  Acute to subacute with history of recent falls    Patient evaluated by neurosurgery with nonsurgical treatment recommended  Continue Keppra per neurology recommendations    Alcohol  withdrawal syndrome with complication (HCC)- (present on admission)  Assessment & Plan  Alcohol withdrawal resolved    Patient counseled on cessation    Protein-calorie malnutrition, severe (HCC)- (present on admission)  Assessment & Plan  Continue supportive care  SLP, RD and nutrition following  Passed FEEs 4/20    Transaminitis- (present on admission)  Assessment & Plan  Echogenic liver ultrasound like alcoholic hepatitis    Counseled on alcohol cessation  Monitor LFTs    Electrolyte disturbance- (present on admission)  Assessment & Plan  Hypomagnesemia  Hypophosphatemia    Replete and monitor    Anxiety  Assessment & Plan  And acute stress with current medical illness   Underlying insomnia on ?seroquel, trazodone and mirtazapine.    Has already restarted on mirtazapine; added trazodone 4/25  1x Ativan 1mg IV ordered 4/26    Chest wall discomfort  Assessment & Plan  Reported 4/24 - will get EKG and pain management  Vague description of symptoms  No acute changes on EKG - managed as a muscular skeletal pain   Will monitor    Thrombocytopenia (HCC)- (present on admission)  Assessment & Plan  Possibly related to her alcoholism  Monitor CBC    Acute cystitis without hematuria- (present on admission)  Assessment & Plan  Completed Bactrim course on 4/20/2021    GI bleed  Assessment & Plan  Continue Prilosec  Clinically resolved  Monitor H&H  Evaluated by Dr. Acuna earlier during this admission reviewed consultation notes    Needed one pRBC 4/21 AM - responded appropriately   At this point, will monitor  If continued to have a further drop, will re-consult GI.     Shock (HCC)  Assessment & Plan  Weaned off norepinephrine  Currently on midodrine monitor blood pressure off pressors  BP has been stable; given over net +10L since admission, IV furosemide 1 dose provided 4/21  Start furosemide oral twice daily 4/23    Acute respiratory failure (HCC)  Assessment & Plan  Extubated on 4/18/2021  Aspiration precautions  RT  protocol  Wean off oxygen as tolerated    Cardiac arrest (HCC)  Assessment & Plan  Possibly secondary to seizure  Patient evaluated by cardiology and noted to have right atrial mass    Case Discussed with Cardiology On call Dr. Malave about prior recommendation of cardiac MRI once clinically stable - advised to follow up as outpatient.     QT prolongation- (present on admission)  Assessment & Plan  Avoid QT prolonging agents    Advanced care planning/counseling discussion- (present on admission)  Assessment & Plan  Palliative care team involvement appreciated  Pt was made DNR/ I OK by daughter in ICU  Pt can now participate in goals of care discussion - agreed for DNR/DNI  POLST signed 4/22     Right atrial mass- (present on admission)  Assessment & Plan  Evaluated by cardiology, Case Discussed with Cardiology On call Dr. Malave about prior recommendation of cardiac MRI once clinically stable - advised to follow up as outpatient.        VTE prophylaxis: SCD

## 2021-04-26 NOTE — CARE PLAN
Problem: Safety  Goal: Will remain free from falls  Outcome: PROGRESSING AS EXPECTED  Note: Pt educated on fall risk. Educated on use of call light. Call light within reach. Bed in lowest and locked position. Fall precautions in place.      Problem: Respiratory:  Goal: Respiratory status will improve  Outcome: PROGRESSING SLOWER THAN EXPECTED  Note: Pt using oxygen, weaning oxygen to maintain 90% or better on pulse oximeter.

## 2021-04-26 NOTE — THERAPY
"Speech Language Pathology  Daily Treatment     Patient Name: Nathalie Aguilar  Age:  60 y.o., Sex:  female  Medical Record #: [id number]  Today's Date: 4/26/2021     Precautions  Precautions: Fall Risk, Swallow Precautions ( See Comments)    Assessment    Pt was seen for dysphagia tx with PO trials of thins via tsp/cup, purees and soft/bite sized solids. Pt was alert, sitting upright at EOB, able to self-feed small bites and sips. Pt still does not have dentures; states her daughter can't find them. Pt was able to mash soft/bite sized solids well enough without her dentures; however, she coughed x2 while masticating juicy fruit with concern for possible penetration/aspiration (likely on prespillage). No s/sx of aspiration occurred w/ thins via tsp/cup. Recommend diet upgrade to Minced and Moist Solids (MM5) and Thin Liquids (TN0) with monitoring during meals. Float pills in puree. Hold PO with any overt coughing. Pt may need a diagnostic swallow evaluation (MBS) if she does not tolerate advanced textures. SLP is following.     Plan    Minced and Moist Solids (MM5) and Thin Liquids (TN0) with monitoring during meals. Float pills in puree.   Hold PO with any overt coughing.    Continue current treatment plan.    Discharge Recommendations: Recommend post-acute placement for additional speech therapy services prior to discharge home    Subjective    \"I ate that too fast.\"     Objective       04/26/21 1517   Dysphagia    Positioning / Behavior Modification Modulate Rate or Bite Size;Self Monitoring   Other Treatments PO trials thins via tsp/cup, PU4, SB6   Diet / Liquid Recommendation Minced & Moist (5) - (Dysphagia II);Thin (0)   Recommended Route of Medication Administration   Medication Administration  Float Whole with Puree   Short Term Goals   Short Term Goal # 1 B  Revised 4/22: Patient will consume meals of pureed solids and mildly thickened liquids with no s/sx of aspiration given monitoring during meals.   Goal " Outcome  # 1 B Goal met   Short Term Goal # 2 New 4/26: Patient will consume meals of minced/moist solids and thin liquids with no s/sx of aspiration given monitoring during meals.

## 2021-04-26 NOTE — CARE PLAN
Problem: Safety  Goal: Will remain free from falls  Outcome: PROGRESSING AS EXPECTED  Note: Bed alarm on, bed low and locked, call light and belongings within reach, clutter and spill free environment maintained, educated about fall risk and use of call light      Problem: Psychosocial Needs:  Goal: Level of anxiety will decrease  Outcome: PROGRESSING AS EXPECTED  Note: Pt anxious, irritable, and tearful this morning stating she is upset she has not been able to go to rehab facility yet. Provided education, Encouraged pt to voice feelings, provided emotional support and comfort measures such as therapeutic touch, warm blanket, and food. Pt then became less anxious, irritable, and tearful. Will continue to provide emotional support and reassurance as needed.

## 2021-04-26 NOTE — THERAPY
Physical Therapy   Daily Treatment     Patient Name: Nathalie Aguilar  Age:  60 y.o., Sex:  female  Medical Record #: [id number]  Today's Date: 4/25/2021     Precautions: Fall Risk, Swallow Precautions    Assessment  Pt seen for PT treatment session. Pt very motivated to participate. Pt demonstrates improved gait while using FWW and wished to trial ambulation without AD to return to PLOF. Pt required min A and HHA for safety d/t unsteadiness. Ataxia improving, gait distance limited by fatigue. Pt reports following rehab she will be staying with her dtr for a bit. Continue to recommend postacute placement at this time to maximize safety and functional independence. Will follow.     Plan  Continue current treatment plan.  DC Equipment Recommendations: Unable to determine at this time  Discharge Recommendations: Recommend post-acute placement for additional physical therapy services prior to discharge home       04/25/21 1624   Balance   Sitting Balance (Static) Fair   Sitting Balance (Dynamic) Fair   Standing Balance (Static) Fair   Standing Balance (Dynamic) Fair -   Weight Shift Sitting Fair   Weight Shift Standing Fair   Comments hands on assist for no AD   Gait Analysis   Gait Level Of Assist Minimal Assist   Assistive Device Hand Held Assist   Distance (Feet) 100   Deviation ataxia improving, shaky, slow dale   Weight Bearing Status no restrictions   Comments close SPV with walker, min A without AD. PLOF without AD. no overt LOB but unsteady and shaky. easily fatigues with mild incr SOB   Bed Mobility    Supine to Sit Supervised   Sit to Supine Supervised   Scooting Minimal Assist (supine)   Comments cues for bridging to assist with bed mob   Functional Mobility   Sit to Stand Supervised   Comments politely declined up to chair.   Short Term Goals    Short Term Goal # 1 Pt will perform supine <> sit with SPV in 6 visits to get in/out of bed   Goal Outcome # 1 Goal met   Short Term Goal # 2 Pt will perform  functional transfers with SPV in 6 visits to increase independence   Goal Outcome # 2 Goal met   Short Term Goal # 3 Pt will ambulate 150ft with FWW and SPV in 6 visits to increase independence   Goal Outcome # 3 Progressing as expected (min A 100ft no AD)

## 2021-04-26 NOTE — THERAPY
"Occupational Therapy  Daily Treatment     Patient Name: Nathalie Aguilar  Age:  60 y.o., Sex:  female  Medical Record #: [id number]  Today's Date: 4/25/2021    Precautions: Fall Risk, Swallow Precautions ( See Comments)    Assessment    Pt seen for OT tx, very motivated to participate, demonstrated improvements in functional independence compared to evaluation session. Pt donned socks at SPV level seated at EOB, completed toilet transfer with FWW however requested to ambulate without FWW d/t goal to progress off the walker, pt requires Joseph HHA without FWW. Pt tolerated >5min standing at sink for grooming ADLs. Pt is eager to DC to rehab and progress to a level that will facilitate/ensure safety upon eventual DC home with her daughter.     Plan    Continue current treatment plan.    DC Equipment Recommendations: Unable to determine at this time  Discharge Recommendations: Recommend post-acute placement for additional occupational therapy services prior to discharge home    Subjective    \"I hope I get to go to rehab tomorrow, that's what they're saying!\"     Objective     04/25/21 1621   Precautions   Precautions Fall Risk;Swallow Precautions ( See Comments)   Balance   Weight Shift Sitting Fair   Weight Shift Standing Fair   Skilled Intervention Tactile Cuing;Verbal Cuing;Postural Facilitation;Compensatory Strategies   Comments standing at sink with FWW   Bed Mobility    Supine to Sit Supervised   Sit to Supine Supervised   Scooting Minimal Assist   Skilled Intervention Verbal Cuing;Tactile Cuing;Facilitation   Activities of Daily Living   Eating Supervision  (pureed diet, unsafe for thins per SLP)   Grooming Supervision;Standing   Upper Body Dressing Supervision   Lower Body Dressing Supervision   Toileting Minimal Assist   Skilled Intervention Verbal Cuing;Tactile Cuing;Sequencing   Functional Mobility   Sit to Stand Supervised   Bed, Chair, Wheelchair Transfer Supervised   Toilet Transfers Minimal Assist "   Transfer Method Stand Step   Mobility with FWW at SPV level, without FWW req Joseph   Skilled Intervention Verbal Cuing;Tactile Cuing;Sequencing;Facilitation   Short Term Goals   Short Term Goal # 1 Pt will complete toilet transfer with spv by discharge.   Goal Outcome # 1 Progressing as expected   Short Term Goal # 2 Pt will complete toileting with spv by discharge.   Goal Outcome # 2 Progressing as expected   Short Term Goal # 3 Pt will complete LB dressing with spv using AE as needed by discharge.   Goal Outcome # 3 Goal met

## 2021-04-26 NOTE — DISCHARGE PLANNING
Will discuss this case again with the Admissions Team this morning.  Anticipate an admission pending bed availability.  Remains medically cleared from Dr. Fuentes.    0929-I do not have an open bed today.  Will re-assess in the am.  I do apologize for the delay.  Msg placed to Dr. Fuentes.  Junie GOMEZ is aware.      8947-Spoke again with Nathalie regarding an anticipated admission pending bed availability.  Hopeful for a bed tomorrow.

## 2021-04-27 ENCOUNTER — HOSPITAL ENCOUNTER (INPATIENT)
Facility: REHABILITATION | Age: 61
LOS: 10 days | DRG: 949 | End: 2021-05-07
Attending: PHYSICAL MEDICINE & REHABILITATION | Admitting: PHYSICAL MEDICINE & REHABILITATION
Payer: MEDICAID

## 2021-04-27 VITALS
BODY MASS INDEX: 23.22 KG/M2 | TEMPERATURE: 98.5 F | RESPIRATION RATE: 16 BRPM | HEIGHT: 68 IN | WEIGHT: 153.22 LBS | HEART RATE: 96 BPM | OXYGEN SATURATION: 94 % | SYSTOLIC BLOOD PRESSURE: 111 MMHG | DIASTOLIC BLOOD PRESSURE: 62 MMHG

## 2021-04-27 DIAGNOSIS — S06.5XAA SDH (SUBDURAL HEMATOMA) (HCC): ICD-10-CM

## 2021-04-27 PROCEDURE — 700102 HCHG RX REV CODE 250 W/ 637 OVERRIDE(OP): Performed by: PHYSICAL MEDICINE & REHABILITATION

## 2021-04-27 PROCEDURE — U0003 INFECTIOUS AGENT DETECTION BY NUCLEIC ACID (DNA OR RNA); SEVERE ACUTE RESPIRATORY SYNDROME CORONAVIRUS 2 (SARS-COV-2) (CORONAVIRUS DISEASE [COVID-19]), AMPLIFIED PROBE TECHNIQUE, MAKING USE OF HIGH THROUGHPUT TECHNOLOGIES AS DESCRIBED BY CMS-2020-01-R: HCPCS

## 2021-04-27 PROCEDURE — 97162 PT EVAL MOD COMPLEX 30 MIN: CPT

## 2021-04-27 PROCEDURE — A9270 NON-COVERED ITEM OR SERVICE: HCPCS | Performed by: STUDENT IN AN ORGANIZED HEALTH CARE EDUCATION/TRAINING PROGRAM

## 2021-04-27 PROCEDURE — 770010 HCHG ROOM/CARE - REHAB SEMI PRIVAT*

## 2021-04-27 PROCEDURE — 700102 HCHG RX REV CODE 250 W/ 637 OVERRIDE(OP): Performed by: NURSE PRACTITIONER

## 2021-04-27 PROCEDURE — U0005 INFEC AGEN DETEC AMPLI PROBE: HCPCS

## 2021-04-27 PROCEDURE — A9270 NON-COVERED ITEM OR SERVICE: HCPCS | Performed by: HOSPITALIST

## 2021-04-27 PROCEDURE — 700102 HCHG RX REV CODE 250 W/ 637 OVERRIDE(OP): Performed by: INTERNAL MEDICINE

## 2021-04-27 PROCEDURE — 99239 HOSP IP/OBS DSCHRG MGMT >30: CPT | Performed by: STUDENT IN AN ORGANIZED HEALTH CARE EDUCATION/TRAINING PROGRAM

## 2021-04-27 PROCEDURE — 99223 1ST HOSP IP/OBS HIGH 75: CPT | Performed by: PHYSICAL MEDICINE & REHABILITATION

## 2021-04-27 PROCEDURE — A9270 NON-COVERED ITEM OR SERVICE: HCPCS | Performed by: NURSE PRACTITIONER

## 2021-04-27 PROCEDURE — A9270 NON-COVERED ITEM OR SERVICE: HCPCS | Performed by: INTERNAL MEDICINE

## 2021-04-27 PROCEDURE — 97530 THERAPEUTIC ACTIVITIES: CPT

## 2021-04-27 PROCEDURE — A9270 NON-COVERED ITEM OR SERVICE: HCPCS | Performed by: PHYSICAL MEDICINE & REHABILITATION

## 2021-04-27 PROCEDURE — 700102 HCHG RX REV CODE 250 W/ 637 OVERRIDE(OP): Performed by: STUDENT IN AN ORGANIZED HEALTH CARE EDUCATION/TRAINING PROGRAM

## 2021-04-27 PROCEDURE — 94760 N-INVAS EAR/PLS OXIMETRY 1: CPT

## 2021-04-27 PROCEDURE — 700102 HCHG RX REV CODE 250 W/ 637 OVERRIDE(OP): Performed by: HOSPITALIST

## 2021-04-27 RX ORDER — LEVETIRACETAM 1000 MG/1
1000 TABLET ORAL EVERY 12 HOURS
Qty: 60 TABLET | Status: ON HOLD
Start: 2021-04-27 | End: 2021-05-06 | Stop reason: SDUPTHER

## 2021-04-27 RX ORDER — MIRTAZAPINE 15 MG/1
15 TABLET, FILM COATED ORAL
Status: CANCELLED | OUTPATIENT
Start: 2021-04-27

## 2021-04-27 RX ORDER — MIRTAZAPINE 7.5 MG/1
15 TABLET, FILM COATED ORAL
Status: DISCONTINUED | OUTPATIENT
Start: 2021-04-27 | End: 2021-04-27

## 2021-04-27 RX ORDER — AMOXICILLIN 250 MG
2 CAPSULE ORAL 2 TIMES DAILY PRN
Qty: 30 TABLET | Refills: 0 | Status: ON HOLD
Start: 2021-04-27 | End: 2021-05-06

## 2021-04-27 RX ORDER — MIDODRINE HYDROCHLORIDE 2.5 MG/1
5 TABLET ORAL 3 TIMES DAILY
Status: DISCONTINUED | OUTPATIENT
Start: 2021-04-27 | End: 2021-04-28

## 2021-04-27 RX ORDER — NICOTINE 21 MG/24HR
14 PATCH, TRANSDERMAL 24 HOURS TRANSDERMAL
Status: CANCELLED | OUTPATIENT
Start: 2021-04-28

## 2021-04-27 RX ORDER — FUROSEMIDE 20 MG/1
20 TABLET ORAL DAILY
Qty: 60 TABLET | Status: ON HOLD
Start: 2021-04-28 | End: 2021-05-06

## 2021-04-27 RX ORDER — OMEPRAZOLE 20 MG/1
20 CAPSULE, DELAYED RELEASE ORAL 2 TIMES DAILY
Status: DISCONTINUED | OUTPATIENT
Start: 2021-04-27 | End: 2021-05-07 | Stop reason: HOSPADM

## 2021-04-27 RX ORDER — ALUMINA, MAGNESIA, AND SIMETHICONE 2400; 2400; 240 MG/30ML; MG/30ML; MG/30ML
20 SUSPENSION ORAL
Status: DISCONTINUED | OUTPATIENT
Start: 2021-04-27 | End: 2021-05-07 | Stop reason: HOSPADM

## 2021-04-27 RX ORDER — POLYVINYL ALCOHOL 14 MG/ML
1 SOLUTION/ DROPS OPHTHALMIC PRN
Status: DISCONTINUED | OUTPATIENT
Start: 2021-04-27 | End: 2021-05-07 | Stop reason: HOSPADM

## 2021-04-27 RX ORDER — BISACODYL 10 MG
10 SUPPOSITORY, RECTAL RECTAL
Status: DISCONTINUED | OUTPATIENT
Start: 2021-04-27 | End: 2021-05-07 | Stop reason: HOSPADM

## 2021-04-27 RX ORDER — TRAZODONE HYDROCHLORIDE 100 MG/1
50 TABLET ORAL
Status: CANCELLED | OUTPATIENT
Start: 2021-04-27

## 2021-04-27 RX ORDER — POLYETHYLENE GLYCOL 3350 17 G/17G
1 POWDER, FOR SOLUTION ORAL
Status: DISCONTINUED | OUTPATIENT
Start: 2021-04-27 | End: 2021-05-07 | Stop reason: HOSPADM

## 2021-04-27 RX ORDER — ONDANSETRON 2 MG/ML
4 INJECTION INTRAMUSCULAR; INTRAVENOUS 4 TIMES DAILY PRN
Status: DISCONTINUED | OUTPATIENT
Start: 2021-04-27 | End: 2021-05-07 | Stop reason: HOSPADM

## 2021-04-27 RX ORDER — AMOXICILLIN 250 MG
2 CAPSULE ORAL 2 TIMES DAILY
Status: DISCONTINUED | OUTPATIENT
Start: 2021-04-27 | End: 2021-05-07 | Stop reason: HOSPADM

## 2021-04-27 RX ORDER — ECHINACEA PURPUREA EXTRACT 125 MG
2 TABLET ORAL PRN
Status: DISCONTINUED | OUTPATIENT
Start: 2021-04-27 | End: 2021-05-07 | Stop reason: HOSPADM

## 2021-04-27 RX ORDER — OMEPRAZOLE 20 MG/1
20 CAPSULE, DELAYED RELEASE ORAL 2 TIMES DAILY
Status: ON HOLD
Start: 2021-04-27 | End: 2021-05-06 | Stop reason: SDUPTHER

## 2021-04-27 RX ORDER — MIRTAZAPINE 15 MG/1
15 TABLET, FILM COATED ORAL
Qty: 30 TABLET | Status: ON HOLD
Start: 2021-04-27 | End: 2021-05-06 | Stop reason: SDUPTHER

## 2021-04-27 RX ORDER — LEVETIRACETAM 500 MG/1
1000 TABLET ORAL EVERY 12 HOURS
Status: DISCONTINUED | OUTPATIENT
Start: 2021-04-27 | End: 2021-05-07 | Stop reason: HOSPADM

## 2021-04-27 RX ORDER — TRAZODONE HYDROCHLORIDE 50 MG/1
50 TABLET ORAL
Status: DISCONTINUED | OUTPATIENT
Start: 2021-04-27 | End: 2021-05-07 | Stop reason: HOSPADM

## 2021-04-27 RX ORDER — NICOTINE 21 MG/24HR
1 PATCH, TRANSDERMAL 24 HOURS TRANSDERMAL EVERY 24 HOURS
Qty: 30 PATCH | Status: ON HOLD
Start: 2021-04-27 | End: 2021-05-06

## 2021-04-27 RX ORDER — HYDRALAZINE HYDROCHLORIDE 25 MG/1
25 TABLET, FILM COATED ORAL EVERY 8 HOURS PRN
Status: DISCONTINUED | OUTPATIENT
Start: 2021-04-27 | End: 2021-05-07 | Stop reason: HOSPADM

## 2021-04-27 RX ORDER — ONDANSETRON 4 MG/1
4 TABLET, ORALLY DISINTEGRATING ORAL 4 TIMES DAILY PRN
Status: DISCONTINUED | OUTPATIENT
Start: 2021-04-27 | End: 2021-05-07 | Stop reason: HOSPADM

## 2021-04-27 RX ORDER — FUROSEMIDE 20 MG/1
20 TABLET ORAL
Status: CANCELLED | OUTPATIENT
Start: 2021-04-28

## 2021-04-27 RX ORDER — ACETAMINOPHEN 325 MG/1
650 TABLET ORAL EVERY 4 HOURS PRN
Status: DISCONTINUED | OUTPATIENT
Start: 2021-04-27 | End: 2021-05-07 | Stop reason: HOSPADM

## 2021-04-27 RX ORDER — FUROSEMIDE 20 MG/1
20 TABLET ORAL
Status: DISCONTINUED | OUTPATIENT
Start: 2021-04-28 | End: 2021-04-29

## 2021-04-27 RX ORDER — MIRTAZAPINE 15 MG/1
15 TABLET, ORALLY DISINTEGRATING ORAL
Status: DISCONTINUED | OUTPATIENT
Start: 2021-04-27 | End: 2021-05-07 | Stop reason: HOSPADM

## 2021-04-27 RX ORDER — NICOTINE 21 MG/24HR
14 PATCH, TRANSDERMAL 24 HOURS TRANSDERMAL
Status: DISCONTINUED | OUTPATIENT
Start: 2021-04-28 | End: 2021-05-06

## 2021-04-27 RX ORDER — MIDODRINE HYDROCHLORIDE 5 MG/1
5 TABLET ORAL 3 TIMES DAILY
Status: CANCELLED | OUTPATIENT
Start: 2021-04-27

## 2021-04-27 RX ORDER — LEVETIRACETAM 500 MG/1
1000 TABLET ORAL EVERY 12 HOURS
Status: CANCELLED | OUTPATIENT
Start: 2021-04-27

## 2021-04-27 RX ORDER — MIDODRINE HYDROCHLORIDE 5 MG/1
5 TABLET ORAL 3 TIMES DAILY
Qty: 60 TABLET | Status: ON HOLD
Start: 2021-04-27 | End: 2021-05-06

## 2021-04-27 RX ORDER — TRAZODONE HYDROCHLORIDE 50 MG/1
50 TABLET ORAL
Qty: 30 TABLET | Refills: 3 | Status: ON HOLD
Start: 2021-04-27 | End: 2021-05-06 | Stop reason: SDUPTHER

## 2021-04-27 RX ADMIN — MAGNESIUM OXIDE TAB 400 MG (241.3 MG ELEMENTAL MG) 400 MG: 400 (241.3 MG) TAB at 20:27

## 2021-04-27 RX ADMIN — FUROSEMIDE 20 MG: 20 TABLET ORAL at 05:43

## 2021-04-27 RX ADMIN — LEVETIRACETAM 1000 MG: 500 TABLET ORAL at 20:27

## 2021-04-27 RX ADMIN — MAGNESIUM GLUCONATE 500 MG ORAL TABLET 400 MG: 500 TABLET ORAL at 05:43

## 2021-04-27 RX ADMIN — MIDODRINE HYDROCHLORIDE 5 MG: 2.5 TABLET ORAL at 15:21

## 2021-04-27 RX ADMIN — MIRTAZAPINE 15 MG: 15 TABLET, ORALLY DISINTEGRATING ORAL at 20:27

## 2021-04-27 RX ADMIN — OMEPRAZOLE 20 MG: 20 CAPSULE, DELAYED RELEASE ORAL at 20:27

## 2021-04-27 RX ADMIN — MIDODRINE HYDROCHLORIDE 5 MG: 5 TABLET ORAL at 05:43

## 2021-04-27 RX ADMIN — TRAZODONE HYDROCHLORIDE 50 MG: 50 TABLET ORAL at 20:27

## 2021-04-27 RX ADMIN — MIDODRINE HYDROCHLORIDE 5 MG: 2.5 TABLET ORAL at 20:28

## 2021-04-27 RX ADMIN — LEVETIRACETAM 1000 MG: 500 TABLET ORAL at 05:43

## 2021-04-27 ASSESSMENT — PATIENT HEALTH QUESTIONNAIRE - PHQ9
5. POOR APPETITE OR OVEREATING: NOT AT ALL
1. LITTLE INTEREST OR PLEASURE IN DOING THINGS: SEVERAL DAYS
4. FEELING TIRED OR HAVING LITTLE ENERGY: NOT AT ALL
3. TROUBLE FALLING OR STAYING ASLEEP OR SLEEPING TOO MUCH: NOT AT ALL
SUM OF ALL RESPONSES TO PHQ QUESTIONS 1-9: 2
8. MOVING OR SPEAKING SO SLOWLY THAT OTHER PEOPLE COULD HAVE NOTICED. OR THE OPPOSITE, BEING SO FIGETY OR RESTLESS THAT YOU HAVE BEEN MOVING AROUND A LOT MORE THAN USUAL: NOT AT ALL
SUM OF ALL RESPONSES TO PHQ9 QUESTIONS 1 AND 2: 2
6. FEELING BAD ABOUT YOURSELF - OR THAT YOU ARE A FAILURE OR HAVE LET YOURSELF OR YOUR FAMILY DOWN: NOT AL ALL
9. THOUGHTS THAT YOU WOULD BE BETTER OFF DEAD, OR OF HURTING YOURSELF: NOT AT ALL
2. FEELING DOWN, DEPRESSED, IRRITABLE, OR HOPELESS: SEVERAL DAYS
7. TROUBLE CONCENTRATING ON THINGS, SUCH AS READING THE NEWSPAPER OR WATCHING TELEVISION: NOT AT ALL

## 2021-04-27 ASSESSMENT — LIFESTYLE VARIABLES
TOTAL SCORE: 0
ALCOHOL_USE: YES
HAVE YOU EVER FELT YOU SHOULD CUT DOWN ON YOUR DRINKING: NO
ON A TYPICAL DAY WHEN YOU DRINK ALCOHOL HOW MANY DRINKS DO YOU HAVE: 1
EVER HAD A DRINK FIRST THING IN THE MORNING TO STEADY YOUR NERVES TO GET RID OF A HANGOVER: NO
TOTAL SCORE: 0
HOW MANY TIMES IN THE PAST YEAR HAVE YOU HAD 5 OR MORE DRINKS IN A DAY: 6
EVER FELT BAD OR GUILTY ABOUT YOUR DRINKING: NO
AVERAGE NUMBER OF DAYS PER WEEK YOU HAVE A DRINK CONTAINING ALCOHOL: 5
CONSUMPTION TOTAL: POSITIVE
HAVE PEOPLE ANNOYED YOU BY CRITICIZING YOUR DRINKING: NO
EVER_SMOKED: YES
TOTAL SCORE: 0

## 2021-04-27 ASSESSMENT — PAIN DESCRIPTION - PAIN TYPE
TYPE: ACUTE PAIN

## 2021-04-27 ASSESSMENT — GAIT ASSESSMENTS
GAIT LEVEL OF ASSIST: CONTACT GUARD ASSIST
ASSISTIVE DEVICE: FRONT WHEEL WALKER
DEVIATION: BRADYKINETIC;DECREASED HEEL STRIKE;DECREASED TOE OFF
DISTANCE (FEET): 120

## 2021-04-27 ASSESSMENT — FIBROSIS 4 INDEX: FIB4 SCORE: 3.71

## 2021-04-27 ASSESSMENT — ACTIVITIES OF DAILY LIVING (ADL): BED_CHAIR_WHEELCHAIR_TRANSFER_DESCRIPTION: ADAPTIVE EQUIPMENT;INCREASED TIME;SUPERVISION FOR SAFETY;VERBAL CUEING

## 2021-04-27 NOTE — THERAPY
"Occupational Therapy  Daily Treatment     Patient Name: Nathalie Aguilar  Age:  60 y.o., Sex:  female  Medical Record #: [id number]  Today's Date: 4/26/2021     Precautions  Precautions: Fall Risk, Swallow Precautions ( See Comments)    Assessment    Pt progressing with OT goals. She was able to don socks with spv, toileting with spv, toilet transfer with Jerson, and standing grooming/hygiene with Jerson for safety/balance. Pt continues to be limited by poor activity tolerance and impaired standing balance. Pt had 2 overt LOB and requires hands on assist to maintain balance during ADLs and functional mobility.     Plan    Continue current treatment plan.    DC Equipment Recommendations: Unable to determine at this time  Discharge Recommendations: Recommend post-acute placement for additional occupational therapy services prior to discharge home    Subjective    Pt alert, pleasant, and cooperative. Pt stated, \"I'm still weak but not as weak.\"     Objective       04/26/21 1712   Cognition    Cognition / Consciousness WDL   Level of Consciousness Alert   Comments pleasant, cooperative, upset towards end of session about being delivered the wrong food   Other Treatments   Other Treatments Provided Empathetic listening and communication with nursing staff about pts concerns.   Balance   Sitting Balance (Static) Fair +   Sitting Balance (Dynamic) Fair   Standing Balance (Static) Fair -   Standing Balance (Dynamic) Fair -   Weight Shift Sitting Fair   Weight Shift Standing Fair   Comments standing with hands on assist, no AD, two overt LOB requiring physical assist   Bed Mobility    Supine to Sit Supervised   Comments HOB slightly elevated, use of bed rails   Activities of Daily Living   Grooming Standing;Minimal Assist  (Jerson for balance, able to complete task without assist)   Lower Body Dressing Supervision  (socks)   Toileting Supervision   Functional Mobility   Sit to Stand Supervised   Toilet Transfers Minimal Assist "   Transfer Method Stand Step   Mobility in room/bathroom/hallway with hands on assist   Distance (Feet) 70   # of Times Distance was Traveled 1   Activity Tolerance   Comments reported fatigue and sore feet limiting her participation   Patient / Family Goals   Patient / Family Goal #1 To get better and go home   Short Term Goals   Short Term Goal # 1 Pt will complete toilet transfer with spv by discharge.   Goal Outcome # 1 Progressing as expected   Short Term Goal # 2 Pt will complete toileting with spv by discharge.   Goal Outcome # 2 Goal met   Short Term Goal # 3 Pt will complete LB dressing with spv using AE as needed by discharge.   Goal Outcome # 3 Goal met   Short Term Goal # 3 B Pt will complete 10 mins standing ADLs with spv and no rest break by discharge.   Goal Outcome # 3 B Progressing as expected

## 2021-04-27 NOTE — PROGRESS NOTES
Patient admitted to facility at 1140 via wheelchair; accompanied by hospital transport.  Patient assisted to room and positioned in bed for comfort and safety; call light within reach.  Patient assisted with stowing belongings and oriented to room and facility.  Admission assessment performed and documented in computer.  Admission paperwork completed; signed copies placed in chart.  Will continue to monitor.      2 RN skin check done with admitting RN and  RN. Face photo and skin photos documented in media. Groin area red and sacrum is red and blanching, barrier paste applied.  Pt with Kristian score of 18, RN wound protocol not needed at this time.

## 2021-04-27 NOTE — DISCHARGE SUMMARY
Discharge Summary    CHIEF COMPLAINT ON ADMISSION  Chief Complaint   Patient presents with   • ALOC   • Failure to Thrive       Reason for Admission  EMS 03     CODE STATUS  DNAR/DNI    HPI & HOSPITAL COURSE  60-year-old female with history of alcohol and heroin dependence who had a syncopal episode and hit her head during her fall was transferred to the emergency department where head CT revealed acute to subacute bilateral subdural hematomas metabolic acidosis.  Patient was admitted to the intensive care unit and evaluated by neurosurgery.  She had a possible seizure episode shortly after admission and went into PEA cardiac arrest for which she received CPR with ROSC within 5 minutes.  Patient was intubated she was on pressors.  Echocardiogram revealed EF 65%, right atrial mass 1.4cm x 2.3cm noted. She was evaluated by cardiology, right atrial mass is likely a prominent eustachian valve but cannot exclude malignancy. Outpatient cardiac MRI is recommended. The patient had GI bleeding and was evaluated by Dr. Acuna and recommended conservative management and serial monitoring and blood transfusion if needed. Deferring GI further workup as outpatient when she is clinical stable. The patient tolerated extubation on 4/18/2021. She was weaned off norepinephrine the morning of 4/19/2021. Her Bp was noted borderline low and she is currently on midodrine 5mg tid. She is also on Keppra 1000 mg twice daily, PPI, trazodone and Remeron.    She was also treated for hypomagnesia, hypophosphatemia.  She completed course of Bactrim for acute cystitis. She was noted with elevated LFT. Hepatitis panel negative. Likely secondary to alcohol abuse. Patient was counseled on alcohol cessation.    Palliative care was consulted for goals of care discussion agreed for DNR/DNI.  POLST signed.    Patient was evaluated for inpatient rehab and she will be transferred for further rehabilitation.     Therefore, she is discharged in guarded  and stable condition to an inpatient rehabilitation hospital.    The patient met 2-midnight criteria for an inpatient stay at the time of discharge.    Admission date: 4/13/2021  Discharge date: 4/27/2021  FOLLOW UP ITEMS POST DISCHARGE  Follow up with cardiology for outpatient cardiac MRI  Follow up with GI for further GI work-up  PCP      DISCHARGE DIAGNOSES  Principal Problem:    SDH (subdural hematoma) (HCC) POA: Yes  Active Problems:    Protein-calorie malnutrition, severe (HCC) (Chronic) POA: Yes    Alcohol withdrawal syndrome with complication (HCC) POA: Yes    Electrolyte disturbance POA: Yes    Marijuana user POA: Yes    Transaminitis POA: Yes    Hyperbilirubinemia POA: Yes    Right atrial mass POA: Yes    Elevated INR POA: Yes    Advanced care planning/counseling discussion POA: Yes    QT prolongation POA: Yes    Cardiac arrest (HCC) POA: No    Acute respiratory failure (HCC) POA: No    Shock (HCC) POA: Unknown    GI bleed POA: No    Melena POA: No    Acute cystitis without hematuria POA: Yes    Thrombocytopenia (HCC) POA: Yes    Chest wall discomfort POA: Unknown    Anxiety POA: Unknown  Resolved Problems:    Lactic acidosis POA: Yes      FOLLOW UP  No future appointments.  Mohsen Tamasaby, M.D.  [address]  [place] [address]  [phone]    Schedule an appointment as soon as possible for a visit in 1 week  For outpataient cardiac MRI for atrial mass found on Echocardiogram and for post-hospitalization follow up    Esvin Acuna M.D.  [address]  [place] [address]  [phone]    In 2 weeks      Asa Sanchez M.D.  [address]  [place] [address]  [phone]    In 2 weeks        MEDICATIONS ON DISCHARGE     Medication List      START taking these medications      Instructions   furosemide 20 MG Tabs  Start taking on: April 28, 2021  Commonly known as: LASIX   Take 1 tablet by mouth every day.  Dose: 20 mg     levetiracetam 1000 MG tablet  Commonly  known as: KEPPRA   Take 1 tablet by mouth every 12 hours.  Dose: 1,000 mg     magnesium oxide 400 MG Tabs tablet  Commonly known as: MAG-OX   Take 1 tablet by mouth 2 times a day.  Dose: 400 mg     midodrine 5 MG Tabs  Commonly known as: PROAMATINE   Take 1 tablet by mouth 3 times a day.  Dose: 5 mg     mirtazapine 15 MG Tabs  Commonly known as: Remeron   Take 1 tablet by mouth at bedtime.  Dose: 15 mg     nicotine 14 MG/24HR Pt24  Commonly known as: NICODERM   Place 1 Patch on the skin every 24 hours.  Dose: 1 Patch     omeprazole 20 MG delayed-release capsule  Commonly known as: PRILOSEC   Take 1 capsule by mouth 2 times a day.  Dose: 20 mg     senna-docusate 8.6-50 MG Tabs  Commonly known as: PERICOLACE or SENOKOT S   Take 2 Tablets by mouth 2 times a day as needed for Constipation.  Dose: 2 tablet     traZODone 50 MG Tabs  Commonly known as: DESYREL   Take 1 tablet by mouth at bedtime.  Dose: 50 mg            Allergies  No Known Allergies    DIET  Orders Placed This Encounter   Procedures   • Diet Order Diet: Level 5 - Minced and Moist (float pills in puree; monitor during meals); Liquid level: Level 0 - Thin     Standing Status:   Standing     Number of Occurrences:   1     Order Specific Question:   Diet:     Answer:   Level 5 - Minced and Moist [24]     Comments:   float pills in puree; monitor during meals     Order Specific Question:   Liquid level     Answer:   Level 0 - Thin       ACTIVITY  As tolerated.  Weight bearing as tolerated    LINES, DRAINS, AND WOUNDS  This is an automated list. Peripheral IVs will be removed prior to discharge.       Wound 04/13/21 Pressure Injury Coccyx Mid PTA mid sacrum/coccyx (Active)   Site Assessment Pink;Red;Excoriated 04/27/21 0715   Periwound Assessment Clean;Dry;Intact;Pink 04/27/21 0715   Margins Defined edges 04/27/21 0715   Closure Open to air 04/27/21 0715   Drainage Amount None 04/26/21 0744   Treatments Offloading 04/27/21 0715   Wound Cleansing Soap and Water  04/25/21 0800   Periwound Protectant Barrier Paste 04/25/21 0800   Dressing Options Open to Air 04/27/21 0715   Dressing Changed New 04/19/21 0800   Dressing Status Open to Air 04/27/21 0715                  MENTAL STATUS ON TRANSFER  Level of Consciousness: Alert  Orientation : Unable to Assess  Speech: Intubated / Trached    CONSULTATIONS  ICU  Neurosurgery  Cardiology  GI  Palliative care    PROCEDURES  intubation    LABORATORY  Lab Results   Component Value Date    SODIUM 138 04/26/2021    POTASSIUM 3.9 04/26/2021    CHLORIDE 105 04/26/2021    CO2 27 04/26/2021    GLUCOSE 83 04/26/2021    BUN 2 (L) 04/26/2021    CREATININE 0.21 (L) 04/26/2021    CREATININE 0.8 02/23/2009        Lab Results   Component Value Date    WBC 3.6 (L) 04/25/2021    HEMOGLOBIN 8.1 (L) 04/25/2021    HEMATOCRIT 25.7 (L) 04/25/2021    PLATELETCT 315 04/25/2021        Total time of the discharge process exceeds 45 minutes.

## 2021-04-27 NOTE — H&P
REHABILITATION HISTORY AND PHYSICAL/POST ADMISSION PHYSICAL EVALUATION    Date of Admission: 4/27/2021  Nathalie Aguilar  RH19/02    [facility] Code / Diagnosis to Support: 0002.22 - Brain Dysfunction: Traumatic, Closed Injury  Etiologic Diagnosis: SDH (subdural hematoma) (HCC)    CC: Word finding, decreased mobility    HPI:  The patient is a 60 y.o. female with a past medical history of substance abuse and asthma who presented on 4/13/21 with AMS after fall. Patient reportedly had headstrike during fall and was found down in home by her daughter. Patient BIBA and found to have acute on chronic bilateral SDHs. NSG was consulted and recommended conservative management.  In ED patient had seizure like activity while being transferred and became apneic and non-responsive. Patient underwent 5 minutes of compression and 1 round of epinephrine.  Patient was intubated and admitted to ICU for PEA arrest.  Patient has since been stabilized on Keppra.  Patient with EEG on 4/15/21 which showed no seizure like activity. Patient's stay also complicated by melena and GI was consulted. Per GI hold on intervention at that time and manage with PPI. TTE reportedly showed right atrial mass and cardiology was consulted. Per cardiology felt the mass was most likely prominent eustachian valve but should have cardiac MRI as outpatient. Patient was extubated on 4/18/21.  Patient did require transfusion while inpatient.     Patient tolerated transfer to [facility]. She reports she is doing much better. She reports she is diffusely weak since being in the hospital. She reports she has word finding difficulty since the fall. She is motivated to get home and will stay with her daughter for a while. She denies SOB, not on oxygen at baseline but currently on NC. She reports right sided rib pain most likely from the fall or from CPR. She reports that her ribs do not feel broken as she has had broken ribs in the past.  She denies chest pain. Denies NVD.  Denies melena.     REVIEW OF SYSTEMS:     Comprehensive 14 point ROS was reviewed and all were negative except as noted elsewhere in this document.     PMH:  Past Medical History:   Diagnosis Date   • ASTHMA    • Cancer (HCC)     breast   • Heroin addiction (HCC)    • Pneumonia    • Psychiatric disorder        PSH:  Past Surgical History:   Procedure Laterality Date   • INCISION AND DRAINAGE ORTHOPEDIC Right 1/25/2019    Procedure: INCISION AND DRAINAGE ORTHOPEDIC - HAND;  Surgeon: Kendrick Campbell M.D.;  Location: SURGERY [facility];  Service: Orthopedics   • CHOLECYSTECTOMY     • GYN SURGERY      partial hysterectomy       FAMILY HISTORY:  Family History   Problem Relation Age of Onset   • No Known Problems Mother    • Alcohol abuse Father         MEDICATIONS:  Current Facility-Administered Medications   Medication Dose   • Respiratory Therapy Consult     • Pharmacy Consult Request ...Pain Management Review 1 Each  1 Each   • hydrALAZINE (APRESOLINE) tablet 25 mg  25 mg   • acetaminophen (Tylenol) tablet 650 mg  650 mg   • senna-docusate (PERICOLACE or SENOKOT S) 8.6-50 MG per tablet 2 tablet  2 tablet    And   • polyethylene glycol/lytes (MIRALAX) PACKET 1 Packet  1 Packet    And   • magnesium hydroxide (MILK OF MAGNESIA) suspension 30 mL  30 mL    And   • bisacodyl (DULCOLAX) suppository 10 mg  10 mg   • artificial tears ophthalmic solution 1 Drop  1 Drop   • benzocaine-menthol (CEPACOL) lozenge 1 Lozenge  1 Lozenge   • mag hydrox-al hydrox-simeth (MAALOX PLUS ES or MYLANTA DS) suspension 20 mL  20 mL   • ondansetron (ZOFRAN ODT) dispertab 4 mg  4 mg    Or   • ondansetron (ZOFRAN) syringe/vial injection 4 mg  4 mg   • traZODone (DESYREL) tablet 50 mg  50 mg   • sodium chloride (OCEAN) 0.65 % nasal spray 2 Spray  2 Spray   • [START ON 4/28/2021] nicotine (NICODERM) 14 MG/24HR 14 mg  14 mg   • magnesium oxide (MAG-OX) tablet 400 mg  400 mg   • [START ON 4/28/2021] furosemide (LASIX) tablet 20 mg  20 mg   •  levETIRAcetam (KEPPRA) tablet 1,000 mg  1,000 mg   • midodrine (PROAMATINE) tablet 5 mg  5 mg   • traZODone (DESYREL) tablet 50 mg  50 mg   • mirtazapine (Remeron) orally disintegrating tab 15 mg  15 mg       ALLERGIES:  Patient has no known allergies.    PSYCHOSOCIAL HISTORY:  Housing / Facility: 1 Story Apartment / Condo  Steps Into Home: 3  Steps In Home: 0  Lives with - Patient's Self Care Capacity: Alone and Able to Care For Self  Equipment Owned: None     Prior Level of Function / Living Situation:   Physical Therapy: Prior Services: Home-Independent  Housing / Facility: 1 Story Apartment / Condo  Steps Into Home: 3  Steps In Home: 0  Bathroom Set up: Bathtub / Shower Combination  Equipment Owned: None  Lives with - Patient's Self Care Capacity: Alone and Able to Care For Self  Bed Mobility: Independent  Transfer Status: Independent  Ambulation: Independent  Distance Ambulation (Feet): (community ambulator)  Assistive Devices Used: None  Stairs: Independent  Current Level of Function:   Gait Level Of Assist: Minimal Assist  Assistive Device: Front Wheel Walker  Distance (Feet): 3  Deviation: Bradykinetic, Shuffled Gait, Ataxic  Weight Bearing Status: no restrictions  Supine to Sit: Minimal Assist  Sit to Supine: Supervised  Scooting: Moderate Assist(seated)  Comments: HHA for leverage  Sit to Stand: Minimal Assist  Bed, Chair, Wheelchair Transfer: Minimal Assist  Toilet Transfers: Minimal Assist  Transfer Method: Stand Step  Sitting in Chair: 5 mins (BSC)  Sitting Edge of Bed: 10-12 mins  Standin-2 mins x 2  Occupational Therapy:   Self Feeding: Independent  Grooming / Hygiene: Independent  Bathing: Independent  Dressing: Independent  Toileting: Independent  Medication Management: Independent  Laundry: Independent  Kitchen Mobility: Independent  Finances: Independent  Home Management: Independent  Shopping: Requires Assist  Prior Level Of Mobility: Independent Without Device in Community, Independent  "Without Device in Home  Driving / Transportation: Relatives / Others Provide Transportation  Prior Services: Home-Independent  Housing / Facility: 1 Story Apartment / Condo  Occupation (Pre-Hospital Vocational): Not Employed  Leisure Interests: Other (Comments)(\"drinking\")  Current Level of Function:   Lower Body Dressing: Maximal Assist(socks)  Toileting: Moderate Assist(for thoroughness with rear pericare)  Speech Language Pathology:   Problem List: Dysphagia  Diet / Liquid Recommendation: Puree (4), Thin (0)  Rehabilitation Prognosis/Potential: Fair  Estimated Length of Stay: 12-13 days    CURRENT LEVEL OF FUNCTION:   Same as level of function prior to admission to [facility]    PHYSICAL EXAM:     VITAL SIGNS:   height is 1.702 m (5' 7\") and weight is 58 kg (127 lb 13.9 oz). Her temporal temperature is 36.1 °C (96.9 °F). Her blood pressure is 99/68 (abnormal) and her pulse is 102 (abnormal). Her respiration is 18 and oxygen saturation is 91%.     GENERAL: No apparent distress  HEENT: Normocephalic/atraumatic, EOMI and PERRL  CARDIAC: Regular rate and rhythm, normal S1, S2   LUNGS: Clear to auscultation   ABDOMINAL: bowel sounds present, soft and nontender    EXTREMITIES: no contractures, spasticity, or edema.  No calf tenderness bilaterally  NEURO:  Mental status:  A&Ox4 (person, place, date, situation) answers questions appropriately  Speech: fluent, no aphasia or dysarthria; occasional word finding difficulty  CRANIAL NERVES: CN 2-12 intact  Motor:  5/5 BUE  4/5 B HF and KE otherwise 4+/5 BLE  Sensory: intact to light touch through out    RADIOLOGY:  MRI 4/18/21  IMPRESSION:     1.  BILATERAL holohemispheric supratentorial subdural hematomas appear slightly larger than on the recent prior CT.  2.  Trace LEFT frontal vertex subarachnoid blood, unchanged  3.  Trace intraventricular blood without hydrocephalus  4.  Atrophy  5.  Mild white matter changes        LABS:  Recent Labs     " 04/25/21  0450 04/26/21  0701   SODIUM 141 138   POTASSIUM 3.0* 3.9   CHLORIDE 106 105   CO2 28 27   GLUCOSE 98 83   BUN 2* 2*   CREATININE 0.21* 0.21*   CALCIUM 7.6* 7.7*     Recent Labs     04/25/21  0450   WBC 3.6*   RBC 2.43*   HEMOGLOBIN 8.1*   HEMATOCRIT 25.7*   .8*   MCH 33.3*   MCHC 31.5*   RDW 91.3*   PLATELETCT 315   MPV 9.8             PRIMARY REHAB DIAGNOSIS:    This patient is a 60 y.o. female admitted for acute inpatient rehabilitation with SDH (subdural hematoma) (HCC).    IMPAIRMENTS:   Cognitive  ADLs/IADLs  Mobility  Speech  Swallow    SECONDARY DIAGNOSIS/MEDICAL CO-MORBIDITIES AFFECTING FUNCTION:  Seizures  Hyponatremia  GI Bleed  Anemia  Substance Abuse  Depression/Anxiety    RELEVANT CHANGES SINCE PREADMISSION EVALUATION:    Status unchanged    The patient's rehabilitation potential is Very Good  The patient's medical prognosis is good    PLAN:   Discussion and Recommendations:   1. The patient requires an acute inpatient rehabilitation program with a coordinated program of care at an intensity and frequency not available at a lower level of care. This recommendation is substantiated by the patient's medical physicians who recommend that the patient's intervention and assessment of medical issues needs to be done at an acute level of care for patient's safety and maximum outcome.   2. A coordinated program of care will be supplied by an interdisciplinary team of physical therapy, occupational therapy, rehab physician, rehab nursing, and, if needed, speech therapy and rehab psychology. Rehab team presents a patient-specific rehabilitation and education program concentrating on prevention of future problems related to accessibility, mobility, skin, bowel, bladder, sexuality, and psychosocial and medical/surgical problems.   3. Need for Rehabilitation Physician: The rehab physician will be evaluating the patient on a multi-weekly basis to help coordinate the program of care. The rehab  physician communicates between medical physicians, therapists, and nurses to maximize the patient's potential outcome. Specific areas in which the rehab physician will be providing daily assessment include the following:   A. Assessing the patient's heart rate and blood pressure response (vitals monitoring) to activity and making adjustments in medications or conservative measures as needed.   B. The rehab physician will be assessing the frequency at which the program can be increased to allow the patient to reach optimal functional outcome.   C. The rehab physician will also provide assessments in daily skin care, especially in light of patient's impairments in mobility.   D. The rehab physician will provide special expertise in understanding how to work with functional impairment and recommend appropriate interventions, compensatory techniques, and education that will facilitate the patient's outcome.   4. Rehab R.N.   The rehab RN will be working with patient to carry over in room mobility and activities of daily living when the patient is not in 3 hours of skilled therapy. Rehab nursing will be working in conjunction with rehab physician to address all the medical issues above and continue to assess laboratory work and discuss abnormalities with the treating physicians, assess vitals, and response to activity, and discuss and report abnormalities with the rehab physician. Rehab RN will also continue daily skin care, supervise bladder/bowel program, instruct in medication administration, and ensure patient safety.   5. Rehab Therapy: Therapies to treat at intensity and frequency of (may change after completion of evaluation by all therapeutic disciplines):       PT:  Physical therapy to address mobility, transfer, gait training and evaluation for adaptive equipment needs 1 hour/day at least 5 days/week for the duration of the ELOS (see below)       OT:  Occupational therapy to address ADLs, self-care, home  management training, functional mobility/transfers and assistive device evaluation, and community re-integration 1  hour/day at least 5 days/week for the duration of the ELOS (see below).        ST/Dysphagia:  Speech therapy to address speech, language, and cognitive deficits as well as swallowing difficulties with retraining/dysphagia management and community re-integration with comprehension, expression, cognitive training 1  hour/day at least 5 days/week for the duration of the ELOS (see below).     Medical management / Rehabilitation Issues/ Adverse Potential as part of rehabilitation plan     REHABILITATION ISSUES/ADVERSE POTENTIAL:  1.  TBI (Traumatic Brain Injury):  Fall with bilateral SDHs managed conservatively. Hospital course complicated by seizure, PEA arrest requiring intubation, and GI bleed. Patient demonstrates functional deficits in cognition, behavior, strength, balance, coordination, and ADL's. The patient requires therapy to correct these deficits prior to discharge. Patient is admitted to [facility] for comprehensive rehabilitation therapy, including physical, occupational and speech therapy.     Rehabilitation nursing monitors bowel and bladder control, educates on medication administration, co-morbidities and monitors patient safety.    2.  Neurostimulants: None at this time but continue to assess daily for need to initiate should status change.    3.  DVT prophylaxis:  Patient is high risk for bleed for anticoagulation upon transfer. Encourage OOB. Monitor daily for signs and symptoms of DVT including but not limited to swelling and pain to prevent the development of DVT that may interfere with therapies.    4.  GI prophylaxis:  On prilosec to prevent gastritis/dyspepsia which may interfere with therapies.    5.  Pain: No issues with pain currently / Controlled with APAP/Tramadol    6.  Nutrition/Dysphagia: Dietician monitors nutrient intake, recommend supplements prn and  provide nutrition education to pt/family to promote optimal nutrition for wound healing/recovery.     7.  Bladder/bowel:  Start bowel and bladder program as described below, to prevent constipation, urinary retention (which may lead to UTI), and urinary incontinence (which will impact upon pt's functional independence).   - Post void bladder scans, I&O cath for PVRs >400  - up to commode after meal     8.  Skin/dermal ulcer prophylaxis: Monitor for new skin conditions with q.2 h. turns as required to prevent the development of skin breakdown.     9.  Cognition/Behavior: As needed psychologist provides adjustment counseling to illness and psychosocial barriers that may be potential barriers to rehabilitation.     10. Respiratory therapy: RT performs O2 management prn, breathing retraining, pulmonary hygiene and bronchospasm management prn to optimize participation in therapies.     MEDICAL CO-MORBIDITIES/ADVERSE POTENTIAL AFFECTING FUNCTION:   TBI (Traumatic Brain Injury):  Fall with bilateral SDHs managed conservatively. Hospital course complicated by seizure, PEA arrest requiring intubation, and GI bleed.  -PT and OT for mobility and ADLs  -SLP for cognition    Dysphagia - Patient on dysphagia diet. SLP for swallow evaluation.     Seizures - New witnessed seizure after SDHs. On Keppra 1000 mg BID. Monitor   -Follow-up Neurology     Hypotension - Patient on Midodrine 5 mg on transfer. Will monitor     GI Bleed - Patient on omeprazole 20 mg BID    Anemia - S/p GI bleed managed conservatively.  Check AM CBC. Did require transfusion     Hypomagnesemia - Patient on Magnesium supplement on transfer.     Substance Abuse - Patient on Nicotine patch. History of heroin abuse.      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 Cardiology evaluated and felt enlarge valve but needs outpatient cardiac MRI    DVT Ppx - Patient high risk for bleed and ambulating.     I  personally performed a complete drug regimen review and no potential clinically significant medication issues were identified.     Pt was seen today for 73 min, and entire time spent in face-to-face contact was >50% in counseling and coordination of care as detailed in A/P above.        GOALS/EXPECTED LEVEL OF FUNCTION BASED ON CURRENT MEDICAL AND FUNCTIONAL STATUS (may change based on patient's medical status and rate of impairment recovery):  Transfers:   Modified Independent  Mobility/Gait:   Modified Independent  ADL's:   Modified Independent  Cognition:  ind  Swallowing:  regular    DISPOSITION: Discharge to pre-morbid independent living setting with the supportive care of patient's family.    ELOS: 7-14 days

## 2021-04-27 NOTE — DISCHARGE PLANNING
Anticipated Discharge Disposition:   [facility]    Action:    Pt accepted by Dr. Dudley to [facility] and transport today at 1100 per [facility] Sammy.  Pt agreeable.  Verbal for Cobra obtained.    Cobra signed by Dr. Aldridge.    Cobra form given to bedside RN Lillian.    Barriers to Discharge:    None    Plan:    DC

## 2021-04-27 NOTE — DISCHARGE INSTRUCTIONS
Discharge Instructions per Loreta Aldridge M.D.    -Patient will need Cardiac MRI as OP to evaluate right atrial mass found on ECHO this admission  -Please follow up with GI as outpatient for EGD evaluation  -Please follow up with primary care provider   -Alcohol cessation strongly advised    DIET: cardiac minced and moist diet    ACTIVITY: As tolerated    DIAGNOSIS: Subdural hematoma; Alcohol withdrawal; Protein calorie malnutrition; Transaminitis; Anxiety; Thrombocytopenia; Acute cystitis completed Bactrim; Acute respiratory failure; Cardiac arrest; Right atrial mass    Return to ER in the event of new or worsening symptoms. Please note importance of compliance and the patient has agreed to proceed with all medical recommendations and follow up plan indicated above. All medications come with benefits and risks. Risks may include permanent injury or death and these risks can be minimized with close reassessment and monitoring. Please make it to your scheduled follow ups with cardiology, GI and PCP    Discharge Instructions    Discharged to other by medical transportation with escort. Discharged via wheelchair, hospital escort: Yes.  Special equipment needed: Not Applicable    Be sure to schedule a follow-up appointment with your primary care doctor or any specialists as instructed.     Discharge Plan:   Diet Plan: Discussed  Activity Level: Discussed  Confirmed Follow up Appointment: Patient to Call and Schedule Appointment  Confirmed Symptoms Management: Discussed  Medication Reconciliation Updated: Yes    I understand that a diet low in cholesterol, fat, and sodium is recommended for good health. Unless I have been given specific instructions below for another diet, I accept this instruction as my diet prescription.   Other diet: regular minced and moist    Special Instructions: None    · Is patient discharged on Warfarin / Coumadin?   No     Depression / Suicide Risk    As you are discharged from this [facility] facility, it is important to learn how to keep safe from harming yourself.    Recognize the warning signs:  · Abrupt changes in personality, positive or negative- including increase in energy   · Giving away possessions  · Change in eating patterns- significant weight changes-  positive or negative  · Change in sleeping patterns- unable to sleep or sleeping all the time   · Unwillingness or inability to communicate  · Depression  · Unusual sadness, discouragement and loneliness  · Talk of wanting to die  · Neglect of personal appearance   · Rebelliousness- reckless behavior  · Withdrawal from people/activities they love  · Confusion- inability to concentrate     If you or a loved one observes any of these behaviors or has concerns about self-harm, here's what you can do:  · Talk about it- your feelings and reasons for harming yourself  · Remove any means that you might use to hurt yourself (examples: pills, rope, extension cords, firearm)  · Get professional help from the community (Mental Health, Substance Abuse, psychological counseling)  · Do not be alone:Call your Safe Contact- someone whom you trust who will be there for you.  · Call your local CRISIS HOTLINE 376-2974 or 579-754-0980  · Call your local Children's Mobile Crisis Response Team [place] (042) 565-5574 or www.Telerivet  · Call the toll free National Suicide Prevention Hotlines   · National Suicide Prevention Lifeline 909-587-YRMD (3034)  · National Hope Line Network 800-SUICIDE (105-0209)

## 2021-04-27 NOTE — CARE PLAN
Problem: Safety  Goal: Will remain free from falls  Outcome: PROGRESSING AS EXPECTED  Note: Pt attempted to get out of bed this morning without calling, reinforced education about fall risk and use of call light, pt understanding of teaching. Bed alarm on, bed low and locked, call light and belongings within reach, clutter and spill free environment maintained, educated about fall risk and use of call light         Problem: Infection  Goal: Will remain free from infection  Outcome: PROGRESSING AS EXPECTED     Problem: Knowledge Deficit  Goal: Knowledge of disease process/condition, treatment plan, diagnostic tests, and medications will improve  Outcome: PROGRESSING AS EXPECTED  Note: Educating pt about condition, fall risk, safety measures, medications, and discharge plan. Pt understanding of teaching. Encouraging patient to ask questions.

## 2021-04-27 NOTE — THERAPY
04/27/21 1529   Prior Living Situation   Prior Services None   Prior Functioning: Everyday Activities   Self Care Independent   Indoor Mobility (Ambulation) Independent   Stairs Independent   Functional Cognition Independent   Prior Device Use None of the given options   Pain 0 - 10 Group   Location Generalized   Therapist Pain Assessment Prior to Activity;During Activity   Cognition    Level of Consciousness Alert   ABS (Agitated Behavior Scale)   Agitated Behavior Scale Performed No   Passive ROM Lower Body   Passive ROM Lower Body WDL   Active ROM Lower Body    Active ROM Lower Body  WDL   Strength Lower Body   Lower Body Strength  WDL   Sensation Lower Body   Lower Extremity Sensation   WDL   Lower Body Muscle Tone   Lower Body Muscle Tone  WDL   Balance Assessment   Sitting Balance (Static) Good   Sitting Balance (Dynamic) Fair +   Standing Balance (Static) Fair   Standing Balance (Dynamic) Fair -   Weight Shift Sitting Good   Weight Shift Standing Good   Bed Mobility    Supine to Sit Stand by Assist   Sit to Supine Stand by Assist   Sit to Stand Contact Guard Assist   Scooting Stand by Assist   Rolling Supervised   Neurological Concerns   Neurological Concerns No   Roll Left and Right   Assistance Needed Supervision;Adaptive equipment   Physical Assistance Level No physical assistance   CARE Score 4   Roll Left and Right Discharge Goal   Discharge Goal 6   Sit to Lying   Assistance Needed Supervision;Verbal cues   Physical Assistance Level No physical assistance   CARE Score 4   Sit to Lying Discharge Goal   Discharge Goal 6   Lying to Sitting on Side of Bed   Assistance Needed Supervision;Verbal cues   Physical Assistance Level No physical assistance   CARE Score 4   Lying to Sitting on Side of Bed Discharge Goal   Discharge Goal 6   Sit to Stand   Assistance Needed Incidental touching;Adaptive equipment;Verbal cues   Physical Assistance Level No physical assistance   CARE Score 4   Sit to Stand Discharge  Goal   Discharge Goal 6   Chair/Bed-to-Chair Transfer   Assistance Needed Incidental touching;Adaptive equipment;Verbal cues   Physical Assistance Level No physical assistance   CARE Score 4   Chair/Bed-to-Chair Transfer Discharge Goal   Discharge Goal 6   Toilet Transfer   Reason if not Attempted Activity not applicable   CARE Score 9   Toilet Transfer Discharge Goal   Discharge Goal 6   Car Transfer   Reason if not Attempted Environmental limitations   CARE Score 10   Car Transfer Discharge Goal   Discharge Goal 6   Walk 10 Feet   Assistance Needed Incidental touching;Adaptive equipment;Verbal cues   Physical Assistance Level No physical assistance   CARE Score 4   Walk 10 Feet Discharge Goal   Discharge Goal 6   Walk 50 Feet with Two Turns   Assistance Needed Incidental touching;Verbal cues;Adaptive equipment   Physical Assistance Level No physical assistance   CARE Score 4   Walk 50 Feet with Two Turns Discharge Goal   Discharge Goal 6   Walk 150 Feet   Reason if not Attempted Safety concerns   CARE Score 88   Walk 150 Feet Discharge Goal   Discharge Goal 6   Walking 10 Feet on Uneven Surfaces   Reason if not Attempted Safety concerns   CARE Score 88   Walking 10 Feet on Uneven Surfaces Discharge Goal   Discharge Goal 6   1 Step (Curb)   Reason if not Attempted Environmental limitations   CARE Score 10   1 Step (Curb) Discharge Goal   Discharge Goal 6   4 Steps   Reason if not Attempted Environmental limitations   CARE Score 10   4 Steps Discharge Goal   Discharge Goal 6   12 Steps   Reason if not Attempted Environmental limitations   CARE Score 10   12 Steps Discharge Goal   Discharge Goal 6   Picking Up Object   Assistance Needed Incidental touching;Verbal cues;Adaptive equipment   Physical Assistance Level No physical assistance   CARE Score 4   Picking Up Object Discharge Goal   Discharge Goal 6   Wheel 50 Feet with Two Turns   Reason if not Attempted Activity not applicable   CARE Score 9   Wheel 50 Feet  with Two Turns Discharge Goal   Discharge Goal 9   Wheel 150 Feet   Reason if not Attempted Activity not applicable   CARE Score 9   Wheel 150 Feet Discharge Goal   Discharge Goal 9   Gait Functional Level of Assist    Gait Level Of Assist Contact Guard Assist   Assistive Device Front Wheel Walker   Distance (Feet) 120   # of Times Distance was Traveled 1   Deviation Bradykinetic;Decreased Heel Strike;Decreased Toe Off   Wheelchair Functional Level of Assist   Wheelchair Assist   (Not assessed)   Distance Wheelchair (Feet or Distance) 0   Stairs Functional Level of Assist   Level of Assist with Stairs Unable to Participate  (Due to Covid 19 protocol)   Transfer Functional Level of Assist   Bed, Chair, Wheelchair Transfer Contact Guard Assist   Bed Chair Wheelchair Transfer Description Adaptive equipment;Increased time;Supervision for safety;Verbal cueing  (FWW)   Toilet Transfers   (Not assessed by PT)   Problem List    Problems Impaired Bed Mobility;Impaired Transfers;Impaired Ambulation;Functional Strength Deficit;Impaired Balance;Impaired Coordination;Decreased Activity Tolerance   Precautions   Precautions Fall Risk;Swallow Precautions ( See Comments)   Current Discharge Plan   Current Discharge Plan Return to Prior Living Situation   Interdisciplinary Plan of Care Collaboration   IDT Collaboration with  Nursing   Collaboration Comments CLOF bed mobility standby assist, transfer fww   Benefit   Therapy Benefit Patient Would Benefit from Inpatient Rehabilitation Physical Therapy to Maximize Functional [place] with ADLs, IADLs and Mobility.   PT Total Time Spent   PT Individual Total Time Spent (Mins) 60   PT Charge Group   Charges Yes   PT Therapeutic Activities 1   PT Evaluation PT Evaluation Mod   Physical Therapy   Initial Evaluation     Patient Name: Nathalie Aguilar  Age:  60 y.o., Sex:  female  Medical Record #: [id number]  Today's Date: 4/27/2021     Subjective    The patient agreed to participate in  the PT evaluation.     Objective    The patient's PT evaluation was limited to in the room due to COVID-19 protocol.  She was able to participate in demonstrating bed mobility, transferring, gait with a walker, muscle testing and sensation testing.    Assessment  Patient is 60 y.o. female with a diagnosis of syncopal episode with a fall and head strike resulting in acute bilateral subdural hematomas, metabolic acidosis seizure, cardiac arrest, intubated, GI bleed.  Additional factors influencing patient status / progress (ie: cognitive factors, co-morbidities, social support, etc): Impaired balance, strength and endurance, gait, fall risk.      Plan  Recommend Physical Therapy  minutes per day 5-7 days per week for 2 weeks for the following treatments:  PT Gait Training, PT Therapeutic Exercises and PT Neuro Re-Ed/Balance.    Passport items to be completed:  Passport items to be completed:  Get in/out of bed safely, in/out of a vehicle, safely use mobility device, walk or wheel around home/community, navigate up and down stairs, show how to get up/down from the ground, ensure home is accessible, demonstrate HEP, complete caregiver training    Goals:  Long term and short term goals have been discussed with patient and they are in agreement.    Physical Therapy Problems     Problem: Mobility     Dates: Start: 04/27/21       Goal: STG-Within one week, patient will ascend and descend four to six stairs     Dates: Start: 04/27/21       Description: 1) Individualized goal: Up/down 4-6 stairs, handrails, SBA 1 week  2) Interventions:  PT Gait Training, PT Therapeutic Exercises, and PT Therapeutic Activity            Goal: STG-Within one week, patient will     Dates: Start: 04/27/21       Description: 1) Individualized goal: Gait fww/no AD with FT SBA, 1 week  2) Interventions:  PT Gait Training, PT Therapeutic Exercises, PT Neuro Re-Ed/Balance, and PT Therapeutic Activity                  Problem: Mobility Transfers      Dates: Start: 04/27/21       Goal: STG-Within one week, patient will transfer bed to chair     Dates: Start: 04/27/21       Description: 1) Individualized goal: Transfer bed to/from chair fww/no AD supervision, 1 week  2) Interventions:  PT Gait Training, PT Therapeutic Exercises, and PT Therapeutic Activity                  Problem: PT-Long Term Goals     Dates: Start: 04/27/21       Goal: LTG-By discharge, patient will ambulate     Dates: Start: 04/27/21       Description: 1) Individualized goal: Gait no  FT, independent at discharge  2) Interventions:  PT Gait Training, PT Therapeutic Exercises, PT Neuro Re-Ed/Balance, and PT Therapeutic Activity            Goal: LTG-By discharge, patient will transfer one surface to another     Dates: Start: 04/27/21       Description: 1) Individualized goal: Transfer 1 surface to another no AD, independent at discharge  2) Interventions:  PT Gait Training, PT Therapeutic Exercises, and PT Therapeutic Activity            Goal: LTG-By discharge, patient will ambulate up/down 4-6 stairs     Dates: Start: 04/27/21       Description: 1) Individualized goal: Up/down 4-6 stairs handrails, independently at discharge  2) Interventions:  PT Gait Training, PT Therapeutic Exercises, PT Neuro Re-Ed/Balance, and PT Therapeutic Activity            Goal: LTG-By discharge, patient will transfer in/out of a car     Dates: Start: 04/27/21       Description: 1) Individualized goal: Car transfer no device supervision at discharge  2) Interventions:  PT Gait Training, PT Therapeutic Exercises, PT Neuro Re-Ed/Balance, and PT Therapeutic Activity

## 2021-04-27 NOTE — FLOWSHEET NOTE
04/27/21 1213   Events/Summary/Plan   Events/Summary/Plan RT assessment   Vital Signs   Pulse (!) 102   Respiration 18   Pulse Oximetry 91 %   $ Pulse Oximetry (Spot Check) Yes   Respiratory Assessment   Level of Consciousness Alert   Chest Exam   Work Of Breathing / Effort Within Normal Limits   Breath Sounds   RLL Breath Sounds Diminished   LLL Breath Sounds Diminished   Oxygen   O2 (LPM) 1.5   O2 Delivery Device Silicone Nasal Cannula   Room Air Challenge Fail  (down to 86% on RA)   Smoking History   Have you ever smoked Yes   Have you smoked in the last 12 months Yes   Have you quit smoking No   Smoking Cessation Offered Patient Counseled

## 2021-04-27 NOTE — DISCHARGE PLANNING
Msg placed to Dr. Aldridge to verify medical clearance.  Will discuss this case again with the Admissions Team this morning.      0934-A bed is available.  Case is under review by Dr. Dudley.  Anticipate an admission today pending Physiatry acceptance/medical clearance.     1003-Dr. Dudley has accepted.  In anticipation of Dr. Aldridge granting medical clearance, I've arranged for transport @ 1100.  Msg placed to Dr. Aldridge & Junie GOMEZ.  Kerline wadsworth & Lillian BSN  are aware.

## 2021-04-27 NOTE — CARE PLAN
Problem: Communication  Goal: The ability to communicate needs accurately and effectively will improve  Outcome: PROGRESSING AS EXPECTED     Problem: Safety  Goal: Will remain free from injury  Outcome: PROGRESSING AS EXPECTED     Problem: Safety  Goal: Will remain free from falls  Outcome: PROGRESSING AS EXPECTED     Problem: Psychosocial Needs:  Goal: Level of anxiety will decrease  Outcome: PROGRESSING AS EXPECTED     Problem: Skin Integrity  Goal: Risk for impaired skin integrity will decrease  Outcome: PROGRESSING AS EXPECTED     Problem: Urinary Elimination:  Goal: Ability to reestablish a normal urinary elimination pattern will improve  Outcome: PROGRESSING AS EXPECTED
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
## 2021-04-27 NOTE — PROGRESS NOTES
1038: Report given to [facility] BRITNEY Arriaga, all questions answered. Pt agreeable to discharge, all questions answered. Per [facility] coordinator, daughter Kerline is already aware that pt will be transferring to rehab at 1100.     1105: Pt retrieved by pt transport via wheelchair to take pt to rehab, all belongings with pt, home meds from pharmacy given to pt transporter

## 2021-04-27 NOTE — FLOWSHEET NOTE
04/27/21 1211   Patient History   Pulmonary Diagnosis asthma, tobacco abuse   Procedures Relevant to Respiratory Status no   Home O2 No   Nocturnal CPAP No   Home Treatments/Frequency Yes   MDI 1/Frequency Albuterol PRN   Sleep Apnea Screening   Have you had a sleep study? No   Have you been diagnosed with sleep apnea? No   S - Have you been told that you SNORE? 0   T - Are you often TIRED during the day? 0   O - Do you know if you stop breathing or has anyone witnessed you stop breathing while you were asleep? (OBSTRUCTION) 0   P - Do you have high blood PRESSURE or on medication to control high blood pressure? 1   B - Is your Body Mass Index greater than 35? (BMI) 0   A - Are you 50 years old or older? (AGE) 1   N - Are you a male with a NECK circumference greater than 17 inches, or a female with a neck circumference greater than 16 inches? 0   G - Are you male? (GENDER) 0   Stop Bang Total Score 2

## 2021-04-27 NOTE — CARE PLAN
Problem: Safety  Goal: Will remain free from injury  Note: Pt educated to use call light when in need of assistance, bed alarm and chair alarm on, non skid socks in used.Call light and personal belongings within reach.

## 2021-04-28 LAB
ALBUMIN SERPL BCP-MCNC: 2.3 G/DL (ref 3.2–4.9)
ALBUMIN/GLOB SERPL: 0.7 G/DL
ALP SERPL-CCNC: 239 U/L (ref 30–99)
ALT SERPL-CCNC: 104 U/L (ref 2–50)
ANION GAP SERPL CALC-SCNC: 6 MMOL/L (ref 7–16)
ANISOCYTOSIS BLD QL SMEAR: ABNORMAL
AST SERPL-CCNC: 154 U/L (ref 12–45)
BASOPHILS # BLD AUTO: 0.9 % (ref 0–1.8)
BASOPHILS # BLD: 0.03 K/UL (ref 0–0.12)
BILIRUB SERPL-MCNC: 2.5 MG/DL (ref 0.1–1.5)
BUN SERPL-MCNC: 4 MG/DL (ref 8–22)
CALCIUM SERPL-MCNC: 8.5 MG/DL (ref 8.5–10.5)
CHLORIDE SERPL-SCNC: 106 MMOL/L (ref 96–112)
CO2 SERPL-SCNC: 29 MMOL/L (ref 20–33)
COMMENT 1642: NORMAL
CREAT SERPL-MCNC: 0.27 MG/DL (ref 0.5–1.4)
EOSINOPHIL # BLD AUTO: 0.07 K/UL (ref 0–0.51)
EOSINOPHIL NFR BLD: 2.1 % (ref 0–6.9)
ERYTHROCYTE [DISTWIDTH] IN BLOOD BY AUTOMATED COUNT: 84.1 FL (ref 35.9–50)
EST. AVERAGE GLUCOSE BLD GHB EST-MCNC: 71 MG/DL
GLOBULIN SER CALC-MCNC: 3.2 G/DL (ref 1.9–3.5)
GLUCOSE SERPL-MCNC: 106 MG/DL (ref 65–99)
HBA1C MFR BLD: 4.1 % (ref 4–5.6)
HCT VFR BLD AUTO: 31.6 % (ref 37–47)
HGB BLD-MCNC: 9.6 G/DL (ref 12–16)
IMM GRANULOCYTES # BLD AUTO: 0.02 K/UL (ref 0–0.11)
IMM GRANULOCYTES NFR BLD AUTO: 0.6 % (ref 0–0.9)
LYMPHOCYTES # BLD AUTO: 0.86 K/UL (ref 1–4.8)
LYMPHOCYTES NFR BLD: 25.7 % (ref 22–41)
MACROCYTES BLD QL SMEAR: ABNORMAL
MCH RBC QN AUTO: 32.7 PG (ref 27–33)
MCHC RBC AUTO-ENTMCNC: 30.4 G/DL (ref 33.6–35)
MCV RBC AUTO: 107.5 FL (ref 81.4–97.8)
MICROCYTES BLD QL SMEAR: ABNORMAL
MONOCYTES # BLD AUTO: 0.22 K/UL (ref 0–0.85)
MONOCYTES NFR BLD AUTO: 6.6 % (ref 0–13.4)
MORPHOLOGY BLD-IMP: NORMAL
NEUTROPHILS # BLD AUTO: 2.15 K/UL (ref 2–7.15)
NEUTROPHILS NFR BLD: 64.1 % (ref 44–72)
NRBC # BLD AUTO: 0 K/UL
NRBC BLD-RTO: 0 /100 WBC
PLATELET # BLD AUTO: 361 K/UL (ref 164–446)
PLATELET BLD QL SMEAR: NORMAL
PMV BLD AUTO: 9.9 FL (ref 9–12.9)
POLYCHROMASIA BLD QL SMEAR: NORMAL
POTASSIUM SERPL-SCNC: 3.5 MMOL/L (ref 3.6–5.5)
PROT SERPL-MCNC: 5.5 G/DL (ref 6–8.2)
RBC # BLD AUTO: 2.94 M/UL (ref 4.2–5.4)
RBC BLD AUTO: PRESENT
SARS-COV-2 RNA RESP QL NAA+PROBE: NOTDETECTED
SODIUM SERPL-SCNC: 141 MMOL/L (ref 135–145)
SPECIMEN SOURCE: NORMAL
TSH SERPL DL<=0.005 MIU/L-ACNC: 3.23 UIU/ML (ref 0.38–5.33)
WBC # BLD AUTO: 3.4 K/UL (ref 4.8–10.8)

## 2021-04-28 PROCEDURE — 85025 COMPLETE CBC W/AUTO DIFF WBC: CPT

## 2021-04-28 PROCEDURE — 97165 OT EVAL LOW COMPLEX 30 MIN: CPT

## 2021-04-28 PROCEDURE — 80053 COMPREHEN METABOLIC PANEL: CPT

## 2021-04-28 PROCEDURE — 84443 ASSAY THYROID STIM HORMONE: CPT

## 2021-04-28 PROCEDURE — 99233 SBSQ HOSP IP/OBS HIGH 50: CPT | Performed by: PHYSICAL MEDICINE & REHABILITATION

## 2021-04-28 PROCEDURE — 36415 COLL VENOUS BLD VENIPUNCTURE: CPT

## 2021-04-28 PROCEDURE — 94760 N-INVAS EAR/PLS OXIMETRY 1: CPT

## 2021-04-28 PROCEDURE — 97110 THERAPEUTIC EXERCISES: CPT

## 2021-04-28 PROCEDURE — A9270 NON-COVERED ITEM OR SERVICE: HCPCS | Performed by: PHYSICAL MEDICINE & REHABILITATION

## 2021-04-28 PROCEDURE — 83036 HEMOGLOBIN GLYCOSYLATED A1C: CPT

## 2021-04-28 PROCEDURE — 82306 VITAMIN D 25 HYDROXY: CPT

## 2021-04-28 PROCEDURE — 97530 THERAPEUTIC ACTIVITIES: CPT

## 2021-04-28 PROCEDURE — 700102 HCHG RX REV CODE 250 W/ 637 OVERRIDE(OP): Performed by: PHYSICAL MEDICINE & REHABILITATION

## 2021-04-28 PROCEDURE — 770010 HCHG ROOM/CARE - REHAB SEMI PRIVAT*

## 2021-04-28 PROCEDURE — 97116 GAIT TRAINING THERAPY: CPT

## 2021-04-28 RX ORDER — MIDODRINE HYDROCHLORIDE 2.5 MG/1
7.5 TABLET ORAL 3 TIMES DAILY
Status: DISCONTINUED | OUTPATIENT
Start: 2021-04-28 | End: 2021-04-30

## 2021-04-28 RX ADMIN — TRAZODONE HYDROCHLORIDE 50 MG: 50 TABLET ORAL at 19:35

## 2021-04-28 RX ADMIN — OMEPRAZOLE 20 MG: 20 CAPSULE, DELAYED RELEASE ORAL at 19:34

## 2021-04-28 RX ADMIN — LEVETIRACETAM 1000 MG: 500 TABLET ORAL at 19:34

## 2021-04-28 RX ADMIN — MAGNESIUM OXIDE TAB 400 MG (241.3 MG ELEMENTAL MG) 400 MG: 400 (241.3 MG) TAB at 19:35

## 2021-04-28 RX ADMIN — MAGNESIUM OXIDE TAB 400 MG (241.3 MG ELEMENTAL MG) 400 MG: 400 (241.3 MG) TAB at 08:31

## 2021-04-28 RX ADMIN — LEVETIRACETAM 1000 MG: 500 TABLET ORAL at 08:31

## 2021-04-28 RX ADMIN — MIDODRINE HYDROCHLORIDE 7.5 MG: 2.5 TABLET ORAL at 19:34

## 2021-04-28 RX ADMIN — MIDODRINE HYDROCHLORIDE 5 MG: 2.5 TABLET ORAL at 08:31

## 2021-04-28 RX ADMIN — OMEPRAZOLE 20 MG: 20 CAPSULE, DELAYED RELEASE ORAL at 08:31

## 2021-04-28 RX ADMIN — MIDODRINE HYDROCHLORIDE 7.5 MG: 2.5 TABLET ORAL at 14:35

## 2021-04-28 RX ADMIN — MIRTAZAPINE 15 MG: 15 TABLET, ORALLY DISINTEGRATING ORAL at 19:35

## 2021-04-28 ASSESSMENT — ACTIVITIES OF DAILY LIVING (ADL)
BED_CHAIR_WHEELCHAIR_TRANSFER_DESCRIPTION: INCREASED TIME;SUPERVISION FOR SAFETY;VERBAL CUEING;INITIAL PREPARATION FOR TASK
TOILET_TRANSFER_DESCRIPTION: GRAB BAR;INCREASED TIME;SUPERVISION FOR SAFETY;SET-UP OF EQUIPMENT;VERBAL CUEING
TUB_SHOWER_TRANSFER_DESCRIPTION: GRAB BAR;SHOWER BENCH;INCREASED TIME;INITIAL PREPARATION FOR TASK;SET-UP OF EQUIPMENT;SUPERVISION FOR SAFETY
BED_CHAIR_WHEELCHAIR_TRANSFER_DESCRIPTION: INCREASED TIME;SUPERVISION FOR SAFETY;VERBAL CUEING;SET-UP OF EQUIPMENT
TOILETING: INDEPENDENT

## 2021-04-28 ASSESSMENT — BRIEF INTERVIEW FOR MENTAL STATUS (BIMS)
WHAT DAY OF THE WEEK IS IT: CORRECT
ASKED TO RECALL SOCK: YES, NO CUE REQUIRED
INITIAL REPETITION OF BED BLUE SOCK - FIRST ATTEMPT: 3
ASKED TO RECALL BED: NO, COULD NOT RECALL
ASKED TO RECALL BLUE: NO, COULD NOT RECALL
WHAT YEAR IS IT: CORRECT
BIMS SUMMARY SCORE: 11
WHAT MONTH IS IT: ACCURATE WITHIN 5 DAYS

## 2021-04-28 ASSESSMENT — GAIT ASSESSMENTS
GAIT LEVEL OF ASSIST: CONTACT GUARD ASSIST
ASSISTIVE DEVICE: FRONT WHEEL WALKER
DISTANCE (FEET): 220
DEVIATION: BRADYKINETIC;DECREASED HEEL STRIKE;DECREASED TOE OFF

## 2021-04-28 NOTE — PROGRESS NOTES
"Rehab Progress Note     Encounter Date: 4/28/2021    CC: TBI, poor sleep    Interval Events (Subjective)  Patient sitting up in room. She reports poor sleep overnight. She reports she is normally on Remeron, Seroquel and Trazodone but does not think they work very well if she is on all three. She reports last night she had Remeron and Trazodone and had a \"very long night.\" Reviewed admission labs including improved anemia, hypokalemia, and elevated LFTs. Discussed about monitoring Tylenol use. Denies NVD. Denies SOB.     Objective:  VITAL SIGNS: /67   Pulse 100   Temp 36.5 °C (97.7 °F) (Temporal)   Resp 18   Ht 1.702 m (5' 7\")   Wt 58 kg (127 lb 13.9 oz)   SpO2 93%   BMI 20.03 kg/m²   Gen: NAD  Psych: Mood and affect appropriate  CV: RRR, no edema  Resp: CTAB, no upper airway sounds  Abd: NTND  Neuro: AOx4, following commands    Recent Results (from the past 72 hour(s))   Basic Metabolic Panel    Collection Time: 04/26/21  7:01 AM   Result Value Ref Range    Sodium 138 135 - 145 mmol/L    Potassium 3.9 3.6 - 5.5 mmol/L    Chloride 105 96 - 112 mmol/L    Co2 27 20 - 33 mmol/L    Glucose 83 65 - 99 mg/dL    Bun 2 (L) 8 - 22 mg/dL    Creatinine 0.21 (L) 0.50 - 1.40 mg/dL    Calcium 7.7 (L) 8.5 - 10.5 mg/dL    Anion Gap 6.0 (L) 7.0 - 16.0   MAGNESIUM    Collection Time: 04/26/21  7:01 AM   Result Value Ref Range    Magnesium 1.6 1.5 - 2.5 mg/dL   ESTIMATED GFR    Collection Time: 04/26/21  7:01 AM   Result Value Ref Range    GFR If African American >60 >60 mL/min/1.73 m 2    GFR If Non African American >60 >60 mL/min/1.73 m 2   SARS-CoV-2 PCR (24 hour In-House): Collect NP swab in VTM    Collection Time: 04/27/21  1:15 PM    Specimen: Nasopharyngeal; Respirate   Result Value Ref Range    SARS-CoV-2 Source NP Swab     SARS-CoV-2 by PCR NotDetected    CBC with Differential    Collection Time: 04/28/21  5:41 AM   Result Value Ref Range    WBC 3.4 (L) 4.8 - 10.8 K/uL    RBC 2.94 (L) 4.20 - 5.40 M/uL    " Hemoglobin 9.6 (L) 12.0 - 16.0 g/dL    Hematocrit 31.6 (L) 37.0 - 47.0 %    .5 (H) 81.4 - 97.8 fL    MCH 32.7 27.0 - 33.0 pg    MCHC 30.4 (L) 33.6 - 35.0 g/dL    RDW 84.1 (H) 35.9 - 50.0 fL    Platelet Count 361 164 - 446 K/uL    MPV 9.9 9.0 - 12.9 fL    Neutrophils-Polys 64.10 44.00 - 72.00 %    Lymphocytes 25.70 22.00 - 41.00 %    Monocytes 6.60 0.00 - 13.40 %    Eosinophils 2.10 0.00 - 6.90 %    Basophils 0.90 0.00 - 1.80 %    Immature Granulocytes 0.60 0.00 - 0.90 %    Nucleated RBC 0.00 /100 WBC    Neutrophils (Absolute) 2.15 2.00 - 7.15 K/uL    Lymphs (Absolute) 0.86 (L) 1.00 - 4.80 K/uL    Monos (Absolute) 0.22 0.00 - 0.85 K/uL    Eos (Absolute) 0.07 0.00 - 0.51 K/uL    Baso (Absolute) 0.03 0.00 - 0.12 K/uL    Immature Granulocytes (abs) 0.02 0.00 - 0.11 K/uL    NRBC (Absolute) 0.00 K/uL    Anisocytosis 3+ (A)     Macrocytosis 3+ (A)     Microcytosis 1+    Comp Metabolic Panel (CMP)    Collection Time: 04/28/21  5:41 AM   Result Value Ref Range    Sodium 141 135 - 145 mmol/L    Potassium 3.5 (L) 3.6 - 5.5 mmol/L    Chloride 106 96 - 112 mmol/L    Co2 29 20 - 33 mmol/L    Anion Gap 6.0 (L) 7.0 - 16.0    Glucose 106 (H) 65 - 99 mg/dL    Bun 4 (L) 8 - 22 mg/dL    Creatinine 0.27 (L) 0.50 - 1.40 mg/dL    Calcium 8.5 8.5 - 10.5 mg/dL    AST(SGOT) 154 (H) 12 - 45 U/L    ALT(SGPT) 104 (H) 2 - 50 U/L    Alkaline Phosphatase 239 (H) 30 - 99 U/L    Total Bilirubin 2.5 (H) 0.1 - 1.5 mg/dL    Albumin 2.3 (L) 3.2 - 4.9 g/dL    Total Protein 5.5 (L) 6.0 - 8.2 g/dL    Globulin 3.2 1.9 - 3.5 g/dL    A-G Ratio 0.7 g/dL   HEMOGLOBIN A1C    Collection Time: 04/28/21  5:41 AM   Result Value Ref Range    Glycohemoglobin 4.1 4.0 - 5.6 %    Est Avg Glucose 71 mg/dL   TSH with Reflex to FT4    Collection Time: 04/28/21  5:41 AM   Result Value Ref Range    TSH 3.230 0.380 - 5.330 uIU/mL   ESTIMATED GFR    Collection Time: 04/28/21  5:41 AM   Result Value Ref Range    GFR If African American >60 >60 mL/min/1.73 m 2    GFR If  Non African American >60 >60 mL/min/1.73 m 2   PERIPHERAL SMEAR REVIEW    Collection Time: 04/28/21  5:41 AM   Result Value Ref Range    Peripheral Smear Review see below    PLATELET ESTIMATE    Collection Time: 04/28/21  5:41 AM   Result Value Ref Range    Plt Estimation Normal    MORPHOLOGY    Collection Time: 04/28/21  5:41 AM   Result Value Ref Range    RBC Morphology Present     Polychromia 1+    DIFFERENTIAL COMMENT    Collection Time: 04/28/21  5:41 AM   Result Value Ref Range    Comments-Diff see below        Current Facility-Administered Medications   Medication Frequency   • Respiratory Therapy Consult Continuous RT   • Pharmacy Consult Request ...Pain Management Review 1 Each PHARMACY TO DOSE   • hydrALAZINE (APRESOLINE) tablet 25 mg Q8HRS PRN   • acetaminophen (Tylenol) tablet 650 mg Q4HRS PRN   • senna-docusate (PERICOLACE or SENOKOT S) 8.6-50 MG per tablet 2 tablet BID    And   • polyethylene glycol/lytes (MIRALAX) PACKET 1 Packet QDAY PRN    And   • magnesium hydroxide (MILK OF MAGNESIA) suspension 30 mL QDAY PRN    And   • bisacodyl (DULCOLAX) suppository 10 mg QDAY PRN   • artificial tears ophthalmic solution 1 Drop PRN   • benzocaine-menthol (CEPACOL) lozenge 1 Lozenge Q2HRS PRN   • mag hydrox-al hydrox-simeth (MAALOX PLUS ES or MYLANTA DS) suspension 20 mL Q2HRS PRN   • ondansetron (ZOFRAN ODT) dispertab 4 mg 4X/DAY PRN    Or   • ondansetron (ZOFRAN) syringe/vial injection 4 mg 4X/DAY PRN   • traZODone (DESYREL) tablet 50 mg QHS PRN   • sodium chloride (OCEAN) 0.65 % nasal spray 2 Spray PRN   • nicotine (NICODERM) 14 MG/24HR 14 mg Daily-0600   • magnesium oxide (MAG-OX) tablet 400 mg BID   • furosemide (LASIX) tablet 20 mg Q DAY   • levETIRAcetam (KEPPRA) tablet 1,000 mg Q12HRS   • midodrine (PROAMATINE) tablet 5 mg TID   • traZODone (DESYREL) tablet 50 mg QHS   • mirtazapine (Remeron) orally disintegrating tab 15 mg QHS   • omeprazole (PRILOSEC) capsule 20 mg BID       Orders Placed This  Encounter   Procedures   • Diet Order Diet: Level 6 - Soft and Bite Sized (float pills in puree); Liquid level: Level 0 - Thin     Standing Status:   Standing     Number of Occurrences:   1     Order Specific Question:   Diet:     Answer:   Level 6 - Soft and Bite Sized [23]     Comments:   float pills in puree     Order Specific Question:   Liquid level     Answer:   Level 0 - Thin       Assessment:  Active Hospital Problems    Diagnosis    • *SDH (subdural hematoma) (HCC)    • Transaminitis    • Anxiety    • Chest wall discomfort    • Thrombocytopenia (HCC)    • GI bleed    • Acute respiratory failure (HCC)    • Cardiac arrest (HCC)    • Right atrial mass        Medical Decision Making and Plan:  TBI (Traumatic Brain Injury):  Fall with bilateral SDHs managed conservatively. Hospital course complicated by seizure, PEA arrest requiring intubation, and GI bleed.  -PT and OT for mobility and ADLs  -SLP for cognition     Dysphagia - Patient on dysphagia diet. SLP for swallow evaluation. Limited by lack of dentures     Seizures - New witnessed seizure after SDHs. On Keppra 1000 mg BID. Monitor   -Follow-up Neurology      Hypotension - Patient on Midodrine 5 mg on transfer. Will monitor   -SBP into low 90s. Increase midodrine to 7.5, HR into 100s.      GI Bleed - Patient on omeprazole 20 mg BID     Anemia - S/p GI bleed managed conservatively.  Check AM CBC. Did require transfusion   -9.6 improved from 8.1. Continue to monitor      Hypomagnesemia - Patient on Magnesium supplement on transfer.      Substance Abuse - Patient on Nicotine patch. History of heroin abuse.      Cirrhosis - LFTs elevated on admission. Continue to monitor    Depression/Anxiety - Patient on Remeron 15 mg QHS and Trazodone 50 mg. Has nightmares     Cardiac Mass - Right atrial mass of 1.4 x 2.3 cm. Cardiology evaluated and felt enlarge valve but needs outpatient cardiac MRI     DVT Ppx - Patient high risk for bleed and ambulating.     Total time:   36 minutes.  I spent greater than 50% of the time for patient care and coordination on this date, including unit/floor time, and face-to-face time with the patient as per assessment and plan above.  Discussion included sleep control, nightmares, history of anxiety, low SBP, increase midodrine and improving anemia.     Hortencia Dudley M.D.

## 2021-04-28 NOTE — FLOWSHEET NOTE
04/28/21 0819   Events/Summary/Plan   Events/Summary/Plan 02 spot check on RA:  88-90%.  Will continue to attempt RA   Vital Signs   Pulse 100   Respiration 18   Pulse Oximetry 93 %   $ Pulse Oximetry (Spot Check) Yes   Respiratory Assessment   Level of Consciousness Alert   Chest Exam   Work Of Breathing / Effort Within Normal Limits   Oxygen   O2 (LPM) 1.5   O2 Delivery Device Silicone Nasal Cannula

## 2021-04-28 NOTE — THERAPY
Speech Language Pathology   Initial Assessment     Patient Name: Nathalie Aguilar  AGE:  60 y.o., SEX:  female  Medical Record #: [id number]  Today's Date: 4/28/2021     Subjective    Patient was seen for dysphagia evaluation and cognitive linguistic evaluation, seated upright in bed. Patient was alert, oriented and cooperative with assessments.      Objective       04/28/21 0831   Precautions   Precautions Fall Risk   Prior Living Situation   Prior Services None;Home-Independent   Prior Level Of Function   Communication Within Functional Limits   Swallow Within Functional Limits   Dentition Edentulous   Dentures None  (Has dentures at home)   Hearing Within Functional Limits for Evaluation   Vision Wears Corrective Lenses   Patient's Primary Language English   Occupation (Pre-Hospital Vocational) Not Employed   Prior Functioning: Everyday Activities   Functional Cognition Independent   Receptive Language / Auditory Comprehension   Receptive Language / Auditory Comprehension WDL   Expressive Language   Expressive Language (WDL) WDL   Reading Comprehension    Reading Words Within Functional Limits (6-7)   Reading Sentences Within Functional Limits (6-7)   Functional Reading Materials Supervision (5)   Written Language Expression   Written Language Expression (WDL) WDL   Dominant Hand Right   Cognition   Simple Attention Within Functional Limits (6-7)   Moderate Attention Within Functional Limits (6-7)   Complex Attention Minimal (4)   Orientation  Within Functional Limits (6-7)   Verbal Short Term Memory   (moderate)   Visual Short Term Memory Moderate (3)   Functional Problem Solving Supervision (5)   Insight into Deficits Within Functional Limits (6-7)   Clock Drawing Impaired Hand Placement   Social / Pragmatic Communication   Social / Pragmatic Communication WDL   Tracheostomy   Tracheostomy No   Speech Mechanisms / Voice Production   Speech Mechanisms / Voice Production (WDL) WDL   Labial Function   Labial  Function (WDL) WDL   Lingual Function   Lingual Function (WDL) WDL   Jaw Function   Jaw Function (WDL) WDL   Velar Function   Velar Function (WDL) WDL   Laryngeal Function   Laryngeal Function (WDL) WDL   Swallowing   Pureed (4) Within Functional Limits   Thin Liquid Within Functional Limits   Masticated Foods Minimal   Dysphagia Strategies / Recommendations   Compensatory Strategies Head of Bed 90 Degrees During Eating / Drinking   Diet / Liquid Recommendation Soft & Bite-Sized (6) - (Dysphagia III);Thin (0)   Medication Administration  Float Whole with Puree   Therapy Interventions None;No Swallowing Intervention Required   Barriers To Oral Feeding   Barriers To Oral Feeding   (pt does not have dentures with her)   Swallowing/Nutritional Status   Swallowing/Nutritional Status Modified food consistency   Functional Level of Assist   Comprehension Independent   Expression Independent   Social Interaction Independent   Problem Solving Supervision   Problem Solving Description Verbal cueing;Therapy schedule;Increased time   Memory Moderate Assist   Memory Description Therapy schedule;Verbal cueing;Increased time   SCCAN (Scales of Cognitive and Communicative Ability for Neurorehabilitation)   Oral Expression - Raw Score 19   Oral Expression - Scale Performance Score 100   Orientation - Raw Score 12   Orientation - Scale Performance Score 100   Memory - Raw Score 10   Memory - Scale Performance Score 53   Speech Comprehension - Raw Score 13   Speech Comprehension - Scale Performance Score 100   Reading Comprehension - Raw Score 10   Reading Comprehension - Scale Performance Score 83   Writing - Raw Score 7   Writing - Scale Performance Score 100   Attention - Raw Score 14   Attention - Scale Performance Score 88   Problem Solving - Raw Score 22   Problem Solving - Scale Performance Score 96   SCCAN Total Raw Score 82   SCCAN Degree of Severity Mild Impairment   Current Discharge Plan   Current Discharge Plan  "Temporarily go to Family /  Friend's Home   Benefit   Therapy Benefit Patient would benefit from Inpatient Rehab Speech-Language Pathology to address above identified deficits.   Interdisciplinary Plan of Care Collaboration   IDT Collaboration with  Recreation Therapist   Patient Position at End of Therapy In Bed;Tray Table within Reach;Phone within Reach;Call Light within Reach   Collaboration Comments transfer of care   Speech Language Pathologist Assigned   Assigned SLP / Extension TBD 30 Cog only   SLP Total Time Spent   SLP Individual Total Time Spent (Mins) 60   Evaluation Charges   Charges Yes       Assessment    Patient is 60 y.o. female with a diagnosis of SDH.  Additional factors influencing patient status/progress (ie: cognitive factors, co-morbidities, social support, etc): independent PLOF, unknown family support, pmhx of substance abuse.    H&P: \"The patient is a 60 y.o. female with a past medical history of substance abuse and asthma who presented on 4/13/21 with AMS after fall. Patient reportedly had headstrike during fall and was found down in home by her daughter. Patient BIBA and found to have acute on chronic bilateral SDHs. NSG was consulted and recommended conservative management.  In ED patient had seizure like activity while being transferred and became apneic and non-responsive. Patient underwent 5 minutes of compression and 1 round of epinephrine.  Patient was intubated and admitted to ICU for PEA arrest.  Patient has since been stabilized on Keppra.  Patient with EEG on 4/15/21 which showed no seizure like activity. Patient's stay also complicated by melena and GI was consulted. Per GI hold on intervention at that time and manage with PPI. TTE reportedly showed right atrial mass and cardiology was consulted. Per cardiology felt the mass was most likely prominent eustachian valve but should have cardiac MRI as outpatient. Patient was extubated on 4/18/21.  Patient did require transfusion " "while inpatient.\"        Dysphagia: Patient is edentulous and reports that her dentures are at home. Patient presents with intact/symmetrical oral structures and OME is WNL. SLP provided PO trials of thin, puree, soft, and regular-easy to chew, alternating solids/liquids to emulate a meal. Patient presents with intact oral phase of the swallow, as indicated by adequate labial seal and no anterior loss of bolus, timely oral transit and trace oral cavity residue with all PO trials. She also presents with intact pharyngeal phase of the swallow, as indicated by timely swallow upon palpation of hyolaryngeal elevation/excursion and no overt s/sx of asp for all PO trials. Patient reported that the regular texture was too difficult to chew without her dentures. SLP recs soft and bite sized textures and upgrade to regular if/when she has her dentures.     Cognitive-Linguistic: SLP administered the Scales of Cognitive and Communicative Abilities for Neurorehabilitation (SCCAN). Patient scored 82, indicting a mild cognitive impairment. See above chart for details. Patient presents with intact receptive/expressive language and orientation. However, she also presents with mild deficits in attention and moderate deficits in memory.      Plan    Upgrade diet to SB6/TN0 (soft and bite sized with thin liquids)  Dysphagia tx not warranted as patient's swallow function is WNL  MD to upgrade diet to regular if/when patient has her dentures    Cognitive tx to target memory and IADLs    Recommend Speech Therapy 30-60 minutes per day 5-6 days per week for 2 weeks for the following treatments:  SLP Self Care / ADL Training , SLP Cognitive Skill Development and SLP Group Treatment.    Passport items to be completed:  Express basic needs, understand food/liquid recommendations, consistently follow swallow precautions, manage finances, manage medications, arrive to therapy appointments on time, complete daily memory log entries, solve " problems related to safety situations, review education related to hospitalization, complete caregiver training     Goals:  Long term and short term goals have been discussed with patient and they are in agreement.    Speech Therapy Problems     Problem: Memory STGs     Dates: Start: 04/28/21       Goal: STG-Within one week, patient will     Dates: Start: 04/28/21       Description: 1) Individualized goal:  use external memory aids and internal memory strategies to recall details for POC with 90% accuracy and minimal verbal cues  2) Interventions:  SLP Self Care / ADL Training , SLP Cognitive Skill Development, and SLP Group Treatment                    Problem: Problem Solving STGs     Dates: Start: 04/28/21       Goal: STG-Within one week, patient will     Dates: Start: 04/28/21       Description: 1) Individualized goal: complete IADL tasks (medication management, finances, etc.) with 90% accuracy and minimal verbal cues to return to independent PLOF.   2) Interventions:  SLP Self Care / ADL Training , SLP Cognitive Skill Development, and SLP Group Treatment                    Problem: Speech/Swallowing LTGs     Dates: Start: 04/28/21       Goal: LTG-By discharge, patient will     Dates: Start: 04/28/21       Description: 1) Individualized goal:  improve cognitive skills from the mild range to the typical functioning range, as indicated by standardized assessment and functional skills  2) Interventions:  SLP Self Care / ADL Training , SLP Cognitive Skill Development, and SLP Group Treatment

## 2021-04-28 NOTE — CARE PLAN
Problem: Safety  Goal: Will remain free from falls  Note: Patient uses call light consistently and appropriately this shift.  Waits for assistance when needed and does not attempt self transfer.  Able to verbalize needs.  Will continue to monitor.      Problem: Pain Management  Goal: Pain level will decrease to patient's comfort goal  Note: Patient able to verbalize needs.  Denies pain or discomfort this shift and no s/s same noted.  Will continue to monitor.

## 2021-04-28 NOTE — THERAPY
"Occupational Therapy   Initial Evaluation     Patient Name: Nathalie Aguilar  Age:  60 y.o., Sex:  female  Medical Record #: [id number]  Today's Date: 4/28/2021     Subjective    \"I had a horrible night last night\"     Objective       04/28/21 0701   Prior Living Situation   Prior Services None;Home-Independent   Housing / Facility 1 Story Apartment / Condo   Steps Into Home 3   Steps In Home 0   Bathroom Set up Bathtub / Shower Combination   Equipment Owned None   Lives with - Patient's Self Care Capacity Alone and Able to Care For Self   Comments pt will d/c to daughters home   Prior Level of ADL Function   Self Feeding Independent   Grooming / Hygiene Independent   Bathing Independent   Dressing Independent   Toileting Independent   Prior Level of IADL Function   Medication Management Independent   Laundry Independent   Kitchen Mobility Independent   Finances Independent   Home Management Independent   Shopping Independent   Prior Level Of Mobility Independent Without Device in Community;Independent With Steps in Community;Supervision Without Device in Home   Driving / Transportation Driving Independent   Occupation (Pre-Hospital Vocational) Not Employed   Prior Functioning: Everyday Activities   Self Care Independent   Indoor Mobility (Ambulation) Independent   Stairs Independent   Functional Cognition Independent   Vitals   Pulse Oximetry 89 %   O2 Delivery Device Silicone Nasal Cannula;None - Room Air   Vitals Comments pt did not have nasal cannul on when OT arrived    Cognition    Level of Consciousness Alert   ABS (Agitated Behavior Scale)   Agitated Behavior Scale Performed Yes   Short Attention Span, Easy Distractibility, Inability to Concentrate 1   Impulsive, Impatient, Low Tolerance for Pain or Frustration 2   Uncooperative, Resistant to Care, Demanding 2   Violent and/or Threatening Violence Toward People or Property 1   Explosive and/or Unpredictable Anger 1   Rocking, Rubbing, Moaning, Other " "Self-Stimulating Behavior 1   Pulling at Tubes, Restraints, etc. 1   Wandering from Treatment Area 1   Restlessness, Pacing, Excessive Movement 1   Repetitive Behaviors, Motor and/or Verbal 1   Rapid, Loud or Excessive Talking 1   Sudden Changes of Mood 2   Easily Initiated - Excessive Crying and/or Laughter 2   Self-Abusiveness, Physical and/or Verbal 1   Agitated Behavior Scale Total Score 18   Level of Severity No Agitation   Cognitive Pattern Assessment   Cognitive Pattern Assessment Used BIMS   Brief Interview for Mental Status (BIMS)   Repetition of Three Words (First Attempt) 3   Temporal Orientation: Year Correct   Temporal Orientation: Month Accurate within 5 days   Temporal Orientation: Day Correct   Recall: \"Sock\" Yes, no cue required   Recall: \"Blue\" No, could not recall   Recall: \"Bed\" No, could not recall   BIMS Summary Score 11   Vision Screen   Vision Not tested   Passive ROM Upper Body   Passive ROM Upper Body WDL   Active ROM Upper Body   Active ROM Upper Body  WDL   Dominant Hand Right   Strength Upper Body   Upper Body Strength  WDL   Sensation Upper Body   Upper Extremity Sensation  WDL   Upper Body Muscle Tone   Upper Body Muscle Tone  WDL   Balance Assessment   Sitting Balance (Static) Good   Sitting Balance (Dynamic) Good   Standing Balance (Static) Fair -   Standing Balance (Dynamic) Fair -   Weight Shift Sitting Good   Weight Shift Standing Good   Bed Mobility    Supine to Sit Stand by Assist   Sit to Supine Stand by Assist   Sit to Stand Contact Guard Assist   Coordination Upper Body   Coordination X   Fine Motor Coordination pt reports some FM impairement and tremors in hands   Eating   Assistance Needed Set-up / clean-up   Physical Assistance Level No physical assistance   CARE Score 5   Eating Discharge Goal   Discharge Goal 6   Oral Hygiene   Assistance Needed Set-up / clean-up   Physical Assistance Level No physical assistance   CARE Score 5   Oral Hygiene Discharge Goal   Discharge " Goal 6   Shower/Bathe Self   Assistance Needed Incidental touching;Set-up / clean-up;Supervision   Physical Assistance Level No physical assistance   CARE Score 4   Shower/Bathe Self Discharge Goal   Discharge Goal 6   Upper Body Dressing   Assistance Needed Physical assistance;Set-up / clean-up   Physical Assistance Level 25% or less   CARE Score 3   Upper Body Dressing Discharge Goal   Discharge Goal 6   Lower Body Dressing   Assistance Needed Incidental touching;Supervision;Set-up / clean-up   Physical Assistance Level No physical assistance   CARE Score 4   Lower Body Dressing Discharge Goal   Discharge Goal 6   Putting On/Taking Off Footwear   Assistance Needed Incidental touching;Set-up / clean-up;Supervision   Physical Assistance Level No physical assistance   CARE Score 4   Putting On/Taking Off Footwear Discharge Goal   Discharge Goal 6   Toileting Hygiene   Assistance Needed Set-up / clean-up;Supervision   Physical Assistance Level No physical assistance   CARE Score 4   Toileting Hygiene Discharge Goal   Discharge Goal 6   Toilet Transfer   Assistance Needed Incidental touching;Supervision;Set-up / clean-up   Physical Assistance Level No physical assistance   CARE Score 4   Toilet Transfer Discharge Goal   Discharge Goal 6   Hearing, Speech, and Vision   Expression of Ideas and Wants Some difficulty   Understanding Verbal and Non-Verbal Content Understands   Functional Level of Assist   Eating Modified Independent   Eating Description Verbal cueing;Set-up of equipment or meal/tube feeding;Increased time  (some spillage due to hand tremors)   Grooming Supervision;Seated   Grooming Description Seated in wheelchair at sink;Set-up of equipment   Bathing Contact Guard Assist   Bathing Description Grab bar;Hand held shower;Tub bench;Set-up of equipment;Supervision for safety;Verbal cueing   Upper Body Dressing Minimal Assist   Upper Body Dressing Description Assist with pulling shirt over head;Increased  time;Set-up of equipment;Supervision for safety;Initial preparation for task   Lower Body Dressing Contact Guard Assist   Lower Body Dressing Description Grab bar;Increased time;Supervision for safety;Verbal cueing;Set-up of equipment;Initial preparation for task   Toileting Stand by Assist   Toileting Description Grab bar;Assist for standing balance;Set-up of equipment;Supervision for safety;Increased time;Initial preparation for task   Bed, Chair, Wheelchair Transfer Stand by Assist   Bed Chair Wheelchair Transfer Description Increased time;Supervision for safety;Verbal cueing;Initial preparation for task   Toilet Transfers Contact Guard Assist   Toilet Transfer Description Grab bar;Increased time;Supervision for safety;Set-up of equipment;Verbal cueing   Tub / Shower Transfers Contact Guard Assist   Tub Shower Transfer Description Grab bar;Shower bench;Increased time;Initial preparation for task;Set-up of equipment;Supervision for safety   Problem List   Problem List Decreased Active Daily Living Skills;Decreased Homemaking Skills;Decreased Functional Mobility;Decreased Activity Tolerance;Impaired Coordination Right Upper Extremity;Impaired Coordination Left Upper Extremity;Impaired Cognitive Function;Impaired Postural Control / Balance   Precautions   Precautions Fall Risk   Current Discharge Plan   Current Discharge Plan Temporarily go to Family /  Friend's Home   Benefit    Therapy Benefit Patient Would Benefit from Inpatient Rehab Occupational Therapy to Maximize [place] with ADLs, IADLs and Functional Mobility.   Interdisciplinary Plan of Care Collaboration   IDT Collaboration with  Nursing;Respiratory Therapist   Patient Position at End of Therapy In Bed;Bed Alarm On;Call Light within Reach;Tray Table within Reach;Phone within Reach   Collaboration Comments CLOF   Equipment Needs   Assistive Device / DME Parallel Bars;Tub Transfer Bench;Grab Bars In Shower / Tub;Grab Bars By Toilet;Shower Chair    Adaptive Equipment Reacher   Strengths & Barriers   Strengths Able to follow instructions;Alert and oriented;Independent prior level of function;Motivated for self care and independence;Supportive family;Willingly participates in therapeutic activities   Barriers Decreased endurance;Generalized weakness;Impaired activity tolerance;Impaired balance;Agitation   OT Total Time Spent   OT Individual Total Time Spent (Mins) 60   OT Charge Group   OT Evaluation OT Evaluation Low       Assessment  Patient is 60 y.o. female with a diagnosis of subdural hematoma.  Pt has a past medical history of substance abuse and asthma. She was found down in her home by her daughter where she reportedly hit her head. Pt has seizure like activity while being transferred in ER and became apneic and non-responsive. Pt underwent 5 minutes of compression and 1 round of epinephrin. Pt was intubated and admitted to ICU. Previously pt lived alone in single story apartment in [place]. Pt will d/c to her daughters home in [place] which is a one story house with 2 steps to enter. Pt presented with some agitation and frustration upon eval. She reports having trouble sleeping, nightmares, and incontinence. Pt completed ADLs with supervision to toro PEREZ. Pt was not wearing O2 when therapist entered and was advised to wear nasal canula until she is weaned off the O2. Pt would benefit from OT sessions focused on ADLs, balance, IADLs, and home/transfer safety.    Plan  Recommend Occupational Therapy  minutes per day 5-7 days per week for 7-10 days for the following treatments:  OT Orthotics Training, OT E Stim Attended, OT Group Therapy, OT Self Care/ADL, OT Cognitive Skill Dev, OT Community Reintegration, OT Manual Ther Technique, OT Neuro Re-Ed/Balance, OT Sensory Int Techniques, OT Therapeutic Activity, OT Evaluation and OT Therapeutic Exercise.    Passport items to be completed:  Perform bathroom transfers, complete dressing, complete feeding, get  ready for the day, prepare a simple meal, participate in household tasks, adapt home for safety needs, demonstrate home exercise program, complete caregiver training     Goals:  Long term and short term goals have been discussed with patient and they are in agreement.    Occupational Therapy Goals     Problem: Bathing     Dates: Start: 04/28/21       Goal: STG-Within one week, patient will bathe     Dates: Start: 04/28/21       Description: 1) Individualized Goal:  w/ supervision using DME and AE as needed  2) Interventions:  OT Orthotics Training, OT E Stim Attended, OT Group Therapy, OT Self Care/ADL, OT Cognitive Skill Dev, OT Community Reintegration, OT Manual Ther Technique, OT Neuro Re-Ed/Balance, OT Sensory Int Techniques, OT Therapeutic Activity, OT Evaluation, and OT Therapeutic Exercise                  Problem: Dressing     Dates: Start: 04/28/21       Goal: STG-Within one week, patient will retrieve clothing     Dates: Start: 04/28/21       Description: 1) Individualized Goal:  and dress w/ supervision   2) Interventions:  OT Orthotics Training, OT E Stim Attended, OT Group Therapy, OT Self Care/ADL, OT Cognitive Skill Dev, OT Community Reintegration, OT Manual Ther Technique, OT Neuro Re-Ed/Balance, OT Sensory Int Techniques, OT Therapeutic Activity, OT Evaluation, and OT Therapeutic Exercise                Problem: Functional Transfers     Dates: Start: 04/28/21       Goal: STG-Within one week, patient will transfer to step in shower     Dates: Start: 04/28/21       Description: 1) Individualized Goal:  w/ supervision using DME and AE as needed  2) Interventions:  OT Orthotics Training, OT E Stim Attended, OT Group Therapy, OT Self Care/ADL, OT Cognitive Skill Dev, OT Community Reintegration, OT Manual Ther Technique, OT Neuro Re-Ed/Balance, OT Sensory Int Techniques, OT Therapeutic Activity, OT Evaluation, and OT Therapeutic Exercise                Problem: IADL's     Dates: Start: 04/28/21        Goal: STG-Within one week, patient will access kitchen area     Dates: Start: 04/28/21       Description: 1) Individualized Goal:  w/ CGA using DME and AE as needed  2) Interventions:  OT Orthotics Training, OT E Stim Attended, OT Group Therapy, OT Self Care/ADL, OT Cognitive Skill Dev, OT Community Reintegration, OT Manual Ther Technique, OT Neuro Re-Ed/Balance, OT Sensory Int Techniques, OT Therapeutic Activity, OT Evaluation, and OT Therapeutic Exercise                Problem: OT Long Term Goals     Dates: Start: 04/28/21       Goal: LTG-By discharge, patient will complete basic self care tasks     Dates: Start: 04/28/21       Description: 1) Individualized Goal:  w/ mod I using DME and AE as needed  2) Interventions:  OT Orthotics Training, OT E Stim Attended, OT Group Therapy, OT Self Care/ADL, OT Cognitive Skill Dev, OT Community Reintegration, OT Manual Ther Technique, OT Neuro Re-Ed/Balance, OT Sensory Int Techniques, OT Therapeutic Activity, OT Evaluation, and OT Therapeutic Exercise          Goal: LTG-By discharge, patient will perform bathroom transfers     Dates: Start: 04/28/21       Description: 1) Individualized Goal:  w/ mod I using DME and AE as needed  2) Interventions:  OT Orthotics Training, OT E Stim Attended, OT Group Therapy, OT Self Care/ADL, OT Cognitive Skill Dev, OT Community Reintegration, OT Manual Ther Technique, OT Neuro Re-Ed/Balance, OT Sensory Int Techniques, OT Therapeutic Activity, OT Evaluation, and OT Therapeutic Exercise

## 2021-04-28 NOTE — THERAPY
04/28/21 1359   Precautions   Precautions Fall Risk   Pain 0 - 10 Group   Therapist Pain Assessment 0   Cognition    Level of Consciousness Alert   Gait Functional Level of Assist    Gait Level Of Assist Contact Guard Assist   Assistive Device Front Wheel Walker   Distance (Feet) 220   # of Times Distance was Traveled 3   Deviation Bradykinetic;Decreased Heel Strike;Decreased Toe Off   Transfer Functional Level of Assist   Bed, Chair, Wheelchair Transfer Stand by Assist   Bed Chair Wheelchair Transfer Description Increased time;Supervision for safety;Verbal cueing;Set-up of equipment   Bed Mobility    Supine to Sit Stand by Assist   Sit to Supine Stand by Assist   Sit to Stand Contact Guard Assist   Scooting Stand by Assist   Rolling Independent   PT Total Time Spent   PT Individual Total Time Spent (Mins) 60   PT Charge Group   Charges Yes   PT Gait Training 1   PT Therapeutic Exercise 2   PT Therapeutic Activities 1   Physical Therapy   Daily Treatment     Patient Name: Nathalie Aguilar  Age:  60 y.o., Sex:  female  Medical Record #: [id number]  Today's Date: 4/28/2021     Precautions  Precautions: Fall Risk    Subjective    The patient was resting in bed.  She agreed to PT.     Objective    The patient demonstrated bed mobility and transfer to the wheelchair with supervision.  In the gym she participated in gait training with a fww and tolerated 220 FT x3 intermittent CGA and occasional standing rests.  She reports having some muscle soreness in her legs from the increased activity.  The patient also did seated bilateral lower extremity therapeutic exercise 2 sets of 15 which are indicated.    Assessment    The patient tolerates gait training using a walker even though she is significantly weak and debilitated.  She is motivated to participate in therapy activities in order to recover her strength and function.       Plan    Safety education, therapeutic exercise for strength and endurance, gait training as  tolerated fww, up/down 4-6 stairs    Passport items to be completed:  Passport items to be completed:  Get in/out of bed safely, in/out of a vehicle, safely use mobility device, walk or wheel around home/community, navigate up and down stairs, show how to get up/down from the ground, ensure home is accessible, demonstrate HEP, complete caregiver training    Physical Therapy Problems     Problem: Mobility     Dates: Start: 04/27/21       Goal: STG-Within one week, patient will ascend and descend four to six stairs     Dates: Start: 04/27/21       Description: 1) Individualized goal: Up/down 4-6 stairs, handrails, SBA 1 week  2) Interventions:  PT Gait Training, PT Therapeutic Exercises, and PT Therapeutic Activity            Goal: STG-Within one week, patient will     Dates: Start: 04/27/21       Description: 1) Individualized goal: Gait fww/no AD with FT SBA, 1 week  2) Interventions:  PT Gait Training, PT Therapeutic Exercises, PT Neuro Re-Ed/Balance, and PT Therapeutic Activity                  Problem: Mobility Transfers     Dates: Start: 04/27/21       Goal: STG-Within one week, patient will transfer bed to chair     Dates: Start: 04/27/21       Description: 1) Individualized goal: Transfer bed to/from chair fww/no AD supervision, 1 week  2) Interventions:  PT Gait Training, PT Therapeutic Exercises, and PT Therapeutic Activity                  Problem: PT-Long Term Goals     Dates: Start: 04/27/21       Goal: LTG-By discharge, patient will ambulate     Dates: Start: 04/27/21       Description: 1) Individualized goal: Gait no  FT, independent at discharge  2) Interventions:  PT Gait Training, PT Therapeutic Exercises, PT Neuro Re-Ed/Balance, and PT Therapeutic Activity            Goal: LTG-By discharge, patient will transfer one surface to another     Dates: Start: 04/27/21       Description: 1) Individualized goal: Transfer 1 surface to another no AD, independent at discharge  2) Interventions:  PT Gait  Training, PT Therapeutic Exercises, and PT Therapeutic Activity            Goal: LTG-By discharge, patient will ambulate up/down 4-6 stairs     Dates: Start: 04/27/21       Description: 1) Individualized goal: Up/down 4-6 stairs handrails, independently at discharge  2) Interventions:  PT Gait Training, PT Therapeutic Exercises, PT Neuro Re-Ed/Balance, and PT Therapeutic Activity            Goal: LTG-By discharge, patient will transfer in/out of a car     Dates: Start: 04/27/21       Description: 1) Individualized goal: Car transfer no device supervision at discharge  2) Interventions:  PT Gait Training, PT Therapeutic Exercises, PT Neuro Re-Ed/Balance, and PT Therapeutic Activity

## 2021-04-28 NOTE — THERAPY
"Recreational Therapy   Initial Evaluation     Patient Name: Nathalie Aguilar  Age:  60 y.o., Sex:  female  Medical Record #: [id number]  Today's Date: 4/28/2021     Subjective    Pt in bed and agreeable for evaluation. \"Not good, I'm tired I didn't sleep very well last night... I had nightmares...wokeup every hour\"- Pt report the felt tired as a result of not sleeping very well last night.     Objective       04/28/21 0931   Leisure History   Leisure Interests Reading;Family   Pre-Morbid Leisure Lifestyle Active   Prior Living Arrangements   Lives with - Patient's Self Care Capacity Alone and Able to Care For Self   Steps Into Home 3   Steps In Home 0   Driving / Transportation Driving Independent   Functional Ability Status - Physical   Endurance Low   Right  Weak   Left  Weak   Right Arm Weak   Left Arm Weak   Right Leg Strong   Left Leg Strong   Upper Extremity Gross Motor Uses Both Arms / Hands  (Pt reported thier  strength is weaker than normal)   Lower Extremity Gross Motor Uses Both Legs   Fine Motor Manipulates Small Objects   Functional Ability Status - Cognitive   Attention Span Remains on Task   Comprehension Follows One Step Commands   Judgment Able to Make Independent Decisions   Functional Ability Status - Emotional    Affect Appropriate   Mood Appropriate   Behavior Appropriate   Leisure Competence Measure   Leisure Awareness Independent   Leisure Attitude Independent   Leisure Skills Minimal Assist   Cultural / Social Behaviors Independent   Interpersonal Skills Independent   Community Integration Skills Minimal Assist   Social Contact Independent   Community Participation Minimal Assist   Clinical Impression   Clinical Impression Impaired Cognitive Leisure Functioning;Other (Comments)  (Memory )   Current Discharge Plan   Current Discharge Plan Temporarily go to Family /  Friend's Home   Benefit    Benefit Patient would Benefit from Inpatient Recreational Therapy to Maximize Independent " "Leisure Functioning    Interdisciplinary Plan of Care Collaboration   IDT Collaboration with  Other (See Comments)  (Recreation therapy student )   Patient Position at End of Therapy In Bed;Tray Table within Reach;Call Light within Reach;Phone within Reach   Strengths & Barriers   Strengths Able to follow instructions;Alert and oriented;Effective communication skills;Motivated for self care and independence;Pleasant and cooperative;Willingly participates in therapeutic activities   Barriers Impaired functional cognition;Impaired activity tolerance;Impaired balance   Treatment Time   Total Time Spent (mins) 30   Procedural Tracking   Procedural Tracking Community Re-Integration;Community Skills Development;Leisure Skills Awareness;Leisure Skills Development       Assessment  Patient is 60 y.o. female with a diagnosis of SDH (subdural hematoma).  Additional factors influencing patient status / progress (ie: cognitive factors, co-morbidities, social support, etc): past medical history of substance abuse and asthma.      Pt complete the Leisure Interest Measure (JONES) assessment with [facility] intern. Findings from the Leisure Interest Measure (JONES) are as follows: Physical domain:3 , Outdoor domain:4.5 , Mechanical domain:1 , Artistic domain:3.5 , Service domain:3 , Social domain:3.25 , Cultural domain:5 , and Reading domain:5 . The results indicate a high interest in:Outdoor, Cultural, and Reading Domain , a moderate interest in:Physical, Artistic, Service, and Social domain , and a low interest in:Meachanical domain.    Pt stated that their leisure interests include reading and taking her grandchildren to the park. Pt stated that they would like to improve upon their memory and stated that they have a hard time finding words. Pt expressed an interest in wanting to join a \"sober experience\" such as \"Alcoholics Anonymous\".     Plan  Recommend Recreational Therapy 30-60 minutes per day 2-3 days per week for 2 weeks for the " following treatments:  Community Re-Integration, Community Skills Development, Leisure Skills Awareness and Leisure Skills Development    Passport items to be completed:  Verbalize two positive leisure activities, discuss returning to work, hobbies, community groups or volunteer activities, explore community resources     Goals:  Long term and short term goals have been discussed with patient and they are in agreement.    Recreation Therapy Problems     Problem: Recreation Therapy     Dates: Start: 04/28/21       Goal: STG-Within one week, patient will demonstrate leisure problem solving     Dates: Start: 04/28/21       Description: by sharing on two positive lesiure activities they would like to participate in during leisure intervention or during freetime and any perceived barriers to their participation.              Goal: STG-Within one week, patient will     Dates: Start: 04/28/21       Description: Participate in a cognitive leisure activity with 60% accuracy with Min A cues or less.             Goal: LTG-By discharge, patient will demonstrate leisure problem solving     Dates: Start: 04/28/21       Description: by sharing on two positive lesiure activities they would like to participate in following d/c and any perceived barriers to their participation.                  Goal: LTG-By discharge, patient will     Dates: Start: 04/28/21       Description: Participate in a cognitive leisure activity with 90% accuracy with Min A cues or less.                       Dilia Maya, Recreation Therapy Student

## 2021-04-28 NOTE — DISCHARGE PLANNING
CASE MANAGEMENT INITIAL ASSESSMENT    Admit Date:  4/27/2021     Patient is a  60 y.o. female transferred from [facility].  She was found down at home by her daughter and sustained a SDH. She has a supportive daughter locally who can assist.  Patient is currently in admission isolation.  I have reviewed the records.  She has apparently been to substance abuse programs in the past including [place].  She is currently on nasal oxygen but does not use this at baseline.  Her admitting physician for rehab is Dr. Dudley.    Diagnosis: Traumatic brain injury with brief (less than 1 hour) loss of consciousness ([facility]) [S06.9X9A]    Co-morbidities:   Patient Active Problem List    Diagnosis Date Noted   • Protein-calorie malnutrition, severe ([facility]) 04/14/2021   • Alcohol withdrawal syndrome with complication ([facility]) 04/14/2021   • SDH (subdural hematoma) ([facility]) 04/13/2021   • Electrolyte disturbance 04/14/2021   • Marijuana user 04/14/2021   • Transaminitis 04/14/2021   • Hyperbilirubinemia 04/14/2021   • Anxiety 04/25/2021   • Chest wall discomfort 04/24/2021   • Acute cystitis without hematuria 04/16/2021   • Thrombocytopenia ([facility]) 04/16/2021   • GI bleed 04/15/2021   • Melena 04/15/2021   • Right atrial mass 04/14/2021   • Elevated INR 04/14/2021   • Advanced care planning/counseling discussion 04/14/2021   • QT prolongation 04/14/2021   • Cardiac arrest ([facility]) 04/14/2021   • Acute respiratory failure ([facility]) 04/14/2021   • Shock ([facility]) 04/14/2021     Prior Living Situation:  Housing / Facility: 1 Story Apartment / Condo(shared kitchen)  Lives with - Patient's Self Care Capacity: Alone and Able to Care For Self    Prior Level of Function:  Medication Management: Requires Assist  Shopping: Requires Assist  Driving / Transportation: Relatives / Others Provide Transportation    Support Systems:  Primary : Daughter is Kerline Hanson at [phone]  Other support systems:      Previous Services Utilized:   Prior Services:  None, Home-Independent    Other Information:  Occupation (Pre-Hospital Vocational): Not Employed  PCP: Mohsen Tamasaby M.D.  Other MDs: Dr. Asa Sanchez for cardiology, Dr. Acuna for GI, Dr. Glez for neurology, Dr. Leos for neursurgery.  Primary Payor Source: Medicaid      Additional Case Management Questions:  Have you ever received case management services for yourself or a family member?  Unable to determine at this time.    Do you feel you have and an understanding of what services  provide? Unable to determine at this time.    Do you have any additional questions regarding case management?    Unable to determine at this time.    CASE MANAGEMENT PLAN OF CARE   Individualized Goals:   1. Case Management will confirm patient has current resources for substance abuse programs.  2. We will schedule outpatient follow up with cardiology and GI per recommendations.  3. We will confirm level of support patient's daughter can provide.    Barriers:   1. Lives alone.  2. Relapse with ETOH    Patient / Family Goal:  Patient / Family Goal: Patient is currently living alone but will likely stay with her daughter at discharge.  She will need home health at discharge and assess for additional dme.  She needs follow up with cardiology and GI as an outpatient.  She will also need follow up with her PCP.  She currently requires nasal oxygen but is not on this at home.    Plan:  1. Continue to follow patient through hospitalization and provide discharge planning in collaboration with patient, family, physicians and ancillary services.     2. Utilize community resources to ensure a safe discharge.

## 2021-04-29 ENCOUNTER — APPOINTMENT (OUTPATIENT)
Dept: RADIOLOGY | Facility: REHABILITATION | Age: 61
DRG: 949 | End: 2021-04-29
Attending: PHYSICAL MEDICINE & REHABILITATION
Payer: MEDICAID

## 2021-04-29 PROCEDURE — 71045 X-RAY EXAM CHEST 1 VIEW: CPT

## 2021-04-29 PROCEDURE — 97129 THER IVNTJ 1ST 15 MIN: CPT

## 2021-04-29 PROCEDURE — 97130 THER IVNTJ EA ADDL 15 MIN: CPT

## 2021-04-29 PROCEDURE — 770010 HCHG ROOM/CARE - REHAB SEMI PRIVAT*

## 2021-04-29 PROCEDURE — 97112 NEUROMUSCULAR REEDUCATION: CPT

## 2021-04-29 PROCEDURE — 99233 SBSQ HOSP IP/OBS HIGH 50: CPT | Performed by: PHYSICAL MEDICINE & REHABILITATION

## 2021-04-29 PROCEDURE — 97530 THERAPEUTIC ACTIVITIES: CPT

## 2021-04-29 PROCEDURE — 700102 HCHG RX REV CODE 250 W/ 637 OVERRIDE(OP): Performed by: PHYSICAL MEDICINE & REHABILITATION

## 2021-04-29 PROCEDURE — 97535 SELF CARE MNGMENT TRAINING: CPT | Mod: CO

## 2021-04-29 PROCEDURE — 97110 THERAPEUTIC EXERCISES: CPT

## 2021-04-29 PROCEDURE — A9270 NON-COVERED ITEM OR SERVICE: HCPCS | Performed by: PHYSICAL MEDICINE & REHABILITATION

## 2021-04-29 PROCEDURE — 94760 N-INVAS EAR/PLS OXIMETRY 1: CPT

## 2021-04-29 PROCEDURE — 97116 GAIT TRAINING THERAPY: CPT

## 2021-04-29 PROCEDURE — 97110 THERAPEUTIC EXERCISES: CPT | Mod: CO

## 2021-04-29 RX ORDER — GABAPENTIN 300 MG/1
300 CAPSULE ORAL 3 TIMES DAILY
Status: DISCONTINUED | OUTPATIENT
Start: 2021-04-29 | End: 2021-05-07 | Stop reason: HOSPADM

## 2021-04-29 RX ADMIN — FUROSEMIDE 20 MG: 20 TABLET ORAL at 06:25

## 2021-04-29 RX ADMIN — DOCUSATE SODIUM 50 MG AND SENNOSIDES 8.6 MG 2 TABLET: 8.6; 5 TABLET, FILM COATED ORAL at 19:46

## 2021-04-29 RX ADMIN — OMEPRAZOLE 20 MG: 20 CAPSULE, DELAYED RELEASE ORAL at 19:46

## 2021-04-29 RX ADMIN — TRAZODONE HYDROCHLORIDE 50 MG: 50 TABLET ORAL at 21:20

## 2021-04-29 RX ADMIN — GABAPENTIN 300 MG: 300 CAPSULE ORAL at 11:32

## 2021-04-29 RX ADMIN — MIRTAZAPINE 15 MG: 15 TABLET, ORALLY DISINTEGRATING ORAL at 19:47

## 2021-04-29 RX ADMIN — MIDODRINE HYDROCHLORIDE 7.5 MG: 2.5 TABLET ORAL at 07:56

## 2021-04-29 RX ADMIN — LEVETIRACETAM 1000 MG: 500 TABLET ORAL at 07:55

## 2021-04-29 RX ADMIN — MIDODRINE HYDROCHLORIDE 7.5 MG: 2.5 TABLET ORAL at 19:46

## 2021-04-29 RX ADMIN — MIDODRINE HYDROCHLORIDE 7.5 MG: 2.5 TABLET ORAL at 14:22

## 2021-04-29 RX ADMIN — GABAPENTIN 300 MG: 300 CAPSULE ORAL at 19:46

## 2021-04-29 RX ADMIN — ACETAMINOPHEN 650 MG: 325 TABLET, FILM COATED ORAL at 19:46

## 2021-04-29 RX ADMIN — GABAPENTIN 300 MG: 300 CAPSULE ORAL at 14:24

## 2021-04-29 RX ADMIN — OMEPRAZOLE 20 MG: 20 CAPSULE, DELAYED RELEASE ORAL at 07:55

## 2021-04-29 RX ADMIN — TRAZODONE HYDROCHLORIDE 50 MG: 50 TABLET ORAL at 19:47

## 2021-04-29 RX ADMIN — MAGNESIUM OXIDE TAB 400 MG (241.3 MG ELEMENTAL MG) 400 MG: 400 (241.3 MG) TAB at 19:47

## 2021-04-29 RX ADMIN — LEVETIRACETAM 1000 MG: 500 TABLET ORAL at 19:46

## 2021-04-29 RX ADMIN — MAGNESIUM OXIDE TAB 400 MG (241.3 MG ELEMENTAL MG) 400 MG: 400 (241.3 MG) TAB at 07:56

## 2021-04-29 ASSESSMENT — ACTIVITIES OF DAILY LIVING (ADL)
BED_CHAIR_WHEELCHAIR_TRANSFER_DESCRIPTION: ADAPTIVE EQUIPMENT;INCREASED TIME;SUPERVISION FOR SAFETY;VERBAL CUEING
BED_CHAIR_WHEELCHAIR_TRANSFER_DESCRIPTION: INCREASED TIME;SUPERVISION FOR SAFETY
TOILET_TRANSFER_DESCRIPTION: ADAPTIVE EQUIPMENT;GRAB BAR;INCREASED TIME;SUPERVISION FOR SAFETY;VERBAL CUEING
BED_CHAIR_WHEELCHAIR_TRANSFER_DESCRIPTION: ADAPTIVE EQUIPMENT;INCREASED TIME;SUPERVISION FOR SAFETY;VERBAL CUEING

## 2021-04-29 ASSESSMENT — PAIN DESCRIPTION - PAIN TYPE
TYPE: ACUTE PAIN
TYPE: ACUTE PAIN

## 2021-04-29 ASSESSMENT — GAIT ASSESSMENTS
DISTANCE (FEET): 220
DISTANCE (FEET): 500
ASSISTIVE DEVICE: FRONT WHEEL WALKER;NONE
GAIT LEVEL OF ASSIST: CONTACT GUARD ASSIST
ASSISTIVE DEVICE: FRONT WHEEL WALKER
DEVIATION: DECREASED BASE OF SUPPORT;DECREASED HEEL STRIKE;BRADYKINETIC
GAIT LEVEL OF ASSIST: STAND BY ASSIST
DEVIATION: ATAXIC;DECREASED BASE OF SUPPORT;BRADYKINETIC

## 2021-04-29 NOTE — THERAPY
"Physical Therapy   Daily Treatment     Patient Name: Nathalie Aguilar  Age:  60 y.o., Sex:  female  Medical Record #: [id number]  Today's Date: 4/29/2021     Precautions  Precautions: Fall Risk    Subjective    Pt in bed resting, agreeable to PT.      Objective       04/29/21 1301   Precautions   Precautions Fall Risk   Gait Functional Level of Assist    Gait Level Of Assist Contact Guard Assist  (SBA with FWW, CGA with no AD)   Assistive Device Front Wheel Walker;None   Distance (Feet) 500  (500 ft FWW, 250 ft no AD)   Deviation Ataxic;Decreased Base Of Support;Bradykinetic   Stairs Functional Level of Assist   Level of Assist with Stairs Contact Guard Assist   # of Stairs Climbed 6  (4\")   Stairs Description Extra time;Hand rails;Oxygen   Transfer Functional Level of Assist   Bed, Chair, Wheelchair Transfer Stand by Assist   Bed Chair Wheelchair Transfer Description Increased time;Supervision for safety  (bed > WC, SPT )   Bed Mobility    Supine to Sit Supervised   Sit to Stand Stand by Assist   Scooting Supervised   Interdisciplinary Plan of Care Collaboration   Patient Position at End of Therapy Seated;Call Light within Reach  (on toilet)   PT Total Time Spent   PT Individual Total Time Spent (Mins) 30   PT Charge Group   PT Gait Training 1   PT Therapeutic Activities 1       Assessment    Pt pleasant and cooperative with session. Unable to recall that she had done walking and stairs in PT session this morning. Pleased with progress with trial of gait training no AD.     Strengths: Able to follow instructions, Effective communication skills, Independent prior level of function, Making steady progress towards goals, Pleasant and cooperative, Willingly participates in therapeutic activities  Barriers: Emotional lability, Fatigue, Generalized weakness, Impaired activity tolerance, Impaired balance(Impaired strength and balance, fall risk)    Plan    Safety education, therapeutic exercise for strength and " endurance, transfer training, gait training fww as tolerated, up/down 4 stairs    Physical Therapy Problems     Problem: Mobility     Dates: Start: 04/27/21       Goal: STG-Within one week, patient will ascend and descend four to six stairs     Dates: Start: 04/27/21       Description: 1) Individualized goal: Up/down 4-6 stairs, handrails, SBA 1 week  2) Interventions:  PT Gait Training, PT Therapeutic Exercises, and PT Therapeutic Activity            Goal: STG-Within one week, patient will     Dates: Start: 04/27/21       Description: 1) Individualized goal: Gait fww/no  FT SBA, 1 week  2) Interventions:  PT Gait Training, PT Therapeutic Exercises, PT Neuro Re-Ed/Balance, and PT Therapeutic Activity      Note:     Goal Note filed on 04/29/21 1225 by OPHELIA Herrera    1) Individualized goal:   Gait  FT SBA 1 week  2) Interventions:  PT Gait Training, PT Therapeutic Exercises, and PT Therapeutic Activity                        Problem: Mobility Transfers     Dates: Start: 04/27/21       Goal: STG-Within one week, patient will transfer bed to chair     Dates: Start: 04/27/21       Description: 1) Individualized goal: Transfer bed to/from chair fww/no AD supervision, 1 week  2) Interventions:  PT Gait Training, PT Therapeutic Exercises, and PT Therapeutic Activity                  Problem: PT-Long Term Goals     Dates: Start: 04/27/21       Goal: LTG-By discharge, patient will ambulate     Dates: Start: 04/27/21       Description: 1) Individualized goal: Gait no  FT, independent at discharge  2) Interventions:  PT Gait Training, PT Therapeutic Exercises, PT Neuro Re-Ed/Balance, and PT Therapeutic Activity            Goal: LTG-By discharge, patient will transfer one surface to another     Dates: Start: 04/27/21       Description: 1) Individualized goal: Transfer 1 surface to another no AD, independent at discharge  2) Interventions:  PT Gait Training, PT Therapeutic Exercises, and PT  Therapeutic Activity            Goal: LTG-By discharge, patient will ambulate up/down 4-6 stairs     Dates: Start: 04/27/21       Description: 1) Individualized goal: Up/down 4-6 stairs handrails, independently at discharge  2) Interventions:  PT Gait Training, PT Therapeutic Exercises, PT Neuro Re-Ed/Balance, and PT Therapeutic Activity            Goal: LTG-By discharge, patient will transfer in/out of a car     Dates: Start: 04/27/21       Description: 1) Individualized goal: Car transfer no device supervision at discharge  2) Interventions:  PT Gait Training, PT Therapeutic Exercises, PT Neuro Re-Ed/Balance, and PT Therapeutic Activity

## 2021-04-29 NOTE — CARE PLAN
Problem: Problem Solving STGs  Goal: STG-Within one week, patient will  Description: 1) Individualized goal: complete IADL tasks (medication management, finances, etc.) with 90% accuracy and minimal verbal cues to return to independent PLOF.   2) Interventions:  SLP Self Care / ADL Training , SLP Cognitive Skill Development, and SLP Group Treatment      Outcome: NOT MET  Note: Initiated 4/29     Problem: Memory STGs  Goal: STG-Within one week, patient will  Description: 1) Individualized goal:  use external memory aids and internal memory strategies to recall details for POC with 90% accuracy and minimal verbal cues  2) Interventions:  SLP Self Care / ADL Training , SLP Cognitive Skill Development, and SLP Group Treatment      Outcome: NOT MET  Note: Introduced 4/29

## 2021-04-29 NOTE — CARE PLAN
Problem: Mobility  Goal: STG-Within one week, patient will ascend and descend four to six stairs  Description: 1) Individualized goal: Up/down 4-6 stairs, handrails, SBA 1 week  2) Interventions:  PT Gait Training, PT Therapeutic Exercises, and PT Therapeutic Activity    Outcome: PROGRESSING AS EXPECTED  Goal: STG-Within one week, patient will  Description: 1) Individualized goal: Gait fww/no  FT SBA, 1 week  2) Interventions:  PT Gait Training, PT Therapeutic Exercises, PT Neuro Re-Ed/Balance, and PT Therapeutic Activity    Outcome: PROGRESSING AS EXPECTED     Problem: Mobility Transfers  Goal: STG-Within one week, patient will transfer bed to chair  Description: 1) Individualized goal: Transfer bed to/from chair fww/no AD supervision, 1 week  2) Interventions:  PT Gait Training, PT Therapeutic Exercises, and PT Therapeutic Activity    Outcome: PROGRESSING AS EXPECTED

## 2021-04-29 NOTE — CARE PLAN
Problem: Bathing  Goal: STG-Within one week, patient will bathe  Description: 1) Individualized Goal:  w/ supervision using DME and AE as needed  2) Interventions:  OT Orthotics Training, OT E Stim Attended, OT Group Therapy, OT Self Care/ADL, OT Cognitive Skill Dev, OT Community Reintegration, OT Manual Ther Technique, OT Neuro Re-Ed/Balance, OT Sensory Int Techniques, OT Therapeutic Activity, OT Evaluation, and OT Therapeutic Exercise    Outcome: NOT MET  Note: Due to eval yesterday      Problem: Dressing  Goal: STG-Within one week, patient will retrieve clothing  Description: 1) Individualized Goal:  and dress w/ supervision   2) Interventions:  OT Orthotics Training, OT E Stim Attended, OT Group Therapy, OT Self Care/ADL, OT Cognitive Skill Dev, OT Community Reintegration, OT Manual Ther Technique, OT Neuro Re-Ed/Balance, OT Sensory Int Techniques, OT Therapeutic Activity, OT Evaluation, and OT Therapeutic Exercise  Outcome: NOT MET  Note: Due to eval yesterday      Problem: Functional Transfers  Goal: STG-Within one week, patient will transfer to step in shower  Description: 1) Individualized Goal:  w/ supervision using DME and AE as needed  2) Interventions:  OT Orthotics Training, OT E Stim Attended, OT Group Therapy, OT Self Care/ADL, OT Cognitive Skill Dev, OT Community Reintegration, OT Manual Ther Technique, OT Neuro Re-Ed/Balance, OT Sensory Int Techniques, OT Therapeutic Activity, OT Evaluation, and OT Therapeutic Exercise  Outcome: NOT MET  Note: Due to eval yesterday      Problem: IADL's  Goal: STG-Within one week, patient will access kitchen area  Description: 1) Individualized Goal:  w/ CGA using DME and AE as needed  2) Interventions:  OT Orthotics Training, OT E Stim Attended, OT Group Therapy, OT Self Care/ADL, OT Cognitive Skill Dev, OT Community Reintegration, OT Manual Ther Technique, OT Neuro Re-Ed/Balance, OT Sensory Int Techniques, OT Therapeutic Activity, OT Evaluation, and OT  Therapeutic Exercise  Outcome: NOT MET  Note: Due to eval yesterday

## 2021-04-29 NOTE — THERAPY
Speech Language Pathology  Daily Treatment     Patient Name: Nathalie Aguilar  Age:  60 y.o., Sex:  female  Medical Record #: [id number]  Today's Date: 4/29/2021     Precautions  Precautions: Fall Risk    Subjective    Pt pleasant and cooperative during ST     Objective       04/29/21 0831   SLP Total Time Spent   SLP Individual Total Time Spent (Mins) 30   Treatment Charges   Charges Yes   SLP Cognitive Skill Development First 15 Minutes 1   SLP Cognitive Skill Development Additional 15 Minutes 1       Assessment    Pt finishing breakfast upon arrival. Pt reported she is willing to try regular textures if her daughter finds her dentures at home; however, she does not mind 6- Soft & Bite Sized textures until they arrive. Pt completed medication chart of current medications. Pt recalled the purpose of 3/7 medications when given the name and asked appropriate and relevant questions related to medications. Pt wrote specific questions on the back of the chart related to medications to ask the doctor. Pt reported she is going to live with her daughter for a couple of weeks after d/c and she will provide pt assistance/spv with medications. Pt introduced to memory log and encouraged to complete daily entries after each meal/therapy to reinforce memory and recall of information. Pt transferred back to bed at end of session due to tail bone pain.    Strengths: Motivated for self care and independence, Pleasant and cooperative, Willingly participates in therapeutic activities  Barriers: Impaired functional cognition    Plan    Inititiate functional medication sort, reinforce use of memory log     Passport items to be completed  manage finances, manage medications,complete daily memory log entries, solve problems related to safety situations, review education related to hospitalization, complete caregiver training     Speech Therapy Problems     Problem: Memory STGs     Dates: Start: 04/28/21       Goal: STG-Within one week,  patient will     Dates: Start: 04/28/21       Description: 1) Individualized goal:  use external memory aids and internal memory strategies to recall details for POC with 90% accuracy and minimal verbal cues  2) Interventions:  SLP Self Care / ADL Training , SLP Cognitive Skill Development, and SLP Group Treatment        Note:     Goal Note filed on 04/29/21 1220 by Angie Cardona, Student    Introduced 4/29                        Problem: Problem Solving STGs     Dates: Start: 04/28/21       Goal: STG-Within one week, patient will     Dates: Start: 04/28/21       Description: 1) Individualized goal: complete IADL tasks (medication management, finances, etc.) with 90% accuracy and minimal verbal cues to return to independent PLOF.   2) Interventions:  SLP Self Care / ADL Training , SLP Cognitive Skill Development, and SLP Group Treatment        Note:     Goal Note filed on 04/29/21 1220 by Angie Cardona, Student    Initiated 4/29                        Problem: Speech/Swallowing LTGs     Dates: Start: 04/28/21       Goal: LTG-By discharge, patient will     Dates: Start: 04/28/21       Description: 1) Individualized goal:  improve cognitive skills from the mild range to the typical functioning range, as indicated by standardized assessment and functional skills  2) Interventions:  SLP Self Care / ADL Training , SLP Cognitive Skill Development, and SLP Group Treatment

## 2021-04-29 NOTE — DISCHARGE PLANNING
Recommendation:  Update IPOC to address therapy service delivery:  PT_60/90_ min/day, OT _90/60_ min/day, ST _30_ min/day on 5/7 days per week for at least 15 hours per week.

## 2021-04-29 NOTE — PROGRESS NOTES
"Rehab Progress Note     Encounter Date: 4/29/2021    CC: TBI, poor sleep    Interval Events (Subjective)  Patient sitting up in room. She reports therapy is going well. She would like to get off of Lasix if possible. Discussed would recheck XR and if no fluid overload will stop Lasix.  She reports new sciatica type symptoms down her left leg. She reports this was not present before her TBI. Discussed about Gabapentin and she is agreeable.     IDT Team Meeting 4/29/2021    IHortencia M.D., was present and led the interdisciplinary team conference on 4/29/2021.  I led the IDT conference and agree with the IDT conference documentation and plan of care as noted below.     RN:  Diet Regular   % Meal     Pain    Sleep    Bowel Continent   Bladder Frequency from lasix   In's & Out's      PT:  Bed Mobility    Transfers SBA   Mobility SBA-CGA   Stairs    Fatigues easily  OT:  Eating    Grooming CGA   Bathing    UB Dressing    LB Dressing    Toileting    Shower & Transfer CGA   Memory limited  Agitated    SLP:  Swallow - normal. No dentures so stay on soft  Mild impairments in cognitive - memory deficits  Medication management.     Resp:  Room air trial today - variable  88-93%  Continue attempts      CM:  Continues to work on disposition and DME needs.      DC/Disposition:  5/11/21    Objective:  VITAL SIGNS: /68   Pulse 90   Temp 36.1 °C (97 °F) (Temporal)   Resp 18   Ht 1.702 m (5' 7\")   Wt 58 kg (127 lb 13.9 oz)   SpO2 98%   BMI 20.03 kg/m²   Gen: NAD  Psych: Mood and affect appropriate  CV: RRR, no edema  Resp: CTAB, no upper airway sounds  Abd: NTND  Neuro: AOx4, following commands  Unchanged from 4/28/21    Recent Results (from the past 72 hour(s))   SARS-CoV-2 PCR (24 hour In-House): Collect NP swab in [place]    Collection Time: 04/27/21  1:15 PM    Specimen: Nasopharyngeal; Respirate   Result Value Ref Range    SARS-CoV-2 Source NP Swab     SARS-CoV-2 by PCR NotDetected    CBC with " Differential    Collection Time: 04/28/21  5:41 AM   Result Value Ref Range    WBC 3.4 (L) 4.8 - 10.8 K/uL    RBC 2.94 (L) 4.20 - 5.40 M/uL    Hemoglobin 9.6 (L) 12.0 - 16.0 g/dL    Hematocrit 31.6 (L) 37.0 - 47.0 %    .5 (H) 81.4 - 97.8 fL    MCH 32.7 27.0 - 33.0 pg    MCHC 30.4 (L) 33.6 - 35.0 g/dL    RDW 84.1 (H) 35.9 - 50.0 fL    Platelet Count 361 164 - 446 K/uL    MPV 9.9 9.0 - 12.9 fL    Neutrophils-Polys 64.10 44.00 - 72.00 %    Lymphocytes 25.70 22.00 - 41.00 %    Monocytes 6.60 0.00 - 13.40 %    Eosinophils 2.10 0.00 - 6.90 %    Basophils 0.90 0.00 - 1.80 %    Immature Granulocytes 0.60 0.00 - 0.90 %    Nucleated RBC 0.00 /100 WBC    Neutrophils (Absolute) 2.15 2.00 - 7.15 K/uL    Lymphs (Absolute) 0.86 (L) 1.00 - 4.80 K/uL    Monos (Absolute) 0.22 0.00 - 0.85 K/uL    Eos (Absolute) 0.07 0.00 - 0.51 K/uL    Baso (Absolute) 0.03 0.00 - 0.12 K/uL    Immature Granulocytes (abs) 0.02 0.00 - 0.11 K/uL    NRBC (Absolute) 0.00 K/uL    Anisocytosis 3+ (A)     Macrocytosis 3+ (A)     Microcytosis 1+    Comp Metabolic Panel (CMP)    Collection Time: 04/28/21  5:41 AM   Result Value Ref Range    Sodium 141 135 - 145 mmol/L    Potassium 3.5 (L) 3.6 - 5.5 mmol/L    Chloride 106 96 - 112 mmol/L    Co2 29 20 - 33 mmol/L    Anion Gap 6.0 (L) 7.0 - 16.0    Glucose 106 (H) 65 - 99 mg/dL    Bun 4 (L) 8 - 22 mg/dL    Creatinine 0.27 (L) 0.50 - 1.40 mg/dL    Calcium 8.5 8.5 - 10.5 mg/dL    AST(SGOT) 154 (H) 12 - 45 U/L    ALT(SGPT) 104 (H) 2 - 50 U/L    Alkaline Phosphatase 239 (H) 30 - 99 U/L    Total Bilirubin 2.5 (H) 0.1 - 1.5 mg/dL    Albumin 2.3 (L) 3.2 - 4.9 g/dL    Total Protein 5.5 (L) 6.0 - 8.2 g/dL    Globulin 3.2 1.9 - 3.5 g/dL    A-G Ratio 0.7 g/dL   HEMOGLOBIN A1C    Collection Time: 04/28/21  5:41 AM   Result Value Ref Range    Glycohemoglobin 4.1 4.0 - 5.6 %    Est Avg Glucose 71 mg/dL   TSH with Reflex to FT4    Collection Time: 04/28/21  5:41 AM   Result Value Ref Range    TSH 3.230 0.380 - 5.330 uIU/mL    ESTIMATED GFR    Collection Time: 04/28/21  5:41 AM   Result Value Ref Range    GFR If African American >60 >60 mL/min/1.73 m 2    GFR If Non African American >60 >60 mL/min/1.73 m 2   PERIPHERAL SMEAR REVIEW    Collection Time: 04/28/21  5:41 AM   Result Value Ref Range    Peripheral Smear Review see below    PLATELET ESTIMATE    Collection Time: 04/28/21  5:41 AM   Result Value Ref Range    Plt Estimation Normal    MORPHOLOGY    Collection Time: 04/28/21  5:41 AM   Result Value Ref Range    RBC Morphology Present     Polychromia 1+    DIFFERENTIAL COMMENT    Collection Time: 04/28/21  5:41 AM   Result Value Ref Range    Comments-Diff see below        Current Facility-Administered Medications   Medication Frequency   • gabapentin (NEURONTIN) capsule 300 mg TID   • midodrine (PROAMATINE) tablet 7.5 mg TID   • Respiratory Therapy Consult Continuous RT   • hydrALAZINE (APRESOLINE) tablet 25 mg Q8HRS PRN   • acetaminophen (Tylenol) tablet 650 mg Q4HRS PRN   • senna-docusate (PERICOLACE or SENOKOT S) 8.6-50 MG per tablet 2 tablet BID    And   • polyethylene glycol/lytes (MIRALAX) PACKET 1 Packet QDAY PRN    And   • magnesium hydroxide (MILK OF MAGNESIA) suspension 30 mL QDAY PRN    And   • bisacodyl (DULCOLAX) suppository 10 mg QDAY PRN   • artificial tears ophthalmic solution 1 Drop PRN   • benzocaine-menthol (CEPACOL) lozenge 1 Lozenge Q2HRS PRN   • mag hydrox-al hydrox-simeth (MAALOX PLUS ES or MYLANTA DS) suspension 20 mL Q2HRS PRN   • ondansetron (ZOFRAN ODT) dispertab 4 mg 4X/DAY PRN    Or   • ondansetron (ZOFRAN) syringe/vial injection 4 mg 4X/DAY PRN   • traZODone (DESYREL) tablet 50 mg QHS PRN   • sodium chloride (OCEAN) 0.65 % nasal spray 2 Spray PRN   • nicotine (NICODERM) 14 MG/24HR 14 mg Daily-0600   • magnesium oxide (MAG-OX) tablet 400 mg BID   • levETIRAcetam (KEPPRA) tablet 1,000 mg Q12HRS   • traZODone (DESYREL) tablet 50 mg QHS   • mirtazapine (Remeron) orally disintegrating tab 15 mg QHS   •  omeprazole (PRILOSEC) capsule 20 mg BID       Orders Placed This Encounter   Procedures   • Diet Order Diet: Level 6 - Soft and Bite Sized (float pills in puree); Liquid level: Level 0 - Thin     Standing Status:   Standing     Number of Occurrences:   1     Order Specific Question:   Diet:     Answer:   Level 6 - Soft and Bite Sized [23]     Comments:   float pills in puree     Order Specific Question:   Liquid level     Answer:   Level 0 - Thin       Assessment:  Active Hospital Problems    Diagnosis    • *SDH (subdural hematoma) (HCC)    • Transaminitis    • Anxiety    • Chest wall discomfort    • Thrombocytopenia (HCC)    • GI bleed    • Acute respiratory failure (HCC)    • Cardiac arrest (HCC)    • Right atrial mass        Medical Decision Making and Plan:  TBI (Traumatic Brain Injury):  Fall with bilateral SDHs managed conservatively. Hospital course complicated by seizure, PEA arrest requiring intubation, and GI bleed.  -PT and OT for mobility and ADLs  -SLP for cognition     Dysphagia - Patient on dysphagia diet. SLP for swallow evaluation. Limited by lack of dentures     Seizures - New witnessed seizure after SDHs. On Keppra 1000 mg BID. Monitor   -Follow-up Neurology      Hypotension - Patient on Midodrine 5 mg on transfer. Will monitor   -SBP into low 90s. Increase midodrine to 7.5, HR into 100s.      GI Bleed - Patient on omeprazole 20 mg BID     Anemia - S/p GI bleed managed conservatively.  Check AM CBC. Did require transfusion   -9.6 improved from 8.1. Continue to monitor      Hypomagnesemia - Patient on Magnesium supplement on transfer.      Substance Abuse - Patient on Nicotine patch. History of heroin abuse.      Atelectasis/Fluid overload - Repeat CXR improved. Discontinue Lasix.     Cirrhosis - LFTs elevated on admission. Continue to monitor    Radiculopathy - Patient with new left sciatica. Start Gabapentin 300 mg TID    Depression/Anxiety - Patient on Remeron 15 mg QHS and Trazodone 50 mg. Has  nightmares     Cardiac Mass - Right atrial mass of 1.4 x 2.3 cm. Cardiology evaluated and felt enlarge valve but needs outpatient cardiac MRI     DVT Ppx - Patient high risk for bleed and ambulating.     Total time:  36 minutes.  I spent greater than 50% of the time for patient care, counseling, and coordination on this date, including unit/floor time, and face-to-face time with the patient as per interval events and assessment and plan above. Topics discussed included discharge planning, frequency, CXR Improved, discontinue Lasix, and start Gabapentin for sciatica. Patient was discussed separately in IDT today; please see details above.    Hortencia Dudley M.D.

## 2021-04-29 NOTE — THERAPY
"Occupational Therapy  Daily Treatment     Patient Name: Nathalie Aguilar  Age:  60 y.o., Sex:  female  Medical Record #: [id number]  Today's Date: 4/29/2021     Precautions  Precautions: (P) Fall Risk, Swallow Precautions ( See Comments)         Subjective    \"I am ready to get my life back\"     Objective       04/29/21 1431   Precautions   Precautions Fall Risk;Swallow Precautions ( See Comments)   Vitals   Pulse Oximetry 92 %   O2 (LPM) 0   O2 Delivery Device None - Room Air   Vitals Comments after 100ft walk   OT Total Time Spent   OT Individual Total Time Spent (Mins) 30   OT Charge Group   OT Neuromuscular Re-education / Balance 1   OT Therapy Activity 1   Pt ambulated with FWW and SBA ~100 ft X2    Visual screen   Pt demonstrated WNL for saccade, convergence and pursuits. Pt required cues to not move head. Unable to complete field cut assessment due to pt unable to follow directions to keeps eyes fixed on therapists nose. Pt reports her vision gets blurry when she is focusing on it.     Pt oriented to IADL space and dicussed d/c plan. Pt reports she will live w/ her daughter for a couple of weeks and then move back into her apartment alone. Pt states she will use a walk in shower at her daughters house and the one at her apartment is a tub shower. Neither bathrooms have GBs.        Assessment    Pt tolerated session well focused on functional mobility, d/c planning, and visual screen. Pt expressed a change in vision clarity but said she does not have double vision. Pt unable to follow directions to complete full vision screen. Pt demonstrated increased balnce and endurance during ambulation.   Strengths: Able to follow instructions, Alert and oriented, Independent prior level of function, Motivated for self care and independence, Supportive family, Willingly participates in therapeutic activities  Barriers: Decreased endurance, Generalized weakness, Impaired activity tolerance, Impaired balance, " Agitation    Plan    ADL , IADL  related mobility and cognition , strength/endurance building   Standing endurance and balance activity     Occupational Therapy Goals     Problem: Bathing     Dates: Start: 04/28/21       Goal: STG-Within one week, patient will bathe     Dates: Start: 04/28/21       Description: 1) Individualized Goal:  w/ supervision using DME and AE as needed  2) Interventions:  OT Orthotics Training, OT E Stim Attended, OT Group Therapy, OT Self Care/ADL, OT Cognitive Skill Dev, OT Community Reintegration, OT Manual Ther Technique, OT Neuro Re-Ed/Balance, OT Sensory Int Techniques, OT Therapeutic Activity, OT Evaluation, and OT Therapeutic Exercise      Note:     Goal Note filed on 04/29/21 1200 by Claire Faith OT    Due to eval yesterday                         Problem: Dressing     Dates: Start: 04/28/21       Goal: STG-Within one week, patient will retrieve clothing     Dates: Start: 04/28/21       Description: 1) Individualized Goal:  and dress w/ supervision   2) Interventions:  OT Orthotics Training, OT E Stim Attended, OT Group Therapy, OT Self Care/ADL, OT Cognitive Skill Dev, OT Community Reintegration, OT Manual Ther Technique, OT Neuro Re-Ed/Balance, OT Sensory Int Techniques, OT Therapeutic Activity, OT Evaluation, and OT Therapeutic Exercise    Note:     Goal Note filed on 04/29/21 1200 by Claire Faith OT    Due to eval yesterday                         Problem: Functional Transfers     Dates: Start: 04/28/21       Goal: STG-Within one week, patient will transfer to step in shower     Dates: Start: 04/28/21       Description: 1) Individualized Goal:  w/ supervision using DME and AE as needed  2) Interventions:  OT Orthotics Training, OT E Stim Attended, OT Group Therapy, OT Self Care/ADL, OT Cognitive Skill Dev, OT Community Reintegration, OT Manual Ther Technique, OT Neuro Re-Ed/Balance, OT Sensory Int Techniques, OT Therapeutic Activity, OT Evaluation, and OT Therapeutic  Exercise    Note:     Goal Note filed on 04/29/21 1200 by Claire Faith OT    Due to eval yesterday                         Problem: IADL's     Dates: Start: 04/28/21       Goal: STG-Within one week, patient will access kitchen area     Dates: Start: 04/28/21       Description: 1) Individualized Goal:  w/ CGA using DME and AE as needed  2) Interventions:  OT Orthotics Training, OT E Stim Attended, OT Group Therapy, OT Self Care/ADL, OT Cognitive Skill Dev, OT Community Reintegration, OT Manual Ther Technique, OT Neuro Re-Ed/Balance, OT Sensory Int Techniques, OT Therapeutic Activity, OT Evaluation, and OT Therapeutic Exercise    Note:     Goal Note filed on 04/29/21 1200 by Claire Faith OT    Due to eval yesterday                         Problem: OT Long Term Goals     Dates: Start: 04/28/21       Goal: LTG-By discharge, patient will complete basic self care tasks     Dates: Start: 04/28/21       Description: 1) Individualized Goal:  w/ mod I using DME and AE as needed  2) Interventions:  OT Orthotics Training, OT E Stim Attended, OT Group Therapy, OT Self Care/ADL, OT Cognitive Skill Dev, OT Community Reintegration, OT Manual Ther Technique, OT Neuro Re-Ed/Balance, OT Sensory Int Techniques, OT Therapeutic Activity, OT Evaluation, and OT Therapeutic Exercise          Goal: LTG-By discharge, patient will perform bathroom transfers     Dates: Start: 04/28/21       Description: 1) Individualized Goal:  w/ mod I using DME and AE as needed  2) Interventions:  OT Orthotics Training, OT E Stim Attended, OT Group Therapy, OT Self Care/ADL, OT Cognitive Skill Dev, OT Community Reintegration, OT Manual Ther Technique, OT Neuro Re-Ed/Balance, OT Sensory Int Techniques, OT Therapeutic Activity, OT Evaluation, and OT Therapeutic Exercise

## 2021-04-29 NOTE — THERAPY
04/29/21 1029   Precautions   Precautions Fall Risk   Pain 0 - 10 Group   Therapist Pain Assessment 0   Cognition    Level of Consciousness Alert   Transfer Functional Level of Assist   Bed, Chair, Wheelchair Transfer Stand by Assist   Bed Chair Wheelchair Transfer Description Adaptive equipment;Increased time;Supervision for safety;Verbal cueing   Sitting Lower Body Exercises   Ankle Pumps 3 sets of 15;Bilateral   Hip Flexion 2 sets of 15;Bilateral   Hip Abduction 2 sets of 15;Bilateral   Hip Adduction 2 sets of 15;Bilateral   Long Arc Quad 2 sets of 15;Bilateral   Hamstring Curl 2 sets of 15;Bilateral   Nustep Resistance Level 3  (43 steps per minute, 8 minutes)   Bed Mobility    Supine to Sit Stand by Assist   Sit to Supine Stand by Assist   Sit to Stand Stand by Assist   Scooting Stand by Assist   Rolling Modified Independent   PT Total Time Spent   PT Individual Total Time Spent (Mins) 60   PT Charge Group   PT Therapeutic Exercise 3   PT Therapeutic Activities 1   Physical Therapy   Daily Treatment     Patient Name: Nathalie Aguilar  Age:  60 y.o., Sex:  female  Medical Record #: [id number]  Today's Date: 4/29/2021     Precautions  Precautions: Fall Risk    Subjective    The patient agreed to PT.     Objective    The patient demonstrated bed mobility and transfer bed to/from chair.  She is able to transfer without physical assistance but SBA for safety.  In the gym she participated in seated bilateral lower extremity therapeutic exercise 2 sets of 15 and she utilized the NuStep with the information indicated above.  The patient was slow in participating in the therapeutic exercise but she was able to accurately count her repetitions and sets.    Assessment    The patient is making consistent progress to improve her strength, endurance and function.  She presents with generalized weakness and deconditioning but with supervision she is able to use a walker and walk 220 FT consistently and able to  occasionally walk 300 FT.  The patient has limited support but her daughter is able to provide some assistance.       Plan    Safety education, therapeutic exercise for strength and endurance, transfer training, gait training fww as tolerated, up/down 4 stairs    Passport items to be completed:  Passport items to be completed:  Get in/out of bed safely, in/out of a vehicle, safely use mobility device, walk or wheel around home/community, navigate up and down stairs, show how to get up/down from the ground, ensure home is accessible, demonstrate HEP, complete caregiver training    Physical Therapy Problems     Problem: Mobility     Dates: Start: 04/27/21       Goal: STG-Within one week, patient will ascend and descend four to six stairs     Dates: Start: 04/27/21       Description: 1) Individualized goal: Up/down 4-6 stairs, handrails, SBA 1 week  2) Interventions:  PT Gait Training, PT Therapeutic Exercises, and PT Therapeutic Activity            Goal: STG-Within one week, patient will     Dates: Start: 04/27/21       Description: 1) Individualized goal: Gait fww/no AD with FT SBA, 1 week  2) Interventions:  PT Gait Training, PT Therapeutic Exercises, PT Neuro Re-Ed/Balance, and PT Therapeutic Activity                  Problem: Mobility Transfers     Dates: Start: 04/27/21       Goal: STG-Within one week, patient will transfer bed to chair     Dates: Start: 04/27/21       Description: 1) Individualized goal: Transfer bed to/from chair fww/no AD supervision, 1 week  2) Interventions:  PT Gait Training, PT Therapeutic Exercises, and PT Therapeutic Activity                  Problem: PT-Long Term Goals     Dates: Start: 04/27/21       Goal: LTG-By discharge, patient will ambulate     Dates: Start: 04/27/21       Description: 1) Individualized goal: Gait no  FT, independent at discharge  2) Interventions:  PT Gait Training, PT Therapeutic Exercises, PT Neuro Re-Ed/Balance, and PT Therapeutic Activity             Goal: LTG-By discharge, patient will transfer one surface to another     Dates: Start: 04/27/21       Description: 1) Individualized goal: Transfer 1 surface to another no AD, independent at discharge  2) Interventions:  PT Gait Training, PT Therapeutic Exercises, and PT Therapeutic Activity            Goal: LTG-By discharge, patient will ambulate up/down 4-6 stairs     Dates: Start: 04/27/21       Description: 1) Individualized goal: Up/down 4-6 stairs handrails, independently at discharge  2) Interventions:  PT Gait Training, PT Therapeutic Exercises, PT Neuro Re-Ed/Balance, and PT Therapeutic Activity            Goal: LTG-By discharge, patient will transfer in/out of a car     Dates: Start: 04/27/21       Description: 1) Individualized goal: Car transfer no device supervision at discharge  2) Interventions:  PT Gait Training, PT Therapeutic Exercises, PT Neuro Re-Ed/Balance, and PT Therapeutic Activity

## 2021-04-29 NOTE — THERAPY
"Occupational Therapy  Daily Treatment     Patient Name: Nathalie Aguilar  Age:  60 y.o., Sex:  female  Medical Record #: [id number]  Today's Date: 4/29/2021     Precautions  Precautions: (P) Fall Risk         Subjective    \" I'm sorry I just get overwhelmed with all that is happening. \" tearfully explained at start of session  Referring to her medical event/ hospitalization  In general      Objective       04/29/21 1031   Precautions   Precautions Fall Risk   Functional Level of Assist   Lower Body Dressing Supervision  (doff shoes  seated in w/c )   Toileting Stand by Assist   Bed, Chair, Wheelchair Transfer Stand by Assist   Toilet Transfers Stand by Assist   Sitting Upper Body Exercises   Chest Press 1 set of 10;Bilateral   Front Arm Raise 2 sets of 10;Bilateral   Shoulder Press 1 set of 10;Bilateral   Internal Shoulder Rotation 1 set of 10;Right ;Left   External Shoulder Rotation 1 set of 10;Right ;Left   Bicep Curls 2 sets of 10;Right ;Left   Pronation / Supination 2 sets of 10;Right ;Left   Other Exercise 2 sets of 10;Bilateral  arm circles forward and back     Comments ther ex performed with 1lb weight    Interdisciplinary Plan of Care Collaboration   Patient Position at End of Therapy In Bed;Call Light within Reach;Tray Table within Reach   OT Total Time Spent   OT Individual Total Time Spent (Mins) 30   OT Charge Group   OT Self Care / ADL 1   OT Therapeutic Exercise  1       Assessment     participates to the best of her ability  With all tasks presented     Strengths: Able to follow instructions, Alert and oriented, Independent prior level of function, Motivated for self care and independence, Supportive family, Willingly participates in therapeutic activities  Barriers: Decreased endurance, Generalized weakness, Impaired activity tolerance, Impaired balance, Agitation    Plan     ADL , IADL  related mobility and cognition , strength/endurance building   Standing endurance and balance activity "       Occupational Therapy Goals     Problem: Bathing     Dates: Start: 04/28/21       Goal: STG-Within one week, patient will bathe     Dates: Start: 04/28/21       Description: 1) Individualized Goal:  w/ supervision using DME and AE as needed  2) Interventions:  OT Orthotics Training, OT E Stim Attended, OT Group Therapy, OT Self Care/ADL, OT Cognitive Skill Dev, OT Community Reintegration, OT Manual Ther Technique, OT Neuro Re-Ed/Balance, OT Sensory Int Techniques, OT Therapeutic Activity, OT Evaluation, and OT Therapeutic Exercise                  Problem: Dressing     Dates: Start: 04/28/21       Goal: STG-Within one week, patient will retrieve clothing     Dates: Start: 04/28/21       Description: 1) Individualized Goal:  and dress w/ supervision   2) Interventions:  OT Orthotics Training, OT E Stim Attended, OT Group Therapy, OT Self Care/ADL, OT Cognitive Skill Dev, OT Community Reintegration, OT Manual Ther Technique, OT Neuro Re-Ed/Balance, OT Sensory Int Techniques, OT Therapeutic Activity, OT Evaluation, and OT Therapeutic Exercise                Problem: Functional Transfers     Dates: Start: 04/28/21       Goal: STG-Within one week, patient will transfer to step in shower     Dates: Start: 04/28/21       Description: 1) Individualized Goal:  w/ supervision using DME and AE as needed  2) Interventions:  OT Orthotics Training, OT E Stim Attended, OT Group Therapy, OT Self Care/ADL, OT Cognitive Skill Dev, OT Community Reintegration, OT Manual Ther Technique, OT Neuro Re-Ed/Balance, OT Sensory Int Techniques, OT Therapeutic Activity, OT Evaluation, and OT Therapeutic Exercise                Problem: IADL's     Dates: Start: 04/28/21       Goal: STG-Within one week, patient will access kitchen area     Dates: Start: 04/28/21       Description: 1) Individualized Goal:  w/ CGA using DME and AE as needed  2) Interventions:  OT Orthotics Training, OT E Stim Attended, OT Group Therapy, OT Self Care/ADL, OT  Cognitive Skill Dev, OT Community Reintegration, OT Manual Ther Technique, OT Neuro Re-Ed/Balance, OT Sensory Int Techniques, OT Therapeutic Activity, OT Evaluation, and OT Therapeutic Exercise                Problem: OT Long Term Goals     Dates: Start: 04/28/21       Goal: LTG-By discharge, patient will complete basic self care tasks     Dates: Start: 04/28/21       Description: 1) Individualized Goal:  w/ mod I using DME and AE as needed  2) Interventions:  OT Orthotics Training, OT E Stim Attended, OT Group Therapy, OT Self Care/ADL, OT Cognitive Skill Dev, OT Community Reintegration, OT Manual Ther Technique, OT Neuro Re-Ed/Balance, OT Sensory Int Techniques, OT Therapeutic Activity, OT Evaluation, and OT Therapeutic Exercise          Goal: LTG-By discharge, patient will perform bathroom transfers     Dates: Start: 04/28/21       Description: 1) Individualized Goal:  w/ mod I using DME and AE as needed  2) Interventions:  OT Orthotics Training, OT E Stim Attended, OT Group Therapy, OT Self Care/ADL, OT Cognitive Skill Dev, OT Community Reintegration, OT Manual Ther Technique, OT Neuro Re-Ed/Balance, OT Sensory Int Techniques, OT Therapeutic Activity, OT Evaluation, and OT Therapeutic Exercise

## 2021-04-30 LAB — 25(OH)D3 SERPL-MCNC: 7 NG/ML (ref 30–80)

## 2021-04-30 PROCEDURE — 97110 THERAPEUTIC EXERCISES: CPT

## 2021-04-30 PROCEDURE — 97535 SELF CARE MNGMENT TRAINING: CPT

## 2021-04-30 PROCEDURE — 97116 GAIT TRAINING THERAPY: CPT

## 2021-04-30 PROCEDURE — 700102 HCHG RX REV CODE 250 W/ 637 OVERRIDE(OP): Performed by: PHYSICAL MEDICINE & REHABILITATION

## 2021-04-30 PROCEDURE — 94760 N-INVAS EAR/PLS OXIMETRY 1: CPT

## 2021-04-30 PROCEDURE — 97129 THER IVNTJ 1ST 15 MIN: CPT

## 2021-04-30 PROCEDURE — 97130 THER IVNTJ EA ADDL 15 MIN: CPT

## 2021-04-30 PROCEDURE — 97530 THERAPEUTIC ACTIVITIES: CPT

## 2021-04-30 PROCEDURE — 97112 NEUROMUSCULAR REEDUCATION: CPT

## 2021-04-30 PROCEDURE — A9270 NON-COVERED ITEM OR SERVICE: HCPCS | Performed by: PHYSICAL MEDICINE & REHABILITATION

## 2021-04-30 PROCEDURE — 770010 HCHG ROOM/CARE - REHAB SEMI PRIVAT*

## 2021-04-30 PROCEDURE — 99232 SBSQ HOSP IP/OBS MODERATE 35: CPT | Performed by: PHYSICAL MEDICINE & REHABILITATION

## 2021-04-30 RX ORDER — MIDODRINE HYDROCHLORIDE 10 MG/1
10 TABLET ORAL 3 TIMES DAILY
Status: DISCONTINUED | OUTPATIENT
Start: 2021-04-30 | End: 2021-05-03

## 2021-04-30 RX ADMIN — OMEPRAZOLE 20 MG: 20 CAPSULE, DELAYED RELEASE ORAL at 09:07

## 2021-04-30 RX ADMIN — GABAPENTIN 300 MG: 300 CAPSULE ORAL at 20:50

## 2021-04-30 RX ADMIN — LEVETIRACETAM 1000 MG: 500 TABLET ORAL at 20:50

## 2021-04-30 RX ADMIN — ACETAMINOPHEN 650 MG: 325 TABLET, FILM COATED ORAL at 07:44

## 2021-04-30 RX ADMIN — GABAPENTIN 300 MG: 300 CAPSULE ORAL at 09:07

## 2021-04-30 RX ADMIN — OMEPRAZOLE 20 MG: 20 CAPSULE, DELAYED RELEASE ORAL at 20:50

## 2021-04-30 RX ADMIN — MAGNESIUM OXIDE TAB 400 MG (241.3 MG ELEMENTAL MG) 400 MG: 400 (241.3 MG) TAB at 09:07

## 2021-04-30 RX ADMIN — MIRTAZAPINE 15 MG: 15 TABLET, ORALLY DISINTEGRATING ORAL at 20:51

## 2021-04-30 RX ADMIN — MAGNESIUM OXIDE TAB 400 MG (241.3 MG ELEMENTAL MG) 400 MG: 400 (241.3 MG) TAB at 20:50

## 2021-04-30 RX ADMIN — DOCUSATE SODIUM 50 MG AND SENNOSIDES 8.6 MG 2 TABLET: 8.6; 5 TABLET, FILM COATED ORAL at 09:04

## 2021-04-30 RX ADMIN — MIDODRINE HYDROCHLORIDE 10 MG: 10 TABLET ORAL at 16:10

## 2021-04-30 RX ADMIN — ACETAMINOPHEN 650 MG: 325 TABLET, FILM COATED ORAL at 20:49

## 2021-04-30 RX ADMIN — MIDODRINE HYDROCHLORIDE 10 MG: 10 TABLET ORAL at 20:51

## 2021-04-30 RX ADMIN — MIDODRINE HYDROCHLORIDE 7.5 MG: 2.5 TABLET ORAL at 09:06

## 2021-04-30 RX ADMIN — GABAPENTIN 300 MG: 300 CAPSULE ORAL at 16:10

## 2021-04-30 RX ADMIN — LEVETIRACETAM 1000 MG: 500 TABLET ORAL at 09:05

## 2021-04-30 RX ADMIN — TRAZODONE HYDROCHLORIDE 50 MG: 50 TABLET ORAL at 20:50

## 2021-04-30 ASSESSMENT — GAIT ASSESSMENTS
GAIT LEVEL OF ASSIST: STAND BY ASSIST
GAIT LEVEL OF ASSIST: STAND BY ASSIST
DEVIATION: BRADYKINETIC;DECREASED BASE OF SUPPORT;DECREASED HEEL STRIKE;DECREASED TOE OFF
ASSISTIVE DEVICE: FRONT WHEEL WALKER
DEVIATION: BRADYKINETIC;DECREASED BASE OF SUPPORT;DECREASED HEEL STRIKE;DECREASED TOE OFF
DISTANCE (FEET): 220
DISTANCE (FEET): 240
ASSISTIVE DEVICE: FRONT WHEEL WALKER

## 2021-04-30 ASSESSMENT — ACTIVITIES OF DAILY LIVING (ADL)
BED_CHAIR_WHEELCHAIR_TRANSFER_DESCRIPTION: INCREASED TIME;SUPERVISION FOR SAFETY
TOILET_TRANSFER_DESCRIPTION: ADAPTIVE EQUIPMENT;GRAB BAR
BED_CHAIR_WHEELCHAIR_TRANSFER_DESCRIPTION: INCREASED TIME;SUPERVISION FOR SAFETY

## 2021-04-30 ASSESSMENT — PAIN DESCRIPTION - PAIN TYPE: TYPE: ACUTE PAIN

## 2021-04-30 NOTE — THERAPY
"Recreational Therapy  Daily Treatment     Patient Name: Nathalie Aguilar  AGE:  60 y.o., SEX:  female  Medical Record #: [id number]  Today's Date: 4/30/2021       Subjective    Pt seated in main gym and agreeable to Tx. \"It's getting better..my memory is still a little fuzzy\". - Pt reported when asked on how they felt their memory currently is.      Objective       04/30/21 1031   Functional Ability Status - Cognitive   Attention Span Remains on Task   Comprehension Follows Two Step Commands   Judgment Able to Make Independent Decisions   Functional Ability Status - Emotional    Affect Appropriate   Mood Appropriate   Behavior Appropriate   Skilled Intervention    Skilled Intervention Cognitive Leisure   Skilled Intervention Comments \"Last Letter\" & Matching activity\"    Interdisciplinary Plan of Care Collaboration   IDT Collaboration with  Other (See Comments);Speech Therapist;Occupational Therapist  (Recreation therapy student )   Patient Position at End of Therapy Seated;Other (Comments)  (In main gym awaiting next Tx session)   Collaboration Comments Pt care transfered to CTRS from OT. End of session Pt care transfered to speech therapist    Strengths & Barriers   Strengths Able to follow instructions;Alert and oriented;Effective communication skills;Making steady progress towards goals;Motivated for self care and independence;Pleasant and cooperative;Willingly participates in therapeutic activities   Barriers Impaired activity tolerance;Impaired balance;Impaired functional cognition   Treatment Time   Total Time Spent (mins) 30   Procedural Tracking   Procedural Tracking Community Re-Integration;Community Skills Development;Leisure Skills Development;Leisure Skills Awareness       Assessment    Pt given information and resources on Alcoholics Anonymous in the [facility] per Pt's request made during initial evaluation.     Pt able to complete cognitive leisure activity \"Last Letter\" following being taught with " no need for cues. Activity required Pt to state objects they see on a card with various images that starts with the last letter of the word mentioned. Pt stated that they currently do not have their glasses and were having a hard time seeing the images on the card.     Transitioned to next activity which involved Pt to identify matching pairs of cards that were faced flipped down - completed with no need for cueing. Pt was required to find matching pairs of cards under the total amount of cards faced flipped down (must find 2 pairs under 4 turns, 3 pairs under 6 turns, 4 pairs under 8 turns, 5 pairs under 10 turns, 6 pairs under 12 turns, and 7 pairs under 14 turns). Pt was able to find all cards under given amount of turns expect for 7 pairs which required the Pt 16 turns.    Strengths: (P) Able to follow instructions, Alert and oriented, Effective communication skills, Making steady progress towards goals, Motivated for self care and independence, Pleasant and cooperative, Willingly participates in therapeutic activities  Barriers: (P) Impaired activity tolerance, Impaired balance, Impaired functional cognition    Plan    Continue cognitive leisure Tx.     Passport items to be completed:  discuss returning to work, hobbies, community groups or volunteer activities, explore community resources

## 2021-04-30 NOTE — FLOWSHEET NOTE
04/29/21 1825   Events/Summary/Plan   Events/Summary/Plan Room air trial check   Vital Signs   Pulse 90   Respiration 16   Pulse Oximetry 90 %   $ Pulse Oximetry (Spot Check) Yes   Oxygen   O2 Delivery Device None - Room Air

## 2021-04-30 NOTE — THERAPY
"Occupational Therapy  Daily Treatment     Patient Name: Nathalie Aguilar  Age:  60 y.o., Sex:  female  Medical Record #: [id number]  Today's Date: 4/30/2021     Precautions  Precautions: Fall Risk, Swallow Precautions ( See Comments)         Subjective      \"I really like this thing\" - pt stated about the fluido bike           Objective       04/30/21 0931   Precautions   Precautions Fall Risk;Swallow Precautions ( See Comments)   Vitals   Pulse Oximetry 94 %   O2 (LPM) 0   O2 Delivery Device None - Room Air   Vitals Comments Pt stayed in 90s throughout session w/out O2   Functional Level of Assist   Grooming Supervision;Seated   Grooming Description Seated in wheelchair at sink;Set-up of equipment   Sitting Upper Body Exercises   Tricep Press 3 sets of 10;Bilateral;Weight (See Comments for lbs)  (rickshaw, 10#)   Upper Extremity Bike Level 2 Resistance  (fluido bike, 15 minutes 1 break)   Balance   Comments activity in // bars with ladder ball. Pt reached and tossed ladder balls with no UE support on // bars. Pt ambulated within // bars using UE support ~50% of the time. Pt collected ladder balls off the rungs while using 1 UE for support on // bars. Pt had 0 LOB during activity    Interdisciplinary Plan of Care Collaboration   IDT Collaboration with  Recreation Therapist   Patient Position at End of Therapy Seated;Other (Comments)  (in gym for rec therapy)   Collaboration Comments transition of care   OT Total Time Spent   OT Individual Total Time Spent (Mins) 60   OT Charge Group   OT Self Care / ADL 1   OT Neuromuscular Re-education / Balance 1   OT Therapeutic Exercise  2       Assessment    Pt tolerated session well focused on balance, UE strength, and grooming. Pt was very pleasant and motivated throughout session. Pt demonstrated increased endurance and balance while completing UE exercises and balance activity. Pt required frequent breaks during balance activity due to weakness and shaking in LEs. "   Strengths: Able to follow instructions, Alert and oriented, Independent prior level of function, Motivated for self care and independence, Supportive family, Willingly participates in therapeutic activities  Barriers: Decreased endurance, Generalized weakness, Impaired activity tolerance, Impaired balance, Agitation    Plan    ADL , IADL  related mobility and cognition , strength/endurance building   Standing endurance and balance activity,     Occupational Therapy Goals     Problem: Bathing     Dates: Start: 04/28/21       Goal: STG-Within one week, patient will bathe     Dates: Start: 04/28/21       Description: 1) Individualized Goal:  w/ supervision using DME and AE as needed  2) Interventions:  OT Orthotics Training, OT E Stim Attended, OT Group Therapy, OT Self Care/ADL, OT Cognitive Skill Dev, OT Community Reintegration, OT Manual Ther Technique, OT Neuro Re-Ed/Balance, OT Sensory Int Techniques, OT Therapeutic Activity, OT Evaluation, and OT Therapeutic Exercise      Note:     Goal Note filed on 04/29/21 1200 by Claire Faith OT    Due to eval yesterday                         Problem: Dressing     Dates: Start: 04/28/21       Goal: STG-Within one week, patient will retrieve clothing     Dates: Start: 04/28/21       Description: 1) Individualized Goal:  and dress w/ supervision   2) Interventions:  OT Orthotics Training, OT E Stim Attended, OT Group Therapy, OT Self Care/ADL, OT Cognitive Skill Dev, OT Community Reintegration, OT Manual Ther Technique, OT Neuro Re-Ed/Balance, OT Sensory Int Techniques, OT Therapeutic Activity, OT Evaluation, and OT Therapeutic Exercise    Note:     Goal Note filed on 04/29/21 1200 by Claire Faith OT    Due to eval yesterday                         Problem: Functional Transfers     Dates: Start: 04/28/21       Goal: STG-Within one week, patient will transfer to step in shower     Dates: Start: 04/28/21       Description: 1) Individualized Goal:  w/ supervision  using DME and AE as needed  2) Interventions:  OT Orthotics Training, OT E Stim Attended, OT Group Therapy, OT Self Care/ADL, OT Cognitive Skill Dev, OT Community Reintegration, OT Manual Ther Technique, OT Neuro Re-Ed/Balance, OT Sensory Int Techniques, OT Therapeutic Activity, OT Evaluation, and OT Therapeutic Exercise    Note:     Goal Note filed on 04/29/21 1200 by Claire Faith OT    Due to eval yesterday                         Problem: IADL's     Dates: Start: 04/28/21       Goal: STG-Within one week, patient will access kitchen area     Dates: Start: 04/28/21       Description: 1) Individualized Goal:  w/ CGA using DME and AE as needed  2) Interventions:  OT Orthotics Training, OT E Stim Attended, OT Group Therapy, OT Self Care/ADL, OT Cognitive Skill Dev, OT Community Reintegration, OT Manual Ther Technique, OT Neuro Re-Ed/Balance, OT Sensory Int Techniques, OT Therapeutic Activity, OT Evaluation, and OT Therapeutic Exercise    Note:     Goal Note filed on 04/29/21 1200 by Claire Faith OT    Due to eval yesterday                         Problem: OT Long Term Goals     Dates: Start: 04/28/21       Goal: LTG-By discharge, patient will complete basic self care tasks     Dates: Start: 04/28/21       Description: 1) Individualized Goal:  w/ mod I using DME and AE as needed  2) Interventions:  OT Orthotics Training, OT E Stim Attended, OT Group Therapy, OT Self Care/ADL, OT Cognitive Skill Dev, OT Community Reintegration, OT Manual Ther Technique, OT Neuro Re-Ed/Balance, OT Sensory Int Techniques, OT Therapeutic Activity, OT Evaluation, and OT Therapeutic Exercise          Goal: LTG-By discharge, patient will perform bathroom transfers     Dates: Start: 04/28/21       Description: 1) Individualized Goal:  w/ mod I using DME and AE as needed  2) Interventions:  OT Orthotics Training, OT E Stim Attended, OT Group Therapy, OT Self Care/ADL, OT Cognitive Skill Dev, OT Community Reintegration, OT Manual Ther  Technique, OT Neuro Re-Ed/Balance, OT Sensory Int Techniques, OT Therapeutic Activity, OT Evaluation, and OT Therapeutic Exercise

## 2021-04-30 NOTE — THERAPY
"Occupational Therapy  Daily Treatment     Patient Name: Nathalie Aguilar  Age:  60 y.o., Sex:  female  Medical Record #: [id number]  Today's Date: 4/30/2021     Precautions  Precautions: (P) Fall Risk         Subjective    \"I want be as strong as possible leaving here because I know I will live alone again at some point and I need to be able to care for myself.\"  Objective       04/30/21 1331   Precautions   Precautions Fall Risk   Functional Level of Assist   Lower Body Dressing Modified Independent  (donning shoes EOB)   Lower Body Dressing Description Increased time   Interdisciplinary Plan of Care Collaboration   Patient Position at End of Therapy Seated;Tray Table within Reach;Call Light within Reach;Phone within Reach   OT Total Time Spent   OT Individual Total Time Spent (Mins) 30   OT Charge Group   OT Neuromuscular Re-education / Balance 2   Pt ambulated outside with FWW and supervision. Pt maneuvered over sidewalks to practice balance and ambulation within the community. Pt required seated breaks every ~100 ft for a total of 300ft. Pt had 0 LOB.    Assessment    Pt tolerated session well focused on balance, endurance, and functional ambulation. Pt cont to be motivated to increase independence and live a healthier life style.   Strengths: Able to follow instructions, Alert and oriented, Independent prior level of function, Motivated for self care and independence, Supportive family, Willingly participates in therapeutic activities  Barriers: Decreased endurance, Generalized weakness, Impaired activity tolerance, Impaired balance, Agitation    Plan    ADL , IADL  related mobility and cognition , strength/endurance building   Standing endurance and balance activity,     Occupational Therapy Goals     Problem: Bathing     Dates: Start: 04/28/21       Goal: STG-Within one week, patient will bathe     Dates: Start: 04/28/21       Description: 1) Individualized Goal:  w/ supervision using DME and AE as " needed  2) Interventions:  OT Orthotics Training, OT E Stim Attended, OT Group Therapy, OT Self Care/ADL, OT Cognitive Skill Dev, OT Community Reintegration, OT Manual Ther Technique, OT Neuro Re-Ed/Balance, OT Sensory Int Techniques, OT Therapeutic Activity, OT Evaluation, and OT Therapeutic Exercise      Note:     Goal Note filed on 04/29/21 1200 by Claire Faith OT    Due to eval yesterday                         Problem: Dressing     Dates: Start: 04/28/21       Goal: STG-Within one week, patient will retrieve clothing     Dates: Start: 04/28/21       Description: 1) Individualized Goal:  and dress w/ supervision   2) Interventions:  OT Orthotics Training, OT E Stim Attended, OT Group Therapy, OT Self Care/ADL, OT Cognitive Skill Dev, OT Community Reintegration, OT Manual Ther Technique, OT Neuro Re-Ed/Balance, OT Sensory Int Techniques, OT Therapeutic Activity, OT Evaluation, and OT Therapeutic Exercise    Note:     Goal Note filed on 04/29/21 1200 by Claire Faith OT    Due to eval yesterday                         Problem: Functional Transfers     Dates: Start: 04/28/21       Goal: STG-Within one week, patient will transfer to step in shower     Dates: Start: 04/28/21       Description: 1) Individualized Goal:  w/ supervision using DME and AE as needed  2) Interventions:  OT Orthotics Training, OT E Stim Attended, OT Group Therapy, OT Self Care/ADL, OT Cognitive Skill Dev, OT Community Reintegration, OT Manual Ther Technique, OT Neuro Re-Ed/Balance, OT Sensory Int Techniques, OT Therapeutic Activity, OT Evaluation, and OT Therapeutic Exercise    Note:     Goal Note filed on 04/29/21 1200 by Claire Faith OT    Due to eval yesterday                         Problem: IADL's     Dates: Start: 04/28/21       Goal: STG-Within one week, patient will access kitchen area     Dates: Start: 04/28/21       Description: 1) Individualized Goal:  w/ CGA using DME and AE as needed  2) Interventions:  OT Orthotics  Training, OT E Stim Attended, OT Group Therapy, OT Self Care/ADL, OT Cognitive Skill Dev, OT Community Reintegration, OT Manual Ther Technique, OT Neuro Re-Ed/Balance, OT Sensory Int Techniques, OT Therapeutic Activity, OT Evaluation, and OT Therapeutic Exercise    Note:     Goal Note filed on 04/29/21 1200 by Claire Faith OT    Due to eval yesterday                         Problem: OT Long Term Goals     Dates: Start: 04/28/21       Goal: LTG-By discharge, patient will complete basic self care tasks     Dates: Start: 04/28/21       Description: 1) Individualized Goal:  w/ mod I using DME and AE as needed  2) Interventions:  OT Orthotics Training, OT E Stim Attended, OT Group Therapy, OT Self Care/ADL, OT Cognitive Skill Dev, OT Community Reintegration, OT Manual Ther Technique, OT Neuro Re-Ed/Balance, OT Sensory Int Techniques, OT Therapeutic Activity, OT Evaluation, and OT Therapeutic Exercise          Goal: LTG-By discharge, patient will perform bathroom transfers     Dates: Start: 04/28/21       Description: 1) Individualized Goal:  w/ mod I using DME and AE as needed  2) Interventions:  OT Orthotics Training, OT E Stim Attended, OT Group Therapy, OT Self Care/ADL, OT Cognitive Skill Dev, OT Community Reintegration, OT Manual Ther Technique, OT Neuro Re-Ed/Balance, OT Sensory Int Techniques, OT Therapeutic Activity, OT Evaluation, and OT Therapeutic Exercise

## 2021-04-30 NOTE — THERAPY
04/30/21 0859   Precautions   Precautions Fall Risk;Swallow Precautions ( See Comments)   Pain 0 - 10 Group   Therapist Pain Assessment 0   Cognition    Level of Consciousness Alert   Gait Functional Level of Assist    Gait Level Of Assist Stand by Assist   Assistive Device Front Wheel Walker   Distance (Feet) 240   # of Times Distance was Traveled 3   Deviation Bradykinetic;Decreased Base Of Support;Decreased Heel Strike;Decreased Toe Off   Transfer Functional Level of Assist   Bed, Chair, Wheelchair Transfer Stand by Assist   Bed Chair Wheelchair Transfer Description Increased time;Supervision for safety   Bed Mobility    Supine to Sit Supervised   Sit to Stand Stand by Assist   Scooting Supervised   Rolling Modified Independent   PT Total Time Spent   PT Individual Total Time Spent (Mins) 30   PT Charge Group   PT Gait Training 2   Physical Therapy   Daily Treatment     Patient Name: Nathalie Aguilar  Age:  60 y.o., Sex:  female  Medical Record #: [id number]  Today's Date: 4/30/2021     Precautions  Precautions: Fall Risk, Swallow Precautions ( See Comments)    Subjective    The patient agreed to PT. She had no complaints of pain. She said she was okay without using oxygen.     Objective    The patient participated in practicing safe sit to/from stand and gait training with a fww. She tolerated 240 FT x3 fww, SBA and verbal cues for attention and slower pace to conserve energy.    Assessment    The patient tolerated gait without supplemental oxygen with saturation at 92%. If she remembers to keep a conservative pace, she does not get short of breath.  Strengths: Able to follow instructions, Effective communication skills, Independent prior level of function, Making steady progress towards goals, Pleasant and cooperative, Willingly participates in therapeutic activities  Barriers: Emotional lability, Fatigue, Generalized weakness, Impaired activity tolerance, Impaired balance(Impaired strength and balance,  fall risk)    Plan    Safety education, energy conservation with activities, therapeutic exercise, gait training fww as tolerated, monitor oxygen saturation    Passport items to be completed:  Passport items to be completed:  Get in/out of bed safely, in/out of a vehicle, safely use mobility device, walk or wheel around home/community, navigate up and down stairs, show how to get up/down from the ground, ensure home is accessible, demonstrate HEP, complete caregiver training    Physical Therapy Problems     Problem: Mobility     Dates: Start: 04/27/21       Goal: STG-Within one week, patient will ascend and descend four to six stairs     Dates: Start: 04/27/21       Description: 1) Individualized goal: Up/down 4-6 stairs, handrails, SBA 1 week  2) Interventions:  PT Gait Training, PT Therapeutic Exercises, and PT Therapeutic Activity            Goal: STG-Within one week, patient will     Dates: Start: 04/27/21       Description: 1) Individualized goal: Gait fww/no  FT SBA, 1 week  2) Interventions:  PT Gait Training, PT Therapeutic Exercises, PT Neuro Re-Ed/Balance, and PT Therapeutic Activity      Note:     Goal Note filed on 04/29/21 1225 by OPHELIA Herrera    1) Individualized goal:   Gait  FT SBA 1 week  2) Interventions:  PT Gait Training, PT Therapeutic Exercises, and PT Therapeutic Activity                        Problem: Mobility Transfers     Dates: Start: 04/27/21       Goal: STG-Within one week, patient will transfer bed to chair     Dates: Start: 04/27/21       Description: 1) Individualized goal: Transfer bed to/from chair fww/no AD supervision, 1 week  2) Interventions:  PT Gait Training, PT Therapeutic Exercises, and PT Therapeutic Activity                  Problem: PT-Long Term Goals     Dates: Start: 04/27/21       Goal: LTG-By discharge, patient will ambulate     Dates: Start: 04/27/21       Description: 1) Individualized goal: Gait no  FT, independent at discharge  2)  Interventions:  PT Gait Training, PT Therapeutic Exercises, PT Neuro Re-Ed/Balance, and PT Therapeutic Activity            Goal: LTG-By discharge, patient will transfer one surface to another     Dates: Start: 04/27/21       Description: 1) Individualized goal: Transfer 1 surface to another no AD, independent at discharge  2) Interventions:  PT Gait Training, PT Therapeutic Exercises, and PT Therapeutic Activity            Goal: LTG-By discharge, patient will ambulate up/down 4-6 stairs     Dates: Start: 04/27/21       Description: 1) Individualized goal: Up/down 4-6 stairs handrails, independently at discharge  2) Interventions:  PT Gait Training, PT Therapeutic Exercises, PT Neuro Re-Ed/Balance, and PT Therapeutic Activity            Goal: LTG-By discharge, patient will transfer in/out of a car     Dates: Start: 04/27/21       Description: 1) Individualized goal: Car transfer no device supervision at discharge  2) Interventions:  PT Gait Training, PT Therapeutic Exercises, PT Neuro Re-Ed/Balance, and PT Therapeutic Activity

## 2021-04-30 NOTE — PROGRESS NOTES
"Rehab Progress Note     Encounter Date: 4/30/2021    CC: TBI, poor sleep    Interval Events (Subjective)  Patient sitting up in gym. She reports therapy is going very well. She reports she is making steady progress. Discussed about discharge and she is in agreement to stay with daughter as she would like to be safe as well as see her grandchildren. She has questions about her recovery and why she had a bleed. She has questions if this is a type of stroke. Discussed about SDH difference from stroke and probable fall causing bleeding. She denies headache. Denies NVD.     IDT Team Meeting 4/29/2021  DC/Disposition:  5/11/21    Objective:  VITAL SIGNS: /66   Pulse 80   Temp 36.7 °C (98.1 °F) (Oral)   Resp 16   Ht 1.702 m (5' 7\")   Wt 58 kg (127 lb 13.9 oz)   SpO2 94%   BMI 20.03 kg/m²   Gen: NAD  Psych: Mood and affect appropriate  CV: RRR, no edema  Resp: CTAB, no upper airway sounds  Abd: NTND  Neuro: AOx4, following commands, pushing wheelchair BUE    Recent Results (from the past 72 hour(s))   SARS-CoV-2 PCR (24 hour In-House): Collect NP swab in [place]    Collection Time: 04/27/21  1:15 PM    Specimen: Nasopharyngeal; Respirate   Result Value Ref Range    SARS-CoV-2 Source NP Swab     SARS-CoV-2 by PCR NotDetected    CBC with Differential    Collection Time: 04/28/21  5:41 AM   Result Value Ref Range    WBC 3.4 (L) 4.8 - 10.8 K/uL    RBC 2.94 (L) 4.20 - 5.40 M/uL    Hemoglobin 9.6 (L) 12.0 - 16.0 g/dL    Hematocrit 31.6 (L) 37.0 - 47.0 %    .5 (H) 81.4 - 97.8 fL    MCH 32.7 27.0 - 33.0 pg    MCHC 30.4 (L) 33.6 - 35.0 g/dL    RDW 84.1 (H) 35.9 - 50.0 fL    Platelet Count 361 164 - 446 K/uL    MPV 9.9 9.0 - 12.9 fL    Neutrophils-Polys 64.10 44.00 - 72.00 %    Lymphocytes 25.70 22.00 - 41.00 %    Monocytes 6.60 0.00 - 13.40 %    Eosinophils 2.10 0.00 - 6.90 %    Basophils 0.90 0.00 - 1.80 %    Immature Granulocytes 0.60 0.00 - 0.90 %    Nucleated RBC 0.00 /100 WBC    Neutrophils (Absolute) 2.15 2.00 " - 7.15 K/uL    Lymphs (Absolute) 0.86 (L) 1.00 - 4.80 K/uL    Monos (Absolute) 0.22 0.00 - 0.85 K/uL    Eos (Absolute) 0.07 0.00 - 0.51 K/uL    Baso (Absolute) 0.03 0.00 - 0.12 K/uL    Immature Granulocytes (abs) 0.02 0.00 - 0.11 K/uL    NRBC (Absolute) 0.00 K/uL    Anisocytosis 3+ (A)     Macrocytosis 3+ (A)     Microcytosis 1+    Comp Metabolic Panel (CMP)    Collection Time: 04/28/21  5:41 AM   Result Value Ref Range    Sodium 141 135 - 145 mmol/L    Potassium 3.5 (L) 3.6 - 5.5 mmol/L    Chloride 106 96 - 112 mmol/L    Co2 29 20 - 33 mmol/L    Anion Gap 6.0 (L) 7.0 - 16.0    Glucose 106 (H) 65 - 99 mg/dL    Bun 4 (L) 8 - 22 mg/dL    Creatinine 0.27 (L) 0.50 - 1.40 mg/dL    Calcium 8.5 8.5 - 10.5 mg/dL    AST(SGOT) 154 (H) 12 - 45 U/L    ALT(SGPT) 104 (H) 2 - 50 U/L    Alkaline Phosphatase 239 (H) 30 - 99 U/L    Total Bilirubin 2.5 (H) 0.1 - 1.5 mg/dL    Albumin 2.3 (L) 3.2 - 4.9 g/dL    Total Protein 5.5 (L) 6.0 - 8.2 g/dL    Globulin 3.2 1.9 - 3.5 g/dL    A-G Ratio 0.7 g/dL   HEMOGLOBIN A1C    Collection Time: 04/28/21  5:41 AM   Result Value Ref Range    Glycohemoglobin 4.1 4.0 - 5.6 %    Est Avg Glucose 71 mg/dL   TSH with Reflex to FT4    Collection Time: 04/28/21  5:41 AM   Result Value Ref Range    TSH 3.230 0.380 - 5.330 uIU/mL   Vitamin D, 25-hydroxy (blood)    Collection Time: 04/28/21  5:41 AM   Result Value Ref Range    25-Hydroxy   Vitamin D 25 7 (L) 30 - 80 ng/mL   ESTIMATED GFR    Collection Time: 04/28/21  5:41 AM   Result Value Ref Range    GFR If African American >60 >60 mL/min/1.73 m 2    GFR If Non African American >60 >60 mL/min/1.73 m 2   PERIPHERAL SMEAR REVIEW    Collection Time: 04/28/21  5:41 AM   Result Value Ref Range    Peripheral Smear Review see below    PLATELET ESTIMATE    Collection Time: 04/28/21  5:41 AM   Result Value Ref Range    Plt Estimation Normal    MORPHOLOGY    Collection Time: 04/28/21  5:41 AM   Result Value Ref Range    RBC Morphology Present     Polychromia 1+     DIFFERENTIAL COMMENT    Collection Time: 04/28/21  5:41 AM   Result Value Ref Range    Comments-Diff see below        Current Facility-Administered Medications   Medication Frequency   • gabapentin (NEURONTIN) capsule 300 mg TID   • midodrine (PROAMATINE) tablet 7.5 mg TID   • Respiratory Therapy Consult Continuous RT   • hydrALAZINE (APRESOLINE) tablet 25 mg Q8HRS PRN   • acetaminophen (Tylenol) tablet 650 mg Q4HRS PRN   • senna-docusate (PERICOLACE or SENOKOT S) 8.6-50 MG per tablet 2 tablet BID    And   • polyethylene glycol/lytes (MIRALAX) PACKET 1 Packet QDAY PRN    And   • magnesium hydroxide (MILK OF MAGNESIA) suspension 30 mL QDAY PRN    And   • bisacodyl (DULCOLAX) suppository 10 mg QDAY PRN   • artificial tears ophthalmic solution 1 Drop PRN   • benzocaine-menthol (CEPACOL) lozenge 1 Lozenge Q2HRS PRN   • mag hydrox-al hydrox-simeth (MAALOX PLUS ES or MYLANTA DS) suspension 20 mL Q2HRS PRN   • ondansetron (ZOFRAN ODT) dispertab 4 mg 4X/DAY PRN    Or   • ondansetron (ZOFRAN) syringe/vial injection 4 mg 4X/DAY PRN   • traZODone (DESYREL) tablet 50 mg QHS PRN   • sodium chloride (OCEAN) 0.65 % nasal spray 2 Spray PRN   • nicotine (NICODERM) 14 MG/24HR 14 mg Daily-0600   • magnesium oxide (MAG-OX) tablet 400 mg BID   • levETIRAcetam (KEPPRA) tablet 1,000 mg Q12HRS   • traZODone (DESYREL) tablet 50 mg QHS   • mirtazapine (Remeron) orally disintegrating tab 15 mg QHS   • omeprazole (PRILOSEC) capsule 20 mg BID       Orders Placed This Encounter   Procedures   • Diet Order Diet: Level 6 - Soft and Bite Sized (float pills in puree); Liquid level: Level 0 - Thin     Standing Status:   Standing     Number of Occurrences:   1     Order Specific Question:   Diet:     Answer:   Level 6 - Soft and Bite Sized [23]     Comments:   float pills in puree     Order Specific Question:   Liquid level     Answer:   Level 0 - Thin       Assessment:  Active Hospital Problems    Diagnosis    • *SDH (subdural hematoma) (HCC)    •  Transaminitis    • Anxiety    • Chest wall discomfort    • Thrombocytopenia (HCC)    • GI bleed    • Acute respiratory failure (HCC)    • Cardiac arrest (HCC)    • Right atrial mass        Medical Decision Making and Plan:  TBI (Traumatic Brain Injury):  Fall with bilateral SDHs managed conservatively. Hospital course complicated by seizure, PEA arrest requiring intubation, and GI bleed.  -PT and OT for mobility and ADLs  -SLP for cognition     Dysphagia - Patient on dysphagia diet. SLP for swallow evaluation. Limited by lack of dentures     Seizures - New witnessed seizure after SDHs. On Keppra 1000 mg BID. Monitor   -Follow-up Neurology      Hypotension - Patient on Midodrine 5 mg on transfer. Will monitor   -SBP into low 90s. Increase midodrine to 7.5, HR into 100s.  Still in low 90s, increase to 10 mg    GI Bleed - Patient on omeprazole 20 mg BID     Anemia - S/p GI bleed managed conservatively.  Check AM CBC. Did require transfusion   -9.6 improved from 8.1. Continue to monitor      Hypomagnesemia - Patient on Magnesium supplement on transfer.      Substance Abuse - Patient on Nicotine patch. History of heroin abuse.      Atelectasis/Fluid overload - Repeat CXR improved. Discontinue Lasix.     Cirrhosis - LFTs elevated on admission. Continue to monitor    Radiculopathy - Patient with new left sciatica. Start Gabapentin 300 mg TID    Depression/Anxiety - Patient on Remeron 15 mg QHS and Trazodone 50 mg. Has nightmares     Cardiac Mass - Right atrial mass of 1.4 x 2.3 cm. Cardiology evaluated and felt enlarge valve but needs outpatient cardiac MRI     DVT Ppx - Patient high risk for bleed and ambulating.     Dispo - Patient would like to discharge to her daughter's home.     Total time:  26 minutes.  I spent greater than 50% of the time for patient care, counseling, and coordination on this date, including unit/floor time, and face-to-face time with the patient as per interval events and assessment and plan  above. Topics discussed included discharge planning, low SBP, increase midodrine, recheck Fe and folate.     Hortencia Dudley M.D.

## 2021-04-30 NOTE — THERAPY
"Speech Language Pathology  Daily Treatment     Patient Name: Nathalie Aguilar  Age:  60 y.o., Sex:  female  Medical Record #: [id number]  Today's Date: 4/30/2021     Precautions  Precautions: Fall Risk, Swallow Precautions ( See Comments)    Subjective    Pt pleasant and cooperative during ST     Objective       04/30/21 1101   SLP Total Time Spent   SLP Individual Total Time Spent (Mins) 30   Treatment Charges   SLP Cognitive Skill Development First 15 Minutes 1   SLP Cognitive Skill Development Additional 15 Minutes 1       Assessment    Pt participated in functional medication sort with 4 of her current medications in 3x day pill box. Pt sorted 2/4 medications indep, requiring mod A for 2/4 (2 x Daily medication- pt sorted with errors three times after review, pt sorted 2 x Daily medication M-W in the AM and Mon-Sun PM-- pt not able to id error with reference to medication label). Pt required frequent reminders during activity to check the medication label for freq before sorting the med and to sort the med for the whole week (pt would only sort for Monday). Pt reported, \"my memory is my biggest issue\" and she will have her daughter check her pill box after sorting at home. Pt had completed memory log prior to ST and was completing daily entry for ST at end of session.     Strengths: Motivated for self care and independence, Pleasant and cooperative, Willingly participates in therapeutic activities  Barriers: Impaired functional cognition    Plan    Reinforce use of memory log, continue functional medication sort     Passport items to be completed:  manage finances, manage medications, complete daily memory log entries, solve problems related to safety situations, review education related to hospitalization, complete caregiver training     Speech Therapy Problems     Problem: Memory STGs     Dates: Start: 04/28/21       Goal: STG-Within one week, patient will     Dates: Start: 04/28/21       Description: 1) " Individualized goal:  use external memory aids and internal memory strategies to recall details for POC with 90% accuracy and minimal verbal cues  2) Interventions:  SLP Self Care / ADL Training , SLP Cognitive Skill Development, and SLP Group Treatment        Note:     Goal Note filed on 04/29/21 1220 by Angie Cardona, Student    Introduced 4/29                        Problem: Problem Solving STGs     Dates: Start: 04/28/21       Goal: STG-Within one week, patient will     Dates: Start: 04/28/21       Description: 1) Individualized goal: complete IADL tasks (medication management, finances, etc.) with 90% accuracy and minimal verbal cues to return to independent PLOF.   2) Interventions:  SLP Self Care / ADL Training , SLP Cognitive Skill Development, and SLP Group Treatment        Note:     Goal Note filed on 04/29/21 1220 by Angie Cardona, Student    Initiated 4/29                        Problem: Speech/Swallowing LTGs     Dates: Start: 04/28/21       Goal: LTG-By discharge, patient will     Dates: Start: 04/28/21       Description: 1) Individualized goal:  improve cognitive skills from the mild range to the typical functioning range, as indicated by standardized assessment and functional skills  2) Interventions:  SLP Self Care / ADL Training , SLP Cognitive Skill Development, and SLP Group Treatment

## 2021-05-01 PROCEDURE — 97535 SELF CARE MNGMENT TRAINING: CPT

## 2021-05-01 PROCEDURE — A9270 NON-COVERED ITEM OR SERVICE: HCPCS | Performed by: PHYSICAL MEDICINE & REHABILITATION

## 2021-05-01 PROCEDURE — 97110 THERAPEUTIC EXERCISES: CPT

## 2021-05-01 PROCEDURE — 770010 HCHG ROOM/CARE - REHAB SEMI PRIVAT*

## 2021-05-01 PROCEDURE — 97130 THER IVNTJ EA ADDL 15 MIN: CPT

## 2021-05-01 PROCEDURE — 97530 THERAPEUTIC ACTIVITIES: CPT

## 2021-05-01 PROCEDURE — 700102 HCHG RX REV CODE 250 W/ 637 OVERRIDE(OP): Performed by: PHYSICAL MEDICINE & REHABILITATION

## 2021-05-01 PROCEDURE — 97129 THER IVNTJ 1ST 15 MIN: CPT

## 2021-05-01 RX ADMIN — MAGNESIUM OXIDE TAB 400 MG (241.3 MG ELEMENTAL MG) 400 MG: 400 (241.3 MG) TAB at 08:21

## 2021-05-01 RX ADMIN — MIDODRINE HYDROCHLORIDE 10 MG: 10 TABLET ORAL at 20:38

## 2021-05-01 RX ADMIN — LEVETIRACETAM 1000 MG: 500 TABLET ORAL at 08:21

## 2021-05-01 RX ADMIN — MIDODRINE HYDROCHLORIDE 10 MG: 10 TABLET ORAL at 14:41

## 2021-05-01 RX ADMIN — OMEPRAZOLE 20 MG: 20 CAPSULE, DELAYED RELEASE ORAL at 20:38

## 2021-05-01 RX ADMIN — ACETAMINOPHEN 650 MG: 325 TABLET, FILM COATED ORAL at 20:38

## 2021-05-01 RX ADMIN — ACETAMINOPHEN 650 MG: 325 TABLET, FILM COATED ORAL at 14:41

## 2021-05-01 RX ADMIN — TRAZODONE HYDROCHLORIDE 50 MG: 50 TABLET ORAL at 20:38

## 2021-05-01 RX ADMIN — MIRTAZAPINE 15 MG: 15 TABLET, ORALLY DISINTEGRATING ORAL at 20:38

## 2021-05-01 RX ADMIN — DOCUSATE SODIUM 50 MG AND SENNOSIDES 8.6 MG 2 TABLET: 8.6; 5 TABLET, FILM COATED ORAL at 08:20

## 2021-05-01 RX ADMIN — LEVETIRACETAM 1000 MG: 500 TABLET ORAL at 20:38

## 2021-05-01 RX ADMIN — OMEPRAZOLE 20 MG: 20 CAPSULE, DELAYED RELEASE ORAL at 08:21

## 2021-05-01 RX ADMIN — GABAPENTIN 300 MG: 300 CAPSULE ORAL at 08:21

## 2021-05-01 RX ADMIN — GABAPENTIN 300 MG: 300 CAPSULE ORAL at 20:38

## 2021-05-01 RX ADMIN — GABAPENTIN 300 MG: 300 CAPSULE ORAL at 14:41

## 2021-05-01 RX ADMIN — MAGNESIUM OXIDE TAB 400 MG (241.3 MG ELEMENTAL MG) 400 MG: 400 (241.3 MG) TAB at 20:38

## 2021-05-01 RX ADMIN — MIDODRINE HYDROCHLORIDE 10 MG: 10 TABLET ORAL at 08:21

## 2021-05-01 ASSESSMENT — ACTIVITIES OF DAILY LIVING (ADL)
BED_CHAIR_WHEELCHAIR_TRANSFER_DESCRIPTION: SET-UP OF EQUIPMENT;SUPERVISION FOR SAFETY;VERBAL CUEING
TOILET_TRANSFER_DESCRIPTION: SUPERVISION FOR SAFETY;SET-UP OF EQUIPMENT
TOILETING_LEVEL_OF_ASSIST_DESCRIPTION: SUPERVISION FOR SAFETY;INITIAL PREPARATION FOR TASK;SET-UP OF EQUIPMENT

## 2021-05-01 NOTE — FLOWSHEET NOTE
04/30/21 1727   Events/Summary/Plan   Events/Summary/Plan SpO2 check on room air.  DC oxygen   Vital Signs   Pulse 100   Respiration 18   Pulse Oximetry 92 %   $ Pulse Oximetry (Spot Check) Yes   Oxygen   O2 Delivery Device None - Room Air   Room Air Challenge Pass

## 2021-05-01 NOTE — THERAPY
Occupational Therapy  Daily Treatment     Patient Name: Nathaile Aguilar  Age:  60 y.o., Sex:  female  Medical Record #: [id number]  Today's Date: 5/1/2021     Precautions  Precautions: (P) Fall Risk         Subjective    Pt upset about CNA and late breakfast when therapist arrived.      Objective       05/01/21 0931   Precautions   Precautions Fall Risk   Functional Level of Assist   Upper Body Dressing Supervision   Upper Body Dressing Description Increased time;Supervision for safety   Lower Body Dressing Supervision   Lower Body Dressing Description Increased time;Supervision for safety   Sitting Upper Body Exercises   Upper Extremity Bike Level 3 Resistance  (fluido bike, 15 minutes, no break )   IADL Treatments   Kitchen Mobility Education Pt completed kitchen mobility task w/ FWW and supervision. Pt reached into cabinets and applicances to address functional balance and kitchen safety. Pt was educated to get close to the counter and keep one hand on counter when reaching high or low. Pt had 0 LOB during activity. Pt did show decreased safety awareness while reaching down and required cues to slow down and hold on to the counter.   Home Management Pt completed laundry task while standing with FWW and supervision. Pt loaded washing machine and started load to simulate laundry at home.    Interdisciplinary Plan of Care Collaboration   IDT Collaboration with  Speech Therapist   Patient Position at End of Therapy Seated;Other (Comments)  (speech therapy gym )   Collaboration Comments transition of care   OT Total Time Spent   OT Individual Total Time Spent (Mins) 60   OT Charge Group   OT Self Care / ADL 1   OT Therapy Activity 2   OT Therapeutic Exercise  1   Pt was frustrated about breakfast being late and the CNA unable to find disposable underwear that pt was requesting. Pt reports the white briefs irritate her skin and she wants the white briefs that very thin and not absorbant. Pt began to cry when  therapist could also not find the briefs she wanted. Pt was consolable and agreed that she would wear the blue brief. During the rest of the session the pt was pleasant and cooperated with all activities.     Assessment  Pt tolerated session well after LB dressing and leaving her room. Pt was excited to do her laundry. Pt demonstrated improved balance during IADL tasks. Pt reports her daughters kitchen is small and she does not think she will have a problem getting into any cabinets or appliances. Pt also demonstrated increased endurance during UE biking activity.   Strengths: Able to follow instructions, Alert and oriented, Independent prior level of function, Motivated for self care and independence, Supportive family, Willingly participates in therapeutic activities  Barriers: Decreased endurance, Generalized weakness, Impaired activity tolerance, Impaired balance, Agitation    Plan       ADL , IADL  related mobility and cognition , strength/endurance building   Standing endurance and balance activity,     Occupational Therapy Goals     Problem: Bathing     Dates: Start: 04/28/21       Goal: STG-Within one week, patient will bathe     Dates: Start: 04/28/21       Description: 1) Individualized Goal:  w/ supervision using DME and AE as needed  2) Interventions:  OT Orthotics Training, OT E Stim Attended, OT Group Therapy, OT Self Care/ADL, OT Cognitive Skill Dev, OT Community Reintegration, OT Manual Ther Technique, OT Neuro Re-Ed/Balance, OT Sensory Int Techniques, OT Therapeutic Activity, OT Evaluation, and OT Therapeutic Exercise      Note:     Goal Note filed on 04/29/21 1200 by Claire Faith OT    Due to eval yesterday                         Problem: Dressing     Dates: Start: 04/28/21       Goal: STG-Within one week, patient will retrieve clothing     Dates: Start: 04/28/21       Description: 1) Individualized Goal:  and dress w/ supervision   2) Interventions:  OT Orthotics Training, OT E Stim  Attended, OT Group Therapy, OT Self Care/ADL, OT Cognitive Skill Dev, OT Community Reintegration, OT Manual Ther Technique, OT Neuro Re-Ed/Balance, OT Sensory Int Techniques, OT Therapeutic Activity, OT Evaluation, and OT Therapeutic Exercise    Note:     Goal Note filed on 04/29/21 1200 by lCaire Fiath OT    Due to eval yesterday                         Problem: Functional Transfers     Dates: Start: 04/28/21       Goal: STG-Within one week, patient will transfer to step in shower     Dates: Start: 04/28/21       Description: 1) Individualized Goal:  w/ supervision using DME and AE as needed  2) Interventions:  OT Orthotics Training, OT E Stim Attended, OT Group Therapy, OT Self Care/ADL, OT Cognitive Skill Dev, OT Community Reintegration, OT Manual Ther Technique, OT Neuro Re-Ed/Balance, OT Sensory Int Techniques, OT Therapeutic Activity, OT Evaluation, and OT Therapeutic Exercise    Note:     Goal Note filed on 04/29/21 1200 by Claire Faith OT    Due to eval yesterday                         Problem: IADL's     Dates: Start: 04/28/21       Goal: STG-Within one week, patient will access kitchen area     Dates: Start: 04/28/21       Description: 1) Individualized Goal:  w/ CGA using DME and AE as needed  2) Interventions:  OT Orthotics Training, OT E Stim Attended, OT Group Therapy, OT Self Care/ADL, OT Cognitive Skill Dev, OT Community Reintegration, OT Manual Ther Technique, OT Neuro Re-Ed/Balance, OT Sensory Int Techniques, OT Therapeutic Activity, OT Evaluation, and OT Therapeutic Exercise    Note:     Goal Note filed on 04/29/21 1200 by Claire Faith OT    Due to eval yesterday                         Problem: OT Long Term Goals     Dates: Start: 04/28/21       Goal: LTG-By discharge, patient will complete basic self care tasks     Dates: Start: 04/28/21       Description: 1) Individualized Goal:  w/ mod I using DME and AE as needed  2) Interventions:  OT Orthotics Training, OT E Stim Attended,  OT Group Therapy, OT Self Care/ADL, OT Cognitive Skill Dev, OT Community Reintegration, OT Manual Ther Technique, OT Neuro Re-Ed/Balance, OT Sensory Int Techniques, OT Therapeutic Activity, OT Evaluation, and OT Therapeutic Exercise          Goal: LTG-By discharge, patient will perform bathroom transfers     Dates: Start: 04/28/21       Description: 1) Individualized Goal:  w/ mod I using DME and AE as needed  2) Interventions:  OT Orthotics Training, OT E Stim Attended, OT Group Therapy, OT Self Care/ADL, OT Cognitive Skill Dev, OT Community Reintegration, OT Manual Ther Technique, OT Neuro Re-Ed/Balance, OT Sensory Int Techniques, OT Therapeutic Activity, OT Evaluation, and OT Therapeutic Exercise

## 2021-05-01 NOTE — CARE PLAN
Problem: Communication  Goal: The ability to communicate needs accurately and effectively will improve  Outcome: PROGRESSING AS EXPECTED  Note: Pt communicates very well and uses call light appropiately.     Problem: Safety  Goal: Will remain free from falls  Outcome: PROGRESSING AS EXPECTED

## 2021-05-01 NOTE — CARE PLAN
Problem: Safety  Goal: Will remain free from injury  Note: Pt remains free of accidental injury at this time. Able to verbalize needs and does not attempt self transfer. Bed rails x2 secured for safety. Call light and personal belongings within reach.      Problem: Bowel/Gastric:  Goal: Normal bowel function is maintained or improved  Note: LBM - 4/30. Refused bowel meds. Denies s/s of constipation

## 2021-05-01 NOTE — THERAPY
"Occupational Therapy  Daily Treatment     Patient Name: Nathalie Aguilar  Age:  60 y.o., Sex:  female  Medical Record #: [id number]  Today's Date: 5/1/2021     Precautions  Precautions: (P) Fall Risk         Subjective    \"Can we go outside?\"     Objective       05/01/21 1301   Precautions   Precautions Fall Risk   Functional Level of Assist   Lower Body Dressing Supervision  (don and doff shoes)   Lower Body Dressing Description Supervision for safety;Increased time   Toileting Supervision   Toileting Description Supervision for safety;Initial preparation for task;Set-up of equipment   Bed, Chair, Wheelchair Transfer Stand by Assist   Bed Chair Wheelchair Transfer Description Set-up of equipment;Supervision for safety;Verbal cueing   Toilet Transfers Supervised   Toilet Transfer Description Supervision for safety;Set-up of equipment   Sitting Upper Body Exercises   Front Arm Raise 3 sets of 10;Right ;Left;Weight (See Comments for lbs)  (2#)   Shoulder Press 3 sets of 10;Right ;Left;Weight (See Comments for lbs)  (2#)   Bicep Curls 3 sets of 15;Right ;Left;Weight (See Comments for lbs)  (3#)   Elbow Extension 3 sets of 10;Right ;Left;Weight (See Comments for lbs)  (2#)   Interdisciplinary Plan of Care Collaboration   Patient Position at End of Therapy In Bed;Tray Table within Reach;Phone within Reach;Call Light within Reach   OT Total Time Spent   OT Individual Total Time Spent (Mins) 30   OT Charge Group   OT Self Care / ADL 1   OT Therapeutic Exercise  1       Assessment    Pt tolerated session well focused on ADLs and UE strengthening. Pt requested to go outside while completing UE exercises. Pt demonstrated increased independence during transfers and toileting.   Strengths: Able to follow instructions, Alert and oriented, Independent prior level of function, Motivated for self care and independence, Supportive family, Willingly participates in therapeutic activities  Barriers: Decreased endurance, Generalized " weakness, Impaired activity tolerance, Impaired balance, Agitation    Plan    ADL , IADL  related mobility and cognition , strength/endurance building   Standing endurance and balance activity,     Occupational Therapy Goals     Problem: Bathing     Dates: Start: 04/28/21       Goal: STG-Within one week, patient will bathe     Dates: Start: 04/28/21       Description: 1) Individualized Goal:  w/ supervision using DME and AE as needed  2) Interventions:  OT Orthotics Training, OT E Stim Attended, OT Group Therapy, OT Self Care/ADL, OT Cognitive Skill Dev, OT Community Reintegration, OT Manual Ther Technique, OT Neuro Re-Ed/Balance, OT Sensory Int Techniques, OT Therapeutic Activity, OT Evaluation, and OT Therapeutic Exercise      Note:     Goal Note filed on 04/29/21 1200 by Claire Faith OT    Due to eval yesterday                         Problem: Dressing     Dates: Start: 04/28/21       Goal: STG-Within one week, patient will retrieve clothing     Dates: Start: 04/28/21       Description: 1) Individualized Goal:  and dress w/ supervision   2) Interventions:  OT Orthotics Training, OT E Stim Attended, OT Group Therapy, OT Self Care/ADL, OT Cognitive Skill Dev, OT Community Reintegration, OT Manual Ther Technique, OT Neuro Re-Ed/Balance, OT Sensory Int Techniques, OT Therapeutic Activity, OT Evaluation, and OT Therapeutic Exercise    Note:     Goal Note filed on 04/29/21 1200 by Claire Faith OT    Due to eval yesterday                         Problem: Functional Transfers     Dates: Start: 04/28/21       Goal: STG-Within one week, patient will transfer to step in shower     Dates: Start: 04/28/21       Description: 1) Individualized Goal:  w/ supervision using DME and AE as needed  2) Interventions:  OT Orthotics Training, OT E Stim Attended, OT Group Therapy, OT Self Care/ADL, OT Cognitive Skill Dev, OT Community Reintegration, OT Manual Ther Technique, OT Neuro Re-Ed/Balance, OT Sensory Int Techniques, OT  Therapeutic Activity, OT Evaluation, and OT Therapeutic Exercise    Note:     Goal Note filed on 04/29/21 1200 by Claire Faith OT    Due to eval yesterday                         Problem: IADL's     Dates: Start: 04/28/21       Goal: STG-Within one week, patient will access kitchen area     Dates: Start: 04/28/21       Description: 1) Individualized Goal:  w/ CGA using DME and AE as needed  2) Interventions:  OT Orthotics Training, OT E Stim Attended, OT Group Therapy, OT Self Care/ADL, OT Cognitive Skill Dev, OT Community Reintegration, OT Manual Ther Technique, OT Neuro Re-Ed/Balance, OT Sensory Int Techniques, OT Therapeutic Activity, OT Evaluation, and OT Therapeutic Exercise    Note:     Goal Note filed on 04/29/21 1200 by Claire Faith OT    Due to eval yesterday                         Problem: OT Long Term Goals     Dates: Start: 04/28/21       Goal: LTG-By discharge, patient will complete basic self care tasks     Dates: Start: 04/28/21       Description: 1) Individualized Goal:  w/ mod I using DME and AE as needed  2) Interventions:  OT Orthotics Training, OT E Stim Attended, OT Group Therapy, OT Self Care/ADL, OT Cognitive Skill Dev, OT Community Reintegration, OT Manual Ther Technique, OT Neuro Re-Ed/Balance, OT Sensory Int Techniques, OT Therapeutic Activity, OT Evaluation, and OT Therapeutic Exercise          Goal: LTG-By discharge, patient will perform bathroom transfers     Dates: Start: 04/28/21       Description: 1) Individualized Goal:  w/ mod I using DME and AE as needed  2) Interventions:  OT Orthotics Training, OT E Stim Attended, OT Group Therapy, OT Self Care/ADL, OT Cognitive Skill Dev, OT Community Reintegration, OT Manual Ther Technique, OT Neuro Re-Ed/Balance, OT Sensory Int Techniques, OT Therapeutic Activity, OT Evaluation, and OT Therapeutic Exercise

## 2021-05-01 NOTE — THERAPY
Speech Language Pathology  Daily Treatment     Patient Name: Nathalie Aguilar  Age:  60 y.o., Sex:  female  Medical Record #: [id number]  Today's Date: 5/1/2021     Precautions  Precautions: Fall Risk    Subjective    Pt pleasant and cooperative during tx.       Objective       05/01/21 1031   Cognition   Functional Memory Activities Minimal (4)   Medication Management  Supervision (5)   SLP Total Time Spent   SLP Individual Total Time Spent (Mins) 30   Treatment Charges   SLP Cognitive Skill Development First 15 Minutes 1   SLP Cognitive Skill Development Additional 15 Minutes 1       Assessment    Pt benefits from min verbal cues to recall daily events.  Pt sorted current medications with 100% accuracy given supervision.     Strengths: Motivated for self care and independence, Pleasant and cooperative, Willingly participates in therapeutic activities  Barriers: Impaired functional cognition    Plan    Target recall, use of memory book, med management        Speech Therapy Problems     Problem: Memory STGs     Dates: Start: 04/28/21       Goal: STG-Within one week, patient will     Dates: Start: 04/28/21       Description: 1) Individualized goal:  use external memory aids and internal memory strategies to recall details for POC with 90% accuracy and minimal verbal cues  2) Interventions:  SLP Self Care / ADL Training , SLP Cognitive Skill Development, and SLP Group Treatment        Note:     Goal Note filed on 04/29/21 1220 by Angie Cardona, Student    Introduced 4/29                        Problem: Problem Solving STGs     Dates: Start: 04/28/21       Goal: STG-Within one week, patient will     Dates: Start: 04/28/21       Description: 1) Individualized goal: complete IADL tasks (medication management, finances, etc.) with 90% accuracy and minimal verbal cues to return to independent PLOF.   2) Interventions:  SLP Self Care / ADL Training , SLP Cognitive Skill Development, and SLP Group Treatment        Note:      Goal Note filed on 04/29/21 1220 by Angie Cardona, Student    Initiated 4/29                        Problem: Speech/Swallowing LTGs     Dates: Start: 04/28/21       Goal: LTG-By discharge, patient will     Dates: Start: 04/28/21       Description: 1) Individualized goal:  improve cognitive skills from the mild range to the typical functioning range, as indicated by standardized assessment and functional skills  2) Interventions:  SLP Self Care / ADL Training , SLP Cognitive Skill Development, and SLP Group Treatment

## 2021-05-01 NOTE — THERAPY
04/30/21 1559   Precautions   Precautions Fall Risk   Pain 0 - 10 Group   Therapist Pain Assessment 0   Cognition    Level of Consciousness Alert   Gait Functional Level of Assist    Gait Level Of Assist Stand by Assist   Assistive Device Front Wheel Walker   Distance (Feet) 220   # of Times Distance was Traveled 2   Deviation Bradykinetic;Decreased Base Of Support;Decreased Heel Strike;Decreased Toe Off   Transfer Functional Level of Assist   Bed, Chair, Wheelchair Transfer Stand by Assist   Bed Chair Wheelchair Transfer Description Increased time;Supervision for safety   Toilet Transfers Supervised   Toilet Transfer Description Adaptive equipment;Grab bar   Standing Lower Body Exercises   Other Exercises Parallel bars: Standing gastrocnemius Achilles stretch 1 minute x 6   Bed Mobility    Supine to Sit Supervised   Sit to Supine Supervised   Sit to Stand Stand by Assist   Scooting Supervised   Rolling Modified Independent   PT Total Time Spent   PT Individual Total Time Spent (Mins) 60   PT Charge Group   PT Gait Training 2   PT Therapeutic Exercise 1   PT Therapeutic Activities 1   Physical Therapy   Daily Treatment     Patient Name: Nathalie Aguilar  Age:  60 y.o., Sex:  female  Medical Record #: [id number]  Today's Date: 4/30/2021     Precautions  Precautions: Fall Risk    Subjective    The patient agreed to PT.     Objective    The patient participated in gait training and tolerated 220 FT x2 with a fww and SBA.  Toward the end of each walk the patient had lightheadedness.  Standing orthostatic blood pressures versus seated are recorded above.  Prior to gait training the patient participated in standing gastrocnemius-Achilles stretching in the parallel bars 1 minute x 6.  PT also supervised transfers in the bathroom where she is otherwise independent.    Assessment    The patient is improving in functional mobility, gait, tolerance for activity.  During gait training, the patient experienced a hypotensive  event toward the end of 220 FT.  Seated and standing blood pressures are recorded with a drop in blood pressure after standing 3 minutes.  Strengths: Able to follow instructions, Effective communication skills, Independent prior level of function, Making steady progress towards goals, Pleasant and cooperative, Willingly participates in therapeutic activities  Barriers: Emotional lability, Fatigue, Generalized weakness, Impaired activity tolerance, Impaired balance(Impaired strength and balance, fall risk)    Plan    Safety education, gait training as tolerated, therapeutic exercise, caution for standing hypotension, standing gastrocnemius-Achilles stretching, up/down stairs    Passport items to be completed:  Passport items to be completed:  Get in/out of bed safely, in/out of a vehicle, safely use mobility device, walk or wheel around home/community, navigate up and down stairs, show how to get up/down from the ground, ensure home is accessible, demonstrate HEP, complete caregiver training    Physical Therapy Problems     Problem: Mobility     Dates: Start: 04/27/21       Goal: STG-Within one week, patient will ascend and descend four to six stairs     Dates: Start: 04/27/21       Description: 1) Individualized goal: Up/down 4-6 stairs, handrails, SBA 1 week  2) Interventions:  PT Gait Training, PT Therapeutic Exercises, and PT Therapeutic Activity            Goal: STG-Within one week, patient will     Dates: Start: 04/27/21       Description: 1) Individualized goal: Gait fww/no  FT SBA, 1 week  2) Interventions:  PT Gait Training, PT Therapeutic Exercises, PT Neuro Re-Ed/Balance, and PT Therapeutic Activity      Note:     Goal Note filed on 04/29/21 1225 by OPHELIA Herrera    1) Individualized goal:   Gait  FT SBA 1 week  2) Interventions:  PT Gait Training, PT Therapeutic Exercises, and PT Therapeutic Activity                        Problem: Mobility Transfers     Dates: Start: 04/27/21        Goal: STG-Within one week, patient will transfer bed to chair     Dates: Start: 04/27/21       Description: 1) Individualized goal: Transfer bed to/from chair fww/no AD supervision, 1 week  2) Interventions:  PT Gait Training, PT Therapeutic Exercises, and PT Therapeutic Activity                  Problem: PT-Long Term Goals     Dates: Start: 04/27/21       Goal: LTG-By discharge, patient will ambulate     Dates: Start: 04/27/21       Description: 1) Individualized goal: Gait no  FT, independent at discharge  2) Interventions:  PT Gait Training, PT Therapeutic Exercises, PT Neuro Re-Ed/Balance, and PT Therapeutic Activity            Goal: LTG-By discharge, patient will transfer one surface to another     Dates: Start: 04/27/21       Description: 1) Individualized goal: Transfer 1 surface to another no AD, independent at discharge  2) Interventions:  PT Gait Training, PT Therapeutic Exercises, and PT Therapeutic Activity            Goal: LTG-By discharge, patient will ambulate up/down 4-6 stairs     Dates: Start: 04/27/21       Description: 1) Individualized goal: Up/down 4-6 stairs handrails, independently at discharge  2) Interventions:  PT Gait Training, PT Therapeutic Exercises, PT Neuro Re-Ed/Balance, and PT Therapeutic Activity            Goal: LTG-By discharge, patient will transfer in/out of a car     Dates: Start: 04/27/21       Description: 1) Individualized goal: Car transfer no device supervision at discharge  2) Interventions:  PT Gait Training, PT Therapeutic Exercises, PT Neuro Re-Ed/Balance, and PT Therapeutic Activity

## 2021-05-02 PROCEDURE — 97530 THERAPEUTIC ACTIVITIES: CPT

## 2021-05-02 PROCEDURE — 97116 GAIT TRAINING THERAPY: CPT

## 2021-05-02 PROCEDURE — 700102 HCHG RX REV CODE 250 W/ 637 OVERRIDE(OP): Performed by: PHYSICAL MEDICINE & REHABILITATION

## 2021-05-02 PROCEDURE — 97112 NEUROMUSCULAR REEDUCATION: CPT

## 2021-05-02 PROCEDURE — 770010 HCHG ROOM/CARE - REHAB SEMI PRIVAT*

## 2021-05-02 PROCEDURE — A9270 NON-COVERED ITEM OR SERVICE: HCPCS | Performed by: PHYSICAL MEDICINE & REHABILITATION

## 2021-05-02 RX ADMIN — MIDODRINE HYDROCHLORIDE 10 MG: 10 TABLET ORAL at 21:26

## 2021-05-02 RX ADMIN — TRAZODONE HYDROCHLORIDE 50 MG: 50 TABLET ORAL at 21:26

## 2021-05-02 RX ADMIN — GABAPENTIN 300 MG: 300 CAPSULE ORAL at 09:37

## 2021-05-02 RX ADMIN — MIDODRINE HYDROCHLORIDE 10 MG: 10 TABLET ORAL at 09:37

## 2021-05-02 RX ADMIN — OMEPRAZOLE 20 MG: 20 CAPSULE, DELAYED RELEASE ORAL at 21:26

## 2021-05-02 RX ADMIN — MIRTAZAPINE 15 MG: 15 TABLET, ORALLY DISINTEGRATING ORAL at 21:26

## 2021-05-02 RX ADMIN — DOCUSATE SODIUM 50 MG AND SENNOSIDES 8.6 MG 2 TABLET: 8.6; 5 TABLET, FILM COATED ORAL at 09:37

## 2021-05-02 RX ADMIN — MAGNESIUM OXIDE TAB 400 MG (241.3 MG ELEMENTAL MG) 400 MG: 400 (241.3 MG) TAB at 21:26

## 2021-05-02 RX ADMIN — GABAPENTIN 300 MG: 300 CAPSULE ORAL at 21:26

## 2021-05-02 RX ADMIN — OMEPRAZOLE 20 MG: 20 CAPSULE, DELAYED RELEASE ORAL at 09:37

## 2021-05-02 RX ADMIN — ACETAMINOPHEN 650 MG: 325 TABLET, FILM COATED ORAL at 14:42

## 2021-05-02 RX ADMIN — MAGNESIUM OXIDE TAB 400 MG (241.3 MG ELEMENTAL MG) 400 MG: 400 (241.3 MG) TAB at 09:37

## 2021-05-02 RX ADMIN — GABAPENTIN 300 MG: 300 CAPSULE ORAL at 14:39

## 2021-05-02 RX ADMIN — LEVETIRACETAM 1000 MG: 500 TABLET ORAL at 09:37

## 2021-05-02 RX ADMIN — MIDODRINE HYDROCHLORIDE 10 MG: 10 TABLET ORAL at 14:39

## 2021-05-02 RX ADMIN — LEVETIRACETAM 1000 MG: 500 TABLET ORAL at 21:25

## 2021-05-02 ASSESSMENT — FIBROSIS 4 INDEX: FIB4 SCORE: 2.47

## 2021-05-02 ASSESSMENT — GAIT ASSESSMENTS
GAIT LEVEL OF ASSIST: CONTACT GUARD ASSIST
DISTANCE (FEET): 200
DEVIATION: BRADYKINETIC

## 2021-05-02 ASSESSMENT — PAIN DESCRIPTION - PAIN TYPE: TYPE: ACUTE PAIN

## 2021-05-02 ASSESSMENT — ACTIVITIES OF DAILY LIVING (ADL): BED_CHAIR_WHEELCHAIR_TRANSFER_DESCRIPTION: INCREASED TIME;SUPERVISION FOR SAFETY

## 2021-05-02 NOTE — CARE PLAN
Problem: Communication  Goal: The ability to communicate needs accurately and effectively will improve  Outcome: PROGRESSING AS EXPECTED   Able to make needs know ask and answer questions. Continue to support encourage and educate pt as opportunities arise.    Problem: Safety  Goal: Will remain free from injury  Outcome: PROGRESSING AS EXPECTED  Remains safe without injury. Ambulates to the bathroom with FWW gait is steady. Calls for assistance and waits for response. Continue to encourage engage and assist pt with ADLs and OOB activities     Problem: Bowel/Gastric:  Goal: Will not experience complications related to bowel motility  Outcome: PROGRESSING AS EXPECTED  BM yesterday so far none today. Continue with stool softeners encourage food and fluids and OOB activities     Problem: Pain Management  Goal: Pain level will decrease to patient's comfort goal  Outcome: PROGRESSING AS EXPECTED  C/o occasional headache and chest wall pain. Tylenol has been sufficient so far with relieving pain and allowing pt to rest perform activities and maintain appetite.

## 2021-05-02 NOTE — THERAPY
Physical Therapy   Daily Treatment     Patient Name: Nathalie Aguilar  Age:  60 y.o., Sex:  female  Medical Record #: [id number]  Today's Date: 5/2/2021     Precautions  Precautions: (P) Fall Risk    Subjective    Pt in bed and not dressed on PT arrival. Agreeable to work with PT.    OBJECTIVE:     05/02/21 1031   Precautions   Precautions Fall Risk   Cognition    Level of Consciousness Alert   Gait Functional Level of Assist    Gait Level Of Assist Contact Guard Assist   Assistive Device None   Distance (Feet) 200   # of Times Distance was Traveled 4   Deviation Bradykinetic   Stairs Functional Level of Assist   Level of Assist with Stairs Stand by Assist   # of Stairs Climbed 20   Stairs Description Extra time;Hand rails;Supervision for safety   Transfer Functional Level of Assist   Bed, Chair, Wheelchair Transfer Stand by Assist   Bed Chair Wheelchair Transfer Description Increased time;Supervision for safety   Bed Mobility    Supine to Sit Supervised   Sit to Supine Supervised   Sit to Stand Stand by Assist   Scooting Supervised   Rolling Supervised   Neuro-Muscular Treatments   Neuro-Muscular Treatments Sequencing;Verbal Cuing  (dynamic balance work)   Comments heel to toe, side stepping and cross over stepping   Interdisciplinary Plan of Care Collaboration   Patient Position at End of Therapy Seated;Chair Alarm On;Self Releasing Lap Belt Applied;Call Light within Reach;Tray Table within Reach   PT Total Time Spent   PT Individual Total Time Spent (Mins) 60   PT Charge Group   PT Gait Training 1   PT Neuromuscular Re-Education / Balance 2   PT Therapeutic Activities 1   Assisted with pt getting dressed - providing SBA with sit to stands at FWW and during transfer into w/c to complete dressing tasks.    Gait training - ambulation without an AD x 200ft for 4 bouts focus on step through gait and progressed from level surfaces to uneven surfaces outside with CGA.  No noted loss of balance - required seated rest  breaks due to fatigue and SOB.      Stairs - up and down 20 steps 4inch to 6 inch height with 2 rails using a step through and a step too gait pattern with SBA.    Balance Work - heel to toe, backwards walking, side stepping and cross over stepping - 50 feet in both directions all with CGA - anxiety noted with heel to toe and cross over stepping.       Assessment    Pt up and moving very well without an AD - at the end of therapy pt had progressed to walking indoors with SBA and no UE support from an AD.  She does fatigue with basic ambulation and needs seated rest breaks but did no show any loss of balance with ambulation indoors.  High anxiety with work on higher level balance task but able to overcome with reassurance and going slow.  No issues with stairs using rails.    Strengths: Able to follow instructions, Effective communication skills, Independent prior level of function, Making steady progress towards goals, Pleasant and cooperative, Willingly participates in therapeutic activities  Barriers: Emotional lability, Fatigue, Generalized weakness, Impaired activity tolerance, Impaired balance(Impaired strength and balance, fall risk)    Plan    Continue to work on balance and activity tolerance as well as increasing independence with functional mobility.  Pt did not use an AD prior to her fall.    Passport items to be completed:Pt showing good progress towards passport items.  Get in/out of bed safely, in/out of a vehicle, safely use mobility device, walk or wheel around home/community, navigate up and down stairs, show how to get up/down from the ground, ensure home is accessible, demonstrate HEP, complete caregiver training    Physical Therapy Problems     Problem: Mobility     Dates: Start: 04/27/21       Goal: STG-Within one week, patient will ascend and descend four to six stairs     Dates: Start: 04/27/21       Description: 1) Individualized goal: Up/down 4-6 stairs, handrails, SBA 1 week  2)  Interventions:  PT Gait Training, PT Therapeutic Exercises, and PT Therapeutic Activity            Goal: STG-Within one week, patient will     Dates: Start: 04/27/21       Description: 1) Individualized goal: Gait fww/no  FT SBA, 1 week  2) Interventions:  PT Gait Training, PT Therapeutic Exercises, PT Neuro Re-Ed/Balance, and PT Therapeutic Activity      Note:     Goal Note filed on 04/29/21 1225 by OPHELIA Herrera    1) Individualized goal:   Gait  FT SBA 1 week  2) Interventions:  PT Gait Training, PT Therapeutic Exercises, and PT Therapeutic Activity                        Problem: Mobility Transfers     Dates: Start: 04/27/21       Goal: STG-Within one week, patient will transfer bed to chair     Dates: Start: 04/27/21       Description: 1) Individualized goal: Transfer bed to/from chair fww/no AD supervision, 1 week  2) Interventions:  PT Gait Training, PT Therapeutic Exercises, and PT Therapeutic Activity                  Problem: PT-Long Term Goals     Dates: Start: 04/27/21       Goal: LTG-By discharge, patient will ambulate     Dates: Start: 04/27/21       Description: 1) Individualized goal: Gait no  FT, independent at discharge  2) Interventions:  PT Gait Training, PT Therapeutic Exercises, PT Neuro Re-Ed/Balance, and PT Therapeutic Activity            Goal: LTG-By discharge, patient will transfer one surface to another     Dates: Start: 04/27/21       Description: 1) Individualized goal: Transfer 1 surface to another no AD, independent at discharge  2) Interventions:  PT Gait Training, PT Therapeutic Exercises, and PT Therapeutic Activity            Goal: LTG-By discharge, patient will ambulate up/down 4-6 stairs     Dates: Start: 04/27/21       Description: 1) Individualized goal: Up/down 4-6 stairs handrails, independently at discharge  2) Interventions:  PT Gait Training, PT Therapeutic Exercises, PT Neuro Re-Ed/Balance, and PT Therapeutic Activity            Goal: LTG-By  discharge, patient will transfer in/out of a car     Dates: Start: 04/27/21       Description: 1) Individualized goal: Car transfer no device supervision at discharge  2) Interventions:  PT Gait Training, PT Therapeutic Exercises, PT Neuro Re-Ed/Balance, and PT Therapeutic Activity

## 2021-05-02 NOTE — CARE PLAN
Problem: Communication  Goal: The ability to communicate needs accurately and effectively will improve  Note: Pt is alert and oriented. Able to verbalize needs without difficulty.     Problem: Infection  Goal: Will remain free from infection  Note: Pt with no s/s of infection noted. Skis intact

## 2021-05-03 LAB
ALBUMIN SERPL BCP-MCNC: 2.5 G/DL (ref 3.2–4.9)
ALBUMIN/GLOB SERPL: 0.9 G/DL
ALP SERPL-CCNC: 159 U/L (ref 30–99)
ALT SERPL-CCNC: 61 U/L (ref 2–50)
ANION GAP SERPL CALC-SCNC: 4 MMOL/L (ref 7–16)
AST SERPL-CCNC: 91 U/L (ref 12–45)
BILIRUB SERPL-MCNC: 1.4 MG/DL (ref 0.1–1.5)
BUN SERPL-MCNC: 12 MG/DL (ref 8–22)
CALCIUM SERPL-MCNC: 8.4 MG/DL (ref 8.5–10.5)
CHLORIDE SERPL-SCNC: 105 MMOL/L (ref 96–112)
CO2 SERPL-SCNC: 27 MMOL/L (ref 20–33)
CREAT SERPL-MCNC: 0.23 MG/DL (ref 0.5–1.4)
ERYTHROCYTE [DISTWIDTH] IN BLOOD BY AUTOMATED COUNT: 77.6 FL (ref 35.9–50)
FOLATE SERPL-MCNC: 6.1 NG/ML
GLOBULIN SER CALC-MCNC: 2.9 G/DL (ref 1.9–3.5)
GLUCOSE SERPL-MCNC: 88 MG/DL (ref 65–99)
HCT VFR BLD AUTO: 32.1 % (ref 37–47)
HGB BLD-MCNC: 9.8 G/DL (ref 12–16)
HGB RETIC QN AUTO: 30.7 PG/CELL (ref 29–35)
IMM RETICS NFR: 36.7 % (ref 9.3–17.4)
IRON SATN MFR SERPL: 28 % (ref 15–55)
IRON SERPL-MCNC: 42 UG/DL (ref 40–170)
MCH RBC QN AUTO: 33.1 PG (ref 27–33)
MCHC RBC AUTO-ENTMCNC: 30.5 G/DL (ref 33.6–35)
MCV RBC AUTO: 108.4 FL (ref 81.4–97.8)
PLATELET # BLD AUTO: 408 K/UL (ref 164–446)
PMV BLD AUTO: 10.3 FL (ref 9–12.9)
POTASSIUM SERPL-SCNC: 3.6 MMOL/L (ref 3.6–5.5)
PROT SERPL-MCNC: 5.4 G/DL (ref 6–8.2)
RBC # BLD AUTO: 2.96 M/UL (ref 4.2–5.4)
RETICS # AUTO: 0.19 M/UL (ref 0.04–0.06)
RETICS/RBC NFR: 6.5 % (ref 0.8–2.1)
SODIUM SERPL-SCNC: 136 MMOL/L (ref 135–145)
TIBC SERPL-MCNC: 152 UG/DL (ref 250–450)
UIBC SERPL-MCNC: 110 UG/DL (ref 110–370)
WBC # BLD AUTO: 3.2 K/UL (ref 4.8–10.8)

## 2021-05-03 PROCEDURE — A9270 NON-COVERED ITEM OR SERVICE: HCPCS | Performed by: PHYSICAL MEDICINE & REHABILITATION

## 2021-05-03 PROCEDURE — 99232 SBSQ HOSP IP/OBS MODERATE 35: CPT | Performed by: PHYSICAL MEDICINE & REHABILITATION

## 2021-05-03 PROCEDURE — 36415 COLL VENOUS BLD VENIPUNCTURE: CPT

## 2021-05-03 PROCEDURE — 36556 INSERT NON-TUNNEL CV CATH: CPT

## 2021-05-03 PROCEDURE — 97116 GAIT TRAINING THERAPY: CPT

## 2021-05-03 PROCEDURE — 97535 SELF CARE MNGMENT TRAINING: CPT

## 2021-05-03 PROCEDURE — 83550 IRON BINDING TEST: CPT

## 2021-05-03 PROCEDURE — 97130 THER IVNTJ EA ADDL 15 MIN: CPT | Performed by: SPEECH-LANGUAGE PATHOLOGIST

## 2021-05-03 PROCEDURE — C1751 CATH, INF, PER/CENT/MIDLINE: HCPCS

## 2021-05-03 PROCEDURE — 83540 ASSAY OF IRON: CPT

## 2021-05-03 PROCEDURE — 97530 THERAPEUTIC ACTIVITIES: CPT

## 2021-05-03 PROCEDURE — 97110 THERAPEUTIC EXERCISES: CPT

## 2021-05-03 PROCEDURE — 80053 COMPREHEN METABOLIC PANEL: CPT

## 2021-05-03 PROCEDURE — 97112 NEUROMUSCULAR REEDUCATION: CPT

## 2021-05-03 PROCEDURE — 770010 HCHG ROOM/CARE - REHAB SEMI PRIVAT*

## 2021-05-03 PROCEDURE — 97129 THER IVNTJ 1ST 15 MIN: CPT | Performed by: SPEECH-LANGUAGE PATHOLOGIST

## 2021-05-03 PROCEDURE — 82746 ASSAY OF FOLIC ACID SERUM: CPT

## 2021-05-03 PROCEDURE — 85027 COMPLETE CBC AUTOMATED: CPT

## 2021-05-03 PROCEDURE — 700102 HCHG RX REV CODE 250 W/ 637 OVERRIDE(OP): Performed by: PHYSICAL MEDICINE & REHABILITATION

## 2021-05-03 PROCEDURE — 85046 RETICYTE/HGB CONCENTRATE: CPT

## 2021-05-03 RX ORDER — VITAMIN B COMPLEX
2000 TABLET ORAL DAILY
Status: DISCONTINUED | OUTPATIENT
Start: 2021-05-04 | End: 2021-05-07 | Stop reason: HOSPADM

## 2021-05-03 RX ORDER — MIDODRINE HYDROCHLORIDE 2.5 MG/1
5 TABLET ORAL 3 TIMES DAILY
Status: DISCONTINUED | OUTPATIENT
Start: 2021-05-03 | End: 2021-05-04

## 2021-05-03 RX ADMIN — OMEPRAZOLE 20 MG: 20 CAPSULE, DELAYED RELEASE ORAL at 08:09

## 2021-05-03 RX ADMIN — GABAPENTIN 300 MG: 300 CAPSULE ORAL at 08:09

## 2021-05-03 RX ADMIN — MIDODRINE HYDROCHLORIDE 10 MG: 10 TABLET ORAL at 08:09

## 2021-05-03 RX ADMIN — TRAZODONE HYDROCHLORIDE 50 MG: 50 TABLET ORAL at 21:10

## 2021-05-03 RX ADMIN — MIDODRINE HYDROCHLORIDE 10 MG: 10 TABLET ORAL at 14:18

## 2021-05-03 RX ADMIN — MAGNESIUM OXIDE TAB 400 MG (241.3 MG ELEMENTAL MG) 400 MG: 400 (241.3 MG) TAB at 21:11

## 2021-05-03 RX ADMIN — LEVETIRACETAM 1000 MG: 500 TABLET ORAL at 08:09

## 2021-05-03 RX ADMIN — MAGNESIUM OXIDE TAB 400 MG (241.3 MG ELEMENTAL MG) 400 MG: 400 (241.3 MG) TAB at 08:09

## 2021-05-03 RX ADMIN — OMEPRAZOLE 20 MG: 20 CAPSULE, DELAYED RELEASE ORAL at 21:10

## 2021-05-03 RX ADMIN — MIRTAZAPINE 15 MG: 15 TABLET, ORALLY DISINTEGRATING ORAL at 21:11

## 2021-05-03 RX ADMIN — ACETAMINOPHEN 650 MG: 325 TABLET, FILM COATED ORAL at 13:29

## 2021-05-03 RX ADMIN — MIDODRINE HYDROCHLORIDE 5 MG: 2.5 TABLET ORAL at 21:16

## 2021-05-03 RX ADMIN — GABAPENTIN 300 MG: 300 CAPSULE ORAL at 21:10

## 2021-05-03 RX ADMIN — LEVETIRACETAM 1000 MG: 500 TABLET ORAL at 21:09

## 2021-05-03 RX ADMIN — GABAPENTIN 300 MG: 300 CAPSULE ORAL at 14:18

## 2021-05-03 ASSESSMENT — GAIT ASSESSMENTS
DISTANCE (FEET): 420
DEVIATION: BRADYKINETIC
GAIT LEVEL OF ASSIST: CONTACT GUARD ASSIST
DEVIATION: BRADYKINETIC
DISTANCE (FEET): 300
GAIT LEVEL OF ASSIST: CONTACT GUARD ASSIST

## 2021-05-03 ASSESSMENT — ACTIVITIES OF DAILY LIVING (ADL)
TOILET_TRANSFER_DESCRIPTION: GRAB BAR;ADAPTIVE EQUIPMENT;INCREASED TIME
TOILETING_LEVEL_OF_ASSIST_DESCRIPTION: GRAB BAR;INCREASED TIME
TOILET_TRANSFER_DESCRIPTION: GRAB BAR;INCREASED TIME
TOILET_TRANSFER_DESCRIPTION: GRAB BAR;INCREASED TIME
TOILETING_LEVEL_OF_ASSIST_DESCRIPTION: INCREASED TIME;GRAB BAR
BED_CHAIR_WHEELCHAIR_TRANSFER_DESCRIPTION: INCREASED TIME

## 2021-05-03 NOTE — THERAPY
"Recreational Therapy  Daily Treatment     Patient Name: Nathalie Aguilar  AGE:  60 y.o., SEX:  female  Medical Record #: [id number]  Today's Date: 5/3/2021       Subjective    Pt in bed and agreeable to Tx. \"I'm slowly getting it.\" \"That was fun. I like Rec Tx.\"      Objective     05/03/21 0901   Functional Ability Status - Cognitive   Attention Span Remains on Task   Comprehension Follows One Step Commands   Judgment Able to Make Independent Decisions   Functional Ability Status - Emotional    Affect Appropriate   Mood Appropriate   Behavior Appropriate   Skilled Intervention    Skilled Intervention Cognitive Leisure   Skilled Intervention Comments   (cognitive multi-step activity \"Speedy-Words\" )   Interdisciplinary Plan of Care Collaboration   Patient Position at End of Therapy In Bed;Call Light within Reach;Tray Table within Reach   Strengths & Barriers   Strengths Able to follow instructions;Alert and oriented;Effective communication skills;Good insight into deficits/needs;Making steady progress towards goals;Motivated for self care and independence;Pleasant and cooperative;Willingly participates in therapeutic activities   Barriers Fatigue;Generalized weakness;Impaired activity tolerance;Impaired functional cognition   Treatment Time   Total Time Spent (mins) 30   Procedural Tracking   Procedural Tracking Community Re-Integration;Community Skills Development;Leisure Skills Awareness;Leisure Skills Development       Assessment    Pt able to complete cognitive activity \"Speedy Words\" with Mod need for cues. After each round Pt required Mod cueing for reminding Pt of activity rules- about more than half way through Tx session Pt was able to recall rules of activity and required no need for cueing.      Strengths: Able to follow instructions, Alert and oriented, Effective communication skills, Good insight into deficits/needs, Making steady progress towards goals, Motivated for self care and independence, " Pleasant and cooperative, Willingly participates in therapeutic activities  Barriers: (P) Fatigue, Generalized weakness, Impaired activity tolerance, Impaired functional cognition    Plan    Continue cognitive leisure Tx.     Passport items to be completed:  Passport items to be completed:  Verbalize two positive leisure activities, discuss returning to work, hobbies, community groups or volunteer activities, explore community resources

## 2021-05-03 NOTE — DISCHARGE PLANNING
Spoke to patient and she plans to DC on 5/7/21 to her daughter's home in [place].  She will stay there for a couple of weeks before going home alone.  Discussed [employer] for DME that might not be covered and possibility of transportation benefit through Medicaid - information regarding both will be given to patient.  Patient would like a new PCP which this CM will work on.  Discussed that other follow-up visits will be arranged as well.

## 2021-05-03 NOTE — THERAPY
"Speech Language Pathology  Daily Treatment     Patient Name: Nathalie Aguilar  Age:  60 y.o., Sex:  female  Medical Record #: [id number]  Today's Date: 5/3/2021     Precautions  Precautions: Fall Risk    Subjective    Patient was sitting in bed when SLP arrived. She was agreeable to ST. Patient reported that she does not like the soft/bite sized textures. SLP referenced the initial swallow eval. Patient was without overt s/sx of aspiration, timely TOMAS and trace oral cavity residue for regular textures but requested softer due to not having her dentures. Patient does not have dentures but is requesting to upgrade anyway. Per initial eval, this texture is appropriate. SLP updated diet order.      Objective       05/03/21 1601   Precautions   Precautions Fall Risk   Cognition   Moderate Attention Within Functional Limits (6-7)   Functional Memory Activities Supervision (5)   Insight into Deficits Within Functional Limits (6-7)   Sleep/Wake Cycle   Sleep & Rest Awake;Out of bed   Dysphagia    Diet / Liquid Recommendation Regular - Easy to Chew (7)   Interdisciplinary Plan of Care Collaboration   IDT Collaboration with  Physician   Patient Position at End of Therapy In Bed;Call Light within Reach;Tray Table within Reach;Phone within Reach   Collaboration Comments upgrade diet to Regular-Easy to Chew   SLP Total Time Spent   SLP Individual Total Time Spent (Mins) 30   Treatment Charges   SLP Cognitive Skill Development First 15 Minutes 1   SLP Cognitive Skill Development Additional 15 Minutes 1       Assessment    SLP provided patient with verbal cues to complete the daily memory log. Patient independently recalled the names for 1/5 therapists she worked with today. She recalled the activities from 5/5 sessions. SLP also provided patient with the memory strategies for \"association\" and \"repetition\" to recall therapists' names. Patient used this strategy to make an association for 5/5 therapists. She also independently " repeated the SLP's name throughout the remainder of the session.      Strengths: Motivated for self care and independence, Pleasant and cooperative, Willingly participates in therapeutic activities  Barriers: Impaired functional cognition    Plan    Cont tx to target functional memory  Consider d/c if patient independent for memory strategies    Passport items to be completed:   manage finances, complete daily memory log entries, solve problems related to safety situations, review education related to hospitalization, complete caregiver training     Speech Therapy Problems     Problem: Memory STGs     Dates: Start: 04/28/21       Goal: STG-Within one week, patient will     Dates: Start: 04/28/21       Description: 1) Individualized goal:  use external memory aids and internal memory strategies to recall details for POC with 90% accuracy and minimal verbal cues  2) Interventions:  SLP Self Care / ADL Training , SLP Cognitive Skill Development, and SLP Group Treatment        Note:     Goal Note filed on 04/29/21 1220 by Angie Cardona, Student    Introduced 4/29                        Problem: Problem Solving STGs     Dates: Start: 04/28/21       Goal: STG-Within one week, patient will     Dates: Start: 04/28/21       Description: 1) Individualized goal: complete IADL tasks (medication management, finances, etc.) with 90% accuracy and minimal verbal cues to return to independent PLOF.   2) Interventions:  SLP Self Care / ADL Training , SLP Cognitive Skill Development, and SLP Group Treatment        Note:     Goal Note filed on 04/29/21 1220 by Angie Cardona, Student    Initiated 4/29                        Problem: Speech/Swallowing LTGs     Dates: Start: 04/28/21       Goal: LTG-By discharge, patient will     Dates: Start: 04/28/21       Description: 1) Individualized goal:  improve cognitive skills from the mild range to the typical functioning range, as indicated by standardized assessment and functional skills  2)  Interventions:  SLP Self Care / ADL Training , SLP Cognitive Skill Development, and SLP Group Treatment

## 2021-05-03 NOTE — THERAPY
05/03/21 1529   Precautions   Precautions Fall Risk   Pain 0 - 10 Group   Therapist Pain Assessment 0   Cognition    Level of Consciousness Alert   Gait Functional Level of Assist    Gait Level Of Assist Contact Guard Assist  (CGA for safety)   Assistive Device None   Distance (Feet) 420   # of Times Distance was Traveled 2   Deviation Bradykinetic   Stairs Functional Level of Assist   Level of Assist with Stairs Stand by Assist   # of Stairs Climbed 20   Stairs Description Extra time;Hand rails;Supervision for safety  (Step-through and step-to pattern)   Transfer Functional Level of Assist   Bed, Chair, Wheelchair Transfer Independent   Bed Chair Wheelchair Transfer Description Increased time   Toilet Transfers Modified Independent  (Wheelchair)   Toilet Transfer Description Grab bar;Adaptive equipment;Increased time   Sitting Lower Body Exercises   Nustep Resistance Level 3;Resistance Level 4  (61 steps per minute, 10 minutes)   PT Total Time Spent   PT Individual Total Time Spent (Mins) 60   PT Charge Group   PT Gait Training 2   PT Therapeutic Exercise 1   PT Therapeutic Activities 1   Physical Therapy   Daily Treatment     Patient Name: Nathalie Aguilar  Age:  60 y.o., Sex:  female  Medical Record #: [id number]  Today's Date: 5/3/2021     Precautions  Precautions: Fall Risk    Subjective    The patient agreed to PT.     Objective    The patient demonstrated a transfer bed to/from wheelchair and transfer in the bathroom.  She did these transfers safely without loss of balance so she has been approved to be independent at a wheelchair level to use the bathroom and transfer from her bed.  PT put a note on her board and informed her nurse that she can be independent for these transfers using a wheelchair.  She also participated in gait training and tolerated 420 FT x2 without a device CGA with a gait belt.  The patient did standing gastroc/Achilles stretching in the parallel bars 1 minute x 3 and she went up  and down 20 stairs with supervision and using handrails.  The patient also utilized the NuStep with information indicated above.    Assessment    The patient is making progress in endurance, strength, balance, gait.  It is recommended that when she discharges to home that she use a walker when she is by herself especially at times of the day when she is fatigued but needs to be up and walking.  The patient will have periods of time when she will be alone at home.  She will be staying at her daughter's for period of time after discharge.  Strengths: Able to follow instructions, Effective communication skills, Independent prior level of function, Making steady progress towards goals, Pleasant and cooperative, Willingly participates in therapeutic activities  Barriers: Emotional lability, Fatigue, Generalized weakness, Impaired activity tolerance, Impaired balance(Impaired strength and balance, fall risk)    Plan    Safety education, therapeutic exercise for strength and endurance, gait training as tolerated    Passport items to be completed:  Passport items to be completed:  Get in/out of bed safely, in/out of a vehicle, safely use mobility device, walk or wheel around home/community, navigate up and down stairs, show how to get up/down from the ground, ensure home is accessible, demonstrate HEP, complete caregiver training    Physical Therapy Problems     Problem: Mobility     Dates: Start: 04/27/21       Goal: STG-Within one week, patient will ascend and descend four to six stairs     Dates: Start: 04/27/21       Description: 1) Individualized goal: Up/down 4-6 stairs, handrails, SBA 1 week  2) Interventions:  PT Gait Training, PT Therapeutic Exercises, and PT Therapeutic Activity            Goal: STG-Within one week, patient will     Dates: Start: 04/27/21       Description: 1) Individualized goal: Gait fww/no  FT SBA, 1 week  2) Interventions:  PT Gait Training, PT Therapeutic Exercises, PT Neuro  Re-Ed/Balance, and PT Therapeutic Activity      Note:     Goal Note filed on 04/29/21 1225 by OPHELIA Herrera    1) Individualized goal:   Gait  FT SBA 1 week  2) Interventions:  PT Gait Training, PT Therapeutic Exercises, and PT Therapeutic Activity                        Problem: Mobility Transfers     Dates: Start: 04/27/21       Goal: STG-Within one week, patient will transfer bed to chair     Dates: Start: 04/27/21       Description: 1) Individualized goal: Transfer bed to/from chair fww/no AD supervision, 1 week  2) Interventions:  PT Gait Training, PT Therapeutic Exercises, and PT Therapeutic Activity                  Problem: PT-Long Term Goals     Dates: Start: 04/27/21       Goal: LTG-By discharge, patient will ambulate     Dates: Start: 04/27/21       Description: 1) Individualized goal: Gait no  FT, independent at discharge  2) Interventions:  PT Gait Training, PT Therapeutic Exercises, PT Neuro Re-Ed/Balance, and PT Therapeutic Activity            Goal: LTG-By discharge, patient will transfer one surface to another     Dates: Start: 04/27/21       Description: 1) Individualized goal: Transfer 1 surface to another no AD, independent at discharge  2) Interventions:  PT Gait Training, PT Therapeutic Exercises, and PT Therapeutic Activity            Goal: LTG-By discharge, patient will ambulate up/down 4-6 stairs     Dates: Start: 04/27/21       Description: 1) Individualized goal: Up/down 4-6 stairs handrails, independently at discharge  2) Interventions:  PT Gait Training, PT Therapeutic Exercises, PT Neuro Re-Ed/Balance, and PT Therapeutic Activity            Goal: LTG-By discharge, patient will transfer in/out of a car     Dates: Start: 04/27/21       Description: 1) Individualized goal: Car transfer no device supervision at discharge  2) Interventions:  PT Gait Training, PT Therapeutic Exercises, PT Neuro Re-Ed/Balance, and PT Therapeutic Activity

## 2021-05-03 NOTE — CARE PLAN
Problem: Communication  Goal: The ability to communicate needs accurately and effectively will improve  Outcome: PROGRESSING AS EXPECTED  Intervention: [place] patient and significant other/support system to call light to alert staff of needs  Note: Pt conversant , able to verbalize needs.     Problem: Safety  Goal: Will remain free from falls  Outcome: PROGRESSING AS EXPECTED  Intervention: Implement fall precautions  Flowsheets (Taken 5/3/2021 4520)  Bed Alarm: Yes - Alarm On  Environmental Precautions:  • Treaded Slipper Socks on Patient  • Bed in Low Position  • Personal Belongings, Wastebasket, Call Bell etc. in Easy Reach  Chair/Bed Strip Alarm: Yes - Alarm On  Note: Safety measures enforced, compliant to safety instructions , does not self transfer , calls appropriately, needs anticipated . Pt free from fall and injury.                             Problem: Pain Management  Goal: Pain level will decrease to patient's comfort goal  Outcome: PROGRESSING AS EXPECTED  Intervention: Educate and implement non-pharmacologic comfort measures. Examples: relaxation, distration, play therapy, activity therapy, massage, etc.  Note: Denies pain, pt c/o of frequency in urination and incontinence x 1, pt time voided . Cont monitored.

## 2021-05-03 NOTE — THERAPY
Occupational Therapy  Daily Treatment     Patient Name: Nathalie Aguilar  Age:  60 y.o., Sex:  female  Medical Record #: [id number]  Today's Date: 5/3/2021     Precautions  Precautions: (P) Fall Risk         Subjective    Pt received in bed finishing breakfast, tearful when getting ready to get dressed over current situation, emotional support provided, pt with improved affect toward end of session     Objective       05/03/21 0831   Precautions   Precautions Fall Risk   Functional Level of Assist   Upper Body Dressing Supervision   Upper Body Dressing Description Set-up of equipment  (setup to don tshirt seated)   Lower Body Dressing Supervision   Lower Body Dressing Description Supervision for safety;Verbal cueing  (donned underwear, pants, shoes, cues 4 positioning for shoes)   Toileting Modified Independent   Toileting Description Grab bar;Increased time   Toilet Transfers Modified Independent   Toilet Transfer Description Grab bar;Increased time  (w/c <> toilet via GB)   Balance   Comments functional mobility on unit 2x600 feet no AD, close SBA for safety though no LOB noted   Interdisciplinary Plan of Care Collaboration   IDT Collaboration with  Physical Therapist   Patient Position at End of Therapy Seated;Edge of Bed  (rec therapy present for next session)   Collaboration Comments discussion with PT re: clearing patient for mod I at w/c level for toileting   OT Total Time Spent   OT Individual Total Time Spent (Mins) 30   OT Charge Group   OT Self Care / ADL 1   OT Neuromuscular Re-education / Balance 1       Assessment    Pt tolerated session well, initially tearful and expressing depression over current situation but affect improved once dressed and after walking on unit. Pt emotional over urinary urgency issues, educated on body regulation issues after brain injury with anticipation of eventual improved control, pt able to demo mod I transfer at w/c level from bed > w/c <> toilet, discussed clearing  patient to take herself to bathroom at w/c level to avoid incontinence episodes while waiting for staff, OT will discuss with primary PT who can clear her later today if appropriate with ultimate goal of being independent with toileting/toilet transfers at ambulatory level by d/c.   Strengths: Able to follow instructions, Alert and oriented, Independent prior level of function, Motivated for self care and independence, Supportive family, Willingly participates in therapeutic activities  Barriers: Decreased endurance, Generalized weakness, Impaired activity tolerance, Impaired balance, Agitation    Plan    ADL , IADL  related mobility and cognition , strength/endurance building   Standing endurance and balance activity,     Occupational Therapy Goals     Problem: Bathing     Dates: Start: 04/28/21       Goal: STG-Within one week, patient will bathe     Dates: Start: 04/28/21       Description: 1) Individualized Goal:  w/ supervision using DME and AE as needed  2) Interventions:  OT Orthotics Training, OT E Stim Attended, OT Group Therapy, OT Self Care/ADL, OT Cognitive Skill Dev, OT Community Reintegration, OT Manual Ther Technique, OT Neuro Re-Ed/Balance, OT Sensory Int Techniques, OT Therapeutic Activity, OT Evaluation, and OT Therapeutic Exercise      Note:     Goal Note filed on 04/29/21 1200 by Claire Faith OT    Due to eval yesterday                         Problem: Dressing     Dates: Start: 04/28/21       Goal: STG-Within one week, patient will retrieve clothing     Dates: Start: 04/28/21       Description: 1) Individualized Goal:  and dress w/ supervision   2) Interventions:  OT Orthotics Training, OT E Stim Attended, OT Group Therapy, OT Self Care/ADL, OT Cognitive Skill Dev, OT Community Reintegration, OT Manual Ther Technique, OT Neuro Re-Ed/Balance, OT Sensory Int Techniques, OT Therapeutic Activity, OT Evaluation, and OT Therapeutic Exercise    Note:     Goal Note filed on 04/29/21 1200 by Claire  COTY Faith    Due to eval yesterday                         Problem: Functional Transfers     Dates: Start: 04/28/21       Goal: STG-Within one week, patient will transfer to step in shower     Dates: Start: 04/28/21       Description: 1) Individualized Goal:  w/ supervision using DME and AE as needed  2) Interventions:  OT Orthotics Training, OT E Stim Attended, OT Group Therapy, OT Self Care/ADL, OT Cognitive Skill Dev, OT Community Reintegration, OT Manual Ther Technique, OT Neuro Re-Ed/Balance, OT Sensory Int Techniques, OT Therapeutic Activity, OT Evaluation, and OT Therapeutic Exercise    Note:     Goal Note filed on 04/29/21 1200 by Claire Faith OT    Due to eval yesterday                         Problem: IADL's     Dates: Start: 04/28/21       Goal: STG-Within one week, patient will access kitchen area     Dates: Start: 04/28/21       Description: 1) Individualized Goal:  w/ CGA using DME and AE as needed  2) Interventions:  OT Orthotics Training, OT E Stim Attended, OT Group Therapy, OT Self Care/ADL, OT Cognitive Skill Dev, OT Community Reintegration, OT Manual Ther Technique, OT Neuro Re-Ed/Balance, OT Sensory Int Techniques, OT Therapeutic Activity, OT Evaluation, and OT Therapeutic Exercise    Note:     Goal Note filed on 04/29/21 1200 by Claire Faith OT    Due to eval yesterday                         Problem: OT Long Term Goals     Dates: Start: 04/28/21       Goal: LTG-By discharge, patient will complete basic self care tasks     Dates: Start: 04/28/21       Description: 1) Individualized Goal:  w/ mod I using DME and AE as needed  2) Interventions:  OT Orthotics Training, OT E Stim Attended, OT Group Therapy, OT Self Care/ADL, OT Cognitive Skill Dev, OT Community Reintegration, OT Manual Ther Technique, OT Neuro Re-Ed/Balance, OT Sensory Int Techniques, OT Therapeutic Activity, OT Evaluation, and OT Therapeutic Exercise          Goal: LTG-By discharge, patient will perform bathroom  transfers     Dates: Start: 04/28/21       Description: 1) Individualized Goal:  w/ mod I using DME and AE as needed  2) Interventions:  OT Orthotics Training, OT E Stim Attended, OT Group Therapy, OT Self Care/ADL, OT Cognitive Skill Dev, OT Community Reintegration, OT Manual Ther Technique, OT Neuro Re-Ed/Balance, OT Sensory Int Techniques, OT Therapeutic Activity, OT Evaluation, and OT Therapeutic Exercise

## 2021-05-03 NOTE — THERAPY
"Occupational Therapy  Daily Treatment     Patient Name: Nathalie Aguilar  Age:  60 y.o., Sex:  female  Medical Record #: [id number]  Today's Date: 5/3/2021     Precautions  Precautions: Fall Risk         Subjective       Objective       05/03/21 1031   Precautions   Precautions Fall Risk   Vitals   O2 Delivery Device None - Room Air   Pain   Intervention Declines   Pain 0 - 10 Group   Therapist Pain Assessment 0   Non Verbal Descriptors   Non Verbal Scale  Calm   Cognition    Level of Consciousness Alert   Sleep/Wake Cycle   Sleep & Rest Awake   Functional Level of Assist   Grooming Stand by Assist;Standing   Grooming Description Increased time;Standing at sink;Supervision for safety   Lower Body Dressing Supervision   Lower Body Dressing Description Grab bar;Increased time;Initial preparation for task;Supervision for safety   Toileting Modified Independent   Toileting Description Increased time;Grab bar   Toilet Transfers Modified Independent   Toilet Transfer Description Grab bar;Increased time   Standing Upper Body Exercises   Other Exercises Standing 5' from Rebounder using medium green ball, 2x50, no LOB, no dropped balls, seated rest break between sets, 4x50 standing on 3\" foam pad, no LOB, dropped ball x 1, seated rest break between sets.   Sitting Lower Body Exercises   Nustep Resistance Level 3  (15 mins, .33 miles.)   Comments Ambulated from room to back gym back to room seated rest break x 2, 585' SBA w/out device   Interdisciplinary Plan of Care Collaboration   Patient Position at End of Therapy In Bed;Bed Alarm On;Call Light within Reach;Tray Table within Reach;Phone within Reach   OT Total Time Spent   OT Individual Total Time Spent (Mins) 60   OT Charge Group   OT Self Care / ADL 2   OT Therapeutic Exercise  2       Assessment    Pt was alert and cooperative w/ tx.  SBA ambulation from room to back gym back to room, ADL's and TherEx - see notes above for details.  Strengths: Able to follow " instructions, Alert and oriented, Independent prior level of function, Motivated for self care and independence, Supportive family, Willingly participates in therapeutic activities  Barriers: Decreased endurance, Generalized weakness, Impaired activity tolerance, Impaired balance, Agitation    Plan    ADL , IADL  related mobility and cognition , strength/endurance building   Standing endurance and balance activity    Passport items to be completed:  Passport items to be completed:  Perform bathroom transfers, complete dressing, complete feeding, get ready for the day, prepare a simple meal, participate in household tasks, adapt home for safety needs, demonstrate home exercise program, complete caregiver training     Occupational Therapy Goals     Problem: Bathing     Dates: Start: 04/28/21       Goal: STG-Within one week, patient will bathe     Dates: Start: 04/28/21       Description: 1) Individualized Goal:  w/ supervision using DME and AE as needed  2) Interventions:  OT Orthotics Training, OT E Stim Attended, OT Group Therapy, OT Self Care/ADL, OT Cognitive Skill Dev, OT Community Reintegration, OT Manual Ther Technique, OT Neuro Re-Ed/Balance, OT Sensory Int Techniques, OT Therapeutic Activity, OT Evaluation, and OT Therapeutic Exercise      Note:     Goal Note filed on 04/29/21 1200 by Claire Faith OT    Due to eval yesterday                         Problem: Dressing     Dates: Start: 04/28/21       Goal: STG-Within one week, patient will retrieve clothing     Dates: Start: 04/28/21       Description: 1) Individualized Goal:  and dress w/ supervision   2) Interventions:  OT Orthotics Training, OT E Stim Attended, OT Group Therapy, OT Self Care/ADL, OT Cognitive Skill Dev, OT Community Reintegration, OT Manual Ther Technique, OT Neuro Re-Ed/Balance, OT Sensory Int Techniques, OT Therapeutic Activity, OT Evaluation, and OT Therapeutic Exercise    Note:     Goal Note filed on 04/29/21 1200 by Claire  COTY Faith    Due to eval yesterday                         Problem: Functional Transfers     Dates: Start: 04/28/21       Goal: STG-Within one week, patient will transfer to step in shower     Dates: Start: 04/28/21       Description: 1) Individualized Goal:  w/ supervision using DME and AE as needed  2) Interventions:  OT Orthotics Training, OT E Stim Attended, OT Group Therapy, OT Self Care/ADL, OT Cognitive Skill Dev, OT Community Reintegration, OT Manual Ther Technique, OT Neuro Re-Ed/Balance, OT Sensory Int Techniques, OT Therapeutic Activity, OT Evaluation, and OT Therapeutic Exercise    Note:     Goal Note filed on 04/29/21 1200 by Claire Faith OT    Due to eval yesterday                         Problem: IADL's     Dates: Start: 04/28/21       Goal: STG-Within one week, patient will access kitchen area     Dates: Start: 04/28/21       Description: 1) Individualized Goal:  w/ CGA using DME and AE as needed  2) Interventions:  OT Orthotics Training, OT E Stim Attended, OT Group Therapy, OT Self Care/ADL, OT Cognitive Skill Dev, OT Community Reintegration, OT Manual Ther Technique, OT Neuro Re-Ed/Balance, OT Sensory Int Techniques, OT Therapeutic Activity, OT Evaluation, and OT Therapeutic Exercise    Note:     Goal Note filed on 04/29/21 1200 by Claire Faith OT    Due to eval yesterday                         Problem: OT Long Term Goals     Dates: Start: 04/28/21       Goal: LTG-By discharge, patient will complete basic self care tasks     Dates: Start: 04/28/21       Description: 1) Individualized Goal:  w/ mod I using DME and AE as needed  2) Interventions:  OT Orthotics Training, OT E Stim Attended, OT Group Therapy, OT Self Care/ADL, OT Cognitive Skill Dev, OT Community Reintegration, OT Manual Ther Technique, OT Neuro Re-Ed/Balance, OT Sensory Int Techniques, OT Therapeutic Activity, OT Evaluation, and OT Therapeutic Exercise          Goal: LTG-By discharge, patient will perform bathroom  transfers     Dates: Start: 04/28/21       Description: 1) Individualized Goal:  w/ mod I using DME and AE as needed  2) Interventions:  OT Orthotics Training, OT E Stim Attended, OT Group Therapy, OT Self Care/ADL, OT Cognitive Skill Dev, OT Community Reintegration, OT Manual Ther Technique, OT Neuro Re-Ed/Balance, OT Sensory Int Techniques, OT Therapeutic Activity, OT Evaluation, and OT Therapeutic Exercise

## 2021-05-03 NOTE — CARE PLAN
Problem: Safety  Goal: Will remain free from injury  Note: Per physical therapist, pt is now mod-I at wheelchair level.      Problem: Bowel/Gastric:  Goal: Normal bowel function is maintained or improved  Flowsheets (Taken 5/3/2021 0811)  Last BM: 05/03/21     Problem: Pain Management  Goal: Pain level will decrease to patient's comfort goal  Flowsheets (Taken 5/3/2021 1438)  Comfort Goal:  • Comfort with Movement  • Perform Activity  • Stay Alert  Note: PRN tylenol given for complaints of headache. Pt states feeling better upon reassessment.

## 2021-05-03 NOTE — THERAPY
Physical Therapy   Daily Treatment     Patient Name: Nathalie Aguilar  Age:  60 y.o., Sex:  female  Medical Record #: [id number]  Today's Date: 5/3/2021     Precautions  Precautions: Fall Risk    Subjective    Pt reports she is agreeable to walking and exercise      Objective       05/03/21 1001   Gait Functional Level of Assist    Gait Level Of Assist Contact Guard Assist   Assistive Device None   Distance (Feet) 300   # of Times Distance was Traveled 2   Deviation Bradykinetic  (no LOB, CGA for safety only)   Sitting Lower Body Exercises   Nustep Resistance Level 3  (15 min, VCs for pacing, 55 SPM)   Interdisciplinary Plan of Care Collaboration   Patient Position at End of Therapy Seated;Call Light within Reach;Tray Table within Reach   PT Total Time Spent   PT Individual Total Time Spent (Mins) 30   PT Charge Group   PT Gait Training 1   PT Therapeutic Exercise 1       Assessment    Pt demos improving dynamic balance - no LOB with walking no AD this session. Cont to demo improving endurance as well with 15 min sustained activity on nustep without breaks.       Strengths: Able to follow instructions, Effective communication skills, Independent prior level of function, Making steady progress towards goals, Pleasant and cooperative, Willingly participates in therapeutic activities  Barriers: Emotional lability, Fatigue, Generalized weakness, Impaired activity tolerance, Impaired balance(Impaired strength and balance, fall risk)    Plan    Continue to work on balance and activity tolerance as well as increasing independence with functional mobility.  Pt did not use an AD prior to her fall.     Passport items to be completed:Pt showing good progress towards passport items.  Get in/out of bed safely, in/out of a vehicle, safely use mobility device, walk or wheel around home/community, navigate up and down stairs, show how to get up/down from the ground, ensure home is accessible, demonstrate HEP, complete caregiver  training    Physical Therapy Problems     Problem: Mobility     Dates: Start: 04/27/21       Goal: STG-Within one week, patient will ascend and descend four to six stairs     Dates: Start: 04/27/21       Description: 1) Individualized goal: Up/down 4-6 stairs, handrails, SBA 1 week  2) Interventions:  PT Gait Training, PT Therapeutic Exercises, and PT Therapeutic Activity            Goal: STG-Within one week, patient will     Dates: Start: 04/27/21       Description: 1) Individualized goal: Gait fww/no  FT SBA, 1 week  2) Interventions:  PT Gait Training, PT Therapeutic Exercises, PT Neuro Re-Ed/Balance, and PT Therapeutic Activity      Note:     Goal Note filed on 04/29/21 1225 by OPHELIA Herrera    1) Individualized goal:   Gait  FT SBA 1 week  2) Interventions:  PT Gait Training, PT Therapeutic Exercises, and PT Therapeutic Activity                        Problem: Mobility Transfers     Dates: Start: 04/27/21       Goal: STG-Within one week, patient will transfer bed to chair     Dates: Start: 04/27/21       Description: 1) Individualized goal: Transfer bed to/from chair fww/no AD supervision, 1 week  2) Interventions:  PT Gait Training, PT Therapeutic Exercises, and PT Therapeutic Activity                  Problem: PT-Long Term Goals     Dates: Start: 04/27/21       Goal: LTG-By discharge, patient will ambulate     Dates: Start: 04/27/21       Description: 1) Individualized goal: Gait no  FT, independent at discharge  2) Interventions:  PT Gait Training, PT Therapeutic Exercises, PT Neuro Re-Ed/Balance, and PT Therapeutic Activity            Goal: LTG-By discharge, patient will transfer one surface to another     Dates: Start: 04/27/21       Description: 1) Individualized goal: Transfer 1 surface to another no AD, independent at discharge  2) Interventions:  PT Gait Training, PT Therapeutic Exercises, and PT Therapeutic Activity            Goal: LTG-By discharge, patient will ambulate  up/down 4-6 stairs     Dates: Start: 04/27/21       Description: 1) Individualized goal: Up/down 4-6 stairs handrails, independently at discharge  2) Interventions:  PT Gait Training, PT Therapeutic Exercises, PT Neuro Re-Ed/Balance, and PT Therapeutic Activity            Goal: LTG-By discharge, patient will transfer in/out of a car     Dates: Start: 04/27/21       Description: 1) Individualized goal: Car transfer no device supervision at discharge  2) Interventions:  PT Gait Training, PT Therapeutic Exercises, PT Neuro Re-Ed/Balance, and PT Therapeutic Activity

## 2021-05-04 PROCEDURE — 99232 SBSQ HOSP IP/OBS MODERATE 35: CPT | Mod: 25 | Performed by: PHYSICAL MEDICINE & REHABILITATION

## 2021-05-04 PROCEDURE — A9270 NON-COVERED ITEM OR SERVICE: HCPCS | Performed by: PHYSICAL MEDICINE & REHABILITATION

## 2021-05-04 PROCEDURE — 97116 GAIT TRAINING THERAPY: CPT

## 2021-05-04 PROCEDURE — 97112 NEUROMUSCULAR REEDUCATION: CPT

## 2021-05-04 PROCEDURE — 97129 THER IVNTJ 1ST 15 MIN: CPT

## 2021-05-04 PROCEDURE — 99406 BEHAV CHNG SMOKING 3-10 MIN: CPT | Mod: GZ | Performed by: PHYSICAL MEDICINE & REHABILITATION

## 2021-05-04 PROCEDURE — 700102 HCHG RX REV CODE 250 W/ 637 OVERRIDE(OP): Performed by: PHYSICAL MEDICINE & REHABILITATION

## 2021-05-04 PROCEDURE — 97130 THER IVNTJ EA ADDL 15 MIN: CPT

## 2021-05-04 PROCEDURE — 770010 HCHG ROOM/CARE - REHAB SEMI PRIVAT*

## 2021-05-04 PROCEDURE — 97535 SELF CARE MNGMENT TRAINING: CPT

## 2021-05-04 PROCEDURE — 97110 THERAPEUTIC EXERCISES: CPT

## 2021-05-04 RX ORDER — BENZOCAINE/MENTHOL 6 MG-10 MG
LOZENGE MUCOUS MEMBRANE 2 TIMES DAILY PRN
Status: DISCONTINUED | OUTPATIENT
Start: 2021-05-04 | End: 2021-05-07 | Stop reason: HOSPADM

## 2021-05-04 RX ADMIN — OMEPRAZOLE 20 MG: 20 CAPSULE, DELAYED RELEASE ORAL at 21:44

## 2021-05-04 RX ADMIN — HYDROCORTISONE: 0.01 CREAM TOPICAL at 21:54

## 2021-05-04 RX ADMIN — MAGNESIUM OXIDE TAB 400 MG (241.3 MG ELEMENTAL MG) 400 MG: 400 (241.3 MG) TAB at 21:44

## 2021-05-04 RX ADMIN — GABAPENTIN 300 MG: 300 CAPSULE ORAL at 07:34

## 2021-05-04 RX ADMIN — MAGNESIUM OXIDE TAB 400 MG (241.3 MG ELEMENTAL MG) 400 MG: 400 (241.3 MG) TAB at 07:34

## 2021-05-04 RX ADMIN — MIDODRINE HYDROCHLORIDE 5 MG: 2.5 TABLET ORAL at 07:34

## 2021-05-04 RX ADMIN — GABAPENTIN 300 MG: 300 CAPSULE ORAL at 15:19

## 2021-05-04 RX ADMIN — LEVETIRACETAM 1000 MG: 500 TABLET ORAL at 07:34

## 2021-05-04 RX ADMIN — CHOLECALCIFEROL TAB 25 MCG (1000 UNIT) 2000 UNITS: 25 TAB at 08:34

## 2021-05-04 RX ADMIN — LEVETIRACETAM 1000 MG: 500 TABLET ORAL at 21:44

## 2021-05-04 RX ADMIN — TRAZODONE HYDROCHLORIDE 50 MG: 50 TABLET ORAL at 21:45

## 2021-05-04 RX ADMIN — HYDROCORTISONE: 0.01 CREAM TOPICAL at 15:19

## 2021-05-04 RX ADMIN — MIRTAZAPINE 15 MG: 15 TABLET, ORALLY DISINTEGRATING ORAL at 21:44

## 2021-05-04 RX ADMIN — OMEPRAZOLE 20 MG: 20 CAPSULE, DELAYED RELEASE ORAL at 07:34

## 2021-05-04 RX ADMIN — ACETAMINOPHEN 650 MG: 325 TABLET, FILM COATED ORAL at 07:34

## 2021-05-04 RX ADMIN — GABAPENTIN 300 MG: 300 CAPSULE ORAL at 21:44

## 2021-05-04 ASSESSMENT — GAIT ASSESSMENTS
GAIT LEVEL OF ASSIST: CONTACT GUARD ASSIST
ASSISTIVE DEVICE: FRONT WHEEL WALKER
DEVIATION: DECREASED BASE OF SUPPORT;BRADYKINETIC

## 2021-05-04 ASSESSMENT — BALANCE ASSESSMENTS
STANDING TO SITTING: 4
REACHING FORWARD WITH OUTSTRETCHED ARM WHILE STANDING: 4
LONG VERSION TOTAL SCORE (MAX 56): 34
LOOK OVER LEFT AND RIGHT SHOULDERS WHILE STANDING: 2
STANDING UNSUPPORTED: 4
SITTING TO STANDING: 4
LONG VERSION TOTAL SCORE (MAX 56): 34
STANDING UNSUPPORTED WITH FEET TOGETHER: 0
STANDING ON ONE LEG: 1
TRANSFERS: 3
PLACE ALTERNATE FOOT ON STEP OR STOOL WHILE STANDING UNSUPPORTED: 1
SITTING UNSUPPORTED: 4
STANDING UNSUPPORTED WITH EYES CLOSED: 0
TURN 360 DEGREES: 1
PICK UP OBJECT FROM THE FLOOR FROM A STANDING POSITION: 4
STANDING UNSUPPORTED ONE FOOT IN FRONT: 2

## 2021-05-04 ASSESSMENT — ACTIVITIES OF DAILY LIVING (ADL)
TOILET_TRANSFER_DESCRIPTION: GRAB BAR
TUB_SHOWER_TRANSFER_DESCRIPTION: GRAB BAR

## 2021-05-04 NOTE — THERAPY
"Physical Therapy   Daily Treatment     Patient Name: Nathalie Aguilar  Age:  60 y.o., Sex:  female  Medical Record #: [id number]  Today's Date: 5/4/2021     Precautions  Precautions: (P) Fall Risk    Subjective    Pt found up in w/c, ready for therapy.  \"I'm stressed out, I have an alcoholic squatter at my apartment that won't move out.\"     Objective       05/04/21 1331   Precautions   Precautions Fall Risk   Gait Functional Level of Assist    Gait Level Of Assist Contact Guard Assist   Assistive Device Front Wheel Walker   Distance (Feet)   (x150', x300' outdoors over uneven sidewalk, curb cut outs)   # of Times Distance was Traveled 2   Deviation Decreased Base Of Support;Bradykinetic   Forbes Balance Scale   Sitting Unsupported (Score 0-4) 4   Change Of Positon: Sitting To Standing (Score 0-4) 4   Change Of Positon: Standing To Sitting (Score 0-4) 4   Transfers (Score 0-4) 3   Standing Unsupported (Score 0-4) 4   Standing With Eyes Closed (Score 0-4) 0   Standing With Feet Together (Score 0-4) 0   Tandem Standing (Score 0-4) 2   Standing On One Leg (Score 0-4) 1   Turning Trunk (Feet Fixed) (Score 0-4) 2   Retrieving Objects From Floor (Score 0-4) 4   Turning 360 Degrees (Score 0-4) 1   Stool Stepping (Score 0-4) 1   Reaching Forward While Standing (Score 0-4) 4   Forbes Balance Total Score (0-56) 34   Interdisciplinary Plan of Care Collaboration   Patient Position at End of Therapy Seated;Self Releasing Lap Belt Applied;Call Light within Reach;Tray Table within Reach;Phone within Reach   PT Total Time Spent   PT Individual Total Time Spent (Mins) 60   PT Charge Group   PT Gait Training 2   PT Neuromuscular Re-Education / Balance 2     Pt expressing high stress and anxiety regarding current medical status, social issues with ETOH & having an alcoholic squatter living in her apartment.  Pt was very tearful and anxious regarding balance assessment but cont to discuss more social anxiety issues while performing Forbes. "   Reinforced Forbes's objectively to assess fall risk and need for an AD, reinforced score to pt that indicates that pt may benefit from continued use of an AD.      Assessment    Gait training with FWW demo minimal heel strike and intermittent scissoring with upright head positioning.  Scored 34/56 on Forbes, updated pt on score indicating fall risk, however reinforced this would not be a reason to have to stay in rehab longer.  Pt cont to express high anxiety about length of stay being extended, cont to reassure pt.    Strengths: Able to follow instructions, Effective communication skills, Independent prior level of function, Making steady progress towards goals, Pleasant and cooperative, Willingly participates in therapeutic activities  Barriers: Emotional lability, Fatigue, Generalized weakness, Impaired activity tolerance, Impaired balance(Impaired strength and balance, fall risk)    Plan    Standing balance: EO: narrow IVA, tandem, EC normal IVA, on airex pad, endurance, LE strengthening, stair and curb training      Physical Therapy Problems     Problem: Mobility     Dates: Start: 04/27/21       Goal: STG-Within one week, patient will ascend and descend four to six stairs     Dates: Start: 04/27/21       Description: 1) Individualized goal: Up/down 4-6 stairs, handrails, SBA 1 week  2) Interventions:  PT Gait Training, PT Therapeutic Exercises, and PT Therapeutic Activity            Goal: STG-Within one week, patient will     Dates: Start: 04/27/21       Description: 1) Individualized goal: Gait fww/no  FT SBA, 1 week  2) Interventions:  PT Gait Training, PT Therapeutic Exercises, PT Neuro Re-Ed/Balance, and PT Therapeutic Activity      Note:     Goal Note filed on 04/29/21 1225 by OPHELIA Herrera    1) Individualized goal:   Gait  FT SBA 1 week  2) Interventions:  PT Gait Training, PT Therapeutic Exercises, and PT Therapeutic Activity                        Problem: Mobility Transfers      Dates: Start: 04/27/21       Goal: STG-Within one week, patient will transfer bed to chair     Dates: Start: 04/27/21       Description: 1) Individualized goal: Transfer bed to/from chair fww/no AD supervision, 1 week  2) Interventions:  PT Gait Training, PT Therapeutic Exercises, and PT Therapeutic Activity                  Problem: PT-Long Term Goals     Dates: Start: 04/27/21       Goal: LTG-By discharge, patient will ambulate     Dates: Start: 04/27/21       Description: 1) Individualized goal: Gait no  FT, independent at discharge  2) Interventions:  PT Gait Training, PT Therapeutic Exercises, PT Neuro Re-Ed/Balance, and PT Therapeutic Activity            Goal: LTG-By discharge, patient will transfer one surface to another     Dates: Start: 04/27/21       Description: 1) Individualized goal: Transfer 1 surface to another no AD, independent at discharge  2) Interventions:  PT Gait Training, PT Therapeutic Exercises, and PT Therapeutic Activity            Goal: LTG-By discharge, patient will ambulate up/down 4-6 stairs     Dates: Start: 04/27/21       Description: 1) Individualized goal: Up/down 4-6 stairs handrails, independently at discharge  2) Interventions:  PT Gait Training, PT Therapeutic Exercises, PT Neuro Re-Ed/Balance, and PT Therapeutic Activity            Goal: LTG-By discharge, patient will transfer in/out of a car     Dates: Start: 04/27/21       Description: 1) Individualized goal: Car transfer no device supervision at discharge  2) Interventions:  PT Gait Training, PT Therapeutic Exercises, PT Neuro Re-Ed/Balance, and PT Therapeutic Activity

## 2021-05-04 NOTE — CARE PLAN
Problem: Safety  Goal: Will remain free from injury  Note: Pt is mod-I at wheelchair level, uses call light appropriately when in need of assistance.     Problem: Bowel/Gastric:  Goal: Normal bowel function is maintained or improved  Flowsheets (Taken 5/4/2021 0967)  Last BM: 05/04/21  Number of Times Stooled: 2

## 2021-05-04 NOTE — CARE PLAN
Problem: Recreation Therapy  Goal: STG-Within one week, patient will demonstrate leisure problem solving  Description: by sharing on two positive lesiure activities they would like to participate in during leisure intervention or during freetime and any perceived barriers to their participation.      Outcome: MET  Goal: STG-Within one week, patient will  Description: Participate in a cognitive leisure activity with 60% accuracy with Min A cues or less.     Outcome: MET  Goal: LTG-By discharge, patient will demonstrate leisure problem solving  Description: by sharing on two positive lesiure activities they would like to participate in following d/c and any perceived barriers to their participation.          Outcome: MET  Goal: LTG-By discharge, patient will  Description: Participate in a cognitive leisure activity with 90% accuracy with Min A cues or less.     Outcome: MET

## 2021-05-04 NOTE — CARE PLAN
Problem: Safety  Goal: Will remain free from falls  Outcome: PROGRESSING AS EXPECTED     Problem: Infection  Goal: Will remain free from infection  Outcome: PROGRESSING AS EXPECTED     Problem: Venous Thromboembolism (VTW)/Deep Vein Thrombosis (DVT) Prevention:  Goal: Patient will participate in Venous Thrombosis (VTE)/Deep Vein Thrombosis (DVT)Prevention Measures  Outcome: PROGRESSING AS EXPECTED     Problem: Pain Management  Goal: Pain level will decrease to patient's comfort goal  Outcome: PROGRESSING AS EXPECTED

## 2021-05-04 NOTE — THERAPY
"Occupational Therapy  Daily Treatment     Patient Name: Nathalie Aguilar  Age:  60 y.o., Sex:  female  Medical Record #: [id number]  Today's Date: 5/4/2021     Precautions  Precautions: (P) Fall Risk         Subjective    \" I'm going to go to a salon an have them  Handle my hair. \" referring to  Matted hair     Objective       05/04/21 0831   Precautions   Precautions Fall Risk   Functional Level of Assist   Bathing Modified Independent   Bathing Description Grab bar;Hand held shower;Tub bench   Upper Body Dressing Supervision  (clothing retrieval   no device   don  no assist )   Lower Body Dressing Supervision  (clothing retrieval   no device   don  no assist )   Toileting Modified Independent   Toilet Transfers Modified Independent   Toilet Transfer Description Grab bar   Tub / Shower Transfers Supervised   Tub Shower Transfer Description Grab bar   Sitting Upper Body Exercises   Upper Extremity Bike Level 4 Resistance  (x 5 minutes   motomed)   Interdisciplinary Plan of Care Collaboration   Patient Position at End of Therapy Seated  (hand off to Rec therapy for tx )   OT Total Time Spent   OT Individual Total Time Spent (Mins) 60   OT Charge Group   OT Self Care / ADL 4      functional mobility    W/c at bedside <-> bathroom   Supervision   To indirect supervision   Assessment    Demonstrates improved independence with  ADL and related mobility.  Demonstrated good  Safety.     Strengths: Able to follow instructions, Alert and oriented, Independent prior level of function, Motivated for self care and independence, Supportive family, Willingly participates in therapeutic activities  Barriers: Decreased endurance, Generalized weakness, Impaired activity tolerance, Impaired balance, Agitation    Plan     ADL IADL , related mobility at FWW level . Related cognition   Continued strength/endurance building    Balance activity         Occupational Therapy Goals     Problem: Bathing     Dates: Start: 04/28/21       Goal: " STG-Within one week, patient will bathe     Dates: Start: 04/28/21       Description: 1) Individualized Goal:  w/ supervision using DME and AE as needed  2) Interventions:  OT Orthotics Training, OT E Stim Attended, OT Group Therapy, OT Self Care/ADL, OT Cognitive Skill Dev, OT Community Reintegration, OT Manual Ther Technique, OT Neuro Re-Ed/Balance, OT Sensory Int Techniques, OT Therapeutic Activity, OT Evaluation, and OT Therapeutic Exercise      Note:     Goal Note filed on 04/29/21 1200 by Claire Faith OT    Due to eval yesterday                         Problem: Dressing     Dates: Start: 04/28/21       Goal: STG-Within one week, patient will retrieve clothing     Dates: Start: 04/28/21       Description: 1) Individualized Goal:  and dress w/ supervision   2) Interventions:  OT Orthotics Training, OT E Stim Attended, OT Group Therapy, OT Self Care/ADL, OT Cognitive Skill Dev, OT Community Reintegration, OT Manual Ther Technique, OT Neuro Re-Ed/Balance, OT Sensory Int Techniques, OT Therapeutic Activity, OT Evaluation, and OT Therapeutic Exercise    Note:     Goal Note filed on 04/29/21 1200 by Claire Faith OT    Due to eval yesterday                         Problem: Functional Transfers     Dates: Start: 04/28/21       Goal: STG-Within one week, patient will transfer to step in shower     Dates: Start: 04/28/21       Description: 1) Individualized Goal:  w/ supervision using DME and AE as needed  2) Interventions:  OT Orthotics Training, OT E Stim Attended, OT Group Therapy, OT Self Care/ADL, OT Cognitive Skill Dev, OT Community Reintegration, OT Manual Ther Technique, OT Neuro Re-Ed/Balance, OT Sensory Int Techniques, OT Therapeutic Activity, OT Evaluation, and OT Therapeutic Exercise    Note:     Goal Note filed on 04/29/21 1200 by Claire Faith OT    Due to eval yesterday                         Problem: IADL's     Dates: Start: 04/28/21       Goal: STG-Within one week, patient will access  kitchen area     Dates: Start: 04/28/21       Description: 1) Individualized Goal:  w/ CGA using DME and AE as needed  2) Interventions:  OT Orthotics Training, OT E Stim Attended, OT Group Therapy, OT Self Care/ADL, OT Cognitive Skill Dev, OT Community Reintegration, OT Manual Ther Technique, OT Neuro Re-Ed/Balance, OT Sensory Int Techniques, OT Therapeutic Activity, OT Evaluation, and OT Therapeutic Exercise    Note:     Goal Note filed on 04/29/21 1200 by Claire Faith OT    Due to eval yesterday                         Problem: OT Long Term Goals     Dates: Start: 04/28/21       Goal: LTG-By discharge, patient will complete basic self care tasks     Dates: Start: 04/28/21       Description: 1) Individualized Goal:  w/ mod I using DME and AE as needed  2) Interventions:  OT Orthotics Training, OT E Stim Attended, OT Group Therapy, OT Self Care/ADL, OT Cognitive Skill Dev, OT Community Reintegration, OT Manual Ther Technique, OT Neuro Re-Ed/Balance, OT Sensory Int Techniques, OT Therapeutic Activity, OT Evaluation, and OT Therapeutic Exercise          Goal: LTG-By discharge, patient will perform bathroom transfers     Dates: Start: 04/28/21       Description: 1) Individualized Goal:  w/ mod I using DME and AE as needed  2) Interventions:  OT Orthotics Training, OT E Stim Attended, OT Group Therapy, OT Self Care/ADL, OT Cognitive Skill Dev, OT Community Reintegration, OT Manual Ther Technique, OT Neuro Re-Ed/Balance, OT Sensory Int Techniques, OT Therapeutic Activity, OT Evaluation, and OT Therapeutic Exercise

## 2021-05-04 NOTE — THERAPY
"Occupational Therapy  Daily Treatment     Patient Name: Nathalie Aguilar  Age:  60 y.o., Sex:  female  Medical Record #: [id number]  Today's Date: 5/4/2021     Precautions  Precautions: (P) Fall Risk         Subjective    \"I just have to tell you about how mean my CNA was last night. When I asked her to put some cream on the rash on my butt she told me to go up to the nurses station, get a mirror and do it myself. I was so upset. I do not understand why she would choose this job if she does not like to help people.\" -     Objective       05/04/21 1101   Precautions   Precautions Fall Risk   Functional Level of Assist   Lower Body Dressing Modified Independent  (shoes)   Lower Body Dressing Description Increased time   Sitting Upper Body Exercises   Upper Extremity Bike Level 4 Resistance  (kika med, 10 minutes, 1.19 miles)   Interdisciplinary Plan of Care Collaboration   Patient Position at End of Therapy Seated;In Bed;Call Light within Reach;Tray Table within Reach;Phone within Reach   OT Total Time Spent   OT Individual Total Time Spent (Mins) 30   OT Charge Group   OT Self Care / ADL 1   OT Therapeutic Exercise  1   Pt participated in d/c planning. Pt states the only thing she needs to purchase is a shower chair and she has already talk to case management about it. Pt feels she will be ready to go home in 3 days.    Pt reports her tremors are causing her to spill food when eating. Pt participated in activity to try weighted utensils. Pt cut and stabbed thera putty w/ weighted knife and fork to practice feeding skills. Pt reports that she will use the weighted utensils next meal to see how she likes them.    Assessment    Pt tolerated session well focused on feeding adaptation and UE strength/endurance. Pt cont to increase independence in dressing and transfers. Pt reported her CNA last night was rude and made her feel like a burden.  Strengths: Able to follow instructions, Alert and oriented, Independent prior " level of function, Motivated for self care and independence, Supportive family, Willingly participates in therapeutic activities  Barriers: Decreased endurance, Generalized weakness, Impaired activity tolerance, Impaired balance, Agitation    Plan     ADL IADL , related mobility at FWW level . Related cognition   Continued strength/endurance building    Balance activity        Occupational Therapy Goals     Problem: Bathing     Dates: Start: 04/28/21       Goal: STG-Within one week, patient will bathe     Dates: Start: 04/28/21       Description: 1) Individualized Goal:  w/ supervision using DME and AE as needed  2) Interventions:  OT Orthotics Training, OT E Stim Attended, OT Group Therapy, OT Self Care/ADL, OT Cognitive Skill Dev, OT Community Reintegration, OT Manual Ther Technique, OT Neuro Re-Ed/Balance, OT Sensory Int Techniques, OT Therapeutic Activity, OT Evaluation, and OT Therapeutic Exercise      Note:     Goal Note filed on 04/29/21 1200 by Claire Faith OT    Due to eval yesterday                         Problem: Dressing     Dates: Start: 04/28/21       Goal: STG-Within one week, patient will retrieve clothing     Dates: Start: 04/28/21       Description: 1) Individualized Goal:  and dress w/ supervision   2) Interventions:  OT Orthotics Training, OT E Stim Attended, OT Group Therapy, OT Self Care/ADL, OT Cognitive Skill Dev, OT Community Reintegration, OT Manual Ther Technique, OT Neuro Re-Ed/Balance, OT Sensory Int Techniques, OT Therapeutic Activity, OT Evaluation, and OT Therapeutic Exercise    Note:     Goal Note filed on 04/29/21 1200 by Claire Faith OT    Due to eval yesterday                         Problem: Functional Transfers     Dates: Start: 04/28/21       Goal: STG-Within one week, patient will transfer to step in shower     Dates: Start: 04/28/21       Description: 1) Individualized Goal:  w/ supervision using DME and AE as needed  2) Interventions:  OT Orthotics Training, OT E  Stim Attended, OT Group Therapy, OT Self Care/ADL, OT Cognitive Skill Dev, OT Community Reintegration, OT Manual Ther Technique, OT Neuro Re-Ed/Balance, OT Sensory Int Techniques, OT Therapeutic Activity, OT Evaluation, and OT Therapeutic Exercise    Note:     Goal Note filed on 04/29/21 1200 by Claire Faith OT    Due to eval yesterday                         Problem: IADL's     Dates: Start: 04/28/21       Goal: STG-Within one week, patient will access kitchen area     Dates: Start: 04/28/21       Description: 1) Individualized Goal:  w/ CGA using DME and AE as needed  2) Interventions:  OT Orthotics Training, OT E Stim Attended, OT Group Therapy, OT Self Care/ADL, OT Cognitive Skill Dev, OT Community Reintegration, OT Manual Ther Technique, OT Neuro Re-Ed/Balance, OT Sensory Int Techniques, OT Therapeutic Activity, OT Evaluation, and OT Therapeutic Exercise    Note:     Goal Note filed on 04/29/21 1200 by Claire Faith OT    Due to eval yesterday                         Problem: OT Long Term Goals     Dates: Start: 04/28/21       Goal: LTG-By discharge, patient will complete basic self care tasks     Dates: Start: 04/28/21       Description: 1) Individualized Goal:  w/ mod I using DME and AE as needed  2) Interventions:  OT Orthotics Training, OT E Stim Attended, OT Group Therapy, OT Self Care/ADL, OT Cognitive Skill Dev, OT Community Reintegration, OT Manual Ther Technique, OT Neuro Re-Ed/Balance, OT Sensory Int Techniques, OT Therapeutic Activity, OT Evaluation, and OT Therapeutic Exercise          Goal: LTG-By discharge, patient will perform bathroom transfers     Dates: Start: 04/28/21       Description: 1) Individualized Goal:  w/ mod I using DME and AE as needed  2) Interventions:  OT Orthotics Training, OT E Stim Attended, OT Group Therapy, OT Self Care/ADL, OT Cognitive Skill Dev, OT Community Reintegration, OT Manual Ther Technique, OT Neuro Re-Ed/Balance, OT Sensory Int Techniques, OT  Therapeutic Activity, OT Evaluation, and OT Therapeutic Exercise

## 2021-05-04 NOTE — THERAPY
"Recreational Therapy  Daily Treatment     Patient Name: Nathalie Aguilar  AGE:  60 y.o., SEX:  female  Medical Record #: [id number]  Today's Date: 5/4/2021       Subjective    Pt in WC and agreeable to Tx. \"I want to work on yesterday's activity because that was a little challenging for me.\"      Objective       05/04/21 0931   Functional Ability Status - Cognitive   Attention Span Remains on Task   Comprehension Follows Two Step Commands   Judgment Able to Make Independent Decisions   Functional Ability Status - Emotional    Affect Appropriate;Bright   Mood Appropriate   Behavior Appropriate   Skilled Intervention    Skilled Intervention Cognitive Leisure   Skilled Intervention Comments \"Ducks in a Row\" & \"Speedy Words\"   Interdisciplinary Plan of Care Collaboration   Patient Position at End of Therapy Call Light within Reach;Seated;Tray Table within Reach   Strengths & Barriers   Strengths Able to follow instructions;Alert and oriented;Effective communication skills;Making steady progress towards goals;Motivated for self care and independence;Pleasant and cooperative;Willingly participates in therapeutic activities;Independent prior level of function   Barriers Impaired activity tolerance;Impaired balance;Impaired functional cognition   Treatment Time   Total Time Spent (mins) 30   Procedural Tracking   Procedural Tracking Community Re-Integration;Community Skills Development;Leisure Skills Awareness;Leisure Skills Development       Assessment    Pt able to complete cognitive leisure activity \"Ducks in a Row\" with no need for cueing following being taught. Pt requested to do yesterday's activity \"Speedy Words\". Pt was able to recall rules of activity and completed activity with no need for cueing.     Strengths: (P) Able to follow instructions, Alert and oriented, Effective communication skills, Making steady progress towards goals, Motivated for self care and independence, Pleasant and cooperative, Willingly " participates in therapeutic activities, Independent prior level of function  Barriers: (P) Impaired activity tolerance, Impaired balance, Impaired functional cognition    Plan    Pt has met all Rec Tx goals and is to be d/c from Rec Tx.    Passport items to be completed:  Verbalize two positive leisure activities, discuss returning to work, hobbies, community groups or volunteer activities, explore community resources

## 2021-05-04 NOTE — PROGRESS NOTES
"Rehab Progress Note     Encounter Date: 5/4/2021    CC: TBI, poor sleep    Interval Events (Subjective)  Patient sitting up in room. She reports therapy is going well except she has a new rash. She reports the rash was bad enough it started bleeding. Discussed will start hydrocortisone cream.  No change in BP on lower dose of midodrine.  She is having slightly worsened tremor since her injury. Discussed may improve with time, will check medications for side effects.     IDT Team Meeting 4/29/2021  DC/Disposition:  5/11/21    Objective:  VITAL SIGNS: /63   Pulse 98   Temp 36.6 °C (97.9 °F) (Oral)   Resp 18   Ht 1.702 m (5' 7\")   Wt 58 kg (127 lb 13.9 oz)   SpO2 96%   BMI 20.03 kg/m²   Gen: NAD  Psych: Mood and affect appropriate  CV: RRR, no edema  Resp: CTAB, no upper airway sounds  Abd: NTND  Neuro: AOx4, following commands  Unchanged from 5/3/21     Recent Results (from the past 72 hour(s))   FOLATE    Collection Time: 05/03/21  6:54 AM   Result Value Ref Range    Folate -Folic Acid 6.1 >4.0 ng/mL   CBC WITHOUT DIFFERENTIAL    Collection Time: 05/03/21  6:54 AM   Result Value Ref Range    WBC 3.2 (L) 4.8 - 10.8 K/uL    RBC 2.96 (L) 4.20 - 5.40 M/uL    Hemoglobin 9.8 (L) 12.0 - 16.0 g/dL    Hematocrit 32.1 (L) 37.0 - 47.0 %    .4 (H) 81.4 - 97.8 fL    MCH 33.1 (H) 27.0 - 33.0 pg    MCHC 30.5 (L) 33.6 - 35.0 g/dL    RDW 77.6 (H) 35.9 - 50.0 fL    Platelet Count 408 164 - 446 K/uL    MPV 10.3 9.0 - 12.9 fL   RETICULOCYTES COUNT    Collection Time: 05/03/21  6:54 AM   Result Value Ref Range    Reticulocyte Count 6.5 (H) 0.8 - 2.1 %    Retic, Absolute 0.19 (H) 0.04 - 0.06 M/uL    Imm. Reticulocyte Fraction 36.7 (H) 9.3 - 17.4 %    Retic Hgb Equivalent 30.7 29.0 - 35.0 pg/cell   IRON/TOTAL IRON BIND    Collection Time: 05/03/21  6:54 AM   Result Value Ref Range    Iron 42 40 - 170 ug/dL    Total Iron Binding 152 (L) 250 - 450 ug/dL    Unsat Iron Binding 110 110 - 370 ug/dL    % Saturation 28 15 - 55 " %   Comp Metabolic Panel    Collection Time: 05/03/21  6:54 AM   Result Value Ref Range    Sodium 136 135 - 145 mmol/L    Potassium 3.6 3.6 - 5.5 mmol/L    Chloride 105 96 - 112 mmol/L    Co2 27 20 - 33 mmol/L    Anion Gap 4.0 (L) 7.0 - 16.0    Glucose 88 65 - 99 mg/dL    Bun 12 8 - 22 mg/dL    Creatinine 0.23 (L) 0.50 - 1.40 mg/dL    Calcium 8.4 (L) 8.5 - 10.5 mg/dL    AST(SGOT) 91 (H) 12 - 45 U/L    ALT(SGPT) 61 (H) 2 - 50 U/L    Alkaline Phosphatase 159 (H) 30 - 99 U/L    Total Bilirubin 1.4 0.1 - 1.5 mg/dL    Albumin 2.5 (L) 3.2 - 4.9 g/dL    Total Protein 5.4 (L) 6.0 - 8.2 g/dL    Globulin 2.9 1.9 - 3.5 g/dL    A-G Ratio 0.9 g/dL   ESTIMATED GFR    Collection Time: 05/03/21  6:54 AM   Result Value Ref Range    GFR If African American >60 >60 mL/min/1.73 m 2    GFR If Non African American >60 >60 mL/min/1.73 m 2       Current Facility-Administered Medications   Medication Frequency   • hydrocortisone 1 % cream BID PRN   • midodrine (PROAMATINE) tablet 5 mg TID   • vitamin D (cholecalciferol) tablet 2,000 Units DAILY   • gabapentin (NEURONTIN) capsule 300 mg TID   • Respiratory Therapy Consult Continuous RT   • hydrALAZINE (APRESOLINE) tablet 25 mg Q8HRS PRN   • acetaminophen (Tylenol) tablet 650 mg Q4HRS PRN   • senna-docusate (PERICOLACE or SENOKOT S) 8.6-50 MG per tablet 2 tablet BID    And   • polyethylene glycol/lytes (MIRALAX) PACKET 1 Packet QDAY PRN    And   • magnesium hydroxide (MILK OF MAGNESIA) suspension 30 mL QDAY PRN    And   • bisacodyl (DULCOLAX) suppository 10 mg QDAY PRN   • artificial tears ophthalmic solution 1 Drop PRN   • benzocaine-menthol (CEPACOL) lozenge 1 Lozenge Q2HRS PRN   • mag hydrox-al hydrox-simeth (MAALOX PLUS ES or MYLANTA DS) suspension 20 mL Q2HRS PRN   • ondansetron (ZOFRAN ODT) dispertab 4 mg 4X/DAY PRN    Or   • ondansetron (ZOFRAN) syringe/vial injection 4 mg 4X/DAY PRN   • traZODone (DESYREL) tablet 50 mg QHS PRN   • sodium chloride (OCEAN) 0.65 % nasal spray 2 Spray PRN    • nicotine (NICODERM) 14 MG/24HR 14 mg Daily-0600   • magnesium oxide (MAG-OX) tablet 400 mg BID   • levETIRAcetam (KEPPRA) tablet 1,000 mg Q12HRS   • traZODone (DESYREL) tablet 50 mg QHS   • mirtazapine (Remeron) orally disintegrating tab 15 mg QHS   • omeprazole (PRILOSEC) capsule 20 mg BID       Orders Placed This Encounter   Procedures   • Diet Order Diet: Level 7 - Easy to Chew (float pills in puree); Liquid level: Level 0 - Thin     Standing Status:   Standing     Number of Occurrences:   1     Order Specific Question:   Diet:     Answer:   Level 7 - Easy to Chew [22]     Comments:   float pills in puree     Order Specific Question:   Liquid level     Answer:   Level 0 - Thin       Assessment:  Active Hospital Problems    Diagnosis    • *SDH (subdural hematoma) (HCC)    • Transaminitis    • Anxiety    • Chest wall discomfort    • Thrombocytopenia (HCC)    • GI bleed    • Acute respiratory failure (HCC)    • Cardiac arrest (HCC)    • Right atrial mass        Medical Decision Making and Plan:  TBI (Traumatic Brain Injury):  Fall with bilateral SDHs managed conservatively. Hospital course complicated by seizure, PEA arrest requiring intubation, and GI bleed.  -PT and OT for mobility and ADLs  -SLP for cognition     Dysphagia - Patient on dysphagia diet. SLP for swallow evaluation. Limited by lack of dentures     Seizures - New witnessed seizure after SDHs. On Keppra 1000 mg BID. Monitor   -Follow-up Neurology      Hypotension - Patient on Midodrine 5 mg on transfer. Will monitor   -SBP into low 90s. Increase midodrine to 7.5, HR into 100s.  Still in low 90s, no change on midodrine. No longer symptomatic. No change on midodrine at 5 mg, will discontinue and monitor     GI Bleed - Patient on omeprazole 20 mg BID     Anemia - S/p GI bleed managed conservatively.  Check AM CBC. Did require transfusion   -9.6 improved from 8.1. Fe panel normal, Folate normal.     Hypomagnesemia - Patient on Magnesium supplement on  transfer.      Substance Abuse - Patient on Nicotine patch. History of heroin abuse.      Atelectasis/Fluid overload - Repeat CXR improved. Discontinue Lasix.     Cirrhosis - LFTs elevated on admission. Continue to monitor    Radiculopathy - Patient with new left sciatica. Start Gabapentin 300 mg TID    Depression/Anxiety - Patient on Remeron 15 mg QHS and Trazodone 50 mg. 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 Cardiology evaluated and felt enlarge valve but needs outpatient cardiac MRI     Tobacco Abuse - Patient with history of smoking on Nicotine patch on transfer. Counseled     Vitamin D Deficiency - 7 on admission. Start 2000 U     DVT Ppx - Patient high risk for bleed and ambulating.     Dispo - Patient would like to discharge to her daughter's home.     Total time:  26 minutes.  I spent greater than 50% of the time for patient care, counseling, and coordination on this date, including unit/floor time, and face-to-face time with the patient as per interval events and assessment and plan above. Topics discussed included new rash, start steroid cream, no change in BP on lower dose of midodrine, and discontinue midodrine.     Hortencia Dudley M.D.    I had a 4 minute discussion with patient on 5/4/2021 about smoking cessation.  Discussed that smoking is associated with respiratory, cardiovascular, oncologic, and neurologic illnesses.  Patient was provided advice and counseling on different methods of smoking cessation as well as provided supportive listening for psychologic aspect of addiction.        DISPLAY PLAN FREE TEXT

## 2021-05-04 NOTE — THERAPY
Speech Language Pathology  Daily Treatment     Patient Name: Nathalie Aguilar  Age:  60 y.o., Sex:  female  Medical Record #: [id number]  Today's Date: 5/4/2021     Precautions  Precautions: Fall Risk    Subjective    Pt pleasant and cooperative during ST      Objective       05/04/21 1031   Outcome Measures   [facility]   SCCAN (Scales of Cognitive and Communicative Ability for Neurorehabilitation)   Oral Expression - Raw Score 19   Oral Expression - Scale Performance Score 100   Orientation - Raw Score 12   Orientation - Scale Performance Score 100   Speech Comprehension - Raw Score 12   Speech Comprehension - Scale Performance Score 92   SLP Total Time Spent   SLP Individual Total Time Spent (Mins) 30   Treatment Charges   SLP Cognitive Skill Development First 15 Minutes 1   SLP Cognitive Skill Development Additional 15 Minutes 1       Assessment    Initiated SCCAN outcome assessment. Pt completed 3/8 subtests, all scores above 92%. Full scores will be available upon completion. Pt indep completed memory book prior to ST and recalled memory strategies discussed in previous ST session.     Strengths: Motivated for self care and independence, Pleasant and cooperative, Willingly participates in therapeutic activities  Barriers: Impaired functional cognition    Plan    Continue SCCAN outcome assessment     Passport items to be completed:  complete caregiver training     Speech Therapy Problems     Problem: Memory STGs     Dates: Start: 04/28/21       Goal: STG-Within one week, patient will     Dates: Start: 04/28/21       Description: 1) Individualized goal:  use external memory aids and internal memory strategies to recall details for POC with 90% accuracy and minimal verbal cues  2) Interventions:  SLP Self Care / ADL Training , SLP Cognitive Skill Development, and SLP Group Treatment        Note:     Goal Note filed on 04/29/21 1220 by Angie Cardona, Student    Introduced 4/29                         Problem: Problem Solving STGs     Dates: Start: 04/28/21       Goal: STG-Within one week, patient will     Dates: Start: 04/28/21       Description: 1) Individualized goal: complete IADL tasks (medication management, finances, etc.) with 90% accuracy and minimal verbal cues to return to independent PLOF.   2) Interventions:  SLP Self Care / ADL Training , SLP Cognitive Skill Development, and SLP Group Treatment        Note:     Goal Note filed on 04/29/21 1220 by Angie Cardona, Student    Initiated 4/29                        Problem: Speech/Swallowing LTGs     Dates: Start: 04/28/21       Goal: LTG-By discharge, patient will     Dates: Start: 04/28/21       Description: 1) Individualized goal:  improve cognitive skills from the mild range to the typical functioning range, as indicated by standardized assessment and functional skills  2) Interventions:  SLP Self Care / ADL Training , SLP Cognitive Skill Development, and SLP Group Treatment

## 2021-05-05 PROCEDURE — 700101 HCHG RX REV CODE 250: Performed by: PHYSICAL MEDICINE & REHABILITATION

## 2021-05-05 PROCEDURE — 99232 SBSQ HOSP IP/OBS MODERATE 35: CPT | Performed by: PHYSICAL MEDICINE & REHABILITATION

## 2021-05-05 PROCEDURE — 97130 THER IVNTJ EA ADDL 15 MIN: CPT

## 2021-05-05 PROCEDURE — 700102 HCHG RX REV CODE 250 W/ 637 OVERRIDE(OP): Performed by: PHYSICAL MEDICINE & REHABILITATION

## 2021-05-05 PROCEDURE — 97530 THERAPEUTIC ACTIVITIES: CPT

## 2021-05-05 PROCEDURE — 97112 NEUROMUSCULAR REEDUCATION: CPT

## 2021-05-05 PROCEDURE — 97129 THER IVNTJ 1ST 15 MIN: CPT

## 2021-05-05 PROCEDURE — 770010 HCHG ROOM/CARE - REHAB SEMI PRIVAT*

## 2021-05-05 PROCEDURE — 97116 GAIT TRAINING THERAPY: CPT

## 2021-05-05 PROCEDURE — A9270 NON-COVERED ITEM OR SERVICE: HCPCS | Performed by: PHYSICAL MEDICINE & REHABILITATION

## 2021-05-05 PROCEDURE — 97110 THERAPEUTIC EXERCISES: CPT

## 2021-05-05 RX ORDER — METHOCARBAMOL 500 MG/1
500 TABLET, FILM COATED ORAL 3 TIMES DAILY PRN
Status: DISCONTINUED | OUTPATIENT
Start: 2021-05-05 | End: 2021-05-07 | Stop reason: HOSPADM

## 2021-05-05 RX ORDER — LIDOCAINE 50 MG/G
1 PATCH TOPICAL DAILY
Status: DISCONTINUED | OUTPATIENT
Start: 2021-05-05 | End: 2021-05-07 | Stop reason: HOSPADM

## 2021-05-05 RX ADMIN — MIRTAZAPINE 15 MG: 15 TABLET, ORALLY DISINTEGRATING ORAL at 21:10

## 2021-05-05 RX ADMIN — OMEPRAZOLE 20 MG: 20 CAPSULE, DELAYED RELEASE ORAL at 21:09

## 2021-05-05 RX ADMIN — OMEPRAZOLE 20 MG: 20 CAPSULE, DELAYED RELEASE ORAL at 07:45

## 2021-05-05 RX ADMIN — GABAPENTIN 300 MG: 300 CAPSULE ORAL at 15:40

## 2021-05-05 RX ADMIN — GABAPENTIN 300 MG: 300 CAPSULE ORAL at 21:09

## 2021-05-05 RX ADMIN — TRAZODONE HYDROCHLORIDE 50 MG: 50 TABLET ORAL at 21:09

## 2021-05-05 RX ADMIN — CHOLECALCIFEROL TAB 25 MCG (1000 UNIT) 2000 UNITS: 25 TAB at 07:45

## 2021-05-05 RX ADMIN — MAGNESIUM OXIDE TAB 400 MG (241.3 MG ELEMENTAL MG) 400 MG: 400 (241.3 MG) TAB at 07:45

## 2021-05-05 RX ADMIN — GABAPENTIN 300 MG: 300 CAPSULE ORAL at 07:45

## 2021-05-05 RX ADMIN — LIDOCAINE 1 PATCH: 50 PATCH TOPICAL at 17:02

## 2021-05-05 RX ADMIN — ACETAMINOPHEN 650 MG: 325 TABLET, FILM COATED ORAL at 10:19

## 2021-05-05 RX ADMIN — METHOCARBAMOL TABLETS 500 MG: 500 TABLET, COATED ORAL at 23:56

## 2021-05-05 RX ADMIN — HYDROCORTISONE: 0.01 CREAM TOPICAL at 15:41

## 2021-05-05 RX ADMIN — MAGNESIUM OXIDE TAB 400 MG (241.3 MG ELEMENTAL MG) 400 MG: 400 (241.3 MG) TAB at 21:09

## 2021-05-05 RX ADMIN — LEVETIRACETAM 1000 MG: 500 TABLET ORAL at 07:45

## 2021-05-05 RX ADMIN — METHOCARBAMOL TABLETS 500 MG: 500 TABLET, COATED ORAL at 17:02

## 2021-05-05 RX ADMIN — LEVETIRACETAM 1000 MG: 500 TABLET ORAL at 21:09

## 2021-05-05 ASSESSMENT — GAIT ASSESSMENTS
DISTANCE (FEET): 300
ASSISTIVE DEVICE: FRONT WHEEL WALKER
GAIT LEVEL OF ASSIST: STAND BY ASSIST
DEVIATION: BRADYKINETIC

## 2021-05-05 NOTE — CARE PLAN
Problem: Problem Solving STGs  Goal: STG-Within one week, patient will  Description: 1) Individualized goal: complete IADL tasks (medication management, finances, etc.) with 90% accuracy and minimal verbal cues to return to independent PLOF.   2) Interventions:  SLP Self Care / ADL Training , SLP Cognitive Skill Development, and SLP Group Treatment      Outcome: MET     Problem: Memory STGs  Goal: STG-Within one week, patient will  Description: 1) Individualized goal:  use external memory aids and internal memory strategies to recall details for POC with 90% accuracy and minimal verbal cues  2) Interventions:  SLP Self Care / ADL Training , SLP Cognitive Skill Development, and SLP Group Treatment      Outcome: MET     Problem: Speech/Swallowing LTGs  Goal: LTG-By discharge, patient will  Description: 1) Individualized goal:  improve cognitive skills from the mild range to the typical functioning range, as indicated by standardized assessment and functional skills  2) Interventions:  SLP Self Care / ADL Training , SLP Cognitive Skill Development, and SLP Group Treatment      Outcome: DISCHARGED-GOAL NOT MET

## 2021-05-05 NOTE — THERAPY
Occupational Therapy  Daily Treatment     Patient Name: Nathalie Aguilar  Age:  60 y.o., Sex:  female  Medical Record #: [id number]  Today's Date: 5/5/2021     Precautions  Precautions: (P) Fall Risk         Subjective    Pt agreeable to meal prep test     Objective       05/05/21 0901   Precautions   Precautions Fall Risk   IADL Treatments   Meal Preparation Pt completed baking task to assess meal prep, functional cognition, kitchen safety, balance, and endurance. Pt demonstrated good safety awareness when working with hot oven. Pt required seated breaks throughout task due to low back pain. Pt ambulated around kitchen w/out device and supervision. Pt reports that she will be making cookies for her grand kids upon d/c.    Interdisciplinary Plan of Care Collaboration   Patient Position at End of Therapy In Bed;Tray Table within Reach;Phone within Reach;Call Light within Reach   OT Total Time Spent   OT Individual Total Time Spent (Mins) 60   OT Charge Group   OT Therapy Activity 4     Pt ambulated from room to kitchen and back w/ FWW and supervision. Pt reports her back does not hurt when she is walking or sitting.   Pt educated on kitchen safety and mobility.   Assessment    Pt tolerated session well focused on kitchen mobility, safety, and meal prep. Pt reports that she does not cook much, but likes to bake with her grandchildren. Pt demonstrated good safety awareness throughout session.   Strengths: Able to follow instructions, Alert and oriented, Independent prior level of function, Motivated for self care and independence, Supportive family, Willingly participates in therapeutic activities  Barriers: Decreased endurance, Generalized weakness, Impaired activity tolerance, Impaired balance, Agitation    Plan    ADL IADL , related mobility at FWW level . Related cognition   Continued strength/endurance building    Balance activity     Occupational Therapy Goals     Problem: Bathing     Dates: Start: 04/28/21        Goal: STG-Within one week, patient will bathe     Dates: Start: 04/28/21       Description: 1) Individualized Goal:  w/ supervision using DME and AE as needed  2) Interventions:  OT Orthotics Training, OT E Stim Attended, OT Group Therapy, OT Self Care/ADL, OT Cognitive Skill Dev, OT Community Reintegration, OT Manual Ther Technique, OT Neuro Re-Ed/Balance, OT Sensory Int Techniques, OT Therapeutic Activity, OT Evaluation, and OT Therapeutic Exercise      Note:     Goal Note filed on 04/29/21 1200 by Claire Faith OT    Due to eval yesterday                         Problem: Dressing     Dates: Start: 04/28/21       Goal: STG-Within one week, patient will retrieve clothing     Dates: Start: 04/28/21       Description: 1) Individualized Goal:  and dress w/ supervision   2) Interventions:  OT Orthotics Training, OT E Stim Attended, OT Group Therapy, OT Self Care/ADL, OT Cognitive Skill Dev, OT Community Reintegration, OT Manual Ther Technique, OT Neuro Re-Ed/Balance, OT Sensory Int Techniques, OT Therapeutic Activity, OT Evaluation, and OT Therapeutic Exercise    Note:     Goal Note filed on 04/29/21 1200 by Claire Faith OT    Due to eval yesterday                         Problem: Functional Transfers     Dates: Start: 04/28/21       Goal: STG-Within one week, patient will transfer to step in shower     Dates: Start: 04/28/21       Description: 1) Individualized Goal:  w/ supervision using DME and AE as needed  2) Interventions:  OT Orthotics Training, OT E Stim Attended, OT Group Therapy, OT Self Care/ADL, OT Cognitive Skill Dev, OT Community Reintegration, OT Manual Ther Technique, OT Neuro Re-Ed/Balance, OT Sensory Int Techniques, OT Therapeutic Activity, OT Evaluation, and OT Therapeutic Exercise    Note:     Goal Note filed on 04/29/21 1200 by Claire Faith OT    Due to eval yesterday                         Problem: IADL's     Dates: Start: 04/28/21       Goal: STG-Within one week, patient will  access kitchen area     Dates: Start: 04/28/21       Description: 1) Individualized Goal:  w/ CGA using DME and AE as needed  2) Interventions:  OT Orthotics Training, OT E Stim Attended, OT Group Therapy, OT Self Care/ADL, OT Cognitive Skill Dev, OT Community Reintegration, OT Manual Ther Technique, OT Neuro Re-Ed/Balance, OT Sensory Int Techniques, OT Therapeutic Activity, OT Evaluation, and OT Therapeutic Exercise    Note:     Goal Note filed on 04/29/21 1200 by Claire Faith OT    Due to eval yesterday                         Problem: OT Long Term Goals     Dates: Start: 04/28/21       Goal: LTG-By discharge, patient will complete basic self care tasks     Dates: Start: 04/28/21       Description: 1) Individualized Goal:  w/ mod I using DME and AE as needed  2) Interventions:  OT Orthotics Training, OT E Stim Attended, OT Group Therapy, OT Self Care/ADL, OT Cognitive Skill Dev, OT Community Reintegration, OT Manual Ther Technique, OT Neuro Re-Ed/Balance, OT Sensory Int Techniques, OT Therapeutic Activity, OT Evaluation, and OT Therapeutic Exercise          Goal: LTG-By discharge, patient will perform bathroom transfers     Dates: Start: 04/28/21       Description: 1) Individualized Goal:  w/ mod I using DME and AE as needed  2) Interventions:  OT Orthotics Training, OT E Stim Attended, OT Group Therapy, OT Self Care/ADL, OT Cognitive Skill Dev, OT Community Reintegration, OT Manual Ther Technique, OT Neuro Re-Ed/Balance, OT Sensory Int Techniques, OT Therapeutic Activity, OT Evaluation, and OT Therapeutic Exercise

## 2021-05-05 NOTE — CARE PLAN
Problem: Safety  Goal: Will remain free from falls  Outcome: PROGRESSING AS EXPECTED  Intervention: Implement fall precautions  Note: Pt independent in going to the bathroom using FWW, voiding freely, denies frequency in urination. Compliant to safety measures, pt instructed to avoid sudden positional change , encouraged to call for assistance as needed. Pt free from fall and injury.     Problem: Venous Thromboembolism (VTW)/Deep Vein Thrombosis (DVT) Prevention:  Goal: Patient will participate in Venous Thrombosis (VTE)/Deep Vein Thrombosis (DVT)Prevention Measures  Outcome: PROGRESSING AS EXPECTED     Problem: Bowel/Gastric:  Goal: Will not experience complications related to bowel motility  Outcome: PROGRESSING AS EXPECTED  Intervention: Assess baseline bowel pattern  Note: Pt refused senna, pt stated does not have any problem in bm.     Problem: Pain Management  Goal: Pain level will decrease to patient's comfort goal  Outcome: PROGRESSING AS EXPECTED  Intervention: Educate and implement non-pharmacologic comfort measures. Examples: relaxation, distration, play therapy, activity therapy, massage, etc.  Note: Assessed for pain and discomfort. Denies pain., pt  anxious, worried about her condition , her family, encouraged to verbalize concerns and fears., reassurance provided.   Pt  requested to see a psychiatrist , made aware that Dr. Dudley will be notified in am.

## 2021-05-05 NOTE — CARE PLAN
Problem: Bowel/Gastric:  Goal: Normal bowel function is maintained or improved  Flowsheets (Taken 5/5/2021 0800)  Last BM: 05/04/21     Problem: Pain Management  Goal: Pain level will decrease to patient's comfort goal  Flowsheets (Taken 5/5/2021 1444)  Comfort Goal:   Comfort with Movement   Perform Activity   Stay Alert  Note: PRN Tylenol given for complaints of headache.

## 2021-05-05 NOTE — THERAPY
Physical Therapy   Daily Treatment     Patient Name: Nathalie Aguilar  Age:  60 y.o., Sex:  female  Medical Record #: [id number]  Today's Date: 5/5/2021     Precautions  Precautions: (P) Fall Risk    Subjective    Pt reported that family training will be tomorrow morning. Pt feels prepared for D/C Friday.      Objective       05/05/21 1431   Precautions   Precautions Fall Risk   Gait Functional Level of Assist    Gait Level Of Assist Stand by Assist   Assistive Device Front Wheel Walker   Distance (Feet) 300   # of Times Distance was Traveled 2   Deviation Bradykinetic   Transfer Functional Level of Assist   Bed, Chair, Wheelchair Transfer Independent   Supine Lower Body Exercise   Supine Lower Body Exercises Yes   Bridges Two Legged;2 sets of 10   Sitting Lower Body Exercises   Bilateral Row 2 sets of 10;Bilateral;Medium Resistance Theraband   Sit to Stand 2 sets of 10  (Emphasis on glute activation)   Comments Scap squeezes 10x 2 sets   Standing Lower Body Exercises   Mini Squat 2 sets of 10  (Emphasis on obtaining full upright posture each rep)   Bed Mobility    Sit to Supine Independent   Sit to Stand Independent   Scooting Independent   Rolling Independent   Neuro-Muscular Treatments   Neuro-Muscular Treatments Weight Shift Right;Weight Shift Left;Sequencing;Verbal Cuing   Comments Education on postural retraining and rationale. Car transfer SPV.    Interdisciplinary Plan of Care Collaboration   Patient Position at End of Therapy In Bed;Tray Table within Reach;Call Light within Reach   Strengths & Barriers   Strengths Able to follow instructions;Effective communication skills;Independent prior level of function;Making steady progress towards goals;Pleasant and cooperative;Willingly participates in therapeutic activities   Barriers Emotional lability;Fatigue;Generalized weakness;Impaired activity tolerance;Impaired balance   PT Total Time Spent   PT Individual Total Time Spent (Mins) 60   PT Charge Group   PT  Gait Training 1   PT Therapeutic Exercise 1   PT Neuromuscular Re-Education / Balance 1   PT Therapeutic Activities 1     Postural HEP handout provided.    Assessment    Pt was receptive to postural education and strengthening. Pt was pleasant and participatory throughout session. Pt progressed to car transfer training. Pt remains limited by dec activity tolerance, generalized weakness, and impaired dynamic balance.      Strengths: (P) Able to follow instructions, Effective communication skills, Independent prior level of function, Making steady progress towards goals, Pleasant and cooperative, Willingly participates in therapeutic activities  Barriers: (P) Emotional lability, Fatigue, Generalized weakness, Impaired activity tolerance, Impaired balance    Plan    Family training tomorrow morning. Prepare for D/C Friday. D/C data collection. Complete remaining passport items.     Passport items to be completed:  Get in/out of bed safely, in/out of a vehicle, safely use mobility device, walk or wheel around home/community, navigate up and down stairs, show how to get up/down from the ground, ensure home is accessible, demonstrate HEP, complete caregiver training    Physical Therapy Problems     Problem: Mobility     Dates: Start: 04/27/21       Goal: STG-Within one week, patient will ascend and descend four to six stairs     Dates: Start: 04/27/21       Description: 1) Individualized goal: Up/down 4-6 stairs, handrails, SBA 1 week  2) Interventions:  PT Gait Training, PT Therapeutic Exercises, and PT Therapeutic Activity            Goal: STG-Within one week, patient will     Dates: Start: 04/27/21       Description: 1) Individualized goal: Gait fww/no  FT SBA, 1 week  2) Interventions:  PT Gait Training, PT Therapeutic Exercises, PT Neuro Re-Ed/Balance, and PT Therapeutic Activity      Note:     Goal Note filed on 04/29/21 1225 by OPHELIA Herrera    1) Individualized goal:   Gait  FT SBA 1 week  2)  Interventions:  PT Gait Training, PT Therapeutic Exercises, and PT Therapeutic Activity                        Problem: Mobility Transfers     Dates: Start: 04/27/21       Goal: STG-Within one week, patient will transfer bed to chair     Dates: Start: 04/27/21       Description: 1) Individualized goal: Transfer bed to/from chair fww/no AD supervision, 1 week  2) Interventions:  PT Gait Training, PT Therapeutic Exercises, and PT Therapeutic Activity                  Problem: PT-Long Term Goals     Dates: Start: 04/27/21       Goal: LTG-By discharge, patient will ambulate     Dates: Start: 04/27/21       Description: 1) Individualized goal: Gait no  FT, independent at discharge  2) Interventions:  PT Gait Training, PT Therapeutic Exercises, PT Neuro Re-Ed/Balance, and PT Therapeutic Activity            Goal: LTG-By discharge, patient will transfer one surface to another     Dates: Start: 04/27/21       Description: 1) Individualized goal: Transfer 1 surface to another no AD, independent at discharge  2) Interventions:  PT Gait Training, PT Therapeutic Exercises, and PT Therapeutic Activity            Goal: LTG-By discharge, patient will ambulate up/down 4-6 stairs     Dates: Start: 04/27/21       Description: 1) Individualized goal: Up/down 4-6 stairs handrails, independently at discharge  2) Interventions:  PT Gait Training, PT Therapeutic Exercises, PT Neuro Re-Ed/Balance, and PT Therapeutic Activity            Goal: LTG-By discharge, patient will transfer in/out of a car     Dates: Start: 04/27/21       Description: 1) Individualized goal: Car transfer no device supervision at discharge  2) Interventions:  PT Gait Training, PT Therapeutic Exercises, PT Neuro Re-Ed/Balance, and PT Therapeutic Activity

## 2021-05-05 NOTE — THERAPY
"Speech Language Pathology  Daily Treatment     Patient Name: Nathalie Aguilar  Age:  60 y.o., Sex:  female  Medical Record #: [id number]  Today's Date: 5/5/2021     Precautions  Precautions: Fall Risk    Subjective    Pt pleasant and cooperative during ST     Objective       05/05/21 0831   Outcome Measures   Outcome Measures Utilized SCCAN   SCCAN (Scales of Cognitive and Communicative Ability for Neurorehabilitation)   Oral Expression - Raw Score 19   Oral Expression - Scale Performance Score 100   Orientation - Raw Score 12   Orientation - Scale Performance Score 100   Memory - Raw Score 16   Memory - Scale Performance Score 84   Speech Comprehension - Raw Score 12   Speech Comprehension - Scale Performance Score 92   Reading Comprehension - Raw Score 7   Reading Comprehension - Scale Performance Score 58   Writing - Raw Score 4   Writing - Scale Performance Score 57   Attention - Raw Score 14   Attention - Scale Performance Score 88   Problem Solving - Raw Score 22   Problem Solving - Scale Performance Score 96   SCCAN Total Raw Score 81   SCCAN Degree of Severity Mild Impairment   SLP Total Time Spent   SLP Individual Total Time Spent (Mins) 30   Treatment Charges   SLP Cognitive Skill Development First 15 Minutes 1   SLP Cognitive Skill Development Additional 15 Minutes 1       Assessment    Completed SCCAN outcome assessment. Pt scored 81/94 indicating Mild Impairment (compared to 82/94, Mild Impairment, on initial eval). Pt demonstrated a significant increase in the area of Memory (53% to 84%). Pt demonstrated decreased scores in the areas of Speech Comprehension (100% to 92%), Reading Comprehension (83% to 58%), and Writing (100% to 57%). It is important to note pt's writing score is low due to minor spelling errors and pt's reading comprehension score is low due to poor attention during task (locating details on page). Pt reported the assessment \"was easier for me this time\" and reported use of the memory " "log has been helpful during hospitalization, \"my memory has improved so much since I have been here-- thank you.\" Pt reported she is not interested in reviewing outcome scores and is motivated for d/c scheduled for 5/7.    Strengths: Motivated for self care and independence, Pleasant and cooperative, Willingly participates in therapeutic activities  Barriers: Impaired functional cognition    Plan    D/c from ST due to meeting all ST goals       Speech Therapy Problems     Problem: Memory STGs     Dates: Start: 04/28/21       Goal: STG-Within one week, patient will     Dates: Start: 04/28/21       Description: 1) Individualized goal:  use external memory aids and internal memory strategies to recall details for POC with 90% accuracy and minimal verbal cues  2) Interventions:  SLP Self Care / ADL Training , SLP Cognitive Skill Development, and SLP Group Treatment        Note:     Goal Note filed on 04/29/21 1220 by Angie Cardona, Student    Introduced 4/29                        Problem: Problem Solving STGs     Dates: Start: 04/28/21       Goal: STG-Within one week, patient will     Dates: Start: 04/28/21       Description: 1) Individualized goal: complete IADL tasks (medication management, finances, etc.) with 90% accuracy and minimal verbal cues to return to independent PLOF.   2) Interventions:  SLP Self Care / ADL Training , SLP Cognitive Skill Development, and SLP Group Treatment        Note:     Goal Note filed on 04/29/21 1220 by Angie Cardona, Student    Initiated 4/29                        Problem: Speech/Swallowing LTGs     Dates: Start: 04/28/21       Goal: LTG-By discharge, patient will     Dates: Start: 04/28/21       Description: 1) Individualized goal:  improve cognitive skills from the mild range to the typical functioning range, as indicated by standardized assessment and functional skills  2) Interventions:  SLP Self Care / ADL Training , SLP Cognitive Skill Development, and SLP Group Treatment     "

## 2021-05-05 NOTE — THERAPY
Physical Therapy   Daily Treatment     Patient Name: Nathalie Aguilar  Age:  60 y.o., Sex:  female  Medical Record #: [id number]  Today's Date: 5/5/2021     Precautions  Precautions: (P) Fall Risk    Subjective    Pt anxiety was relieved following practice of floor recovery. Pt admitted that this training was helpful.      Objective       05/05/21 1301   Precautions   Precautions Fall Risk   Neuro-Muscular Treatments   Neuro-Muscular Treatments Verbal Cuing;Weight Shift Right;Weight Shift Left;Sequencing   Comments EOM <> floor using prone progression strategy 2x for fall recovery training SBA after demo. Home safety/ fall prevention handouts reviewed/ provided. D/C recommendations reviewed.    Strengths & Barriers   Strengths Able to follow instructions;Effective communication skills;Independent prior level of function;Making steady progress towards goals;Pleasant and cooperative;Willingly participates in therapeutic activities   Barriers Emotional lability;Fatigue;Generalized weakness;Impaired activity tolerance;Impaired balance   PT Total Time Spent   PT Individual Total Time Spent (Mins) 30   PT Charge Group   PT Neuromuscular Re-Education / Balance 1   PT Therapeutic Activities 1       Assessment    Pt was actively engaged during home safety recommendation review. Pt was able to complete floor recovery training 2x. Pt remains limited by anxiousness, and impaired balance.      Strengths: (P) Able to follow instructions, Effective communication skills, Independent prior level of function, Making steady progress towards goals, Pleasant and cooperative, Willingly participates in therapeutic activities  Barriers: (P) Emotional lability, Fatigue, Generalized weakness, Impaired activity tolerance, Impaired balance    Plan    D/C anticipated in 2 days. Collect D/C data, prepare for D/C.     Passport items to be completed:  Passport items to be completed:  Get in/out of bed safely, in/out of a vehicle, safely use  mobility device, walk or wheel around home/community, navigate up and down stairs, show how to get up/down from the ground, ensure home is accessible, demonstrate HEP, complete caregiver training    Physical Therapy Problems     Problem: Mobility     Dates: Start: 04/27/21       Goal: STG-Within one week, patient will ascend and descend four to six stairs     Dates: Start: 04/27/21       Description: 1) Individualized goal: Up/down 4-6 stairs, handrails, SBA 1 week  2) Interventions:  PT Gait Training, PT Therapeutic Exercises, and PT Therapeutic Activity            Goal: STG-Within one week, patient will     Dates: Start: 04/27/21       Description: 1) Individualized goal: Gait fww/no  FT SBA, 1 week  2) Interventions:  PT Gait Training, PT Therapeutic Exercises, PT Neuro Re-Ed/Balance, and PT Therapeutic Activity      Note:     Goal Note filed on 04/29/21 1225 by OPHELIA Herrera    1) Individualized goal:   Gait  FT SBA 1 week  2) Interventions:  PT Gait Training, PT Therapeutic Exercises, and PT Therapeutic Activity                        Problem: Mobility Transfers     Dates: Start: 04/27/21       Goal: STG-Within one week, patient will transfer bed to chair     Dates: Start: 04/27/21       Description: 1) Individualized goal: Transfer bed to/from chair fww/no AD supervision, 1 week  2) Interventions:  PT Gait Training, PT Therapeutic Exercises, and PT Therapeutic Activity                  Problem: PT-Long Term Goals     Dates: Start: 04/27/21       Goal: LTG-By discharge, patient will ambulate     Dates: Start: 04/27/21       Description: 1) Individualized goal: Gait no  FT, independent at discharge  2) Interventions:  PT Gait Training, PT Therapeutic Exercises, PT Neuro Re-Ed/Balance, and PT Therapeutic Activity            Goal: LTG-By discharge, patient will transfer one surface to another     Dates: Start: 04/27/21       Description: 1) Individualized goal: Transfer 1 surface to  another no AD, independent at discharge  2) Interventions:  PT Gait Training, PT Therapeutic Exercises, and PT Therapeutic Activity            Goal: LTG-By discharge, patient will ambulate up/down 4-6 stairs     Dates: Start: 04/27/21       Description: 1) Individualized goal: Up/down 4-6 stairs handrails, independently at discharge  2) Interventions:  PT Gait Training, PT Therapeutic Exercises, PT Neuro Re-Ed/Balance, and PT Therapeutic Activity            Goal: LTG-By discharge, patient will transfer in/out of a car     Dates: Start: 04/27/21       Description: 1) Individualized goal: Car transfer no device supervision at discharge  2) Interventions:  PT Gait Training, PT Therapeutic Exercises, PT Neuro Re-Ed/Balance, and PT Therapeutic Activity

## 2021-05-06 PROBLEM — K92.2 GI BLEED: Status: RESOLVED | Noted: 2021-04-15 | Resolved: 2021-05-06

## 2021-05-06 PROBLEM — R07.89 CHEST WALL DISCOMFORT: Status: RESOLVED | Noted: 2021-04-24 | Resolved: 2021-05-06

## 2021-05-06 PROBLEM — D69.6 THROMBOCYTOPENIA (HCC): Status: RESOLVED | Noted: 2021-04-16 | Resolved: 2021-05-06

## 2021-05-06 PROBLEM — F41.9 ANXIETY: Status: RESOLVED | Noted: 2021-04-25 | Resolved: 2021-05-06

## 2021-05-06 PROBLEM — I46.9 CARDIAC ARREST (HCC): Status: RESOLVED | Noted: 2021-04-14 | Resolved: 2021-05-06

## 2021-05-06 PROBLEM — J96.00 ACUTE RESPIRATORY FAILURE (HCC): Status: RESOLVED | Noted: 2021-04-14 | Resolved: 2021-05-06

## 2021-05-06 PROCEDURE — 97535 SELF CARE MNGMENT TRAINING: CPT

## 2021-05-06 PROCEDURE — 97116 GAIT TRAINING THERAPY: CPT

## 2021-05-06 PROCEDURE — 97110 THERAPEUTIC EXERCISES: CPT

## 2021-05-06 PROCEDURE — 97530 THERAPEUTIC ACTIVITIES: CPT

## 2021-05-06 PROCEDURE — 700102 HCHG RX REV CODE 250 W/ 637 OVERRIDE(OP): Performed by: PHYSICAL MEDICINE & REHABILITATION

## 2021-05-06 PROCEDURE — 99233 SBSQ HOSP IP/OBS HIGH 50: CPT | Performed by: PHYSICAL MEDICINE & REHABILITATION

## 2021-05-06 PROCEDURE — 700101 HCHG RX REV CODE 250: Performed by: PHYSICAL MEDICINE & REHABILITATION

## 2021-05-06 PROCEDURE — A9270 NON-COVERED ITEM OR SERVICE: HCPCS | Performed by: PHYSICAL MEDICINE & REHABILITATION

## 2021-05-06 PROCEDURE — 770010 HCHG ROOM/CARE - REHAB SEMI PRIVAT*

## 2021-05-06 RX ORDER — MIRTAZAPINE 15 MG/1
15 TABLET, FILM COATED ORAL
Qty: 30 TABLET | Refills: 2 | Status: SHIPPED | OUTPATIENT
Start: 2021-05-06 | End: 2021-10-23

## 2021-05-06 RX ORDER — OMEPRAZOLE 20 MG/1
20 CAPSULE, DELAYED RELEASE ORAL 2 TIMES DAILY
Qty: 60 CAPSULE | Refills: 2 | Status: SHIPPED | OUTPATIENT
Start: 2021-05-06 | End: 2021-10-23

## 2021-05-06 RX ORDER — GABAPENTIN 300 MG/1
300 CAPSULE ORAL 3 TIMES DAILY
Qty: 90 CAPSULE | Refills: 2 | Status: SHIPPED | OUTPATIENT
Start: 2021-05-07 | End: 2021-10-23

## 2021-05-06 RX ORDER — TRAZODONE HYDROCHLORIDE 50 MG/1
50 TABLET ORAL
Qty: 30 TABLET | Refills: 2 | Status: SHIPPED | OUTPATIENT
Start: 2021-05-06 | End: 2021-10-23

## 2021-05-06 RX ORDER — METHOCARBAMOL 500 MG/1
500 TABLET, FILM COATED ORAL 3 TIMES DAILY PRN
Qty: 30 TABLET | Refills: 0 | Status: SHIPPED | OUTPATIENT
Start: 2021-05-06 | End: 2021-06-01

## 2021-05-06 RX ORDER — LEVETIRACETAM 1000 MG/1
1000 TABLET ORAL EVERY 12 HOURS
Qty: 180 TABLET | Refills: 5 | Status: SHIPPED | OUTPATIENT
Start: 2021-05-06 | End: 2022-05-03

## 2021-05-06 RX ADMIN — OMEPRAZOLE 20 MG: 20 CAPSULE, DELAYED RELEASE ORAL at 09:12

## 2021-05-06 RX ADMIN — LIDOCAINE 1 PATCH: 50 PATCH TOPICAL at 09:05

## 2021-05-06 RX ADMIN — MAGNESIUM OXIDE TAB 400 MG (241.3 MG ELEMENTAL MG) 400 MG: 400 (241.3 MG) TAB at 20:54

## 2021-05-06 RX ADMIN — ACETAMINOPHEN 650 MG: 325 TABLET, FILM COATED ORAL at 15:49

## 2021-05-06 RX ADMIN — HYDROCORTISONE 1 EACH: 0.01 CREAM TOPICAL at 20:56

## 2021-05-06 RX ADMIN — METHOCARBAMOL TABLETS 500 MG: 500 TABLET, COATED ORAL at 16:45

## 2021-05-06 RX ADMIN — CHOLECALCIFEROL TAB 25 MCG (1000 UNIT) 2000 UNITS: 25 TAB at 09:03

## 2021-05-06 RX ADMIN — LEVETIRACETAM 1000 MG: 500 TABLET ORAL at 09:04

## 2021-05-06 RX ADMIN — MIRTAZAPINE 15 MG: 15 TABLET, ORALLY DISINTEGRATING ORAL at 20:54

## 2021-05-06 RX ADMIN — DOCUSATE SODIUM 50 MG AND SENNOSIDES 8.6 MG 2 TABLET: 8.6; 5 TABLET, FILM COATED ORAL at 09:03

## 2021-05-06 RX ADMIN — OMEPRAZOLE 20 MG: 20 CAPSULE, DELAYED RELEASE ORAL at 20:54

## 2021-05-06 RX ADMIN — TRAZODONE HYDROCHLORIDE 50 MG: 50 TABLET ORAL at 20:54

## 2021-05-06 RX ADMIN — ACETAMINOPHEN 650 MG: 325 TABLET, FILM COATED ORAL at 20:53

## 2021-05-06 RX ADMIN — GABAPENTIN 300 MG: 300 CAPSULE ORAL at 14:53

## 2021-05-06 RX ADMIN — LEVETIRACETAM 1000 MG: 500 TABLET ORAL at 20:53

## 2021-05-06 RX ADMIN — MAGNESIUM OXIDE TAB 400 MG (241.3 MG ELEMENTAL MG) 400 MG: 400 (241.3 MG) TAB at 09:03

## 2021-05-06 RX ADMIN — GABAPENTIN 300 MG: 300 CAPSULE ORAL at 09:03

## 2021-05-06 RX ADMIN — METHOCARBAMOL TABLETS 500 MG: 500 TABLET, COATED ORAL at 09:03

## 2021-05-06 RX ADMIN — GABAPENTIN 300 MG: 300 CAPSULE ORAL at 20:53

## 2021-05-06 ASSESSMENT — GAIT ASSESSMENTS
DISTANCE (FEET): 675
DEVIATION: BRADYKINETIC
GAIT LEVEL OF ASSIST: SUPERVISED
DEVIATION: BRADYKINETIC
GAIT LEVEL OF ASSIST: SUPERVISED
DISTANCE (FEET): 675
ASSISTIVE DEVICE: FRONT WHEEL WALKER
GAIT LEVEL OF ASSIST: SUPERVISED
ASSISTIVE DEVICE: FRONT WHEEL WALKER
DEVIATION: BRADYKINETIC
ASSISTIVE DEVICE: FRONT WHEEL WALKER
DISTANCE (FEET): 375

## 2021-05-06 ASSESSMENT — PAIN DESCRIPTION - PAIN TYPE: TYPE: ACUTE PAIN

## 2021-05-06 ASSESSMENT — ACTIVITIES OF DAILY LIVING (ADL)
TUB_SHOWER_TRANSFER_DESCRIPTION: GRAB BAR;SHOWER BENCH
TOILET_TRANSFER_DESCRIPTION: GRAB BAR;ADAPTIVE EQUIPMENT;INCREASED TIME
BED_CHAIR_WHEELCHAIR_TRANSFER_DESCRIPTION: INCREASED TIME
TOILET_TRANSFER_DESCRIPTION: ADAPTIVE EQUIPMENT;GRAB BAR;INCREASED TIME
BED_CHAIR_WHEELCHAIR_TRANSFER_DESCRIPTION: ADAPTIVE EQUIPMENT;INCREASED TIME
BED_CHAIR_WHEELCHAIR_TRANSFER_DESCRIPTION: INCREASED TIME
TOILETING_LEVEL_OF_ASSIST_DESCRIPTION: GRAB BAR;INCREASED TIME
TOILET_TRANSFER_DESCRIPTION: GRAB BAR;INCREASED TIME
TOILET_TRANSFER_DESCRIPTION: ADAPTIVE EQUIPMENT;GRAB BAR

## 2021-05-06 NOTE — PROGRESS NOTES
"Rehab Progress Note     Encounter Date: 5/6/2021    CC: TBI, poor sleep    Interval Events (Subjective)  Patient sitting up in room. She reports she is doing well. She is happy she is potentially going home tomorrow. Discussed that we will have IDT later today. Discussed would send in medications. She is planning on discharging between 10:30 and 11 with daughter.     IDT Team Meeting 5/6/2021    IHortencia M.D., was present and led the interdisciplinary team conference on 5/6/2021.  I led the IDT conference and agree with the IDT conference documentation and plan of care as noted below.     RN:  Diet Regular   % Meal     Pain    Sleep    Bowel Continent   Bladder Continent   In's & Out's    LE pain    PT:  Bed Mobility    Transfers    Mobility supervs with walker   Stairs supervs   Family training went well    OT:  Eating    Grooming    Bathing Joshua   UB Dressing    LB Dressing    Toileting Joshua   Shower & Transfer Joshua     SLP:  Signed off, cognition recovered    CM:  Continues to work on disposition and DME needs.      DC/Disposition:  5/11/21 -> 5/7/21    Objective:  VITAL SIGNS: /70   Pulse 83   Temp 36.5 °C (97.7 °F) (Tympanic)   Resp 18   Ht 1.702 m (5' 7\")   Wt 58 kg (127 lb 13.9 oz)   SpO2 94%   BMI 20.03 kg/m²   Gen: NAD  Psych: Mood and affect appropriate  CV: RRR, no edema  Resp: CTAB, no upper airway sounds  Abd: NTND  Neuro: AOx4, following commands  MSK: Normal ROM BLE  Unchanged from 5/5/21    No results found for this or any previous visit (from the past 72 hour(s)).    Current Facility-Administered Medications   Medication Frequency   • lidocaine (LIDODERM) 5 % 1 Patch DAILY   • methocarbamol (ROBAXIN) tablet 500 mg TID PRN   • hydrocortisone 1 % cream BID PRN   • vitamin D (cholecalciferol) tablet 2,000 Units DAILY   • gabapentin (NEURONTIN) capsule 300 mg TID   • Respiratory Therapy Consult Continuous RT   • hydrALAZINE (APRESOLINE) tablet 25 mg Q8HRS PRN   • " acetaminophen (Tylenol) tablet 650 mg Q4HRS PRN   • senna-docusate (PERICOLACE or SENOKOT S) 8.6-50 MG per tablet 2 tablet BID    And   • polyethylene glycol/lytes (MIRALAX) PACKET 1 Packet QDAY PRN    And   • magnesium hydroxide (MILK OF MAGNESIA) suspension 30 mL QDAY PRN    And   • bisacodyl (DULCOLAX) suppository 10 mg QDAY PRN   • artificial tears ophthalmic solution 1 Drop PRN   • benzocaine-menthol (CEPACOL) lozenge 1 Lozenge Q2HRS PRN   • mag hydrox-al hydrox-simeth (MAALOX PLUS ES or MYLANTA DS) suspension 20 mL Q2HRS PRN   • ondansetron (ZOFRAN ODT) dispertab 4 mg 4X/DAY PRN    Or   • ondansetron (ZOFRAN) syringe/vial injection 4 mg 4X/DAY PRN   • traZODone (DESYREL) tablet 50 mg QHS PRN   • sodium chloride (OCEAN) 0.65 % nasal spray 2 Spray PRN   • nicotine (NICODERM) 14 MG/24HR 14 mg Daily-0600   • magnesium oxide (MAG-OX) tablet 400 mg BID   • levETIRAcetam (KEPPRA) tablet 1,000 mg Q12HRS   • traZODone (DESYREL) tablet 50 mg QHS   • mirtazapine (Remeron) orally disintegrating tab 15 mg QHS   • omeprazole (PRILOSEC) capsule 20 mg BID       Orders Placed This Encounter   Procedures   • Diet Order Diet: Level 7 - Easy to Chew (float pills in puree, weighted utensils); Liquid level: Level 0 - Thin     Standing Status:   Standing     Number of Occurrences:   1     Order Specific Question:   Diet:     Answer:   Level 7 - Easy to Chew [22]     Comments:   float pills in puree, weighted utensils     Order Specific Question:   Liquid level     Answer:   Level 0 - Thin       Assessment:  Active Hospital Problems    Diagnosis    • *SDH (subdural hematoma) (HCC)    • Transaminitis    • Anxiety    • Chest wall discomfort    • Thrombocytopenia (HCC)    • GI bleed    • Acute respiratory failure (HCC)    • Cardiac arrest (HCC)    • Right atrial mass        Medical Decision Making and Plan:  TBI (Traumatic Brain Injury):  Fall with bilateral SDHs managed conservatively. Hospital course complicated by seizure, PEA arrest  requiring intubation, and GI bleed.  -PT and OT for mobility and ADLs  -SLP for cognition     Dysphagia - Patient on dysphagia diet. SLP for swallow evaluation. Limited by lack of dentures     Seizures - New witnessed seizure after SDHs. On Keppra 1000 mg BID. Monitor   -Follow-up Neurology      Hypotension - Patient on Midodrine 5 mg on transfer. Will monitor   -SBP into low 90s. Increase midodrine to 7.5, HR into 100s.  Still in low 90s, no change on midodrine. No longer symptomatic. No change on midodrine at 5 mg, will discontinue and monitor. Unchanged BP off midodrine.     GI Bleed - Patient on omeprazole 20 mg BID     Anemia - S/p GI bleed managed conservatively.  Check AM CBC. Did require transfusion   -9.6 improved from 8.1. Fe panel normal, Folate normal.     Hypomagnesemia - Patient on Magnesium supplement on transfer. Recheck wnl, discontinue       Substance Abuse - Patient on Nicotine patch. History of heroin abuse. Discontinue Nicotine patch, does not want at discharge     Atelectasis/Fluid overload - Repeat CXR improved. Discontinue Lasix.     Cirrhosis - LFTs elevated on admission. Continue to monitor    Radiculopathy - Patient with new left sciatica. Start Gabapentin 300 mg TID    Hip/Back pain - Separate from radiculopathy. Start Lidocaine and PRN Robaxin    Depression/Anxiety - Patient on Remeron 15 mg QHS and Trazodone 50 mg. 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 Cardiology evaluated and felt enlarge valve but needs outpatient cardiac MRI     Tobacco Abuse - Patient with history of smoking on Nicotine patch on transfer. Counseled     Vitamin D Deficiency - 7 on admission. Start 2000 U     DVT Ppx - Patient high risk for bleed and ambulating.     Dispo - Patient would like to discharge to her daughter's home.     Total time:  36 minutes.  I spent greater than 50% of the time for patient care, counseling, and  coordination on this date, including unit/floor time, and face-to-face time with the patient as per interval events and assessment and plan above. Topics discussed included discharge planning, discharge medications, discontinue nicotine patch, and discontinue Mag supplement. Patient was discussed separately in IDT today; please see details above.    Hortencia Dudley M.D.

## 2021-05-06 NOTE — DISCHARGE PLANNING
Spoke with patient in the gym earlier today.  Let patient know that FWW and shower chair were ordered as well as outpatient therapy.  Everything is set for DC tomorrow and patient is excited to go home.

## 2021-05-06 NOTE — DISCHARGE PLANNING
DME referral sent to [facility].  Outpatient therapy referral sent to [facility] per choice form.  Awaiting responses.

## 2021-05-06 NOTE — CARE PLAN
Problem: Communication  Goal: The ability to communicate needs accurately and effectively will improve  Note: Patient is able to verbalize her needs to staff. She is reminded to use the call light if she needs assistance from staff.      Problem: Safety  Goal: Will remain free from injury  Note: Patient is independent for transfers and reminded to call staff if she feels unsafe to transfer herself; patient verbalized understanding.

## 2021-05-06 NOTE — PROGRESS NOTES
"Rehab Progress Note     Encounter Date: 5/5/2021    CC: TBI, poor sleep    Interval Events (Subjective)  Patient sitting up in room. She reports her biggest complaint is she is having significant hip pain. She reports she sometimes also gets back pain. She reports the pain feels like muscle tightness.  Discussed trial of Lidocaine patch and if ineffective PRN muscle relaxant. Otherwise she reports therapy is going well. SLP to sign off.     IDT Team Meeting 4/29/2021  DC/Disposition:  5/11/21    Objective:  VITAL SIGNS: /58   Pulse 91   Temp 36.6 °C (97.9 °F) (Tympanic)   Resp 18   Ht 1.702 m (5' 7\")   Wt 58 kg (127 lb 13.9 oz)   SpO2 94%   BMI 20.03 kg/m²   Gen: NAD  Psych: Mood and affect appropriate  CV: RRR, no edema  Resp: CTAB, no upper airway sounds  Abd: NTND  Neuro: AOx4, following commands  MSK: Normal ROM BLE    Recent Results (from the past 72 hour(s))   FOLATE    Collection Time: 05/03/21  6:54 AM   Result Value Ref Range    Folate -Folic Acid 6.1 >4.0 ng/mL   CBC WITHOUT DIFFERENTIAL    Collection Time: 05/03/21  6:54 AM   Result Value Ref Range    WBC 3.2 (L) 4.8 - 10.8 K/uL    RBC 2.96 (L) 4.20 - 5.40 M/uL    Hemoglobin 9.8 (L) 12.0 - 16.0 g/dL    Hematocrit 32.1 (L) 37.0 - 47.0 %    .4 (H) 81.4 - 97.8 fL    MCH 33.1 (H) 27.0 - 33.0 pg    MCHC 30.5 (L) 33.6 - 35.0 g/dL    RDW 77.6 (H) 35.9 - 50.0 fL    Platelet Count 408 164 - 446 K/uL    MPV 10.3 9.0 - 12.9 fL   RETICULOCYTES COUNT    Collection Time: 05/03/21  6:54 AM   Result Value Ref Range    Reticulocyte Count 6.5 (H) 0.8 - 2.1 %    Retic, Absolute 0.19 (H) 0.04 - 0.06 M/uL    Imm. Reticulocyte Fraction 36.7 (H) 9.3 - 17.4 %    Retic Hgb Equivalent 30.7 29.0 - 35.0 pg/cell   IRON/TOTAL IRON BIND    Collection Time: 05/03/21  6:54 AM   Result Value Ref Range    Iron 42 40 - 170 ug/dL    Total Iron Binding 152 (L) 250 - 450 ug/dL    Unsat Iron Binding 110 110 - 370 ug/dL    % Saturation 28 15 - 55 %   Comp Metabolic Panel    " Collection Time: 05/03/21  6:54 AM   Result Value Ref Range    Sodium 136 135 - 145 mmol/L    Potassium 3.6 3.6 - 5.5 mmol/L    Chloride 105 96 - 112 mmol/L    Co2 27 20 - 33 mmol/L    Anion Gap 4.0 (L) 7.0 - 16.0    Glucose 88 65 - 99 mg/dL    Bun 12 8 - 22 mg/dL    Creatinine 0.23 (L) 0.50 - 1.40 mg/dL    Calcium 8.4 (L) 8.5 - 10.5 mg/dL    AST(SGOT) 91 (H) 12 - 45 U/L    ALT(SGPT) 61 (H) 2 - 50 U/L    Alkaline Phosphatase 159 (H) 30 - 99 U/L    Total Bilirubin 1.4 0.1 - 1.5 mg/dL    Albumin 2.5 (L) 3.2 - 4.9 g/dL    Total Protein 5.4 (L) 6.0 - 8.2 g/dL    Globulin 2.9 1.9 - 3.5 g/dL    A-G Ratio 0.9 g/dL   ESTIMATED GFR    Collection Time: 05/03/21  6:54 AM   Result Value Ref Range    GFR If African American >60 >60 mL/min/1.73 m 2    GFR If Non African American >60 >60 mL/min/1.73 m 2       Current Facility-Administered Medications   Medication Frequency   • lidocaine (LIDODERM) 5 % 1 Patch DAILY   • methocarbamol (ROBAXIN) tablet 500 mg TID PRN   • hydrocortisone 1 % cream BID PRN   • vitamin D (cholecalciferol) tablet 2,000 Units DAILY   • gabapentin (NEURONTIN) capsule 300 mg TID   • Respiratory Therapy Consult Continuous RT   • hydrALAZINE (APRESOLINE) tablet 25 mg Q8HRS PRN   • acetaminophen (Tylenol) tablet 650 mg Q4HRS PRN   • senna-docusate (PERICOLACE or SENOKOT S) 8.6-50 MG per tablet 2 tablet BID    And   • polyethylene glycol/lytes (MIRALAX) PACKET 1 Packet QDAY PRN    And   • magnesium hydroxide (MILK OF MAGNESIA) suspension 30 mL QDAY PRN    And   • bisacodyl (DULCOLAX) suppository 10 mg QDAY PRN   • artificial tears ophthalmic solution 1 Drop PRN   • benzocaine-menthol (CEPACOL) lozenge 1 Lozenge Q2HRS PRN   • mag hydrox-al hydrox-simeth (MAALOX PLUS ES or MYLANTA DS) suspension 20 mL Q2HRS PRN   • ondansetron (ZOFRAN ODT) dispertab 4 mg 4X/DAY PRN    Or   • ondansetron (ZOFRAN) syringe/vial injection 4 mg 4X/DAY PRN   • traZODone (DESYREL) tablet 50 mg QHS PRN   • sodium chloride (OCEAN) 0.65 %  nasal spray 2 Spray PRN   • nicotine (NICODERM) 14 MG/24HR 14 mg Daily-0600   • magnesium oxide (MAG-OX) tablet 400 mg BID   • levETIRAcetam (KEPPRA) tablet 1,000 mg Q12HRS   • traZODone (DESYREL) tablet 50 mg QHS   • mirtazapine (Remeron) orally disintegrating tab 15 mg QHS   • omeprazole (PRILOSEC) capsule 20 mg BID       Orders Placed This Encounter   Procedures   • Diet Order Diet: Level 7 - Easy to Chew (float pills in puree, weighted utensils); Liquid level: Level 0 - Thin     Standing Status:   Standing     Number of Occurrences:   1     Order Specific Question:   Diet:     Answer:   Level 7 - Easy to Chew [22]     Comments:   float pills in puree, weighted utensils     Order Specific Question:   Liquid level     Answer:   Level 0 - Thin       Assessment:  Active Hospital Problems    Diagnosis    • *SDH (subdural hematoma) (HCC)    • Transaminitis    • Anxiety    • Chest wall discomfort    • Thrombocytopenia (HCC)    • GI bleed    • Acute respiratory failure (HCC)    • Cardiac arrest (HCC)    • Right atrial mass        Medical Decision Making and Plan:  TBI (Traumatic Brain Injury):  Fall with bilateral SDHs managed conservatively. Hospital course complicated by seizure, PEA arrest requiring intubation, and GI bleed.  -PT and OT for mobility and ADLs  -SLP for cognition     Dysphagia - Patient on dysphagia diet. SLP for swallow evaluation. Limited by lack of dentures     Seizures - New witnessed seizure after SDHs. On Keppra 1000 mg BID. Monitor   -Follow-up Neurology      Hypotension - Patient on Midodrine 5 mg on transfer. Will monitor   -SBP into low 90s. Increase midodrine to 7.5, HR into 100s.  Still in low 90s, no change on midodrine. No longer symptomatic. No change on midodrine at 5 mg, will discontinue and monitor     GI Bleed - Patient on omeprazole 20 mg BID     Anemia - S/p GI bleed managed conservatively.  Check AM CBC. Did require transfusion   -9.6 improved from 8.1. Fe panel normal, Folate  normal.     Hypomagnesemia - Patient on Magnesium supplement on transfer.      Substance Abuse - Patient on Nicotine patch. History of heroin abuse.      Atelectasis/Fluid overload - Repeat CXR improved. Discontinue Lasix.     Cirrhosis - LFTs elevated on admission. Continue to monitor    Radiculopathy - Patient with new left sciatica. Start Gabapentin 300 mg TID    Hip/Back pain - Separate from radiculopathy. Start Lidocaine and PRN Robaxin    Depression/Anxiety - Patient on Remeron 15 mg QHS and Trazodone 50 mg. 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 Cardiology evaluated and felt enlarge valve but needs outpatient cardiac MRI     Tobacco Abuse - Patient with history of smoking on Nicotine patch on transfer. Counseled     Vitamin D Deficiency - 7 on admission. Start 2000 U     DVT Ppx - Patient high risk for bleed and ambulating.     Dispo - Patient would like to discharge to her daughter's home.     Total time:  26 minutes.  I spent greater than 50% of the time for patient care, counseling, and coordination on this date, including unit/floor time, and face-to-face time with the patient as per interval events and assessment and plan above. Topics discussed included discharge planning, hip and back pain, start Lidocaine patch and PRN Robaxin.     Hortencia Dudley M.D.

## 2021-05-06 NOTE — CARE PLAN
Problem: Communication  Goal: The ability to communicate needs accurately and effectively will improve  Outcome: PROGRESSING AS EXPECTED  Intervention: [place] patient and significant other/support system to call light to alert staff of needs  Note: Pt calm and appreciative of the care given by staff. Able to verbalize needs , reassurance cont. Provided.      Problem: Bowel/Gastric:  Goal: Will not experience complications related to bowel motility  Outcome: PROGRESSING AS EXPECTED     Problem: Pain Management  Goal: Pain level will decrease to patient's comfort goal  Outcome: PROGRESSING AS EXPECTED     Problem: Safety  Goal: Will remain free from falls  Note: Safety measures enforced , compliant to safety instructions.Pt  independent in going to the bathroom , uses FWW. Pt encouraged to notify staff of any bowel movement , verbalized understanding.

## 2021-05-06 NOTE — THERAPY
05/06/21 0959   Precautions   Precautions Fall Risk   Pain 0 - 10 Group   Therapist Pain Assessment 0   Cognition    Level of Consciousness Alert   ABS (Agitated Behavior Scale)   Agitated Behavior Scale Performed No   Gait Functional Level of Assist    Gait Level Of Assist Supervised   Assistive Device Front Wheel Walker   Distance (Feet) 375   # of Times Distance was Traveled 2   Deviation Bradykinetic   Wheelchair Functional Level of Assist   Wheelchair Assist   (Activity is not applicable)   Stairs Functional Level of Assist   Level of Assist with Stairs Supervised   # of Stairs Climbed 20   Stairs Description Extra time;Hand rails;Supervision for safety   Transfer Functional Level of Assist   Bed, Chair, Wheelchair Transfer Independent   Bed Chair Wheelchair Transfer Description Increased time   Toilet Transfers Modified Independent   Toilet Transfer Description Grab bar;Adaptive equipment;Increased time   Bed Mobility    Supine to Sit Independent   Sit to Supine Independent   Sit to Stand Independent   Scooting Independent   Rolling Independent   Interdisciplinary Plan of Care Collaboration   IDT Collaboration with  Family / Caregiver   Collaboration Comments Family training with the patient's daughterKerline   PT Total Time Spent   PT Individual Total Time Spent (Mins) 30   PT Charge Group   PT Gait Training 1   PT Therapeutic Activities 1   Physical Therapy   Daily Treatment     Patient Name: Nathalie Aguilar  Age:  60 y.o., Sex:  female  Medical Record #: [id number]  Today's Date: 5/6/2021     Precautions  Precautions: Fall Risk    Subjective    The patient agreed to PT.  Her daughter was present for training.     Objective    Activities that the patient participated in to demonstrate her abilities to her daughter include: Transfer bed to chair with a fww, gait with a walker tolerating 375 FT x2, up and down 20 stairs, up and down a curb with a walker and a car transfer using a  walker.    Assessment    See plan below  Strengths: Able to follow instructions, Effective communication skills, Independent prior level of function, Making steady progress towards goals, Pleasant and cooperative, Willingly participates in therapeutic activities  Barriers: Emotional lability, Fatigue, Generalized weakness, Impaired activity tolerance, Impaired balance    Plan    The patient demonstrated all of the above activities with supervision on the stairs and longer distance gait.  The patient will be staying at her daughter's home for period of time until she has improved strength and can demonstrate her ability to live independently.  The patient will continue with physical therapy in an outpatient setting and she will also be referred for support services regarding alcohol abuse.  The patient will discharge to her daughter's home 5/7/2021.  Her daughter is not currently working so that she will be able to provide assistance as needed.    Passport items to be completed:  Passport items to be completed:  Get in/out of bed safely, in/out of a vehicle, safely use mobility device, walk or wheel around home/community, navigate up and down stairs, show how to get up/down from the ground, ensure home is accessible, demonstrate HEP, complete caregiver training    Physical Therapy Problems     Problem: Mobility     Dates: Start: 04/27/21       Goal: STG-Within one week, patient will ascend and descend four to six stairs     Dates: Start: 04/27/21       Description: 1) Individualized goal: Up/down 4-6 stairs, handrails, SBA 1 week  2) Interventions:  PT Gait Training, PT Therapeutic Exercises, and PT Therapeutic Activity            Goal: STG-Within one week, patient will     Dates: Start: 04/27/21       Description: 1) Individualized goal: Gait fww/no  FT SBA, 1 week  2) Interventions:  PT Gait Training, PT Therapeutic Exercises, PT Neuro Re-Ed/Balance, and PT Therapeutic Activity      Note:     Goal Note filed  on 04/29/21 1225 by OPHELIA Herrera    1) Individualized goal:   Gait  FT SBA 1 week  2) Interventions:  PT Gait Training, PT Therapeutic Exercises, and PT Therapeutic Activity                        Problem: Mobility Transfers     Dates: Start: 04/27/21       Goal: STG-Within one week, patient will transfer bed to chair     Dates: Start: 04/27/21       Description: 1) Individualized goal: Transfer bed to/from chair fww/no AD supervision, 1 week  2) Interventions:  PT Gait Training, PT Therapeutic Exercises, and PT Therapeutic Activity                  Problem: PT-Long Term Goals     Dates: Start: 04/27/21       Goal: LTG-By discharge, patient will ambulate     Dates: Start: 04/27/21       Description: 1) Individualized goal: Gait no  FT, independent at discharge  2) Interventions:  PT Gait Training, PT Therapeutic Exercises, PT Neuro Re-Ed/Balance, and PT Therapeutic Activity            Goal: LTG-By discharge, patient will transfer one surface to another     Dates: Start: 04/27/21       Description: 1) Individualized goal: Transfer 1 surface to another no AD, independent at discharge  2) Interventions:  PT Gait Training, PT Therapeutic Exercises, and PT Therapeutic Activity            Goal: LTG-By discharge, patient will ambulate up/down 4-6 stairs     Dates: Start: 04/27/21       Description: 1) Individualized goal: Up/down 4-6 stairs handrails, independently at discharge  2) Interventions:  PT Gait Training, PT Therapeutic Exercises, PT Neuro Re-Ed/Balance, and PT Therapeutic Activity            Goal: LTG-By discharge, patient will transfer in/out of a car     Dates: Start: 04/27/21       Description: 1) Individualized goal: Car transfer no device supervision at discharge  2) Interventions:  PT Gait Training, PT Therapeutic Exercises, PT Neuro Re-Ed/Balance, and PT Therapeutic Activity

## 2021-05-06 NOTE — THERAPY
"Recreational Therapy  Daily Treatment     Patient Name: Nathalie Aguilar  AGE:  60 y.o., SEX:  female  Medical Record #: [id number]  Today's Date: 5/5/2021       Subjective  Pt seated in WC and agreeable to Tx. \"I'm definitely not drinking.\" \"This was fun.\"      Objective       05/05/21 1601   Functional Ability Status - Cognitive   Attention Span Remains on Task   Comprehension Follows Two Step Commands   Judgment Able to Make Independent Decisions   Functional Ability Status - Emotional    Affect Appropriate   Mood Appropriate   Behavior Appropriate   Skilled Intervention    Skilled Intervention Group Participation   Skilled Intervention Comments Guided facilitation and arts activity    Interdisciplinary Plan of Care Collaboration   IDT Collaboration with  Other (See Comments);Therapy Tech  (Associate  )   Collaboration Comments Therapy tech assisted Pt to room    Strengths & Barriers   Strengths Able to follow instructions;Alert and oriented;Effective communication skills;Making steady progress towards goals;Motivated for self care and independence;Pleasant and cooperative;Willingly participates in therapeutic activities   Barriers Impaired activity tolerance;Impaired balance   Treatment Time   Total Time Spent (mins) 30   Procedural Tracking   Procedural Tracking Community Re-Integration;Community Skills Development;Leisure Skills Awareness;Leisure Skills Development       Assessment    Pt able to participate in group leisure session. Pt participated in a guided mediation at the start of the session and then was able to create a tree that represents their goals, accomplishments, and things they'd like to strive for. During group sharing session Pt reported that they are \"definitely not drinking\" and want to start \"loving myself\", as well as physically would like physically strengthen their arms and legs.     Strengths: (P) Able to follow instructions, Alert and oriented, Effective communication skills, " Making steady progress towards goals, Motivated for self care and independence, Pleasant and cooperative, Willingly participates in therapeutic activities  Barriers: (P) Impaired activity tolerance, Impaired balance    Plan    Pt has been d/c from care of Rec Tx.

## 2021-05-06 NOTE — THERAPY
Occupational Therapy  Daily Treatment     Patient Name: Nathalie Aguilar  Age:  60 y.o., Sex:  female  Medical Record #: [id number]  Today's Date: 5/6/2021     Precautions  Precautions: Fall Risk    Safety   ADL Safety : Modified Independent  Bathroom Safety: Modified Independent    Subjective    Pt and Pt's adult child present for session      Objective       05/06/21 1001   Precautions   Precautions Fall Risk   Functional Level of Assist   Tub / Shower Transfers Supervised  (tub transfer)   Tub Shower Transfer Description Shower bench;Supervision for safety;Grab bar   Interdisciplinary Plan of Care Collaboration   IDT Collaboration with  Family / Caregiver   Collaboration Comments Family training with the patient's adult child, Kerline   OT Total Time Spent   OT Individual Total Time Spent (Mins) 30   OT Charge Group   OT Self Care / ADL 1   OT Therapy Activity 1   Family training  Pt demonstrated tub transfer to simulate bathroom at home. Pt's adult child stated that they have a walk in shower too that she can use. Pt and caregiver were educated about home and bathroom safety. They were given handout on close and distant intermediate supervision and fall prevention in the home. All of the pt and pt's caregivers questions were answers.     Pt ambulated with FWW and supervision from gym to room ~200 feet  Assessment    Pt tolerated session well focused on home safety and caregiver training. Pt and caregiver are confident in d/c plan tomorrow.   Strengths: Able to follow instructions, Alert and oriented, Independent prior level of function, Motivated for self care and independence, Supportive family, Willingly participates in therapeutic activities  Barriers: Decreased endurance, Generalized weakness, Impaired activity tolerance, Impaired balance, Agitation    Plan    D/c tomorrow 5/7/21    Occupational Therapy Goals     Problem: OT Long Term Goals     Dates: Start: 04/28/21       Goal: LTG-By discharge, patient will  complete basic self care tasks     Dates: Start: 04/28/21       Description: 1) Individualized Goal:  w/ mod I using DME and AE as needed  2) Interventions:  OT Orthotics Training, OT E Stim Attended, OT Group Therapy, OT Self Care/ADL, OT Cognitive Skill Dev, OT Community Reintegration, OT Manual Ther Technique, OT Neuro Re-Ed/Balance, OT Sensory Int Techniques, OT Therapeutic Activity, OT Evaluation, and OT Therapeutic Exercise          Goal: LTG-By discharge, patient will perform bathroom transfers     Dates: Start: 04/28/21       Description: 1) Individualized Goal:  w/ mod I using DME and AE as needed  2) Interventions:  OT Orthotics Training, OT E Stim Attended, OT Group Therapy, OT Self Care/ADL, OT Cognitive Skill Dev, OT Community Reintegration, OT Manual Ther Technique, OT Neuro Re-Ed/Balance, OT Sensory Int Techniques, OT Therapeutic Activity, OT Evaluation, and OT Therapeutic Exercise

## 2021-05-06 NOTE — THERAPY
05/06/21 1129   Precautions   Precautions Fall Risk   Pain 0 - 10 Group   Therapist Pain Assessment 0   ABS (Agitated Behavior Scale)   Agitated Behavior Scale Performed No   Gait Functional Level of Assist    Gait Level Of Assist Supervised   Assistive Device Front Wheel Walker   Distance (Feet) 675   # of Times Distance was Traveled 2   Deviation Bradykinetic   Transfer Functional Level of Assist   Bed, Chair, Wheelchair Transfer Independent   Bed Chair Wheelchair Transfer Description Increased time   Toilet Transfers Modified Independent   Toilet Transfer Description Adaptive equipment;Grab bar   Standing Lower Body Exercises   Hip Flexion 2 sets of 15;Bilateral   Hip Extension 2 sets of 15;Bilateral    Hip Abduction 2 sets of 15;Bilateral   Heel Rise 2 sets of 15;Bilateral   Mini Squat 2 sets of 15;Partial   Other Exercises Parallel bars standing gastrocnemius and Achilles stretch   Bed Mobility    Supine to Sit Independent   Sit to Supine Independent   Sit to Stand Independent   Scooting Independent   Rolling Independent   PT Total Time Spent   PT Individual Total Time Spent (Mins) 60   PT Charge Group   PT Gait Training 1   PT Therapeutic Exercise 2   PT Therapeutic Activities 1   Physical Therapy   Daily Treatment     Patient Name: Nathalie Aguilar  Age:  60 y.o., Sex:  female  Medical Record #: [id number]  Today's Date: 5/6/2021     Precautions  Precautions: Fall Risk    Subjective    The patient agreed to PT.     Objective    Patient participated in gait training using a fww and tolerated 675 FT x2.  Exercise program was also covered giving a handout for sitting therapeutic exercises and standing exercises.  She participated in the standing exercise and tolerated 2 sets of 15.  The patient took 1 sitting rest during the standing exercise.    Assessment    The patient is gaining in strength and endurance.  However, she will need to use a fww during the coming weeks due to generalized weakness and  impaired balance.  As she gets stronger these issues will resolve and will likely not need to use a walker.  Strengths: Able to follow instructions, Effective communication skills, Independent prior level of function, Making steady progress towards goals, Pleasant and cooperative, Willingly participates in therapeutic activities  Barriers: Emotional lability, Fatigue, Generalized weakness, Impaired activity tolerance, Impaired balance    Plan    Discharge to her daughter's home 5/7/2021    Passport items to be completed:  Passport items to be completed:  Get in/out of bed safely, in/out of a vehicle, safely use mobility device, walk or wheel around home/community, navigate up and down stairs, show how to get up/down from the ground, ensure home is accessible, demonstrate HEP, complete caregiver training    Physical Therapy Problems     Problem: Mobility     Dates: Start: 04/27/21       Goal: STG-Within one week, patient will ascend and descend four to six stairs     Dates: Start: 04/27/21       Description: 1) Individualized goal: Up/down 4-6 stairs, handrails, SBA 1 week  2) Interventions:  PT Gait Training, PT Therapeutic Exercises, and PT Therapeutic Activity            Goal: STG-Within one week, patient will     Dates: Start: 04/27/21       Description: 1) Individualized goal: Gait fww/no  FT SBA, 1 week  2) Interventions:  PT Gait Training, PT Therapeutic Exercises, PT Neuro Re-Ed/Balance, and PT Therapeutic Activity      Note:     Goal Note filed on 04/29/21 1225 by OPHELIA Herrera    1) Individualized goal:   Gait  FT SBA 1 week  2) Interventions:  PT Gait Training, PT Therapeutic Exercises, and PT Therapeutic Activity                        Problem: Mobility Transfers     Dates: Start: 04/27/21       Goal: STG-Within one week, patient will transfer bed to chair     Dates: Start: 04/27/21       Description: 1) Individualized goal: Transfer bed to/from chair fww/no AD supervision, 1 week  2)  Interventions:  PT Gait Training, PT Therapeutic Exercises, and PT Therapeutic Activity                  Problem: PT-Long Term Goals     Dates: Start: 04/27/21       Goal: LTG-By discharge, patient will ambulate     Dates: Start: 04/27/21       Description: 1) Individualized goal: Gait no  FT, independent at discharge  2) Interventions:  PT Gait Training, PT Therapeutic Exercises, PT Neuro Re-Ed/Balance, and PT Therapeutic Activity            Goal: LTG-By discharge, patient will transfer one surface to another     Dates: Start: 04/27/21       Description: 1) Individualized goal: Transfer 1 surface to another no AD, independent at discharge  2) Interventions:  PT Gait Training, PT Therapeutic Exercises, and PT Therapeutic Activity            Goal: LTG-By discharge, patient will ambulate up/down 4-6 stairs     Dates: Start: 04/27/21       Description: 1) Individualized goal: Up/down 4-6 stairs handrails, independently at discharge  2) Interventions:  PT Gait Training, PT Therapeutic Exercises, PT Neuro Re-Ed/Balance, and PT Therapeutic Activity            Goal: LTG-By discharge, patient will transfer in/out of a car     Dates: Start: 04/27/21       Description: 1) Individualized goal: Car transfer no device supervision at discharge  2) Interventions:  PT Gait Training, PT Therapeutic Exercises, PT Neuro Re-Ed/Balance, and PT Therapeutic Activity

## 2021-05-06 NOTE — CARE PLAN
Problem: Bathing  Goal: STG-Within one week, patient will bathe  Description: 1) Individualized Goal:  w/ supervision using DME and AE as needed  2) Interventions:  OT Orthotics Training, OT E Stim Attended, OT Group Therapy, OT Self Care/ADL, OT Cognitive Skill Dev, OT Community Reintegration, OT Manual Ther Technique, OT Neuro Re-Ed/Balance, OT Sensory Int Techniques, OT Therapeutic Activity, OT Evaluation, and OT Therapeutic Exercise    Outcome: MET     Problem: Dressing  Goal: STG-Within one week, patient will retrieve clothing  Description: 1) Individualized Goal:  and dress w/ supervision   2) Interventions:  OT Orthotics Training, OT E Stim Attended, OT Group Therapy, OT Self Care/ADL, OT Cognitive Skill Dev, OT Community Reintegration, OT Manual Ther Technique, OT Neuro Re-Ed/Balance, OT Sensory Int Techniques, OT Therapeutic Activity, OT Evaluation, and OT Therapeutic Exercise  Outcome: MET     Problem: Functional Transfers  Goal: STG-Within one week, patient will transfer to step in shower  Description: 1) Individualized Goal:  w/ supervision using DME and AE as needed  2) Interventions:  OT Orthotics Training, OT E Stim Attended, OT Group Therapy, OT Self Care/ADL, OT Cognitive Skill Dev, OT Community Reintegration, OT Manual Ther Technique, OT Neuro Re-Ed/Balance, OT Sensory Int Techniques, OT Therapeutic Activity, OT Evaluation, and OT Therapeutic Exercise  Outcome: MET     Problem: IADL's  Goal: STG-Within one week, patient will access kitchen area  Description: 1) Individualized Goal:  w/ CGA using DME and AE as needed  2) Interventions:  OT Orthotics Training, OT E Stim Attended, OT Group Therapy, OT Self Care/ADL, OT Cognitive Skill Dev, OT Community Reintegration, OT Manual Ther Technique, OT Neuro Re-Ed/Balance, OT Sensory Int Techniques, OT Therapeutic Activity, OT Evaluation, and OT Therapeutic Exercise  Outcome: MET

## 2021-05-06 NOTE — DISCHARGE PLANNING
Per Mary Mullen, DME referral accepted.  FWW will be delivered to Rehab and shower chair will be delivered to patient's daughter's home.

## 2021-05-06 NOTE — THERAPY
"Occupational Therapy  Daily Treatment     Patient Name: Nathalie Aguilar  Age:  60 y.o., Sex:  female  Medical Record #: [id number]  Today's Date: 5/6/2021     Precautions  Precautions: Fall Risk    Safety   ADL Safety : Modified Independent  Bathroom Safety: Modified Independent    Subjective    \"I really enjoyed the group last night.\"     Objective       05/06/21 0713   Precautions   Precautions Fall Risk   Safety    ADL Safety  Modified Independent   Bathroom Safety Modified Independent   Cognition    Level of Consciousness Alert   Functional Level of Assist   Grooming Modified Independent;Independent   Grooming Description Standing at sink   Bathing Modified Independent   Bathing Description Hand held shower;Grab bar;Tub bench   Upper Body Dressing Modified Independent   Upper Body Dressing Description Increased time   Lower Body Dressing Modified Independent   Lower Body Dressing Description Increased time   Toileting Modified Independent   Toileting Description Grab bar;Increased time   Toilet Transfers Modified Independent   Toilet Transfer Description Grab bar;Increased time  (FWW level )   Tub / Shower Transfers Modified Independent   Tub Shower Transfer Description Grab bar;Shower bench  (FWW level)   Sitting Upper Body Exercises   Chest Press 2 sets of 10;Bilateral;Light Resistance Theraband   Bicep Curls 2 sets of 10;Bilateral;Light Resistance Theraband   Elbow Extension 2 sets of 10;Bilateral;Weight (See Comments for lbs)   Bed Mobility    Supine to Sit Independent   Sit to Supine Independent   Scooting Independent   Interdisciplinary Plan of Care Collaboration   Patient Position at End of Therapy Seated;Tray Table within Reach;Call Light within Reach;Phone within Reach   OT Total Time Spent   OT Individual Total Time Spent (Mins) 60   OT Charge Group   OT Self Care / ADL 3   OT Therapeutic Exercise  1   Pt given UE thera band exercise home program. Pt demonstrated 3/5 of exercises on home program. "     Assessment    Pt tolerated session well focused on ADLs and home program. Pt completed all ADLs with mod I to I. Pt reports she is ready to go home. Pt was able to demonstrate home program for UE strength.  Strengths: Able to follow instructions, Alert and oriented, Independent prior level of function, Motivated for self care and independence, Supportive family, Willingly participates in therapeutic activities  Barriers: Decreased endurance, Generalized weakness, Impaired activity tolerance, Impaired balance, Agitation    Plan    Family training with pt's adult child 5/6/21  Discharge to adult child's house 5/7/21    Occupational Therapy Goals     Problem: Bathing     Dates: Start: 04/28/21       Goal: STG-Within one week, patient will bathe     Dates: Start: 04/28/21       Description: 1) Individualized Goal:  w/ supervision using DME and AE as needed  2) Interventions:  OT Orthotics Training, OT E Stim Attended, OT Group Therapy, OT Self Care/ADL, OT Cognitive Skill Dev, OT Community Reintegration, OT Manual Ther Technique, OT Neuro Re-Ed/Balance, OT Sensory Int Techniques, OT Therapeutic Activity, OT Evaluation, and OT Therapeutic Exercise      Note:     Goal Note filed on 04/29/21 1200 by Claire Faith OT    Due to eval yesterday                         Problem: Dressing     Dates: Start: 04/28/21       Goal: STG-Within one week, patient will retrieve clothing     Dates: Start: 04/28/21       Description: 1) Individualized Goal:  and dress w/ supervision   2) Interventions:  OT Orthotics Training, OT E Stim Attended, OT Group Therapy, OT Self Care/ADL, OT Cognitive Skill Dev, OT Community Reintegration, OT Manual Ther Technique, OT Neuro Re-Ed/Balance, OT Sensory Int Techniques, OT Therapeutic Activity, OT Evaluation, and OT Therapeutic Exercise    Note:     Goal Note filed on 04/29/21 1200 by Claire Faith OT    Due to eval yesterday                         Problem: Functional Transfers     Dates:  Start: 04/28/21       Goal: STG-Within one week, patient will transfer to step in shower     Dates: Start: 04/28/21       Description: 1) Individualized Goal:  w/ supervision using DME and AE as needed  2) Interventions:  OT Orthotics Training, OT E Stim Attended, OT Group Therapy, OT Self Care/ADL, OT Cognitive Skill Dev, OT Community Reintegration, OT Manual Ther Technique, OT Neuro Re-Ed/Balance, OT Sensory Int Techniques, OT Therapeutic Activity, OT Evaluation, and OT Therapeutic Exercise    Note:     Goal Note filed on 04/29/21 1200 by Claire Faith OT    Due to eval yesterday                         Problem: IADL's     Dates: Start: 04/28/21       Goal: STG-Within one week, patient will access kitchen area     Dates: Start: 04/28/21       Description: 1) Individualized Goal:  w/ CGA using DME and AE as needed  2) Interventions:  OT Orthotics Training, OT E Stim Attended, OT Group Therapy, OT Self Care/ADL, OT Cognitive Skill Dev, OT Community Reintegration, OT Manual Ther Technique, OT Neuro Re-Ed/Balance, OT Sensory Int Techniques, OT Therapeutic Activity, OT Evaluation, and OT Therapeutic Exercise    Note:     Goal Note filed on 04/29/21 1200 by Claire Faith OT    Due to eval yesterday                         Problem: OT Long Term Goals     Dates: Start: 04/28/21       Goal: LTG-By discharge, patient will complete basic self care tasks     Dates: Start: 04/28/21       Description: 1) Individualized Goal:  w/ mod I using DME and AE as needed  2) Interventions:  OT Orthotics Training, OT E Stim Attended, OT Group Therapy, OT Self Care/ADL, OT Cognitive Skill Dev, OT Community Reintegration, OT Manual Ther Technique, OT Neuro Re-Ed/Balance, OT Sensory Int Techniques, OT Therapeutic Activity, OT Evaluation, and OT Therapeutic Exercise          Goal: LTG-By discharge, patient will perform bathroom transfers     Dates: Start: 04/28/21       Description: 1) Individualized Goal:  w/ mod I using DME and AE  as needed  2) Interventions:  OT Orthotics Training, OT E Stim Attended, OT Group Therapy, OT Self Care/ADL, OT Cognitive Skill Dev, OT Community Reintegration, OT Manual Ther Technique, OT Neuro Re-Ed/Balance, OT Sensory Int Techniques, OT Therapeutic Activity, OT Evaluation, and OT Therapeutic Exercise

## 2021-05-07 VITALS
DIASTOLIC BLOOD PRESSURE: 77 MMHG | BODY MASS INDEX: 20.07 KG/M2 | TEMPERATURE: 99 F | OXYGEN SATURATION: 90 % | SYSTOLIC BLOOD PRESSURE: 114 MMHG | WEIGHT: 127.87 LBS | HEART RATE: 92 BPM | RESPIRATION RATE: 16 BRPM | HEIGHT: 67 IN

## 2021-05-07 PROCEDURE — 700102 HCHG RX REV CODE 250 W/ 637 OVERRIDE(OP): Performed by: PHYSICAL MEDICINE & REHABILITATION

## 2021-05-07 PROCEDURE — 99239 HOSP IP/OBS DSCHRG MGMT >30: CPT | Performed by: PHYSICAL MEDICINE & REHABILITATION

## 2021-05-07 PROCEDURE — A9270 NON-COVERED ITEM OR SERVICE: HCPCS | Performed by: PHYSICAL MEDICINE & REHABILITATION

## 2021-05-07 PROCEDURE — 700101 HCHG RX REV CODE 250: Performed by: PHYSICAL MEDICINE & REHABILITATION

## 2021-05-07 RX ADMIN — LIDOCAINE 1 PATCH: 50 PATCH TOPICAL at 07:08

## 2021-05-07 RX ADMIN — METHOCARBAMOL TABLETS 500 MG: 500 TABLET, COATED ORAL at 07:08

## 2021-05-07 RX ADMIN — LEVETIRACETAM 1000 MG: 500 TABLET ORAL at 07:10

## 2021-05-07 RX ADMIN — CHOLECALCIFEROL TAB 25 MCG (1000 UNIT) 2000 UNITS: 25 TAB at 07:10

## 2021-05-07 RX ADMIN — OMEPRAZOLE 20 MG: 20 CAPSULE, DELAYED RELEASE ORAL at 07:10

## 2021-05-07 RX ADMIN — GABAPENTIN 300 MG: 300 CAPSULE ORAL at 07:10

## 2021-05-07 ASSESSMENT — ACTIVITIES OF DAILY LIVING (ADL)
SHOWER_TRANSFER_LEVEL_OF_ASSIST: ABLE TO COMPLETE SHOWER TRANSFER WITHOUT ASSIST
TOILET_TRANSFER_LEVEL_OF_ASSIST: ABLE TO COMPLETE TOILET TRANSFER WITHOUT ASSIST
TOILETING_LEVEL_OF_ASSIST: ABLE TO COMPLETE TOILETING WITHOUT ASSIST

## 2021-05-07 ASSESSMENT — PAIN DESCRIPTION - PAIN TYPE: TYPE: ACUTE PAIN

## 2021-05-07 NOTE — CARE PLAN
Problem: Safety  Goal: Will remain free from injury  Outcome: PROGRESSING AS EXPECTED  Note: Pt transfers mod I in the room via wheelchair. She calls appropriately when in need of assistance.     Problem: Pain Management  Goal: Pain level will decrease to patient's comfort goal  Outcome: PROGRESSING AS EXPECTED  Note: Tylenol given PRN per MAR for c/o headache with adequate relief. Pt sleeps good. Will continue to monitor.

## 2021-05-07 NOTE — PROGRESS NOTES
Patient discharged to home per order.  Discharge instructions reviewed with patient and daughter; they verbalize understanding and signed copies placed in chart.  Patient has all belongings; signed copy of form in chart.  Patient left facility at 11:15 via wheelchair in private vehicle accompanied by rehab staff and daughter.  Have enjoyed working with this pleasant patient.

## 2021-05-07 NOTE — DISCHARGE SUMMARY
Rehab Discharge Summary    Admission Date: 4/27/2021    Discharge Date: 5/7/2021    Attending Provider: Hortencia Dudley MD/PhD    Admission Diagnosis:   Active Hospital Problems    Diagnosis    • *SDH (subdural hematoma) (HCC)    • Transaminitis    • Anxiety    • Chest wall discomfort    • Thrombocytopenia (HCC)    • GI bleed    • Acute respiratory failure (HCC)    • Cardiac arrest (HCC)    • Right atrial mass        Discharge Diagnosis:  Active Hospital Problems    Diagnosis    • *SDH (subdural hematoma) (HCC)    • Transaminitis    • Anxiety    • Chest wall discomfort    • Thrombocytopenia (HCC)    • GI bleed    • Acute respiratory failure (HCC)    • Cardiac arrest (HCC)    • Right atrial mass        HPI per H&P:  The patient is a 60 y.o. female with a past medical history of substance abuse and asthma who presented on 4/13/21 with AMS after fall. Patient reportedly had headstrike during fall and was found down in home by her daughter. Patient BIBA and found to have acute on chronic bilateral SDHs. NSG was consulted and recommended conservative management.  In ED patient had seizure like activity while being transferred and became apneic and non-responsive. Patient underwent 5 minutes of compression and 1 round of epinephrine.  Patient was intubated and admitted to ICU for PEA arrest.  Patient has since been stabilized on Keppra.  Patient with EEG on 4/15/21 which showed no seizure like activity. Patient's stay also complicated by melena and GI was consulted. Per GI hold on intervention at that time and manage with PPI. TTE reportedly showed right atrial mass and cardiology was consulted. Per cardiology felt the mass was most likely prominent eustachian valve but should have cardiac MRI as outpatient. Patient was extubated on 4/18/21.  Patient did require transfusion while inpatient.     Patient was admitted to [facility] on 4/27/2021.     Hospital Course by Problem List:  TBI  (Traumatic Brain Injury):  Fall with bilateral SDHs managed conservatively. Hospital course complicated by seizure, PEA arrest requiring intubation, and GI bleed. Patient underwent acute inpatient rehabilitation from 4/27/21 to 5/7/21 with good improvement in mobility, ADLs and cognition.  -Referral to [facility] clinic     Dysphagia - Patient on dysphagia diet. SLP for swallow evaluation. Limited by lack of dentures. Resolved     Seizures - New witnessed seizure after SDHs. On Keppra 1000 mg BID. Monitor   -Follow-up Neurology      Hypotension - Patient on Midodrine 5 mg on transfer. Will monitor. SBP into low 90s. Increase midodrine to 7.5, HR into 100s.  Still in low 90s, no change on midodrine. No longer symptomatic. No change on midodrine at 5 mg, will discontinue and monitor. Unchanged BP off midodrine. Resolved.      GI Bleed - Patient on omeprazole 20 mg BID     Anemia - S/p GI bleed managed conservatively.  Check AM CBC. Did require transfusion. 9.6 improved from 8.1. Fe panel normal, Folate normal.   -Follow-up PCP     Hypomagnesemia - Patient on Magnesium supplement on transfer. Recheck wnl, discontinue       Substance Abuse - Patient on Nicotine patch. History of heroin abuse. Discontinue Nicotine patch, does not want at discharge     Atelectasis/Fluid overload - Repeat CXR improved. Discontinue Lasix.      Cirrhosis - LFTs elevated on admission. Continue to monitor     Radiculopathy - Patient with new left sciatica. Start Gabapentin 300 mg TID     Depression/Anxiety - Patient on Remeron 15 mg QHS and Trazodone 50 mg. 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 Cardiology evaluated and felt enlarge valve but needs outpatient cardiac MRI     Tobacco Abuse - Patient with history of smoking on Nicotine patch on transfer. Counseled, no longer on nicotine patch     Vitamin D Deficiency - 7 on admission. Start 2000 U       DVT Ppx - Patient high risk for bleed and ambulating.      Dispo - Patient would like to discharge to her daughter's home.     Functional Status at Discharge  Eating:  Modified Independent  Eating Description:  Verbal cueing, Set-up of equipment or meal/tube feeding, Increased time(some spillage due to hand tremors)  Grooming:  Modified Independent, Independent  Grooming Description:  Standing at sink  Bathing:  Modified Independent  Bathing Description:  Hand held shower, Grab bar, Tub bench  Upper Body Dressing:  Modified Independent  Upper Body Dressing Description:  Increased time  Lower Body Dressing:  Modified Independent  Lower Body Dressing Description:  Increased time     Walk:  Supervised  Distance Walked:  675  Number of Times Distance Was Traveled:  2  Assistive Device:  Front Wheel Walker  Gait Deviation:  Bradykinetic  Wheelchair:  (Activity is not applicable)  Distance Propelled:  50   Wheelchair Description:  Extra time, Limited by fatigue  Stairs Supervised  Stairs Description Extra time, Hand rails, Limited by fatigue, Supervision for safety     Comprehension:  Independent  Comprehension Description:     Expression:  Independent  Expression Description:     Social Interaction:  Independent  Social Interaction Description:     Problem Solving:  Supervision  Problem Solving Description:  Bed/chair alarm, Seat belt, Verbal cueing, Therapy schedule  Memory:  Moderate Assist  Memory Description:  Bed/chair alarm, Increased time, Seat belt, Therapy schedule, Verbal cueing  Progress since Admit: Nathalie demonstrates difculties in the areas of memory and attention. Pt reports memory deficits are new; however, the use of external memory aids (memory log, therapy schedule, calendar, etc.) are helpful and have imrpoved her memory since hospitalization. All ST goals met.  Discharge Location : Relative / Friend's Home  Patient Discharging with Assist of: Family   Level of Supervision Required: No  Supervision  Recommended Services Upon Discharge: No Follow-Up Speech Therapy Recommended  Long Term Goals Met: 0/1  Long Term Goals Not Met: 1/1  Reason(s) for Goals Not Met: Pt's standardized scores did not increase to range of WFL.  Criteria for Termination of Services: Maximum Function Achieved for Inpatient Rehabilitation    IHortencia M.D., personally performed a complete drug regimen review and no potential clinically significant medication issues were identified.   Discharge Medication:     Medication List      START taking these medications      Instructions   gabapentin 300 MG Caps  Commonly known as: NEURONTIN  Notes to patient: Nerve pain   Take 1 capsule by mouth 3 times a day.  Dose: 300 mg     methocarbamol 500 MG Tabs  Commonly known as: ROBAXIN  Notes to patient: Muscle spasms    Take 1 tablet by mouth 3 times a day as needed.  Dose: 500 mg        CONTINUE taking these medications      Instructions   levetiracetam 1000 MG tablet  Commonly known as: KEPPRA  Notes to patient: Seizures    Take 1 tablet by mouth every 12 hours.  Dose: 1,000 mg     mirtazapine 15 MG Tabs  Commonly known as: Remeron  Notes to patient: Mood    Take 1 tablet by mouth at bedtime.  Dose: 15 mg     omeprazole 20 MG delayed-release capsule  Commonly known as: PRILOSEC  Notes to patient: Acid reflux    Take 1 capsule by mouth 2 times a day.  Dose: 20 mg     traZODone 50 MG Tabs  Commonly known as: DESYREL  Notes to patient: Sleep    Take 1 tablet by mouth at bedtime.  Dose: 50 mg        STOP taking these medications    furosemide 20 MG Tabs  Commonly known as: LASIX     magnesium oxide 400 MG Tabs tablet  Commonly known as: MAG-OX     midodrine 5 MG Tabs  Commonly known as: PROAMATINE     nicotine 14 MG/24HR Pt24  Commonly known as: NICODERM     senna-docusate 8.6-50 MG Tabs  Commonly known as: PERICOLACE or SENOKOT S            Discharge Diet:  Regular    Discharge Activity:  As tolerated      Disposition:  Patient to discharge home with family support and community resources.     Equipment:  FWW    Follow-up & Discharge Instructions:  Follow up with your primary care provider (PCP) within 7-10 days of discharge to review your medications and take over your care.     If you develop chest pain, fever, chills, change in neurologic function (weakness, sensation changes, vision changes), or other concerning sxs, seek immediate medical attention or call 911.      Condition on Discharge:  Good    More than 33 minutes was spent on discharging this patient, including face-to-face time, prescription management, and the dictation of this note.    Hortencia Dudley M.D.    Date of Service: 5/7/2021

## 2021-05-07 NOTE — DISCHARGE PLANNING
Case management Summary:   Met with patient and her daughter to review CM DC Instructions prior to discharge.   Reviewed follow up appointments with PCP, Cardiology, GI and PM&R.  Patient aware that she needs follow-up with Neurologist that will take her insurance.  Referral made to [facility] and we are awaiting acceptance of referral.  [facility] has delivered a FWW and shower chair to the patient.      During hospitalization, I have provided support and education and have been available for questions and information during hours of operation, communicated with therapy team and MD along with providing links/resources  to outside services.    Patient verbalizes agreement with all plans and has an understanding of the next steps within the post acute services.     Individualized Goals:   1. Case Management will confirm patient has current resources for substance abuse programs.  2. We will schedule outpatient follow up with cardiology and GI per recommendations.  3. We will confirm level of support patient's daughter can provide.    Outcome:   1.  Goal partially met - patient aware of need for programs and has knowledge of how to obtain help.  2.  Goal met - appointments set with cardiology and GI.  3.  Goal met - patient discharging to daughter's home for a couple of weeks prior to returning to her own home.

## 2021-05-07 NOTE — CARE PLAN
Problem: OT Long Term Goals  Goal: LTG-By discharge, patient will complete basic self care tasks  Description: 1) Individualized Goal:  w/ mod I using DME and AE as needed  2) Interventions:  OT Orthotics Training, OT E Stim Attended, OT Group Therapy, OT Self Care/ADL, OT Cognitive Skill Dev, OT Community Reintegration, OT Manual Ther Technique, OT Neuro Re-Ed/Balance, OT Sensory Int Techniques, OT Therapeutic Activity, OT Evaluation, and OT Therapeutic Exercise  Outcome: MET  Goal: LTG-By discharge, patient will perform bathroom transfers  Description: 1) Individualized Goal:  w/ mod I using DME and AE as needed  2) Interventions:  OT Orthotics Training, OT E Stim Attended, OT Group Therapy, OT Self Care/ADL, OT Cognitive Skill Dev, OT Community Reintegration, OT Manual Ther Technique, OT Neuro Re-Ed/Balance, OT Sensory Int Techniques, OT Therapeutic Activity, OT Evaluation, and OT Therapeutic Exercise  Outcome: MET

## 2021-05-07 NOTE — DISCHARGE INSTRUCTIONS
"Depression, Adult  Depression is feeling sad, low, down in the dumps, blue, gloomy, or empty. In general, there are two kinds of depression:  · Normal sadness or grief. This can happen after something upsetting. It often goes away on its own within 2 weeks. After losing a loved one (bereavement), normal sadness and grief may last longer than two weeks. It usually gets better with time.  · Clinical depression. This kind lasts longer than normal sadness or grief. It keeps you from doing the things you normally do in life. It is often hard to function at home, work, or at school. It may affect your relationships with others. Treatment is often needed.  GET HELP RIGHT AWAY IF:  · You have thoughts about hurting yourself or others.  · You lose touch with reality (psychotic symptoms). You may:  ¨ See or hear things that are not real.  ¨ Have untrue beliefs about your life or people around you.  · Your medicine is giving you problems.  MAKE SURE YOU:  · Understand these instructions.  · Will watch your condition.  · Will get help right away if you are not doing well or get worse.     This information is not intended to replace advice given to you by your health care provider. Make sure you discuss any questions you have with your health care provider.     Document Released: 01/20/2012 Document Revised: 01/08/2016 Document Reviewed: 04/18/2013  [employer] Patient Education ©2016 [employer]        Prevent Falls in Your Home    \"Falling once doubles your chance of falling again\"        -Center for Disease Control and Prevention    Falls in the home can lead to serious injury (fractures, brain injuries), hospitalizations, increased medical costs, and could even be fatal.  The good news is, there are many precautions you can take to avoid falls in your home and help keep you safe:     · If prescribed an assistive device (walker, crutches), use as instructed by the healthcare provider\"   · Remove any tripping " hazards from your home, including loose cords, throw rugs and clutter  · Keep a nightlight on in dark (hallways, bathrooms, etc)   · Get up slowly, to make sure you feel okay before getting up  · Be aware of any side effects of your medications: some medications may make you dizzy  · Place a non-skid rubber mat in your shower or tub-consider a shower bench or chair if unsteady on your feet  · Wear supportive shoes or non-skid socks when moving around  · Start an exercise program once approved by your provider.  If you are feeling weak following a hospital stay, talk to your doctor about home health or outpatient therapy programs designed to help rebuild your strength and endurance      Physical Therapy Discharge Instructions for Nathalie Aguilar    5/7/2021    Weight Bearing Status - Patient Should: Bear Weight as Tolerated Right Leg, Bear Weight as Tolerated Left Leg  Level of Assist Required for Ambulation: No Assist on Flat Surfaces, Supervision on Curbs, Supervision on Stairs  Distance Patient May Ambulate: 600 feet or more as tolerated  Device Recommended for Ambulation: Front-Wheeled Walker  Level of Assist Required to Propel Wheelchair: (Activity is not applicable)  Level of Assist Required for Transfers: Requires No Assist  Device Recommended for Transfers: Front-Wheeled Walker  Home Exercise Program: Refer to Home Exercise Program Handout for Details  Prosthesis / Orthosis Recommendation / Location: No Prosthesis  or Orthosis Recommended   Nathalie congratulations on meeting your goals and going home.  Stay positive, remain active and be strong.  Best wishes for continued success,  Donis Kinsey [facility], [facility] NURSING DISCHARGE INSTRUCTIONS    Blood Pressure: 100/67(reported to BRITNEY Mcduffie)  Weight: 58 kg (127 lb 13.9 oz)  Nursing recommendations for Nathalie Aguilar at time of discharge are as follows:  Client and Family Member verbalized understanding of all discharge  instructions and prescriptions.     Review all your home medications and newly ordered medications with your doctor and/or pharmacist. Follow medication instructions as directed by your doctor and/or pharmacist.    Pain Management:   Discharge Pain Medication Instructions:  Comfort Goal: Comfort with Movement, Sleep Comfortably  Notify your primary care provider if pain is unrelieved with these measures, if the pain is new, or increased in intensity.    Discharge Skin Characteristics: Warm, Dry  Discharge Skin Exam: Other (Comments)(scattered rashes on belly & mid back)  Wound 04/13/21 Pressure Injury Coccyx Mid PTA mid sacrum/coccyx (Active)     Skin / Wound Care Instructions: Please contact your primary care physician for any change in skin integrity. 0    If You Have Surgical Incisions / Wounds:  Monitor surgical site(s) for signs of increased swelling, redness or symptoms of drainage from the site or fever as this could indicate signs and symptoms of infection. If these symptoms are noted, notifiy your primary care provider.      Discharge Safety Instructions: No Supervision Needed     Discharge Safety Concerns: No Concerns Noted  The interdisciplinary team has made recommendation that you do not require supervision in the house due to demonstration of safety with ADL's and IADLS and problem solving skills  Anti-embolic stockings are not required to increase circulation to the lower extremities.    Discharge Diet: Regular-easy to chew     Discharge Liquids: Thin  Discharge Bowel Function: Continent  Please contact your primary care physician for any changes in bowel habits.  Discharge Bowel Program:    Discharge Bladder Function: Continent  Discharge Urinary Devices: None      Nursing Discharge Plan:   Smoking Cessation Offered: Patient Counseled    Case Management Discharge Instructions:   Discharge Location:    Agency Name/Address/Phone:    Home Health:    Outpatient Services:    DME Provider/Phone:    [facility]  Equipment Ordered:    Prescription Faxed to:        Discharge Medication Instructions:  Below are the medications your physician expects you to take upon discharge:

## 2021-05-10 NOTE — DOCUMENTATION QUERY
DOCUMENTATION QUERY    PROVIDERS: Please select “Cosign w/ note”to reply to query.    To better represent the severity of illness of your patient, please review the following information and exercise your independent professional judgment in responding to this query.     The patient was admitted for a traumatic subdural hematoma. It is into clear if there was a loss of consciousness as it was not mentioned in medical record. Please confirm if the patient had a loss of consciousness.    * The patient had a traumatic subdural hemorrhage without loss of consciousness.  * The patient had a traumatic subdural hemorrhage with LOC of 30 minutes or less  *The patient had a traumatic subdural hemorrhage with LOC of 31 minutes to 59 minutes  * The patient had a traumatic subdural hemorrhage with LOC of 1 hour to 5 hours 59 minutes  * The patient had a traumatic subdural hemorrhage with LOC of 6 hours to 24 hours  *The patient had a traumatic subdural hemorrhage with LOC of greater than 24 hours   *Unable to determine      The medical record reflects the following:   Clinical Findings Diagnosis: Traumatic brain injury with brief (less than 1 hour) loss of consciousness (HCC) [S06.9X9A]   Treatment -PT and OT for mobility and ADLs  -SLP for cognition   Risk Factors  worsening of SDH deficits   Location within medical record  RN note 4/27     Thank You,   Claire Garcia, [email or web address]

## 2021-05-27 ENCOUNTER — OCCUPATIONAL THERAPY (OUTPATIENT)
Dept: OCCUPATIONAL THERAPY | Facility: REHABILITATION | Age: 61
End: 2021-05-27
Attending: PHYSICAL MEDICINE & REHABILITATION
Payer: MEDICAID

## 2021-05-27 DIAGNOSIS — S06.5X9D TRAUMATIC SUBDURAL HEMORRHAGE WITH LOSS OF CONSCIOUSNESS OF UNSPECIFIED DURATION, SUBSEQUENT ENCOUNTER: ICD-10-CM

## 2021-05-27 PROCEDURE — 97166 OT EVAL MOD COMPLEX 45 MIN: CPT

## 2021-05-27 SDOH — ECONOMIC STABILITY: GENERAL: QUALITY OF LIFE: GOOD

## 2021-05-27 ASSESSMENT — BALANCE ASSESSMENTS
BALANCE - STANDING DYNAMIC: FAIR +
BALANCE - SITTING DYNAMIC: GOOD
BALANCE - SITTING STATIC: GOOD
BALANCE - STANDING STATIC: FAIR +

## 2021-05-27 ASSESSMENT — ENCOUNTER SYMPTOMS: PAIN SCALE: 5

## 2021-05-27 ASSESSMENT — ACTIVITIES OF DAILY LIVING (ADL)
AMBULATION_WITH_ASSISTIVE_DEVICE: INDEPENDENT
POOR_BALANCE: 1

## 2021-05-27 NOTE — OP THERAPY EVALUATION
Outpatient Occupational Therapy  INITIAL NEUROLOGICAL EVALUATION    [facility] Occupational Therapy [facility]  [address]  [address]  [place] [address]  Phone:  [phone]  Fax:  [phone]    Date of Evaluation: 2021    Patient: Nathalie Aguilar  YOB: 1960  MRN: [id number]     Referring Provider: Hortencia Dudley M.D.  [address]  [address]  [place] [address]   Referring Diagnosis Traumatic subdural hemorrhage with loss of consciousness of unspecified duration, subsequent encounter [S06.5X9D]     Time Calculation    Start time: 0200  Stop time: 0250 Time Calculation (min): 50 minutes             Chief Complaint: Extremity Weakness (BUE weakness)    Visit Diagnoses     ICD-10-CM   1. Traumatic subdural hemorrhage with loss of consciousness of unspecified duration, subsequent encounter  S06.5X9D       Subjective:   History of Present Illness:     Date of onset:  2021    Mechanism of injury:  S/p fall while intoxicated on 2021 resulting in bilateral SDHs, now with reports of BUE weakness, decreased motor control with tremors, lower back/buttock pain, and occasional word finding difficulties which is affecting her ability to perform her ADLs, iADLs, and functional mobility .  Quality of life:  Good  Prior level of function:  Independent ADLs, iADLs, walking without an AD, enjoys chess  Pain:     Current pain ratin    Location:  Reports pain in her buttocks, groin, and lower back  Social Support:     Lives in:  One-story house    Lives with: adult daugther.  Hand dominance:  Left  Treatments:     Previous treatment:  Physical therapy, occupational therapy and speech therapy    Discharged from (in last 30 days): rehabilitation facility      Treatment Comments:  formerly [facility] 21-21  Activities of Daily Living:     Patient reported ADL status: Mod I feeding with spoon only, avoids using fork due to tremors.  Independent grooming, UB/LB dressing, and toileting.   Mod I bathing sit/stand shower.   Mod I light meal prep and light housekeeping.     Patient Goals:     Patient goals for therapy:  Increased strength and independence with ADLs/IADLs    Other patient goals:  To improve my speech, to feed myself with a fork      Past Medical History:   Diagnosis Date   • ASTHMA    • Cancer (HCC)     breast   • Heroin addiction (HCC)    • Pneumonia    • Psychiatric disorder      Past Surgical History:   Procedure Laterality Date   • INCISION AND DRAINAGE ORTHOPEDIC Right 1/25/2019    Procedure: INCISION AND DRAINAGE ORTHOPEDIC - HAND;  Surgeon: Kendrick Campbell M.D.;  Location: SURGERY [facility];  Service: Orthopedics   • CHOLECYSTECTOMY     • GYN SURGERY      partial hysterectomy     Social History     Tobacco Use   • Smoking status: Current Every Day Smoker     Packs/day: 0.50   • Smokeless tobacco: Never Used   Substance Use Topics   • Alcohol use: Yes     Comment: 1 pint hard alcohol daily     Family and Occupational History     Socioeconomic History   • Marital status:      Spouse name: Not on file   • Number of children: Not on file   • Years of education: Not on file   • Highest education level: Not on file   Occupational History   • Not on file       Objective:   Active Range of Motion:   Upper extremity (left):     All left upper extremity active range of motion: All within functional limits  Upper extremity (right):     All right upper extremity active range of motion: All within functional limits      Strength:     Left upper extremity strength within functional limits.      Right upper extremity strength within functional limits.      , Prehension, Pinch:  /Prehension (left):      strength: Impaired (30lbs)    Opposition: Impaired (moderate tremor)  /Prehension (right):      strength: Impaired (35lbs)    Opposition: Impaired (moderate tremor)    Tone, Sensation and Coordination:   Tone:     Left upper extremity muscle tone: Normal     Right upper extremity muscle tone: Normal    Sensation   Upper extremity (left):     Light touch: Intact    Proprioception: Intact   Upper extremity (right):     Light touch: Intact    Proprioception: Intact     Coordination   Upper extremity (left):     Fine motor: Impaired (moderate tremors)    Gross motor: Impaired (moderate tremors)    Finger to finger: Impaired    Finger touch to nose: Impaired  Upper extremity (right):     Fine motor: Impaired (moderate tremors)    Gross motor: Impaired (moderate tremors)    Finger to finger: Impaired    Finger touch to nose: Impaired      Coordination comments:   Grooved Pegboard: Left: 170 seconds, Right: 144 seconds  Handwriting affected by tremors      Cognition:     Orientation: normal to time, normal to place and normal to person    Direction following: two step    Short term memory: impaired (able to recall 1/5 items after 2 minutes.)    Long term memory: intact    Attention span: impaired (decreased divided attention)    Sequencing: intact    Organization: intact    Problem solving: impaired (mildly delayed)    Judgement and safety awareness: impaired (fair)    Hearing: intact    Behavior: mildly anxious affect    Cognition Comments:  Trail Making Test Part B: multiple errors after the letter B, 2 trials.  1 paraphasic error during conversation      Vision/Perception:     Visual tracking: intact    Convergence: intact    Visual attentions: intact    Visual acuity: impaired (pt reports decreased acuity since the accident but denies diploplia)    Visual scanning: intact    R/L neglect: not present    Spatial relations: intact    Vision assistive device(s): glasses    Postural Control (Balance)     Sitting (static): Good    Sitting (dynamic): Good    Standing (static): Fair +    Standing (dynamic): Fair +    Ambulation: Level Surfaces   Ambulation with assistive device: independent (FWW)      Exercises/Treatments  Time-based treatments/modalities:           Assessment,  Response and Plan:   Impairments: abnormal ADL function, abnormal coordination, fine motor function, impaired functional mobility, impaired balance, impaired physical strength, lacks appropriate home exercise program and limited ADL's    Other Impairments:  Cognitive deficits  Assessment details:  Pt is a pleasant 60 y.o female s/p fall while intoxicated on 4/13/2021 resulting in bilateral SDHs who presents to outpatient OT functioning below baseline secondary to decreased strength, decreased motor control with moderate tremors, lower back/buttock pain, decreased balance, and cognitive deficits which are affecting her ability to perform her ADLs, iADLs, and functional mobility. Recommend further evaluation of cognitive skills by SLP.  Prognosis: good    Goals:   Short Term Goals:   Pt will increase her bilateral  strength >= 5lbs to open jars and carry grocery bags  Pt will improve her bilateral fine motor control to be able to safely feed herself using a fork with AE/compensatory techniques  Pt will demonstrate ability to sustain her divided attention to perform moderately challenging cognitive activities with min cues in a non-distracting environment  Short term goal timespan:  2-4 weeks    Long Term Goals:   Pt will improve her left hand FMC to perform handwriting with baseline legibility and control  Pt will be independent HEP for strengthening and motor control  Pt will perform the Grooved Pegboard in 30 seconds quicker than on her initial evaluation  Pt will correctly complete the Trail Making Test Part B in <= 180 seconds  Long term goal timespan:  6-8 weeks    Plan:   Therapy options:  Occupational therapy treatment to continue  Planned therapy interventions:  Therapeutic Activities (CPT 49030) and Therapeutic Exercise (CPT 84730)  Frequency: 1-2 x week.  Duration in weeks:  8  Duration in visits:  8  Discussed with:  Patient      Functional Assessment Used  OT Functional Assessment Tool Used: Grooved  Pegboard  OT Functional Assessment Score: Left: 170 seconds, Right: 144 seconds     Referring provider co-signature:  I have reviewed this plan of care and my co-signature certifies the need for services.    Certification Period: 05/27/2021 to  08/12/21    Physician Signature: ________________________________ Date: ______________

## 2021-06-01 ENCOUNTER — OFFICE VISIT (OUTPATIENT)
Dept: CARDIOLOGY | Facility: MEDICAL CENTER | Age: 61
End: 2021-06-01
Payer: MEDICAID

## 2021-06-01 VITALS
RESPIRATION RATE: 16 BRPM | OXYGEN SATURATION: 91 % | BODY MASS INDEX: 20.37 KG/M2 | SYSTOLIC BLOOD PRESSURE: 120 MMHG | DIASTOLIC BLOOD PRESSURE: 82 MMHG | WEIGHT: 129.8 LBS | HEIGHT: 67 IN | HEART RATE: 112 BPM

## 2021-06-01 DIAGNOSIS — I51.89 RIGHT ATRIAL MASS: ICD-10-CM

## 2021-06-01 DIAGNOSIS — R94.31 QT PROLONGATION: ICD-10-CM

## 2021-06-01 DIAGNOSIS — I10 ESSENTIAL HYPERTENSION: Chronic | ICD-10-CM

## 2021-06-01 PROCEDURE — 99214 OFFICE O/P EST MOD 30 MIN: CPT | Performed by: INTERNAL MEDICINE

## 2021-06-01 RX ORDER — CLONAZEPAM 1 MG/1
TABLET ORAL
COMMUNITY
Start: 2021-03-19 | End: 2021-10-23

## 2021-06-01 RX ORDER — VENLAFAXINE HYDROCHLORIDE 150 MG/1
CAPSULE, EXTENDED RELEASE ORAL EVERY MORNING
COMMUNITY
Start: 2021-05-17 | End: 2021-10-23

## 2021-06-01 RX ORDER — QUETIAPINE FUMARATE 400 MG/1
400 TABLET, FILM COATED ORAL EVERY EVENING
COMMUNITY
Start: 2021-04-15 | End: 2022-05-03

## 2021-06-01 ASSESSMENT — ENCOUNTER SYMPTOMS
BACK PAIN: 1
MEMORY LOSS: 1
BRUISES/BLEEDS EASILY: 1
HEARTBURN: 1
ABDOMINAL PAIN: 1
NECK PAIN: 1
WEAKNESS: 1
HEADACHES: 1
WEIGHT LOSS: 1
FALLS: 1
DEPRESSION: 1
NERVOUS/ANXIOUS: 1

## 2021-06-01 ASSESSMENT — FIBROSIS 4 INDEX: FIB4 SCORE: 1.68

## 2021-06-01 ASSESSMENT — LIFESTYLE VARIABLES: SUBSTANCE_ABUSE: 1

## 2021-06-02 ENCOUNTER — OCCUPATIONAL THERAPY (OUTPATIENT)
Dept: OCCUPATIONAL THERAPY | Facility: REHABILITATION | Age: 61
End: 2021-06-02
Attending: PHYSICAL MEDICINE & REHABILITATION
Payer: MEDICAID

## 2021-06-02 DIAGNOSIS — S06.5X9D TRAUMATIC SUBDURAL HEMORRHAGE WITH LOSS OF CONSCIOUSNESS OF UNSPECIFIED DURATION, SUBSEQUENT ENCOUNTER: ICD-10-CM

## 2021-06-02 PROCEDURE — 97530 THERAPEUTIC ACTIVITIES: CPT

## 2021-06-02 PROCEDURE — 97110 THERAPEUTIC EXERCISES: CPT

## 2021-06-02 NOTE — OP THERAPY DAILY TREATMENT
"  Outpatient Occupational Therapy  DAILY TREATMENT     [facility] Occupational Therapy [address]  [address]  [place] [address]  Phone:  [phone]  Fax:  [phone]    Date: 06/02/2021    Patient: Nathalie Aguilar  YOB: 1960  MRN: [id number]     Time Calculation  Start time: 0200  Stop time: 0300 Time Calculation (min): 60 minutes         Chief Complaint: Extremity Weakness (BUE) and Other (cognition, STM)    Visit #: 2    SUBJECTIVE: \"I'm really tired\"    OBJECTIVE:  Current objective measures:    strength: Left: 32lbs, Right: 35lbs  Opposition/FNF: mild-moderate tremor  SLUMS: 23/30  Trail Making Test Part A: 41 seconds        Therapeutic Exercises (CPT 48001):     1.     Therapeutic Exercise Summary:  Issued and instructed on light resistance theraputty exercises focused on , rolling a log with alternating 2-pt pinch, lateral pinch, lumbrical pinch, in-hand manipulation, picking out coins, thumb flexion, and GMC ther ex punching holes with black plastic tube.   To perform 2 x day.    Therapeutic Treatments and Modalities:    1. Therapeutic Activities (CPT 57617)    Therapeutic Treatments and Modalities Summary: SLUMS: 23/30, Trail Making Test Part A.  Word retrieval activity for names of animals based on categories and visualization of environment, (farm, forest, [place]) with improved response.    Time-based treatments/modalities:  Therapeutic exercise minutes (CPT 34129): 30 minutes  Therapeutic activity minutes (CPT 86488): 30 minutes          ASSESSMENT:   Response to treatment:  Pt making progress with her bilateral hand strength and motor control for her daily routine in response to initiation of theraputty exercises.  Pt continues to demonstrate cognitive deficits in STM, working memory, and divided attention on the SLUMS.  Recommend SLP evaluation.  Pt to contact PCP for referral.  Issued written HEP and putty with good " understanding.    PLAN/RECOMMENDATIONS:   Plan for treatment: therapy treatment to continue next visit.  Planned interventions for next visit: continue with current treatment, therapeutic activities (CPT 07581) and therapeutic exercise (CPT 10857)

## 2021-06-02 NOTE — PROGRESS NOTES
Chief Complaint   Patient presents with   • New Patient     Dx: Right atrial mass       Subjective:   Nathalie Aguilar is a 60 y.o. female who presents today for follow-up of recent hospitalization where I seen her in consult for concern for right atrial mass father was more likely prominent eustachian valve at the time she had a seizure cardiac arrest was on a ventilator she had prolonged recovery but did recover from hospital stay she has been abstinent from alcohol and off benzodiazepines she is seen with her daughter today    Past Medical History:   Diagnosis Date   • ASTHMA    • Cancer (HCC)     breast   • Heroin addiction (HCC)    • Pneumonia    • Psychiatric disorder      Past Surgical History:   Procedure Laterality Date   • INCISION AND DRAINAGE ORTHOPEDIC Right 1/25/2019    Procedure: INCISION AND DRAINAGE ORTHOPEDIC - HAND;  Surgeon: Kendrick Campbell M.D.;  Location: SURGERY [facility];  Service: Orthopedics   • CHOLECYSTECTOMY     • GYN SURGERY      partial hysterectomy     Family History   Problem Relation Age of Onset   • No Known Problems Mother    • Alcohol abuse Father    • Heart Disease Neg Hx      Social History     Socioeconomic History   • Marital status:      Spouse name: Not on file   • Number of children: Not on file   • Years of education: Not on file   • Highest education level: Not on file   Occupational History   • Not on file   Tobacco Use   • Smoking status: Current Every Day Smoker     Packs/day: 0.50   • Smokeless tobacco: Never Used   Vaping Use   • Vaping Use: Never used   Substance and Sexual Activity   • Alcohol use: Yes     Comment: 1 pint hard alcohol daily   • Drug use: Yes     Comment: history of heroin use   • Sexual activity: Not on file   Other Topics Concern   • Not on file   Social History Narrative   • Not on file     Social Determinants of Health     Financial Resource Strain:    • Difficulty of Paying Living Expenses:    Food Insecurity:    • Worried  About Running Out of Food in the Last Year:    • Ran Out of Food in the Last Year:    Transportation Needs:    • Lack of Transportation (Medical):    • Lack of Transportation (Non-Medical):    Physical Activity:    • Days of Exercise per Week:    • Minutes of Exercise per Session:    Stress:    • Feeling of Stress :    Social Connections:    • Frequency of Communication with Friends and Family:    • Frequency of Social Gatherings with Friends and Family:    • Attends Baptism Services:    • Active Member of Clubs or Organizations:    • Attends Club or Organization Meetings:    • Marital Status:    Intimate Partner Violence:    • Fear of Current or Ex-Partner:    • Emotionally Abused:    • Physically Abused:    • Sexually Abused:      No Known Allergies  Outpatient Encounter Medications as of 6/1/2021   Medication Sig Dispense Refill   • clonazePAM (KLONOPIN) 1 MG Tab TAKE 1 TABLET BY MOUTH FOUR TIMES A DAY  30D F41.9     • QUEtiapine (SEROQUEL) 200 MG Tab Take 200 mg by mouth.     • venlafaxine (EFFEXOR-XR) 150 MG extended-release capsule Take  by mouth every morning. Take 1 capsule by mouth every morning     • gabapentin (NEURONTIN) 300 MG Cap Take 1 capsule by mouth 3 times a day. 90 capsule 2   • levetiracetam (KEPPRA) 1000 MG tablet Take 1 tablet by mouth every 12 hours. 180 tablet 5   • mirtazapine (REMERON) 15 MG Tab Take 1 tablet by mouth at bedtime. 30 tablet 2   • omeprazole (PRILOSEC) 20 MG delayed-release capsule Take 1 capsule by mouth 2 times a day. 60 capsule 2   • traZODone (DESYREL) 50 MG Tab Take 1 tablet by mouth at bedtime. 30 tablet 2   • [DISCONTINUED] methocarbamol (ROBAXIN) 500 MG Tab Take 1 tablet by mouth 3 times a day as needed. (Patient not taking: Reported on 6/1/2021) 30 tablet 0     No facility-administered encounter medications on file as of 6/1/2021.     Review of Systems   Constitutional: Positive for malaise/fatigue and weight loss.   Cardiovascular: Positive for chest pain.  "  Gastrointestinal: Positive for abdominal pain and heartburn.   Genitourinary: Positive for frequency.   Musculoskeletal: Positive for back pain, falls, joint pain and neck pain.   Skin: Positive for itching.   Neurological: Positive for weakness and headaches.   Endo/Heme/Allergies: Bruises/bleeds easily.   Psychiatric/Behavioral: Positive for depression, memory loss, substance abuse and suicidal ideas. The patient is nervous/anxious.         Objective:   /82 (BP Location: Left arm, Patient Position: Sitting, BP Cuff Size: Adult)   Pulse (!) 112   Resp 16   Ht 1.702 m (5' 7\")   Wt 58.9 kg (129 lb 12.8 oz)   SpO2 91%   BMI 20.33 kg/m²     Physical Exam   Constitutional: No distress.   Eyes: No scleral icterus.   Neck: No JVD present.   Cardiovascular: Normal rate and normal heart sounds. Exam reveals no gallop and no friction rub.   No murmur heard.  Pulmonary/Chest: No respiratory distress. She has no wheezes. She has no rales.   Abdominal: Soft. Bowel sounds are normal.   Neurological: She is alert.   Skin: No rash noted. She is not diaphoretic.     We reviewed the results from hospitalization during chemistry CBC    Reviewed the images of her echocardiogram showing possible right atrial mass versus prominent eustachian valve  Assessment:     1. Essential hypertension     2. QT prolongation     3. Right atrial mass  MR-CARDIAC MORPH/FUNC WITH & W/O       Medical Decision Making:  Today's Assessment / Status / Plan:     It was my pleasure to meet with Ms. Aguilar.    We discussed that with her seizure she was noted to have a prolonged QT interval and that she needs to avoid QT prolonging medications and to make sure that with the medications for QT intervals are checked subsequent QT is over the hospital stay improved to normal    For the concern for cardiac mass  best test is a cardiac MRI she is able to participate now and understands the implications    We will follow up with Ms. Aguilar on the " results of the testing over the phone. We will determine further follow-up from there.    It is my pleasure to participate in the care of Ms. Aguilar.  Please do not hesitate to contact me with questions or concerns.    Asa Sanchez MD PhD [facility]  Cardiologist [facility]    Please note that this dictation was created using voice recognition software. There may be errors I did not discover before finalizing the note.     6/1/2021  6:13 PM

## 2021-06-07 ENCOUNTER — OCCUPATIONAL THERAPY (OUTPATIENT)
Dept: OCCUPATIONAL THERAPY | Facility: REHABILITATION | Age: 61
End: 2021-06-07
Attending: PHYSICAL MEDICINE & REHABILITATION
Payer: MEDICAID

## 2021-06-07 DIAGNOSIS — S06.5X9D TRAUMATIC SUBDURAL HEMORRHAGE WITH LOSS OF CONSCIOUSNESS OF UNSPECIFIED DURATION, SUBSEQUENT ENCOUNTER: ICD-10-CM

## 2021-06-07 PROCEDURE — 97110 THERAPEUTIC EXERCISES: CPT

## 2021-06-07 PROCEDURE — 97530 THERAPEUTIC ACTIVITIES: CPT

## 2021-06-07 NOTE — OP THERAPY DAILY TREATMENT
"  Outpatient Occupational Therapy  DAILY TREATMENT     [facility] Occupational [address].  [address]  [place] [address]  Phone:  [phone]  Fax:  [phone]    Date: 06/07/2021    Patient: Nathalie Aguilar  YOB: 1960  MRN: [id number]     Time Calculation  Start time: 0100  Stop time: 0200 Time Calculation (min): 60 minutes         Chief Complaint: Extremity Weakness (BUE, tremors) and Other (cognitive deficits)    Visit #: 3    SUBJECTIVE: \"I was able to work on the putty\"    OBJECTIVE:  Current objective measures:   strength: Left: 40lbs, Right: 39lbs  Opposition/FNF: mild-moderate tremor, L > R  Purdue Pegboard: 10 rows pin/washer/sleeve sequence alternating hands performed in 3 minutes 53 seconds.        Therapeutic Exercises (CPT 76789):     1. BH    Therapeutic Exercise Summary:  Red foam tube exercises: EDC gliding \"hook and roll\" x 20 reps, 3-pt pinch 10 reps of 5 second hold, thumb flexion 10 reps of 5 second holds, and interossei strengthening 3-5 second holds x 5 cycles.  Motor control exercises using tongue depressor: rotation bilateral directions x 30 reps, flipping forward/backward using thumb/IF/MF bilateral directions x 30 reps, and turning over/back on table alternating digits with thumb x 5 cycles each direction with intermittent use of towel.  To perform 2 x day.      Therapeutic Treatments and Modalities:    1. Therapeutic Activities (CPT 78788)    Therapeutic Treatments and Modalities Summary: Purdue Pegboard, see results above.  Multi-Matrix board using numbers in ascending order 1-35 alternating hands completed in 3 minutes 57 seconds.  Repeated in descending order completed in 3 minutes 58 seconds.  Repeated using letters of alphabet in forward sequence incorporating word associations in categories of animals vs food items completed in 8 minutes 36 seconds.    Time-based treatments/modalities:  Therapeutic exercise minutes (CPT 15313): 30 " minutes  Therapeutic activity minutes (CPT 42791): 30 minutes        ASSESSMENT:   Response to treatment:  Pt making progress with her bilateral hand strength and motor control along with improved divided attention and working memory for her ADLs and iADLs in response to interventions described above.  HEP updated with good understanding.    PLAN/RECOMMENDATIONS:   Plan for treatment: therapy treatment to continue next visit.  Planned interventions for next visit: continue with current treatment, therapeutic activities (CPT 61169) and therapeutic exercise (CPT 53018)

## 2021-06-22 ENCOUNTER — APPOINTMENT (OUTPATIENT)
Dept: PHYSICAL THERAPY | Facility: REHABILITATION | Age: 61
End: 2021-06-22
Attending: PHYSICAL MEDICINE & REHABILITATION
Payer: MEDICAID

## 2021-06-23 ENCOUNTER — OCCUPATIONAL THERAPY (OUTPATIENT)
Dept: OCCUPATIONAL THERAPY | Facility: REHABILITATION | Age: 61
End: 2021-06-23
Attending: PHYSICAL MEDICINE & REHABILITATION
Payer: MEDICAID

## 2021-06-23 DIAGNOSIS — S06.5X9D TRAUMATIC SUBDURAL HEMORRHAGE WITH LOSS OF CONSCIOUSNESS OF UNSPECIFIED DURATION, SUBSEQUENT ENCOUNTER: ICD-10-CM

## 2021-06-23 PROCEDURE — 97530 THERAPEUTIC ACTIVITIES: CPT

## 2021-06-23 PROCEDURE — 97110 THERAPEUTIC EXERCISES: CPT

## 2021-06-23 NOTE — OP THERAPY DAILY TREATMENT
Outpatient Occupational Therapy  DAILY TREATMENT     [facility] Occupational Therapy [address].  [address]  [place] [address]  Phone:  [phone]  Fax:  [phone]    Date: 06/23/2021    Patient: Nathalie Aguilar  YOB: 1960  MRN: [id number]     Time Calculation  Start time: 0230  Stop time: 0330 Time Calculation (min): 60 minutes         Chief Complaint: Hand Weakness and Hand Problem    Visit #: 1    SUBJECTIVE:  I am doing my HEP 1 x day as I have PT exercises as well    OBJECTIVE:  Current objective measures:    strength: Left: 36lbs, Right: 36lbs    Opposition/FNF: WFL with no tremor noted.   Able to write name legibily    Purdue Pegboard: 10 rows pin/washer/sleeve sequence alternating hands performed in 3 minutes 0 seconds.    Trail Making Test Part B= correctly completed in 150 seconds    Grooved Pegboard= R=219 seconds  L=260 seconds, having difficulty as patient had bandages on B thumbs        Therapeutic Exercises (CPT 76071):     1. Gross and fine motor strengthening    2. Gross and fine motor coordination    Therapeutic Treatments and Modalities:    1. Therapeutic Activities (CPT 70940)    Therapeutic Treatments and Modalities Summary: Fine motor manipulation activity using Grooved Pegboard and visual attention/divided attention activity using Trail Making Test part B      Time-based treatments/modalities:  Therapeutic exercise minutes (CPT 03016): 30 minutes  Therapeutic activity minutes (CPT 84539): 30 minutes        Pain rating before treatment: 0  Pain rating after treatment: 0    ASSESSMENT:   Response to treatment: no tremors now with no issues with writing or feeding self with utensils now.  To continue with HEP and increase to 2-3 x a day    PLAN/RECOMMENDATIONS:   Plan for treatment: therapy treatment to continue next visit.  Planned interventions for next visit: continue with current treatment, therapeutic activities (CPT 53497) and therapeutic  exercise (CPT 34719)

## 2021-06-25 ENCOUNTER — PHYSICAL THERAPY (OUTPATIENT)
Dept: PHYSICAL THERAPY | Facility: REHABILITATION | Age: 61
End: 2021-06-25
Attending: PHYSICAL MEDICINE & REHABILITATION
Payer: MEDICAID

## 2021-06-25 DIAGNOSIS — S06.5X9D TRAUMATIC SUBDURAL HEMORRHAGE WITH LOSS OF CONSCIOUSNESS OF UNSPECIFIED DURATION, SUBSEQUENT ENCOUNTER: ICD-10-CM

## 2021-06-25 PROCEDURE — 97162 PT EVAL MOD COMPLEX 30 MIN: CPT

## 2021-06-25 SDOH — ECONOMIC STABILITY: GENERAL: QUALITY OF LIFE: GOOD

## 2021-06-25 ASSESSMENT — ACTIVITIES OF DAILY LIVING (ADL)
AMBULATION_WITHOUT_ASSISTIVE_DEVICE: INDEPENDENT
POOR_BALANCE: 1

## 2021-06-25 ASSESSMENT — ENCOUNTER SYMPTOMS
PAIN SCALE AT HIGHEST: 0
PAIN SCALE AT LOWEST: 0
PAIN SCALE: 0

## 2021-06-25 NOTE — OP THERAPY EVALUATION
"  Outpatient Physical Therapy  INITIAL NEUROLOGICAL EVALUATION    [facility] Physical Therapy [facility]  [address]  [address]  [place] [address]  Phone:  [phone]  Fax:  [phone]    Date of Evaluation: 06/25/2021    Patient: Nathalie Aguilar  YOB: 1960  MRN: [id number]     Referring Provider: Hortencia Dudley M.D.  [address]  [address]  [place] [address]   Referring Diagnosis Traumatic subdural hemorrhage with loss of consciousness of unspecified duration, subsequent encounter [S06.5X9D]     Time Calculation    Start time: 1340  Stop time: 1430 Time Calculation (min): 50 minutes             Chief Complaint: Weakness and Loss Of Balance    Visit Diagnoses     ICD-10-CM   1. Traumatic subdural hemorrhage with loss of consciousness of unspecified duration, subsequent encounter  S06.5X9D       Subjective:   History of Present Illness:     Date of onset:  4/13/2021    Mechanism of injury:  Referring provider note from IRF admit 4/27-5/7  \"The patient is a 60 y.o. female with a past medical history of substance abuse and asthma who presented on 4/13/21 with AMS after fall. Patient reportedly had headstrike during fall and was found down in home by her daughter. Patient BIBA and found to have acute on chronic bilateral SDHs. NSG was consulted and recommended conservative management.  In ED patient had seizure like activity while being transferred and became apneic and non-responsive. Patient underwent 5 minutes of compression and 1 round of epinephrine.  Patient was intubated and admitted to ICU for PEA arrest.  Patient has since been stabilized on Keppra.  Patient with EEG on 4/15/21 which showed no seizure like activity. Patient's stay also complicated by melena and GI was consulted. Per GI hold on intervention at that time and manage with PPI. TTE reportedly showed right atrial mass and cardiology was consulted. Per cardiology felt the mass was most likely prominent " "eustachian valve but should have cardiac MRI as outpatient. Patient was extubated on 21.  Patient did require transfusion while inpatient. \"    Pt reports she stopped using FWW 1 month ago without difficulty. Has not tried ambulating on uneven ground yet. Does not feel that her LE are weak but can walk her do fo 30-45 min with min soreness.    Pt reports some pain in low back and glutes. Pt believes it is from losing weight, but then report she has not lost weight. Would like to maybe work on balance, has always been clumsy per report. Pt has trouble getting off the floor when plaing with her grandchildren.    Pt has not been sleeping, having nightmeares. Seeing physchiatrist next week.     Reports she is an alocholic. Once to get her apartment re-done before moving back in. Also, enjoying time with family. Although, her family can be too helpful and prevent her from \"over doing it.\" Like unloading the    Quality of life:  Good  Sleep disturbance:  Interrupted sleep  Pain:     Current pain ratin    At best pain ratin    At worst pain ratin  Social Support:     Lives in:  One-story house    Lives with:  Adult children  Hand dominance:  Left  Treatments:     Current treatment:  Occupational therapy    Treatment Comments:  Seeing OT for hand pain    Wants a speech therapy referral for word finding and memory difficulties.       Past Medical History:   Diagnosis Date   • ASTHMA    • Cancer (HCC)     breast   • Heroin addiction (HCC)    • Pneumonia    • Psychiatric disorder      Past Surgical History:   Procedure Laterality Date   • INCISION AND DRAINAGE ORTHOPEDIC Right 2019    Procedure: INCISION AND DRAINAGE ORTHOPEDIC - HAND;  Surgeon: Kendrick Campbell M.D.;  Location: SURGERY [facility];  Service: Orthopedics   • CHOLECYSTECTOMY     • GYN SURGERY      partial hysterectomy     Social History     Tobacco Use   • Smoking status: Current Every Day Smoker     Packs/day: 0.50   • " Smokeless tobacco: Never Used   Substance Use Topics   • Alcohol use: Yes     Comment: 1 pint hard alcohol daily     Family and Occupational History     Socioeconomic History   • Marital status:      Spouse name: Not on file   • Number of children: Not on file   • Years of education: Not on file   • Highest education level: Not on file   Occupational History   • Not on file       Objective:     Strength:   Lower extremity (left):     Hip flexion: 4    Hip extension: 3-    Hip abduction: 4-    Hip adduction: 3-    Knee flexion: 4    Knee extension: 4+    Ankle dorsiflexion: 4+    Ankle plantar flexion: 4  Lower extremity (right):     Hip flexion: 4    Hip extension: 3-    Hip abduction: 3+    Hip adduction: 3-    Knee flexion: 4    Knee extension: 4+    Ankle dorsiflexion: 4+    Ankle plantar flexion: 4    Strength Comments:  5STS 17.79 standard chair, no UE  6MWT 1098feet no AD  Sit up test 1/5, unable to clear scap from table, able to initiate/head lift    Posture: Right lateral trunk lean, mod forward head    Tone, Sensation and Coordination:     Coordination   Lower extremity (left):     Heel to shin: Within functional limits  Lower extremity (right):     Heel to shin: Within functional limits    Ambulation: Level Surfaces   Ambulation without assistive device: independent    Additional Level Surfaces Ambulation Details  Pt ambulates with right trendelenburg gait, occasional right steppage, good overall heel strike.        Therapeutic Exercises (CPT 96837):     1. Sl adduction, x 5 B    2. Reverse clamshell, x 10 B    3. Quadruped fire hydrant, x 5      Therapeutic Exercise Summary: HEP: SL adduction, reverse clamshells, bridge 5 x 30 seconds, quadruped fire hydrants, standing hip ext      Time-based treatments/modalities:    Physical Therapy Timed Treatment Charges  Therapeutic exercise minutes (CPT 09981): 5 minutes      Assessment, Response and Plan:   Impairments: abnormal gait, activity intolerance,  impaired balance, impaired physical strength and lacks appropriate home exercise program    Assessment details:  Nathalie is 60 year old female presents to therapy 10 weeks post fall and subdural hematoma with resulting IRF stay. Pt ambulating independently with mod gait deviations related to weakness and impaired balance. Pt demos significant LE, hip, and core weakness with inability to cleared shoulder blades from surface in hooklying position. Pt demo'd significant dynamic gait deificits during miniBEST test but otherwise fair balance. Pt will benefit from skilled PT in order to improve above deficits, decrease fall risk, and return to PLOF.   Barriers to therapy:  Comorbidities and psychosocial  Prognosis: good    Goals:   Short Term Goals:   1. Pt will be compliant with HEP 3-5x per week for improving strength.  2. Pt will ambulate 50 feet on unstable/outdoor surfaces without LOB and SBA for safety.  3. Pt will demonstrate improved functional LE strength with 5STS score no UE 12 seconds or better.     Short term goal time span:  2-4 weeks      Long Term Goals:    1. Pt will demonstrate improved dynamic gait abilities with miniBEST sub score 8/10 or better in order to safely access the community.  2. Pt will demonstrate improved hip adduction strength 4/5 and hip ext 4/5 or better.  3. Pt will demonstrate improved cv endurance with 6MWT score 1500 feet or more.  4. Pt will ambulate on outdoor/uneven surfaces 300 feet independently without significant LOB.     Long term goal time span:  2-4 months    Plan:   Therapy options:  Physical therapy treatment to continue  Planned therapy interventions:  Gait Training (CPT 47852), Neuromuscular Re-education (CPT 34392), Therapeutic Exercise (CPT 64351) and Therapeutic Activities (CPT 57612)  Frequency:  2x week  Duration in weeks:  10  Discussed with:  Patient  Plan details:  Pt to be seen 1-2x per week for 8-10 weeks       Functional Assessment Used  PT Functional  Assessment Tool Used: MiniBEST  PT Functional Assessment Score: Anticipatory 6/6, Reactive 4/6, Sensory 5/6 Gait 5/10       Referring provider co-signature:  I have reviewed this plan of care and my co-signature certifies the need for services.    Certification Period: 06/25/2021 to  09/03/21    Physician Signature: ________________________________ Date: ______________

## 2021-06-28 ENCOUNTER — APPOINTMENT (OUTPATIENT)
Dept: PHYSICAL THERAPY | Facility: REHABILITATION | Age: 61
End: 2021-06-28
Attending: PHYSICAL MEDICINE & REHABILITATION
Payer: MEDICAID

## 2021-06-28 NOTE — OP THERAPY DAILY TREATMENT
Outpatient Physical Therapy  DAILY TREATMENT     [facility] Physical Therapy [address]  [address]  [place] [address]  Phone:  [phone]  Fax:  [phone]    Date: 06/28/2021    Patient: Nathalie Aguilar  YOB: 1960  MRN: [id number]     Time Calculation                   Chief Complaint: No chief complaint on file.    Visit #: 2    SUBJECTIVE:  ***    OBJECTIVE:  Current objective measures: ***          Therapeutic Exercises (CPT 34857):     1. Bridge    2. SL adduction    3. Reverse clamshell    4. Clamshell holds    5. Tall kneeling heel sits    6. Quadruped    20. POC 9/3    Therapeutic Treatments and Modalities:     1. Neuromuscular Re-education (CPT 93467)    Therapeutic Treatment and Modalities Summary: NMR  Tall kneeling chops/lifts  Half kneeling  Half kneeling reaching    Time-based treatments/modalities:           ASSESSMENT:   Response to treatment: ***    PLAN/RECOMMENDATIONS:   Plan for treatment: therapy treatment to continue next visit.  Planned interventions for next visit: continue with current treatment.

## 2021-06-29 ENCOUNTER — APPOINTMENT (OUTPATIENT)
Dept: OCCUPATIONAL THERAPY | Facility: REHABILITATION | Age: 61
End: 2021-06-29
Attending: PHYSICAL MEDICINE & REHABILITATION
Payer: MEDICAID

## 2021-06-29 NOTE — OP THERAPY DAILY TREATMENT
"  Outpatient Occupational Therapy  DAILY TREATMENT     [facility] Occupational Therapy [address].  [address]  [place] [address]  Phone:  [phone]  Fax:  [phone]    Date: 06/29/2021    Patient: Nathalie Aguilar  YOB: 1960  MRN: [id number]     Time Calculation                 Chief Complaint: No chief complaint on file.    Visit #: 4    SUBJECTIVE:  ***    OBJECTIVE:  Current objective measures:    strength: Left: 36lbs, Right: 36lbs     Opposition/FNF: WFL with no tremor noted.   Able to write name legibily     Purdue Pegboard: 10 rows pin/washer/sleeve sequence alternating hands performed in 3 minutes 0 seconds.     Trail Making Test Part B= correctly completed in 150 seconds     Grooved Pegboard= R=219 seconds  L=260 seconds, having difficulty as patient had bandages on B thumbs      Exercises/Treatments  Time-based treatments/modalities:           Pain rating before treatment: {PAIN NUMBERS_1-10:89948}  Pain rating after treatment: {PAIN NUMBERS_1-10:86300}    ASSESSMENT:   Response to treatment: ***    PLAN/RECOMMENDATIONS:   Plan for treatment: {Therapy Plan:[id number]::\"therapy treatment to continue next visit\"}.  Planned interventions for next visit: {planned OT Interventions:[id number]}       "

## 2021-07-01 ENCOUNTER — PHYSICAL THERAPY (OUTPATIENT)
Dept: PHYSICAL THERAPY | Facility: REHABILITATION | Age: 61
End: 2021-07-01
Attending: PHYSICAL MEDICINE & REHABILITATION
Payer: MEDICAID

## 2021-07-01 DIAGNOSIS — S06.5X9D TRAUMATIC SUBDURAL HEMORRHAGE WITH LOSS OF CONSCIOUSNESS OF UNSPECIFIED DURATION, SUBSEQUENT ENCOUNTER: ICD-10-CM

## 2021-07-01 PROCEDURE — 97110 THERAPEUTIC EXERCISES: CPT

## 2021-07-01 PROCEDURE — 97112 NEUROMUSCULAR REEDUCATION: CPT

## 2021-07-01 NOTE — OP THERAPY DAILY TREATMENT
Outpatient Physical Therapy  DAILY TREATMENT     [facility] Physical [address]  [address]  [place] [address]  Phone:  [phone]  Fax:  [phone]    Date: 07/01/2021    Patient: Nathalie Aguilar  YOB: 1960  MRN: [id number]     Time Calculation    Start time: 1330  Stop time: 1426 Time Calculation (min): 56 minutes         Chief Complaint: Weakness, Loss Of Balance, and Difficulty Walking    Visit #: 2    SUBJECTIVE:  Pt fell Sunday night, she tripped over a shoe. Reports right glute pain after fall she describers as old sciatic pain.    OBJECTIVE:            Therapeutic Exercises (CPT 01566):     1. Nu step, 10 min, lvl 3, 70-80% max HR    2. Piriformis stretch, 3 x 1min, Rt    3. Hip adduction, 3 x 10B     4. Hip abduction, 3 x 10B    5. Bridge, 4 x 30 sec    6. Quadruped alt UE lifts, 2 x 20B    7. Quadruped frie hydrants, 2 x 10 B    20. 9/3/21 POC    Therapeutic Treatments and Modalities:     1. Neuromuscular Re-education (CPT 54963)    Therapeutic Treatment and Modalities Summary: Tall kneeling on airex x 2 min  -2 min reaching inside/outside IVA    STS on airex 2 x 10 cues to dec posterior lean on mat table, 20 inch table    Narrow IVA on airex x 1 min  Head turns x 1 min, min a  EC, 1 LOB to Rt min a, 1 min    Time-based treatments/modalities:    Physical Therapy Timed Treatment Charges  Neuromusc re-ed, balance, coor, post minutes (CPT 19913): 16 minutes  Therapeutic exercise minutes (CPT 28670): 40 minutes        ASSESSMENT:   Pt demonstrated significant weakness during session with against gravity interventions. Mod cues required for AD and Ab to decrease hip flexion compensation including tactile cues. Pt has difficulty with proprioception, unable to determine if she is standing up straight or flexed and if her LE is straight. Mod FF posture, will assess hip flexor tightness and proprioception next session. Educated pt on importance of decreasing clutter  to dec fall risk in the home. Educated on DOMS and importance of hydration. Encouraged self awareness to not over do it at home and be safe.     PLAN/RECOMMENDATIONS:   Plan for treatment: therapy treatment to continue next visit.  Planned interventions for next visit: continue with current treatment.

## 2021-07-02 ENCOUNTER — TELEPHONE (OUTPATIENT)
Dept: OCCUPATIONAL THERAPY | Facility: REHABILITATION | Age: 61
End: 2021-07-02

## 2021-07-02 ENCOUNTER — APPOINTMENT (OUTPATIENT)
Dept: OCCUPATIONAL THERAPY | Facility: REHABILITATION | Age: 61
End: 2021-07-02
Attending: FAMILY MEDICINE
Payer: MEDICAID

## 2021-07-02 NOTE — OP THERAPY DISCHARGE SUMMARY
Outpatient Occupational Therapy  DISCHARGE SUMMARY NOTE    [address].  [address]  [place] [address]  Phone:  [phone]  Fax:  [phone]    Date of Visit: 07/02/2021    Patient: Nathalie Aguilar  YOB: 1960  MRN: [id number]     Referring Provider: No referring provider defined for this encounter.   Referring Diagnosis: No admission diagnoses are documented for this encounter.           Your patient is being discharged from Occupational Therapy with the following comments:   · Patient cancelled or missed more than 2 scheduled appointments/non-compliant    Comments:  Patient seen for initial evaluation on 5/27/21 and attended follow up visits; however has late cancelled/no show for 3 appointments and therefore discharged from OT.     Limitations Remaining:  Impaired strength, dexterity, function    Recommendations:  Continue with HEP    Catina Rutherford MS,OTR/L    Date: 7/2/2021

## 2021-07-02 NOTE — OP THERAPY DAILY TREATMENT
"  Outpatient Occupational Therapy  DAILY TREATMENT     [facility] Occupational Therapy [address].  [address]  [place] [address]  Phone:  [phone]  Fax:  [phone]    Date: 07/02/2021    Patient: Nathalie Aguilar  YOB: 1960  MRN: [id number]     Time Calculation                 Chief Complaint: No chief complaint on file.    Visit #: 4    SUBJECTIVE:  ***    OBJECTIVE:  Current objective measures:    strength: Left: 36lbs, Right: 36lbs     Opposition/FNF: WFL with no tremor noted.   Able to write name legibily     Purdue Pegboard: 10 rows pin/washer/sleeve sequence alternating hands performed in 3 minutes 0 seconds.     Trail Making Test Part B= correctly completed in 150 seconds     Grooved Pegboard= R=219 seconds  L=260 seconds, having difficulty as patient had bandages on B thumbs      Exercises/Treatments  Time-based treatments/modalities:           Pain rating before treatment: {PAIN NUMBERS_1-10:16450}  Pain rating after treatment: {PAIN NUMBERS_1-10:02450}    ASSESSMENT:   Response to treatment: ***    PLAN/RECOMMENDATIONS:   Plan for treatment: {Therapy Plan:[id number]::\"therapy treatment to continue next visit\"}.  Planned interventions for next visit: {planned OT Interventions:[id number]}       "

## 2021-07-08 ENCOUNTER — PHYSICAL THERAPY (OUTPATIENT)
Dept: PHYSICAL THERAPY | Facility: REHABILITATION | Age: 61
End: 2021-07-08
Attending: PHYSICAL MEDICINE & REHABILITATION
Payer: MEDICAID

## 2021-07-08 DIAGNOSIS — S06.5X9D TRAUMATIC SUBDURAL HEMORRHAGE WITH LOSS OF CONSCIOUSNESS OF UNSPECIFIED DURATION, SUBSEQUENT ENCOUNTER: ICD-10-CM

## 2021-07-08 PROCEDURE — 97112 NEUROMUSCULAR REEDUCATION: CPT

## 2021-07-08 PROCEDURE — 97110 THERAPEUTIC EXERCISES: CPT

## 2021-07-08 NOTE — OP THERAPY DAILY TREATMENT
Outpatient Physical Therapy  DAILY TREATMENT     [facility] Physical Therapy [address].  [address]  [place] [address]  Phone:  [phone]  Fax:  [phone]    Date: 07/08/2021    Patient: Nathalie Aguilar  YOB: 1960  MRN: [id number]     Time Calculation    Start time: 1129  Stop time: 1213 Time Calculation (min): 44 minutes         Chief Complaint: Weakness, Loss Of Balance, and Difficulty Walking    Visit #: 3    SUBJECTIVE:  Pt reports compliance with HEP. Improved glute pain with piriformis stretch for HEP. Reports she is trying to create an altered personality that does not have her memories or problems.    OBJECTIVE:            Therapeutic Exercises (CPT 25305):     1. Nu step, 8 min, lvl 3, 70-80% max HR    3. Hip adduction, 3 x 10B     4. Hip abduction, 3 x 10B, 2#AW, cues dec hip flexion    5. Bridge, 3 x 1 min    6. Quadruped alt UE lifts, 0    7. Quadruped frie hydrants, 0    20. 9/3/21 POC    Therapeutic Treatments and Modalities:     1. Neuromuscular Re-education (CPT 90151)    Therapeutic Treatment and Modalities Summary: Modified SLS NWB on 6 inch step  X 1 min B  1 x min B horizontal head turns (cues for head vs trunk)  1 x min B EC CGA/min A    Semi tandem EC x 1 min CGA bilateral    Time-based treatments/modalities:    Physical Therapy Timed Treatment Charges  Neuromusc re-ed, balance, coor, post minutes (CPT 28394): 10 minutes  Therapeutic exercise minutes (CPT 74780): 34 minutes      ASSESSMENT:   Encouraged pt to seek outside assistance with stress and memories in her life from mental health provider.  Pt tolerated strength progression without pain. Continues to have moderate LE and core weakness with bridges approx 50% rom. Adductor strength improving but continues to be weak with mod cues for knee ext for full lever arm. Severe difficulty on Rt LE. Continued education for HEP compliance.    PLAN/RECOMMENDATIONS:   Plan for treatment: therapy treatment to  continue next visit.  Planned interventions for next visit: continue with current treatment.

## 2021-07-09 ENCOUNTER — APPOINTMENT (OUTPATIENT)
Dept: OCCUPATIONAL THERAPY | Facility: REHABILITATION | Age: 61
End: 2021-07-09
Attending: FAMILY MEDICINE
Payer: MEDICAID

## 2021-07-12 ENCOUNTER — HOSPITAL ENCOUNTER (OUTPATIENT)
Dept: RADIOLOGY | Facility: MEDICAL CENTER | Age: 61
End: 2021-07-12
Attending: INTERNAL MEDICINE
Payer: MEDICAID

## 2021-07-12 DIAGNOSIS — I51.89 RIGHT ATRIAL MASS: ICD-10-CM

## 2021-07-12 PROCEDURE — A9577 INJ MULTIHANCE: HCPCS | Performed by: INTERNAL MEDICINE

## 2021-07-12 PROCEDURE — 700117 HCHG RX CONTRAST REV CODE 255: Performed by: INTERNAL MEDICINE

## 2021-07-12 PROCEDURE — 75561 CARDIAC MRI FOR MORPH W/DYE: CPT

## 2021-07-12 RX ADMIN — GADOBENATE DIMEGLUMINE 20 ML: 529 INJECTION, SOLUTION INTRAVENOUS at 15:10

## 2021-07-13 ENCOUNTER — APPOINTMENT (OUTPATIENT)
Dept: OCCUPATIONAL THERAPY | Facility: REHABILITATION | Age: 61
End: 2021-07-13
Attending: FAMILY MEDICINE
Payer: MEDICAID

## 2021-07-15 ENCOUNTER — APPOINTMENT (OUTPATIENT)
Dept: OCCUPATIONAL THERAPY | Facility: REHABILITATION | Age: 61
End: 2021-07-15
Attending: FAMILY MEDICINE
Payer: MEDICAID

## 2021-07-19 ENCOUNTER — TELEPHONE (OUTPATIENT)
Dept: CARDIOLOGY | Facility: MEDICAL CENTER | Age: 61
End: 2021-07-19

## 2021-07-19 NOTE — LETTER
"July 19, 2021        Nathalie Aguilar  [address]  [address]  [place] [address]          Dear Nathalie,    We have received the results of your recent: MRI    Your test came back and Dr. Sanchez states, \"The MRI looks good, NO MASS.  She should monitor for breathing and swelling and let us know if this changes otherwise follow up as needed perhaps every 5 years.  As always avoid medications that prolong the QT.\"    Please follow up as previously discussed with your physician.      Feel free to call us with any questions.        Sincerely,          Prisca Sanchez  Electronically Signed  "

## 2021-07-19 NOTE — TELEPHONE ENCOUNTER
----- Message from Asa Sanchez M.D. sent at 7/13/2021 12:27 PM PDT -----  The MRI looks good, NO MASS.  She should monitor for breathing and swelling and let us know if this changes otherwise follow up as needed perhaps every 5 years    As always avoid medications that prolong the QT    please let her know     Thank you

## 2021-07-20 ENCOUNTER — PHYSICAL THERAPY (OUTPATIENT)
Dept: PHYSICAL THERAPY | Facility: REHABILITATION | Age: 61
End: 2021-07-20
Attending: PHYSICAL MEDICINE & REHABILITATION
Payer: MEDICAID

## 2021-07-20 DIAGNOSIS — S06.5X9D TRAUMATIC SUBDURAL HEMORRHAGE WITH LOSS OF CONSCIOUSNESS OF UNSPECIFIED DURATION, SUBSEQUENT ENCOUNTER: ICD-10-CM

## 2021-07-20 PROCEDURE — 97112 NEUROMUSCULAR REEDUCATION: CPT

## 2021-07-20 PROCEDURE — 97110 THERAPEUTIC EXERCISES: CPT

## 2021-07-20 NOTE — OP THERAPY DAILY TREATMENT
Outpatient Physical Therapy  DAILY TREATMENT     [facility] Physical Therapy [address].  [address]  [place] [address]  Phone:  [phone]  Fax:  [phone]    Date: 07/20/2021    Patient: Nathalie Aguilar  YOB: 1960  MRN: [id number]     Time Calculation    Start time: 1330  Stop time: 1414 Time Calculation (min): 44 minutes         Chief Complaint: Weakness, Loss Of Balance, and Difficulty Walking    Visit #: 4    SUBJECTIVE:  Pt reports compliance with HEP, is increasing holds and reps.    OBJECTIVE:            Therapeutic Exercises (CPT 97056):     1. Bridge, 3 x 10, bosu ball side down    2. Shuttle squat, 30, cords 1-5    3. Shuttle calf raises, 30, cords 1-5    4. Floor transfer, x8 (UE-half kneeling)    5. SL shuttle squat, 30 B, cords 1-4    6. Quadruped alt UE lifts, 0    7. Quadruped frie hydrants, 0    8. STS, x 10, mat table 18 inch, no UE-sig Rt shift    20. 9/3/21 POC    Therapeutic Treatments and Modalities:     1. Neuromuscular Re-education (CPT 56906)    Therapeutic Treatment and Modalities Summary: Dynamic balance to dec fall risk obstacle course including up/down inclined unstable surface and 6 alternating toe taps at end to improve coordination. CGA required. X 8 reps 1 mod LOB during descend on incline. 3 mod LOB with cone taps with Lt LE stance limb.  Completed lateral ascend/descend wedges x 6 reps CGA    Modified tandem on inclined/unstable wedge  X 1 min B  X 1 min vertical head turns  X 1 min horizontal head turns    Frequent min A throughout    Time-based treatments/modalities:    Physical Therapy Timed Treatment Charges  Neuromusc re-ed, balance, coor, post minutes (CPT 54827): 14 minutes  Therapeutic exercise minutes (CPT 51504): 30 minutes      ASSESSMENT:   Pt has significant difficulty with full knee ext on with squats and calf reaises on shuttle not observed in gait or stnading. Unable to improve with tactile or verbal cueing. Pt reported  feeling her LE were straight. Pt demo'd significant Rt shift with STS without UE, unable to correct with verbal cues, will utilize mirror for feedback next session.    PLAN/RECOMMENDATIONS:   Plan for treatment: therapy treatment to continue next visit.  Planned interventions for next visit: continue with current treatment.

## 2021-07-22 ENCOUNTER — APPOINTMENT (OUTPATIENT)
Dept: OCCUPATIONAL THERAPY | Facility: REHABILITATION | Age: 61
End: 2021-07-22
Attending: FAMILY MEDICINE
Payer: MEDICAID

## 2021-07-23 ENCOUNTER — APPOINTMENT (OUTPATIENT)
Dept: PHYSICAL THERAPY | Facility: REHABILITATION | Age: 61
End: 2021-07-23
Attending: PHYSICAL MEDICINE & REHABILITATION
Payer: MEDICAID

## 2021-07-23 NOTE — OP THERAPY DAILY TREATMENT
Outpatient Physical Therapy  DAILY TREATMENT     [facility] Physical Therapy [place] [address]  [address]  [place] [address]  Phone:  [phone]  Fax:  [phone]    Date: 07/23/2021    Patient: Nathalie Aguilar  YOB: 1960  MRN: [id number]     Time Calculation                   Chief Complaint: No chief complaint on file.    Visit #: 5    SUBJECTIVE:  Pt reports compliance with HEP, is increasing holds and reps.    OBJECTIVE:            Therapeutic Exercises (CPT 70607):     1. Bridge, 3 x 10, bosu ball side down    2. Shuttle squat, 30, cords 1-5    3. Shuttle calf raises, 30, cords 1-5    4. Floor transfer, x8 (UE-half kneeling)    5. SL shuttle squat, 30 B, cords 1-4    6. Quadruped alt UE lifts, 0    7. Quadruped frie hydrants, 0    8. STS, x 10, mat table 18 inch, no UE-sig Rt shift    20. 9/3/21 POC    Therapeutic Treatments and Modalities:     1. Neuromuscular Re-education (CPT 33090)    Therapeutic Treatment and Modalities Summary: Dynamic balance to dec fall risk obstacle course including up/down inclined unstable surface and 6 alternating toe taps at end to improve coordination. CGA required. X 8 reps 1 mod LOB during descend on incline. 3 mod LOB with cone taps with Lt LE stance limb.  Completed lateral ascend/descend wedges x 6 reps CGA    Modified tandem on inclined/unstable wedge  X 1 min B  X 1 min vertical head turns  X 1 min horizontal head turns    Frequent min A throughout    Time-based treatments/modalities:           ASSESSMENT:   Pt has significant difficulty with full knee ext on with squats and calf reaises on shuttle not observed in gait or stnading. Unable to improve with tactile or verbal cueing. Pt reported feeling her LE were straight. Pt demo'd significant Rt shift with STS without UE, unable to correct with verbal cues, will utilize mirror for feedback next session.    PLAN/RECOMMENDATIONS:   Plan for treatment: therapy treatment to continue next  visit.  Planned interventions for next visit: continue with current treatment.

## 2021-07-27 ENCOUNTER — APPOINTMENT (OUTPATIENT)
Dept: OCCUPATIONAL THERAPY | Facility: REHABILITATION | Age: 61
End: 2021-07-27
Attending: FAMILY MEDICINE
Payer: MEDICAID

## 2021-07-27 ENCOUNTER — PHYSICAL THERAPY (OUTPATIENT)
Dept: PHYSICAL THERAPY | Facility: REHABILITATION | Age: 61
End: 2021-07-27
Attending: PHYSICAL MEDICINE & REHABILITATION
Payer: MEDICAID

## 2021-07-27 DIAGNOSIS — S06.5X9D TRAUMATIC SUBDURAL HEMORRHAGE WITH LOSS OF CONSCIOUSNESS OF UNSPECIFIED DURATION, SUBSEQUENT ENCOUNTER: ICD-10-CM

## 2021-07-27 PROCEDURE — 97110 THERAPEUTIC EXERCISES: CPT

## 2021-07-27 NOTE — OP THERAPY DAILY TREATMENT
Outpatient Physical Therapy  DAILY TREATMENT     [facility] Physical Therapy [address].  [address]  [place] [address]  Phone:  [phone]  Fax:  [phone]    Date: 07/27/2021    Patient: Nathalie Aguilar  YOB: 1960  MRN: [id number]     Time Calculation    Start time: 1046  Stop time: 1127 Time Calculation (min): 41 minutes         Chief Complaint: Weakness and Loss Of Balance    Visit #: 5    SUBJECTIVE:  Pt reports seeing a mental health professional yet reports she only saw they once and they're busy  Pt very upset today, blaming herself for her situation. IE when she gets off the floor shes gets upset as it reminds her she is an alcoholic.  Upset she is having  Trouble keeping trak of times and days of the week, very forgetful.     Reports no SLP referral.    Pt is going out of town, to [place], for family trip      OBJECTIVE:            Therapeutic Exercises (CPT 89446):     1. Bridge OTB for Ab, 3 x 1min    2. Shuttle squat, 30, cords 1-5    3. Shuttle calf raises, 30, cords 1-5    4. Hip adduction, 3 x 10, mod cues to dec    5. SL shuttle squat, 0, cords 1-4    6. Quadruped alt UE lifts, 10    7. Quadruped fire hydrants, 10    8. STS, x 10, mat table 18 inch, no UE-sig Rt shift    9. Nustep level 3, 8 min    10. Long sit, LE straight AD iso, 20 x 10 sec    11. Crunches, 2 x 15, min scap clearence     20. 9/3/21 POC      Therapeutic Exercise Summary: HEP update: [id number] bridge c AB band holds 3 x 1 min, long sit AD isometrics, side lying AD, fire hydrants, donkey kicks      Time-based treatments/modalities:    Physical Therapy Timed Treatment Charges  Therapeutic exercise minutes (CPT 14029): 41 minutes      ASSESSMENT:   Due to pt reported difficulty with cognition therapist recommendation for mental health professional and SLP provided in writing. Pt anger towards herself and other feelings expressed during session are important to be addressed. Throughout session PT  encouraged pt to seek care from mental health profressional ie psychiatrist.   Updated HEP for continued LE and core strengthening at home. Pt currently continues to have significant weakness with adduction and abduction requiring cues to dec compensatory strategies.   Discussed late cancellation policy with need to DC if occurs again, verbalized understanding.   PLAN/RECOMMENDATIONS:   Plan for treatment: therapy treatment to continue next visit.  Planned interventions for next visit: continue with current treatment.

## 2021-07-29 ENCOUNTER — APPOINTMENT (OUTPATIENT)
Dept: OCCUPATIONAL THERAPY | Facility: REHABILITATION | Age: 61
End: 2021-07-29
Attending: FAMILY MEDICINE
Payer: MEDICAID

## 2021-08-02 ENCOUNTER — APPOINTMENT (OUTPATIENT)
Dept: OCCUPATIONAL THERAPY | Facility: REHABILITATION | Age: 61
End: 2021-08-02
Attending: FAMILY MEDICINE
Payer: MEDICAID

## 2021-08-03 ENCOUNTER — APPOINTMENT (OUTPATIENT)
Dept: PHYSICAL THERAPY | Facility: REHABILITATION | Age: 61
End: 2021-08-03
Attending: PHYSICAL MEDICINE & REHABILITATION
Payer: MEDICAID

## 2021-08-04 ENCOUNTER — APPOINTMENT (OUTPATIENT)
Dept: OCCUPATIONAL THERAPY | Facility: REHABILITATION | Age: 61
End: 2021-08-04
Attending: FAMILY MEDICINE
Payer: MEDICAID

## 2021-08-05 ENCOUNTER — PHYSICAL THERAPY (OUTPATIENT)
Dept: PHYSICAL THERAPY | Facility: REHABILITATION | Age: 61
End: 2021-08-05
Attending: PHYSICAL MEDICINE & REHABILITATION
Payer: MEDICAID

## 2021-08-05 DIAGNOSIS — S06.5X9D TRAUMATIC SUBDURAL HEMORRHAGE WITH LOSS OF CONSCIOUSNESS OF UNSPECIFIED DURATION, SUBSEQUENT ENCOUNTER: ICD-10-CM

## 2021-08-05 PROCEDURE — 97110 THERAPEUTIC EXERCISES: CPT

## 2021-08-05 PROCEDURE — 97112 NEUROMUSCULAR REEDUCATION: CPT

## 2021-08-05 NOTE — OP THERAPY DAILY TREATMENT
Outpatient Physical Therapy  DAILY TREATMENT     [facility] Physical Therapy [address].  [address]  [place] [address]  Phone:  [phone]  Fax:  [phone]    Date: 08/05/2021    Patient: Nathalie Aguilar  YOB: 1960  MRN: [id number]     Time Calculation    Start time: 1330  Stop time: 1413 Time Calculation (min): 43 minutes         Chief Complaint: Weakness and Loss Of Balance    Visit #: 6    SUBJECTIVE:  Pt has not followed up with previous recommendations for referrals. Plans to today.  Pt reports her balance is the most challenging still. Unable to describe what about balance is difficult or identify a situation. She later stated feeling off balance when turning into door ways. Unable to report if she has trouble with that due to vision, off balance, weakness etc.    OBJECTIVE:            Therapeutic Exercises (CPT 85030):     1. Bridge OTB for Ab, 3 x 1 min    2. SL bridge, x 10 B, min clearence     3. Shuttle calf raises, 0, cords 1-5    4. Hip adduction, 0    5. SL shuttle squat, 0, cords 1-4    6. Quadruped alt UE lifts, x 15B    7. Quadruped fire hydrants, 10, 5 sec holds B    8. STS, x 10, mat table 18 inch, no UE-sig Rt shift    9. Nustep level 5, 5 min    11. Crunches, 2 x 15, min scap clearence     20. 9/3/21 POC      Therapeutic Exercise Summary: HEP update: [id number] bridge c AB band holds 3 x 1 min, long sit AD isometrics, side lying AD, fire hydrants, donkey kicks    Therapeutic Treatments and Modalities:     1. Neuromuscular Re-education (CPT 90757)    Therapeutic Treatment and Modalities Summary: For improving dynamic balance and dual tasking   Pt ambulated x 225 feet with CGA for safety while throwing a ball to a tech while identifying items in preselected categories with mod cues to not stop walking while performing. Walking including walking through 4 door ways and 4 left turns without LOB    Pt ambulated backwards x 30 feet with CGA while performing task  without LOB    Pt performed task for improving dual tasking, hand eye coordination, object tracking while ambulating with balloon toss. Cues to utilize BUE. Good safety with reaching inside and outside IVA and not over stretching and causing fall. Pt talking almost constantly without LOB     Time-based treatments/modalities:    Physical Therapy Timed Treatment Charges  Neuromusc re-ed, balance, coor, post minutes (CPT 98599): 15 minutes  Therapeutic exercise minutes (CPT 85052): 28 minutes      ASSESSMENT:   Continued to encourage pt to contact mental health professional. Although, pt reports feeling off balance while turning corners she was able to perform this task multiple times while dual tasking and completing UE tasks without any LOB and very min to no change in gait speed. Will continue to progress dynamic balance and strengthening interventions as she continues to have deficits and will benefit from skilled intervention.    PLAN/RECOMMENDATIONS:   Plan for treatment: therapy treatment to continue next visit.  Planned interventions for next visit: continue with current treatment.

## 2021-08-10 ENCOUNTER — PHYSICAL THERAPY (OUTPATIENT)
Dept: PHYSICAL THERAPY | Facility: REHABILITATION | Age: 61
End: 2021-08-10
Attending: PHYSICAL MEDICINE & REHABILITATION
Payer: MEDICAID

## 2021-08-10 ENCOUNTER — APPOINTMENT (OUTPATIENT)
Dept: OCCUPATIONAL THERAPY | Facility: REHABILITATION | Age: 61
End: 2021-08-10
Attending: FAMILY MEDICINE
Payer: MEDICAID

## 2021-08-10 DIAGNOSIS — S06.5X9D TRAUMATIC SUBDURAL HEMORRHAGE WITH LOSS OF CONSCIOUSNESS OF UNSPECIFIED DURATION, SUBSEQUENT ENCOUNTER: ICD-10-CM

## 2021-08-10 PROCEDURE — 97110 THERAPEUTIC EXERCISES: CPT

## 2021-08-10 NOTE — OP THERAPY DAILY TREATMENT
"  Outpatient Physical Therapy  DAILY TREATMENT     [facility] Physical Therapy [address].  [address]  [place] [address]  Phone:  [phone]  Fax:  [phone]    Date: 08/10/2021    Patient: Nathalie Aguilar  YOB: 1960  MRN: [id number]     Time Calculation    Start time: 1330  Stop time: 1411 Time Calculation (min): 41 minutes         Chief Complaint: Weakness    Visit #: 7    SUBJECTIVE:  No new complaints. HEP getting easier. Did not follow up related to mental health professional, is no longer interested.     OBJECTIVE:            Therapeutic Exercises (CPT 36777):     1. Bridge tealTB for Ab, 3 x 1 min    2. SL bridge, x 10 B, min clearence     3. Alt hip flexor in supine, 3 x 8, teal TB at feet    4. Hip adduction, 0    5. SL shuttle squat, 0, cords 1-4    6. Quadruped alt UE lifts, x 15B    7. Quadruped donkey kicks, x 10B    8. STS, x 10, mat table 18 inch, no UE-sig Rt shift    9. Nustep level 5, 5 min    10. Standing hip ext, 2 x 15B, 3 sec hold, teal TB @ ankles    11. Hip abduction lateral walking, 4 min, teal TB at ankles    12. Mini lunge    20. 9/3/21 POC      Therapeutic Exercise Summary: HEP update: [id number] bridge c AB band holds 3 x 1 min, long sit AD isometrics, side lying AD, fire hydrants, donkey kicks      Time-based treatments/modalities:    Physical Therapy Timed Treatment Charges  Therapeutic exercise minutes (CPT 00263): 41 minutes      ASSESSMENT:   Pt tolerated progressive strengthening without difficulty. Discussed plan for DC next week, pt initially agreed. Pt later more anxious asking things like \"Wwell, what to I tell disability.\" reporting she can't stand or sit for a long time. Things that have never been brought up previously. This change is likely anxiety related to discussion of discharge. However, pt is very functional and will benefit from a few more visits for assessment of independent HEP and understanding. Pt unable to appropriately " perform quadruped donkey kicks, as she had previously and is part of HEP, so reported compliance may be unlikely.    PLAN/RECOMMENDATIONS:   Plan for treatment: therapy treatment to continue next visit.  Planned interventions for next visit: continue with current treatment.

## 2021-08-12 ENCOUNTER — APPOINTMENT (OUTPATIENT)
Dept: OCCUPATIONAL THERAPY | Facility: REHABILITATION | Age: 61
End: 2021-08-12
Attending: FAMILY MEDICINE
Payer: MEDICAID

## 2021-08-12 ENCOUNTER — PHYSICAL THERAPY (OUTPATIENT)
Dept: PHYSICAL THERAPY | Facility: REHABILITATION | Age: 61
End: 2021-08-12
Attending: PHYSICAL MEDICINE & REHABILITATION
Payer: MEDICAID

## 2021-08-12 DIAGNOSIS — S06.5X9D TRAUMATIC SUBDURAL HEMORRHAGE WITH LOSS OF CONSCIOUSNESS OF UNSPECIFIED DURATION, SUBSEQUENT ENCOUNTER: ICD-10-CM

## 2021-08-12 PROCEDURE — 97110 THERAPEUTIC EXERCISES: CPT

## 2021-08-12 PROCEDURE — 97112 NEUROMUSCULAR REEDUCATION: CPT

## 2021-08-12 NOTE — OP THERAPY DAILY TREATMENT
"  Outpatient Physical Therapy  DAILY TREATMENT     [facility] Physical [address].  [address]  [place] [address]  Phone:  [phone]  Fax:  [phone]    Date: 08/12/2021    Patient: Nathalie Aguilar  YOB: 1960  MRN: [id number]     Time Calculation    Start time: 1330  Stop time: 1411 Time Calculation (min): 41 minutes         Chief Complaint: Weakness and Loss Of Balance    Visit #: 8    SUBJECTIVE:  No new complaints. HEP getting easier. Did not follow up related to mental health professional, is no longer interested.     OBJECTIVE:            Therapeutic Exercises (CPT 25230):     1. Therapy ball bridge, 3 x 10, 5 sec, LE ext    2. SL bridge, x 10 B, min clearence     3. SL abduction/ext holds, 2 x 1 min, 13 sec lt, Rt 28 seconds    4. Mini squats, 8# DB 15, cues/mirror to dec rt weight shift    6. Quadruped alt UE lifts, 0    7. Quadruped donkey kicks, 0    8. STS, 0, mat table 18 inch, no UE-sig Rt shift    9. Nustep level 5, 5 min    10. Standing hip ext, 0, teal TB @ ankles    11. Hip abduction lateral walking, 0, teal TB at ankles    12. Mini lunge    20. 9/3/21 POC      Therapeutic Exercise Summary: HEP update: [id number] bridge c AB band holds 3 x 1 min, long sit AD isometrics, side lying AD, fire hydrants, donkey kicks    Therapeutic Treatments and Modalities:     1. Neuromuscular Re-education (CPT 45975)    Therapeutic Treatment and Modalities Summary: Tandem gait 20 feet x 2, (fwd/bwd)  Ball toss c tandem 20 feet x 2, large right step 50% of time. Pt unresponsive for cues for tandem gait rather opting for just narrow IVA steps. Pt would have difficulty with task and appeared to be sabotaging herself even saying she \"just feels too wobbly to do that and I walk just fine.\"    Time-based treatments/modalities:    Physical Therapy Timed Treatment Charges  Neuromusc re-ed, balance, coor, post minutes (CPT 07701): 10 minutes  Therapeutic exercise minutes (CPT " 24418): 31 minutes      ASSESSMENT:   Pt was not as engaged during session as she had been previously, Continued to educate on purpose of each intervention, specifically NMR however pt seemed agitated more so related to her poor performance with tandem walking especially with ball toss.  Pt demo'd excessive right sided weight shift with mini squats. Seemed irritated with PT cues to dec and use of mirror. With feedback reps would improve 1-3 then return to weight shift. Pt seemed unaware.     PLAN/RECOMMENDATIONS:   Plan for treatment: therapy treatment to continue next visit.  Planned interventions for next visit: continue with current treatment.

## 2021-08-17 ENCOUNTER — APPOINTMENT (OUTPATIENT)
Dept: OCCUPATIONAL THERAPY | Facility: REHABILITATION | Age: 61
End: 2021-08-17
Attending: FAMILY MEDICINE
Payer: MEDICAID

## 2021-08-17 ENCOUNTER — PHYSICAL THERAPY (OUTPATIENT)
Dept: PHYSICAL THERAPY | Facility: REHABILITATION | Age: 61
End: 2021-08-17
Attending: PHYSICAL MEDICINE & REHABILITATION
Payer: MEDICAID

## 2021-08-17 DIAGNOSIS — S06.5X9D TRAUMATIC SUBDURAL HEMORRHAGE WITH LOSS OF CONSCIOUSNESS OF UNSPECIFIED DURATION, SUBSEQUENT ENCOUNTER: ICD-10-CM

## 2021-08-17 PROCEDURE — 97112 NEUROMUSCULAR REEDUCATION: CPT

## 2021-08-17 NOTE — OP THERAPY DAILY TREATMENT
"  Outpatient Physical Therapy  DAILY TREATMENT     [facility] [address].  [address]  [place] [address]  Phone:  [phone]  Fax:  [phone]    Date: 08/17/2021    Patient: Nathalie Aguilar  YOB: 1960  MRN: [id number]     Time Calculation    Start time: 1330  Stop time: 1400 Time Calculation (min): 30 minutes         Chief Complaint: Weakness and Loss Of Balance    Visit #: 9    SUBJECTIVE:  Pot has physician appointment and needs to leave at 2 rather than 215.  Overall no concerns with planned DC next session. Somewhat melancholy related to her birthday coming up. Is following through with therapist encouragement to see mental health professional. Some concerns about tripping but states she had trouble with that prior to [facility]. Also, some what concerned with walking and \"doing stuff\" but unable to identify a task.     OBJECTIVE:            Therapeutic Exercises (CPT 85037):     1. Therapy ball bridge, 0, LE ext    2. SL bridge, 0, min clearence     3. SL abduction/ext holds, 0    4. Mini squats, 0, cues/mirror to dec rt weight shift    6. Quadruped alt UE lifts, 0    7. Quadruped donkey kicks, 0    8. STS, 0, mat table 18 inch, no UE-sig Rt shift    9. Nustep level 5, 0    10. Standing hip ext, 0, teal TB @ ankles    11. Hip abduction lateral walking, 0, teal TB at ankles    12. Mini lunge    20. 9/3/21 POC      Therapeutic Exercise Summary: HEP update: [id number] bridge c AB band holds 3 x 1 min, long sit AD isometrics, side lying AD, fire hydrants, donkey kicks    Therapeutic Treatments and Modalities:     1. Neuromuscular Re-education (CPT 35806)    Therapeutic Treatment and Modalities Summary: NMR: reactive balance training  Simulated slips/trips in anterior, posterior an bilateral directions. Therapist positioned hands superiorly near clavicles, posterior on scap, and proximal humerus.   Pt instructed to lean heavily into therapists hand, therapist then " remove hands at random times to create slip/trip. 12 reps each side. Anteriorly pt learned relatively quickly with first 2 reps requiring multiple steps and further reps completed with 1 step, more dominantly left LE. Posteriorly pt able to correct LOB with 1 step 4/12 times, more than 3 steps on 2 reps. Laterally able to correct all reps with 1 step, both sides.     Dynamic balance training/mulittasking. Pt ambulated 300 feet x 2 reps while dual tasking. First pouring water between two glasses with no change in gait speed. Self balloon tapping with mod dec in gait speed and no LOB. Pt walked 100 feet backwards with self balloon tap without LOB. Good overall stabilty.    Time-based treatments/modalities:    Physical Therapy Timed Treatment Charges  Neuromusc re-ed, balance, coor, post minutes (CPT 24984): 30 minutes      ASSESSMENT:   Pt demo'd good response to reactive balance training with some difficulty with posterior LOB but overall able to complete without physical assistance to prevent fall. Able to perform dual tasking with hand eye coordination without LOB while ambulating. Pt agreeable with plan to DC next visit.      Short Term Goals:   1. Pt will be compliant with HEP 3-5x per week for improving strength.   2. Pt will ambulate 50 feet on unstable/outdoor surfaces without LOB and SBA for safety.  3. Pt will demonstrate improved functional LE strength with 5STS score no UE 12 seconds or better.      Short term goal time span:  2-4 weeks      Long Term Goals:    1. Pt will demonstrate improved dynamic gait abilities with miniBEST sub score 8/10 or better in order to safely access the community.  2. Pt will demonstrate improved hip adduction strength 4/5 and hip ext 4/5 or better.  3. Pt will demonstrate improved cv endurance with 6MWT score 1500 feet or more.  4. Pt will ambulate on outdoor/uneven surfaces 300 feet independently without significant LOB.     PLAN/RECOMMENDATIONS:   Plan for treatment:  therapy treatment to continue next visit.  Planned interventions for next visit: continue with current treatment.

## 2021-08-19 ENCOUNTER — APPOINTMENT (OUTPATIENT)
Dept: OCCUPATIONAL THERAPY | Facility: REHABILITATION | Age: 61
End: 2021-08-19
Attending: FAMILY MEDICINE
Payer: MEDICAID

## 2021-08-19 ENCOUNTER — TELEPHONE (OUTPATIENT)
Dept: PHYSICAL THERAPY | Facility: REHABILITATION | Age: 61
End: 2021-08-19

## 2021-08-19 NOTE — OP THERAPY DISCHARGE SUMMARY
Outpatient Physical Therapy  DISCHARGE SUMMARY NOTE      [facility] [address].  [address]  [place] [address]  Phone:  [phone]  Fax:  [phone]    Date of Visit: 08/19/2021    Patient: Nathalie Aguilar  YOB: 1960  MRN: [id number]     Referring Provider: Narinder Burnett M.D.   Referring Diagnosis S06.5X9D         Functional Assessment Used        Your patient is being discharged from Physical Therapy with the following comments:   · Pt no showed for DC and formal assessment of goals.    Comments:  Pt was scheduled for last visit today 8/19 and no showed appointment. In previous conversations pt reported good improvement with balance, weakness and gait. Pt is appropriate for DC.      Limitations Remaining:  Weakness.     Recommendations:  ESEQUIEL Nichols, PT, DPT    Date: 8/19/2021

## 2021-10-05 NOTE — DISCHARGE PLANNING
Please contact patient to help with getting paid from his employer. I will complete his FMLA form Monday AM. I wrote him a work note that should cover him until then. [facility] Coordination    Referral from:  Dr. Fuentes    [facility]    Potential Rehab Diagnosis: TBI    Chart review indicates patient may have on going medical management and may have therapy needs to possibly meet inpatient rehab facility criteria with the goal of returning to community.    D/C support: TBD     Physiatry consultation forwarded per protocol.     TBI.  Physiatry to consult.  Waiting on additional information to determine appropriateness for acute inpatient rehabilitation. Will continue to follow.      Thank you for the referral.

## 2021-10-23 ENCOUNTER — APPOINTMENT (OUTPATIENT)
Dept: RADIOLOGY | Facility: MEDICAL CENTER | Age: 61
End: 2021-10-23
Attending: EMERGENCY MEDICINE
Payer: MEDICAID

## 2021-10-23 ENCOUNTER — HOSPITAL ENCOUNTER (EMERGENCY)
Facility: MEDICAL CENTER | Age: 61
End: 2021-10-23
Attending: EMERGENCY MEDICINE
Payer: MEDICAID

## 2021-10-23 VITALS
SYSTOLIC BLOOD PRESSURE: 120 MMHG | OXYGEN SATURATION: 95 % | RESPIRATION RATE: 20 BRPM | BODY MASS INDEX: 18.69 KG/M2 | HEART RATE: 68 BPM | DIASTOLIC BLOOD PRESSURE: 77 MMHG | TEMPERATURE: 98.5 F | HEIGHT: 67 IN | WEIGHT: 119.05 LBS

## 2021-10-23 DIAGNOSIS — R41.82 ALTERED MENTAL STATUS, UNSPECIFIED ALTERED MENTAL STATUS TYPE: ICD-10-CM

## 2021-10-23 DIAGNOSIS — N30.90 CYSTITIS: ICD-10-CM

## 2021-10-23 DIAGNOSIS — R53.1 WEAKNESS: ICD-10-CM

## 2021-10-23 DIAGNOSIS — R29.6 MULTIPLE FALLS: ICD-10-CM

## 2021-10-23 LAB
ALBUMIN SERPL BCP-MCNC: 3.7 G/DL (ref 3.2–4.9)
ALBUMIN/GLOB SERPL: 1.4 G/DL
ALP SERPL-CCNC: 150 U/L (ref 30–99)
ALT SERPL-CCNC: 12 U/L (ref 2–50)
AMMONIA PLAS-SCNC: 16 UMOL/L (ref 11–45)
AMPHET UR QL SCN: NEGATIVE
ANION GAP SERPL CALC-SCNC: 9 MMOL/L (ref 7–16)
APPEARANCE UR: CLEAR
APTT PPP: 31.6 SEC (ref 24.7–36)
AST SERPL-CCNC: 14 U/L (ref 12–45)
BACTERIA #/AREA URNS HPF: ABNORMAL /HPF
BARBITURATES UR QL SCN: NEGATIVE
BASOPHILS # BLD AUTO: 0.7 % (ref 0–1.8)
BASOPHILS # BLD: 0.03 K/UL (ref 0–0.12)
BENZODIAZ UR QL SCN: POSITIVE
BILIRUB SERPL-MCNC: 0.2 MG/DL (ref 0.1–1.5)
BILIRUB UR QL STRIP.AUTO: NEGATIVE
BUN SERPL-MCNC: 12 MG/DL (ref 8–22)
BZE UR QL SCN: NEGATIVE
CALCIUM SERPL-MCNC: 9.2 MG/DL (ref 8.5–10.5)
CANNABINOIDS UR QL SCN: POSITIVE
CHLORIDE SERPL-SCNC: 105 MMOL/L (ref 96–112)
CO2 SERPL-SCNC: 28 MMOL/L (ref 20–33)
COLOR UR: YELLOW
CREAT SERPL-MCNC: 0.45 MG/DL (ref 0.5–1.4)
EKG IMPRESSION: NORMAL
EOSINOPHIL # BLD AUTO: 0.09 K/UL (ref 0–0.51)
EOSINOPHIL NFR BLD: 2.2 % (ref 0–6.9)
EPI CELLS #/AREA URNS HPF: ABNORMAL /HPF
ERYTHROCYTE [DISTWIDTH] IN BLOOD BY AUTOMATED COUNT: 48.6 FL (ref 35.9–50)
ETHANOL BLD-MCNC: <10.1 MG/DL (ref 0–10)
GLOBULIN SER CALC-MCNC: 2.7 G/DL (ref 1.9–3.5)
GLUCOSE BLD-MCNC: 94 MG/DL (ref 65–99)
GLUCOSE SERPL-MCNC: 79 MG/DL (ref 65–99)
GLUCOSE UR STRIP.AUTO-MCNC: NEGATIVE MG/DL
HCT VFR BLD AUTO: 46.2 % (ref 37–47)
HGB BLD-MCNC: 15.3 G/DL (ref 12–16)
HYALINE CASTS #/AREA URNS LPF: ABNORMAL /LPF
IMM GRANULOCYTES # BLD AUTO: 0.01 K/UL (ref 0–0.11)
IMM GRANULOCYTES NFR BLD AUTO: 0.2 % (ref 0–0.9)
INR PPP: 0.96 (ref 0.87–1.13)
KETONES UR STRIP.AUTO-MCNC: NEGATIVE MG/DL
LACTATE BLD-SCNC: 1.3 MMOL/L (ref 0.5–2)
LEUKOCYTE ESTERASE UR QL STRIP.AUTO: ABNORMAL
LYMPHOCYTES # BLD AUTO: 1.49 K/UL (ref 1–4.8)
LYMPHOCYTES NFR BLD: 36.6 % (ref 22–41)
MCH RBC QN AUTO: 31 PG (ref 27–33)
MCHC RBC AUTO-ENTMCNC: 33.1 G/DL (ref 33.6–35)
MCV RBC AUTO: 93.7 FL (ref 81.4–97.8)
METHADONE UR QL SCN: NEGATIVE
MICRO URNS: ABNORMAL
MONOCYTES # BLD AUTO: 0.25 K/UL (ref 0–0.85)
MONOCYTES NFR BLD AUTO: 6.1 % (ref 0–13.4)
NEUTROPHILS # BLD AUTO: 2.2 K/UL (ref 2–7.15)
NEUTROPHILS NFR BLD: 54.2 % (ref 44–72)
NITRITE UR QL STRIP.AUTO: POSITIVE
NRBC # BLD AUTO: 0 K/UL
NRBC BLD-RTO: 0 /100 WBC
OPIATES UR QL SCN: NEGATIVE
OXYCODONE UR QL SCN: NEGATIVE
PCP UR QL SCN: NEGATIVE
PH UR STRIP.AUTO: 5.5 [PH] (ref 5–8)
PLATELET # BLD AUTO: 263 K/UL (ref 164–446)
PMV BLD AUTO: 9.4 FL (ref 9–12.9)
POTASSIUM SERPL-SCNC: 3.9 MMOL/L (ref 3.6–5.5)
PROCALCITONIN SERPL-MCNC: <0.05 NG/ML
PROPOXYPH UR QL SCN: NEGATIVE
PROT SERPL-MCNC: 6.4 G/DL (ref 6–8.2)
PROT UR QL STRIP: NEGATIVE MG/DL
PROTHROMBIN TIME: 12.5 SEC (ref 12–14.6)
RBC # BLD AUTO: 4.93 M/UL (ref 4.2–5.4)
RBC # URNS HPF: ABNORMAL /HPF
RBC UR QL AUTO: NEGATIVE
SODIUM SERPL-SCNC: 142 MMOL/L (ref 135–145)
SP GR UR STRIP.AUTO: 1.01
TROPONIN T SERPL-MCNC: 7 NG/L (ref 6–19)
UROBILINOGEN UR STRIP.AUTO-MCNC: 0.2 MG/DL
WBC # BLD AUTO: 4.1 K/UL (ref 4.8–10.8)
WBC #/AREA URNS HPF: ABNORMAL /HPF

## 2021-10-23 PROCEDURE — 96374 THER/PROPH/DIAG INJ IV PUSH: CPT | Mod: XU

## 2021-10-23 PROCEDURE — 85025 COMPLETE CBC W/AUTO DIFF WBC: CPT

## 2021-10-23 PROCEDURE — 87040 BLOOD CULTURE FOR BACTERIA: CPT | Mod: 91

## 2021-10-23 PROCEDURE — 700105 HCHG RX REV CODE 258: Performed by: EMERGENCY MEDICINE

## 2021-10-23 PROCEDURE — 87077 CULTURE AEROBIC IDENTIFY: CPT | Mod: 91

## 2021-10-23 PROCEDURE — 87186 SC STD MICRODIL/AGAR DIL: CPT

## 2021-10-23 PROCEDURE — 93005 ELECTROCARDIOGRAM TRACING: CPT | Performed by: EMERGENCY MEDICINE

## 2021-10-23 PROCEDURE — 85730 THROMBOPLASTIN TIME PARTIAL: CPT

## 2021-10-23 PROCEDURE — 82140 ASSAY OF AMMONIA: CPT

## 2021-10-23 PROCEDURE — 700111 HCHG RX REV CODE 636 W/ 250 OVERRIDE (IP): Performed by: EMERGENCY MEDICINE

## 2021-10-23 PROCEDURE — 85610 PROTHROMBIN TIME: CPT

## 2021-10-23 PROCEDURE — 81001 URINALYSIS AUTO W/SCOPE: CPT | Mod: XU

## 2021-10-23 PROCEDURE — 82962 GLUCOSE BLOOD TEST: CPT

## 2021-10-23 PROCEDURE — 84145 PROCALCITONIN (PCT): CPT

## 2021-10-23 PROCEDURE — 70498 CT ANGIOGRAPHY NECK: CPT

## 2021-10-23 PROCEDURE — 71045 X-RAY EXAM CHEST 1 VIEW: CPT

## 2021-10-23 PROCEDURE — 87086 URINE CULTURE/COLONY COUNT: CPT

## 2021-10-23 PROCEDURE — 82077 ASSAY SPEC XCP UR&BREATH IA: CPT

## 2021-10-23 PROCEDURE — 84484 ASSAY OF TROPONIN QUANT: CPT

## 2021-10-23 PROCEDURE — 80053 COMPREHEN METABOLIC PANEL: CPT

## 2021-10-23 PROCEDURE — 700117 HCHG RX CONTRAST REV CODE 255: Performed by: EMERGENCY MEDICINE

## 2021-10-23 PROCEDURE — 83605 ASSAY OF LACTIC ACID: CPT

## 2021-10-23 PROCEDURE — 99285 EMERGENCY DEPT VISIT HI MDM: CPT

## 2021-10-23 PROCEDURE — 70496 CT ANGIOGRAPHY HEAD: CPT

## 2021-10-23 PROCEDURE — 80307 DRUG TEST PRSMV CHEM ANLYZR: CPT

## 2021-10-23 RX ORDER — CEPHALEXIN 500 MG/1
500 CAPSULE ORAL 2 TIMES DAILY
Qty: 14 CAPSULE | Refills: 0 | Status: SHIPPED | OUTPATIENT
Start: 2021-10-23 | End: 2021-10-30

## 2021-10-23 RX ORDER — CEPHALEXIN 500 MG/1
500 CAPSULE ORAL 4 TIMES DAILY
Qty: 28 CAPSULE | Refills: 0 | Status: SHIPPED | OUTPATIENT
Start: 2021-10-23 | End: 2021-10-30

## 2021-10-23 RX ORDER — CLONAZEPAM 1 MG/1
1 TABLET ORAL 3 TIMES DAILY
Status: ON HOLD | COMMUNITY
Start: 2021-08-17 | End: 2022-07-06

## 2021-10-23 RX ORDER — VENLAFAXINE HYDROCHLORIDE 75 MG/1
225 CAPSULE, EXTENDED RELEASE ORAL EVERY MORNING
Status: SHIPPED | COMMUNITY
Start: 2021-08-16 | End: 2022-07-03

## 2021-10-23 RX ORDER — SODIUM CHLORIDE, SODIUM LACTATE, POTASSIUM CHLORIDE, AND CALCIUM CHLORIDE .6; .31; .03; .02 G/100ML; G/100ML; G/100ML; G/100ML
1000 INJECTION, SOLUTION INTRAVENOUS ONCE
Status: COMPLETED | OUTPATIENT
Start: 2021-10-23 | End: 2021-10-23

## 2021-10-23 RX ORDER — GABAPENTIN 300 MG/1
300-600 CAPSULE ORAL 3 TIMES DAILY
Status: ON HOLD | COMMUNITY
End: 2022-07-06

## 2021-10-23 RX ORDER — CEFTRIAXONE 1 G/1
1 INJECTION, POWDER, FOR SOLUTION INTRAMUSCULAR; INTRAVENOUS ONCE
Status: COMPLETED | OUTPATIENT
Start: 2021-10-23 | End: 2021-10-23

## 2021-10-23 RX ADMIN — SODIUM CHLORIDE, POTASSIUM CHLORIDE, SODIUM LACTATE AND CALCIUM CHLORIDE 1000 ML: 600; 310; 30; 20 INJECTION, SOLUTION INTRAVENOUS at 13:28

## 2021-10-23 RX ADMIN — CEFTRIAXONE SODIUM 1 G: 1 INJECTION, POWDER, FOR SOLUTION INTRAMUSCULAR; INTRAVENOUS at 15:07

## 2021-10-23 RX ADMIN — IOHEXOL 80 ML: 350 INJECTION, SOLUTION INTRAVENOUS at 14:38

## 2021-10-23 ASSESSMENT — FIBROSIS 4 INDEX: FIB4 SCORE: 1.71

## 2021-10-23 ASSESSMENT — LIFESTYLE VARIABLES: DO YOU DRINK ALCOHOL: NO

## 2021-10-23 NOTE — LETTER
10/28/2021               Nathalie Aguilar  [address] [facility] [address] [facility] [address]        Dear Nathalie (MR#[id number])    As we have been unable to contact you by phone, this letter is sent in regards to your recent visit to the [facility] Emergency Department on 10/23/2021. During the visit, tests were performed to assist the physician in a medical diagnosis. A review of those tests requires that we notify you of the following:    Your urine culture and sensitivity was POSITIVE for a bacteria called Escherichia coli, and further treatment may be necessary. The currently prescribed antibiotic (cephalexin) may not be effective in treating your infection. IF YOU ARE NOT FEELING BETTER PLEASE CONTACT ME AS SOON AS POSSIBLE AT THE NUMBER BELOW.       Thank you for your cooperation in the matter.    Sincerely,  ED Culture Follow-Up Staff  Madelyn Rosales, PharmD  PGY2 Infectious Diseases Pharmacy Resident      [facility], Emergency Department  [address] [place] [id number] [phone] (Madelyn's Phone Number)  [phone] (ED Culture Line)

## 2021-10-23 NOTE — ED NOTES
Pharmacy Medication Reconciliation      ~Medication reconciliation updated and complete per pt & pt daughter at bedside  ~Allergies have been verified  ~No oral ABX within the last 30 days  ~Patient home pharmacy:[facility]      ~Patient & patient daughter report patient stopped taking Keppra on 10/20/2021 per neurologist.

## 2021-10-23 NOTE — ED NOTES
Patient to red12 in wheelchair with daughter at bedside. Placed on monitor, changed into gown. Chart up for ERP

## 2021-10-23 NOTE — ED TRIAGE NOTES
Chief Complaint   Patient presents with   • Weakness     The patient has been progressively getting weak over the last few days with mild disorientation.   • T-5000 GLF     The daughter reports she heard the patient fall, however, there are no signs of injuries on the patient.     Pt wheeled to triage for above complaint. The patient's stroke assessment is negative. She presents with overall generalized weakness with mild slurred speech. History of ETOH abuse and has polypharmacy.  Nora, the patient's daughter is with the patient.  Pt is AO x 4, follows commands, and responds appropriately to questions. Patient's breathing is unlabored and pain is currently 0/10 on the 0-10 pain scale.  Pt placed in lobby. Patient educated on triage process and encouraged to alert staff for any changes.

## 2021-10-23 NOTE — ED PROVIDER NOTES
ED Provider Note    CHIEF COMPLAINT  Chief Complaint   Patient presents with   • Weakness     The patient has been progressively getting weak over the last few days with mild disorientation.   • T-5000 GLF     The daughter reports she heard the patient fall, however, there are no signs of injuries on the patient.       HPI  Nathalie Aguilar is a 61 y.o. female with a history of chronic alcohol abuse, heroin addiction, breast cancer, asthma, subdural hemorrhage, PEA arrest, upper GI bleed, seizure who presents with complaints of increased weakness and confusion for the past several days.  Patient lives with daughter who notes that the patient quit drinking about 2 weeks ago.  She says that patient has been getting confused with increased weakness and has had several falls over the past 3 days.  She notes today she appeared to be hallucinating and talking about things that made no sense.  The patient denies any significant headache, neck pain, chest pain, back pain, abdominal pain.  She does complain of feeling weak.  She denies any fever, shaking chills, sore throat, cough, congestion, or difficulty breathing.  She has had no vomiting or diarrhea.    REVIEW OF SYSTEMS  See HPI for further details. All other systems are negative.     PAST MEDICAL HISTORY  Past Medical History:   Diagnosis Date   • ASTHMA    • Cancer (HCC)     breast   • Heroin addiction (HCC)    • Pneumonia    • Psychiatric disorder        FAMILY HISTORY  Family History   Problem Relation Age of Onset   • No Known Problems Mother    • Alcohol abuse Father    • Heart Disease Neg Hx        SOCIAL HISTORY  Social History     Tobacco Use   • Smoking status: Current Every Day Smoker     Packs/day: 0.50   • Smokeless tobacco: Never Used   Vaping Use   • Vaping Use: Never used   Substance Use Topics   • Alcohol use: Yes     Comment: two weeks since   • Drug use: Yes     Comment: history of heroin use      Social History     Substance and Sexual Activity  "  Drug Use Yes    Comment: history of heroin use       SURGICAL HISTORY  Past Surgical History:   Procedure Laterality Date   • INCISION AND DRAINAGE ORTHOPEDIC Right 1/25/2019    Procedure: INCISION AND DRAINAGE ORTHOPEDIC - HAND;  Surgeon: Kendrick Campbell M.D.;  Location: SURGERY [facility];  Service: Orthopedics   • CHOLECYSTECTOMY     • GYN SURGERY      partial hysterectomy       CURRENT MEDICATIONS  Home Medications     Reviewed by Thaddeus Guadarrama R.N. (Registered Nurse) on 10/23/21 at 1050  Med List Status: Partial    Medication Last Dose Status    clonazePAM (KLONOPIN) 1 MG Tab None Active    gabapentin (NEURONTIN) 300 MG Cap None Active    levetiracetam (KEPPRA) 1000 MG tablet None Active    mirtazapine (REMERON) 15 MG Tab None Active    omeprazole (PRILOSEC) 20 MG delayed-release capsule None Active    QUEtiapine (SEROQUEL) 200 MG Tab None Active    traZODone (DESYREL) 50 MG Tab None Active    venlafaxine (EFFEXOR-XR) 150 MG extended-release capsule None Active                ALLERGIES  No Known Allergies    PHYSICAL EXAM0  VITAL SIGNS: Blood Pressure 139/78   Pulse 65   Temperature 36.1 °C (97 °F) (Temporal)   Respiration 16   Height 1.702 m (5' 7\")   Weight 54 kg (119 lb 0.8 oz)   Oxygen Saturation 95%   Body Mass Index 18.65 kg/m²   Constitutional: Awake, alert, in no acute distress, Non-toxic appearance.   HENT: Atraumatic. Bilateral external ears normal, mucous membranes dry, throat nonerythematous without exudates, nose is normal.  Eyes: PERRL, EOMI, conjunctiva moist, noninjected.  Neck: Nontender, Normal range of motion, No nuchal rigidity, No stridor.   Lymphatic: No lymphadenopathy noted.   Cardiovascular: Regular rate and rhythm, no murmurs, rubs, gallops.  Thorax & Lungs:  Good breath sounds bilaterally, no wheezes, rales, or retractions.  No chest tenderness.  Abdomen: Bowel sounds normal, Soft, nontender, nondistended, no rebound, guarding, masses.  Back: No CVA or spinal " tenderness.  Extremities: Intact distal pulses, No edema, No tenderness.   Skin: Warm, Dry, No rashes.   Musculoskeletal: No joint swelling or tenderness.  Neurologic: Alert & oriented x , cranial nerves II through XII shows no facial asymmetry, speech is slightly slurred, sensory tact light touch, and motor show some weakness on the right hand grasp, some slight pronator drift to the right upper extremity, opening finger movements slowed on the right, there is 4/5 strength to the right lower extremity, 5/5 strength to the left lower extremity, there is a pronator drift on the right.  Psychiatric: Affect normal, judgment impaired, Mood normal.     EKG  EKG Interpretation:  Interpreted by me    Rhythm:  Normal sinus rhythm   Rate: 61  Intervals: normal  Axis: normal  Ectopy: none  Conduction: normal  ST Segments: no evidence of elevation or depression  T Waves: no acute change  Q Waves: none  Clinical Impression: No acute injury or ischemic pattern.   Comparison to previous EKG from April 2021 shows no acute changes.        LABS  Labs Reviewed   CBC WITH DIFFERENTIAL - Abnormal; Notable for the following components:       Result Value    WBC 4.1 (*)     MCHC 33.1 (*)     All other components within normal limits   COMP METABOLIC PANEL - Abnormal; Notable for the following components:    Creatinine 0.45 (*)     Alkaline Phosphatase 150 (*)     All other components within normal limits   URINALYSIS - Abnormal; Notable for the following components:    Nitrite Positive (*)     Leukocyte Esterase Large (*)     All other components within normal limits   URINE DRUG SCREEN - Abnormal; Notable for the following components:    Benzodiazepines Positive (*)     Cannabinoid Metab Positive (*)     All other components within normal limits   URINE MICROSCOPIC (W/UA) - Abnormal; Notable for the following components:    WBC 20-50 (*)     RBC 10-20 (*)     Bacteria Many (*)     All other components within normal limits   LACTIC ACID  "  BLOOD CULTURE    Narrative:     Per Hospital Policy: Only change Specimen Src: to \"Line\" if  specified by physician order.   BLOOD CULTURE    Narrative:     Per Hospital Policy: Only change Specimen Src: to \"Line\" if  specified by physician order.   DIAGNOSTIC ALCOHOL   AMMONIA   PROTHROMBIN TIME    Narrative:     Indicate which anticoagulants the patient is on:->UNKNOWN   APTT    Narrative:     Indicate which anticoagulants the patient is on:->UNKNOWN   TROPONIN   ESTIMATED GFR   PROCALCITONIN   POCT GLUCOSE DEVICE RESULTS       All labs reviewed by me.      RADIOLOGY/PROCEDURES  CT-CTA HEAD WITH & W/O-POST PROCESS   Final Result      1.  No acute intracranial findings.      2.  No large vessel occlusion or aneurysm identified      CT-CTA NECK WITH & W/O-POST PROCESSING   Final Result      1. No evidence of flow-limiting stenosis in the cervical carotid or cervical vertebral arteries.      DX-CHEST-PORTABLE (1 VIEW)   Final Result         1. No acute cardiopulmonary abnormalities are identified.          The radiologist's interpretations of all radiological studies have been reviewed by me.        COURSE & MEDICAL DECISION MAKING  Pertinent Labs & Imaging studies reviewed. (See chart for details)  The patient presents with the above complaints.  She has had some increased weakness and falls over the past several days.  She does show some weakness on the right side and has some mildly slurred speech.  She is outside the window for TPA.  This appears dehydrated, is hypotensive, with dry mucous membranes.  IV was placed, she was given a bolus of lactated Ringer's.    CTA of the head shows no acute intracranial findings, no large vessel occlusion or aneurysm.  CTA of the neck showed no stenosis, or dissection.  Chest x-ray shows no acute cardiac or pulmonary abnormalities.  CBC shows a normal white count of 4100, normal differential, chemistry profile unremarkable, lactic acid normal.  Urinalysis shows large " leukocyte esterase, positive nitrites, 20-50 WBC, 10-20 RBC, many bacteria.  The patient appears to have a urinary tract infection which may be causing her symptoms.  On reexamination she was much more awake and alert.  I recommended admission to the hospital for further treatment as she has been having falls.  The patient adamantly refuses admission to the hospital.   The patient shows no signs of sepsis, with an improvement of her blood pressure after IV fluids.  Her white count is normal, lactic acid is normal, procalcitonin normal..  Repeat neurologic exam, the patient appears to be completely neurologically intact.  She has good strength in all 4 extremities, with no pronator drift to either the upper or lower extremities.   I suspect her weakness and drowsiness may been a medication related as she was positive for benzodiazepines and cannabinoids on urine drug screen.   The patient was given a dose of Rocephin for her urinary tract infection.  She will be continued on a 7-day course of Keflex.  She is to return to the ER for any worse symptoms, fever, shaking chills, vomiting, increased weakness or dizziness, or any other problems.    FINAL IMPRESSION  1. Cystitis    2. Altered mental status, unspecified altered mental status type    3. Weakness    4. Multiple falls          Electronically signed by: Jakob Sanches M.D., 10/23/2021 12:54 PM

## 2021-10-24 NOTE — ED NOTES
Pt discharged to home. Pt was given follow up instructions and prescriptions for keflex. Pt verbalized understanding of all instructions for discharge and is out of ED in wheelchair.

## 2021-10-26 LAB
BACTERIA UR CULT: ABNORMAL
BACTERIA UR CULT: ABNORMAL
SIGNIFICANT IND 70042: ABNORMAL
SITE SITE: ABNORMAL
SOURCE SOURCE: ABNORMAL

## 2021-10-28 LAB
BACTERIA BLD CULT: NORMAL
BACTERIA BLD CULT: NORMAL
SIGNIFICANT IND 70042: NORMAL
SIGNIFICANT IND 70042: NORMAL
SITE SITE: NORMAL
SITE SITE: NORMAL
SOURCE SOURCE: NORMAL
SOURCE SOURCE: NORMAL

## 2021-10-28 NOTE — ED NOTES
ED Positive Culture Follow-up/Notification Note:   Date: 10/28/2021    Patient seen in the ED on 10/23/2021 for increased weakness and confusion the last several days.     1. Cystitis    2. Altered mental status, unspecified altered mental status type    3. Weakness    4. Multiple falls      Discharge Medication List as of 10/23/2021  4:25 PM      START taking these medications    Details   cephALEXin (KEFLEX) 500 MG Cap Take 1 Capsule by mouth 2 times a day for 7 days., Disp-14 Capsule, R-0, Print Rx Paper           Allergies: Patient has no known allergies.    Vitals:    10/23/21 1235 10/23/21 1330 10/23/21 1510 10/23/21 1600   BP:  (!) 99/62 139/78 120/77   Pulse: 64 (!) 57 65 68   Resp: 14 18 16 20   Temp:    36.9 °C (98.5 °F)   TempSrc:    Temporal   SpO2:    95%   Weight:       Height:           Final cultures:   Results     Procedure Component Value Units Date/Time    URINE CULTURE(NEW) [[id number]]  (Abnormal)  (Susceptibility) Collected: 10/23/21 1320    Order Status: Completed Specimen: Urine Updated: 10/26/21 1137     Significant Indicator POS     Source UR     Site -     Culture Result Usual urogenital lulu 10-50,000 cfu/mL      Escherichia coli  ,000 cfu/mL      Narrative:      Indication for culture:->Patient WITHOUT an indwelling Castro  catheter in place with new onset of Dysuria, Frequency,  Urgency, and/or Suprapubic pain  Indication for culture:->Patient WITHOUT an indwelling Castro    Susceptibility     Escherichia coli (1)     Antibiotic Interpretation Microscan Method Status    Amikacin  [*]  Sensitive <=16 mcg/mL JAC Final    Ampicillin Resistant >16 mcg/mL JAC Final    Amoxicillin/CA  [*]  Resistant >16/8 mcg/mL JAC Final    Aztreonam  [*]  Intermediate 16 mcg/mL JAC Final    Ceftolozane+Tazobactam  [*]  Intermediate 4 mcg/mL JAC Final    Ceftriaxone Resistant 32 mcg/mL JAC Final    Ceftazidime  [*]  Resistant >16 mcg/mL JAC Final    Cefazolin Resistant >16 mcg/mL JAC Final     "Ciprofloxacin Sensitive <=0.25 mcg/mL JAC Final    Cefepime Sensitive <=2 mcg/mL JAC Final    Cefuroxime Resistant >16 mcg/mL JAC Final    Ampicillin/sulbactam Resistant >16/8 mcg/mL JAC Final    Ceftazidime+Avibactam  [*]  Sensitive <=4 mcg/mL JAC Final    Tobramycin Sensitive <=2 mcg/mL JAC Final    Ertapenem  [*]  Sensitive <=0.5 mcg/mL JAC Final    Nitrofurantoin Sensitive <=32 mcg/mL JAC Final    Gentamicin Sensitive <=2 mcg/mL JAC Final    Imipenem  [*]  Sensitive <=1 mcg/mL JAC Final    Levofloxacin Sensitive <=0.5 mcg/mL JAC Final    Meropenem  [*]  Sensitive <=1 mcg/mL JAC Final    Meropenem/Vaborbactam  [*]  Sensitive <=2 mcg/mL JAC Final    Pip/Tazobactam Intermediate 32 mcg/mL JAC Final    Trimeth/Sulfa Sensitive <=0.5/9.5 mcg/mL JAC Final    Tetracycline  [*]  Sensitive <=4 mcg/mL JAC Final    Tigecycline Sensitive <=2 mcg/mL JAC Final           [*]  Suppressed Antibiotic                 BLOOD CULTURE [[id number]] Collected: 10/23/21 1310    Order Status: Completed Specimen: Blood from Peripheral Updated: 10/24/21 0714     Significant Indicator NEG     Source BLD     Site PERIPHERAL     Culture Result No Growth  Note: Blood cultures are incubated for 5 days and  are monitored continuously.Positive blood cultures  are called to the RN and reported as soon as  they are identified.      Narrative:      Per Hospital Policy: Only change Specimen Src: to \"Line\" if  specified by physician order.  No site indicated    BLOOD CULTURE [[id number]] Collected: 10/23/21 1500    Order Status: Completed Specimen: Blood from Peripheral Updated: 10/24/21 0714     Significant Indicator NEG     Source BLD     Site PERIPHERAL     Culture Result No Growth  Note: Blood cultures are incubated for 5 days and  are monitored continuously.Positive blood cultures  are called to the RN and reported as soon as  they are identified.      Narrative:      Per Hospital Policy: Only change Specimen Src: to \"Line\" if  specified by physician " order.  No site indicated    URINALYSIS [[id number]]  (Abnormal) Collected: 10/23/21 1320    Order Status: Completed Specimen: Urine Updated: 10/23/21 1345     Color Yellow     Character Clear     Specific Gravity 1.011     Ph 5.5     Glucose Negative mg/dL      Ketones Negative mg/dL      Protein Negative mg/dL      Bilirubin Negative     Urobilinogen, Urine 0.2     Nitrite Positive     Leukocyte Esterase Large     Occult Blood Negative     Micro Urine Req Microscopic          Plan:   Isolated organism is resistant to prescribed therapy.Attmpted to call patient to discuss culture results, patient unfortunately unavailable. M with call back number.     If patient calls back, would prescribe:     New Rx:  Nitrofurantoin 100mg PO BID x 5 days per [facility] protocol     11.22 Patient called back, reports that she is feeling fine. Informed her of culture results from roughly a month ago. Patient reports that she received a new prescription from her PCP since the first round of antibiotics from the ER, which she mentioned was working better than the first. Unable to recall name of antibiotic, but suspect that UTI was already treated. Patient denies urinary symptoms, fevers or chills. No changes at this time.     Madelyn Rosales, PharmD  PGY2 Infectious Diseases Pharmacy Resident

## 2022-05-03 ENCOUNTER — HOSPITAL ENCOUNTER (INPATIENT)
Facility: MEDICAL CENTER | Age: 62
LOS: 4 days | DRG: 083 | End: 2022-05-07
Attending: EMERGENCY MEDICINE | Admitting: SURGERY
Payer: MEDICAID

## 2022-05-03 ENCOUNTER — APPOINTMENT (OUTPATIENT)
Dept: RADIOLOGY | Facility: MEDICAL CENTER | Age: 62
DRG: 083 | End: 2022-05-03
Attending: NURSE PRACTITIONER
Payer: MEDICAID

## 2022-05-03 ENCOUNTER — HOSPITAL ENCOUNTER (OUTPATIENT)
Dept: RADIOLOGY | Facility: MEDICAL CENTER | Age: 62
End: 2022-05-03

## 2022-05-03 DIAGNOSIS — I60.9 SAH (SUBARACHNOID HEMORRHAGE) (HCC): ICD-10-CM

## 2022-05-03 DIAGNOSIS — S06.5XAA SDH (SUBDURAL HEMATOMA) (HCC): ICD-10-CM

## 2022-05-03 DIAGNOSIS — F10.930 ALCOHOL WITHDRAWAL SYNDROME WITHOUT COMPLICATION (HCC): ICD-10-CM

## 2022-05-03 PROBLEM — Z78.9 ALCOHOL USE: Status: ACTIVE | Noted: 2022-05-03

## 2022-05-03 PROBLEM — T14.90XA TRAUMA: Status: ACTIVE | Noted: 2022-05-03

## 2022-05-03 PROBLEM — Z53.09 CONTRAINDICATION TO DEEP VEIN THROMBOSIS (DVT) PROPHYLAXIS: Status: ACTIVE | Noted: 2022-05-03

## 2022-05-03 PROBLEM — Z11.52 ENCOUNTER FOR SCREENING FOR COVID-19: Status: ACTIVE | Noted: 2022-05-03

## 2022-05-03 PROBLEM — Z86.59 PERSONAL HISTORY OF MENTAL DISORDER: Status: ACTIVE | Noted: 2022-05-03

## 2022-05-03 PROBLEM — S06.6X9A SUBARACHNOID HEMORRHAGE FOLLOWING INJURY, WITH LOSS OF CONSCIOUSNESS (HCC): Status: ACTIVE | Noted: 2022-05-03

## 2022-05-03 LAB
ABO GROUP BLD: NORMAL
ALBUMIN SERPL BCP-MCNC: 4.4 G/DL (ref 3.2–4.9)
ALBUMIN/GLOB SERPL: 1.7 G/DL
ALP SERPL-CCNC: 198 U/L (ref 30–99)
ALT SERPL-CCNC: 46 U/L (ref 2–50)
ANION GAP SERPL CALC-SCNC: 22 MMOL/L (ref 7–16)
APTT PPP: 27.8 SEC (ref 24.7–36)
AST SERPL-CCNC: 85 U/L (ref 12–45)
BILIRUB SERPL-MCNC: 0.9 MG/DL (ref 0.1–1.5)
BLD GP AB SCN SERPL QL: NORMAL
BUN SERPL-MCNC: 19 MG/DL (ref 8–22)
CALCIUM SERPL-MCNC: 8.6 MG/DL (ref 8.5–10.5)
CFT BLD TEG: 5.6 MIN (ref 4.6–9.1)
CFT P HPASE BLD TEG: 6 MIN (ref 4.3–8.3)
CHLORIDE SERPL-SCNC: 97 MMOL/L (ref 96–112)
CLOT ANGLE BLD TEG: 69.6 DEGREES (ref 63–78)
CLOT LYSIS 30M P MA LENFR BLD TEG: 0 % (ref 0–2.6)
CO2 SERPL-SCNC: 20 MMOL/L (ref 20–33)
CREAT SERPL-MCNC: 0.52 MG/DL (ref 0.5–1.4)
CT.EXTRINSIC BLD ROTEM: 2.3 MIN (ref 0.8–2.1)
ERYTHROCYTE [DISTWIDTH] IN BLOOD BY AUTOMATED COUNT: 60.7 FL (ref 35.9–50)
ETHANOL BLD-MCNC: 105.8 MG/DL
GFR SERPLBLD CREATININE-BSD FMLA CKD-EPI: 106 ML/MIN/1.73 M 2
GLOBULIN SER CALC-MCNC: 2.6 G/DL (ref 1.9–3.5)
GLUCOSE BLD STRIP.AUTO-MCNC: 187 MG/DL (ref 65–99)
GLUCOSE SERPL-MCNC: 72 MG/DL (ref 65–99)
HCG SERPL QL: NEGATIVE
HCT VFR BLD AUTO: 48.4 % (ref 37–47)
HGB BLD-MCNC: 16.6 G/DL (ref 12–16)
INR PPP: 0.91 (ref 0.87–1.13)
MCF BLD TEG: 49.7 MM (ref 52–69)
MCF.PLATELET INHIB BLD ROTEM: 16 MM (ref 15–32)
MCH RBC QN AUTO: 33.3 PG (ref 27–33)
MCHC RBC AUTO-ENTMCNC: 34.3 G/DL (ref 33.6–35)
MCV RBC AUTO: 97 FL (ref 81.4–97.8)
PA AA BLD-ACNC: 12.3 % (ref 0–11)
PA ADP BLD-ACNC: 18.5 % (ref 0–17)
PLATELET # BLD AUTO: 142 K/UL (ref 164–446)
PMV BLD AUTO: 9.4 FL (ref 9–12.9)
POTASSIUM SERPL-SCNC: 4 MMOL/L (ref 3.6–5.5)
PROT SERPL-MCNC: 7 G/DL (ref 6–8.2)
PROTHROMBIN TIME: 12 SEC (ref 12–14.6)
RBC # BLD AUTO: 4.99 M/UL (ref 4.2–5.4)
RH BLD: NORMAL
SARS-COV+SARS-COV-2 AG RESP QL IA.RAPID: NOTDETECTED
SODIUM SERPL-SCNC: 139 MMOL/L (ref 135–145)
SPECIMEN SOURCE: NORMAL
TEG ALGORITHM TGALG: ABNORMAL
WBC # BLD AUTO: 4.5 K/UL (ref 4.8–10.8)

## 2022-05-03 PROCEDURE — 700102 HCHG RX REV CODE 250 W/ 637 OVERRIDE(OP): Performed by: NURSE PRACTITIONER

## 2022-05-03 PROCEDURE — 85730 THROMBOPLASTIN TIME PARTIAL: CPT

## 2022-05-03 PROCEDURE — 85347 COAGULATION TIME ACTIVATED: CPT

## 2022-05-03 PROCEDURE — 36415 COLL VENOUS BLD VENIPUNCTURE: CPT

## 2022-05-03 PROCEDURE — 99291 CRITICAL CARE FIRST HOUR: CPT

## 2022-05-03 PROCEDURE — 82077 ASSAY SPEC XCP UR&BREATH IA: CPT

## 2022-05-03 PROCEDURE — 85576 BLOOD PLATELET AGGREGATION: CPT

## 2022-05-03 PROCEDURE — 87426 SARSCOV CORONAVIRUS AG IA: CPT

## 2022-05-03 PROCEDURE — HZ2ZZZZ DETOXIFICATION SERVICES FOR SUBSTANCE ABUSE TREATMENT: ICD-10-PCS | Performed by: SURGERY

## 2022-05-03 PROCEDURE — 86900 BLOOD TYPING SEROLOGIC ABO: CPT

## 2022-05-03 PROCEDURE — 86850 RBC ANTIBODY SCREEN: CPT

## 2022-05-03 PROCEDURE — 84703 CHORIONIC GONADOTROPIN ASSAY: CPT

## 2022-05-03 PROCEDURE — 770022 HCHG ROOM/CARE - ICU (200)

## 2022-05-03 PROCEDURE — 86901 BLOOD TYPING SEROLOGIC RH(D): CPT

## 2022-05-03 PROCEDURE — 80053 COMPREHEN METABOLIC PANEL: CPT

## 2022-05-03 PROCEDURE — G0390 TRAUMA RESPONS W/HOSP CRITI: HCPCS

## 2022-05-03 PROCEDURE — 99291 CRITICAL CARE FIRST HOUR: CPT | Performed by: SURGERY

## 2022-05-03 PROCEDURE — 700105 HCHG RX REV CODE 258: Performed by: NURSE PRACTITIONER

## 2022-05-03 PROCEDURE — 96374 THER/PROPH/DIAG INJ IV PUSH: CPT

## 2022-05-03 PROCEDURE — 82962 GLUCOSE BLOOD TEST: CPT

## 2022-05-03 PROCEDURE — 85027 COMPLETE CBC AUTOMATED: CPT

## 2022-05-03 PROCEDURE — 85610 PROTHROMBIN TIME: CPT

## 2022-05-03 PROCEDURE — 85384 FIBRINOGEN ACTIVITY: CPT

## 2022-05-03 PROCEDURE — 700111 HCHG RX REV CODE 636 W/ 250 OVERRIDE (IP): Performed by: EMERGENCY MEDICINE

## 2022-05-03 PROCEDURE — 70450 CT HEAD/BRAIN W/O DYE: CPT

## 2022-05-03 PROCEDURE — A9270 NON-COVERED ITEM OR SERVICE: HCPCS | Performed by: NURSE PRACTITIONER

## 2022-05-03 PROCEDURE — 700111 HCHG RX REV CODE 636 W/ 250 OVERRIDE (IP): Performed by: NURSE PRACTITIONER

## 2022-05-03 RX ORDER — SODIUM CHLORIDE 9 MG/ML
INJECTION, SOLUTION INTRAVENOUS CONTINUOUS
Status: DISCONTINUED | OUTPATIENT
Start: 2022-05-03 | End: 2022-05-04

## 2022-05-03 RX ORDER — OXYCODONE HYDROCHLORIDE 5 MG/1
5 TABLET ORAL
Status: DISCONTINUED | OUTPATIENT
Start: 2022-05-03 | End: 2022-05-04

## 2022-05-03 RX ORDER — VENLAFAXINE HYDROCHLORIDE 75 MG/1
225 CAPSULE, EXTENDED RELEASE ORAL EVERY MORNING
Status: DISCONTINUED | OUTPATIENT
Start: 2022-05-04 | End: 2022-05-07 | Stop reason: HOSPADM

## 2022-05-03 RX ORDER — PRAZOSIN HYDROCHLORIDE 1 MG/1
1 CAPSULE ORAL NIGHTLY
Status: ON HOLD | COMMUNITY
End: 2022-07-06

## 2022-05-03 RX ORDER — QUETIAPINE FUMARATE 100 MG/1
200 TABLET, FILM COATED ORAL
Status: DISCONTINUED | OUTPATIENT
Start: 2022-05-03 | End: 2022-05-07 | Stop reason: HOSPADM

## 2022-05-03 RX ORDER — LORAZEPAM 2 MG/ML
1 INJECTION INTRAMUSCULAR
Status: DISCONTINUED | OUTPATIENT
Start: 2022-05-03 | End: 2022-05-04

## 2022-05-03 RX ORDER — LORAZEPAM 2 MG/ML
4 INJECTION INTRAMUSCULAR
Status: DISCONTINUED | OUTPATIENT
Start: 2022-05-03 | End: 2022-05-04

## 2022-05-03 RX ORDER — ACETAMINOPHEN 325 MG/1
650 TABLET ORAL EVERY 6 HOURS PRN
Status: DISCONTINUED | OUTPATIENT
Start: 2022-05-08 | End: 2022-05-07 | Stop reason: HOSPADM

## 2022-05-03 RX ORDER — AMOXICILLIN 250 MG
1 CAPSULE ORAL NIGHTLY
Status: DISCONTINUED | OUTPATIENT
Start: 2022-05-03 | End: 2022-05-07 | Stop reason: HOSPADM

## 2022-05-03 RX ORDER — LORAZEPAM 2 MG/ML
3 INJECTION INTRAMUSCULAR
Status: DISCONTINUED | OUTPATIENT
Start: 2022-05-03 | End: 2022-05-04

## 2022-05-03 RX ORDER — MIRTAZAPINE 15 MG/1
45 TABLET, FILM COATED ORAL NIGHTLY
Status: DISCONTINUED | OUTPATIENT
Start: 2022-05-03 | End: 2022-05-07 | Stop reason: HOSPADM

## 2022-05-03 RX ORDER — MIRTAZAPINE 45 MG/1
45 TABLET, FILM COATED ORAL NIGHTLY
Status: ON HOLD | COMMUNITY
End: 2022-07-06

## 2022-05-03 RX ORDER — LORAZEPAM 2 MG/ML
2 INJECTION INTRAMUSCULAR ONCE
Status: COMPLETED | OUTPATIENT
Start: 2022-05-03 | End: 2022-05-03

## 2022-05-03 RX ORDER — GABAPENTIN 300 MG/1
300 CAPSULE ORAL EVERY 8 HOURS
Status: DISCONTINUED | OUTPATIENT
Start: 2022-05-03 | End: 2022-05-07 | Stop reason: HOSPADM

## 2022-05-03 RX ORDER — AMOXICILLIN 250 MG
1 CAPSULE ORAL
Status: DISCONTINUED | OUTPATIENT
Start: 2022-05-03 | End: 2022-05-07 | Stop reason: HOSPADM

## 2022-05-03 RX ORDER — LEVETIRACETAM 250 MG/1
250 TABLET ORAL 2 TIMES DAILY
Status: ON HOLD | COMMUNITY
End: 2022-05-07

## 2022-05-03 RX ORDER — ACETAMINOPHEN 325 MG/1
650 TABLET ORAL EVERY 6 HOURS
Status: DISCONTINUED | OUTPATIENT
Start: 2022-05-03 | End: 2022-05-07 | Stop reason: HOSPADM

## 2022-05-03 RX ORDER — BISACODYL 10 MG
10 SUPPOSITORY, RECTAL RECTAL
Status: DISCONTINUED | OUTPATIENT
Start: 2022-05-03 | End: 2022-05-07 | Stop reason: HOSPADM

## 2022-05-03 RX ORDER — POLYETHYLENE GLYCOL 3350 17 G/17G
1 POWDER, FOR SOLUTION ORAL 2 TIMES DAILY
Status: DISCONTINUED | OUTPATIENT
Start: 2022-05-03 | End: 2022-05-07 | Stop reason: HOSPADM

## 2022-05-03 RX ORDER — PRAZOSIN HYDROCHLORIDE 1 MG/1
1 CAPSULE ORAL NIGHTLY
Status: DISCONTINUED | OUTPATIENT
Start: 2022-05-03 | End: 2022-05-07 | Stop reason: HOSPADM

## 2022-05-03 RX ORDER — HYDROMORPHONE HYDROCHLORIDE 1 MG/ML
0.5 INJECTION, SOLUTION INTRAMUSCULAR; INTRAVENOUS; SUBCUTANEOUS
Status: DISCONTINUED | OUTPATIENT
Start: 2022-05-03 | End: 2022-05-04

## 2022-05-03 RX ORDER — LORAZEPAM 2 MG/ML
2 INJECTION INTRAMUSCULAR
Status: DISCONTINUED | OUTPATIENT
Start: 2022-05-03 | End: 2022-05-04

## 2022-05-03 RX ORDER — OXYCODONE HYDROCHLORIDE 10 MG/1
10 TABLET ORAL
Status: DISCONTINUED | OUTPATIENT
Start: 2022-05-03 | End: 2022-05-04

## 2022-05-03 RX ORDER — ONDANSETRON 4 MG/1
4 TABLET, ORALLY DISINTEGRATING ORAL EVERY 4 HOURS PRN
Status: DISCONTINUED | OUTPATIENT
Start: 2022-05-03 | End: 2022-05-07 | Stop reason: HOSPADM

## 2022-05-03 RX ORDER — ONDANSETRON 2 MG/ML
4 INJECTION INTRAMUSCULAR; INTRAVENOUS EVERY 4 HOURS PRN
Status: DISCONTINUED | OUTPATIENT
Start: 2022-05-03 | End: 2022-05-04

## 2022-05-03 RX ORDER — ENEMA 19; 7 G/133ML; G/133ML
1 ENEMA RECTAL
Status: DISCONTINUED | OUTPATIENT
Start: 2022-05-03 | End: 2022-05-07 | Stop reason: HOSPADM

## 2022-05-03 RX ORDER — DEXTROSE MONOHYDRATE 25 G/50ML
25 INJECTION, SOLUTION INTRAVENOUS
Status: DISCONTINUED | OUTPATIENT
Start: 2022-05-03 | End: 2022-05-04

## 2022-05-03 RX ORDER — QUETIAPINE FUMARATE 200 MG/1
200 TABLET, FILM COATED ORAL
Status: ON HOLD | COMMUNITY
End: 2022-07-06

## 2022-05-03 RX ORDER — LEVETIRACETAM 500 MG/1
500 TABLET ORAL EVERY 12 HOURS
Status: DISCONTINUED | OUTPATIENT
Start: 2022-05-03 | End: 2022-05-07 | Stop reason: HOSPADM

## 2022-05-03 RX ORDER — LEVETIRACETAM 500 MG/5ML
500 INJECTION, SOLUTION, CONCENTRATE INTRAVENOUS EVERY 12 HOURS
Status: DISCONTINUED | OUTPATIENT
Start: 2022-05-03 | End: 2022-05-04

## 2022-05-03 RX ORDER — DOCUSATE SODIUM 100 MG/1
100 CAPSULE, LIQUID FILLED ORAL 2 TIMES DAILY
Status: DISCONTINUED | OUTPATIENT
Start: 2022-05-03 | End: 2022-05-07 | Stop reason: HOSPADM

## 2022-05-03 RX ADMIN — LEVETIRACETAM 500 MG: 100 INJECTION, SOLUTION INTRAVENOUS at 17:26

## 2022-05-03 RX ADMIN — MAGNESIUM HYDROXIDE 30 ML: 400 SUSPENSION ORAL at 17:26

## 2022-05-03 RX ADMIN — SODIUM CHLORIDE: 9 INJECTION, SOLUTION INTRAVENOUS at 17:01

## 2022-05-03 RX ADMIN — OXYCODONE 5 MG: 5 TABLET ORAL at 17:41

## 2022-05-03 RX ADMIN — LORAZEPAM 2 MG: 2 INJECTION INTRAMUSCULAR; INTRAVENOUS at 20:32

## 2022-05-03 RX ADMIN — OXYCODONE HYDROCHLORIDE 10 MG: 10 TABLET ORAL at 21:05

## 2022-05-03 RX ADMIN — LORAZEPAM 2 MG: 2 INJECTION INTRAMUSCULAR; INTRAVENOUS at 15:46

## 2022-05-03 RX ADMIN — ACETAMINOPHEN 650 MG: 325 TABLET, FILM COATED ORAL at 17:25

## 2022-05-03 RX ADMIN — QUETIAPINE FUMARATE 200 MG: 100 TABLET ORAL at 21:06

## 2022-05-03 RX ADMIN — LORAZEPAM 1 MG: 2 INJECTION INTRAMUSCULAR; INTRAVENOUS at 17:24

## 2022-05-03 RX ADMIN — INSULIN HUMAN 1 UNITS: 100 INJECTION, SOLUTION PARENTERAL at 21:13

## 2022-05-03 RX ADMIN — MIRTAZAPINE 45 MG: 15 TABLET, FILM COATED ORAL at 21:06

## 2022-05-03 RX ADMIN — GABAPENTIN 300 MG: 300 CAPSULE ORAL at 21:05

## 2022-05-03 RX ADMIN — DOCUSATE SODIUM 100 MG: 100 CAPSULE, LIQUID FILLED ORAL at 17:25

## 2022-05-03 RX ADMIN — LORAZEPAM 2 MG: 2 INJECTION INTRAMUSCULAR; INTRAVENOUS at 19:35

## 2022-05-03 RX ADMIN — SENNOSIDES AND DOCUSATE SODIUM 1 TABLET: 50; 8.6 TABLET ORAL at 21:06

## 2022-05-03 ASSESSMENT — LIFESTYLE VARIABLES
DOES PATIENT WANT TO TALK TO SOMEONE ABOUT QUITTING: YES
TREMOR: MODERATE TREMOR WITH ARMS EXTENDED
NAUSEA AND VOMITING: *
TREMOR: MODERATE TREMOR WITH ARMS EXTENDED
DOES PATIENT WANT TO STOP DRINKING: YES
AUDITORY DISTURBANCES: NOT PRESENT
TOTAL SCORE: VERY MILD ITCHING, PINS AND NEEDLES SENSATION, BURNING OR NUMBNESS
TOTAL SCORE: 10
PAROXYSMAL SWEATS: BARELY PERCEPTIBLE SWEATING. PALMS MOIST
HEADACHE, FULLNESS IN HEAD: MODERATE
TOTAL SCORE: 12
PAROXYSMAL SWEATS: NO SWEAT VISIBLE
ANXIETY: MILDLY ANXIOUS
TOTAL SCORE: 4
ANXIETY: MILDLY ANXIOUS
AGITATION: SOMEWHAT MORE THAN NORMAL ACTIVITY
VISUAL DISTURBANCES: NOT PRESENT
TOTAL SCORE: 4
ANXIETY: *
AVERAGE NUMBER OF DAYS PER WEEK YOU HAVE A DRINK CONTAINING ALCOHOL: 7
ORIENTATION AND CLOUDING OF SENSORIUM: ORIENTED AND CAN DO SERIAL ADDITIONS
AGITATION: SOMEWHAT MORE THAN NORMAL ACTIVITY
HOW MANY TIMES IN THE PAST YEAR HAVE YOU HAD 5 OR MORE DRINKS IN A DAY: 365
PAROXYSMAL SWEATS: *
HAVE YOU EVER FELT YOU SHOULD CUT DOWN ON YOUR DRINKING: YES
EVER HAD A DRINK FIRST THING IN THE MORNING TO STEADY YOUR NERVES TO GET RID OF A HANGOVER: YES
ALCOHOL_USE: YES
EVER FELT BAD OR GUILTY ABOUT YOUR DRINKING: YES
TOTAL SCORE: 14
AGITATION: NORMAL ACTIVITY
HEADACHE, FULLNESS IN HEAD: MODERATE
HEADACHE, FULLNESS IN HEAD: MODERATE
ON A TYPICAL DAY WHEN YOU DRINK ALCOHOL HOW MANY DRINKS DO YOU HAVE: 10
TOTAL SCORE: 4
VISUAL DISTURBANCES: NOT PRESENT
TACTILE DISTURBANCES: VERY MILD ITCHING, PINS AND NEEDLES SENSATION, BURNING OR NUMBNESS
HAVE PEOPLE ANNOYED YOU BY CRITICIZING YOUR DRINKING: YES
TOTAL SCORE: VERY MILD ITCHING, PINS AND NEEDLES SENSATION, BURNING OR NUMBNESS
AUDITORY DISTURBANCES: NOT PRESENT
NAUSEA AND VOMITING: *
NAUSEA AND VOMITING: MILD NAUSEA WITH NO VOMITING
ORIENTATION AND CLOUDING OF SENSORIUM: ORIENTED AND CAN DO SERIAL ADDITIONS
AUDITORY DISTURBANCES: NOT PRESENT
CONSUMPTION TOTAL: POSITIVE
VISUAL DISTURBANCES: NOT PRESENT
TREMOR: *
ORIENTATION AND CLOUDING OF SENSORIUM: ORIENTED AND CAN DO SERIAL ADDITIONS

## 2022-05-03 ASSESSMENT — PATIENT HEALTH QUESTIONNAIRE - PHQ9
SUM OF ALL RESPONSES TO PHQ9 QUESTIONS 1 AND 2: 0
1. LITTLE INTEREST OR PLEASURE IN DOING THINGS: NOT AT ALL
2. FEELING DOWN, DEPRESSED, IRRITABLE, OR HOPELESS: NOT AT ALL
2. FEELING DOWN, DEPRESSED, IRRITABLE, OR HOPELESS: NOT AT ALL
SUM OF ALL RESPONSES TO PHQ9 QUESTIONS 1 AND 2: 0
SUM OF ALL RESPONSES TO PHQ9 QUESTIONS 1 AND 2: 0
1. LITTLE INTEREST OR PLEASURE IN DOING THINGS: NOT AT ALL

## 2022-05-03 ASSESSMENT — PAIN DESCRIPTION - PAIN TYPE
TYPE: ACUTE PAIN

## 2022-05-03 ASSESSMENT — FIBROSIS 4 INDEX
FIB4 SCORE: 5.3
FIB4 SCORE: 0.92

## 2022-05-03 NOTE — ASSESSMENT & PLAN NOTE
Ground level fall night prior to admission  Trauma Green Transfer Activation.  Per Mendes MD. Trauma Surgery.

## 2022-05-03 NOTE — ASSESSMENT & PLAN NOTE
Chronic condition treated with Seroquel, mirtazapine, prazosin and venlafaxine.  Resumed maintenance medication on admission.
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
## 2022-05-03 NOTE — PROGRESS NOTES
4 Eyes Skin Assessment Completed by BRITNEY Stratton and BRITNEY Torres.    Head Bruising  Ears WDL  Nose WDL  Mouth WDL  Neck WDL  Breast/Chest WDL  Shoulder Blades bruising bilateral shoulders  Spine WDL  (R) Arm/Elbow/Hand Redness and Blanching  (L) Arm/Elbow/Hand Redness and Blanching  Abdomen WDL  Groin WDL  Scrotum/Coccyx/Buttocks WDL  (R) Leg Abrasion  (L) Leg Abrasion  (R) Heel/Foot/Toe Redness and Blanching  (L) Heel/Foot/Toe Redness and Blanching          Devices In Places ECG, Blood Pressure Cuff, Pulse Ox and SCD's      Interventions In Place Heel Mepilex, Sacral Mepilex, Pillows, Elbow Mepilex, Q2 Turns, Low Air Loss Mattress and ZFlo Pillow    Possible Skin Injury No    Pictures Uploaded Into Epic Yes  Wound Consult Placed N/A  RN Wound Prevention Protocol Ordered Yes

## 2022-05-03 NOTE — PROGRESS NOTES
Belongings:    Gray sweatshirt  Gray boots  Gray socks  Black sweatshirt,   Blue jacket  Floral wallet  Black cell phone  Drivers lisence  Health insurance cards  No money present  Black pants    All placed in closet in S103

## 2022-05-03 NOTE — ASSESSMENT & PLAN NOTE
Prophylactic anticoagulation for thrombotic prevention initially contraindicated secondary to elevated bleeding risk.  5/4 Trauma screening bilateral lower extremity venous duplex negative for above knee DVT.
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
## 2022-05-03 NOTE — ED PROVIDER NOTES
ED Provider Note    Scribed for Tomer Jennings M.D. by Mark Anthony Wang. 5/3/2022  3:45 PM    Primary care provider: Mohsen Tamasaby, M.D.  Means of arrival: Transfer from [facility]  History obtained from: Patient  History limited by: None    CHIEF COMPLAINT  Chief Complaint   Patient presents with    T-5000 Head Injury     Transfer by DEM[facility] from [facility]. Pt states she was drunk and fell and hit head last night, dropped off at [facility] ED. Pt found to have subarachnoid hemorrhage. ETOH withdrawal 1mg Ativan, 4 Zofran, 650 tylenol at [facility].       HPI  Nathalie Aguilar is a 61 y.o. female who presents to the Emergency Department as a transfer from [facility] for evaluation of hematomas onset this morning. Patient states she was drunk the night before and woke up to blood and vomit. After presenting the ER, she was found with a small subarachnoid hemorrhage and subdural hematoma to her frontal aspect. Patient admits to associated nausea, vomiting, and headache, but denies any chest pain, shortness of breath, neck pain, numbness, tingling, or weakness. She notes that she drinks daily.     REVIEW OF SYSTEMS  Pertinent positives include subarachnoid hemorrhage, subdural hematoma, nausea, vomiting, and headache. Pertinent negatives include no chest pain, shortness of breath, neck pain, numbness, tingling, or weakness..  All other systems reviewed and negative.  C    PAST MEDICAL HISTORY   has a past medical history of ASTHMA, Cancer (HCC), Heroin addiction (HCC), Pneumonia, and Psychiatric disorder.    SURGICAL HISTORY   has a past surgical history that includes gyn surgery; incision and drainage orthopedic (Right, 1/25/2019); and cholecystectomy.    SOCIAL HISTORY  Social History     Tobacco Use    Smoking status: Current Every Day Smoker     Packs/day: 0.50    Smokeless tobacco: Never Used   Vaping Use    Vaping Use: Never used   Substance Use  "Topics    Alcohol use: Yes     Comment: two weeks since    Drug use: Yes     Comment: history of heroin use      Social History     Substance and Sexual Activity   Drug Use Yes    Comment: history of heroin use       FAMILY HISTORY  Family History   Problem Relation Age of Onset    No Known Problems Mother     Alcohol abuse Father     Heart Disease Neg Hx        CURRENT MEDICATIONS  Home Medications    None noted when reviewed         ALLERGIES  Allergies   Allergen Reactions    Fish Anaphylaxis    Other Drug      Other reaction(s): crazier than without it       PHYSICAL EXAM  VITAL SIGNS: /62   Temp 36.9 °C (98.4 °F)   Ht 1.626 m (5' 4\")   Wt 54 kg (119 lb 0.8 oz)   BMI 20.43 kg/m²     Constitutional: Well developed, Well nourished, mild distress, Non-toxic appearance.   HENT: Normocephalic, Occipital hematoma with dried blood, no obvious lacerations, Bilateral external ears normal, Oropharynx moist, No oral exudates.   Eyes: PERRLA, EOMI, Conjunctiva normal, No discharge.   Neck: No tenderness, Supple, No stridor. No C-Spine tenderness  Lymphatic: No lymphadenopathy noted.   Cardiovascular: Tachycardic heart rate, Normal rhythm.   Thorax & Lungs: Clear to auscultation bilaterally, No respiratory distress, No wheezing, No crackles.   Abdomen: Soft, No tenderness, No masses, No pulsatile masses.   Skin: Warm, Dry, No erythema, No rash.   Extremities:, No edema No cyanosis.   Musculoskeletal: No tenderness to palpation or major deformities noted.  Intact distal pulses. No C-Spine or L-Spine tenderness  Neurologic: Awake, alert. Moves all extremities spontaneously.   Psychiatric: Anxious and tremulous, Judgment normal    LABS  Results for orders placed or performed during the hospital encounter of 05/03/22   PLATELET MAPPING WITH BASIC TEG   Result Value Ref Range    Reaction Time Initial-R 5.6 4.6 - 9.1 min    React Time Initial Hep 6.0 4.3 - 8.3 min    Clot Kinetics-K 2.3 (H) 0.8 - 2.1 min    Clot " Angle-Angle 69.6 63.0 - 78.0 degrees    Maximum Clot Strength-MA 49.7 (L) 52.0 - 69.0 mm    TEG Functional Fibrinogen(MA) 16.0 15.0 - 32.0 mm    Lysis 30 minutes-LY30 0.0 0.0 - 2.6 %    % Inhibition ADP 18.5 (H) 0.0 - 17.0 %    % Inhibition AA 12.3 (H) 0.0 - 11.0 %    TEG Algorithm Link Algorithm    COD - Adult (Type and Screen)   Result Value Ref Range    ABO Grouping Only O     Rh Grouping Only POS     Antibody Screen-Cod NEG    DIAGNOSTIC ALCOHOL   Result Value Ref Range    Diagnostic Alcohol 105.8 (H) <10.1 mg/dL   Comp Metabolic Panel   Result Value Ref Range    Sodium 139 135 - 145 mmol/L    Potassium 4.0 3.6 - 5.5 mmol/L    Chloride 97 96 - 112 mmol/L    Co2 20 20 - 33 mmol/L    Anion Gap 22.0 (H) 7.0 - 16.0    Glucose 72 65 - 99 mg/dL    Bun 19 8 - 22 mg/dL    Creatinine 0.52 0.50 - 1.40 mg/dL    Calcium 8.6 8.5 - 10.5 mg/dL    AST(SGOT) 85 (H) 12 - 45 U/L    ALT(SGPT) 46 2 - 50 U/L    Alkaline Phosphatase 198 (H) 30 - 99 U/L    Total Bilirubin 0.9 0.1 - 1.5 mg/dL    Albumin 4.4 3.2 - 4.9 g/dL    Total Protein 7.0 6.0 - 8.2 g/dL    Globulin 2.6 1.9 - 3.5 g/dL    A-G Ratio 1.7 g/dL   CBC WITHOUT DIFFERENTIAL   Result Value Ref Range    WBC 4.5 (L) 4.8 - 10.8 K/uL    RBC 4.99 4.20 - 5.40 M/uL    Hemoglobin 16.6 (H) 12.0 - 16.0 g/dL    Hematocrit 48.4 (H) 37.0 - 47.0 %    MCV 97.0 81.4 - 97.8 fL    MCH 33.3 (H) 27.0 - 33.0 pg    MCHC 34.3 33.6 - 35.0 g/dL    RDW 60.7 (H) 35.9 - 50.0 fL    Platelet Count 142 (L) 164 - 446 K/uL    MPV 9.4 9.0 - 12.9 fL   Prothrombin Time   Result Value Ref Range    PT 12.0 12.0 - 14.6 sec    INR 0.91 0.87 - 1.13   APTT   Result Value Ref Range    APTT 27.8 24.7 - 36.0 sec   HCG QUAL SERUM   Result Value Ref Range    Beta-Hcg Qualitative Serum Negative Negative   SARS-COV Antigen YOSELIN   Result Value Ref Range    SARS-CoV-2 Source Nasal Swab     SARS-COV ANTIGEN YOSELIN NotDetected NotDetected   ESTIMATED GFR   Result Value Ref Range    GFR (CKD-EPI) 106 >60 mL/min/1.73 m 2         COURSE & MEDICAL DECISION MAKING  Pertinent Labs & Imaging studies reviewed. (See chart for details)      3:45 PM - Trauma was seen at bedside and seems to be a BIG 2. Patient will be admitted for care in the ICU. Ordered for Platelet Mapping with Basic TEG, Diagnostic Alcohol, CMP, CBC w/o Diff, Prothrombin Time, APTT, HCG Qual Serum, COD, and component cellular for further evaluation of symptoms. Patient will be medicated with Ativan 2 mg.      Decision Making:  Alcoholic status post fall head injury subarachnoid and subdural hematoma, the patient is a big 2.  The patient appears to be withdrawing from alcohol, give the patient some Ativan, the patient will be admitted to the trauma ICU.    DISPOSITION:  Patient will be hospitalized by Dr. Mendes in guarded condition.     FINAL IMPRESSION  1. SAH (subarachnoid hemorrhage) (HCC)    2. SDH (subdural hematoma) (HCC)    3. Alcohol withdrawal syndrome without complication (HCC)          Mark Anthony CASH (Scribe), am scribing for, and in the presence of, Tomer Jennings M.D..    Electronically signed by: Mark Anthony Wang (Bertha), 5/3/2022    ITomer M.D. personally performed the services described in this documentation, as scribed by Mark Anthony Wang in my presence, and it is both accurate and complete.    The note accurately reflects work and decisions made by me.  Tomer Jennings M.D.  5/3/2022  9:18 PM

## 2022-05-03 NOTE — ASSESSMENT & PLAN NOTE
Reports history of daily alcohol use  Admission blood alcohol 0.1   TEG AA 12.3% inhibition and ADP 18.5% inhibition.  RASS initiated on admission  5/4 Librium initiated   - Ativan PRN  5/6 DC librium   She needs to call [facility] on discharge to see if they have a bed. She has to do this on her own.   [facility] will also see in house.

## 2022-05-04 ENCOUNTER — APPOINTMENT (OUTPATIENT)
Dept: RADIOLOGY | Facility: MEDICAL CENTER | Age: 62
DRG: 083 | End: 2022-05-04
Attending: NURSE PRACTITIONER
Payer: MEDICAID

## 2022-05-04 PROBLEM — S00.33XA NASAL CONTUSION: Status: ACTIVE | Noted: 2022-05-04

## 2022-05-04 PROBLEM — Z11.52 ENCOUNTER FOR SCREENING FOR COVID-19: Status: RESOLVED | Noted: 2022-05-03 | Resolved: 2022-05-04

## 2022-05-04 LAB
ALBUMIN SERPL BCP-MCNC: 4 G/DL (ref 3.2–4.9)
ALBUMIN/GLOB SERPL: 1.5 G/DL
ALP SERPL-CCNC: 178 U/L (ref 30–99)
ALT SERPL-CCNC: 40 U/L (ref 2–50)
ANION GAP SERPL CALC-SCNC: 11 MMOL/L (ref 7–16)
AST SERPL-CCNC: 70 U/L (ref 12–45)
BILIRUB SERPL-MCNC: 1.4 MG/DL (ref 0.1–1.5)
BUN SERPL-MCNC: 16 MG/DL (ref 8–22)
CALCIUM SERPL-MCNC: 8.4 MG/DL (ref 8.5–10.5)
CHLORIDE SERPL-SCNC: 99 MMOL/L (ref 96–112)
CO2 SERPL-SCNC: 29 MMOL/L (ref 20–33)
CREAT SERPL-MCNC: 0.52 MG/DL (ref 0.5–1.4)
ERYTHROCYTE [DISTWIDTH] IN BLOOD BY AUTOMATED COUNT: 61.3 FL (ref 35.9–50)
GFR SERPLBLD CREATININE-BSD FMLA CKD-EPI: 106 ML/MIN/1.73 M 2
GLOBULIN SER CALC-MCNC: 2.6 G/DL (ref 1.9–3.5)
GLUCOSE BLD STRIP.AUTO-MCNC: 168 MG/DL (ref 65–99)
GLUCOSE BLD STRIP.AUTO-MCNC: 80 MG/DL (ref 65–99)
GLUCOSE SERPL-MCNC: 92 MG/DL (ref 65–99)
HCT VFR BLD AUTO: 46.4 % (ref 37–47)
HGB BLD-MCNC: 15.6 G/DL (ref 12–16)
MAGNESIUM SERPL-MCNC: 1.9 MG/DL (ref 1.5–2.5)
MCH RBC QN AUTO: 32.8 PG (ref 27–33)
MCHC RBC AUTO-ENTMCNC: 33.6 G/DL (ref 33.6–35)
MCV RBC AUTO: 97.7 FL (ref 81.4–97.8)
PHOSPHATE SERPL-MCNC: 2.5 MG/DL (ref 2.5–4.5)
PLATELET # BLD AUTO: 93 K/UL (ref 164–446)
PMV BLD AUTO: 9 FL (ref 9–12.9)
POTASSIUM SERPL-SCNC: 4 MMOL/L (ref 3.6–5.5)
PROT SERPL-MCNC: 6.6 G/DL (ref 6–8.2)
RBC # BLD AUTO: 4.75 M/UL (ref 4.2–5.4)
SODIUM SERPL-SCNC: 139 MMOL/L (ref 135–145)
WBC # BLD AUTO: 3.4 K/UL (ref 4.8–10.8)

## 2022-05-04 PROCEDURE — 770001 HCHG ROOM/CARE - MED/SURG/GYN PRIV*

## 2022-05-04 PROCEDURE — A9270 NON-COVERED ITEM OR SERVICE: HCPCS | Performed by: NURSE PRACTITIONER

## 2022-05-04 PROCEDURE — 84100 ASSAY OF PHOSPHORUS: CPT

## 2022-05-04 PROCEDURE — 700105 HCHG RX REV CODE 258: Performed by: NURSE PRACTITIONER

## 2022-05-04 PROCEDURE — 85027 COMPLETE CBC AUTOMATED: CPT

## 2022-05-04 PROCEDURE — 83735 ASSAY OF MAGNESIUM: CPT

## 2022-05-04 PROCEDURE — 700102 HCHG RX REV CODE 250 W/ 637 OVERRIDE(OP): Performed by: NURSE PRACTITIONER

## 2022-05-04 PROCEDURE — 700111 HCHG RX REV CODE 636 W/ 250 OVERRIDE (IP): Performed by: NURSE PRACTITIONER

## 2022-05-04 PROCEDURE — 93970 EXTREMITY STUDY: CPT

## 2022-05-04 PROCEDURE — 80053 COMPREHEN METABOLIC PANEL: CPT

## 2022-05-04 PROCEDURE — 82962 GLUCOSE BLOOD TEST: CPT

## 2022-05-04 PROCEDURE — 99233 SBSQ HOSP IP/OBS HIGH 50: CPT | Mod: FS | Performed by: NURSE PRACTITIONER

## 2022-05-04 PROCEDURE — 70160 X-RAY EXAM OF NASAL BONES: CPT

## 2022-05-04 RX ORDER — CHLORDIAZEPOXIDE HYDROCHLORIDE 25 MG/1
25 CAPSULE, GELATIN COATED ORAL EVERY 8 HOURS
Status: DISCONTINUED | OUTPATIENT
Start: 2022-05-04 | End: 2022-05-06

## 2022-05-04 RX ORDER — LORAZEPAM 2 MG/ML
0.5 INJECTION INTRAMUSCULAR EVERY 4 HOURS PRN
Status: DISCONTINUED | OUTPATIENT
Start: 2022-05-04 | End: 2022-05-07 | Stop reason: HOSPADM

## 2022-05-04 RX ORDER — BUTALBITAL, ACETAMINOPHEN AND CAFFEINE 50; 325; 40 MG/1; MG/1; MG/1
1 TABLET ORAL EVERY 6 HOURS PRN
Status: DISCONTINUED | OUTPATIENT
Start: 2022-05-04 | End: 2022-05-07 | Stop reason: HOSPADM

## 2022-05-04 RX ADMIN — GABAPENTIN 300 MG: 300 CAPSULE ORAL at 13:38

## 2022-05-04 RX ADMIN — OXYCODONE HYDROCHLORIDE 10 MG: 10 TABLET ORAL at 08:17

## 2022-05-04 RX ADMIN — ACETAMINOPHEN 650 MG: 325 TABLET, FILM COATED ORAL at 06:12

## 2022-05-04 RX ADMIN — LORAZEPAM 1 MG: 2 INJECTION INTRAMUSCULAR; INTRAVENOUS at 08:17

## 2022-05-04 RX ADMIN — GABAPENTIN 300 MG: 300 CAPSULE ORAL at 06:12

## 2022-05-04 RX ADMIN — QUETIAPINE FUMARATE 200 MG: 100 TABLET ORAL at 20:58

## 2022-05-04 RX ADMIN — LORAZEPAM 0.5 MG: 2 INJECTION INTRAMUSCULAR; INTRAVENOUS at 20:58

## 2022-05-04 RX ADMIN — SENNOSIDES AND DOCUSATE SODIUM 1 TABLET: 50; 8.6 TABLET ORAL at 20:58

## 2022-05-04 RX ADMIN — DOCUSATE SODIUM 100 MG: 100 CAPSULE, LIQUID FILLED ORAL at 06:12

## 2022-05-04 RX ADMIN — MIRTAZAPINE 45 MG: 15 TABLET, FILM COATED ORAL at 20:59

## 2022-05-04 RX ADMIN — CHLORDIAZEPOXIDE HYDROCHLORIDE 25 MG: 25 CAPSULE ORAL at 14:50

## 2022-05-04 RX ADMIN — CHLORDIAZEPOXIDE HYDROCHLORIDE 25 MG: 25 CAPSULE ORAL at 21:04

## 2022-05-04 RX ADMIN — LEVETIRACETAM 500 MG: 500 TABLET, FILM COATED ORAL at 06:11

## 2022-05-04 RX ADMIN — INSULIN HUMAN 1 UNITS: 100 INJECTION, SOLUTION PARENTERAL at 12:41

## 2022-05-04 RX ADMIN — GABAPENTIN 300 MG: 300 CAPSULE ORAL at 21:04

## 2022-05-04 RX ADMIN — LEVETIRACETAM 500 MG: 500 TABLET, FILM COATED ORAL at 18:04

## 2022-05-04 RX ADMIN — POLYETHYLENE GLYCOL 3350 1 PACKET: 17 POWDER, FOR SOLUTION ORAL at 06:11

## 2022-05-04 RX ADMIN — VENLAFAXINE HYDROCHLORIDE 225 MG: 75 CAPSULE, EXTENDED RELEASE ORAL at 06:13

## 2022-05-04 RX ADMIN — ACETAMINOPHEN 650 MG: 325 TABLET, FILM COATED ORAL at 12:39

## 2022-05-04 RX ADMIN — SODIUM CHLORIDE: 9 INJECTION, SOLUTION INTRAVENOUS at 13:37

## 2022-05-04 RX ADMIN — BUTALBITAL, ACETAMINOPHEN, AND CAFFEINE 1 TABLET: 50; 325; 40 TABLET ORAL at 18:05

## 2022-05-04 RX ADMIN — OXYCODONE HYDROCHLORIDE 10 MG: 10 TABLET ORAL at 03:30

## 2022-05-04 ASSESSMENT — COGNITIVE AND FUNCTIONAL STATUS - GENERAL
CLIMB 3 TO 5 STEPS WITH RAILING: A LITTLE
SUGGESTED CMS G CODE MODIFIER MOBILITY: CK
MOVING FROM LYING ON BACK TO SITTING ON SIDE OF FLAT BED: A LITTLE
WALKING IN HOSPITAL ROOM: A LITTLE
SUGGESTED CMS G CODE MODIFIER DAILY ACTIVITY: CK
TOILETING: A LITTLE
PERSONAL GROOMING: A LITTLE
HELP NEEDED FOR BATHING: A LITTLE
MOBILITY SCORE: 19
STANDING UP FROM CHAIR USING ARMS: A LITTLE
DAILY ACTIVITIY SCORE: 19
MOVING TO AND FROM BED TO CHAIR: A LITTLE
DRESSING REGULAR LOWER BODY CLOTHING: A LITTLE
DRESSING REGULAR UPPER BODY CLOTHING: A LITTLE

## 2022-05-04 ASSESSMENT — PAIN DESCRIPTION - PAIN TYPE
TYPE: ACUTE PAIN

## 2022-05-04 ASSESSMENT — ENCOUNTER SYMPTOMS
RESPIRATORY NEGATIVE: 1
HEADACHES: 1
PSYCHIATRIC NEGATIVE: 1
MYALGIAS: 1

## 2022-05-04 ASSESSMENT — FIBROSIS 4 INDEX: FIB4 SCORE: 5.3

## 2022-05-04 ASSESSMENT — COPD QUESTIONNAIRES
DO YOU EVER COUGH UP ANY MUCUS OR PHLEGM?: NO/ONLY WITH OCCASIONAL COLDS OR INFECTIONS
COPD SCREENING SCORE: 4
HAVE YOU SMOKED AT LEAST 100 CIGARETTES IN YOUR ENTIRE LIFE: YES
DURING THE PAST 4 WEEKS HOW MUCH DID YOU FEEL SHORT OF BREATH: NONE/LITTLE OF THE TIME

## 2022-05-04 NOTE — PROGRESS NOTES
"    Trauma / Surgical Daily Progress Note    Date of Service  5/4/2022    Chief Complaint  61 y.o. female admitted 5/3/2022 as a trauma green transfer - [facility] - SAH / BIG 2    Interval Events  Tertiary complete - nasal pain and swelling - imaging pending  RAP/SBIRT complete  Minimal head ache   Cog eval pending  Librium initiated  DC oxy  Fioricet initiated   Transfer to heredia     Anticipate home in next 24 hours pending progress  She is interested in alcohol rehab - she can call [facility] for self admission - she also may have transportation benefits.   \"[facility]\" will come to see patient in the ICU as well ([phone]).    Review of Systems  Review of Systems   Constitutional: Positive for malaise/fatigue.   HENT: Negative.    Respiratory: Negative.    Genitourinary:        Voiding   Musculoskeletal: Positive for myalgias.   Neurological: Positive for headaches.   Psychiatric/Behavioral: Negative.    All other systems reviewed and are negative.       Vital Signs  Temp:  [36.3 °C (97.3 °F)-37.2 °C (98.9 °F)] 36.3 °C (97.3 °F)  Pulse:  [] 80  Resp:  [11-60] 15  BP: ()/(55-80) 139/80  SpO2:  [90 %-100 %] 94 %    Physical Exam  Physical Exam  Vitals and nursing note reviewed.   Constitutional:       General: She is not in acute distress.     Appearance: Normal appearance.   HENT:      Head:      Comments: Nose swollen and bruised. TTP.  Bilateral eye bruising.      Right Ear: External ear normal.      Left Ear: External ear normal.      Mouth/Throat:      Mouth: Mucous membranes are moist.      Pharynx: Oropharynx is clear.   Eyes:      General:         Right eye: No discharge.         Left eye: No discharge.      Pupils: Pupils are equal, round, and reactive to light.   Pulmonary:      Effort: Pulmonary effort is normal. No respiratory distress.      Breath sounds: Normal breath sounds.   Abdominal:      General: There is no distension.      Palpations: Abdomen is soft.      " Tenderness: There is no abdominal tenderness.   Musculoskeletal:      Cervical back: Normal range of motion. No rigidity. No muscular tenderness.   Skin:     General: Skin is warm and dry.      Capillary Refill: Capillary refill takes less than 2 seconds.      Findings: Abrasion, bruising and ecchymosis present.   Neurological:      General: No focal deficit present.      Mental Status: She is alert and oriented to person, place, and time.   Psychiatric:         Mood and Affect: Mood normal.         Behavior: Behavior normal.         Thought Content: Thought content normal.         Laboratory  Recent Results (from the past 24 hour(s))   PLATELET MAPPING WITH BASIC TEG    Collection Time: 05/03/22  3:48 PM   Result Value Ref Range    Reaction Time Initial-R 5.6 4.6 - 9.1 min    React Time Initial Hep 6.0 4.3 - 8.3 min    Clot Kinetics-K 2.3 (H) 0.8 - 2.1 min    Clot Angle-Angle 69.6 63.0 - 78.0 degrees    Maximum Clot Strength-MA 49.7 (L) 52.0 - 69.0 mm    TEG Functional Fibrinogen(MA) 16.0 15.0 - 32.0 mm    Lysis 30 minutes-LY30 0.0 0.0 - 2.6 %    % Inhibition ADP 18.5 (H) 0.0 - 17.0 %    % Inhibition AA 12.3 (H) 0.0 - 11.0 %    TEG Algorithm Link Algorithm    COD - Adult (Type and Screen)    Collection Time: 05/03/22  3:48 PM   Result Value Ref Range    ABO Grouping Only O     Rh Grouping Only POS     Antibody Screen-Cod NEG    DIAGNOSTIC ALCOHOL    Collection Time: 05/03/22  3:48 PM   Result Value Ref Range    Diagnostic Alcohol 105.8 (H) <10.1 mg/dL   Comp Metabolic Panel    Collection Time: 05/03/22  3:48 PM   Result Value Ref Range    Sodium 139 135 - 145 mmol/L    Potassium 4.0 3.6 - 5.5 mmol/L    Chloride 97 96 - 112 mmol/L    Co2 20 20 - 33 mmol/L    Anion Gap 22.0 (H) 7.0 - 16.0    Glucose 72 65 - 99 mg/dL    Bun 19 8 - 22 mg/dL    Creatinine 0.52 0.50 - 1.40 mg/dL    Calcium 8.6 8.5 - 10.5 mg/dL    AST(SGOT) 85 (H) 12 - 45 U/L    ALT(SGPT) 46 2 - 50 U/L    Alkaline Phosphatase 198 (H) 30 - 99 U/L    Total  Bilirubin 0.9 0.1 - 1.5 mg/dL    Albumin 4.4 3.2 - 4.9 g/dL    Total Protein 7.0 6.0 - 8.2 g/dL    Globulin 2.6 1.9 - 3.5 g/dL    A-G Ratio 1.7 g/dL   CBC WITHOUT DIFFERENTIAL    Collection Time: 05/03/22  3:48 PM   Result Value Ref Range    WBC 4.5 (L) 4.8 - 10.8 K/uL    RBC 4.99 4.20 - 5.40 M/uL    Hemoglobin 16.6 (H) 12.0 - 16.0 g/dL    Hematocrit 48.4 (H) 37.0 - 47.0 %    MCV 97.0 81.4 - 97.8 fL    MCH 33.3 (H) 27.0 - 33.0 pg    MCHC 34.3 33.6 - 35.0 g/dL    RDW 60.7 (H) 35.9 - 50.0 fL    Platelet Count 142 (L) 164 - 446 K/uL    MPV 9.4 9.0 - 12.9 fL   Prothrombin Time    Collection Time: 05/03/22  3:48 PM   Result Value Ref Range    PT 12.0 12.0 - 14.6 sec    INR 0.91 0.87 - 1.13   APTT    Collection Time: 05/03/22  3:48 PM   Result Value Ref Range    APTT 27.8 24.7 - 36.0 sec   HCG QUAL SERUM    Collection Time: 05/03/22  3:48 PM   Result Value Ref Range    Beta-Hcg Qualitative Serum Negative Negative   ESTIMATED GFR    Collection Time: 05/03/22  3:48 PM   Result Value Ref Range    GFR (CKD-EPI) 106 >60 mL/min/1.73 m 2   SARS-COV Antigen YOSELIN    Collection Time: 05/03/22  4:31 PM   Result Value Ref Range    SARS-CoV-2 Source Nasal Swab     SARS-COV ANTIGEN YOSELIN NotDetected NotDetected   POCT glucose device results    Collection Time: 05/03/22  9:10 PM   Result Value Ref Range    POC Glucose, Blood 187 (H) 65 - 99 mg/dL   Magnesium    Collection Time: 05/04/22  4:40 AM   Result Value Ref Range    Magnesium 1.9 1.5 - 2.5 mg/dL   Phosphorus    Collection Time: 05/04/22  4:40 AM   Result Value Ref Range    Phosphorus 2.5 2.5 - 4.5 mg/dL   Comp Metabolic Panel    Collection Time: 05/04/22  4:40 AM   Result Value Ref Range    Sodium 139 135 - 145 mmol/L    Potassium 4.0 3.6 - 5.5 mmol/L    Chloride 99 96 - 112 mmol/L    Co2 29 20 - 33 mmol/L    Anion Gap 11.0 7.0 - 16.0    Glucose 92 65 - 99 mg/dL    Bun 16 8 - 22 mg/dL    Creatinine 0.52 0.50 - 1.40 mg/dL    Calcium 8.4 (L) 8.5 - 10.5 mg/dL    AST(SGOT) 70 (H) 12 -  "45 U/L    ALT(SGPT) 40 2 - 50 U/L    Alkaline Phosphatase 178 (H) 30 - 99 U/L    Total Bilirubin 1.4 0.1 - 1.5 mg/dL    Albumin 4.0 3.2 - 4.9 g/dL    Total Protein 6.6 6.0 - 8.2 g/dL    Globulin 2.6 1.9 - 3.5 g/dL    A-G Ratio 1.5 g/dL   ESTIMATED GFR    Collection Time: 05/04/22  4:40 AM   Result Value Ref Range    GFR (CKD-EPI) 106 >60 mL/min/1.73 m 2   CBC WITHOUT DIFFERENTIAL    Collection Time: 05/04/22  6:25 AM   Result Value Ref Range    WBC 3.4 (L) 4.8 - 10.8 K/uL    RBC 4.75 4.20 - 5.40 M/uL    Hemoglobin 15.6 12.0 - 16.0 g/dL    Hematocrit 46.4 37.0 - 47.0 %    MCV 97.7 81.4 - 97.8 fL    MCH 32.8 27.0 - 33.0 pg    MCHC 33.6 33.6 - 35.0 g/dL    RDW 61.3 (H) 35.9 - 50.0 fL    Platelet Count 93 (L) 164 - 446 K/uL    MPV 9.0 9.0 - 12.9 fL   POCT glucose device results    Collection Time: 05/04/22  6:28 AM   Result Value Ref Range    POC Glucose, Blood 80 65 - 99 mg/dL   POCT glucose device results    Collection Time: 05/04/22 12:39 PM   Result Value Ref Range    POC Glucose, Blood 168 (H) 65 - 99 mg/dL       Fluids    Intake/Output Summary (Last 24 hours) at 5/4/2022 1407  Last data filed at 5/4/2022 1200  Gross per 24 hour   Intake 1723.75 ml   Output 1100 ml   Net 623.75 ml       Core Measures & Quality Metrics  Medications reviewed, Labs reviewed and Radiology images reviewed  Castro catheter: No Castro      DVT Prophylaxis: Contraindicated - High bleeding risk  DVT prophylaxis - mechanical: SCDs  Ulcer prophylaxis: Not indicated    Assessed for rehab: Patient was assess for and/or received rehabilitation services during this hospitalization    RAP Score Total: 6    ETOH Screening  CAGE Score: 4  Assessment complete date: 5/4/2022  Intervention: yes. Patient response to intervention: \"I want to stop\".   Patient demonstrates understanding of intervention. Patient agrees to follow-up.   has been contacted. Follow up with: PCP  Total ETOH intervention time: greater than 30 " minutes      Assessment/Plan  * Trauma- (present on admission)  Assessment & Plan  Ground level fall night prior to admission  Trauma Green Transfer Activation.  Per Mendes MD. Trauma Surgery.     Subarachnoid hemorrhage following injury, with loss of consciousness (HCC)- (present on admission)  Assessment & Plan  Head CT from referring facility with small amount of subarachnoid hemorrhage within inferior and medial frontal lobe, small amount of acute hemorrhage seen within multiple sulci, extra-axial hemorrhage adjacent to the inferior falx.  Isolated closed head injury.  Trauma Brain Injury Guidelines implemented.   BIG 2.  Repeat interval CT unchanged.    Alcohol use- (present on admission)  Assessment & Plan  Reports history of daily alcohol use  Admission blood alcohol 0.1   TEG AA 12.3% inhibition and ADP 18.5% inhibition.  RASS initiated on admission  5/4 Librium initiated   - Ativan PRN  She needs to call [facility] on discharge to see if they have a bed. She has to do this on her own.   [facility] will also see in house.     Nasal contusion- (present on admission)  Assessment & Plan  Imaging pending    Personal history of mental disorder- (present on admission)  Assessment & Plan  Chronic condition treated with Seroquel, mirtazapine, prazosin and venlafaxine.  Resumed maintenance medication on admission.    Contraindication to deep vein thrombosis (DVT) prophylaxis- (present on admission)  Assessment & Plan  Prophylactic anticoagulation for thrombotic prevention initially contraindicated secondary to elevated bleeding risk.  5/4 Trauma surveillance venous duplex scanning ordered.   Mental status adequate for full examination?: Yes    Spine cleared (radiologically and/or clinically): Yes    All current laboratory studies/radiology exams reviewed: Yes    Completed Consultations:  None     Pending Consultations:  None    Newly Identified Diagnoses and Injuries:  Nasal injury    Discussed  patient condition with RN, Patient and Dr. Chin.

## 2022-05-04 NOTE — DISCHARGE PLANNING
Anticipated Discharge Disposition: Home    Action: Discussed pt in IDT rounds. Pt to either transfer to floor vs d/c home from ICU. RN requested alcohol resources for pt.    LSW provided pt with alcohol resources at bedside to look over.     Barriers to Discharge: None    Plan: LSW to follow and assist as needed.

## 2022-05-04 NOTE — H&P
CHIEF COMPLAINT: Ground-level fall    HISTORY OF PRESENT ILLNESS: The patient is a 61-year-old woman who fell while drinking.  She struck her head.  She woke up in the bathtub with significant blood around her.  Work-up did reveal an outside facility did reveal subarachnoid hemorrhage.  At the time my exam she does complain of a headache.  She denies neck pain, abdominal pain, numbness, tingling, or weakness.    TRIAGE CATEGORY: The patient was triaged as a Trauma Green Transfer activation. The patient was initially evaluated at [facility] in [place], [place] where CT imaging demonstrated Subarachnoid hemorrhage and subdural hematoma. The patient was transported to [facility] in [place], [place] for a definitive neurotrauma evaluation. An expeditious primary and secondary survey with required adjuncts was conducted. See Trauma Narrator for full details.    PAST MEDICAL HISTORY:  has a past medical history of ASTHMA, Cancer (HCC), Heroin addiction ([facility]), Pneumonia, and Psychiatric disorder.    PAST SURGICAL HISTORY:  has a past surgical history that includes gyn surgery; incision and drainage orthopedic (Right, 1/25/2019); and cholecystectomy.    ALLERGIES:   Allergies   Allergen Reactions   • Fish Anaphylaxis   • Other Drug      Other reaction(s): crazier than without it       CURRENT MEDICATIONS:   Home Medications     Reviewed by Delio Godinez (Pharmacy Tech) on 05/03/22 at 1612  Med List Status: Complete   Medication Last Dose Status   clonazePAM (KLONOPIN) 1 MG Tab FEW DAYS AGO Active   gabapentin (NEURONTIN) 300 MG Cap FEW DAYS AGO Active   levETIRAcetam (KEPPRA) 250 MG tablet FEW DAYS AGO Active   mirtazapine (REMERON) 45 MG tablet 5/2/2022 Active   prazosin (MINIPRESS) 1 MG Cap UNK Active   QUEtiapine (SEROQUEL) 200 MG Tab 5/2/2022 Active   venlafaxine XR (EFFEXOR XR) 75 MG CAPSULE SR 24 HR 5/2/2022 Active                FAMILY HISTORY: family history includes Alcohol abuse  "in her father; No Known Problems in her mother.    SOCIAL HISTORY:  reports that she has been smoking. She has been smoking about 0.50 packs per day. She has never used smokeless tobacco. She reports current alcohol use. She reports current drug use.    REVIEW OF SYSTEMS: Comprehensive review of systems is negative with the exception of the aforementioned HPI, PMH, and PSH bullets in accordance with CMS guidelines.    PHYSICAL EXAMINATION:      Vital Signs: /65   Pulse (!) 103   Temp 37.2 °C (98.9 °F) (Temporal)   Resp (!) 53   Ht 1.626 m (5' 4\")   Wt 54.9 kg (121 lb 0.5 oz)   SpO2 95%   Physical Exam  General: Laying in bed and is in no distress  HEENT: Pupils equal and reactive, extraocular muscles intact, some facial bruising  Neck: Supple with full range of motion  Chest: Nontender, symmetrical chest excursion  Cardiovascular: Regular rate and rhythm  Abdomen: Soft and nontender  Pelvis: Stable  Back: No step-offs nontender  Extremities: No open wounds or deformity  Neurologic: GCS of 15, no focal deficits  Vascular: Palpable radial and femoral pulses  Skin: Warm and dry  Psychiatric: Interacts appropriately    LABORATORY VALUES:   Recent Labs     05/03/22  1548   WBC 4.5*   RBC 4.99   HEMOGLOBIN 16.6*   HEMATOCRIT 48.4*   MCV 97.0   MCH 33.3*   MCHC 34.3   RDW 60.7*   PLATELETCT 142*   MPV 9.4     Recent Labs     05/03/22  1548   SODIUM 139   POTASSIUM 4.0   CHLORIDE 97   CO2 20   GLUCOSE 72   BUN 19   CREATININE 0.52   CALCIUM 8.6     Recent Labs     05/03/22  1548   ASTSGOT 85*   ALTSGPT 46   TBILIRUBIN 0.9   ALKPHOSPHAT 198*   GLOBULIN 2.6   INR 0.91     Recent Labs     05/03/22  1548   APTT 27.8   INR 0.91        IMAGING:   OUTSIDE IMAGES-CT CERVICAL SPINE   Final Result      OUTSIDE IMAGES-CT HEAD   Final Result      CT-HEAD W/O    (Results Pending)       Problems:    Trauma  Ground level fall night prior to admission  Trauma Green Transfer Activation.  Per Mendes MD. Trauma Surgery. "     Alcohol use  Reports history of daily alcohol use  Admission blood alcohol 0.1   TEG pending  RASS initiated on admission  Brief intervention when able    Subarachnoid hemorrhage following injury, with loss of consciousness (HCC)  Head CT from referring facility with small amount of subarachnoid hemorrhage within inferior and medial frontal lobe, small amount of acute hemorrhage seen within multiple sulci, extra-axial hemorrhage adjacent to the inferior falx.  Isolated closed head injury.  Trauma Brain Injury Guidelines implemented.   BIG 2.  Repeat interval CT pending     Contraindication to deep vein thrombosis (DVT) prophylaxis  Prophylactic anticoagulation for thrombotic prevention initially contraindicated secondary to elevated bleeding risk.   Trauma surveillance venous duplex scanning ordered.     Encounter for screening for COVID-19  5/3 COVID-19 specimen sent. AIRBORNE & CONTACT/EYE ISOLATION implemented pending final SARS-CoV-2 testing.     Personal history of mental disorder  Chronic condition treated with Seroquel, mirtazapine, prazosin and venlafaxine.  Resumed maintenance medication on admission.    Assessment and plan:  61-year-old woman status post a ground-level fall.  She does have injuries includin.  Subarachnoid and subdural hemorrhage- big 2 by criteria.  Repeat imaging has been ordered.  Will require ICU admission for this injury  She also has significant history of alcohol use and associated withdrawal when not drinking.  RASS protocol will be initiated.    DISPOSITION: Trauma ICU.  Trauma tertiary survey.    CRITICAL CARE TIME: 32 minutes excluding procedures.       ____________________________________     Per Mendes M.D.    DD: 5/3/2022  6:39 PM

## 2022-05-04 NOTE — CARE PLAN
The patient is Watcher - Medium risk of patient condition declining or worsening    Shift Goals  Clinical Goals: Stable neuro exam, reduce ETOH withdrawal symptoms  Patient Goals: Rest, pain control  Family Goals: No family present    Progress made toward(s) clinical / shift goals:    Problem: Optimal Care for Alcohol Withdrawal  Goal: Optimal Care for the alcohol withdrawal patient  Outcome: Progressing   RASS protocol initiated. PRN Ativan administered per RASS.     Problem: Pain - Standard  Goal: Alleviation of pain or a reduction in pain to the patient’s comfort goal  Outcome: Progressing   Patient able to rate pain using 0-10 pain rating scale. Medicated for pain per orders.     Problem: Fall Risk  Goal: Patient will remain free from falls  Outcome: Progressing   Fall precautions in place. Bed in lowest position and bed brakes applied. Call light and personal belongings within reach. Instructed patient to use call light to alert staff of needs and before getting out of bed, patient verbalized understanding.

## 2022-05-04 NOTE — CARE PLAN
The patient is Watcher - Medium risk of patient condition declining or worsening         Progress made toward(s) clinical / shift goals:    Problem: Knowledge Deficit - Standard  Goal: Patient and family/care givers will demonstrate understanding of plan of care, disease process/condition, diagnostic tests and medications  Outcome: Progressing     Problem: Seizure Precautions  Goal: Implementation of seizure precautions  Outcome: Progressing     Problem: Pain - Standard  Goal: Alleviation of pain or a reduction in pain to the patient’s comfort goal  Outcome: Progressing       Patient is not progressing towards the following goals:      Problem: Optimal Care for Alcohol Withdrawal  Goal: Optimal Care for the alcohol withdrawal patient  Outcome: Not Progressing     Problem: Psychosocial  Goal: Patient's level of anxiety will decrease  Outcome: Not Progressing

## 2022-05-04 NOTE — DIETARY
"Nutrition services: Day 1 of admit.  Nathalie Aguilar is a 61 y.o. female with admitting DX of Subarachnoid hemorrhage.  Consult received for Malnutrition Screening Tool score of 2 for unplanned weight loss of 14-23 lb in >1 year.    Assessment:  Height: 162.6 cm (5' 4\")  Weight: 55 kg (121 lb 4.1 oz)  Body mass index is 20.81 kg/m²., BMI classification: Normal  Diet/Intake: Regular    Evaluation:   1. Pt with weight loss in >1 year per nutrition screen. Per chart review, weight has been stable for the past year at ~55 kg. Weight one year ago on 4/13/21 = 55.8 kg.  2. No decreased appetite per nutrition screen. Pt initially on clear liquid diet. Recorded PO intake dinner 5/3 = %, breakfast today <25%. Diet now upgraded to Regular.    Malnutrition Risk: Criteria not met.    Recommendations/Plan:   1. Encourage intake of meals >50%.  2. Document intake of all meals as % taken in ADL's to provide interdisciplinary communication across all shifts.   3. Monitor weight.  4. Nutrition rep will continue to see patient for ongoing meal and snack preferences.   5. RD to monitor per department policy.            "

## 2022-05-05 PROCEDURE — 700102 HCHG RX REV CODE 250 W/ 637 OVERRIDE(OP): Performed by: NURSE PRACTITIONER

## 2022-05-05 PROCEDURE — A9270 NON-COVERED ITEM OR SERVICE: HCPCS | Performed by: NURSE PRACTITIONER

## 2022-05-05 PROCEDURE — 99233 SBSQ HOSP IP/OBS HIGH 50: CPT | Performed by: NURSE PRACTITIONER

## 2022-05-05 PROCEDURE — 770001 HCHG ROOM/CARE - MED/SURG/GYN PRIV*

## 2022-05-05 PROCEDURE — 92523 SPEECH SOUND LANG COMPREHEN: CPT

## 2022-05-05 PROCEDURE — 700111 HCHG RX REV CODE 636 W/ 250 OVERRIDE (IP): Performed by: NURSE PRACTITIONER

## 2022-05-05 RX ORDER — OXYCODONE HYDROCHLORIDE 5 MG/1
5 TABLET ORAL EVERY 4 HOURS PRN
Status: DISCONTINUED | OUTPATIENT
Start: 2022-05-05 | End: 2022-05-07 | Stop reason: HOSPADM

## 2022-05-05 RX ADMIN — BUTALBITAL, ACETAMINOPHEN, AND CAFFEINE 1 TABLET: 50; 325; 40 TABLET ORAL at 11:44

## 2022-05-05 RX ADMIN — BUTALBITAL, ACETAMINOPHEN, AND CAFFEINE 1 TABLET: 50; 325; 40 TABLET ORAL at 05:48

## 2022-05-05 RX ADMIN — VENLAFAXINE HYDROCHLORIDE 225 MG: 75 CAPSULE, EXTENDED RELEASE ORAL at 06:12

## 2022-05-05 RX ADMIN — OXYCODONE 5 MG: 5 TABLET ORAL at 08:45

## 2022-05-05 RX ADMIN — LORAZEPAM 0.5 MG: 2 INJECTION INTRAMUSCULAR; INTRAVENOUS at 12:45

## 2022-05-05 RX ADMIN — QUETIAPINE FUMARATE 200 MG: 100 TABLET ORAL at 21:03

## 2022-05-05 RX ADMIN — LEVETIRACETAM 500 MG: 500 TABLET, FILM COATED ORAL at 06:12

## 2022-05-05 RX ADMIN — CHLORDIAZEPOXIDE HYDROCHLORIDE 25 MG: 25 CAPSULE ORAL at 06:12

## 2022-05-05 RX ADMIN — SENNOSIDES AND DOCUSATE SODIUM 1 TABLET: 50; 8.6 TABLET ORAL at 21:03

## 2022-05-05 RX ADMIN — BUTALBITAL, ACETAMINOPHEN, AND CAFFEINE 1 TABLET: 50; 325; 40 TABLET ORAL at 17:33

## 2022-05-05 RX ADMIN — OXYCODONE 5 MG: 5 TABLET ORAL at 19:21

## 2022-05-05 RX ADMIN — GABAPENTIN 300 MG: 300 CAPSULE ORAL at 15:15

## 2022-05-05 RX ADMIN — DOCUSATE SODIUM 100 MG: 100 CAPSULE, LIQUID FILLED ORAL at 06:11

## 2022-05-05 RX ADMIN — LEVETIRACETAM 500 MG: 500 TABLET, FILM COATED ORAL at 17:34

## 2022-05-05 RX ADMIN — CHLORDIAZEPOXIDE HYDROCHLORIDE 25 MG: 25 CAPSULE ORAL at 15:14

## 2022-05-05 RX ADMIN — GABAPENTIN 300 MG: 300 CAPSULE ORAL at 06:12

## 2022-05-05 RX ADMIN — PRAZOSIN HYDROCHLORIDE 1 MG: 1 CAPSULE ORAL at 21:04

## 2022-05-05 RX ADMIN — MIRTAZAPINE 45 MG: 15 TABLET, FILM COATED ORAL at 21:04

## 2022-05-05 RX ADMIN — GABAPENTIN 300 MG: 300 CAPSULE ORAL at 21:03

## 2022-05-05 RX ADMIN — CHLORDIAZEPOXIDE HYDROCHLORIDE 25 MG: 25 CAPSULE ORAL at 21:03

## 2022-05-05 RX ADMIN — OXYCODONE 5 MG: 5 TABLET ORAL at 15:13

## 2022-05-05 ASSESSMENT — PAIN DESCRIPTION - PAIN TYPE
TYPE: ACUTE PAIN

## 2022-05-05 ASSESSMENT — PATIENT HEALTH QUESTIONNAIRE - PHQ9
2. FEELING DOWN, DEPRESSED, IRRITABLE, OR HOPELESS: SEVERAL DAYS
9. THOUGHTS THAT YOU WOULD BE BETTER OFF DEAD, OR OF HURTING YOURSELF: NOT AT ALL
6. FEELING BAD ABOUT YOURSELF - OR THAT YOU ARE A FAILURE OR HAVE LET YOURSELF OR YOUR FAMILY DOWN: SEVERAL DAYS
SUM OF ALL RESPONSES TO PHQ QUESTIONS 1-9: 3
SUM OF ALL RESPONSES TO PHQ9 QUESTIONS 1 AND 2: 2
4. FEELING TIRED OR HAVING LITTLE ENERGY: NOT AT ALL
1. LITTLE INTEREST OR PLEASURE IN DOING THINGS: SEVERAL DAYS
5. POOR APPETITE OR OVEREATING: NOT AT ALL
8. MOVING OR SPEAKING SO SLOWLY THAT OTHER PEOPLE COULD HAVE NOTICED. OR THE OPPOSITE, BEING SO FIGETY OR RESTLESS THAT YOU HAVE BEEN MOVING AROUND A LOT MORE THAN USUAL: NOT AT ALL
3. TROUBLE FALLING OR STAYING ASLEEP OR SLEEPING TOO MUCH: NOT AT ALL
7. TROUBLE CONCENTRATING ON THINGS, SUCH AS READING THE NEWSPAPER OR WATCHING TELEVISION: NOT AT ALL

## 2022-05-05 ASSESSMENT — ENCOUNTER SYMPTOMS
MYALGIAS: 1
HEADACHES: 1
PSYCHIATRIC NEGATIVE: 1
RESPIRATORY NEGATIVE: 1

## 2022-05-05 ASSESSMENT — FIBROSIS 4 INDEX: FIB4 SCORE: 7.14

## 2022-05-05 NOTE — PROGRESS NOTES
Trauma / Surgical Daily Progress Note    Date of Service  5/5/2022    Chief Complaint  61 y.o. female admitted 5/3/2022 with subarachnoid hemorrhage post ground level fall.    Interval Events    No critical events overnight.   Alert and oriented.   Up to chair eating breakfast.   1 dose of PRN ativan overnight.    - Continue scheduled librium and PRN ativan.   - Cognitive evaluation pending.  - Clinically stable at this time, awaiting heredia bed.   - Counseled    Review of Systems  Review of Systems   Constitutional: Positive for malaise/fatigue.   HENT: Negative.    Respiratory: Negative.    Genitourinary:        Voiding   Musculoskeletal: Positive for myalgias.   Neurological: Positive for headaches.   Psychiatric/Behavioral: Negative.    All other systems reviewed and are negative.       Vital Signs  Temp:  [36.1 °C (96.9 °F)-37 °C (98.6 °F)] 36.4 °C (97.5 °F)  Pulse:  [76-96] 79  Resp:  [11-34] 18  BP: ()/(61-83) 101/65  SpO2:  [88 %-100 %] 100 %    Physical Exam  Physical Exam  Vitals and nursing note reviewed.   Constitutional:       General: She is not in acute distress.     Appearance: Normal appearance.   HENT:      Head:      Comments: Nose swollen and bruised.  Bilateral eye ecchymosis.      Right Ear: External ear normal.      Left Ear: External ear normal.      Mouth/Throat:      Mouth: Mucous membranes are moist.      Pharynx: Oropharynx is clear.   Eyes:      General:         Right eye: No discharge.         Left eye: No discharge.      Pupils: Pupils are equal, round, and reactive to light.   Pulmonary:      Effort: Pulmonary effort is normal. No respiratory distress.      Breath sounds: Normal breath sounds.   Abdominal:      General: There is no distension.      Palpations: Abdomen is soft.      Tenderness: There is no abdominal tenderness.   Musculoskeletal:      Cervical back: Normal range of motion. No rigidity. No muscular tenderness.   Skin:     General: Skin is warm and dry.       "Capillary Refill: Capillary refill takes less than 2 seconds.      Findings: Abrasion, bruising and ecchymosis present.   Neurological:      General: No focal deficit present.      Mental Status: She is alert and oriented to person, place, and time.   Psychiatric:         Mood and Affect: Mood normal.         Behavior: Behavior normal.         Thought Content: Thought content normal.         Laboratory  Recent Results (from the past 24 hour(s))   POCT glucose device results    Collection Time: 05/04/22 12:39 PM   Result Value Ref Range    POC Glucose, Blood 168 (H) 65 - 99 mg/dL       Fluids    Intake/Output Summary (Last 24 hours) at 5/5/2022 0951  Last data filed at 5/5/2022 0400  Gross per 24 hour   Intake 980 ml   Output 1100 ml   Net -120 ml       Core Measures & Quality Metrics  Medications reviewed, Labs reviewed and Radiology images reviewed  Castro catheter: No Castro      DVT Prophylaxis: Contraindicated - High bleeding risk  DVT prophylaxis - mechanical: SCDs  Ulcer prophylaxis: Not indicated    Assessed for rehab: Patient was assess for and/or received rehabilitation services during this hospitalization    RAP Score Total: 6    ETOH Screening  CAGE Score: 4  Assessment complete date: 5/4/2022  Intervention: yes. Patient response to intervention: \"I want to stop\".   Patient demonstrates understanding of intervention. Patient agrees to follow-up.   has been contacted. Follow up with: PCP  Total ETOH intervention time: greater than 30 minutes      Assessment/Plan  * Trauma- (present on admission)  Assessment & Plan  Ground level fall night prior to admission  Trauma Green Transfer Activation.  Per Mendes MD. Trauma Surgery.      Subarachnoid hemorrhage following injury, with loss of consciousness (HCC)- (present on admission)  Assessment & Plan  Head CT from referring facility with small amount of subarachnoid hemorrhage within inferior and medial frontal lobe, small amount of acute " hemorrhage seen within multiple sulci, extra-axial hemorrhage adjacent to the inferior falx.  Isolated closed head injury.  Trauma Brain Injury Guidelines implemented.   BIG 2.  Repeat interval CT unchanged.     Alcohol use- (present on admission)  Assessment & Plan  Reports history of daily alcohol use  Admission blood alcohol 0.1   TEG AA 12.3% inhibition and ADP 18.5% inhibition.  RASS initiated on admission  5/4 Librium initiated   - Ativan PRN  She needs to call [facility] on discharge to see if they have a bed. She has to do this on her own.   [facility] will also see in house.      Nasal contusion- (present on admission)  Assessment & Plan  Imaging without fracture.    Personal history of mental disorder- (present on admission)  Assessment & Plan  Chronic condition treated with Seroquel, mirtazapine, prazosin and venlafaxine.  Resumed maintenance medication on admission.     Contraindication to deep vein thrombosis (DVT) prophylaxis- (present on admission)  Assessment & Plan  Prophylactic anticoagulation for thrombotic prevention initially contraindicated secondary to elevated bleeding risk.  5/4 Trauma screening bilateral lower extremity venous duplex negative for above knee DVT.         Discussed patient condition with Patient and trauma surgery, Dr. Tania Chin.

## 2022-05-05 NOTE — CARE PLAN
The patient is Stable - Low risk of patient condition declining or worsening    Shift Goals  Clinical Goals: stable neuro exam, manage etoh withdrawal, pain control  Patient Goals: pain control, ativan dosing  Family Goals: ELLIS    Progress made toward(s) clinical / shift goals:  neuro exam stable, etoh withdrawal managed, pain controlled    Patient is not progressing towards the following goals: N/A      Problem: Knowledge Deficit - Standard  Goal: Patient and family/care givers will demonstrate understanding of plan of care, disease process/condition, diagnostic tests and medications  Outcome: Progressing     Problem: Optimal Care for Alcohol Withdrawal  Goal: Optimal Care for the alcohol withdrawal patient  Outcome: Progressing     Problem: Seizure Precautions  Goal: Implementation of seizure precautions  Outcome: Progressing     Problem: Lifestyle Changes  Goal: Patient's ability to identify lifestyle changes and available resources to help reduce recurrence of condition will improve  Outcome: Progressing     Problem: Psychosocial  Goal: Patient's level of anxiety will decrease  Outcome: Progressing  Goal: Spiritual and cultural needs incorporated into hospitalization  Outcome: Progressing     Problem: Pain - Standard  Goal: Alleviation of pain or a reduction in pain to the patient’s comfort goal  Outcome: Progressing

## 2022-05-05 NOTE — THERAPY
Speech Language Pathology   Initial Assessment     Patient Name: Nathalie Aguilar  AGE:  61 y.o., SEX:  female  Medical Record #: [id number]  Today's Date: 5/5/2022     Precautions  Precautions: Fall Risk    Assessment:    Patient was seen on this date for a cognitive-linguistic evaluation 2/2 subarachnoid hemorrhage post ground level fall.. Pt reports some new difficulty with sustaining attention during complex cognitive tasks.     Portions of the COGNISTAT (Neurobehavioral Cognitive Status Assessment) were administered in conjunction with non-standardized assessments.     Patient scored the following on the Cognistat:  Orientation: WNL  Attention: Mild  Comprehension (Language):WNL  Repetition (Language):WNL  Naming (Language):WNL  Memory:Mild  Calculations:WNL  Similarities (Reasoning):WNL  Judgment (Reasoning):WNL    Further testing revealed patient demonstrated difficulty with following complex written directions and medication management tasks, likely related to the patients impaired alternating attention. The patient benefited from additional cues to determine times to take medications during medication task.     Strengths include orientation, language, reasoning, written expression   Weaknesses include memory, alternating attention, reading comprehension and medication management.       Pt presenting with a mild cognitive-linguistic impairment characterized by deficits in attention, medication management, and memory.  Supervision Needs Upon Discharge: The pt will benefit from intermittent supervision throughout the day and direct assistance for the following IADLs: Medication management.       Of note, cognitive-linguistic evaluations assess performance on various cognitive-linguistic domains such as expressive and receptive language, attention, memory, executive function, problem solving, etc. It is not within the scope of Speech Language Pathologists to determine capacity, please defer to medical team or  psych for capacity evaluation. Thank you.     Plan    Recommend Speech Therapy 3 times per week until therapy goals are met for the following treatments:  Cognitive-Linguistic Training.     Objective       05/05/22 1109   Vitals   O2 (LPM) 2   O2 Delivery Device Silicone Nasal Cannula   Pain 0 - 10 Group   Therapist Pain Assessment Post Activity Pain Same as Prior to Activity;Nurse Notified;0   Prior Living Situation   Prior Services Home-Independent   Lives with - Patient's Self Care Capacity Alone and Able to Care For Self   Prior Level Of Function   Communication Within Functional Limits   Swallow Within Functional Limits   Dentition Poor Quality    Hearing Within Functional Limits for Evaluation   Vision Within Functional Limits for Evaluation   Patient's Primary Language English   Education Completed College   Education Years Completed 16   Occupation (Pre-Hospital Vocational) Retired Due To Disability   Verbal Expression   Verbal Expression / Aphasia Eval (WDL) WDL   Auditory Comprehension   Auditory Comprehension (WDL) WDL   Reading Comprehension   Following Written Direction Minimal (4)   Functional Reading Materials Within Functional Limits (6-7)   Written Expression   Functional Writing: Name Within Functional Limits (6-7)   Functional Writing Dictation: Word Within Functional Limits (6-7)   Functional Writing to Dictation: Sentence Within Functional Limits (6-7)   Formulates: Sentence Within Functional Limits (6-7)   Cognitive-Linguistic   Level of Consciousness Alert   Orientation Level Oriented x 4   Sustained Attention Within Functional Limits (6-7)   Alternating Attention Moderate (3)   Short Term Memory Minimal (4)   Immediate Memory Moderate (3)   Long Term Memory / Reminiscing Within Functional Limits (6-7)   Guy Reasoning Supervision (5)   Abstract Reasoning Supervision (5)   Safety Awareness Supervision (5)   Insight into Deficits Supervision (5)   Auditory Math Within Functional Limits  "(6-7)   Medication Management  Minimal (4)   Clock Drawing Within Functional Limits   Patient / Family Goals   Patient / Family Goal #1 \"I usually could do that much better\"   Short Term Goals   Short Term Goal # 1 Pt will complete med management task w/ 80% accuracy given min verbal cues   Short Term Goal # 2 Pt will complete alternating attention task with less than 3 redirections to task   Education Group   Education Provided Traumatic Brain Injury / Cognitive-Linguistic   TBI / Cog-Ling Patient Response Patient;Acceptance;Explanation;Verbal Demonstration     "

## 2022-05-05 NOTE — CARE PLAN
The patient is Stable - Low risk of patient condition declining or worsening    Shift Goals  Clinical Goals: Stable neuro exam, reduce ETOH withdrawal symptoms, mobility  Patient Goals: Pain control, rest  Family Goals: No family present    Progress made toward(s) clinical / shift goals:    Problem: Optimal Care for Alcohol Withdrawal  Goal: Optimal Care for the alcohol withdrawal patient  Outcome: Progressing     Problem: Lifestyle Changes  Goal: Patient's ability to identify lifestyle changes and available resources to help reduce recurrence of condition will improve  Outcome: Progressing     Problem: Skin Integrity  Goal: Skin integrity is maintained or improved  Outcome: Progressing     Problem: Fall Risk  Goal: Patient will remain free from falls  Outcome: Progressing

## 2022-05-06 PROCEDURE — 700102 HCHG RX REV CODE 250 W/ 637 OVERRIDE(OP): Performed by: NURSE PRACTITIONER

## 2022-05-06 PROCEDURE — 700111 HCHG RX REV CODE 636 W/ 250 OVERRIDE (IP): Performed by: NURSE PRACTITIONER

## 2022-05-06 PROCEDURE — A9270 NON-COVERED ITEM OR SERVICE: HCPCS | Performed by: NURSE PRACTITIONER

## 2022-05-06 PROCEDURE — 770001 HCHG ROOM/CARE - MED/SURG/GYN PRIV*

## 2022-05-06 PROCEDURE — 99232 SBSQ HOSP IP/OBS MODERATE 35: CPT | Performed by: NURSE PRACTITIONER

## 2022-05-06 RX ADMIN — LEVETIRACETAM 500 MG: 500 TABLET, FILM COATED ORAL at 17:40

## 2022-05-06 RX ADMIN — OXYCODONE 5 MG: 5 TABLET ORAL at 15:15

## 2022-05-06 RX ADMIN — BUTALBITAL, ACETAMINOPHEN, AND CAFFEINE 1 TABLET: 50; 325; 40 TABLET ORAL at 10:58

## 2022-05-06 RX ADMIN — OXYCODONE 5 MG: 5 TABLET ORAL at 20:32

## 2022-05-06 RX ADMIN — BUTALBITAL, ACETAMINOPHEN, AND CAFFEINE 1 TABLET: 50; 325; 40 TABLET ORAL at 17:40

## 2022-05-06 RX ADMIN — OXYCODONE 5 MG: 5 TABLET ORAL at 06:06

## 2022-05-06 RX ADMIN — ACETAMINOPHEN 650 MG: 325 TABLET, FILM COATED ORAL at 05:23

## 2022-05-06 RX ADMIN — PRAZOSIN HYDROCHLORIDE 1 MG: 1 CAPSULE ORAL at 20:33

## 2022-05-06 RX ADMIN — LEVETIRACETAM 500 MG: 500 TABLET, FILM COATED ORAL at 05:23

## 2022-05-06 RX ADMIN — GABAPENTIN 300 MG: 300 CAPSULE ORAL at 22:00

## 2022-05-06 RX ADMIN — LORAZEPAM 0.5 MG: 2 INJECTION INTRAMUSCULAR; INTRAVENOUS at 11:55

## 2022-05-06 RX ADMIN — DOCUSATE SODIUM 100 MG: 100 CAPSULE, LIQUID FILLED ORAL at 05:23

## 2022-05-06 RX ADMIN — VENLAFAXINE HYDROCHLORIDE 225 MG: 75 CAPSULE, EXTENDED RELEASE ORAL at 05:22

## 2022-05-06 RX ADMIN — QUETIAPINE FUMARATE 200 MG: 100 TABLET ORAL at 20:31

## 2022-05-06 RX ADMIN — GABAPENTIN 300 MG: 300 CAPSULE ORAL at 05:22

## 2022-05-06 RX ADMIN — LORAZEPAM 0.5 MG: 2 INJECTION INTRAMUSCULAR; INTRAVENOUS at 16:03

## 2022-05-06 RX ADMIN — CHLORDIAZEPOXIDE HYDROCHLORIDE 25 MG: 25 CAPSULE ORAL at 05:22

## 2022-05-06 RX ADMIN — GABAPENTIN 300 MG: 300 CAPSULE ORAL at 15:15

## 2022-05-06 RX ADMIN — MIRTAZAPINE 45 MG: 15 TABLET, FILM COATED ORAL at 20:57

## 2022-05-06 ASSESSMENT — PAIN DESCRIPTION - PAIN TYPE
TYPE: ACUTE PAIN

## 2022-05-06 ASSESSMENT — ENCOUNTER SYMPTOMS
RESPIRATORY NEGATIVE: 1
HEADACHES: 1
CONSTITUTIONAL NEGATIVE: 1
PSYCHIATRIC NEGATIVE: 1
MYALGIAS: 1

## 2022-05-06 ASSESSMENT — FIBROSIS 4 INDEX: FIB4 SCORE: 7.14

## 2022-05-06 NOTE — CARE PLAN
The patient is Stable - Low risk of patient condition declining or worsening    Shift Goals  Clinical Goals: transfer, mobilize, manage pain, sleep  Patient Goals: sleep, pain control  Family Goals: ELLIS    Progress made toward(s) clinical / shift goals:  yes      Problem: Psychosocial  Goal: Patient's level of anxiety will decrease  Outcome: Progressing     Problem: Optimal Care for Alcohol Withdrawal  Goal: Optimal Care for the alcohol withdrawal patient  Outcome: Progressing

## 2022-05-06 NOTE — PROGRESS NOTES
Trauma / Surgical Daily Progress Note    Date of Service  5/6/2022    Chief Complaint  61 y.o. female admitted 5/3/2022 with subarachnoid hemorrhage post ground level fall.     Interval Events    No critical events overnight.  Alert and oriented.  No overt signs of acute alcohol withdrawal.  Would like to DC home.    - DC librium. Alcohol withdrawal surveillance.  - Wean supplemental oxygen.   - Disposition: home when medically cleared.  - Counseled.      Review of Systems  Review of Systems   Constitutional: Negative.    HENT: Negative.    Respiratory: Negative.    Genitourinary:        Voiding   Musculoskeletal: Positive for myalgias.   Neurological: Positive for headaches.   Psychiatric/Behavioral: Negative.    All other systems reviewed and are negative.       Vital Signs  Temp:  [36.3 °C (97.3 °F)-36.8 °C (98.2 °F)] 36.3 °C (97.3 °F)  Pulse:  [] 76  BP: ()/(60-73) 111/69  SpO2:  [91 %-99 %] 96 %    Physical Exam  Physical Exam  Vitals and nursing note reviewed.   Constitutional:       General: She is not in acute distress.     Appearance: Normal appearance.   HENT:      Head:      Comments: Nose swollen and bruised.  Bilateral eye ecchymosis.      Right Ear: External ear normal.      Left Ear: External ear normal.      Mouth/Throat:      Mouth: Mucous membranes are moist.      Pharynx: Oropharynx is clear.   Eyes:      General:         Right eye: No discharge.         Left eye: No discharge.      Pupils: Pupils are equal, round, and reactive to light.   Pulmonary:      Effort: Pulmonary effort is normal. No respiratory distress.      Breath sounds: Normal breath sounds.   Abdominal:      General: There is no distension.      Palpations: Abdomen is soft.      Tenderness: There is no abdominal tenderness.   Musculoskeletal:      Cervical back: Normal range of motion. No rigidity. No muscular tenderness.   Skin:     General: Skin is warm and dry.      Capillary Refill: Capillary refill takes less  "than 2 seconds.      Findings: Abrasion, bruising and ecchymosis present.   Neurological:      General: No focal deficit present.      Mental Status: She is alert and oriented to person, place, and time.   Psychiatric:         Mood and Affect: Mood normal.         Behavior: Behavior normal.         Thought Content: Thought content normal.         Laboratory  No results found for this or any previous visit (from the past 24 hour(s)).    Fluids    Intake/Output Summary (Last 24 hours) at 5/6/2022 0909  Last data filed at 5/6/2022 0800  Gross per 24 hour   Intake 240 ml   Output 1100 ml   Net -860 ml       Core Measures & Quality Metrics  Medications reviewed, Labs reviewed and Radiology images reviewed  Castro catheter: No Castro      DVT Prophylaxis: Contraindicated - High bleeding risk  DVT prophylaxis - mechanical: SCDs  Ulcer prophylaxis: Not indicated    Assessed for rehab: Patient was assess for and/or received rehabilitation services during this hospitalization    RAP Score Total: 6    ETOH Screening  CAGE Score: 4  Assessment complete date: 5/4/2022  Intervention: yes. Patient response to intervention: \"I want to stop\".   Patient demonstrates understanding of intervention. Patient agrees to follow-up.   has been contacted. Follow up with: PCP  Total ETOH intervention time: greater than 30 minutes      Assessment/Plan  * Trauma- (present on admission)  Assessment & Plan  Ground level fall night prior to admission  Trauma Green Transfer Activation.  Per Mendes MD. Trauma Surgery.       Subarachnoid hemorrhage following injury, with loss of consciousness (HCC)- (present on admission)  Assessment & Plan  Head CT from referring facility with small amount of subarachnoid hemorrhage within inferior and medial frontal lobe, small amount of acute hemorrhage seen within multiple sulci, extra-axial hemorrhage adjacent to the inferior falx.  Isolated closed head injury.  Trauma Brain Injury Guidelines " implemented.   BIG 2.  Repeat interval CT unchanged.      Alcohol use- (present on admission)  Assessment & Plan  Reports history of daily alcohol use  Admission blood alcohol 0.1   TEG AA 12.3% inhibition and ADP 18.5% inhibition.  RASS initiated on admission  5/4 Librium initiated   - Ativan PRN  5/6 DC librium   She needs to call [facility] on discharge to see if they have a bed. She has to do this on her own.   [facility] to Peer Recovery will also see in house.       Nasal contusion- (present on admission)  Assessment & Plan  Imaging without fracture.     Personal history of mental disorder- (present on admission)  Assessment & Plan  Chronic condition treated with Seroquel, mirtazapine, prazosin and venlafaxine.  Resumed maintenance medication on admission.      Contraindication to deep vein thrombosis (DVT) prophylaxis- (present on admission)  Assessment & Plan  Prophylactic anticoagulation for thrombotic prevention initially contraindicated secondary to elevated bleeding risk.  5/4 Trauma screening bilateral lower extremity venous duplex negative for above knee DVT.          Discussed patient condition with Patient and trauma surgery, Dr. Per Mendes.

## 2022-05-06 NOTE — PROGRESS NOTES
2 RN skin check completed with BRITNEY Dykes     Areas of concern/Skin observations:  · Abrasions to right cheek, left shoulder, left thigh, left medial ankle at various stages of healing and scabbed  · Bruising to face, left shoulder, bilateral legs  · Blanching redness to sacrum-mepilex in place    Devices in use, assessed under and interventions (as appropriate) for skin protection:  · SCDs, BP cuff, SaO2 monitor, nasal cannula    Interventions in place such as:   · q2 hour turns  · Keeping skin clean and dry  · Use of products such as barrier wipes/cream  · Waffle cushion while OOB: yes  · Bed Type: pressure redistribution mattress  · Mobility: up to chair

## 2022-05-06 NOTE — CARE PLAN
Problem: Knowledge Deficit - Standard  Goal: Patient and family/care givers will demonstrate understanding of plan of care, disease process/condition, diagnostic tests and medications  Outcome: Progressing     Problem: Pain - Standard  Goal: Alleviation of pain or a reduction in pain to the patient’s comfort goal  Outcome: Progressing   The patient is Stable - Low risk of patient condition declining or worsening    Shift Goals  Clinical Goals: transfer, mobilize, manage pain, sleep  Patient Goals: sleep, pain control  Family Goals: ELLIS

## 2022-05-07 ENCOUNTER — PHARMACY VISIT (OUTPATIENT)
Dept: PHARMACY | Facility: MEDICAL CENTER | Age: 62
End: 2022-05-07
Payer: COMMERCIAL

## 2022-05-07 VITALS
RESPIRATION RATE: 18 BRPM | WEIGHT: 121.47 LBS | OXYGEN SATURATION: 91 % | SYSTOLIC BLOOD PRESSURE: 111 MMHG | DIASTOLIC BLOOD PRESSURE: 68 MMHG | HEIGHT: 64 IN | BODY MASS INDEX: 20.74 KG/M2 | HEART RATE: 90 BPM | TEMPERATURE: 97.6 F

## 2022-05-07 PROCEDURE — A9270 NON-COVERED ITEM OR SERVICE: HCPCS | Performed by: NURSE PRACTITIONER

## 2022-05-07 PROCEDURE — RXMED WILLOW AMBULATORY MEDICATION CHARGE: Performed by: NURSE PRACTITIONER

## 2022-05-07 PROCEDURE — 700111 HCHG RX REV CODE 636 W/ 250 OVERRIDE (IP): Performed by: NURSE PRACTITIONER

## 2022-05-07 PROCEDURE — 700102 HCHG RX REV CODE 250 W/ 637 OVERRIDE(OP): Performed by: NURSE PRACTITIONER

## 2022-05-07 PROCEDURE — 99239 HOSP IP/OBS DSCHRG MGMT >30: CPT | Performed by: NURSE PRACTITIONER

## 2022-05-07 RX ORDER — BUTALBITAL, ACETAMINOPHEN AND CAFFEINE 50; 325; 40 MG/1; MG/1; MG/1
1 TABLET ORAL EVERY 6 HOURS PRN
Qty: 12 TABLET | Refills: 0 | Status: SHIPPED | OUTPATIENT
Start: 2022-05-07 | End: 2022-05-10

## 2022-05-07 RX ORDER — ACETAMINOPHEN 325 MG/1
650 TABLET ORAL EVERY 6 HOURS
Qty: 30 TABLET | Refills: 0 | Status: SHIPPED | OUTPATIENT
Start: 2022-05-07

## 2022-05-07 RX ORDER — LEVETIRACETAM 500 MG/1
500 TABLET ORAL EVERY 12 HOURS
Qty: 8 TABLET | Refills: 0 | Status: SHIPPED | OUTPATIENT
Start: 2022-05-07 | End: 2022-05-11

## 2022-05-07 RX ADMIN — VENLAFAXINE HYDROCHLORIDE 225 MG: 75 CAPSULE, EXTENDED RELEASE ORAL at 06:09

## 2022-05-07 RX ADMIN — ACETAMINOPHEN 650 MG: 325 TABLET, FILM COATED ORAL at 06:08

## 2022-05-07 RX ADMIN — LORAZEPAM 0.5 MG: 2 INJECTION INTRAMUSCULAR; INTRAVENOUS at 07:45

## 2022-05-07 RX ADMIN — GABAPENTIN 300 MG: 300 CAPSULE ORAL at 06:09

## 2022-05-07 RX ADMIN — LEVETIRACETAM 500 MG: 500 TABLET, FILM COATED ORAL at 06:09

## 2022-05-07 ASSESSMENT — PATIENT HEALTH QUESTIONNAIRE - PHQ9
5. POOR APPETITE OR OVEREATING: NOT AT ALL
3. TROUBLE FALLING OR STAYING ASLEEP OR SLEEPING TOO MUCH: NOT AT ALL
7. TROUBLE CONCENTRATING ON THINGS, SUCH AS READING THE NEWSPAPER OR WATCHING TELEVISION: NOT AT ALL
8. MOVING OR SPEAKING SO SLOWLY THAT OTHER PEOPLE COULD HAVE NOTICED. OR THE OPPOSITE, BEING SO FIGETY OR RESTLESS THAT YOU HAVE BEEN MOVING AROUND A LOT MORE THAN USUAL: NOT AT ALL
SUM OF ALL RESPONSES TO PHQ9 QUESTIONS 1 AND 2: 2
6. FEELING BAD ABOUT YOURSELF - OR THAT YOU ARE A FAILURE OR HAVE LET YOURSELF OR YOUR FAMILY DOWN: SEVERAL DAYS
9. THOUGHTS THAT YOU WOULD BE BETTER OFF DEAD, OR OF HURTING YOURSELF: NOT AT ALL
SUM OF ALL RESPONSES TO PHQ QUESTIONS 1-9: 3
2. FEELING DOWN, DEPRESSED, IRRITABLE, OR HOPELESS: SEVERAL DAYS
4. FEELING TIRED OR HAVING LITTLE ENERGY: NOT AT ALL
1. LITTLE INTEREST OR PLEASURE IN DOING THINGS: SEVERAL DAYS

## 2022-05-07 NOTE — DISCHARGE INSTRUCTIONS
Discharge Instructions    Discharged to home by car with relative. Discharged via walking, hospital escort: Yes.  Special equipment needed: Not Applicable    Be sure to schedule a follow-up appointment with your primary care doctor or any specialists as instructed.     Discharge Plan:        I understand that a diet low in cholesterol, fat, and sodium is recommended for good health. Unless I have been given specific instructions below for another diet, I accept this instruction as my diet prescription.   Other diet: regular    Special Instructions: None    · Is patient discharged on Warfarin / Coumadin?   No     Depression / Suicide Risk    As you are discharged from this [facility] facility, it is important to learn how to keep safe from harming yourself.    Recognize the warning signs:  · Abrupt changes in personality, positive or negative- including increase in energy   · Giving away possessions  · Change in eating patterns- significant weight changes-  positive or negative  · Change in sleeping patterns- unable to sleep or sleeping all the time   · Unwillingness or inability to communicate  · Depression  · Unusual sadness, discouragement and loneliness  · Talk of wanting to die  · Neglect of personal appearance   · Rebelliousness- reckless behavior  · Withdrawal from people/activities they love  · Confusion- inability to concentrate     If you or a loved one observes any of these behaviors or has concerns about self-harm, here's what you can do:  · Talk about it- your feelings and reasons for harming yourself  · Remove any means that you might use to hurt yourself (examples: pills, rope, extension cords, firearm)  · Get professional help from the community (Mental Health, Substance Abuse, psychological counseling)  · Do not be alone:Call your Safe Contact- someone whom you trust who will be there for you.  · Call your local CRISIS HOTLINE 264-3907 or 743-163-0153  · Call your local Children's Mobile Crisis  Response Team [facility] (423) 489-5695 or [email or web address]  · Call the toll free National Suicide Prevention Hotlines   · National Suicide Prevention Lifeline 312-152-UOSA (9955)  · National Hope Line Network 800-SUICIDE (911-9053)

## 2022-05-07 NOTE — DISCHARGE SUMMARY
Trauma Discharge Summary    DATE OF ADMISSION: 5/3/2022    DATE OF DISCHARGE: 5/7/2022    LENGTH OF STAY: 4 days    ATTENDING PHYSICIAN: Per Mendes M.D.    CONSULTING PHYSICIAN:   1. None    DISCHARGE DIAGNOSIS:  Principal Problem:    Trauma POA: Yes  Active Problems:    Alcohol use POA: Yes    Subarachnoid hemorrhage following injury, with loss of consciousness (HCC) POA: Yes    Contraindication to deep vein thrombosis (DVT) prophylaxis POA: Yes    Personal history of mental disorder POA: Yes    Nasal contusion POA: Yes  Resolved Problems:    Encounter for screening for COVID-19 POA: Yes      PROCEDURES:  1. None     HISTORY OF PRESENT ILLNESS: The patient is a 61 y.o. female who was reportedly drinking and struck her head.  Initial work up was completed at an outlying facility and demonstrated a subarachnoid hemorrhage . She was transferred to [facility] in [place], [place].    HOSPITAL COURSE: The patient was triaged as a partial trauma transfer activation. The patient was transported to the trauma intensive care unit. The patient met BIG 2 criteria and the Trauma Brain Injury Guidelines were implemented. Repeat interval CT imaging of the head was unchanged.     There were reports of daily alcohol use.  RASS was initiated on admission and patient received scheduled librium.  These medications were weaned off prior to discharge.  Peer to Peer recovery saw the patient during her stay.  Admission paper work for [facility] and Chemical Dependency Community resources were provided.     On the day of discharge, the patient was a GCS of 15 with no focal neurological deficits. There were no overt signs of alcohol withdrawal.  She was afebrile and non-toxic in appearance.  She ambulatory and tolerating a oral diet.     HOSPITAL PROBLEM LIST:  * Trauma- (present on admission)  Assessment & Plan  Ground level fall night prior to admission  Trauma Green Transfer Activation.  Per Mendes,  MD. Trauma Surgery.       Subarachnoid hemorrhage following injury, with loss of consciousness (HCC)- (present on admission)  Assessment & Plan  Head CT from referring facility with small amount of subarachnoid hemorrhage within inferior and medial frontal lobe, small amount of acute hemorrhage seen within multiple sulci, extra-axial hemorrhage adjacent to the inferior falx.  Isolated closed head injury.  Trauma Brain Injury Guidelines implemented.   BIG 2.  Repeat interval CT unchanged.      Alcohol use- (present on admission)  Assessment & Plan  Reports history of daily alcohol use  Admission blood alcohol 0.1   TEG AA 12.3% inhibition and ADP 18.5% inhibition.  RASS initiated on admission  5/4 Librium initiated   - Ativan PRN  5/6 DC librium   She needs to call [facility] on discharge to see if they have a bed. She has to do this on her own.   Peer to Peer Recovery will also see in house.       Nasal contusion- (present on admission)  Assessment & Plan  Imaging without fracture.     Personal history of mental disorder- (present on admission)  Assessment & Plan  Chronic condition treated with Seroquel, mirtazapine, prazosin and venlafaxine.  Resumed maintenance medication on admission.      Contraindication to deep vein thrombosis (DVT) prophylaxis- (present on admission)  Assessment & Plan  Prophylactic anticoagulation for thrombotic prevention initially contraindicated secondary to elevated bleeding risk.  5/4 Trauma screening bilateral lower extremity venous duplex negative for above knee DVT.      Encounter for screening for COVID-19-resolved as of 5/4/2022, (present on admission)  Assessment & Plan  Admission SARS-CoV-2 testing negative. Repeat SARS-CoV-2 testing not indicated. Isolation precautions de-escalated.         DISCHARGE PHYSICAL EXAM: See [facility] physical exam dated 5/7/2022    DISPOSITION: Discharged 5/7/2022. The patient was counseled and questions were answered. Specifically, signs and  symptoms of infection, neurological decompensation, and persistent or worsening pain were discussed and the patient agrees to seek medical attention if any of these develop.    DISCHARGE MEDICATIONS:  The patients controlled substance history was reviewed and a controlled substance use informed consent (if applicable) was provided by [facility] and the patient has been prescribed.     Medication List      START taking these medications      Instructions   acetaminophen 325 MG Tabs  Commonly known as: Tylenol   Doctor's comments: May take over the counter as directed for pain  Take 2 Tablets by mouth every 6 hours.  Dose: 650 mg     butalbital/apap/caffeine -40 mg Tabs  Commonly known as: Fioricet   Take 1 Tablet by mouth every 6 hours as needed for Headache for up to 3 days.  Dose: 1 Tablet        CHANGE how you take these medications      Instructions   levETIRAcetam 500 MG Tabs  What changed:   · medication strength  · how much to take  · when to take this  Commonly known as: KEPPRA   Doctor's comments: First dose 1800 tonight.    Resume prehospital Keppra dosing of 250mg BID when 500 mg Keppra course completed  Take 1 Tablet by mouth every 12 hours for 8 doses. First dose is 6pm 5/7/22  Dose: 500 mg        CONTINUE taking these medications      Instructions   clonazePAM 1 MG Tabs  Commonly known as: KLONOPIN   Take 1 mg by mouth 3 times a day.  Dose: 1 mg     gabapentin 300 MG Caps  Commonly known as: NEURONTIN   Take 300-600 mg by mouth 3 times a day.  Dose: 300-600 mg     mirtazapine 45 MG tablet  Commonly known as: REMERON   Take 45 mg by mouth every evening.  Dose: 45 mg     prazosin 1 MG Caps  Commonly known as: MINIPRESS   Take 1 mg by mouth every evening.  Dose: 1 mg     QUEtiapine 200 MG Tabs  Commonly known as: Seroquel   Take 200 mg by mouth at bedtime.  Dose: 200 mg     venlafaxine XR 75 MG Cp24  Commonly known as: EFFEXOR XR   Take 225 mg by mouth every morning.  Dose: 225  mg            ACTIVITY:  As tolerated    DIET:  Orders Placed This Encounter   Procedures   • Diet Order Diet: Regular     Standing Status:   Standing     Number of Occurrences:   1     Order Specific Question:   Diet:     Answer:   Regular [1]       FOLLOW UP:  Mohsen Tamasaby, M.D.  [facility] [address] [id number] [phone]    Schedule an appointment as soon as possible for a visit in 1 week  As needed, If symptoms worsen      [facility]   Admission phone number [phone]  Cell phone [phone]  Email [email or web address]          Follow up with chemical dependency community resources    [phone]          TIME SPENT ON DISCHARGE: 35 minutes      ____________________________________________  LISSETH Victor    DD: 5/7/2022 11:04 AM

## 2022-07-02 ENCOUNTER — HOSPITAL ENCOUNTER (INPATIENT)
Facility: MEDICAL CENTER | Age: 62
LOS: 3 days | DRG: 917 | End: 2022-07-06
Attending: EMERGENCY MEDICINE | Admitting: STUDENT IN AN ORGANIZED HEALTH CARE EDUCATION/TRAINING PROGRAM
Payer: MEDICAID

## 2022-07-02 DIAGNOSIS — F32.A DEPRESSION, UNSPECIFIED DEPRESSION TYPE: ICD-10-CM

## 2022-07-02 DIAGNOSIS — Z79.899 CHRONIC PRESCRIPTION BENZODIAZEPINE USE: ICD-10-CM

## 2022-07-02 DIAGNOSIS — R45.851 SUICIDAL IDEATION: ICD-10-CM

## 2022-07-02 DIAGNOSIS — K29.21 ACUTE ALCOHOLIC GASTRITIS WITH HEMORRHAGE: ICD-10-CM

## 2022-07-02 DIAGNOSIS — Z78.9 ALCOHOL USE: ICD-10-CM

## 2022-07-02 DIAGNOSIS — K92.0 HEMATEMESIS, PRESENCE OF NAUSEA NOT SPECIFIED: ICD-10-CM

## 2022-07-02 DIAGNOSIS — K92.1 MELENA: ICD-10-CM

## 2022-07-02 DIAGNOSIS — F10.10 ALCOHOL ABUSE: ICD-10-CM

## 2022-07-02 DIAGNOSIS — T50.901A ACCIDENTAL DRUG OVERDOSE, INITIAL ENCOUNTER: ICD-10-CM

## 2022-07-02 DIAGNOSIS — F10.939 ALCOHOL WITHDRAWAL SYNDROME WITH COMPLICATION (HCC): ICD-10-CM

## 2022-07-02 DIAGNOSIS — F41.1 GENERALIZED ANXIETY DISORDER: ICD-10-CM

## 2022-07-02 LAB
ANION GAP SERPL CALC-SCNC: 26 MMOL/L (ref 7–16)
BASOPHILS # BLD AUTO: 1.2 % (ref 0–1.8)
BASOPHILS # BLD: 0.04 K/UL (ref 0–0.12)
BUN SERPL-MCNC: 15 MG/DL (ref 8–22)
CALCIUM SERPL-MCNC: 8.4 MG/DL (ref 8.5–10.5)
CHLORIDE SERPL-SCNC: 100 MMOL/L (ref 96–112)
CO2 SERPL-SCNC: 17 MMOL/L (ref 20–33)
CREAT SERPL-MCNC: 0.43 MG/DL (ref 0.5–1.4)
EKG IMPRESSION: NORMAL
EOSINOPHIL # BLD AUTO: 0.03 K/UL (ref 0–0.51)
EOSINOPHIL NFR BLD: 0.9 % (ref 0–6.9)
ERYTHROCYTE [DISTWIDTH] IN BLOOD BY AUTOMATED COUNT: 54.3 FL (ref 35.9–50)
ETHANOL BLD-MCNC: 213.1 MG/DL
GFR SERPLBLD CREATININE-BSD FMLA CKD-EPI: 111 ML/MIN/1.73 M 2
GLUCOSE SERPL-MCNC: 111 MG/DL (ref 65–99)
HCT VFR BLD AUTO: 48.2 % (ref 37–47)
HGB BLD-MCNC: 16.7 G/DL (ref 12–16)
IMM GRANULOCYTES # BLD AUTO: 0.02 K/UL (ref 0–0.11)
IMM GRANULOCYTES NFR BLD AUTO: 0.6 % (ref 0–0.9)
LYMPHOCYTES # BLD AUTO: 0.48 K/UL (ref 1–4.8)
LYMPHOCYTES NFR BLD: 14.8 % (ref 22–41)
MCH RBC QN AUTO: 33.7 PG (ref 27–33)
MCHC RBC AUTO-ENTMCNC: 34.6 G/DL (ref 33.6–35)
MCV RBC AUTO: 97.4 FL (ref 81.4–97.8)
MONOCYTES # BLD AUTO: 0.14 K/UL (ref 0–0.85)
MONOCYTES NFR BLD AUTO: 4.3 % (ref 0–13.4)
NEUTROPHILS # BLD AUTO: 2.53 K/UL (ref 2–7.15)
NEUTROPHILS NFR BLD: 78.2 % (ref 44–72)
NRBC # BLD AUTO: 0 K/UL
NRBC BLD-RTO: 0 /100 WBC
PLATELET # BLD AUTO: 194 K/UL (ref 164–446)
PMV BLD AUTO: 9.3 FL (ref 9–12.9)
POTASSIUM SERPL-SCNC: 4 MMOL/L (ref 3.6–5.5)
RBC # BLD AUTO: 4.95 M/UL (ref 4.2–5.4)
SODIUM SERPL-SCNC: 143 MMOL/L (ref 135–145)
WBC # BLD AUTO: 3.2 K/UL (ref 4.8–10.8)

## 2022-07-02 PROCEDURE — 96376 TX/PRO/DX INJ SAME DRUG ADON: CPT

## 2022-07-02 PROCEDURE — 85025 COMPLETE CBC W/AUTO DIFF WBC: CPT

## 2022-07-02 PROCEDURE — 36415 COLL VENOUS BLD VENIPUNCTURE: CPT

## 2022-07-02 PROCEDURE — 99285 EMERGENCY DEPT VISIT HI MDM: CPT

## 2022-07-02 PROCEDURE — 96365 THER/PROPH/DIAG IV INF INIT: CPT

## 2022-07-02 PROCEDURE — 700111 HCHG RX REV CODE 636 W/ 250 OVERRIDE (IP): Performed by: EMERGENCY MEDICINE

## 2022-07-02 PROCEDURE — 96375 TX/PRO/DX INJ NEW DRUG ADDON: CPT

## 2022-07-02 PROCEDURE — 80048 BASIC METABOLIC PNL TOTAL CA: CPT

## 2022-07-02 PROCEDURE — 82077 ASSAY SPEC XCP UR&BREATH IA: CPT

## 2022-07-02 PROCEDURE — 96366 THER/PROPH/DIAG IV INF ADDON: CPT

## 2022-07-02 PROCEDURE — 93005 ELECTROCARDIOGRAM TRACING: CPT | Performed by: EMERGENCY MEDICINE

## 2022-07-02 RX ORDER — ONDANSETRON 2 MG/ML
4 INJECTION INTRAMUSCULAR; INTRAVENOUS ONCE
Status: COMPLETED | OUTPATIENT
Start: 2022-07-02 | End: 2022-07-02

## 2022-07-02 RX ADMIN — ONDANSETRON 4 MG: 2 INJECTION INTRAMUSCULAR; INTRAVENOUS at 23:18

## 2022-07-02 RX ADMIN — ONDANSETRON 4 MG: 2 INJECTION INTRAMUSCULAR; INTRAVENOUS at 21:40

## 2022-07-02 ASSESSMENT — FIBROSIS 4 INDEX: FIB4 SCORE: 7.14

## 2022-07-03 PROBLEM — K29.21 ALCOHOLIC GASTRITIS WITH HEMORRHAGE: Status: ACTIVE | Noted: 2022-07-03

## 2022-07-03 PROBLEM — F10.939 ALCOHOL WITHDRAWAL (HCC): Status: ACTIVE | Noted: 2022-07-03

## 2022-07-03 PROBLEM — Z71.89 ACP (ADVANCE CARE PLANNING): Status: ACTIVE | Noted: 2022-07-03

## 2022-07-03 PROBLEM — T50.901A DRUG OVERDOSE, ACCIDENTAL OR UNINTENTIONAL, INITIAL ENCOUNTER: Status: ACTIVE | Noted: 2022-07-03

## 2022-07-03 LAB
AMPHET UR QL SCN: NEGATIVE
BARBITURATES UR QL SCN: NEGATIVE
BENZODIAZ UR QL SCN: POSITIVE
BODY FLD TYPE: ABNORMAL
BZE UR QL SCN: NEGATIVE
CANNABINOIDS UR QL SCN: POSITIVE
HCT VFR BLD AUTO: 47.5 % (ref 37–47)
HCT VFR BLD AUTO: 52.8 % (ref 37–47)
HEMOCCULT SP1 SPEC QL: POSITIVE
HGB BLD-MCNC: 16 G/DL (ref 12–16)
HGB BLD-MCNC: 16.9 G/DL (ref 12–16)
METHADONE UR QL SCN: NEGATIVE
OPIATES UR QL SCN: NEGATIVE
OXYCODONE UR QL SCN: NEGATIVE
PCP UR QL SCN: NEGATIVE
PROPOXYPH UR QL SCN: NEGATIVE

## 2022-07-03 PROCEDURE — 85018 HEMOGLOBIN: CPT | Mod: 91

## 2022-07-03 PROCEDURE — 80307 DRUG TEST PRSMV CHEM ANLYZR: CPT

## 2022-07-03 PROCEDURE — 700111 HCHG RX REV CODE 636 W/ 250 OVERRIDE (IP): Performed by: STUDENT IN AN ORGANIZED HEALTH CARE EDUCATION/TRAINING PROGRAM

## 2022-07-03 PROCEDURE — HZ2ZZZZ DETOXIFICATION SERVICES FOR SUBSTANCE ABUSE TREATMENT: ICD-10-PCS | Performed by: EMERGENCY MEDICINE

## 2022-07-03 PROCEDURE — 96365 THER/PROPH/DIAG IV INF INIT: CPT

## 2022-07-03 PROCEDURE — 96376 TX/PRO/DX INJ SAME DRUG ADON: CPT

## 2022-07-03 PROCEDURE — A9270 NON-COVERED ITEM OR SERVICE: HCPCS | Performed by: INTERNAL MEDICINE

## 2022-07-03 PROCEDURE — 770020 HCHG ROOM/CARE - TELE (206)

## 2022-07-03 PROCEDURE — 99497 ADVNCD CARE PLAN 30 MIN: CPT | Performed by: STUDENT IN AN ORGANIZED HEALTH CARE EDUCATION/TRAINING PROGRAM

## 2022-07-03 PROCEDURE — 96366 THER/PROPH/DIAG IV INF ADDON: CPT

## 2022-07-03 PROCEDURE — 82271 OCCULT BLOOD OTHER SOURCES: CPT

## 2022-07-03 PROCEDURE — A9270 NON-COVERED ITEM OR SERVICE: HCPCS | Performed by: EMERGENCY MEDICINE

## 2022-07-03 PROCEDURE — 700105 HCHG RX REV CODE 258: Performed by: INTERNAL MEDICINE

## 2022-07-03 PROCEDURE — 700111 HCHG RX REV CODE 636 W/ 250 OVERRIDE (IP): Performed by: EMERGENCY MEDICINE

## 2022-07-03 PROCEDURE — 99223 1ST HOSP IP/OBS HIGH 75: CPT | Mod: 25 | Performed by: STUDENT IN AN ORGANIZED HEALTH CARE EDUCATION/TRAINING PROGRAM

## 2022-07-03 PROCEDURE — 700101 HCHG RX REV CODE 250

## 2022-07-03 PROCEDURE — 700102 HCHG RX REV CODE 250 W/ 637 OVERRIDE(OP): Performed by: INTERNAL MEDICINE

## 2022-07-03 PROCEDURE — 96375 TX/PRO/DX INJ NEW DRUG ADDON: CPT

## 2022-07-03 PROCEDURE — 700102 HCHG RX REV CODE 250 W/ 637 OVERRIDE(OP): Performed by: EMERGENCY MEDICINE

## 2022-07-03 PROCEDURE — 85014 HEMATOCRIT: CPT

## 2022-07-03 RX ORDER — LORAZEPAM 1 MG/1
0.5 TABLET ORAL EVERY 4 HOURS PRN
Status: DISCONTINUED | OUTPATIENT
Start: 2022-07-03 | End: 2022-07-06

## 2022-07-03 RX ORDER — VENLAFAXINE HYDROCHLORIDE 75 MG/1
150 CAPSULE, EXTENDED RELEASE ORAL DAILY
Status: ON HOLD | COMMUNITY
End: 2022-07-06

## 2022-07-03 RX ORDER — DIAZEPAM 5 MG/ML
2.5 INJECTION, SOLUTION INTRAMUSCULAR; INTRAVENOUS EVERY 4 HOURS PRN
Status: DISCONTINUED | OUTPATIENT
Start: 2022-07-03 | End: 2022-07-06

## 2022-07-03 RX ORDER — LORAZEPAM 2 MG/ML
1.5 INJECTION INTRAMUSCULAR
Status: DISCONTINUED | OUTPATIENT
Start: 2022-07-03 | End: 2022-07-06

## 2022-07-03 RX ORDER — ONDANSETRON 4 MG/1
4 TABLET, ORALLY DISINTEGRATING ORAL EVERY 4 HOURS PRN
Status: DISCONTINUED | OUTPATIENT
Start: 2022-07-03 | End: 2022-07-06 | Stop reason: HOSPADM

## 2022-07-03 RX ORDER — ONDANSETRON 2 MG/ML
4 INJECTION INTRAMUSCULAR; INTRAVENOUS EVERY 4 HOURS PRN
Status: DISCONTINUED | OUTPATIENT
Start: 2022-07-03 | End: 2022-07-06 | Stop reason: HOSPADM

## 2022-07-03 RX ORDER — LORAZEPAM 2 MG/1
2 TABLET ORAL
Status: DISCONTINUED | OUTPATIENT
Start: 2022-07-03 | End: 2022-07-06

## 2022-07-03 RX ORDER — OMEPRAZOLE 20 MG/1
20 CAPSULE, DELAYED RELEASE ORAL 2 TIMES DAILY
Status: DISCONTINUED | OUTPATIENT
Start: 2022-07-03 | End: 2022-07-06 | Stop reason: HOSPADM

## 2022-07-03 RX ORDER — GAUZE BANDAGE 2" X 2"
100 BANDAGE TOPICAL DAILY
Status: DISCONTINUED | OUTPATIENT
Start: 2022-07-03 | End: 2022-07-06 | Stop reason: HOSPADM

## 2022-07-03 RX ORDER — LORAZEPAM 1 MG/1
1 TABLET ORAL EVERY 4 HOURS PRN
Status: DISCONTINUED | OUTPATIENT
Start: 2022-07-03 | End: 2022-07-06

## 2022-07-03 RX ORDER — GABAPENTIN 300 MG/1
300 CAPSULE ORAL 3 TIMES DAILY
Status: DISCONTINUED | OUTPATIENT
Start: 2022-07-03 | End: 2022-07-06 | Stop reason: HOSPADM

## 2022-07-03 RX ORDER — FOLIC ACID 1 MG/1
1 TABLET ORAL DAILY
Status: DISCONTINUED | OUTPATIENT
Start: 2022-07-03 | End: 2022-07-06 | Stop reason: HOSPADM

## 2022-07-03 RX ORDER — LORAZEPAM 2 MG/ML
1 INJECTION INTRAMUSCULAR
Status: DISCONTINUED | OUTPATIENT
Start: 2022-07-03 | End: 2022-07-06

## 2022-07-03 RX ORDER — LORAZEPAM 2 MG/ML
0.5 INJECTION INTRAMUSCULAR EVERY 4 HOURS PRN
Status: DISCONTINUED | OUTPATIENT
Start: 2022-07-03 | End: 2022-07-06

## 2022-07-03 RX ORDER — SODIUM CHLORIDE 9 MG/ML
INJECTION, SOLUTION INTRAVENOUS CONTINUOUS
Status: DISCONTINUED | OUTPATIENT
Start: 2022-07-03 | End: 2022-07-04

## 2022-07-03 RX ORDER — HYDROXYZINE HYDROCHLORIDE 25 MG/1
25 TABLET, FILM COATED ORAL 3 TIMES DAILY PRN
Status: DISCONTINUED | OUTPATIENT
Start: 2022-07-03 | End: 2022-07-06 | Stop reason: HOSPADM

## 2022-07-03 RX ADMIN — LORAZEPAM 2 MG: 2 TABLET ORAL at 07:59

## 2022-07-03 RX ADMIN — LORAZEPAM 1 MG: 1 TABLET ORAL at 15:07

## 2022-07-03 RX ADMIN — OMEPRAZOLE 20 MG: 20 CAPSULE, DELAYED RELEASE ORAL at 17:42

## 2022-07-03 RX ADMIN — THIAMINE HYDROCHLORIDE: 100 INJECTION, SOLUTION INTRAMUSCULAR; INTRAVENOUS at 05:07

## 2022-07-03 RX ADMIN — DIAZEPAM 2.5 MG: 5 INJECTION, SOLUTION INTRAMUSCULAR; INTRAVENOUS at 14:20

## 2022-07-03 RX ADMIN — GABAPENTIN 300 MG: 300 CAPSULE ORAL at 20:10

## 2022-07-03 RX ADMIN — LORAZEPAM 1 MG: 2 INJECTION INTRAMUSCULAR; INTRAVENOUS at 05:40

## 2022-07-03 RX ADMIN — FAMOTIDINE 20 MG: 10 INJECTION INTRAVENOUS at 03:12

## 2022-07-03 RX ADMIN — LORAZEPAM 1.5 MG: 2 INJECTION INTRAMUSCULAR; INTRAVENOUS at 01:44

## 2022-07-03 RX ADMIN — LORAZEPAM 1.5 MG: 2 INJECTION INTRAMUSCULAR; INTRAVENOUS at 03:33

## 2022-07-03 RX ADMIN — GABAPENTIN 300 MG: 300 CAPSULE ORAL at 15:07

## 2022-07-03 RX ADMIN — LORAZEPAM 1 MG: 2 INJECTION INTRAMUSCULAR; INTRAVENOUS at 12:26

## 2022-07-03 RX ADMIN — DIAZEPAM 2.5 MG: 5 INJECTION, SOLUTION INTRAMUSCULAR; INTRAVENOUS at 20:10

## 2022-07-03 RX ADMIN — ONDANSETRON 4 MG: 2 INJECTION INTRAMUSCULAR; INTRAVENOUS at 03:10

## 2022-07-03 RX ADMIN — OMEPRAZOLE 20 MG: 20 CAPSULE, DELAYED RELEASE ORAL at 07:59

## 2022-07-03 RX ADMIN — LORAZEPAM 1 MG: 1 TABLET ORAL at 10:30

## 2022-07-03 RX ADMIN — HYDROXYZINE HYDROCHLORIDE 25 MG: 25 TABLET, FILM COATED ORAL at 17:42

## 2022-07-03 RX ADMIN — DIAZEPAM 2.5 MG: 5 INJECTION, SOLUTION INTRAMUSCULAR; INTRAVENOUS at 03:13

## 2022-07-03 RX ADMIN — LORAZEPAM 1.5 MG: 2 INJECTION INTRAMUSCULAR; INTRAVENOUS at 04:42

## 2022-07-03 RX ADMIN — SODIUM CHLORIDE: 9 INJECTION, SOLUTION INTRAVENOUS at 15:07

## 2022-07-03 ASSESSMENT — LIFESTYLE VARIABLES
HEADACHE, FULLNESS IN HEAD: NOT PRESENT
TREMOR: TREMOR NOT VISIBLE BUT CAN BE FELT, FINGERTIP TO FINGERTIP
TOTAL SCORE: 9
TOTAL SCORE: 15
ANXIETY: NO ANXIETY (AT EASE)
ANXIETY: MODERATELY ANXIOUS OR GUARDED, SO ANXIETY IS INFERRED
HEADACHE, FULLNESS IN HEAD: MILD
ORIENTATION AND CLOUDING OF SENSORIUM: ORIENTED AND CAN DO SERIAL ADDITIONS
AGITATION: SOMEWHAT MORE THAN NORMAL ACTIVITY
AGITATION: *
ANXIETY: *
AUDITORY DISTURBANCES: NOT PRESENT
NAUSEA AND VOMITING: NO NAUSEA AND NO VOMITING
HEADACHE, FULLNESS IN HEAD: MODERATE
AGITATION: *
VISUAL DISTURBANCES: NOT PRESENT
EVER FELT BAD OR GUILTY ABOUT YOUR DRINKING: YES
AUDITORY DISTURBANCES: NOT PRESENT
TREMOR: TREMOR NOT VISIBLE BUT CAN BE FELT, FINGERTIP TO FINGERTIP
AUDITORY DISTURBANCES: NOT PRESENT
ORIENTATION AND CLOUDING OF SENSORIUM: ORIENTED AND CAN DO SERIAL ADDITIONS
AUDITORY DISTURBANCES: NOT PRESENT
TOTAL SCORE: 11
HEADACHE, FULLNESS IN HEAD: NOT PRESENT
ORIENTATION AND CLOUDING OF SENSORIUM: ORIENTED AND CAN DO SERIAL ADDITIONS
ALCOHOL_USE: YES
TOTAL SCORE: 11
PAROXYSMAL SWEATS: NO SWEAT VISIBLE
NAUSEA AND VOMITING: *
CONSUMPTION TOTAL: INCOMPLETE
EVER HAD A DRINK FIRST THING IN THE MORNING TO STEADY YOUR NERVES TO GET RID OF A HANGOVER: YES
NAUSEA AND VOMITING: MILD NAUSEA WITH NO VOMITING
TOTAL SCORE: 3
AUDITORY DISTURBANCES: NOT PRESENT
ORIENTATION AND CLOUDING OF SENSORIUM: ORIENTED AND CAN DO SERIAL ADDITIONS
TOTAL SCORE: 15
ORIENTATION AND CLOUDING OF SENSORIUM: ORIENTED AND CAN DO SERIAL ADDITIONS
TREMOR: *
ANXIETY: MILDLY ANXIOUS
NAUSEA AND VOMITING: *
NAUSEA AND VOMITING: NO NAUSEA AND NO VOMITING
HEADACHE, FULLNESS IN HEAD: MILD
TREMOR: TREMOR NOT VISIBLE BUT CAN BE FELT, FINGERTIP TO FINGERTIP
VISUAL DISTURBANCES: NOT PRESENT
AVERAGE NUMBER OF DAYS PER WEEK YOU HAVE A DRINK CONTAINING ALCOHOL: 7
ORIENTATION AND CLOUDING OF SENSORIUM: ORIENTED AND CAN DO SERIAL ADDITIONS
ANXIETY: MILDLY ANXIOUS
TREMOR: MODERATE TREMOR WITH ARMS EXTENDED
HEADACHE, FULLNESS IN HEAD: MODERATELY SEVERE
VISUAL DISTURBANCES: NOT PRESENT
TOTAL SCORE: 4
VISUAL DISTURBANCES: NOT PRESENT
TOTAL SCORE: 11
ORIENTATION AND CLOUDING OF SENSORIUM: ORIENTED AND CAN DO SERIAL ADDITIONS
HEADACHE, FULLNESS IN HEAD: MILD
TOTAL SCORE: 4
ANXIETY: *
TOTAL SCORE: 19
ANXIETY: MILDLY ANXIOUS
AGITATION: *
SUBSTANCE_ABUSE: 1
NAUSEA AND VOMITING: *
VISUAL DISTURBANCES: NOT PRESENT
TREMOR: *
NAUSEA AND VOMITING: NO NAUSEA AND NO VOMITING
TREMOR: *
AGITATION: SOMEWHAT MORE THAN NORMAL ACTIVITY
AUDITORY DISTURBANCES: NOT PRESENT
PAROXYSMAL SWEATS: NO SWEAT VISIBLE
ANXIETY: *
PAROXYSMAL SWEATS: BARELY PERCEPTIBLE SWEATING. PALMS MOIST
AGITATION: *
VISUAL DISTURBANCES: NOT PRESENT
AUDITORY DISTURBANCES: NOT PRESENT
NAUSEA AND VOMITING: INTERMITTENT NAUSEA WITH DRY HEAVES
HEADACHE, FULLNESS IN HEAD: VERY MILD
VISUAL DISTURBANCES: NOT PRESENT
ORIENTATION AND CLOUDING OF SENSORIUM: ORIENTED AND CAN DO SERIAL ADDITIONS
TREMOR: MODERATE TREMOR WITH ARMS EXTENDED
PAROXYSMAL SWEATS: NO SWEAT VISIBLE
AUDITORY DISTURBANCES: NOT PRESENT
VISUAL DISTURBANCES: NOT PRESENT
HAVE YOU EVER FELT YOU SHOULD CUT DOWN ON YOUR DRINKING: YES
HAVE PEOPLE ANNOYED YOU BY CRITICIZING YOUR DRINKING: YES
AGITATION: NORMAL ACTIVITY
TOTAL SCORE: 8
NAUSEA AND VOMITING: *
AUDITORY DISTURBANCES: NOT PRESENT
TREMOR: *
DOES PATIENT WANT TO STOP DRINKING: YES
HEADACHE, FULLNESS IN HEAD: NOT PRESENT
DOES PATIENT WANT TO TALK TO SOMEONE ABOUT QUITTING: YES
PAROXYSMAL SWEATS: BARELY PERCEPTIBLE SWEATING. PALMS MOIST
AGITATION: NORMAL ACTIVITY
HEADACHE, FULLNESS IN HEAD: MODERATELY SEVERE
ORIENTATION AND CLOUDING OF SENSORIUM: ORIENTED AND CAN DO SERIAL ADDITIONS
ORIENTATION AND CLOUDING OF SENSORIUM: ORIENTED AND CAN DO SERIAL ADDITIONS
AGITATION: *
NAUSEA AND VOMITING: *
PAROXYSMAL SWEATS: NO SWEAT VISIBLE
TOTAL SCORE: 1
NAUSEA AND VOMITING: *
PAROXYSMAL SWEATS: NO SWEAT VISIBLE
TOTAL SCORE: 14
AGITATION: SOMEWHAT MORE THAN NORMAL ACTIVITY
TOTAL SCORE: 4
HEADACHE, FULLNESS IN HEAD: VERY MILD
ANXIETY: *
ORIENTATION AND CLOUDING OF SENSORIUM: ORIENTED AND CAN DO SERIAL ADDITIONS
TOTAL SCORE: 4
PAROXYSMAL SWEATS: NO SWEAT VISIBLE
ANXIETY: MILDLY ANXIOUS
PAROXYSMAL SWEATS: NO SWEAT VISIBLE
AGITATION: MODERATELY FIDGETY AND RESTLESS
HEADACHE, FULLNESS IN HEAD: MILD
PAROXYSMAL SWEATS: NO SWEAT VISIBLE
VISUAL DISTURBANCES: NOT PRESENT
AUDITORY DISTURBANCES: NOT PRESENT
AUDITORY DISTURBANCES: NOT PRESENT
NAUSEA AND VOMITING: INTERMITTENT NAUSEA WITH DRY HEAVES
TREMOR: TREMOR NOT VISIBLE BUT CAN BE FELT, FINGERTIP TO FINGERTIP
TREMOR: TREMOR NOT VISIBLE BUT CAN BE FELT, FINGERTIP TO FINGERTIP
VISUAL DISTURBANCES: NOT PRESENT
PAROXYSMAL SWEATS: NO SWEAT VISIBLE
TREMOR: *
PAROXYSMAL SWEATS: NO SWEAT VISIBLE
ORIENTATION AND CLOUDING OF SENSORIUM: ORIENTED AND CAN DO SERIAL ADDITIONS
ANXIETY: *
AGITATION: *
AUDITORY DISTURBANCES: NOT PRESENT
ANXIETY: *

## 2022-07-03 ASSESSMENT — ENCOUNTER SYMPTOMS
DEPRESSION: 1
TINGLING: 0
HEADACHES: 0
MYALGIAS: 0
VOMITING: 0
HEARTBURN: 0
NAUSEA: 1
DIZZINESS: 0
DOUBLE VISION: 0
FEVER: 0
ABDOMINAL PAIN: 0
NAUSEA: 0
BLURRED VISION: 0
SHORTNESS OF BREATH: 0
HEMOPTYSIS: 0
BLOOD IN STOOL: 0
VOMITING: 1
CHILLS: 0
TREMORS: 1
BRUISES/BLEEDS EASILY: 0
NERVOUS/ANXIOUS: 1
COUGH: 0
DEPRESSION: 0
NECK PAIN: 0
PALPITATIONS: 0

## 2022-07-03 ASSESSMENT — COGNITIVE AND FUNCTIONAL STATUS - GENERAL
SUGGESTED CMS G CODE MODIFIER MOBILITY: CH
SUGGESTED CMS G CODE MODIFIER DAILY ACTIVITY: CH
MOBILITY SCORE: 24
DAILY ACTIVITIY SCORE: 24

## 2022-07-03 ASSESSMENT — PATIENT HEALTH QUESTIONNAIRE - PHQ9
SUM OF ALL RESPONSES TO PHQ9 QUESTIONS 1 AND 2: 0
1. LITTLE INTEREST OR PLEASURE IN DOING THINGS: NOT AT ALL
2. FEELING DOWN, DEPRESSED, IRRITABLE, OR HOPELESS: NOT AT ALL

## 2022-07-03 NOTE — ED NOTES
Pt. A&O X 4, resting in bed. Assisted earlier to restroom, gait even and steady. Monitors in place. States she is anxious and does not want to throw up again. Reassurance provided. Denies further needs at this time.

## 2022-07-03 NOTE — ED NOTES
Pt medicated per MAR based on CIWA protocol  Given more warm blankets  Updated on POC and plan for admit

## 2022-07-03 NOTE — ED PROVIDER NOTES
ED Provider Note    CHIEF COMPLAINT  Chief Complaint   Patient presents with   • Drug Overdose     EMS- pt from home, called 911 after +ETOH this AM and blacked out. Pt awoke and found some of her pill bottles empty but does not remember taking them. Called 911 bc pt was concerned she did take them while intoxicated earlier today. EMS reports pt possibly took clonazepam, denies SI/HI en route. GCS 15, A&O x4       HPI  Nathalie Aguilar is a 61 y.o. female who presents to the emergency room and with possible drug overdose. Past medical history as documented below. The patient explained that she has had ongoing depression throughout her entire life. She has had acute on chronic depression secondary to recent bereavement and passing of her . She states that she only intermittently drinks however she drank quite significantly last night and this morning. After becoming more sober she evaluated her pill bottles and multiple pills are missing specifically her clonazepam. She says it is quite possible that she took these in time to harm herself as she has been think about possible suicide by pill ingestion. She cannot however recall actively taking the medications. She does not believe they were anywhere else within the residence or flushed. This is what leads you believe that she actually took the pills. Being that she does not recall event does not have any heels were taken nor at what time. Currently denying any active desire to harm self or to commit suicide but does believe that she needs help with her current level of depression.    REVIEW OF SYSTEMS  See HPI for further details. All other systems are negative.     PAST MEDICAL HISTORY   has a past medical history of ASTHMA, Cancer (HCC), Heroin addiction (HCC), Pneumonia, and Psychiatric disorder.    SOCIAL HISTORY  Social History     Tobacco Use   • Smoking status: Current Every Day Smoker     Packs/day: 0.50   • Smokeless tobacco: Never Used   Vaping  "Use   • Vaping Use: Never used   Substance and Sexual Activity   • Alcohol use: Yes     Comment: two weeks since   • Drug use: Yes     Comment: history of heroin use   • Sexual activity: Not on file       SURGICAL HISTORY   has a past surgical history that includes gyn surgery; incision and drainage orthopedic (Right, 1/25/2019); and cholecystectomy.    CURRENT MEDICATIONS  Home Medications     Reviewed by Nanci Garcia R.N. (Registered Nurse) on 07/02/22 at 2039  Med List Status: Partial   Medication Last Dose Status   acetaminophen (TYLENOL) 325 MG Tab  Active   clonazePAM (KLONOPIN) 1 MG Tab  Active   gabapentin (NEURONTIN) 300 MG Cap  Active   mirtazapine (REMERON) 45 MG tablet  Active   prazosin (MINIPRESS) 1 MG Cap  Active   QUEtiapine (SEROQUEL) 200 MG Tab  Active   venlafaxine XR (EFFEXOR XR) 75 MG CAPSULE SR 24 HR  Active                ALLERGIES  Allergies   Allergen Reactions   • Fish Anaphylaxis   • Other Drug      Other reaction(s): crazier than without it       PHYSICAL EXAM  VITAL SIGNS: BP (!) 166/82   Pulse (!) 105   Temp 36.7 °C (98 °F) (Temporal)   Resp 20   Ht 1.702 m (5' 7\")   Wt 55.1 kg (121 lb 7.6 oz)   SpO2 93%   BMI 19.03 kg/m²  @SHANNON[[id number]::@   Pulse ox interpretation: I interpret this pulse ox as normal.  Constitutional: Alert in no apparent distress.  HENT: No signs of trauma, Bilateral external ears normal, Nose normal.   Eyes: Pupils are equal and reactive  Neck: Normal range of motion, No tenderness, Supple  Cardiovascular: Regular rate and rhythm, no murmurs.   Thorax & Lungs: Normal breath sounds, No respiratory distress, No wheezing, No chest tenderness.   Abdomen: Bowel sounds normal, Soft, No tenderness  Skin: Warm, Dry, No erythema, No rash.   Extremities: Intact distal pulses  Musculoskeletal: Good range of motion in all major joints. No tenderness to palpation or major deformities noted.   Neurologic: Alert , Normal motor function, Normal sensory function, No " focal deficits noted.   Psychiatric: Affect normal, Judgment normal, Mood normal.       DIAGNOSTIC STUDIES / PROCEDURES      LABS  Results for orders placed or performed during the hospital encounter of 07/02/22   CBC WITH DIFFERENTIAL   Result Value Ref Range    WBC 3.2 (L) 4.8 - 10.8 K/uL    RBC 4.95 4.20 - 5.40 M/uL    Hemoglobin 16.7 (H) 12.0 - 16.0 g/dL    Hematocrit 48.2 (H) 37.0 - 47.0 %    MCV 97.4 81.4 - 97.8 fL    MCH 33.7 (H) 27.0 - 33.0 pg    MCHC 34.6 33.6 - 35.0 g/dL    RDW 54.3 (H) 35.9 - 50.0 fL    Platelet Count 194 164 - 446 K/uL    MPV 9.3 9.0 - 12.9 fL    Neutrophils-Polys 78.20 (H) 44.00 - 72.00 %    Lymphocytes 14.80 (L) 22.00 - 41.00 %    Monocytes 4.30 0.00 - 13.40 %    Eosinophils 0.90 0.00 - 6.90 %    Basophils 1.20 0.00 - 1.80 %    Immature Granulocytes 0.60 0.00 - 0.90 %    Nucleated RBC 0.00 /100 WBC    Neutrophils (Absolute) 2.53 2.00 - 7.15 K/uL    Lymphs (Absolute) 0.48 (L) 1.00 - 4.80 K/uL    Monos (Absolute) 0.14 0.00 - 0.85 K/uL    Eos (Absolute) 0.03 0.00 - 0.51 K/uL    Baso (Absolute) 0.04 0.00 - 0.12 K/uL    Immature Granulocytes (abs) 0.02 0.00 - 0.11 K/uL    NRBC (Absolute) 0.00 K/uL   BASIC METABOLIC PANEL   Result Value Ref Range    Sodium 143 135 - 145 mmol/L    Potassium 4.0 3.6 - 5.5 mmol/L    Chloride 100 96 - 112 mmol/L    Co2 17 (L) 20 - 33 mmol/L    Glucose 111 (H) 65 - 99 mg/dL    Bun 15 8 - 22 mg/dL    Creatinine 0.43 (L) 0.50 - 1.40 mg/dL    Calcium 8.4 (L) 8.5 - 10.5 mg/dL    Anion Gap 26.0 (H) 7.0 - 16.0   DIAGNOSTIC ALCOHOL   Result Value Ref Range    Diagnostic Alcohol 213.1 (H) <10.1 mg/dL   ESTIMATED GFR   Result Value Ref Range    GFR (CKD-EPI) 111 >60 mL/min/1.73 m 2   FLUID OCCULT BLOOD   Result Value Ref Range    Occult Blood Positive (A) Negative   EKG   Result Value Ref Range    Report       [facility] Emergency Dept.    Test Date:  2022-07-02  Pt Name:    JOSS WHALEN               Department: ER  MRN:        [id number]                       Room:       [facility]  Gender:     Female                       Technician: [id number]  :        1960                   Requested By:ER TRIAGE PROTOCOL  Order #:    [id number]                    Reading MD:    Measurements  Intervals                                Axis  Rate:       102                          P:          74  NY:         138                          QRS:        67  QRSD:       98                           T:          60  QT:         374  QTc:        486    Interpretive Statements  Sinus tachycardia  Borderline prolonged QT interval  Compared to ECG 10/23/2021 13:11:39  Sinus rhythm no longer present  T-wave abnormality no longer present           RADIOLOGY  No orders to display         COURSE & MEDICAL DECISION MAKING  Pertinent Labs & Imaging studies reviewed. (See chart for details)    61-year-old presented the emergency department with the above presentation. Chronic alcohol abuse. Currently intoxicated. Patient is unaware whether not she definitively ingested pills however she believes she may have as she had previously considered it prior to getting significant intoxicated last night. She does have a smoking history of she is slightly hypoxic this could certainly be related to benzodiazepine overuse. This point poison control has been consulted and they state that the patient will need to be observed for approximate 10 to 12 hours for medical clearance standpoint from for this digestion. During her ER observational time she has developed some hematemesis and what is likely some mild alcohol withdrawal. She has been placed on withdrawal protocol and at this point will be brought in under the hospital service for further evaluation and possible worsening of G.I. bleed. She will additionally further psychiatric assessment to assess the need for possible inpatient stay due to her acute on chronic depression and potential suicidal overdose.        FINAL IMPRESSION  1. Accidental drug overdose,  initial encounter    2. Suicidal ideation    3. Depression, unspecified depression type    4. Alcohol abuse    5. Hematemesis, presence of nausea not specified            Electronically signed by: Pasquale Marshall M.D., 7/2/2022 10:01 PM

## 2022-07-03 NOTE — ED NOTES
Pt. Resting in bed. Requesting ativan. Education provided r/t detox process. Pt. Verbalized understanding.

## 2022-07-03 NOTE — ED NOTES
POISON CONTROL CASE NUMBER #[id number]    PC contacted for further recommendation advising unknown time of ingestion and unknown exact substance but report possible 45 mg Clonazepam, and x5 pills of Quetiapine fumarate 400 mg tabs based upon dates filled and 0 left in bottles.  Poison control advised to obs pt for 8-12 hrs and provide s/s support such as tachycardia, CNS/resp depression, and anticholinergic s/s.   ERP updated

## 2022-07-03 NOTE — ED NOTES
Pt ambulated to restroom and back w/ steady gait, provided socks  Urine collected and sent to lab  New linens and blankets provided  Pt currently resting, NAD

## 2022-07-03 NOTE — ASSESSMENT & PLAN NOTE
Discussed with patient regarding goals of care patient wishes to be DNR/DNI with no aggressive measures.  Time spent 11 minutes.

## 2022-07-03 NOTE — ED NOTES
ERP notified of positive gastrocult sample and needing CIWA protocols as pt presenting w/ s/s of alcohol detox. Agitated, tremulous, restless

## 2022-07-03 NOTE — ASSESSMENT & PLAN NOTE
Home  Psychiatric medication and THC ingestion, alcohol   Poison control has been contacted case #[id number]  Supportive care, continue Telemetry monitoring  -restarted gabapentin at low dose   - on legal hold, psych following, appreciate recommendations   Holding home medications   Monitor closely for serotonin syndrome

## 2022-07-03 NOTE — ED NOTES
Pt medicated per MAR for anxiety, N/V  Pt was actively vomiting UA of this RN to BS  Resting back in bed, NAD

## 2022-07-03 NOTE — ASSESSMENT & PLAN NOTE
Hx of ETOH use, hx of withdrawal   Suspect symptoms are due to withdrawal from ETOH   Continue CIWA protocol  Thiamine folic acid  Multivitamin supplementation  Monitor electrolytes and replace as needed

## 2022-07-03 NOTE — ED TRIAGE NOTES
Chief Complaint   Patient presents with   • Drug Overdose     EMS- pt from home, called 911 after +ETOH this AM and blacked out. Pt awoke and found some of her pill bottles empty but does not remember taking them. Called 911 bc pt was concerned she did take them while intoxicated earlier today. EMS reports pt possibly took clonazepam, denies SI/HI en route. GCS 15, A&O x4     Pt arrives and denies SI/HI UA. Multiple pill bottles accompanied w/ REMSA. Pt in no distress just c/o N/V. Apparently drank earlier this AM and blacked out. PIV est en route and fluids given

## 2022-07-03 NOTE — ASSESSMENT & PLAN NOTE
Secondary to alcohol use  Start omeprazole twice daily  Trend H&H, stable   No further signs of bleeding   Diet as tolerated   Will need outpatient follow up with GI   Monitor

## 2022-07-03 NOTE — PROGRESS NOTES
Hospital Medicine Daily Progress Note    Date of Service  7/3/2022    Chief Complaint  Nathalie Aguilar is a 61 y.o. female admitted 2022 with drug overdose.     Hospital Course  61 y.o. female past medical history of alcohol abuse who presented 2022 with concerns of drug overdose.  Patient is alert, awake, oriented x4 and reports that when she woke up she noticed that her bottles of routine medications including clonazepam, Seroquel, mirtazapine were empty.  She does not remember if she took those medications or not.  She denies having any suicidal or homicidal intent.  She reports ever since her   she has been drinking a lot and has a lot on her mind.  No other relieving or exacerbating factors were noted     In the ER, poison control was contacted (case #[id number]) and recommended observation for 8 to 12 hours and supportive care.  While in ER patient had dark emesis which was positive for occult blood.  Patient also noted to be withdrawing. Patient admitted started on PPI therapy and CIWA protocol.     Interval Problem Update  Patient tachycardic, CIWA 11. Change pepcid to omeprazole BID, trend H&H Q8H. No recurrence of coffee grounds, vitals stable. If recurrence or drop in H&H will consult GI. Patient very anxious, home meds were held for possible ingestion patient very concerned about her gabapentin will restart at the lower dose and monitor. Atarax prn anxiety.     I have discussed this patient's plan of care and discharge plan at IDT rounds today with Case Management, Nursing, Nursing leadership, and other members of the IDT team.    Consultants/Specialty  N/A    Code Status  DNAR/DNI    Disposition  Patient is not medically cleared for discharge.   Anticipate discharge to to home with close outpatient follow-up.  I have placed the appropriate orders for post-discharge needs.    Review of Systems  Review of Systems   Constitutional: Negative for chills and fever.   Respiratory:  Negative for shortness of breath.    Cardiovascular: Negative for chest pain.   Gastrointestinal: Negative for abdominal pain, blood in stool, melena, nausea and vomiting.   Genitourinary: Negative for dysuria.   Musculoskeletal: Negative for myalgias.   Skin: Negative for rash.   Neurological: Positive for tremors. Negative for dizziness and headaches.   Psychiatric/Behavioral: Positive for substance abuse. Negative for depression. The patient is nervous/anxious.    All other systems reviewed and are negative.       Physical Exam  Temp:  [36.7 °C (98 °F)] 36.7 °C (98 °F)  Pulse:  [] 95  Resp:  [16-20] 20  BP: (109-166)/(68-83) 119/79  SpO2:  [90 %-100 %] 96 %    Physical Exam  Vitals and nursing note reviewed.   Constitutional:       General: She is not in acute distress.     Appearance: She is ill-appearing.   HENT:      Head: Normocephalic and atraumatic.   Eyes:      General: No scleral icterus.     Conjunctiva/sclera: Conjunctivae normal.   Cardiovascular:      Rate and Rhythm: Regular rhythm. Tachycardia present.      Pulses: Normal pulses.   Pulmonary:      Effort: Pulmonary effort is normal. No respiratory distress.      Breath sounds: No wheezing.   Abdominal:      General: Abdomen is flat. There is no distension.      Palpations: Abdomen is soft.      Tenderness: There is no abdominal tenderness.   Musculoskeletal:         General: No swelling. Normal range of motion.      Cervical back: Normal range of motion and neck supple.   Skin:     General: Skin is warm and dry.      Findings: No rash.   Neurological:      General: No focal deficit present.      Mental Status: She is alert and oriented to person, place, and time.      Cranial Nerves: No cranial nerve deficit.      Motor: Tremor present.   Psychiatric:         Mood and Affect: Mood is anxious.         Behavior: Behavior is agitated.         Thought Content: Thought content normal.         Fluids    Intake/Output Summary (Last 24 hours) at  7/3/2022 1414  Last data filed at 7/3/2022 1032  Gross per 24 hour   Intake 1000 ml   Output --   Net 1000 ml       Laboratory  Recent Labs     07/02/22 2045 07/03/22  0901   WBC 3.2*  --    RBC 4.95  --    HEMOGLOBIN 16.7* 16.9*   HEMATOCRIT 48.2* 52.8*   MCV 97.4  --    MCH 33.7*  --    MCHC 34.6  --    RDW 54.3*  --    PLATELETCT 194  --    MPV 9.3  --      Recent Labs     07/02/22 2045   SODIUM 143   POTASSIUM 4.0   CHLORIDE 100   CO2 17*   GLUCOSE 111*   BUN 15   CREATININE 0.43*   CALCIUM 8.4*                   Imaging  No orders to display        Assessment/Plan  * Drug overdose, accidental or unintentional, initial encounter- (present on admission)  Assessment & Plan  Possible drug ingestion  Poison control has been contacted case #[id number]  Supportive care  Telemetry monitoring  Observation  We will hold off on home meds  -restarted gabapentin at low dose   IV fluids      ACP (advance care planning)  Assessment & Plan  Discussed with patient regarding goals of care patient wishes to be DNR/DNI with no aggressive measures.  Time spent 11 minutes.    Alcoholic gastritis with hemorrhage  Assessment & Plan  Secondary to alcohol use  Start omeprazole twice daily  Trend H&H  Consider GI consult if recurrence or significant drop in hemoglobin  Monitor    Alcohol withdrawal (HCC)  Assessment & Plan  CIWA protocol  IV fluids  Thiamine folic acid  Multivitamin supplementation  Monitor for DTs       VTE prophylaxis: SCDs/TEDs

## 2022-07-03 NOTE — ED NOTES
"Pt. Agitated, stating she wants to leave, walked out to nurses station. Redirectable to room. States \"You guys aren't even going to feed me! You haven't given me my home meds! What's the point of me even being here?\" Reassurance and education provided r/t detox process, vomiting, hold of home meds d/t possible overdose and GI monitoring. Pt. Back to bed with monitors in place. MD aware of pt. Status and pt to be medicated per MAR for anxiety.   "

## 2022-07-03 NOTE — H&P
Hospital Medicine History & Physical Note    Date of Service  7/3/2022    Primary Care Physician  No primary care provider on file.    Consultants  none     Specialist Names: none     Code Status  DNAR/DNI    Chief Complaint  Chief Complaint   Patient presents with   • Drug Overdose     EMS- pt from home, called 911 after +ETOH this AM and blacked out. Pt awoke and found some of her pill bottles empty but does not remember taking them. Called 911 bc pt was concerned she did take them while intoxicated earlier today. EMS reports pt possibly took clonazepam, denies SI/HI en route. GCS 15, A&O x4       History of Presenting Illness  Nathalie Aguilar is a 61 y.o. female past medical history of alcohol abuse who presented 2022 with concerns of drug overdose.  Patient is alert, awake, oriented x4 and reports that when she woke up she noticed that her bottles of routine medications including clonazepam, Seroquel, mirtazapine were empty.  She does not remember if she took those medications or not.  She denies having any suicidal or homicidal intent.  She reports ever since her   she has been drinking a lot and has a lot on her mind.  No other relieving or exacerbating factors were noted    In the ER, poison control was contacted (case #[phone]) and recommended observation for 8 to 12 hours and supportive care.  While in ER patient had dark emesis which was positive for occult blood.  Patient also noted to be withdrawing.  Hospitalist called for admission.    Patient examined bedside is alert, awake, oriented x4 visibly tremulous.  She blatantly denies that she tried to harm herself with taking pills.  CIWA protocol was initiated and patient will be admitted inpatient for alcohol withdrawal.    I discussed the plan of care with patient.    Review of Systems  Review of Systems   Constitutional: Negative for chills and fever.   HENT: Negative for hearing loss and tinnitus.    Eyes: Negative for blurred  vision and double vision.   Respiratory: Negative for cough and hemoptysis.    Cardiovascular: Negative for chest pain and palpitations.   Gastrointestinal: Positive for nausea and vomiting. Negative for heartburn.   Genitourinary: Negative for dysuria and urgency.   Musculoskeletal: Negative for myalgias and neck pain.   Skin: Negative for itching and rash.   Neurological: Negative for dizziness, tingling and headaches.   Endo/Heme/Allergies: Does not bruise/bleed easily.   Psychiatric/Behavioral: Positive for depression and substance abuse. Negative for suicidal ideas.       Past Medical History   has a past medical history of ASTHMA, Cancer (HCC), Heroin addiction (HCC), Pneumonia, and Psychiatric disorder.    Surgical History   has a past surgical history that includes gyn surgery; incision and drainage orthopedic (Right, 1/25/2019); and cholecystectomy.     Family History  family history includes Alcohol abuse in her father; No Known Problems in her mother.   Family history reviewed with patient. There is no family history that is pertinent to the chief complaint.     Social History   reports that she has been smoking. She has been smoking about 0.50 packs per day. She has never used smokeless tobacco. She reports current alcohol use. She reports current drug use.    Allergies  Allergies   Allergen Reactions   • Fish Anaphylaxis       Medications  Prior to Admission Medications   Prescriptions Last Dose Informant Patient Reported? Taking?   QUEtiapine (SEROQUEL) 200 MG Tab 7/1/2022 at PM Patient Yes No   Sig: Take 200 mg by mouth at bedtime.   acetaminophen (TYLENOL) 325 MG Tab PRN at PRN Patient No No   Sig: Take 2 Tablets by mouth every 6 hours.   clonazePAM (KLONOPIN) 1 MG Tab 7/2/2022 at 1600 Patient Yes No   Sig: Take 1 mg by mouth 3 times a day.   gabapentin (NEURONTIN) 300 MG Cap 7/2/2022 at Noon Patient Yes No   Sig: Take 300-600 mg by mouth 3 times a day.   mirtazapine (REMERON) 45 MG tablet 7/1/2022  at PM Patient Yes No   Sig: Take 45 mg by mouth every evening.   prazosin (MINIPRESS) 1 MG Cap 7/1/2022 at PM Patient Yes No   Sig: Take 1 mg by mouth every evening.   venlafaxine XR (EFFEXOR XR) 75 MG CAPSULE SR 24 HR 7/2/2022 at AM Patient Yes Yes   Sig: Take 150 mg by mouth every day. 3 tablets = 150 mg      Facility-Administered Medications: None       Physical Exam  Temp:  [36.7 °C (98 °F)] 36.7 °C (98 °F)  Pulse:  [100-109] 107  Resp:  [20] 20  BP: (132-166)/(74-82) 132/74  SpO2:  [90 %-99 %] 98 %  Blood Pressure: 132/74   Temperature: 36.7 °C (98 °F)   Pulse: (!) 107   Respiration: 20   Pulse Oximetry: 98 %       Physical Exam  Vitals and nursing note reviewed.   Constitutional:       General: She is not in acute distress.     Appearance: Normal appearance. She is not ill-appearing.   HENT:      Head: Normocephalic and atraumatic.      Right Ear: Tympanic membrane normal.      Left Ear: Tympanic membrane normal.      Nose: Nose normal.      Mouth/Throat:      Mouth: Mucous membranes are dry.      Pharynx: Oropharynx is clear.   Eyes:      Extraocular Movements: Extraocular movements intact.      Pupils: Pupils are equal, round, and reactive to light.   Cardiovascular:      Rate and Rhythm: Regular rhythm. Tachycardia present.      Pulses: Normal pulses.      Heart sounds: Normal heart sounds.   Pulmonary:      Effort: Pulmonary effort is normal. No respiratory distress.      Breath sounds: Normal breath sounds. No stridor. No wheezing or rhonchi.   Abdominal:      General: Bowel sounds are normal. There is no distension.      Palpations: Abdomen is soft. There is no mass.      Tenderness: There is no abdominal tenderness.      Hernia: No hernia is present.   Musculoskeletal:         General: No swelling, tenderness, deformity or signs of injury. Normal range of motion.      Cervical back: Neck supple. No rigidity or tenderness.   Skin:     General: Skin is warm.      Capillary Refill: Capillary refill takes  less than 2 seconds.      Coloration: Skin is not jaundiced or pale.      Findings: No bruising or erythema.   Neurological:      General: No focal deficit present.      Mental Status: She is alert and oriented to person, place, and time. Mental status is at baseline.      Cranial Nerves: No cranial nerve deficit.      Sensory: No sensory deficit.      Motor: No weakness.      Coordination: Coordination normal.   Psychiatric:         Mood and Affect: Mood normal.         Behavior: Behavior normal.         Laboratory:  Recent Labs     07/02/22 2045   WBC 3.2*   RBC 4.95   HEMOGLOBIN 16.7*   HEMATOCRIT 48.2*   MCV 97.4   MCH 33.7*   MCHC 34.6   RDW 54.3*   PLATELETCT 194   MPV 9.3     Recent Labs     07/02/22 2045   SODIUM 143   POTASSIUM 4.0   CHLORIDE 100   CO2 17*   GLUCOSE 111*   BUN 15   CREATININE 0.43*   CALCIUM 8.4*     Recent Labs     07/02/22 2045   GLUCOSE 111*         No results for input(s): NTPROBNP in the last 72 hours.      No results for input(s): TROPONINT in the last 72 hours.    Imaging:  No orders to display       no X-Ray or EKG requiring interpretation    Assessment/Plan:  Justification for Admission Status  I anticipate this patient will require at least two midnights for appropriate medical management, necessitating inpatient admission because Alcohol withdrawal requiring detox with [facility] protocol.    * Drug overdose, accidental or unintentional, initial encounter- (present on admission)  Assessment & Plan  Possible drug ingestion  Poison control has been contacted case #[phone]  Supportive care  Telemetry monitoring  Observation  We will hold off on home psychiatric medications given above  IV fluids      ACP (advance care planning)  Assessment & Plan  Discussed with patient regarding goals of care patient wishes to be DNR/DNI with no aggressive measures.  Time spent 11 minutes.    Alcoholic gastritis with hemorrhage  Assessment & Plan  Secondary to alcohol use  IV  Pepcid  Monitor    Alcohol withdrawal (HCC)  Assessment & Plan  WA protocol  IV fluids  Thiamine folic acid  Multivitamin supplementation  Monitor for DTs      VTE prophylaxis: SCDs/TEDs

## 2022-07-04 LAB
ANION GAP SERPL CALC-SCNC: 9 MMOL/L (ref 7–16)
BUN SERPL-MCNC: 10 MG/DL (ref 8–22)
CALCIUM SERPL-MCNC: 8.5 MG/DL (ref 8.5–10.5)
CHLORIDE SERPL-SCNC: 100 MMOL/L (ref 96–112)
CO2 SERPL-SCNC: 30 MMOL/L (ref 20–33)
CREAT SERPL-MCNC: 0.51 MG/DL (ref 0.5–1.4)
ERYTHROCYTE [DISTWIDTH] IN BLOOD BY AUTOMATED COUNT: 54.4 FL (ref 35.9–50)
GFR SERPLBLD CREATININE-BSD FMLA CKD-EPI: 106 ML/MIN/1.73 M 2
GLUCOSE SERPL-MCNC: 78 MG/DL (ref 65–99)
HCT VFR BLD AUTO: 45.9 % (ref 37–47)
HGB BLD-MCNC: 15.6 G/DL (ref 12–16)
MCH RBC QN AUTO: 34.1 PG (ref 27–33)
MCHC RBC AUTO-ENTMCNC: 34 G/DL (ref 33.6–35)
MCV RBC AUTO: 100.4 FL (ref 81.4–97.8)
PLATELET # BLD AUTO: 158 K/UL (ref 164–446)
PMV BLD AUTO: 9.8 FL (ref 9–12.9)
POTASSIUM SERPL-SCNC: 3.6 MMOL/L (ref 3.6–5.5)
RBC # BLD AUTO: 4.57 M/UL (ref 4.2–5.4)
SODIUM SERPL-SCNC: 139 MMOL/L (ref 135–145)
WBC # BLD AUTO: 4.5 K/UL (ref 4.8–10.8)

## 2022-07-04 PROCEDURE — A9270 NON-COVERED ITEM OR SERVICE: HCPCS | Performed by: STUDENT IN AN ORGANIZED HEALTH CARE EDUCATION/TRAINING PROGRAM

## 2022-07-04 PROCEDURE — 36415 COLL VENOUS BLD VENIPUNCTURE: CPT

## 2022-07-04 PROCEDURE — 700102 HCHG RX REV CODE 250 W/ 637 OVERRIDE(OP): Performed by: STUDENT IN AN ORGANIZED HEALTH CARE EDUCATION/TRAINING PROGRAM

## 2022-07-04 PROCEDURE — 700102 HCHG RX REV CODE 250 W/ 637 OVERRIDE(OP): Performed by: HOSPITALIST

## 2022-07-04 PROCEDURE — 80048 BASIC METABOLIC PNL TOTAL CA: CPT

## 2022-07-04 PROCEDURE — 85027 COMPLETE CBC AUTOMATED: CPT

## 2022-07-04 PROCEDURE — A9270 NON-COVERED ITEM OR SERVICE: HCPCS | Performed by: HOSPITALIST

## 2022-07-04 PROCEDURE — 99233 SBSQ HOSP IP/OBS HIGH 50: CPT | Performed by: HOSPITALIST

## 2022-07-04 PROCEDURE — 700102 HCHG RX REV CODE 250 W/ 637 OVERRIDE(OP): Performed by: EMERGENCY MEDICINE

## 2022-07-04 PROCEDURE — A9270 NON-COVERED ITEM OR SERVICE: HCPCS | Performed by: EMERGENCY MEDICINE

## 2022-07-04 PROCEDURE — 770020 HCHG ROOM/CARE - TELE (206)

## 2022-07-04 PROCEDURE — 700111 HCHG RX REV CODE 636 W/ 250 OVERRIDE (IP): Performed by: EMERGENCY MEDICINE

## 2022-07-04 PROCEDURE — A9270 NON-COVERED ITEM OR SERVICE: HCPCS | Performed by: INTERNAL MEDICINE

## 2022-07-04 PROCEDURE — 700105 HCHG RX REV CODE 258: Performed by: INTERNAL MEDICINE

## 2022-07-04 PROCEDURE — 700102 HCHG RX REV CODE 250 W/ 637 OVERRIDE(OP): Performed by: INTERNAL MEDICINE

## 2022-07-04 RX ORDER — VENLAFAXINE HYDROCHLORIDE 75 MG/1
150 CAPSULE, EXTENDED RELEASE ORAL DAILY
Status: DISCONTINUED | OUTPATIENT
Start: 2022-07-04 | End: 2022-07-05

## 2022-07-04 RX ADMIN — OMEPRAZOLE 20 MG: 20 CAPSULE, DELAYED RELEASE ORAL at 06:02

## 2022-07-04 RX ADMIN — DIAZEPAM 2.5 MG: 5 INJECTION, SOLUTION INTRAMUSCULAR; INTRAVENOUS at 10:23

## 2022-07-04 RX ADMIN — GABAPENTIN 300 MG: 300 CAPSULE ORAL at 20:19

## 2022-07-04 RX ADMIN — HYDROXYZINE HYDROCHLORIDE 25 MG: 25 TABLET, FILM COATED ORAL at 14:48

## 2022-07-04 RX ADMIN — LORAZEPAM 1 MG: 1 TABLET ORAL at 12:33

## 2022-07-04 RX ADMIN — LORAZEPAM 3 MG: 2 TABLET ORAL at 20:19

## 2022-07-04 RX ADMIN — GABAPENTIN 300 MG: 300 CAPSULE ORAL at 14:15

## 2022-07-04 RX ADMIN — DIAZEPAM 2.5 MG: 5 INJECTION, SOLUTION INTRAMUSCULAR; INTRAVENOUS at 06:06

## 2022-07-04 RX ADMIN — VENLAFAXINE HYDROCHLORIDE 150 MG: 75 CAPSULE, EXTENDED RELEASE ORAL at 14:15

## 2022-07-04 RX ADMIN — DIAZEPAM 2.5 MG: 5 INJECTION, SOLUTION INTRAMUSCULAR; INTRAVENOUS at 16:45

## 2022-07-04 RX ADMIN — GABAPENTIN 300 MG: 300 CAPSULE ORAL at 08:44

## 2022-07-04 RX ADMIN — DIAZEPAM 2.5 MG: 5 INJECTION, SOLUTION INTRAMUSCULAR; INTRAVENOUS at 20:52

## 2022-07-04 RX ADMIN — FOLIC ACID 1 MG: 1 TABLET ORAL at 06:02

## 2022-07-04 RX ADMIN — SODIUM CHLORIDE: 9 INJECTION, SOLUTION INTRAVENOUS at 08:52

## 2022-07-04 RX ADMIN — Medication 100 MG: at 06:02

## 2022-07-04 RX ADMIN — LORAZEPAM 1 MG: 1 TABLET ORAL at 08:00

## 2022-07-04 ASSESSMENT — LIFESTYLE VARIABLES
ORIENTATION AND CLOUDING OF SENSORIUM: ORIENTED AND CAN DO SERIAL ADDITIONS
ORIENTATION AND CLOUDING OF SENSORIUM: ORIENTED AND CAN DO SERIAL ADDITIONS
TREMOR: NO TREMOR
AGITATION: SOMEWHAT MORE THAN NORMAL ACTIVITY
VISUAL DISTURBANCES: NOT PRESENT
PAROXYSMAL SWEATS: BARELY PERCEPTIBLE SWEATING. PALMS MOIST
PAROXYSMAL SWEATS: *
TOTAL SCORE: VERY MILD ITCHING, PINS AND NEEDLES SENSATION, BURNING OR NUMBNESS
SUBSTANCE_ABUSE: 1
TOTAL SCORE: 3
AGITATION: SOMEWHAT MORE THAN NORMAL ACTIVITY
NAUSEA AND VOMITING: NO NAUSEA AND NO VOMITING
TOTAL SCORE: 17
ANXIETY: *
NAUSEA AND VOMITING: NO NAUSEA AND NO VOMITING
NAUSEA AND VOMITING: NO NAUSEA AND NO VOMITING
TOTAL SCORE: 8
ANXIETY: *
HEADACHE, FULLNESS IN HEAD: VERY MILD
VISUAL DISTURBANCES: NOT PRESENT
ANXIETY: *
HEADACHE, FULLNESS IN HEAD: NOT PRESENT
VISUAL DISTURBANCES: NOT PRESENT
HEADACHE, FULLNESS IN HEAD: MILD
NAUSEA AND VOMITING: NO NAUSEA AND NO VOMITING
PAROXYSMAL SWEATS: BARELY PERCEPTIBLE SWEATING. PALMS MOIST
TOTAL SCORE: 6
AUDITORY DISTURBANCES: NOT PRESENT
AGITATION: NORMAL ACTIVITY
TREMOR: MODERATE TREMOR WITH ARMS EXTENDED
HEADACHE, FULLNESS IN HEAD: VERY MILD
ANXIETY: MODERATELY ANXIOUS OR GUARDED, SO ANXIETY IS INFERRED
HEADACHE, FULLNESS IN HEAD: MODERATE
PAROXYSMAL SWEATS: BARELY PERCEPTIBLE SWEATING. PALMS MOIST
PAROXYSMAL SWEATS: BARELY PERCEPTIBLE SWEATING. PALMS MOIST
TOTAL SCORE: 4
AGITATION: NORMAL ACTIVITY
TOTAL SCORE: 10
TREMOR: *
ORIENTATION AND CLOUDING OF SENSORIUM: ORIENTED AND CAN DO SERIAL ADDITIONS
PAROXYSMAL SWEATS: *
VISUAL DISTURBANCES: NOT PRESENT
AUDITORY DISTURBANCES: NOT PRESENT
TOTAL SCORE: 6
TREMOR: *
HEADACHE, FULLNESS IN HEAD: NOT PRESENT
ANXIETY: MILDLY ANXIOUS
ORIENTATION AND CLOUDING OF SENSORIUM: ORIENTED AND CAN DO SERIAL ADDITIONS
NAUSEA AND VOMITING: NO NAUSEA AND NO VOMITING
VISUAL DISTURBANCES: NOT PRESENT
PAROXYSMAL SWEATS: BARELY PERCEPTIBLE SWEATING. PALMS MOIST
HEADACHE, FULLNESS IN HEAD: NOT PRESENT
AGITATION: NORMAL ACTIVITY
TREMOR: *
ORIENTATION AND CLOUDING OF SENSORIUM: ORIENTED AND CAN DO SERIAL ADDITIONS
AGITATION: NORMAL ACTIVITY
TREMOR: TREMOR NOT VISIBLE BUT CAN BE FELT, FINGERTIP TO FINGERTIP
ANXIETY: *
AUDITORY DISTURBANCES: NOT PRESENT
AUDITORY DISTURBANCES: NOT PRESENT
ORIENTATION AND CLOUDING OF SENSORIUM: ORIENTED AND CAN DO SERIAL ADDITIONS
AUDITORY DISTURBANCES: NOT PRESENT
VISUAL DISTURBANCES: NOT PRESENT
ANXIETY: *
TREMOR: MODERATE TREMOR WITH ARMS EXTENDED
AGITATION: *
ORIENTATION AND CLOUDING OF SENSORIUM: ORIENTED AND CAN DO SERIAL ADDITIONS
NAUSEA AND VOMITING: MILD NAUSEA WITH NO VOMITING
AUDITORY DISTURBANCES: NOT PRESENT
AUDITORY DISTURBANCES: NOT PRESENT
NAUSEA AND VOMITING: NO NAUSEA AND NO VOMITING
VISUAL DISTURBANCES: NOT PRESENT

## 2022-07-04 ASSESSMENT — ENCOUNTER SYMPTOMS
TREMORS: 1
ABDOMINAL PAIN: 0
MYALGIAS: 0
SHORTNESS OF BREATH: 0
DIZZINESS: 0
BLOOD IN STOOL: 0
NERVOUS/ANXIOUS: 1
CHILLS: 0
FEVER: 0
NAUSEA: 0
HEADACHES: 0
VOMITING: 0
DEPRESSION: 0

## 2022-07-04 NOTE — DIETARY
"Nutrition services: Day 1 of admit.  Nathalie Aguilar is a 61 y.o. female with admitting DX of drug overdose, accidental or unintentional.    Consult received for wt loss (2-13 lbs in 1 month), poor PO per admit screen. Spoke with pt at bedside. Pt was sitting up in bed, appeared adequately nourished. Pt reports a low appetite prior to admit. Pt stated that appetite is slow to return. Pt reports a 10 lb wt loss over the past 2-3 weeks.  She stated her wt fluctuates, she has always been on the thin side and she did not have a UBW. Pt stated she has been trying to eat at home, but is not able to eat very much. Discussed oral nutrition supplements with pt to optimize nutrition at home. Pt stated she cannot afford them. She is agreeable to receiving on meal tray in [facility]. RD provided 1 case of Boost Plus for pt to take home upon discharge. RD also to add high protein foods such as greek yogurt and cottage cheese and pineapple per pt preference.    Assessment:  Height: 170.2 cm (5' 7\")  Weight: 55.1 kg (121 lb 7.6 oz)  Body mass index is 19.03 kg/m²., BMI classification: normal  Diet/Intake: Regular, Vegetarian; no PO recorded yet to assess    Evaluation:   1. Wt hx per chart review: 121 lbs 5/4/22, 129 lbs 6/1/21, 127 lbs 4/24/21, 123 lbs 4/14/21. No significant wt loss noted.    Malnutrition Risk: Does not meet criteria per ASPEN guidelines at this time.    Recommendations/Plan:  1. Boost Plus once per day, greek yogurt and cottage cheese and fruit with meals.  2. Encourage intake of meals, snacks and supplements.  3. Document intake of all meals, snacks and supplements as % taken in ADLs to provide interdisciplinary communication across all shifts.   4. Monitor weight.  5. Nutrition rep will continue to see patient for ongoing meal and snack preferences.     RD to follow per department guidelines.          "

## 2022-07-04 NOTE — PROGRESS NOTES
Monitor Summary: NSR 78-84, IN 0.14, QRS 0.05, QT 0.40, with rare PVCs, per shift from monitor room

## 2022-07-04 NOTE — ED NOTES
Pt in bed and tearful, requesting ativan and valium. Education provided r/t medication admin and necessity.

## 2022-07-04 NOTE — CARE PLAN
The patient is Stable - Low risk of patient condition declining or worsening    Shift Goals  Clinical Goals: Anxiety control, hydration  Patient Goals: emotional support  Family Goals: ELLIS    Progress made toward(s) clinical / shift goals:  pt has not needed ativan for withdrawal just yet during this shift    Problem: Knowledge Deficit - Standard  Goal: Patient and family/care givers will demonstrate understanding of plan of care, disease process/condition, diagnostic tests and medications  Outcome: Progressing     Problem: Optimal Care for Alcohol Withdrawal  Goal: Optimal Care for the alcohol withdrawal patient  Outcome: Progressing     Problem: Seizure Precautions  Goal: Implementation of seizure precautions  Outcome: Progressing     Problem: Lifestyle Changes  Goal: Patient's ability to identify lifestyle changes and available resources to help reduce recurrence of condition will improve  Outcome: Progressing     Problem: Psychosocial  Goal: Patient's level of anxiety will decrease  Outcome: Progressing  Goal: Spiritual and cultural needs incorporated into hospitalization  Outcome: Progressing     Problem: Risk for Aspiration  Goal: Patient's risk for aspiration will be absent or decrease  Outcome: Progressing

## 2022-07-04 NOTE — PROGRESS NOTES
4 Eyes Skin Assessment Completed by BRITNEY Jamil and BRITNEY Torrez.    Head WDL  Ears WDL  Nose WDL  Mouth WDL  Neck WDL  Breast/Chest Scar  Shoulder Blades WDL  Spine WDL  (R) Arm/Elbow/Hand WDL  (L) Arm/Elbow/Hand WDL  Abdomen WDL  Groin WDL  Scrotum/Coccyx/Buttocks WDL  (R) Leg WDL  (L) Leg WDL  (R) Heel/Foot/Toe WDL  (L) Heel/Foot/Toe WDL    Skin in general dry      Devices In Places  NA    Interventions In Place Heels Loaded W/Pillows    Possible Skin Injury No    Pictures Uploaded Into Epic N/A  Wound Consult Placed N/A  RN Wound Prevention Protocol Ordered No

## 2022-07-04 NOTE — PROGRESS NOTES
[facility] Medicine Daily Progress Note    Date of Service  2022    Chief Complaint  Nathalie Aguilar is a 61 y.o. female admitted 2022 with drug overdose.     Hospital Course  61 y.o. female past medical history of alcohol abuse who presented 2022 with concerns of drug overdose.  Patient is alert, awake, oriented x4 and reports that when she woke up she noticed that her bottles of routine medications including clonazepam, Seroquel, mirtazapine were empty.  She does not remember if she took those medications or not.  She denies having any suicidal or homicidal intent.  She reports ever since her   she has been drinking a lot and has a lot on her mind.  No other relieving or exacerbating factors were noted     In the ER, poison control was contacted (case #[id number]) and recommended observation for 8 to 12 hours and supportive care.  While in ER patient had dark emesis which was positive for occult blood.  Patient also noted to be withdrawing. Patient admitted started on PPI therapy and CIWA protocol.     Interval Problem Update  7/3:  Patient tachycardic, CIWA 11. Change pepcid to omeprazole BID, trend H&H Q8H. No recurrence of coffee grounds, vitals stable. If recurrence or drop in H&H will consult GI. Patient very anxious, home meds were held for possible ingestion patient very concerned about her gabapentin will restart at the lower dose and monitor. Atarax prn anxiety.   :  Tearful, stating she wants to die.  She sees psychiatrist Dr. Rao regularly.  She is fearful that if he finds that she has overdosed he will stop giving her Klonopin.Ms. Aguilar was here with a confirmed overdose of T40.7 - Cannabis and T42 - Antiepileptic, sedative-hypnotic, or anti-Parkinsonism drugs; she has no other known overdoses.  I have placed a consult for psychiatry and restarted home celexa.  CIWA remains elevated at 4,4 and 10.  dc'd NS 83/hr since tolerating regular diet.       I have discussed this  patient's plan of care and discharge plan at IDT rounds today with Case Management, Nursing, Nursing leadership, and other members of the IDT team.    Consultants/Specialty  Psychiatry consulted 7/4    Code Status  DNAR/DNI    Disposition  Patient is not medically cleared for discharge.   Anticipate discharge to to home with close outpatient follow-up.  I have placed the appropriate orders for post-discharge needs.    Review of Systems  Review of Systems   Constitutional: Negative for chills and fever.   Respiratory: Negative for shortness of breath.    Cardiovascular: Negative for chest pain.   Gastrointestinal: Negative for abdominal pain, blood in stool, melena, nausea and vomiting.   Genitourinary: Negative for dysuria.   Musculoskeletal: Negative for myalgias.   Skin: Negative for rash.   Neurological: Positive for tremors. Negative for dizziness and headaches.   Psychiatric/Behavioral: Positive for substance abuse. Negative for depression. The patient is nervous/anxious.    All other systems reviewed and are negative.       Physical Exam  Temp:  [36.2 °C (97.1 °F)-36.8 °C (98.2 °F)] 36.3 °C (97.3 °F)  Pulse:  [] 73  Resp:  [16-18] 18  BP: (109-140)/() 136/73  SpO2:  [93 %-99 %] 93 %    Physical Exam  Vitals and nursing note reviewed.   Constitutional:       General: She is not in acute distress.     Appearance: She is ill-appearing.   HENT:      Head: Normocephalic and atraumatic.   Eyes:      General: No scleral icterus.     Conjunctiva/sclera: Conjunctivae normal.   Cardiovascular:      Rate and Rhythm: Regular rhythm. Tachycardia present.      Pulses: Normal pulses.   Pulmonary:      Effort: Pulmonary effort is normal. No respiratory distress.      Breath sounds: No wheezing.   Abdominal:      General: Abdomen is flat. There is no distension.      Palpations: Abdomen is soft.      Tenderness: There is no abdominal tenderness.   Musculoskeletal:         General: No swelling. Normal range of  motion.      Cervical back: Normal range of motion and neck supple.   Skin:     General: Skin is warm and dry.      Findings: No rash.   Neurological:      General: No focal deficit present.      Mental Status: She is alert and oriented to person, place, and time.      Cranial Nerves: No cranial nerve deficit.      Motor: Tremor present.   Psychiatric:         Mood and Affect: Mood is anxious.         Behavior: Behavior is agitated.         Thought Content: Thought content normal.         Fluids    Intake/Output Summary (Last 24 hours) at 7/4/2022 1620  Last data filed at 7/4/2022 1149  Gross per 24 hour   Intake 1135 ml   Output --   Net 1135 ml       Laboratory  Recent Labs     07/02/22 2045 07/03/22  0901 07/03/22  1754 07/04/22  0118   WBC 3.2*  --   --  4.5*   RBC 4.95  --   --  4.57   HEMOGLOBIN 16.7* 16.9* 16.0 15.6   HEMATOCRIT 48.2* 52.8* 47.5* 45.9   MCV 97.4  --   --  100.4*   MCH 33.7*  --   --  34.1*   MCHC 34.6  --   --  34.0   RDW 54.3*  --   --  54.4*   PLATELETCT 194  --   --  158*   MPV 9.3  --   --  9.8     Recent Labs     07/02/22 2045 07/04/22  0118   SODIUM 143 139   POTASSIUM 4.0 3.6   CHLORIDE 100 100   CO2 17* 30   GLUCOSE 111* 78   BUN 15 10   CREATININE 0.43* 0.51   CALCIUM 8.4* 8.5                   Imaging  No orders to display        Assessment/Plan  * Drug overdose, accidental or unintentional, initial encounter- (present on admission)  Assessment & Plan  Home  Psychiatric medication and THC ingestion, alcohol   Poison control has been contacted case #[id number]  Supportive care  Telemetry monitoring  -restarted gabapentin at low dose   IV fluids  7/4:  Patient stating she just wants to die.  Given her medication overdose of klonopin, this warrants a psychiatric consultation.  I have consulted inpatient psychiatry.  Restarted celexa home medication.  She is tearful and anxious.    ACP (advance care planning)- (present on admission)  Assessment & Plan  Discussed with patient regarding  goals of care patient wishes to be DNR/DNI with no aggressive measures.  Time spent 11 minutes.    Alcoholic gastritis with hemorrhage  Assessment & Plan  Secondary to alcohol use  Start omeprazole twice daily  Trend H&H  Stable Hg.  Monitor    Alcohol withdrawal (HCC)- (present on admission)  Assessment & Plan  CIWA protocol  IV fluids  Thiamine folic acid  Multivitamin supplementation  Monitor for DTs       VTE prophylaxis: SCDs/TEDs

## 2022-07-05 LAB
ERYTHROCYTE [DISTWIDTH] IN BLOOD BY AUTOMATED COUNT: 53.2 FL (ref 35.9–50)
HCT VFR BLD AUTO: 44.1 % (ref 37–47)
HGB BLD-MCNC: 14.8 G/DL (ref 12–16)
MCH RBC QN AUTO: 33.7 PG (ref 27–33)
MCHC RBC AUTO-ENTMCNC: 33.6 G/DL (ref 33.6–35)
MCV RBC AUTO: 100.5 FL (ref 81.4–97.8)
PLATELET # BLD AUTO: 134 K/UL (ref 164–446)
PMV BLD AUTO: 9.2 FL (ref 9–12.9)
RBC # BLD AUTO: 4.39 M/UL (ref 4.2–5.4)
WBC # BLD AUTO: 4 K/UL (ref 4.8–10.8)

## 2022-07-05 PROCEDURE — A9270 NON-COVERED ITEM OR SERVICE: HCPCS | Performed by: EMERGENCY MEDICINE

## 2022-07-05 PROCEDURE — 85027 COMPLETE CBC AUTOMATED: CPT

## 2022-07-05 PROCEDURE — 700102 HCHG RX REV CODE 250 W/ 637 OVERRIDE(OP): Performed by: EMERGENCY MEDICINE

## 2022-07-05 PROCEDURE — 36415 COLL VENOUS BLD VENIPUNCTURE: CPT

## 2022-07-05 PROCEDURE — 700102 HCHG RX REV CODE 250 W/ 637 OVERRIDE(OP): Performed by: INTERNAL MEDICINE

## 2022-07-05 PROCEDURE — 770020 HCHG ROOM/CARE - TELE (206)

## 2022-07-05 PROCEDURE — 700111 HCHG RX REV CODE 636 W/ 250 OVERRIDE (IP): Performed by: EMERGENCY MEDICINE

## 2022-07-05 PROCEDURE — A9270 NON-COVERED ITEM OR SERVICE: HCPCS | Performed by: HOSPITALIST

## 2022-07-05 PROCEDURE — A9270 NON-COVERED ITEM OR SERVICE: HCPCS | Performed by: STUDENT IN AN ORGANIZED HEALTH CARE EDUCATION/TRAINING PROGRAM

## 2022-07-05 PROCEDURE — A9270 NON-COVERED ITEM OR SERVICE: HCPCS | Performed by: PSYCHIATRY & NEUROLOGY

## 2022-07-05 PROCEDURE — A9270 NON-COVERED ITEM OR SERVICE: HCPCS | Performed by: INTERNAL MEDICINE

## 2022-07-05 PROCEDURE — 700102 HCHG RX REV CODE 250 W/ 637 OVERRIDE(OP): Performed by: PSYCHIATRY & NEUROLOGY

## 2022-07-05 PROCEDURE — 99233 SBSQ HOSP IP/OBS HIGH 50: CPT | Performed by: STUDENT IN AN ORGANIZED HEALTH CARE EDUCATION/TRAINING PROGRAM

## 2022-07-05 PROCEDURE — 700102 HCHG RX REV CODE 250 W/ 637 OVERRIDE(OP): Performed by: STUDENT IN AN ORGANIZED HEALTH CARE EDUCATION/TRAINING PROGRAM

## 2022-07-05 PROCEDURE — 700102 HCHG RX REV CODE 250 W/ 637 OVERRIDE(OP): Performed by: HOSPITALIST

## 2022-07-05 PROCEDURE — 99222 1ST HOSP IP/OBS MODERATE 55: CPT | Mod: GC | Performed by: PSYCHIATRY & NEUROLOGY

## 2022-07-05 RX ORDER — PRAZOSIN HYDROCHLORIDE 1 MG/1
1 CAPSULE ORAL NIGHTLY
Status: DISCONTINUED | OUTPATIENT
Start: 2022-07-05 | End: 2022-07-05

## 2022-07-05 RX ORDER — QUETIAPINE FUMARATE 100 MG/1
200 TABLET, FILM COATED ORAL
Status: DISCONTINUED | OUTPATIENT
Start: 2022-07-05 | End: 2022-07-05

## 2022-07-05 RX ORDER — NALTREXONE HYDROCHLORIDE 50 MG/1
50 TABLET, FILM COATED ORAL DAILY
Status: DISCONTINUED | OUTPATIENT
Start: 2022-07-05 | End: 2022-07-06 | Stop reason: HOSPADM

## 2022-07-05 RX ADMIN — Medication 100 MG: at 05:49

## 2022-07-05 RX ADMIN — LORAZEPAM 3 MG: 2 TABLET ORAL at 06:44

## 2022-07-05 RX ADMIN — OMEPRAZOLE 20 MG: 20 CAPSULE, DELAYED RELEASE ORAL at 05:49

## 2022-07-05 RX ADMIN — OMEPRAZOLE 20 MG: 20 CAPSULE, DELAYED RELEASE ORAL at 17:31

## 2022-07-05 RX ADMIN — LORAZEPAM 3 MG: 2 TABLET ORAL at 14:00

## 2022-07-05 RX ADMIN — LORAZEPAM 2 MG: 2 TABLET ORAL at 12:03

## 2022-07-05 RX ADMIN — GABAPENTIN 300 MG: 300 CAPSULE ORAL at 09:22

## 2022-07-05 RX ADMIN — LORAZEPAM 1 MG: 1 TABLET ORAL at 02:59

## 2022-07-05 RX ADMIN — NALTREXONE HYDROCHLORIDE 50 MG: 50 TABLET, FILM COATED ORAL at 13:08

## 2022-07-05 RX ADMIN — VENLAFAXINE HYDROCHLORIDE 150 MG: 75 CAPSULE, EXTENDED RELEASE ORAL at 05:49

## 2022-07-05 RX ADMIN — GABAPENTIN 300 MG: 300 CAPSULE ORAL at 15:43

## 2022-07-05 RX ADMIN — LORAZEPAM 2 MG: 2 TABLET ORAL at 18:35

## 2022-07-05 RX ADMIN — GABAPENTIN 300 MG: 300 CAPSULE ORAL at 20:27

## 2022-07-05 RX ADMIN — LORAZEPAM 2 MG: 2 TABLET ORAL at 01:10

## 2022-07-05 RX ADMIN — FOLIC ACID 1 MG: 1 TABLET ORAL at 05:50

## 2022-07-05 RX ADMIN — DIAZEPAM 2.5 MG: 5 INJECTION, SOLUTION INTRAMUSCULAR; INTRAVENOUS at 20:27

## 2022-07-05 ASSESSMENT — LIFESTYLE VARIABLES
AGITATION: NORMAL ACTIVITY
PAROXYSMAL SWEATS: *
ORIENTATION AND CLOUDING OF SENSORIUM: ORIENTED AND CAN DO SERIAL ADDITIONS
NAUSEA AND VOMITING: NO NAUSEA AND NO VOMITING
AUDITORY DISTURBANCES: NOT PRESENT
VISUAL DISTURBANCES: NOT PRESENT
TREMOR: *
SUBSTANCE_ABUSE: 1
ORIENTATION AND CLOUDING OF SENSORIUM: ORIENTED AND CAN DO SERIAL ADDITIONS
AUDITORY DISTURBANCES: NOT PRESENT
PAROXYSMAL SWEATS: *
HEADACHE, FULLNESS IN HEAD: VERY MILD
ANXIETY: NO ANXIETY (AT EASE)
AGITATION: SOMEWHAT MORE THAN NORMAL ACTIVITY
VISUAL DISTURBANCES: NOT PRESENT
HEADACHE, FULLNESS IN HEAD: MODERATELY SEVERE
TOTAL SCORE: 2
AUDITORY DISTURBANCES: NOT PRESENT
AGITATION: NORMAL ACTIVITY
AUDITORY DISTURBANCES: NOT PRESENT
VISUAL DISTURBANCES: NOT PRESENT
TOTAL SCORE: 13
ORIENTATION AND CLOUDING OF SENSORIUM: ORIENTED AND CAN DO SERIAL ADDITIONS
VISUAL DISTURBANCES: NOT PRESENT
NAUSEA AND VOMITING: MILD NAUSEA WITH NO VOMITING
TREMOR: TREMOR NOT VISIBLE BUT CAN BE FELT, FINGERTIP TO FINGERTIP
AGITATION: NORMAL ACTIVITY
PAROXYSMAL SWEATS: BARELY PERCEPTIBLE SWEATING. PALMS MOIST
TREMOR: *
PAROXYSMAL SWEATS: NO SWEAT VISIBLE
TREMOR: *
TOTAL SCORE: 12
ORIENTATION AND CLOUDING OF SENSORIUM: ORIENTED AND CAN DO SERIAL ADDITIONS
ORIENTATION AND CLOUDING OF SENSORIUM: ORIENTED AND CAN DO SERIAL ADDITIONS
TOTAL SCORE: 2
TOTAL SCORE: 19
AGITATION: SOMEWHAT MORE THAN NORMAL ACTIVITY
ANXIETY: *
TREMOR: TREMOR NOT VISIBLE BUT CAN BE FELT, FINGERTIP TO FINGERTIP
TOTAL SCORE: 7
AUDITORY DISTURBANCES: NOT PRESENT
AUDITORY DISTURBANCES: NOT PRESENT
HEADACHE, FULLNESS IN HEAD: VERY MILD
HEADACHE, FULLNESS IN HEAD: NOT PRESENT
NAUSEA AND VOMITING: MILD NAUSEA WITH NO VOMITING
AUDITORY DISTURBANCES: NOT PRESENT
ANXIETY: MILDLY ANXIOUS
VISUAL DISTURBANCES: NOT PRESENT
ORIENTATION AND CLOUDING OF SENSORIUM: ORIENTED AND CAN DO SERIAL ADDITIONS
AUDITORY DISTURBANCES: NOT PRESENT
NAUSEA AND VOMITING: NO NAUSEA AND NO VOMITING
VISUAL DISTURBANCES: NOT PRESENT
PAROXYSMAL SWEATS: BARELY PERCEPTIBLE SWEATING. PALMS MOIST
TREMOR: TREMOR NOT VISIBLE BUT CAN BE FELT, FINGERTIP TO FINGERTIP
TOTAL SCORE: 8
ANXIETY: NO ANXIETY (AT EASE)
HEADACHE, FULLNESS IN HEAD: MODERATELY SEVERE
VISUAL DISTURBANCES: NOT PRESENT
AGITATION: NORMAL ACTIVITY
PAROXYSMAL SWEATS: NO SWEAT VISIBLE
TOTAL SCORE: 7
AGITATION: SOMEWHAT MORE THAN NORMAL ACTIVITY
HEADACHE, FULLNESS IN HEAD: MILD
ORIENTATION AND CLOUDING OF SENSORIUM: ORIENTED AND CAN DO SERIAL ADDITIONS
PAROXYSMAL SWEATS: BARELY PERCEPTIBLE SWEATING. PALMS MOIST
HEADACHE, FULLNESS IN HEAD: MILD
NAUSEA AND VOMITING: NO NAUSEA AND NO VOMITING
NAUSEA AND VOMITING: NO NAUSEA AND NO VOMITING
ANXIETY: *
PAROXYSMAL SWEATS: *
TOTAL SCORE: 13
TREMOR: *
ORIENTATION AND CLOUDING OF SENSORIUM: ORIENTED AND CAN DO SERIAL ADDITIONS
NAUSEA AND VOMITING: NO NAUSEA AND NO VOMITING
PAROXYSMAL SWEATS: NO SWEAT VISIBLE
AUDITORY DISTURBANCES: NOT PRESENT
ANXIETY: *
VISUAL DISTURBANCES: NOT PRESENT
AGITATION: SOMEWHAT MORE THAN NORMAL ACTIVITY
AGITATION: *
NAUSEA AND VOMITING: MILD NAUSEA WITH NO VOMITING
ANXIETY: MODERATELY ANXIOUS OR GUARDED, SO ANXIETY IS INFERRED
HEADACHE, FULLNESS IN HEAD: NOT PRESENT
TREMOR: TREMOR NOT VISIBLE BUT CAN BE FELT, FINGERTIP TO FINGERTIP
HEADACHE, FULLNESS IN HEAD: MODERATE
TREMOR: *
VISUAL DISTURBANCES: NOT PRESENT
AUDITORY DISTURBANCES: NOT PRESENT
ANXIETY: MODERATELY ANXIOUS OR GUARDED, SO ANXIETY IS INFERRED
ORIENTATION AND CLOUDING OF SENSORIUM: ORIENTED AND CAN DO SERIAL ADDITIONS
NAUSEA AND VOMITING: MILD NAUSEA WITH NO VOMITING
HEADACHE, FULLNESS IN HEAD: MILD
NAUSEA AND VOMITING: NO NAUSEA AND NO VOMITING
ANXIETY: *
TREMOR: *
AGITATION: *
ANXIETY: *
VISUAL DISTURBANCES: NOT PRESENT
ANXIETY: MODERATELY ANXIOUS OR GUARDED, SO ANXIETY IS INFERRED
TREMOR: TREMOR NOT VISIBLE BUT CAN BE FELT, FINGERTIP TO FINGERTIP
VISUAL DISTURBANCES: NOT PRESENT
NAUSEA AND VOMITING: *
TOTAL SCORE: 1
ORIENTATION AND CLOUDING OF SENSORIUM: ORIENTED AND CAN DO SERIAL ADDITIONS
ORIENTATION AND CLOUDING OF SENSORIUM: ORIENTED AND CAN DO SERIAL ADDITIONS
HEADACHE, FULLNESS IN HEAD: MILD
PAROXYSMAL SWEATS: NO SWEAT VISIBLE
AUDITORY DISTURBANCES: NOT PRESENT
AGITATION: *
PAROXYSMAL SWEATS: NO SWEAT VISIBLE
TOTAL SCORE: 17

## 2022-07-05 ASSESSMENT — ENCOUNTER SYMPTOMS
TREMORS: 0
DEPRESSION: 0
PALPITATIONS: 0
MYALGIAS: 0
ABDOMINAL PAIN: 0
SHORTNESS OF BREATH: 0
DIARRHEA: 0
FEVER: 0
CHILLS: 0
BACK PAIN: 1
TREMORS: 1
VOMITING: 0
BLOOD IN STOOL: 0
DIZZINESS: 0
HEADACHES: 0
SINUS PAIN: 0
NAUSEA: 0
HEMOPTYSIS: 0
NAUSEA: 1
CONSTIPATION: 0
NERVOUS/ANXIOUS: 1
WEIGHT LOSS: 1
BLURRED VISION: 0

## 2022-07-05 ASSESSMENT — PAIN DESCRIPTION - PAIN TYPE
TYPE: ACUTE PAIN
TYPE: ACUTE PAIN

## 2022-07-05 NOTE — PROGRESS NOTES
"1915:  During bedside report with melissa RN, pt stated \"It's taking a lot for me not to put on those boots and walk out of here and go get super drunk\".  Also stated \"I'm tired of living like this\".  Charge RN notified.    Pt is very tearful and agitated since she has been placed on a legal hold.  Pt stating she wished she could fall asleep and never wake up.  Pt denies having an action plan for suicide.  When pt was notified that a tele sitter would be placed in the room, pt stated \"I never said I wanted to die or kill myself\"      2050:  Pt requested valium for anxiety so she coould sleep.  Pt also stated \"I don't want to die, I'm just tired of living like this\".  Pt verbalized understanding that the legal hold and tele sitter are for her safety.  Pt stated \"I should use this time in the hospital to get the help I need\".  Pt refused ativan for CIWA since she received valium and ativan earlier.    "

## 2022-07-05 NOTE — PROGRESS NOTES
Hospital Medicine Daily Progress Note    Date of Service  2022    Chief Complaint  Nathalie Aguilar is a 61 y.o. female admitted 2022 with drug overdose.     Hospital Course  61 y.o. female past medical history of alcohol abuse who presented 2022 with concerns of drug overdose.  Patient is alert, awake, oriented x4 and reports that when she woke up she noticed that her bottles of routine medications including clonazepam, Seroquel, mirtazapine were empty.  She does not remember if she took those medications or not.  She denies having any suicidal or homicidal intent.  She reports ever since her   she has been drinking a lot and has a lot on her mind.  No other relieving or exacerbating factors were noted     In the ER, poison control was contacted (case #[id number]) and recommended observation for 8 to 12 hours and supportive care.  While in ER patient had dark emesis which was positive for occult blood.  Patient also noted to be withdrawing from alcohol. Patient admitted started on PPI therapy and CIWA protocol.     Interval Problem Update  Patient seen and examined, she notes that she is very anxious, she is tremulous which she states she gets that way when she is anxious. Does have hx of ETOH withdrawal, admits to hx of seizure and intubation from withdrawal.   - CIWA high of 17 overnight, received 6-8 mg of ativan in the last 24 hours   - was placed on legal hold due to concern for SI, psychiatry consulted, appreciate recs   - continue CIWA, home meds discontinued by psych and naltrexone added   - it is unclear if her current symptoms are due to ETOH withdrawal vs serotonin syndrome her main symptoms are anxiety and tremors I suspect ETOH is the more likely cause given her hx. Continue with ativan per CIWA and monitor closely. Can consider Cyproheptadine if symptoms worsen or there is increasing concern for serotonin toxicity   - she denies SI/HI, she states she is tired of living the way  she is but denies ever stating she wished she were dead. Legal hold continued for observation, however not extended.       I have discussed this patient's plan of care and discharge plan at IDT rounds today with Case Management, Nursing, Nursing leadership, and other members of the IDT team.    Consultants/Specialty  Psychiatry consulted     Code Status  DNAR/DNI    Disposition  Patient is not medically cleared for discharge.   Anticipate discharge to home vs inpatient psych.  I have placed the appropriate orders for post-discharge needs.    Review of Systems  Review of Systems   Constitutional: Negative for chills and fever.   Respiratory: Negative for shortness of breath.    Cardiovascular: Negative for chest pain.   Gastrointestinal: Negative for abdominal pain, blood in stool, melena, nausea and vomiting.   Genitourinary: Negative for dysuria.   Musculoskeletal: Negative for myalgias.   Skin: Negative for rash.   Neurological: Positive for tremors. Negative for dizziness and headaches.   Psychiatric/Behavioral: Positive for substance abuse. Negative for depression. The patient is nervous/anxious.    All other systems reviewed and are negative.       Physical Exam  Temp:  [36 °C (96.8 °F)-37.1 °C (98.8 °F)] 36.6 °C (97.8 °F)  Pulse:  [73-85] 85  Resp:  [16-18] 18  BP: (106-136)/(56-97) 125/97  SpO2:  [90 %-94 %] 91 %    Physical Exam  Vitals and nursing note reviewed.   Constitutional:       General: She is not in acute distress.     Appearance: She is ill-appearing. She is not toxic-appearing.   HENT:      Head: Normocephalic and atraumatic.   Eyes:      General: No scleral icterus.     Conjunctiva/sclera: Conjunctivae normal.   Cardiovascular:      Rate and Rhythm: Regular rhythm. Tachycardia present.      Pulses: Normal pulses.   Pulmonary:      Effort: Pulmonary effort is normal. No respiratory distress.      Breath sounds: No wheezing.   Abdominal:      General: Abdomen is flat. There is no distension.       Palpations: Abdomen is soft.      Tenderness: There is no abdominal tenderness.   Musculoskeletal:         General: No swelling. Normal range of motion.      Cervical back: Normal range of motion and neck supple.   Skin:     General: Skin is warm and dry.      Findings: No rash.   Neurological:      General: No focal deficit present.      Mental Status: She is alert and oriented to person, place, and time.      Cranial Nerves: No cranial nerve deficit.      Motor: Tremor present.   Psychiatric:         Mood and Affect: Mood is anxious.         Behavior: Behavior is agitated.         Thought Content: Thought content normal.         Judgment: Judgment normal.         Fluids    Intake/Output Summary (Last 24 hours) at 7/5/2022 1438  Last data filed at 7/5/2022 1335  Gross per 24 hour   Intake 600 ml   Output --   Net 600 ml       Laboratory  Recent Labs     07/02/22 2045 07/03/22  0901 07/03/22  1754 07/04/22  0118 07/05/22  0102   WBC 3.2*  --   --  4.5* 4.0*   RBC 4.95  --   --  4.57 4.39   HEMOGLOBIN 16.7*   < > 16.0 15.6 14.8   HEMATOCRIT 48.2*   < > 47.5* 45.9 44.1   MCV 97.4  --   --  100.4* 100.5*   MCH 33.7*  --   --  34.1* 33.7*   MCHC 34.6  --   --  34.0 33.6   RDW 54.3*  --   --  54.4* 53.2*   PLATELETCT 194  --   --  158* 134*   MPV 9.3  --   --  9.8 9.2    < > = values in this interval not displayed.     Recent Labs     07/02/22 2045 07/04/22  0118   SODIUM 143 139   POTASSIUM 4.0 3.6   CHLORIDE 100 100   CO2 17* 30   GLUCOSE 111* 78   BUN 15 10   CREATININE 0.43* 0.51   CALCIUM 8.4* 8.5                   Imaging  No orders to display        Assessment/Plan  * Drug overdose, accidental or unintentional, initial encounter- (present on admission)  Assessment & Plan  Home  Psychiatric medication and THC ingestion, alcohol   Poison control has been contacted case #[id number]  Supportive care, continue Telemetry monitoring  -restarted gabapentin at low dose   - on legal hold, psych following, appreciate  recommendations   Holding home medications   Monitor closely for serotonin syndrome     Alcohol withdrawal (HCC)- (present on admission)  Assessment & Plan  Hx of ETOH use, hx of withdrawal   Suspect symptoms are due to withdrawal from ETOH   Continue CIWA protocol  Thiamine folic acid  Multivitamin supplementation  Monitor electrolytes and replace as needed    ACP (advance care planning)- (present on admission)  Assessment & Plan  Discussed with patient regarding goals of care patient wishes to be DNR/DNI with no aggressive measures.  Time spent 11 minutes.    Alcoholic gastritis with hemorrhage  Assessment & Plan  Secondary to alcohol use  Start omeprazole twice daily  Trend H&H, stable   No further signs of bleeding   Diet as tolerated   Will need outpatient follow up with GI   Monitor    Contraindication to deep vein thrombosis (DVT) prophylaxis- (present on admission)  Assessment & Plan  hematemesis during admission, likely due to alcoholic gastritis   On PPI, Hg stable   Hold chemical ppx        VTE prophylaxis: SCDs/TEDs
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
## 2022-07-05 NOTE — CARE PLAN
Problem: Optimal Care for Alcohol Withdrawal  Goal: Optimal Care for the alcohol withdrawal patient  Outcome: Progressing     Problem: Seizure Precautions  Goal: Implementation of seizure precautions  Outcome: Progressing     Problem: P sychosocial  Goal: Patient's level of anxiety will decrease  Outcome: Progressing     Problem: Provide Safe Environment  Goal: Suicide environmental safety, protocols, policies, and practices will be implemented  Outcome: Progressing   The patient is Stable - Low risk of patient condition declining or worsening    Shift Goals  Clinical Goals: safety, CIWA  Patient Goals: go home  Family Goals: n/a    Progress made toward(s) clinical / shift goals:  Patient free from falls during shift. Patient anxiety continues. CIWA continued.

## 2022-07-05 NOTE — CONSULTS
Brief psychiatric note: Full note to follow    Assessment: Patient has a history of MDD, borderline personality disorder, PTSD, questionable Polar disorder, alcohol use disorder severe, history of 2 previous suicide attempt, currently connected with psychiatric treatment, compliant with psychotropic meds, who was brought in by EMS activated by self with concerns of overdosing on home medications including Effexor, mirtazapine and prazosin.  Patient overdosed on approximately 1 to 2 weeks of supply of medications, but denies taking Seroquel.  Patient endorses ongoing depression and alcohol use for the past 10 years, worsened recently due to some family stressors related to her daughter.  Also there is an upcoming death anniversary from her ex- and best friend.  Patient confirms that she has not been feeling suicidal this past month, and denied attempted suicide. She does not recall overdosing her medications.  She continues to deny any active or passive SI.  She is future oriented and is identifying goals, including achieving sobriety.  She is also identifying protective factors including her daughter and grandchildren.  I will continue legal hold for further longitudinal observation of mood and behavior for safe discharge.  Family will be contacted and the safety plan will be completed.       Dx:  Dysthymia with episodes of MDD, moderate, without psychotic features  Alcohol use disorder, severe  Substance-induced mood disorder  History of borderline personality disorder  History of PTSD  Rule out mood disorder due to other medical condition (TBI)    Medical:  History of TBI  Alcohol withdrawal  Alcoholic gastritis with hemorrhage    Plan:  1- Legal hold: Will not extend or discontinue at this time.  Patient is on a 72-hour hold  2- Psychotropic medications: Do not restart home medications (I discontinue prazosin, Seroquel and Effexor).  Monitor for serotonin syndrome and NMS  Start naltrexone 50 mg p.o. daily  for alcohol craving  3- Please transfer pt to inpatient psychiatric hospital when a bed is available  4- Brief supportive psychotherapy provided   5- Psychiatry will follow up    Thank you for the consult.     Sitter: teleSitter  Phone: Yes   Visitors: Yes  Personal belongings: Yes

## 2022-07-05 NOTE — CARE PLAN
The patient is Watcher - Medium risk of patient condition declining or worsening    Shift Goals  Clinical Goals: safety, anxiety control, CIWA symptom managment  Patient Goals: go home  Family Goals: n/a    Progress made toward(s) clinical / shift goals:    Problem: Knowledge Deficit - Standard  Goal: Patient and family/care givers will demonstrate understanding of plan of care, disease process/condition, diagnostic tests and medications  Outcome: Progressing  Note: Pt aware of current legal hold status d/t suicidal thoughts.  Pt upset about legal status, but was able to verbalize understanding that it is for her safety.     Problem: Optimal Care for Alcohol Withdrawal  Goal: Optimal Care for the alcohol withdrawal patient  Outcome: Progressing  Note: Pt being monitored for ETOH withdrawal with CIWA and being medicated per CIWA score     Problem: Provide Safe Environment  Goal: Suicide environmental safety, protocols, policies, and practices will be implemented  Outcome: Progressing  Flowsheets (Taken 7/5/2022 0219)  Safety Interventions:   Patient Wearing Hospital Clothing   Plastic Utensils / Paper Ware Ordered   Provided Safety Education   Discussed no self harm or elopement and safe behavior with patient  Note: Tele sitter in room

## 2022-07-06 VITALS
WEIGHT: 121.47 LBS | OXYGEN SATURATION: 91 % | TEMPERATURE: 97.5 F | SYSTOLIC BLOOD PRESSURE: 117 MMHG | HEART RATE: 98 BPM | DIASTOLIC BLOOD PRESSURE: 84 MMHG | HEIGHT: 67 IN | RESPIRATION RATE: 18 BRPM | BODY MASS INDEX: 19.07 KG/M2

## 2022-07-06 LAB
ALBUMIN SERPL BCP-MCNC: 4.2 G/DL (ref 3.2–4.9)
ALP SERPL-CCNC: 115 U/L (ref 30–99)
ALT SERPL-CCNC: 19 U/L (ref 2–50)
ANION GAP SERPL CALC-SCNC: 13 MMOL/L (ref 7–16)
AST SERPL-CCNC: 22 U/L (ref 12–45)
BILIRUB CONJ SERPL-MCNC: 0.2 MG/DL (ref 0.1–0.5)
BILIRUB INDIRECT SERPL-MCNC: 0.8 MG/DL (ref 0–1)
BILIRUB SERPL-MCNC: 1 MG/DL (ref 0.1–1.5)
BUN SERPL-MCNC: 8 MG/DL (ref 8–22)
CALCIUM SERPL-MCNC: 9.4 MG/DL (ref 8.5–10.5)
CHLORIDE SERPL-SCNC: 98 MMOL/L (ref 96–112)
CO2 SERPL-SCNC: 25 MMOL/L (ref 20–33)
CREAT SERPL-MCNC: 0.38 MG/DL (ref 0.5–1.4)
ERYTHROCYTE [DISTWIDTH] IN BLOOD BY AUTOMATED COUNT: 50.5 FL (ref 35.9–50)
GFR SERPLBLD CREATININE-BSD FMLA CKD-EPI: 114 ML/MIN/1.73 M 2
GLUCOSE SERPL-MCNC: 120 MG/DL (ref 65–99)
HCT VFR BLD AUTO: 47.6 % (ref 37–47)
HGB BLD-MCNC: 16.4 G/DL (ref 12–16)
MAGNESIUM SERPL-MCNC: 1.5 MG/DL (ref 1.5–2.5)
MCH RBC QN AUTO: 33.7 PG (ref 27–33)
MCHC RBC AUTO-ENTMCNC: 34.5 G/DL (ref 33.6–35)
MCV RBC AUTO: 97.9 FL (ref 81.4–97.8)
PLATELET # BLD AUTO: 154 K/UL (ref 164–446)
PMV BLD AUTO: 9.4 FL (ref 9–12.9)
POTASSIUM SERPL-SCNC: 3.4 MMOL/L (ref 3.6–5.5)
PROT SERPL-MCNC: 6.4 G/DL (ref 6–8.2)
RBC # BLD AUTO: 4.86 M/UL (ref 4.2–5.4)
SODIUM SERPL-SCNC: 136 MMOL/L (ref 135–145)
VIT B12 SERPL-MCNC: 468 PG/ML (ref 211–911)
WBC # BLD AUTO: 3.8 K/UL (ref 4.8–10.8)

## 2022-07-06 PROCEDURE — 85027 COMPLETE CBC AUTOMATED: CPT

## 2022-07-06 PROCEDURE — 80048 BASIC METABOLIC PNL TOTAL CA: CPT

## 2022-07-06 PROCEDURE — 700102 HCHG RX REV CODE 250 W/ 637 OVERRIDE(OP): Performed by: STUDENT IN AN ORGANIZED HEALTH CARE EDUCATION/TRAINING PROGRAM

## 2022-07-06 PROCEDURE — RXMED WILLOW AMBULATORY MEDICATION CHARGE: Performed by: STUDENT IN AN ORGANIZED HEALTH CARE EDUCATION/TRAINING PROGRAM

## 2022-07-06 PROCEDURE — 82607 VITAMIN B-12: CPT

## 2022-07-06 PROCEDURE — 36415 COLL VENOUS BLD VENIPUNCTURE: CPT

## 2022-07-06 PROCEDURE — 83735 ASSAY OF MAGNESIUM: CPT

## 2022-07-06 PROCEDURE — 700102 HCHG RX REV CODE 250 W/ 637 OVERRIDE(OP): Performed by: INTERNAL MEDICINE

## 2022-07-06 PROCEDURE — A9270 NON-COVERED ITEM OR SERVICE: HCPCS | Performed by: INTERNAL MEDICINE

## 2022-07-06 PROCEDURE — A9270 NON-COVERED ITEM OR SERVICE: HCPCS | Performed by: PSYCHIATRY & NEUROLOGY

## 2022-07-06 PROCEDURE — 700102 HCHG RX REV CODE 250 W/ 637 OVERRIDE(OP): Performed by: PSYCHIATRY & NEUROLOGY

## 2022-07-06 PROCEDURE — A9270 NON-COVERED ITEM OR SERVICE: HCPCS | Performed by: EMERGENCY MEDICINE

## 2022-07-06 PROCEDURE — A9270 NON-COVERED ITEM OR SERVICE: HCPCS | Performed by: STUDENT IN AN ORGANIZED HEALTH CARE EDUCATION/TRAINING PROGRAM

## 2022-07-06 PROCEDURE — 99239 HOSP IP/OBS DSCHRG MGMT >30: CPT | Performed by: STUDENT IN AN ORGANIZED HEALTH CARE EDUCATION/TRAINING PROGRAM

## 2022-07-06 PROCEDURE — 700102 HCHG RX REV CODE 250 W/ 637 OVERRIDE(OP): Performed by: EMERGENCY MEDICINE

## 2022-07-06 PROCEDURE — 99232 SBSQ HOSP IP/OBS MODERATE 35: CPT | Mod: GC | Performed by: PSYCHIATRY & NEUROLOGY

## 2022-07-06 PROCEDURE — 80076 HEPATIC FUNCTION PANEL: CPT

## 2022-07-06 RX ORDER — CLONAZEPAM 1 MG/1
1 TABLET ORAL 3 TIMES DAILY
Qty: 9 TABLET | Refills: 0 | Status: SHIPPED | OUTPATIENT
Start: 2022-07-06 | End: 2022-07-09

## 2022-07-06 RX ORDER — OMEPRAZOLE 20 MG/1
20 CAPSULE, DELAYED RELEASE ORAL 2 TIMES DAILY
Qty: 28 CAPSULE | Refills: 0 | Status: SHIPPED | OUTPATIENT
Start: 2022-07-07 | End: 2022-07-22

## 2022-07-06 RX ORDER — NALTREXONE HYDROCHLORIDE 50 MG/1
50 TABLET, FILM COATED ORAL DAILY
Qty: 30 TABLET | Refills: 0 | Status: SHIPPED | OUTPATIENT
Start: 2022-07-07 | End: 2022-07-29

## 2022-07-06 RX ORDER — GABAPENTIN 300 MG/1
300 CAPSULE ORAL 3 TIMES DAILY
Qty: 21 CAPSULE | Refills: 0 | Status: SHIPPED | OUTPATIENT
Start: 2022-07-06 | End: 2022-07-13

## 2022-07-06 RX ORDER — LORAZEPAM 1 MG/1
0.5 TABLET ORAL
Status: DISCONTINUED | OUTPATIENT
Start: 2022-07-06 | End: 2022-07-06

## 2022-07-06 RX ORDER — LANOLIN ALCOHOL/MO/W.PET/CERES
100 CREAM (GRAM) TOPICAL DAILY
Qty: 30 TABLET | Refills: 0 | Status: SHIPPED | OUTPATIENT
Start: 2022-07-07

## 2022-07-06 RX ORDER — LORAZEPAM 1 MG/1
0.5 TABLET ORAL
Status: DISCONTINUED | OUTPATIENT
Start: 2022-07-06 | End: 2022-07-06 | Stop reason: HOSPADM

## 2022-07-06 RX ADMIN — GABAPENTIN 300 MG: 300 CAPSULE ORAL at 08:00

## 2022-07-06 RX ADMIN — LORAZEPAM 1 MG: 1 TABLET ORAL at 04:28

## 2022-07-06 RX ADMIN — GABAPENTIN 300 MG: 300 CAPSULE ORAL at 15:57

## 2022-07-06 RX ADMIN — LORAZEPAM 2 MG: 2 TABLET ORAL at 07:59

## 2022-07-06 RX ADMIN — FOLIC ACID 1 MG: 1 TABLET ORAL at 04:28

## 2022-07-06 RX ADMIN — LORAZEPAM 0.5 MG: 1 TABLET ORAL at 17:08

## 2022-07-06 RX ADMIN — LORAZEPAM 2 MG: 2 TABLET ORAL at 11:48

## 2022-07-06 RX ADMIN — OMEPRAZOLE 20 MG: 20 CAPSULE, DELAYED RELEASE ORAL at 06:00

## 2022-07-06 RX ADMIN — NALTREXONE HYDROCHLORIDE 50 MG: 50 TABLET, FILM COATED ORAL at 04:27

## 2022-07-06 RX ADMIN — Medication 100 MG: at 04:28

## 2022-07-06 RX ADMIN — OMEPRAZOLE 20 MG: 20 CAPSULE, DELAYED RELEASE ORAL at 17:08

## 2022-07-06 RX ADMIN — HYDROXYZINE HYDROCHLORIDE 25 MG: 25 TABLET, FILM COATED ORAL at 15:57

## 2022-07-06 ASSESSMENT — ENCOUNTER SYMPTOMS
NAUSEA: 0
DIARRHEA: 0
PALPITATIONS: 0
TREMORS: 0
BLURRED VISION: 0
FEVER: 0
CONSTIPATION: 0
HEMOPTYSIS: 0
HEADACHES: 0
VOMITING: 0
BLOOD IN STOOL: 0
CHILLS: 0
SHORTNESS OF BREATH: 0

## 2022-07-06 ASSESSMENT — LIFESTYLE VARIABLES
ANXIETY: MODERATELY ANXIOUS OR GUARDED, SO ANXIETY IS INFERRED
VISUAL DISTURBANCES: NOT PRESENT
HEADACHE, FULLNESS IN HEAD: NOT PRESENT
ORIENTATION AND CLOUDING OF SENSORIUM: ORIENTED AND CAN DO SERIAL ADDITIONS
HEADACHE, FULLNESS IN HEAD: NOT PRESENT
HEADACHE, FULLNESS IN HEAD: NOT PRESENT
PAROXYSMAL SWEATS: BARELY PERCEPTIBLE SWEATING. PALMS MOIST
AUDITORY DISTURBANCES: NOT PRESENT
NAUSEA AND VOMITING: NO NAUSEA AND NO VOMITING
ANXIETY: NO ANXIETY (AT EASE)
ANXIETY: MODERATELY ANXIOUS OR GUARDED, SO ANXIETY IS INFERRED
TREMOR: TREMOR NOT VISIBLE BUT CAN BE FELT, FINGERTIP TO FINGERTIP
ANXIETY: NO ANXIETY (AT EASE)
AUDITORY DISTURBANCES: NOT PRESENT
ORIENTATION AND CLOUDING OF SENSORIUM: ORIENTED AND CAN DO SERIAL ADDITIONS
PAROXYSMAL SWEATS: NO SWEAT VISIBLE
PAROXYSMAL SWEATS: BARELY PERCEPTIBLE SWEATING. PALMS MOIST
AGITATION: SOMEWHAT MORE THAN NORMAL ACTIVITY
HEADACHE, FULLNESS IN HEAD: NOT PRESENT
VISUAL DISTURBANCES: NOT PRESENT
AUDITORY DISTURBANCES: NOT PRESENT
TOTAL SCORE: 1
NAUSEA AND VOMITING: NO NAUSEA AND NO VOMITING
ANXIETY: NO ANXIETY (AT EASE)
TREMOR: NO TREMOR
ORIENTATION AND CLOUDING OF SENSORIUM: ORIENTED AND CAN DO SERIAL ADDITIONS
TOTAL SCORE: 0
VISUAL DISTURBANCES: NOT PRESENT
VISUAL DISTURBANCES: NOT PRESENT
AGITATION: NORMAL ACTIVITY
NAUSEA AND VOMITING: NO NAUSEA AND NO VOMITING
HEADACHE, FULLNESS IN HEAD: NOT PRESENT
TREMOR: MODERATE TREMOR WITH ARMS EXTENDED
HEADACHE, FULLNESS IN HEAD: NOT PRESENT
ORIENTATION AND CLOUDING OF SENSORIUM: ORIENTED AND CAN DO SERIAL ADDITIONS
ORIENTATION AND CLOUDING OF SENSORIUM: ORIENTED AND CAN DO SERIAL ADDITIONS
TOTAL SCORE: 11
NAUSEA AND VOMITING: MILD NAUSEA WITH NO VOMITING
AGITATION: NORMAL ACTIVITY
AGITATION: NORMAL ACTIVITY
NAUSEA AND VOMITING: NO NAUSEA AND NO VOMITING
AUDITORY DISTURBANCES: NOT PRESENT
VISUAL DISTURBANCES: NOT PRESENT
PAROXYSMAL SWEATS: NO SWEAT VISIBLE
TOTAL SCORE: 1
PAROXYSMAL SWEATS: BARELY PERCEPTIBLE SWEATING. PALMS MOIST
AGITATION: NORMAL ACTIVITY
AUDITORY DISTURBANCES: NOT PRESENT
NAUSEA AND VOMITING: MILD NAUSEA WITH NO VOMITING
TREMOR: TREMOR NOT VISIBLE BUT CAN BE FELT, FINGERTIP TO FINGERTIP
TREMOR: NO TREMOR
AUDITORY DISTURBANCES: NOT PRESENT
TREMOR: TREMOR NOT VISIBLE BUT CAN BE FELT, FINGERTIP TO FINGERTIP
PAROXYSMAL SWEATS: NO SWEAT VISIBLE
ANXIETY: NO ANXIETY (AT EASE)
TOTAL SCORE: 11
PAROXYSMAL SWEATS: NO SWEAT VISIBLE
AGITATION: SOMEWHAT MORE THAN NORMAL ACTIVITY
TOTAL SCORE: 0
AUDITORY DISTURBANCES: NOT PRESENT
VISUAL DISTURBANCES: NOT PRESENT
AGITATION: NORMAL ACTIVITY
VISUAL DISTURBANCES: NOT PRESENT
ORIENTATION AND CLOUDING OF SENSORIUM: ORIENTED AND CAN DO SERIAL ADDITIONS
HEADACHE, FULLNESS IN HEAD: NOT PRESENT
TOTAL SCORE: 2
ANXIETY: NO ANXIETY (AT EASE)
TREMOR: MODERATE TREMOR WITH ARMS EXTENDED
ORIENTATION AND CLOUDING OF SENSORIUM: ORIENTED AND CAN DO SERIAL ADDITIONS
NAUSEA AND VOMITING: NO NAUSEA AND NO VOMITING

## 2022-07-06 ASSESSMENT — PAIN DESCRIPTION - PAIN TYPE
TYPE: ACUTE PAIN

## 2022-07-06 NOTE — CARE PLAN
The patient is Stable - Low risk of patient condition declining or worsening    Shift Goals  Clinical Goals: safety   Patient Goals: wants to go home   Family Goals: ELLIS    Progress made toward(s) clinical / shift goals:  see above     Patient is not progressing towards the following goals:

## 2022-07-06 NOTE — CARE PLAN
Problem: Knowledge Deficit - Standard  Goal: Patient and family/care givers will demonstrate understanding of plan of care, disease process/condition, diagnostic tests and medications  Outcome: Progressing     Problem: Optimal Care for Alcohol Withdrawal  Goal: Optimal Care for the alcohol withdrawal patient  Outcome: Progressing     Problem: Seizure Precautions  Goal: Implementation of seizure precautions  Outcome: Progressing     Problem: Lifestyle Changes  Goal: Patient's ability to identify lifestyle changes and available resources to help reduce recurrence of condition will improve  Outcome: Progressing     Problem: Psychosocial  Goal: Patient's level of anxiety will decrease  Outcome: Progressing     Problem: Provide Safe Environment  Goal: Suicide environmental safety, protocols, policies, and practices will be implemented  Outcome: Progressing     Problem: Psychosocial  Goal: Patient's ability to identify and develop effective coping behaviors will improve  Outcome: Progressing  Goal: Patient's ability to identify and utilize available support systems will improve  Outcome: Progressing      Shift Goals  Clinical Goals: Neuro Intact, Safety, CIWA improvement, pain management  Patient Goals: to sleep and go home  Family Goals: ELLIS    Progress made toward(s) clinical / shift goals:  YES

## 2022-07-06 NOTE — CONSULTS
PSYCHIATRIC FOLLOW-UP:(established)  *Reason for admission: Overdose       *Legal Hold Status on Admission:    Discontinued Legal Hold 7/6/22 @16:15       Chart reviewed.         *HPI:          No acute events overnight.  Continues to require Ativan for CIWA.  Is tearful and would like to go home.  Continues to deny SI/HI/AVH.  Discussed triggers including rumination of past trauma.  Discussed coping strategies, keeping her prescriptions in a cabinet, decreasing the length of prescriptions given at a time.  Reports she will speak to her daughter in crisis and is aware of 911 and emergency room options if thoughts of SI occur. Was cooperative with moca, which was 24/30.  Eating, sleeping, using restroom without issues.  Denies further incidence of hematemesis.    Spoke with daughter 7/6/2022 to discuss safe discharge planning.  Daughter is transitioning to male, preferred name is Kendrick.  He states Nathalie can stay at his house for a few days to monitor.  Was with Nathalie the day of the event and did not notice any abnormal behavior.  States since the TBI Nathalie has been more forgetful which has made her more irritable with mild depression due to a decrease in her independence.  Reports she has mostly normal days with no discussion of SI or plan.  Denies Nathalie has never had history of suicide attempts or self-harm.  States Nathalie's full body shaking including shaking of her hands has been for at least 20 years and is increased when Nathalie is anxious.  Nathalie currently lives in a [facility] apartment alone, but Kendrick can monitor for few days.  Kendrick has no safety concerns for Nathalie returning home.    Medical ROS (as pertinent):     Review of Systems   Constitutional: Negative for chills and fever.   HENT: Negative for congestion and nosebleeds.    Eyes: Negative for blurred vision.   Respiratory: Negative for hemoptysis and shortness of breath.    Cardiovascular: Negative for chest pain and palpitations.  "  Gastrointestinal: Negative for blood in stool, constipation, diarrhea, nausea and vomiting.   Genitourinary: Negative for hematuria.   Skin: Negative for rash.   Neurological: Negative for tremors and headaches.   Psychiatric/Behavioral:        See HPI           *Psychiatric Examination:  Vitals: /83   Pulse 74   Temp 36.7 °C (98 °F) (Temporal)   Resp 18   Ht 1.702 m (5' 7\")   Wt 55.1 kg (121 lb 7.6 oz)   SpO2 93%    General Appearance: sitting at edge of bed, rocking back and forth, appears anxious  Abnormal Movements: Rocking back and forth and hand tremor bilaterally  Gait and Posture: No abnormalities with gait or posture  Speech: Regular rate and rhythm, no pressured speech, no paucity of speech  Thought processes: Logical, linear, goal oriented to discharge and sobriety  Associations: None  Abnormal or Psychotic Thoughts: Denies SI/HI/AVH  Judgement and Insight: Good/fair.  Good judgment to call 911, poor insight into sobriety with refusal of rehab.  Orientation: Alert and oriented to person time place and circumstance  Recent and Remote Memory: MoCA completed 7/6/2022: 24/30.  Mild recent and remote memory changes.  Deficits seen mostly in recall and attention  Attention Span and Concentration: Decreased recall and attention assessed through MoCA.  2/5 word recall, 1 correct serial 7  Language: English   fund of Knowledge: Grossly intact  Mood and Affect: \"Anxious, I need to leave\" appears anxious, emotionally labile, full range  SI/HI: Denies SI/HI       *PAST MEDICAL/PSYCH/FAMILY/SOCIAL(as reported by patient):       Please see original consult note on 7/5/2022      EKG:  7/2/22, borderline prolonged QTC     EEG: None     Brain Imaging:   CT head, 5/3/2022:  Stable small amount of subarachnoid hemorrhage in the anterior right frontal parafalcine region.  No interval new abnormality.       *Labs personally reviewed: Low platelets trending up, elevated WBC trending down  GFR: " "114  AST/ALT: WNL  Alk phos: 115 trending down    Assessment: (indicate if problem is stable, worsening, improving)  60yo female with PPsychHx: MDD, Alcohol use disorder, hx Anorexia w/bulimia, and pt reported hx PTSD, Borderline PD, Bipolar I disorder. PMEDHX: Stable subarachnoid hemorrhage on CT from 4/2021.  Admitted after awakening to empty prescription bottles following a blackout from alcohol.  Patient feared for her life and called EMS.  Reports recent stressors of daughter transitioning to male, changing living situation, lack of social support, and the anniversary of her  and friend's death approaching.  Reports taking 1 week supply of Remeron, 2-week supply of Klonopin, \"a few \"Effexor, and denies taking Seroquel.  This is her third suicide attempt since 13 years old, all overdose.  Denies access to firearms.  Denies stashes of medications at home.  Denies current SI with plan but endorses passive thoughts of not waking up roughly 3 times a month.  She is dysthymic but future oriented.  Agrees to starting naltrexone, attending AA, following up with psychiatry and psychotherapy, wants to get sober.  Identifies her daughter and grandchildren as protective factors and can see herself being sober in the next 6 months.     Spoke with family, no concerns with Nathalie returning home.  No history of suicide attempts or self-harm per family.  Does not believe Nathalie would hurt herself consciously if alcohol was involved.  Noted mood and personality changes since TBI 1 year ago with mildly increased depression and frustration with memory deficits.  MoCA performed 24/30.  Family states bilateral hand, arm, body shaking has occurred for over 20 years.  Increases with anxiety.     Nathalie does not meet criteria for legal hold.  Legal hold discontinued.  Safety plan completed.       Dx:  Dysthymia with episodes of MDD, moderate, without psychotic features  Alcohol use disorder, severe  Substance-induced mood " disorder  History of borderline personality disorder  History of PTSD  Rule out mood disorder due to other medical condition (TBI)  MOCA 24/30 on 7/6/22     Medical:  History of TBI  Alcohol withdrawal  Alcoholic gastritis with hemorrhage      Plan:  1- Legal hold: Discontinued   2- Psychotropic medications   -Continue naltrexone 50 mg p.o. daily for cravings   -Recommend discharge without psychotropic medications due to recent overdose.  -Counseled pt to follow up with outpatient provider Dr. Rao.     -If concern for benzodiazepine withdrawal, recommend discharge on 3 days of Klonopin  home dose to get to outpatient psychiatrist appointment.  3- Labs ordered/reviewed: B12: 468, AST/ALT: WNL already, alk phos elevated but trending down  4- Collateral obtained from daughter, no safety concerns from family  5- Safety plan completed, copy in chart  6- Pt was advised to follow up with outpatient psychiatrist: Immediately after discharge  Pt received referrals to outpatient psychiatric services in the community  Pt declined referrals for substance programs in the community.   7- Psychiatry will be signing off.     Thank you for the consult.     Note: more than 50% of the time was spent with counseling and coordination of care (we discussed in depth the importance of safety planning, MoCA eval, collateral with daughter.      This note was created using voice recognition software (Dragon). The accuracy of the dictation is limited by the abilities of the software. I have reviewed the note prior to signing. However, error related to voice recognition software and /or scribes may still exist and should be interpreted within the appropriate context.

## 2022-07-07 NOTE — DISCHARGE SUMMARY
Discharge Summary    CHIEF COMPLAINT ON ADMISSION  Chief Complaint   Patient presents with   • Drug Overdose     EMS- pt from home, called 911 after +ETOH this AM and blacked out. Pt awoke and found some of her pill bottles empty but does not remember taking them. Called 911 bc pt was concerned she did take them while intoxicated earlier today. EMS reports pt possibly took clonazepam, denies SI/HI en route. GCS 15, A&O x4       Reason for Admission  EMS     Admission Date  7/2/2022    CODE STATUS  Prior    HPI & HOSPITAL COURSE  This is a 61 y.o. female here with past medical history of alcohol abuse, major depressive disorder, history of borderline personality disorder and post traumatic stress disorder and  traumatic subdural hemorrhage in May 2022, who presented 7/2/2022 with concerns of drug overdose. Patient had reportedly been intoxicated and took all of her home mediations (clonazepam, Seroquel, mirtazapine), she realized this upon awakening and called 911 for assistance.  on arrival she was awake and alert.    In the ER, poison control was contacted (case #[id number]) and recommended observation for 8 to 12 hours and supportive care.  While in ER patient had dark emesis which was positive for occult blood.  Patient also noted to be withdrawing from alcohol. She as  admitted and started on PPI therapy and CIWA protocol. She had no further episodes of hematemesis or melena/hematochezia ad her blood levels remained stable. She was treated.during her admission there was concern for suicidal ideation and thus was placed on a legal hold. She was assessed by psychiatry and observed and ultimately the hold was discontinued. Her home medications were not resumed due to risk for serotonin syndrome. She was told to follow up with her prescribing physician and psychiatrist regarding resumption of her medications. She was given a 3 day script of Klonipin on discharge to avoid benzo withdrawal. She was extensively educated  on avoidance of alcohol. At the time of discharge she is medically stable, she denies any suicidal or homicidal ideation and is very eager and comfortable with discharge plan.      Therefore, she is discharged in fair and stable condition to home with close outpatient follow-up.    The patient met 2-midnight criteria for an inpatient stay at the time of discharge.    Discharge Date  7/6/2022    FOLLOW UP ITEMS POST DISCHARGE  Psych follow up for medication evaluation  Recommend AA/alcohol rehab   GI referral for evaluation and screening given episode of hematemsis     DISCHARGE DIAGNOSES  Principal Problem:    Drug overdose, accidental or unintentional, initial encounter POA: Yes  Active Problems:    Alcohol withdrawal (HCC) POA: Yes    Contraindication to deep vein thrombosis (DVT) prophylaxis POA: Yes    Alcoholic gastritis with hemorrhage POA: Unknown    ACP (advance care planning) POA: Yes  Resolved Problems:    * No resolved hospital problems. *      FOLLOW UP  Future Appointments   Date Time Provider Department Center   7/11/2022  1:30 PM Junie Bland M.D. [facility]     Gerardo Rao M.D.  [address] [place] [id number]  [phone]    Schedule an appointment as soon as possible for a visit  call asap for appointment      MEDICATIONS ON DISCHARGE     Medication List      START taking these medications      Instructions   naltrexone 50 MG Tabs  Commonly known as: DEPADE   Take 1 Tablet by mouth every day.  Dose: 50 mg     omeprazole 20 MG delayed-release capsule  Commonly known as: PRILOSEC   Take 1 Capsule by mouth 2 times a day for 14 days.  Dose: 20 mg     thiamine 100 MG tablet  Commonly known as: THIAMINE   Take 1 Tablet by mouth every day.  Dose: 100 mg        CHANGE how you take these medications      Instructions   gabapentin 300 MG Caps  What changed:   · how much to take  · when to take this  Commonly known as: NEURONTIN   Take 1 Capsule by mouth in the morning, at noon, and at  bedtime for 7 days.  Dose: 300 mg        CONTINUE taking these medications      Instructions   acetaminophen 325 MG Tabs  Commonly known as: Tylenol   Doctor's comments: May take over the counter as directed for pain  Take 2 Tablets by mouth every 6 hours.  Dose: 650 mg     clonazePAM 1 MG Tabs  Commonly known as: KLONOPIN   Take 1 Tablet by mouth 3 times a day for 3 days.  Dose: 1 mg        STOP taking these medications    mirtazapine 45 MG tablet  Commonly known as: REMERON     prazosin 1 MG Caps  Commonly known as: MINIPRESS     QUEtiapine 200 MG Tabs  Commonly known as: Seroquel     venlafaxine XR 75 MG Cp24  Commonly known as: EFFEXOR XR            Allergies  Allergies   Allergen Reactions   • Fish Anaphylaxis       DIET  No orders of the defined types were placed in this encounter.      ACTIVITY  As tolerated.      CONSULTATIONS  Psychiatry     PROCEDURES  NA    LABORATORY  Lab Results   Component Value Date    SODIUM 136 07/06/2022    POTASSIUM 3.4 (L) 07/06/2022    CHLORIDE 98 07/06/2022    CO2 25 07/06/2022    GLUCOSE 120 (H) 07/06/2022    BUN 8 07/06/2022    CREATININE 0.38 (L) 07/06/2022    CREATININE 0.8 02/23/2009        Lab Results   Component Value Date    WBC 3.8 (L) 07/06/2022    HEMOGLOBIN 16.4 (H) 07/06/2022    HEMATOCRIT 47.6 (H) 07/06/2022    PLATELETCT 154 (L) 07/06/2022        Total time of the discharge process exceeds 38 minutes.

## 2022-07-07 NOTE — DISCHARGE INSTRUCTIONS
Take medications as directed   You have been a 3 day supply of Klonipin, you need to follow up with your primary prescribed for refills and to discuss resumption of all your psychiatric medications   Do not consume any alcohol   If your are having thoughts of self harm, go to your nearest emergency room or call 911   Activity  Special Equipment    You are being discharged with the following special equipment:  Not Applicable        Resume Your Normal Activity    You may resume your normal activity as tolerated.  Rest as needed.

## 2022-07-08 ENCOUNTER — PHARMACY VISIT (OUTPATIENT)
Dept: PHARMACY | Facility: MEDICAL CENTER | Age: 62
End: 2022-07-08
Payer: COMMERCIAL

## 2022-07-09 ENCOUNTER — APPOINTMENT (OUTPATIENT)
Dept: RADIOLOGY | Facility: MEDICAL CENTER | Age: 62
End: 2022-07-09
Attending: EMERGENCY MEDICINE
Payer: MEDICAID

## 2022-07-09 ENCOUNTER — HOSPITAL ENCOUNTER (EMERGENCY)
Facility: MEDICAL CENTER | Age: 62
End: 2022-07-09
Attending: EMERGENCY MEDICINE
Payer: MEDICAID

## 2022-07-09 VITALS
TEMPERATURE: 98.2 F | OXYGEN SATURATION: 90 % | DIASTOLIC BLOOD PRESSURE: 78 MMHG | HEART RATE: 72 BPM | WEIGHT: 120 LBS | BODY MASS INDEX: 18.83 KG/M2 | HEIGHT: 67 IN | SYSTOLIC BLOOD PRESSURE: 117 MMHG | RESPIRATION RATE: 17 BRPM

## 2022-07-09 DIAGNOSIS — E87.6 HYPOKALEMIA: ICD-10-CM

## 2022-07-09 DIAGNOSIS — R56.9 SEIZURE (HCC): ICD-10-CM

## 2022-07-09 LAB
ALBUMIN SERPL BCP-MCNC: 4 G/DL (ref 3.2–4.9)
ALBUMIN/GLOB SERPL: 1.7 G/DL
ALP SERPL-CCNC: 94 U/L (ref 30–99)
ALT SERPL-CCNC: 25 U/L (ref 2–50)
ANION GAP SERPL CALC-SCNC: 11 MMOL/L (ref 7–16)
AST SERPL-CCNC: 31 U/L (ref 12–45)
BASOPHILS # BLD AUTO: 0.8 % (ref 0–1.8)
BASOPHILS # BLD: 0.03 K/UL (ref 0–0.12)
BILIRUB SERPL-MCNC: 0.3 MG/DL (ref 0.1–1.5)
BUN SERPL-MCNC: 12 MG/DL (ref 8–22)
CALCIUM SERPL-MCNC: 9 MG/DL (ref 8.5–10.5)
CHLORIDE SERPL-SCNC: 103 MMOL/L (ref 96–112)
CO2 SERPL-SCNC: 26 MMOL/L (ref 20–33)
CREAT SERPL-MCNC: 0.37 MG/DL (ref 0.5–1.4)
EOSINOPHIL # BLD AUTO: 0.08 K/UL (ref 0–0.51)
EOSINOPHIL NFR BLD: 2 % (ref 0–6.9)
ERYTHROCYTE [DISTWIDTH] IN BLOOD BY AUTOMATED COUNT: 50.1 FL (ref 35.9–50)
ETHANOL BLD-MCNC: <10.1 MG/DL
GFR SERPLBLD CREATININE-BSD FMLA CKD-EPI: 115 ML/MIN/1.73 M 2
GLOBULIN SER CALC-MCNC: 2.3 G/DL (ref 1.9–3.5)
GLUCOSE SERPL-MCNC: 104 MG/DL (ref 65–99)
HCT VFR BLD AUTO: 41.2 % (ref 37–47)
HGB BLD-MCNC: 14.2 G/DL (ref 12–16)
IMM GRANULOCYTES # BLD AUTO: 0.01 K/UL (ref 0–0.11)
IMM GRANULOCYTES NFR BLD AUTO: 0.3 % (ref 0–0.9)
LYMPHOCYTES # BLD AUTO: 0.77 K/UL (ref 1–4.8)
LYMPHOCYTES NFR BLD: 19.4 % (ref 22–41)
MCH RBC QN AUTO: 34.5 PG (ref 27–33)
MCHC RBC AUTO-ENTMCNC: 34.5 G/DL (ref 33.6–35)
MCV RBC AUTO: 100.2 FL (ref 81.4–97.8)
MONOCYTES # BLD AUTO: 0.38 K/UL (ref 0–0.85)
MONOCYTES NFR BLD AUTO: 9.6 % (ref 0–13.4)
NEUTROPHILS # BLD AUTO: 2.7 K/UL (ref 2–7.15)
NEUTROPHILS NFR BLD: 67.9 % (ref 44–72)
NRBC # BLD AUTO: 0 K/UL
NRBC BLD-RTO: 0 /100 WBC
PLATELET # BLD AUTO: 178 K/UL (ref 164–446)
PMV BLD AUTO: 9.8 FL (ref 9–12.9)
POTASSIUM SERPL-SCNC: 3.1 MMOL/L (ref 3.6–5.5)
PROT SERPL-MCNC: 6.3 G/DL (ref 6–8.2)
RBC # BLD AUTO: 4.11 M/UL (ref 4.2–5.4)
SODIUM SERPL-SCNC: 140 MMOL/L (ref 135–145)
WBC # BLD AUTO: 4 K/UL (ref 4.8–10.8)

## 2022-07-09 PROCEDURE — 82077 ASSAY SPEC XCP UR&BREATH IA: CPT

## 2022-07-09 PROCEDURE — 99285 EMERGENCY DEPT VISIT HI MDM: CPT

## 2022-07-09 PROCEDURE — 700111 HCHG RX REV CODE 636 W/ 250 OVERRIDE (IP): Performed by: EMERGENCY MEDICINE

## 2022-07-09 PROCEDURE — 80053 COMPREHEN METABOLIC PANEL: CPT

## 2022-07-09 PROCEDURE — 700102 HCHG RX REV CODE 250 W/ 637 OVERRIDE(OP): Performed by: EMERGENCY MEDICINE

## 2022-07-09 PROCEDURE — 85025 COMPLETE CBC W/AUTO DIFF WBC: CPT

## 2022-07-09 PROCEDURE — A9270 NON-COVERED ITEM OR SERVICE: HCPCS | Performed by: EMERGENCY MEDICINE

## 2022-07-09 PROCEDURE — 96374 THER/PROPH/DIAG INJ IV PUSH: CPT

## 2022-07-09 PROCEDURE — 36415 COLL VENOUS BLD VENIPUNCTURE: CPT

## 2022-07-09 PROCEDURE — 70450 CT HEAD/BRAIN W/O DYE: CPT

## 2022-07-09 RX ORDER — HYDROCODONE BITARTRATE AND ACETAMINOPHEN 5; 325 MG/1; MG/1
1 TABLET ORAL ONCE
Status: COMPLETED | OUTPATIENT
Start: 2022-07-09 | End: 2022-07-09

## 2022-07-09 RX ORDER — LEVETIRACETAM 500 MG/1
500 TABLET ORAL 2 TIMES DAILY
Qty: 60 TABLET | Refills: 0 | Status: SHIPPED | OUTPATIENT
Start: 2022-07-09

## 2022-07-09 RX ORDER — LEVETIRACETAM 500 MG/5ML
1000 INJECTION, SOLUTION, CONCENTRATE INTRAVENOUS ONCE
Status: COMPLETED | OUTPATIENT
Start: 2022-07-09 | End: 2022-07-09

## 2022-07-09 RX ORDER — HYDROCODONE BITARTRATE AND ACETAMINOPHEN 5; 325 MG/1; MG/1
1 TABLET ORAL ONCE
Status: DISCONTINUED | OUTPATIENT
Start: 2022-07-09 | End: 2022-07-09

## 2022-07-09 RX ORDER — CLONAZEPAM 0.5 MG/1
1 TABLET ORAL ONCE
Status: COMPLETED | OUTPATIENT
Start: 2022-07-09 | End: 2022-07-09

## 2022-07-09 RX ADMIN — CLONAZEPAM 1 MG: 0.5 TABLET ORAL at 20:54

## 2022-07-09 RX ADMIN — HYDROCODONE BITARTRATE AND ACETAMINOPHEN 1 TABLET: 5; 325 TABLET ORAL at 18:54

## 2022-07-09 RX ADMIN — LEVETIRACETAM 1000 MG: 100 INJECTION, SOLUTION INTRAVENOUS at 19:04

## 2022-07-09 ASSESSMENT — LIFESTYLE VARIABLES: DO YOU DRINK ALCOHOL: NO

## 2022-07-09 ASSESSMENT — FIBROSIS 4 INDEX: FIB4 SCORE: 1.97

## 2022-07-10 NOTE — ED PROVIDER NOTES
ED Provider Note  CHIEF COMPLAINT  Chief Complaint   Patient presents with   • Seizure       HPI  Nathalie Aguilar is a 61 y.o. female who presents to the emergency department stating that she had a witnessed seizure by family.  She states she has a history of seizures in the past and was on Keppra.  She stopped taking Keppra approximately a week ago secondary to her primary care doctor's recommendation and stop drinking alcohol.  She states she drink approximately 1/2 pint of alcohol per day.  She was at the [employer] and had a Seizure for a unknown duration.  Patient denies loss of sensation or strength in arms or legs, chest pain, back pain, Jesse pain, nausea, vomiting.  She is noticing a slight headache and states that she does have headaches after she has her seizures.    REVIEW OF SYSTEMS  Positives as above. Pertinent negatives include   loss of sensation or strength in arms or legs, visual changes, nausea, vomiting, fever  All other 10 review of systems are negative    PAST MEDICAL HISTORY  Past Medical History:   Diagnosis Date   • ASTHMA    • Cancer (HCC)     breast   • Heroin addiction (HCC)    • Pneumonia    • Psychiatric disorder        FAMILY HISTORY  Noncontributory    SOCIAL HISTORY  Social History     Socioeconomic History   • Marital status:    Tobacco Use   • Smoking status: Current Every Day Smoker     Packs/day: 0.50   • Smokeless tobacco: Never Used   Vaping Use   • Vaping Use: Never used   Substance and Sexual Activity   • Alcohol use: Yes     Comment: two weeks since   • Drug use: Yes     Comment: history of heroin use       SURGICAL HISTORY  Past Surgical History:   Procedure Laterality Date   • INCISION AND DRAINAGE ORTHOPEDIC Right 1/25/2019    Procedure: INCISION AND DRAINAGE ORTHOPEDIC - HAND;  Surgeon: Kendrick Campbell M.D.;  Location: SURGERY [facility];  Service: Orthopedics   • CHOLECYSTECTOMY     • GYN SURGERY      partial hysterectomy       CURRENT  "MEDICATIONS  Home Medications     Reviewed by Rosangela Carrera R.N. (Registered Nurse) on 07/09/22 at 1809  Med List Status: Partial   Medication Last Dose Status   acetaminophen (TYLENOL) 325 MG Tab  Active   clonazePAM (KLONOPIN) 1 MG Tab  Active   gabapentin (NEURONTIN) 300 MG Cap  Active   naltrexone (DEPADE) 50 MG Tab  Active   omeprazole (PRILOSEC) 20 MG delayed-release capsule  Active   thiamine (THIAMINE) 100 MG tablet  Active                ALLERGIES  Allergies   Allergen Reactions   • Fish Anaphylaxis       PHYSICAL EXAM  VITAL SIGNS: /78   Pulse 72   Temp 36.8 °C (98.2 °F) (Temporal)   Resp 17   Ht 1.702 m (5' 7\")   Wt 54.4 kg (120 lb)   SpO2 90%   BMI 18.79 kg/m²      Constitutional: Well developed, Well nourished, No acute distress, Non-toxic appearance.   Eyes: PERRLA, EOMI, Conjunctiva normal, No discharge.   Cardiovascular: Normal heart rate, Normal rhythm, No murmurs, No rubs, No gallops, and intact distal pulses.   Thorax & Lungs:  No respiratory distress, no rales, no rhonchi, No wheezing, No chest wall tenderness.   Abdomen: Bowel sounds normal, Soft, No tenderness, No guarding, No rebound, No pulsatile masses.   Skin: Warm, Dry, No erythema, No rash.   Extremities: Full range of motion, no deformity, no edema.  Neurologic: Alert & oriented to month and age, Normal cognition, Cranial nerves II-XII are intact, No slurred speech, Negative finger to nose bilaterally, No pronator drift bilaterally,   strength 5/5 bilaterally, Leg raise strength 5/5 bilaterally, Plantarflexion strength 5/5 bilaterally, Dorsiflexion strength 5/5 bilaterally, Deep tendon reflexes 2/4 upper and lower extremities bilaterally, Sensation intact throughout, No Nystagmus.  Psychiatric: Affect normal for clinical presentation.      LABORATORY/ECG  Results for orders placed or performed during the hospital encounter of 07/09/22   CBC WITH DIFFERENTIAL   Result Value Ref Range    WBC 4.0 (L) 4.8 - 10.8 K/uL    " RBC 4.11 (L) 4.20 - 5.40 M/uL    Hemoglobin 14.2 12.0 - 16.0 g/dL    Hematocrit 41.2 37.0 - 47.0 %    .2 (H) 81.4 - 97.8 fL    MCH 34.5 (H) 27.0 - 33.0 pg    MCHC 34.5 33.6 - 35.0 g/dL    RDW 50.1 (H) 35.9 - 50.0 fL    Platelet Count 178 164 - 446 K/uL    MPV 9.8 9.0 - 12.9 fL    Neutrophils-Polys 67.90 44.00 - 72.00 %    Lymphocytes 19.40 (L) 22.00 - 41.00 %    Monocytes 9.60 0.00 - 13.40 %    Eosinophils 2.00 0.00 - 6.90 %    Basophils 0.80 0.00 - 1.80 %    Immature Granulocytes 0.30 0.00 - 0.90 %    Nucleated RBC 0.00 /100 WBC    Neutrophils (Absolute) 2.70 2.00 - 7.15 K/uL    Lymphs (Absolute) 0.77 (L) 1.00 - 4.80 K/uL    Monos (Absolute) 0.38 0.00 - 0.85 K/uL    Eos (Absolute) 0.08 0.00 - 0.51 K/uL    Baso (Absolute) 0.03 0.00 - 0.12 K/uL    Immature Granulocytes (abs) 0.01 0.00 - 0.11 K/uL    NRBC (Absolute) 0.00 K/uL   COMP METABOLIC PANEL   Result Value Ref Range    Sodium 140 135 - 145 mmol/L    Potassium 3.1 (L) 3.6 - 5.5 mmol/L    Chloride 103 96 - 112 mmol/L    Co2 26 20 - 33 mmol/L    Anion Gap 11.0 7.0 - 16.0    Glucose 104 (H) 65 - 99 mg/dL    Bun 12 8 - 22 mg/dL    Creatinine 0.37 (L) 0.50 - 1.40 mg/dL    Calcium 9.0 8.5 - 10.5 mg/dL    AST(SGOT) 31 12 - 45 U/L    ALT(SGPT) 25 2 - 50 U/L    Alkaline Phosphatase 94 30 - 99 U/L    Total Bilirubin 0.3 0.1 - 1.5 mg/dL    Albumin 4.0 3.2 - 4.9 g/dL    Total Protein 6.3 6.0 - 8.2 g/dL    Globulin 2.3 1.9 - 3.5 g/dL    A-G Ratio 1.7 g/dL   DIAGNOSTIC ALCOHOL   Result Value Ref Range    Diagnostic Alcohol <10.1 <10.1 mg/dL   ESTIMATED GFR   Result Value Ref Range    GFR (CKD-EPI) 115 >60 mL/min/1.73 m 2         RADIOLOGY/PROCEDURES  CT-HEAD W/O   Final Result      No acute intracranial abnormality.               COURSE & MEDICAL DECISION MAKING  Pertinent Labs & Imaging  This is a 61-year-old female presents after having a seizure.  She is an alcoholic and has not drunk for quite a while has not taken her Keppra.  Here in the emergency department, she  had a severe headache and she does have a history of subarachnoid hemorrhage recently therefore CT scan of the brain was completed and fortunately is negative for acute abnormality such as intracranial hemorrhage.  In addition, the patient has no significant electrolyte abnormalities of her slightly low potassium of 3.1 and she is encouraged to eat foods that are high in potassium.  She received Norco 1 tab here in the emergency department secondary to headache.  On reevaluation, she has had no more seizures, normal blood sugar, no neurological vascular deficits.  She received Klonopin as she states that she normally takes it 3 times a day.  The patient is discharged with strict return precautions and she is instructed not to drive until she is cleared by neurology and to follow-up with neurology for further evaluation.  She did receive Keppra 1 g IV and prescription for 500 twice daily was given.  FINAL IMPRESSION     1. Seizure (HCC)    2. Hypokalemia      Electronically signed b studies reviewed. (See chart for details)      Rashel Thacker D.O., 7/9/2022 7:01 PM

## 2022-07-10 NOTE — ED NOTES
Report from Rosangela TAN, assuming care of pt. Oxygen turned down for RA saturation now that pt is mentating at baseline. Will continue to monitor for decreased saturations.

## 2022-07-10 NOTE — ED NOTES
Pt BIB EMS from the [facility].  Pt has had two seizures today.  Pt reports that she had been staying with her daughter and has been off of her Keppra for a wk and also stopped drinking a wk ago - drank a 1/2 pint of alcohol/day.  Pt c/o headache.  No injuries with sz, except pt incont with sz.  ERP to see.

## 2022-07-10 NOTE — ED NOTES
Patient discharged in stable condition per orders. IV access removed - bandage applied. Wristband removed per protocol. Patient verbalized understanding of all discharge instructions. All belongings accounted for. Wheeled to lobby by staff.

## 2022-07-11 ENCOUNTER — HOSPITAL ENCOUNTER (EMERGENCY)
Facility: MEDICAL CENTER | Age: 62
End: 2022-07-11
Payer: MEDICAID

## 2022-07-11 ENCOUNTER — OFFICE VISIT (OUTPATIENT)
Dept: INTERNAL MEDICINE | Facility: OTHER | Age: 62
End: 2022-07-11
Payer: MEDICAID

## 2022-07-11 VITALS
OXYGEN SATURATION: 97 % | TEMPERATURE: 99 F | DIASTOLIC BLOOD PRESSURE: 49 MMHG | HEIGHT: 67 IN | WEIGHT: 124 LBS | BODY MASS INDEX: 19.46 KG/M2 | SYSTOLIC BLOOD PRESSURE: 74 MMHG | HEART RATE: 87 BPM

## 2022-07-11 DIAGNOSIS — R56.9 SEIZURE (HCC): ICD-10-CM

## 2022-07-11 DIAGNOSIS — I95.9 HYPOTENSION, UNSPECIFIED HYPOTENSION TYPE: ICD-10-CM

## 2022-07-11 PROBLEM — R57.9 SHOCK (HCC): Status: RESOLVED | Noted: 2021-04-14 | Resolved: 2022-07-11

## 2022-07-11 PROCEDURE — 99214 OFFICE O/P EST MOD 30 MIN: CPT | Mod: GC | Performed by: HOSPITALIST

## 2022-07-11 ASSESSMENT — ENCOUNTER SYMPTOMS
HALLUCINATIONS: 0
PALPITATIONS: 0
WEIGHT LOSS: 1
CHILLS: 0
DIARRHEA: 0
NAUSEA: 1
DEPRESSION: 1
BLOOD IN STOOL: 1
SHORTNESS OF BREATH: 0
NERVOUS/ANXIOUS: 1
CONSTIPATION: 0
ABDOMINAL PAIN: 1
FEVER: 0
VOMITING: 1
COUGH: 0

## 2022-07-11 ASSESSMENT — LIFESTYLE VARIABLES: SUBSTANCE_ABUSE: 1

## 2022-07-11 ASSESSMENT — FIBROSIS 4 INDEX: FIB4 SCORE: 2.09

## 2022-07-11 NOTE — PATIENT INSTRUCTIONS
Thank you for allowing us to participate in your care today. A parademic service is on their way to transport you to the hospital for your low blood pressure, dizziness, and lightheadedness. Please follow-up with our office after discharge from the hospital.

## 2022-07-11 NOTE — PROGRESS NOTES
Teaching Physician Attestation      Level of Participation    I have personally interviewed and examined the patient.  In addition, I discussed with the resident physician the patient's history, exam, assessment and plan in detail.  Topics listed in my addendum were the focus of the visit.  Healthcare maintenance was not addressed this visit unless listed as a topic in my addendum.  I agree with the plan as written along with the following additions/modifications:      Establishing Care      PMH: alcohol use, BCC, depression per chart, seizure per report      Symptomatic severe hypotension  -Patient is lightheaded at all times, worse when sitting up, dramatically worse when standing to the point where she had difficulty standing.  Serial blood pressure measurements from the left and right arms ranged in the 70s systolic to 80 systolic.  She is afebrile, etiology is not completely clear although patient reports very poor p.o. intake, perhaps 2 cups/day.  When I entered the room she was mentating okay, although was significantly lightheaded.  She is nontoxic-appearing.  She has a very faint 1 to 2 out of 6 systolic ejection murmur, lungs were clear although patient was not taking deep breaths.    Plan  -Recommended patient proceed to the emergency department immediately for IV fluids and further assessment.  Patient transported by EMS, direct signout given to EMS, also to [facility] transfer center and also ED physician Dr. De Paz, appreciate support.  -Unclear etiology, patient has hemoptysis with night sweats, may have infectious process, also has history of alcohol use with hemorrhage, may have a bleed, is also noting poor p.o. intake.  Given murmur patient could have infective endocarditis also, although afebrile and again was nontoxic-appearing.  -Of note, patient was recently admitted for a possible seizure, although perhaps this could also have been a syncopal episode given current clinical situation, patient  does report a history of seizures but also has a significant history of alcohol use, would benefit from neurology evaluation and follow-up after discharge.  -Return to clinic after discharge to fully establish care.

## 2022-07-11 NOTE — LETTER
July 11, 2022        Nathalie Lizabeth Jeff  1960      Current Outpatient Medications:   •  Venlafaxine HCl (EFFEXOR PO), Take  by mouth., Taking  •  levETIRAcetam, 500 mg, Oral, BID, Taking  •  gabapentin, 300 mg, Oral, TID, Taking  •  omeprazole, 20 mg, Oral, BID, Taking  •  thiamine, 100 mg, Oral, DAILY, Taking  •  naltrexone, 50 mg, Oral, DAILY (Patient not taking: Reported on 7/11/2022), Not Taking  •  acetaminophen, 650 mg, Oral, Q6HRS (Patient not taking: Reported on 7/11/2022), Not Taking                                  [facility]

## 2022-07-11 NOTE — PROGRESS NOTES
"Subjective     Nathalie Aguilar is a 61 y.o. female who presents with Establish Care (NU2U), Seizure (On 7/9/22 and another one 2 weeks prior to that), and Headache (For about 4 months)            Patient presents today for hospital follow-up as a new patient to the clinic.    Past medical history: Hypertension, alcohol use, basal cell carcinoma of the skin, depression, and anorexia.    Social history: Smokes 5 cigarettes a day and has been using tobacco products since she was a teenager.  Reports she has not had an alcoholic beverage in over 1 week but typically has half a pint of whiskey daily.  Reports intermittent marijuana use.  Reports no sexual partner at this time.    Surgical history: Gallbladder surgery 6 years ago, partial hysterectomy at age 40.    Family history: Patient reports none.    Meds: Keppra 500 mg twice daily, gabapentin 300 mg 3 times daily, Seroquel, Remeron, venlafaxine, clonazepam.    Allergies: Fish.    Immunizations; up-to-date per patient.    HPI  1. Hypotension, unspecified hypotension type   Patient presents with a blood pressure of 75/50 heart rate 87, repeat blood pressure on left arm improved slightly to 82/45 and then decreased to 74/49.  Patient reports lightheadedness and dizziness with standing, unable to form full sentences while standing and swaying.  Patient reports lightheadedness and dizziness has been occurring since 7/9/2022 when she was admitted to the emergency department and was managed for seizure episode.  Patient reports decreased oral intake today reporting, I have had \"a couple bites\" and nothing to drink.  Of note patient has a history of anorexia, which she reports is stable.  In office BMI is 19.42.  Patient reports taking the above medications.    2. Seizure (HCC)  Patient was admitted to the ED on 7/9 after being observed to have shaking and urinary incontinence while at the pool with the pepper mill.  Patient reports she has a history of seizure " "activity but was recently taking off her Keppra at the advice of a different physician.  Patient reports she lives alone but often visits with her daughter, and reports that she has not been witnessed to have any tremors or shakes that she is unaware of, denies any urinary incontinence and seizure activity.    Review of Systems   Constitutional: Positive for weight loss. Negative for chills and fever.   Respiratory: Negative for cough and shortness of breath.    Cardiovascular: Positive for chest pain (chest \"tightnesss\" since   10 years ago). Negative for palpitations.   Gastrointestinal: Positive for abdominal pain, blood in stool (was reason for ED visit on , no episodes since), nausea and vomiting (was in the ED on  for vomiting, sergio reports she does not why she went to the ED). Negative for constipation and diarrhea.   Psychiatric/Behavioral: Positive for depression and substance abuse. Negative for hallucinations and suicidal ideas. The patient is nervous/anxious.               Objective     BP (!) 74/49 (BP Location: Right arm, Patient Position: Sitting, BP Cuff Size: Small infant)   Pulse 87   Temp 37.2 °C (99 °F) (Temporal)   Ht 1.702 m (5' 7\")   Wt 56.2 kg (124 lb)   SpO2 97%   BMI 19.42 kg/m²      Physical Exam  Constitutional:       Appearance: She is ill-appearing. She is not toxic-appearing.   HENT:      Mouth/Throat:      Mouth: Mucous membranes are dry.      Comments: Oral membranes dry.  Eyes:      General: No scleral icterus.        Right eye: No discharge.         Left eye: No discharge.   Cardiovascular:      Rate and Rhythm: Normal rate.      Pulses: Normal pulses.      Heart sounds: Normal heart sounds.   Pulmonary:      Effort: Pulmonary effort is normal. No respiratory distress.      Breath sounds: Normal breath sounds.   Abdominal:      General: There is no distension.      Tenderness: There is no abdominal tenderness.   Musculoskeletal:         General: No " tenderness, deformity or signs of injury.      Cervical back: Normal range of motion and neck supple. No rigidity.      Right lower leg: No edema.      Left lower leg: No edema.   Skin:     General: Skin is warm and dry.      Coloration: Skin is not jaundiced.      Findings: No lesion.   Neurological:      Mental Status: She is disoriented.      Sensory: No sensory deficit.      Motor: Weakness present.      Comments: Patient unsteady on her feet, swing, and unable to stay balance while standing.                       Assessment & Plan        1. Hypotension, unspecified hypotension type   Patient's blood pressure primarily in the 70s and 80s in both arms while in office.  Patient reports lightheadedness and dizziness while sitting and standing.  Upon physical exam patient unable to remain standing on her feet without swaying and feeling like she was going to fall to the floor.  Patient was also unable to formulate sentences while standing.  Patient agreeable to proceed to the emergency department per EMS for further evaluation.  Hypotension possibly secondary to decreased oral intake, as patient reports having nothing to drink today and possibly 2 cups of liquid a day.  Patient with stable mentation while lying down on the exam table. She is not toxic appearing but looks ill.  Of note patient reports night sweats, hemoptysis for which she presented to the emergency department on 7/2/2022, and weight loss.  -IV rehydration per ED  -Assessment of fluid and electrolyte status through lab work  -Reevaluation in clinic upon discharge from the hospital    2. Seizure (HCC)  Patient was managed in the emergency department on 7 9/2022 after episode of tremors and urinary incontinence.  She was restarted on Keppra at this time.  Of note patient reports she has a history of seizures but had stopped taking her Keppra secondary to medical advice.  Patient reports no seizure activity since her emergency department visit.   Considering patient's low blood pressure in office and inability to stand it is possible that the seizure activity observed on the ninth was secondary to hypotension.  Patient was hypotensive and unable to stand without feeling extremely dizzy and lightheaded and was subsequently transported to emergency department unable to fully evaluate seizure activity after recent hospitalization at this visit.  -Continue Keppra 500 mg twice daily  -Appropriate fluid hydration daily with 6 to 8 cups of fluids  -To consider follow-up with neurology pending additional evaluation  -Proceed to the emergency department should patient suspect or be told that she exhibited seizure-like activity or behavior after discharge from the hospital    Follow-up: Patient to return to clinic for evaluation once discharged from the hospital.

## 2022-07-12 PROBLEM — K29.21 ALCOHOLIC GASTRITIS WITH HEMORRHAGE: Status: RESOLVED | Noted: 2022-07-03 | Resolved: 2022-07-12

## 2022-07-12 PROBLEM — Z71.89 ADVANCED CARE PLANNING/COUNSELING DISCUSSION: Status: RESOLVED | Noted: 2021-04-14 | Resolved: 2022-07-12

## 2022-07-12 PROBLEM — S00.33XA NASAL CONTUSION: Status: RESOLVED | Noted: 2022-05-04 | Resolved: 2022-07-12

## 2022-07-12 PROBLEM — F10.939 ALCOHOL WITHDRAWAL (HCC): Status: RESOLVED | Noted: 2022-07-03 | Resolved: 2022-07-12

## 2022-07-29 ENCOUNTER — OFFICE VISIT (OUTPATIENT)
Dept: INTERNAL MEDICINE | Facility: OTHER | Age: 62
End: 2022-07-29
Payer: MEDICAID

## 2022-07-29 VITALS
SYSTOLIC BLOOD PRESSURE: 82 MMHG | OXYGEN SATURATION: 93 % | HEART RATE: 99 BPM | HEIGHT: 67 IN | DIASTOLIC BLOOD PRESSURE: 61 MMHG | TEMPERATURE: 97.8 F | WEIGHT: 129 LBS | BODY MASS INDEX: 20.25 KG/M2

## 2022-07-29 DIAGNOSIS — I51.89 CARDIAC MASS: ICD-10-CM

## 2022-07-29 DIAGNOSIS — Z79.899 POLYPHARMACY: ICD-10-CM

## 2022-07-29 DIAGNOSIS — I95.9 HYPOTENSION, UNSPECIFIED HYPOTENSION TYPE: ICD-10-CM

## 2022-07-29 DIAGNOSIS — R41.89 COGNITIVE DECLINE: ICD-10-CM

## 2022-07-29 PROBLEM — E87.8 ELECTROLYTE DISTURBANCE: Status: RESOLVED | Noted: 2021-04-14 | Resolved: 2022-07-29

## 2022-07-29 PROBLEM — F10.939 ALCOHOL WITHDRAWAL SYNDROME WITH COMPLICATION (HCC): Status: RESOLVED | Noted: 2021-04-14 | Resolved: 2022-07-29

## 2022-07-29 PROCEDURE — 99214 OFFICE O/P EST MOD 30 MIN: CPT | Mod: GC | Performed by: STUDENT IN AN ORGANIZED HEALTH CARE EDUCATION/TRAINING PROGRAM

## 2022-07-29 RX ORDER — THIAMINE MONONITRATE (VIT B1) 100 MG
TABLET ORAL
COMMUNITY
Start: 2022-07-06

## 2022-07-29 RX ORDER — GABAPENTIN 100 MG/1
100 CAPSULE ORAL 2 TIMES DAILY
COMMUNITY
Start: 2022-06-08 | End: 2022-07-29

## 2022-07-29 RX ORDER — QUETIAPINE FUMARATE 400 MG/1
TABLET, FILM COATED ORAL
COMMUNITY
Start: 2022-07-26

## 2022-07-29 RX ORDER — BUSPIRONE HYDROCHLORIDE 15 MG/1
15 TABLET ORAL 2 TIMES DAILY
COMMUNITY
Start: 2022-06-02

## 2022-07-29 RX ORDER — CLONAZEPAM 1 MG/1
1 TABLET ORAL 4 TIMES DAILY
COMMUNITY
Start: 2022-07-10

## 2022-07-29 RX ORDER — MIRTAZAPINE 45 MG/1
TABLET, FILM COATED ORAL
COMMUNITY
Start: 2022-07-15

## 2022-07-29 ASSESSMENT — FIBROSIS 4 INDEX: FIB4 SCORE: 2.09

## 2022-07-29 NOTE — ASSESSMENT & PLAN NOTE
- Patient requests refill of gabapentin for neuropathy, defer at this time due to polypharmacy, high risk of sedation and has history of drug overdose

## 2022-07-29 NOTE — ASSESSMENT & PLAN NOTE
- right atrial mass on echo in 2020, at the time, recommended cardiac CT/MRI for further evaluation   - Repeat Echo  - Cardiology referral sent

## 2022-07-29 NOTE — ASSESSMENT & PLAN NOTE
- asymptomatic currently, unclear etiology. Previous bp has been in normal range ?poor oral intake vs polypharmacy   - last Echo in 2020 w/ normal LVEF, does not appear to be in volume overload. Repeat for further evaluation    - r/o anemia, adrenal insufficiency, thyroid screen   - Recommends reducing seroquel to 200mg at night and close follow up with psychiatry  - Recommend avoiding alcohol, substance use   - follow up next week after labs done

## 2022-07-29 NOTE — ASSESSMENT & PLAN NOTE
"- patient c/o \"alex does not work well\" and request neurology referral   - suspect alcohol related component, will need further clarification at next visit  "

## 2022-07-29 NOTE — PROGRESS NOTES
"Nathalie Aguilar is a 61 y.o. 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     Initial bp 65/53. On retake 95/63, orthostatic vitals negative. Patient denies any dizziness, HA, chest pain, SOB.     She reports that she was discharge from inpatient detox 2 days ago ([facility]) and is currently staying a motel. Report last drink last week prior to detox. Reports mostly feeling tired.   Patient presented on 7/11 also with hypotension, sent to ED with EMS, patient left prior to arrival to ED.     Discussed danger of hypotension, especially in context of her previous seizures/syncope episode, recommended ED evaluation. Patient declines, states \"I've been like this all my life.\" Discussed stat CBC and CMP to r/o immediate anemia, electrolytes derangement, offered cab voucher to and from lab office. Patient states that she is unable to go get lab work done today as her family is waiting for her. She adamantly declined transportation assistance. She states she will get lab work done Monday and promises to return to clinic next week for follow up.     Discussed her polypharmacy, particularly Seroquel that could be contributing to her hypotension. Encourage continued alcohol abstinence. Recommend decreasing Seroquel to 200mg at night and close follow up with Dr. Rao, psychiatrist. Patient agrees to plan.     Lab order given to patient, she left without making follow up.      Current medicines (including changes today)  Current Outpatient Medications   Medication Sig Dispense Refill   • clonazePAM (KLONOPIN) 1 MG Tab Take 1 mg by mouth 4 times a day.     • mirtazapine (REMERON) 45 MG tablet      • quetiapine (SEROQUEL) 400 MG tablet      • Venlafaxine HCl (EFFEXOR PO) Take  by mouth.     • " "levETIRAcetam (KEPPRA) 500 MG Tab Take 1 Tablet by mouth 2 times a day. 60 Tablet 0   • thiamine (THIAMINE) 100 MG tablet Take 1 Tablet by mouth every day. 30 Tablet 0   • acetaminophen (TYLENOL) 325 MG Tab Take 2 Tablets by mouth every 6 hours. 30 Tablet 0     No current facility-administered medications for this visit.     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  She  has a past surgical history that includes gyn surgery; incision and drainage orthopedic (Right, 1/25/2019); and cholecystectomy.  Social History     Tobacco Use   • Smoking status: Current Every Day Smoker     Packs/day: 0.50   • Smokeless tobacco: Never Used   Vaping Use   • Vaping Use: Never used   Substance Use Topics   • Alcohol use: Not Currently     Comment: two weeks since   • Drug use: Not Currently     Types: Marijuana, Inhaled     Comment: history of heroin use     Social History     Social History Narrative   • Not on file     Family History   Problem Relation Age of Onset   • No Known Problems Mother    • Alcohol abuse Father    • Heart Disease Neg Hx      Family Status   Relation Name Status   • Mo  (Not Specified)   • Fa  (Not Specified)   • Neg Hx  (Not Specified)       ROS    The pertinent  ROS findings can be seen in the HPI above.     All other systems reviewed and are negative     Objective:     BP (!) 82/61   Pulse 99   Temp 36.6 °C (97.8 °F) (Temporal)   Ht 1.702 m (5' 7\")   Wt 58.5 kg (129 lb)   SpO2 93%  Body mass index is 20.2 kg/m².        Physical Exam:    Constitutional: Alert, no distress.  Skin: No suspicious lesions  Eye: Equal, round and reactive, conjunctiva clear, lids normal.  ENMT: Lips without lesions, good dentition, oropharynx " "clear.  Neck: Trachea midline, no masses, no thyromegaly. No cervical or supraclavicular lymphadenopathy.  Respiratory: Unlabored respiratory effort, lungs clear to auscultation, no wheezes, no ronchi.  Cardiovascular: Normal S1, S2, no murmur, no edema  Abdomen: Soft, non-tender, no masses, no hepatosplenomegaly.  Musculoskeletal: Adequately aligned spine. ROM intact spine and extremities. No joint erythema or tenderness. Normal muscular development. Normal gait  Neuro: CN II-XII intact. Reflexes 2+ throughout. Cerebellar testing normal. Normal and equal strength in all extremities  Psych: Mood stable, affect appropriate, thoughts and speech appropriate        Assessment and Plan:   Hypotension  - asymptomatic currently, unclear etiology. Previous bp has been in normal range ?poor oral intake vs polypharmacy   - last Echo in 2020 w/ normal LVEF, does not appear to be in volume overload. Repeat for further evaluation    - r/o anemia, adrenal insufficiency, thyroid screen   - Recommends reducing seroquel to 200mg at night and close follow up with psychiatry  - Recommend avoiding alcohol, substance use   - follow up next week after labs done     Cardiac mass  - right atrial mass on echo in 2020, at the time, recommended cardiac CT/MRI for further evaluation   - Repeat Echo  - Cardiology referral sent     Cognitive decline  - patient c/o \"alex does not work well\" and request neurology referral   - suspect alcohol related component, will need further clarification at next visit    Polypharmacy  - Patient requests refill of gabapentin for neuropathy, defer at this time due to polypharmacy, high risk of sedation and has history of drug overdose      Instructed to Follow up in clinic or ER for worsening symptoms, difficulty breathing, lack of expected recovery, or should new symptoms or problems arise.    Followup: Return in about 1 week (around 8/5/2022).               "

## 2023-03-09 NOTE — PROGRESS NOTES
Critical Care Progress Note    Date of admission  4/13/2021    Chief Complaint  Altered level of consciousness and FTT    Hospital Course  Ms. Aguilar is a 60-year-old female with PMH significant for cholecystectomy, tobacco dependence, EtOH dependence (1 pint/day) and IVDU (heroin) who presented via EMS 4/13/2021 with acute loss of consciousness and failure to thrive. A couple of days prior to presentation, she reportedly fell in the bathroom.  Day of presentation she was found by her daughter, naked, altered and covered in feces.  Suspected she was down for at least 24 hours.  CT head showed acute/subacute bilateral SDH left greater than right with significant bifrontal cerebral atrophy. Neurosurgery consulted and recommend non-operative management.     Pertinent admission labs: Lactic acid 13, UDS positive benzos and cannabinoids, . Elevated liver enzymes, Hepatitis panel (-). RUQ US unremarkable showing fatty liver and trace ascites.    4/14 - seizure --> asystole, Code Blue ROSC after 2nd pulse check. Intubated. Continuous EEG. ECHO found mass right atrium. Unable to do cardiac MRI at this time.     4/15 - bloody stool overnight. Hgb 7.7. INR 1.28, receiving vitamin K. Protonix drip. GI consult, medical management for now. Hgb 8.9 after 1 RBC.     Interval Problem Update  Reviewed last 24 hour events:              - acute events overnight: required ongoing electrolyte replacement. Hgb 8.6 - only 1 melena stool.               - Tm: 100.5              - HR: 's              - SBP: 100-130's Norepi off and on              - Neuro: withdraws all extremities. Does not follow commands.               - GI: Cortrack. OK to start tube feedings today per GI              - I/O: 2100/660 (+9200 since admit)              - Castro: yes              - Lines: LIJ TLC, right fem art line, LUE single lumen PICC              - PPx: Protonix              - CXR (personally reviewed): no acute changes              -  Antibiotic Day: 3/5 Rocephin for E. Coli UTI   - SAT/SBT: yes/yes. Unable to follow commands.    - Mobility: 1   - Goals of Care: family meeting with VIELKA HIGGINS, Palliative Care tomorrow @ 1100.     Review of Systems  Review of Systems   Unable to perform ROS: Intubated        Vital Signs for last 24 hours   Temp:  [36.2 °C (97.2 °F)-38.1 °C (100.5 °F)] 36.7 °C (98 °F)  Pulse:  [] 116  Resp:  [14-43] 24  BP: ()/(56-79) 105/67  SpO2:  [91 %-100 %] 97 %    Hemodynamic parameters for last 24 hours       Respiratory Information for the last 24 hours  Vent Mode: PSIMV-APV  Rate (breaths/min): 16  Vt Target (mL): 390  PEEP/CPAP: 10  P Support: 10  MAP: 12  Length of Weaning Trial (Hours): 2 hours   Control VTE (exp VT): 610    Physical Exam   Physical Exam  Vitals and nursing note reviewed.   Constitutional:       General: She is in acute distress.      Appearance: She is underweight. She is ill-appearing. She is not toxic-appearing.      Interventions: She is intubated.   HENT:      Mouth/Throat:      Lips: Pink.      Mouth: Mucous membranes are moist.   Eyes:      Comments: PEERL 5mm      Cardiovascular:      Rate and Rhythm: Regular rhythm.      Pulses: No decreased pulses.           Dorsalis pedis pulses are 2+ on the right side and 2+ on the left side.      Heart sounds: Heart sounds are distant.      Comments: Bilateral hand edema 1-2+  Pulmonary:      Effort: Pulmonary effort is normal. She is intubated.      Breath sounds: No wheezing or rhonchi.   Abdominal:      General: Bowel sounds are decreased.      Palpations: Abdomen is soft.   Genitourinary:     Comments: Matthew  Musculoskeletal:      Right lower le+ Edema present.      Left lower le+ Edema present.      Comments: LONNY  Does not follow commands   Skin:     General: Skin is cool.      Capillary Refill: Capillary refill takes 2 to 3 seconds.      Comments: Bruises in various stages of healing throughout   Neurological:      GCS: GCS eye  subscore is 4. GCS verbal subscore is 1. GCS motor subscore is 4.      Cranial Nerves: Cranial nerve deficit present.      Motor: Atrophy, abnormal muscle tone and seizure activity present.      Comments: 9T  Opens eyes, does not follow commands  MUKHERJEE - withdrawals to pain    Continuous EEG   Psychiatric:      Comments: Intubated    Agitated at times         Medications  Current Facility-Administered Medications   Medication Dose Route Frequency Provider Last Rate Last Admin   • omeprazole (FIRST-OMEPRAZOLE) 2 mg/mL oral susp 40 mg  40 mg Enteral Tube Q12HRS Argenis Stapleton, A.P.R.N.       • magnesium sulfate IVPB premix 4 g  4 g Intravenous Once Argenis Stapleton, A.P.R.N. 25 mL/hr at 04/16/21 1119 4 g at 04/16/21 1119   • sulfamethoxazole-trimethoprim (BACTRIM) 400-80 MG per tablet 1 tablet  1 tablet Enteral Tube Q12HRS Argenis Stapleton, A.P.R.N.       • levETIRAcetam (Keppra) 1000 mg in 100 mL NaCl IV premix  1,000 mg Intravenous Q12HRS Argenis Stapleton, A.P.R.N.   Stopped at 04/16/21 0520   • propofol (DIPRIVAN) injection  0-50 mcg/kg/min Intravenous Continuous Argenis Stapleton, A.P.R.N.   Stopped at 04/16/21 0620   • Pharmacy Consult: Enteral tube insertion - review meds/change route/product selection  1 Each Other PHARMACY TO DOSE Fatmata Funez M.D.       • multivitamin (THERAGRAN) tablet 1 tablet  1 tablet Enteral Tube DAILY Ventura Still, D.O.   1 tablet at 04/16/21 0508    And   • folic acid (FOLVITE) tablet 1 mg  1 mg Enteral Tube DAILY Ventura Still, D.O.   1 mg at 04/16/21 0508   • magnesium oxide (MAG-OX) tablet 400 mg  400 mg Enteral Tube BID Ventura Still, D.O.   400 mg at 04/16/21 0508   • acetaminophen (Tylenol) tablet 650 mg  650 mg Enteral Tube Q6HRS PRN Ventura Still D.O.   650 mg at 04/15/21 2037   • norepinephrine (Levophed) infusion 8 mg/250 mL (premix)  0-30 mcg/min Intravenous Continuous Tania Camarillo M.D.   Stopped at 04/16/21 0704   • MD Alert...ICU Electrolyte Replacement per Pharmacy   Other  PHARMACY TO DOSE Argenis Stapleton, A.P.R.N.       • K+ Scale: Goal of 4.5  1 Each Intravenous Q6HRS Tania Camarillo M.D.   1 Each at 04/16/21 1200   • Respiratory Therapy Consult   Nebulization Continuous RT Argenis Stapleton, A.P.R.N.       • senna-docusate (PERICOLACE or SENOKOT S) 8.6-50 MG per tablet 2 tablet  2 tablet Enteral Tube BID Argenis Stapleton, A.P.R.N.   Stopped at 04/15/21 0600    And   • polyethylene glycol/lytes (MIRALAX) PACKET 1 Packet  1 Packet Enteral Tube QDAY PRN Argenis Stapleton, A.P.R.N.        And   • magnesium hydroxide (MILK OF MAGNESIA) suspension 30 mL  30 mL Enteral Tube QDAY PRN Argenis Stapleton, A.P.R.N.        And   • bisacodyl (DULCOLAX) suppository 10 mg  10 mg Rectal QDAY PRN Argenis Stapleton, A.P.R.N.       • lidocaine (XYLOCAINE) 1 % injection 1-2 mL  1-2 mL Tracheal Tube Q30 MIN PRN Argenis Stapleton, A.P.R.N.       • fentaNYL (SUBLIMAZE) injection 25 mcg  25 mcg Intravenous Q HOUR PRN Tania Camarillo M.D.        Or   • fentaNYL (SUBLIMAZE) injection 50 mcg  50 mcg Intravenous Q HOUR PRN Tania Camarillo M.D.        Or   • fentaNYL (SUBLIMAZE) injection 100 mcg  100 mcg Intravenous Q HOUR PRN Tania Camarillo M.D.       • fentaNYL (SUBLIMAZE) 50 mcg/mL in 50mL   Intravenous Continuous Tania Camarillo M.D.   Stopped at 04/14/21 1815   • dextrose 50% (D50W) injection 50 mL  50 mL Intravenous Q15 MIN PRN Ventura Still D.O.       • nicotine (NICODERM) 14 MG/24HR 14 mg  14 mg Transdermal Daily-0600 Francesco Bernstein M.D.   14 mg at 04/16/21 0509       Fluids    Intake/Output Summary (Last 24 hours) at 4/16/2021 1428  Last data filed at 4/16/2021 1200  Gross per 24 hour   Intake 1540.7 ml   Output 660 ml   Net 880.7 ml       Laboratory  Recent Labs     04/14/21  1303 04/15/21  0247 04/16/21  0114   ISTATAPH 7.444 7.490 7.495   ISTATAPCO2 33.5 36.2 31.4   ISTATAPO2 99* 91* 73   ISTATATCO2 24 29 25   LFTJYUS1IRU 98 98 96   ISTATARTHCO3 23.0 27.6* 24.2   ISTATARTBE -1 4* 1   ISTATTEMP 98.8 F 97.0 F 97.6 F    ISTATFIO2 35 40 40   ISTATSPEC Arterial Arterial Arterial   ISTATAPHTC 7.442 7.504* 7.503*   ETVMDQPB7GO 100* 86 71     Recent Labs     04/13/21  1845 04/14/21  0249   CPKTOTAL 96 88     Recent Labs     04/15/21  1607 04/15/21  1607 04/15/21  2200 04/16/21  0000 04/16/21  0408 04/16/21  0925   SODIUM 141   < > 142  --  141 135   POTASSIUM 3.7   < > 3.7  --  4.3 4.1   CHLORIDE 106   < > 107  --  106 101   CO2 22   < > 23  --  22 25   BUN 9   < > 8  --  7* 7*   CREATININE 0.21*   < > 0.18*  --  0.17* 0.19*   MAGNESIUM 2.0  --   --  1.7  --  1.6   PHOSPHORUS 2.6  --   --  1.6*  --  3.5   CALCIUM 6.6*   < > 6.7*  --  8.6 7.8*    < > = values in this interval not displayed.     Recent Labs     04/13/21  1845 04/13/21  1845 04/14/21  0249 04/14/21  0249 04/14/21  0545 04/14/21  0545 04/14/21  1157 04/14/21  1555 04/15/21  0245 04/15/21  0927 04/15/21  2200 04/16/21  0408 04/16/21  0925   ALTSGPT 87*  --  62*  --  83*  --   --   --   --   --   --   --   --    ASTSGOT 220*  --  153*  --  238*  --   --   --   --   --   --   --   --    ALKPHOSPHAT 167*  --  117*  --  144*  --   --   --   --   --   --   --   --    TBILIRUBIN 5.0*  --  3.6*  --  3.7*  --   --   --   --   --   --   --   --    PREALBUMIN  --   --   --   --  10.6*  --  10.0*  --  9.7*  --   --   --   --    GLUCOSE 99   < > 71   < > 98   < > 178*   < > 106*   < > 93 96 106*    < > = values in this interval not displayed.     Recent Labs     04/13/21  1845 04/13/21  1845 04/14/21 0249 04/14/21 0249 04/14/21  0545 04/15/21  0245 04/16/21  0925   WBC 4.9   < > 3.8*   < > 3.7* 3.4* 4.0*   NEUTSPOLYS 86.10*   < > 84.20*   < > 80.50* 71.40 59.70   LYMPHOCYTES 8.80*   < > 10.00*   < > 15.20* 23.00 27.50   MONOCYTES 4.10   < > 4.70   < > 2.40 3.50 6.40   EOSINOPHILS 0.00   < > 0.00   < > 0.00 0.60 1.20   BASOPHILS 0.20   < > 0.00   < > 0.00 0.00 0.20   ASTSGOT 220*  --  153*  --  238*  --   --    ALTSGPT 87*  --  62*  --  83*  --   --    ALKPHOSPHAT 167*  --  117*  --   144*  --   --    TBILIRUBIN 5.0*  --  3.6*  --  3.7*  --   --     < > = values in this interval not displayed.     Recent Labs     04/13/21  1845 04/14/21 0249 04/14/21  0545 04/14/21  0545 04/14/21  1952 04/14/21  1952 04/15/21  0245 04/15/21  0927 04/15/21  2200 04/16/21  0408 04/16/21  0615 04/16/21  0925   RBC 3.00*   < > 2.42*  --   --   --  2.12*  --   --   --   --  2.36*   HEMOGLOBIN 11.0*   < > 8.9*   < > 7.9*   < > 7.7*   < > 8.1* 8.6*  --  8.2*  8.1*   HEMATOCRIT 32.6*   < > 26.5*   < > 23.6*  --  22.8*  --   --   --   --  24.6*   PLATELETCT 145*   < > 109*  --   --   --  82*  --   --   --   --  60*   PROTHROMBTM 20.1*  --   --   --   --   --  16.4*  --   --   --  14.5  --    APTT 35.3  --   --   --   --   --   --   --   --   --   --   --    INR 1.66*  --   --   --   --   --  1.28*  --   --   --  1.10  --     < > = values in this interval not displayed.       Imaging  X-Ray:  I have personally reviewed the images and compared with prior images.    Assessment/Plan  * SDH (subdural hematoma) (HCC)- (present on admission)  Assessment & Plan  -Patient with significant alcohol history - on presentation  -Sustained a fall, striking head at home 2 days prior to presentation  -Imaging showed bilateral subdural hematomas L>R  -INR elevated.  Not currently on anticoagulation.  She has received 3 doses vitamin K  -Also with new onset seizures. Continue BID Keppra  -Every 2 neurochecks  -VTE currently contraindicated  -PT/OT/SLP once hemodynamically stable    Alcohol withdrawal syndrome with complication (HCC)- (present on admission)  Assessment & Plan  -with acute intoxication on admission  -she has received Rally bag  -high-dose Thiamine IV x 3 days, then PO daily  -MVI, folate daily  -Hepatitis panel negative  -cessation education and counseling when able  -seizure precautions - continuous EEG     Protein-calorie malnutrition, severe (HCC)- (present on admission)  Assessment & Plan  -Body mass index is  "18.7 kg/m².  -History of significant EtOH abuse  -OK to start tube feedings today per GI  -High risk for refeeding syndrome.  Monitor closely  -Daily MVI, folate and thiamine     Transaminitis- (present on admission)  Assessment & Plan  -Likely from chronic alcohol use  -Hepatitis panel negative  -RUQ US pending  -Follow labs    Lactic acidosis- (present on admission)  Assessment & Plan  -Suspect from seizure-like activity and cardiac arrest  -Trending down, stop trending    Electrolyte disturbance- (present on admission)  Assessment & Plan  -Hypokalemia, hypomagnesemia, hypophosphatemia, hypomagnesemia  -Replacing all per ICU protocol  -Trending BMPs    Thrombocytopenia (HCC)  Assessment & Plan  -multifactorial due to history of ETOH abuse, vitamin deficiencies, liver disease  -trending down  -monitor for bleeding  -follow CBC    UTI due to extended-spectrum beta lactamase (ESBL) producing Escherichia coli- (present on admission)  Assessment & Plan  -present on admission-found down, altered at home - had been incontinent feces  -day 3/5 Rocephin --> Meropenem. ID consult.  -low-grade fever, no leukocytosis    Melena  Assessment & Plan  -1 stool overnight  -see \"GI Bleed\"  -GI signed off    GI bleed  Assessment & Plan  -suspected  -GI consult - no intervention at this time. Should Hgb continue to drop or patient begin having more bloody stools, would consider EGD/colonoscopy  -continue PPI  -continue to trend Hgb    Shock (HCC)  Assessment & Plan  -Multifactorial: Cardiogenic shock post cardiac arrest. Hypovolemic shock  -Status post 4 L fluid resuscitation  -Vasopressor support to maintain MAP greater than 65  -Continuous hemodynamic monitoring    Acute respiratory failure (HCC)  Assessment & Plan  -bilateral SDH with seizure like activity  -intubated post-cardiac arrest  -Lung protective ventilation strategies  -Low suspicion for PNA clinically, but infiltrate on CXR so will leave doxy for now and send trach " "aspirate  -Titrate ventilator prescription to optimize oxygenation, ventilation, and acid base balance.  -Goal PCO2 35-40  -Daily SAT/SBT trials currently contraindicated  -Chlorhexidine  -RT protocols    Cardiac arrest (HCC)  Assessment & Plan  -4/14 asystole - suspected due to seizures  -ROSC after 2 rounds CPR  -intubated  -ECHO nml EF, no valvular abnormalities reported  -target-temperature management not indicated at this time. Data does not support in-hospital TTM. Would consider cooling to maintain normothermia if spikes fevers  -code status discussed with daughter - DNR       QT prolongation- (present on admission)  Assessment & Plan  -537 on EKG, repeat EKG post cardiac arrest  -Repeat , 512  -Continuous telemetry monitoring  -Repeat a.m. 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 Repeat US ordered for 4/17.  -Cardiology consulted: \"Right atrial mass is likely a prominent eustachian valve but cannot exclude malignancy the most appropriate test is a cardiac MRI.  Clearly she is not in clinical state to get a cardiac MRI as you have to hold still for 40 minutes with breath holds so if she gets to this clinical state try to arrange a cardiac MRI for evaluation of this otherwise could be done as an outpatient\".              VTE:  Contraindicated  Ulcer: PPI  Lines: Central Line  Ongoing indication addressed, Arterial Line  Ongoing indication addressed and Castro Catheter  Ongoing indication addressed    I have performed a physical exam and reviewed and updated ROS and Plan today " (4/16/2021). In review of yesterday's note (4/15/2021), there are no changes except as documented above.     Discussed patient condition and risk of morbidity and/or mortality with Family, RN, RT, Pharmacy, Dietary, , Charge nurse / hot rounds, Patient and neurology, GI, Palliative Care.    The patient remains critically ill.  Critical care time = 58 minutes in directly providing and coordinating critical care and extensive data review.  No time overlap and excludes procedures.    Please note that this dictation was created using voice recognition software. I have made every reasonable attempt to correct obvious errors, but there may be errors of grammar and possibly content that I did not discover before finalizing the note.    GELY Harvey.     Consent: Written consent obtained. Risks include but not limited to lid/brow ptosis, bruising, swelling, diplopia, temporary effect, incomplete chemical denervation.

## 2023-04-14 ENCOUNTER — APPOINTMENT (OUTPATIENT)
Dept: URGENT CARE | Facility: PHYSICIAN GROUP | Age: 63
End: 2023-04-14
Payer: MEDICAID

## 2023-08-01 NOTE — CONSULTS
"Reason for PC Consult: Advance Care Planning    Consulted by: Argenis VORA     Assessment:  General: 61 yo female admitted on 4/13/2021 for SDH  PMH: Asthma, Cancer, Heroin addiction, Pneumonia, Psychiatric disorder.    Pt presented to the ED after being found down by her daughter, naked on the floor and covered in feces with ALOC.   Head CT shows \"Bilateral subdural hematomas measure up to 8.3 mm on the left and 5 mm on the right. Left frontal subarachnoid blood is also seen. No midline shift. Soft tissue swelling of the right cheek.\"     Social: Pts daughter stated that her mother had been complaining of abdominal symptoms for approximately a week prior to current admission. She reports that her mother is an active drinker.     Consults:   Critical Care  Neurosurgery  Neurology  Gastroenterology  Cardiology  Palliative care     Dyspnea: Yes- intubated  Last BM: 04/16/21-    Pain: Unable to determine-    Depression: Unable to determine-    Dementia: Unable to Determine;       Spiritual:  Is Hinduism or spirituality important for coping with this illness? No- Per pts daughter, pt is not \"very spiritual\"  Has a  or spiritual provider visit been requested? No    Palliative Performance Scale: 20%    Advance Directive: None on file.     DPOA: No, NOK Kerline Stoner [phone], daughter  POLST: None on file      Code Status: DNR/ I ok     Outcome:  Received consult, arrived at the bedside. Pt remains intubated. Discussed pt with Dr Camarillo and VIELKA Stapleton. Request for family meeting for further discussion of pts POC/GOC.    Called and spoke with ps daughter, Kerline. Introduced self and the role of Palliative Care. Spoke about her mother and her current condition. Kerline expressed sadness over situation and stated that her Grandmother Pat, pts mother, is currently driving up from [place].   Kerline stated that she has been keeping Pat updated but feels inadequate for the task. Spoke with Kerline on Medical Team " "request for family meeting, possibly tomorrow. Kerline in agreement and feels that this will help her greatly along with her Grandmother to be able to sit with the MDs and discuss first hand.   Discussed time frame for MD Morning Rounds, Kerline requested for meeting at 11:00 AM. Let her know that I will update the Medical Team.   Asked about her mothers face sheet which states that she is a Rastafarian and asked if she would like for the  or the Rastafarian Father to see her mother. Kerline stated that she is unsure why that is on her mothers Face Sheet and that she is \"not really very spiritual\". She states that perhaps her Grandmother will like to have some Spiritual support,  And that she will ask her when she arrives later today.   Let her know that our Chaplains are always here for them and to just ask.   Provided with Palliative contact number and agreed to meet at the bedside tomorrow with the meeting arranged for 11:00.     Recommendations: I do not recommend an ethics or hospice consult at this time because family meeting arranged for tomorrow 4/17, at 11:00, for further discussion on POC/GOC.    Updated: Argenis Haile, Unit  BRITNEY Dyson     Plan: Family meeting tomorrow, 4/17 at 11:00.     Thank you for allowing Palliative Care to participate in this patient's care. Please feel free to call [phone] with any questions or concerns.  " Encounter for repeat ultrasound for fetal heart rate

## 2023-10-03 NOTE — CARE PLAN
Problem: Safety  Goal: Will remain free from falls  Outcome: PROGRESSING AS EXPECTED  Note: Fall precautions in place,uses call light for assistance.     Problem: Skin Integrity  Goal: Risk for impaired skin integrity will decrease  Outcome: PROGRESSING SLOWER THAN EXPECTED  Note: Turn & reposition independently.ambulated with fww & sba.barrier cream in sacrum/kathrin area.      Topical Sulfur Applications Pregnancy And Lactation Text: This medication is considered safe during pregnancy and breast feeding secondary to limited systemic absorption.

## (undated) DEVICE — KIT ROOM DECONTAMINATION

## (undated) DEVICE — SUCTION INSTRUMENT YANKAUER BULBOUS TIP W/O VENT (50EA/CA)

## (undated) DEVICE — SET EXTENSION WITH 2 PORTS (48EA/CA) ***PART #2C8610 IS A SUBSTITUTE*****

## (undated) DEVICE — DRAPE LOWER EXTREMETY - (6/CA)

## (undated) DEVICE — ELECTRODE 850 FOAM ADHESIVE - HYDROGEL RADIOTRNSPRNT (50/PK)

## (undated) DEVICE — CANISTER SUCTION 3000ML MECHANICAL FILTER AUTO SHUTOFF MEDI-VAC NONSTERILE LF DISP  (40EA/CA)

## (undated) DEVICE — BANDAGE STERILE 2 IN X 75 IN (12EA/BX 8BX/CA)

## (undated) DEVICE — WATER IRRIG. STER. 1500 ML - (9/CA)

## (undated) DEVICE — GOWN WARMING STANDARD FLEX - (30/CA)

## (undated) DEVICE — ELECTRODE DUAL RETURN W/ CORD - (50/PK)

## (undated) DEVICE — GLOVE BIOGEL SZ 7.5 SURGICAL PF LTX - (50PR/BX 4BX/CA)

## (undated) DEVICE — SLEEVE, VASO, THIGH, MED

## (undated) DEVICE — GLOVE BIOGEL PI INDICATOR SZ 7.0 SURGICAL PF LF - (50/BX 4BX/CA)

## (undated) DEVICE — SENSOR SPO2 NEO LNCS ADHESIVE (20/BX) SEE USER NOTES

## (undated) DEVICE — KIT ANESTHESIA W/CIRCUIT & 3/LT BAG W/FILTER (20EA/CA)

## (undated) DEVICE — PACK MAJOR ORTHO - (2EA/CA)

## (undated) DEVICE — SET LEADWIRE 5 LEAD BEDSIDE DISPOSABLE ECG (1SET OF 5/EA)

## (undated) DEVICE — GLOVE BIOGEL PI ORTHO SZ 6 1/2 SURGICAL PF LF (40PR/BX)

## (undated) DEVICE — CHLORAPREP 26 ML APPLICATOR - ORANGE TINT(25/CA)

## (undated) DEVICE — NEPTUNE 4 PORT MANIFOLD - (20/PK)

## (undated) DEVICE — SUTURE 3-0 ETHILON FS-1 - (36/BX) 30 INCH

## (undated) DEVICE — TUBING CLEARLINK DUO-VENT - C-FLO (48EA/CA)

## (undated) DEVICE — PADDING CAST 6 IN STERILE - 6 X 4 YDS (24/CA)

## (undated) DEVICE — SODIUM CHL IRRIGATION 0.9% 1000ML (12EA/CA)

## (undated) DEVICE — LACTATED RINGERS INJ 1000 ML - (14EA/CA 60CA/PF)

## (undated) DEVICE — PROTECTOR ULNA NERVE - (36PR/CA)

## (undated) DEVICE — GLOVE BIOGEL INDICATOR SZ 8 SURGICAL PF LTX - (50/BX 4BX/CA)

## (undated) DEVICE — HEAD HOLDER JUNIOR/ADULT

## (undated) DEVICE — MASK, LARYNGEAL AIRWAY #4

## (undated) DEVICE — SUTURE GENERAL

## (undated) DEVICE — DRAPE SURGICAL U 77X120 - (10/CA)

## (undated) DEVICE — MASK ANESTHESIA ADULT  - (100/CA)